# Patient Record
Sex: MALE | Race: BLACK OR AFRICAN AMERICAN | Employment: OTHER | URBAN - METROPOLITAN AREA
[De-identification: names, ages, dates, MRNs, and addresses within clinical notes are randomized per-mention and may not be internally consistent; named-entity substitution may affect disease eponyms.]

---

## 2018-12-26 ENCOUNTER — HOSPITAL ENCOUNTER (OUTPATIENT)
Age: 68
Discharge: HOME | End: 2018-12-28

## 2018-12-26 PROCEDURE — 88331 PATH CONSLTJ SURG 1 BLK 1SPC: CPT

## 2018-12-26 PROCEDURE — 88305 TISSUE EXAM BY PATHOLOGIST: CPT

## 2018-12-26 PROCEDURE — 88307 TISSUE EXAM BY PATHOLOGIST: CPT

## 2018-12-26 PROCEDURE — 88182 CELL MARKER STUDY: CPT

## 2018-12-26 PROCEDURE — 88184 FLOWCYTOMETRY/ TC 1 MARKER: CPT

## 2018-12-26 PROCEDURE — 88185 FLOWCYTOMETRY/TC ADD-ON: CPT

## 2018-12-28 LAB
Lab: NORMAL
REPORT: NORMAL
THIS TEST SENT TO: NORMAL

## 2022-01-07 ENCOUNTER — HOSPITAL ENCOUNTER (EMERGENCY)
Age: 72
Discharge: HOME | End: 2022-01-07
Attending: EMERGENCY MEDICINE
Payer: COMMERCIAL

## 2022-01-07 ENCOUNTER — APPOINTMENT (OUTPATIENT)
Dept: GENERAL RADIOLOGY | Age: 72
End: 2022-01-07
Payer: COMMERCIAL

## 2022-01-07 VITALS
RESPIRATION RATE: 17 BRPM | HEART RATE: 87 BPM | HEIGHT: 68 IN | BODY MASS INDEX: 19.7 KG/M2 | DIASTOLIC BLOOD PRESSURE: 76 MMHG | SYSTOLIC BLOOD PRESSURE: 148 MMHG | WEIGHT: 130 LBS | TEMPERATURE: 97.5 F | OXYGEN SATURATION: 96 %

## 2022-01-07 DIAGNOSIS — M25.511 ACUTE PAIN OF RIGHT SHOULDER: Primary | ICD-10-CM

## 2022-01-07 LAB
ANION GAP SERPL CALCULATED.3IONS-SCNC: 10 MMOL/L (ref 7–16)
BASOPHILS ABSOLUTE: 0.07 E9/L (ref 0–0.2)
BASOPHILS RELATIVE PERCENT: 0.9 % (ref 0–2)
BUN BLDV-MCNC: 8 MG/DL (ref 6–23)
CALCIUM SERPL-MCNC: 9.8 MG/DL (ref 8.6–10.2)
CHLORIDE BLD-SCNC: 102 MMOL/L (ref 98–107)
CO2: 26 MMOL/L (ref 22–29)
CREAT SERPL-MCNC: 1.1 MG/DL (ref 0.7–1.2)
EOSINOPHILS ABSOLUTE: 0.39 E9/L (ref 0.05–0.5)
EOSINOPHILS RELATIVE PERCENT: 5.2 % (ref 0–6)
GFR AFRICAN AMERICAN: >60
GFR NON-AFRICAN AMERICAN: >60 ML/MIN/1.73
GLUCOSE BLD-MCNC: 94 MG/DL (ref 74–99)
HCT VFR BLD CALC: 40.5 % (ref 37–54)
HEMOGLOBIN: 13.3 G/DL (ref 12.5–16.5)
IMMATURE GRANULOCYTES #: 0.04 E9/L
IMMATURE GRANULOCYTES %: 0.5 % (ref 0–5)
LYMPHOCYTES ABSOLUTE: 1.14 E9/L (ref 1.5–4)
LYMPHOCYTES RELATIVE PERCENT: 15.3 % (ref 20–42)
MCH RBC QN AUTO: 30.7 PG (ref 26–35)
MCHC RBC AUTO-ENTMCNC: 32.8 % (ref 32–34.5)
MCV RBC AUTO: 93.5 FL (ref 80–99.9)
MONOCYTES ABSOLUTE: 0.91 E9/L (ref 0.1–0.95)
MONOCYTES RELATIVE PERCENT: 12.2 % (ref 2–12)
NEUTROPHILS ABSOLUTE: 4.89 E9/L (ref 1.8–7.3)
NEUTROPHILS RELATIVE PERCENT: 65.9 % (ref 43–80)
PDW BLD-RTO: 13.2 FL (ref 11.5–15)
PLATELET # BLD: 182 E9/L (ref 130–450)
PMV BLD AUTO: 9.5 FL (ref 7–12)
POTASSIUM REFLEX MAGNESIUM: 4.4 MMOL/L (ref 3.5–5)
RBC # BLD: 4.33 E12/L (ref 3.8–5.8)
SARS-COV-2, NAAT: NOT DETECTED
SODIUM BLD-SCNC: 138 MMOL/L (ref 132–146)
TROPONIN, HIGH SENSITIVITY: 13 NG/L (ref 0–11)
TROPONIN, HIGH SENSITIVITY: 15 NG/L (ref 0–11)
WBC # BLD: 7.4 E9/L (ref 4.5–11.5)

## 2022-01-07 PROCEDURE — 87635 SARS-COV-2 COVID-19 AMP PRB: CPT

## 2022-01-07 PROCEDURE — 73030 X-RAY EXAM OF SHOULDER: CPT

## 2022-01-07 PROCEDURE — 6360000002 HC RX W HCPCS: Performed by: STUDENT IN AN ORGANIZED HEALTH CARE EDUCATION/TRAINING PROGRAM

## 2022-01-07 PROCEDURE — 84484 ASSAY OF TROPONIN QUANT: CPT

## 2022-01-07 PROCEDURE — 71045 X-RAY EXAM CHEST 1 VIEW: CPT

## 2022-01-07 PROCEDURE — 36415 COLL VENOUS BLD VENIPUNCTURE: CPT

## 2022-01-07 PROCEDURE — 85025 COMPLETE CBC W/AUTO DIFF WBC: CPT

## 2022-01-07 PROCEDURE — 99282 EMERGENCY DEPT VISIT SF MDM: CPT

## 2022-01-07 PROCEDURE — 96372 THER/PROPH/DIAG INJ SC/IM: CPT

## 2022-01-07 PROCEDURE — 93005 ELECTROCARDIOGRAM TRACING: CPT | Performed by: NURSE PRACTITIONER

## 2022-01-07 PROCEDURE — 80048 BASIC METABOLIC PNL TOTAL CA: CPT

## 2022-01-07 RX ORDER — KETOROLAC TROMETHAMINE 30 MG/ML
30 INJECTION, SOLUTION INTRAMUSCULAR; INTRAVENOUS ONCE
Status: COMPLETED | OUTPATIENT
Start: 2022-01-07 | End: 2022-01-07

## 2022-01-07 RX ADMIN — KETOROLAC TROMETHAMINE 30 MG: 30 INJECTION, SOLUTION INTRAMUSCULAR at 18:08

## 2022-01-07 ASSESSMENT — ENCOUNTER SYMPTOMS
NAUSEA: 0
EYE REDNESS: 0
VOMITING: 0
SINUS PRESSURE: 0
DIARRHEA: 0
COUGH: 0
SORE THROAT: 0
ABDOMINAL PAIN: 0
SHORTNESS OF BREATH: 0
BACK PAIN: 0
EYE DISCHARGE: 0
EYE PAIN: 0
WHEEZING: 0

## 2022-01-07 ASSESSMENT — PAIN SCALES - GENERAL: PAINLEVEL_OUTOF10: 6

## 2022-01-07 NOTE — ED NOTES
Department of Emergency Medicine  FIRST PROVIDER TRIAGE NOTE             Independent MLP           1/7/22  2:21 PM EST    Date of Encounter: 1/7/22   MRN: [id number]      HPI: Milo Baltazar is a 70 y.o. male who presents to the ED for Neck Pain (since last week, reports R sided neck and shoulder pain that radiates to R ribs. Denies SOB and CP)  Right side neck pain along with right shoulder pain and right rib pain. He denies any specific shortness of breath or chest pain. He states he has been having a cough that is been persistent. Denies any midline neck pain or numbness or tingling. Denies any falls or trauma. ROS: Negative for sob or fever. PE: Gen Appearance/Constitutional: alert  Neck: No midline tenderness. Tenderness on the right paraspinal  CV: regular rate. Right lateral chest wall tenderness  Pulm: CTA bilat     Initial Plan of Care: All treatment areas with department are currently occupied. Plan to order/Initiate the following while awaiting opening in ED: labs, EKG and imaging studies.     Initial Plan of Care: Initiate Treatment-Testing, Proceed toTreatment Area When Bed Available for ED Attending/MLP to Continue Care    Electronically signed by JASSI Carreon CNP   DD: 1/7/22         JASSI Carreon CNP  01/07/22 [id number]

## 2022-01-07 NOTE — ED NOTES
Bed: HD  Expected date:   Expected time:   Means of arrival:   Comments:  Rusty Nelson, GAYLE  01/07/22 9799

## 2022-01-07 NOTE — ED PROVIDER NOTES
Patient presents with right shoulder and neck pain. Patient states has been ongoing for 1 week. He denies any inciting event. Patient rates his pain as moderate to severe. He states that it was initially swallowing but that has improved over time. States the pain however has remained constant. He states the pain is worse with palpation and movement and better with nothing. He has not taken any medications to make it better nor worse. Patient denies any numbness, tingling, weakness down his arms. He denies any fevers, chest pain, shortness of breath, nausea, vomiting, or abdominal pain. Patient is a current smoker. The history is provided by the patient. No  was used. Review of Systems   Constitutional: Negative for chills and fever. HENT: Negative for ear pain, sinus pressure and sore throat. Eyes: Negative for pain, discharge and redness. Respiratory: Negative for cough, shortness of breath and wheezing. Cardiovascular: Negative for chest pain. Gastrointestinal: Negative for abdominal pain, diarrhea, nausea and vomiting. Genitourinary: Negative for dysuria and frequency. Musculoskeletal: Positive for neck pain and neck stiffness. Negative for arthralgias and back pain. Skin: Negative for rash and wound. Neurological: Negative for weakness and headaches. Hematological: Negative for adenopathy. All other systems reviewed and are negative. Physical Exam  Vitals and nursing note reviewed. Constitutional:       General: He is not in acute distress. Appearance: He is well-developed. HENT:      Head: Normocephalic and atraumatic. Eyes:      Conjunctiva/sclera: Conjunctivae normal.   Cardiovascular:      Rate and Rhythm: Normal rate and regular rhythm. Heart sounds: Normal heart sounds. No murmur heard. Pulmonary:      Effort: Pulmonary effort is normal. No respiratory distress. Breath sounds: Normal breath sounds.  No wheezing There are no obvious ST elevations however there are flattened T waves in lead III. No previous EKG for comparison. As interpreted by myself, the ED physician. [BB]      ED Course User Index  [BB] Carissa Martinez, DO       --------------------------------------------- PAST HISTORY ---------------------------------------------  Past Medical History:  has a past medical history of Asthma, Diabetes mellitus ([facility] [address] and Hypertension. Past Surgical History:  has a past surgical history that includes Dental surgery. Social History:  reports that he has been smoking cigarettes. He has been smoking about 0.50 packs per day. He has never used smokeless tobacco. He reports current alcohol use. He reports that he does not use drugs. Family History: family history includes Brain Cancer in his mother; Heart Attack in his father. The patients home medications have been reviewed. Allergies: Patient has no known allergies.     -------------------------------------------------- RESULTS -------------------------------------------------  Labs:  Results for orders placed or performed during the hospital encounter of 01/07/22   COVID-19, Rapid    Specimen: Nasopharyngeal Swab   Result Value Ref Range    SARS-CoV-2, NAAT Not Detected Not Detected   CBC Auto Differential   Result Value Ref Range    WBC 7.4 4.5 - 11.5 E9/L    RBC 4.33 3.80 - 5.80 E12/L    Hemoglobin 13.3 12.5 - 16.5 g/dL    Hematocrit 40.5 37.0 - 54.0 %    MCV 93.5 80.0 - 99.9 fL    MCH 30.7 26.0 - 35.0 pg    MCHC 32.8 32.0 - 34.5 %    RDW 13.2 11.5 - 15.0 fL    Platelets 205 194 - 915 E9/L    MPV 9.5 7.0 - 12.0 fL    Neutrophils % 65.9 43.0 - 80.0 %    Immature Granulocytes % 0.5 0.0 - 5.0 %    Lymphocytes % 15.3 (L) 20.0 - 42.0 %    Monocytes % 12.2 (H) 2.0 - 12.0 %    Eosinophils % 5.2 0.0 - 6.0 %    Basophils % 0.9 0.0 - 2.0 %    Neutrophils Absolute 4.89 1.80 - 7.30 E9/L    Immature Granulocytes # 0.04 E9/L    Lymphocytes Absolute 1.14 (L) 1.50 - 4.00 E9/L    Monocytes Absolute 0.91 0.10 - 0.95 E9/L    Eosinophils Absolute 0.39 0.05 - 0.50 E9/L    Basophils Absolute 0.07 0.00 - 0.20 M9/K   Basic Metabolic Panel w/ Reflex to MG   Result Value Ref Range    Sodium 138 132 - 146 mmol/L    Potassium reflex Magnesium 4.4 3.5 - 5.0 mmol/L    Chloride 102 98 - 107 mmol/L    CO2 26 22 - 29 mmol/L    Anion Gap 10 7 - 16 mmol/L    Glucose 94 74 - 99 mg/dL    BUN 8 6 - 23 mg/dL    CREATININE 1.1 0.7 - 1.2 mg/dL    GFR Non-African American >60 >=60 mL/min/1.73    GFR African American >60     Calcium 9.8 8.6 - 10.2 mg/dL   Troponin   Result Value Ref Range    Troponin, High Sensitivity 13 (H) 0 - 11 ng/L   Troponin   Result Value Ref Range    Troponin, High Sensitivity 15 (H) 0 - 11 ng/L   EKG 12 Lead   Result Value Ref Range    Ventricular Rate 89 BPM    Atrial Rate 89 BPM    P-R Interval 146 ms    QRS Duration 100 ms    Q-T Interval 374 ms    QTc Calculation (Bazett) 455 ms    P Axis 71 degrees    R Axis -47 degrees    T Axis 10 degrees       Radiology:  XR SHOULDER RIGHT (MIN 2 VIEWS)   Final Result   No acute abnormality. XR CHEST PORTABLE   Final Result   No acute process. XR RIBS RIGHT INCLUDE CHEST (MIN 3 VIEWS)    (Results Pending)       ------------------------- NURSING NOTES AND VITALS REVIEWED ---------------------------  Date / Time Roomed:  1/7/2022  4:05 PM  ED Bed Assignment:  SABA D/MARICRUZ    The nursing notes within the ED encounter and vital signs as below have been reviewed. BP (!) 148/76   Pulse 87   Temp 97.5 °F (36.4 °C)   Resp 17   Ht 5' 8\" (1.727 m)   Wt 130 lb (59 kg)   SpO2 96%   BMI 19.77 kg/m²   Oxygen Saturation Interpretation: Normal      ------------------------------------------ PROGRESS NOTES ------------------------------------------  8:48 PM EST  I have spoken with the patient and discussed todays results, in addition to providing specific details for the plan of care and counseling regarding the diagnosis and prognosis. Their questions are answered at this time and they are agreeable with the plan. I discussed at length with them reasons for immediate return here for re evaluation. They will followup with their primary care physician by calling their office tomorrow. --------------------------------- ADDITIONAL PROVIDER NOTES ---------------------------------  At this time the patient is without objective evidence of an acute process requiring hospitalization or inpatient management. They have remained hemodynamically stable throughout their entire ED visit and are stable for discharge with outpatient follow-up. The plan has been discussed in detail and they are aware of the specific conditions for emergent return, as well as the importance of follow-up. Discharge Medication List as of 1/7/2022  6:17 PM          Diagnosis:  1. Acute pain of right shoulder        Disposition:  Patient's disposition: Discharge to home  Patient's condition is stable.     Patient was seen and evaluated by both myself and Michael Toussaint, [facility],   Resident  01/07/22 2045

## 2022-01-08 LAB
EKG ATRIAL RATE: 89 BPM
EKG P AXIS: 71 DEGREES
EKG P-R INTERVAL: 146 MS
EKG Q-T INTERVAL: 374 MS
EKG QRS DURATION: 100 MS
EKG QTC CALCULATION (BAZETT): 455 MS
EKG R AXIS: -47 DEGREES
EKG T AXIS: 10 DEGREES
EKG VENTRICULAR RATE: 89 BPM

## 2022-01-08 PROCEDURE — 93010 ELECTROCARDIOGRAM REPORT: CPT | Performed by: INTERNAL MEDICINE

## 2022-08-08 ENCOUNTER — HOSPITAL ENCOUNTER (EMERGENCY)
Age: 72
Discharge: HOME | End: 2022-08-08
Payer: COMMERCIAL

## 2022-08-08 VITALS
HEART RATE: 94 BPM | RESPIRATION RATE: 16 BRPM | TEMPERATURE: 98.2 F | HEIGHT: 68 IN | OXYGEN SATURATION: 97 % | SYSTOLIC BLOOD PRESSURE: 130 MMHG | DIASTOLIC BLOOD PRESSURE: 89 MMHG | WEIGHT: 132 LBS | BODY MASS INDEX: 20 KG/M2

## 2022-08-08 DIAGNOSIS — S61.412A LACERATION OF LEFT HAND WITHOUT FOREIGN BODY, INITIAL ENCOUNTER: Primary | ICD-10-CM

## 2022-08-08 PROCEDURE — 99283 EMERGENCY DEPT VISIT LOW MDM: CPT

## 2022-08-08 PROCEDURE — 6370000000 HC RX 637 (ALT 250 FOR IP): Performed by: PHYSICIAN ASSISTANT

## 2022-08-08 RX ORDER — PRAMOXINE HCL/BENZALKONIUM CHL 1%-0.13%
SPRAY, NON-AEROSOL (ML) TOPICAL 3 TIMES DAILY
Qty: 28 G | Refills: 0 | Status: SHIPPED | OUTPATIENT
Start: 2022-08-08 | End: 2022-08-15

## 2022-08-08 RX ORDER — BACITRACIN, NEOMYCIN, POLYMYXIN B 400; 3.5; 5 [USP'U]/G; MG/G; [USP'U]/G
OINTMENT TOPICAL 2 TIMES DAILY
Status: DISCONTINUED | OUTPATIENT
Start: 2022-08-08 | End: 2022-08-08 | Stop reason: HOSPADM

## 2022-08-08 RX ORDER — CEPHALEXIN 500 MG/1
500 CAPSULE ORAL ONCE
Status: COMPLETED | OUTPATIENT
Start: 2022-08-08 | End: 2022-08-08

## 2022-08-08 RX ORDER — CEPHALEXIN 500 MG/1
500 CAPSULE ORAL 3 TIMES DAILY
Qty: 21 CAPSULE | Refills: 0 | Status: SHIPPED | OUTPATIENT
Start: 2022-08-08 | End: 2022-08-15

## 2022-08-08 RX ADMIN — CEPHALEXIN 500 MG: 500 CAPSULE ORAL at 13:26

## 2022-08-08 RX ADMIN — POLYMYXIN B SULFATE, BACITRACIN ZINC, NEOMYCIN SULFATE 1 EACH: 5000; 3.5; 4 OINTMENT TOPICAL at 14:14

## 2022-08-08 ASSESSMENT — LIFESTYLE VARIABLES
HOW MANY STANDARD DRINKS CONTAINING ALCOHOL DO YOU HAVE ON A TYPICAL DAY: 3 OR 4
HOW OFTEN DO YOU HAVE A DRINK CONTAINING ALCOHOL: 2-4 TIMES A MONTH

## 2022-08-08 NOTE — ED PROVIDER NOTES
[address]  Department of Emergency Medicine   ED  Encounter Note  Admit Date/RoomTime: 2022 12:53 PM  ED Room: [place] [address]    NAME: Tian Gao  : 1950  MRN: [id number]     Chief Complaint:  Laceration (Laceration to left thumb. )    History of Present Illness       Tian Gao is a 67 y.o. old male presenting to the emergency department by private vehicle, for a laceration to the interdigital space between the left #1-2 fingers, caused by falling on an outstretched hand, which occurred approximately \"a couple \" day(s) prior to arrival.  There is not a possibility of retained foreign body in the affected area. Bleeding is  controlled. He takes no blood thinning agents. There is pain at injury site. Tetanus Status:  within past 5 years. ROS   Pertinent positives and negatives are stated within HPI, all other systems reviewed and are negative. Past Medical History:  has a past medical history of Asthma, Diabetes mellitus ([address] and Hypertension. Surgical History:  has a past surgical history that includes Dental surgery. Social History:  reports that he has been smoking cigarettes. He has been smoking an average of .5 packs per day. He has never used smokeless tobacco. He reports current alcohol use. He reports that he does not use drugs. Family History: family history includes Brain Cancer in his mother; Heart Attack in his father. Allergies: Patient has no known allergies. Physical Exam  Physical Exam   Oxygen Saturation Interpretation: Normal.        ED Triage Vitals   BP Temp Temp Source Heart Rate Resp SpO2 Height Weight   22 1250 22 1227 22 1227 22 1227 22 1250 22 1227 22 1250 22 1250   130/89 98.2 °F (36.8 °C) Oral 94 16 97 % 5' 8\" (1.727 m) 132 lb (59.9 kg)         Constitutional:  Alert, development consistent with age. Neck:  Normal ROM. Supple. Non-tender.   Hand: Left interdigital space between fingers #1-2            Tenderness: mild. Swelling: none. Deformity: No.             Skin: See clinical image below, 30 cm laceration. Neurovascular: Motor deficit: none. Sensory deficit: none. Pulse deficit: none. Capillary refill: normal.  Fingers:  Left all fingers diffusely            Tenderness:  none. Swelling: none. Deformity: No.              ROM: full range of motion. Skin:  no wounds, erythema, or swelling. Wrist:  Left diffusely across carpal bones             Tenderness:  none. Swelling: none. Deformity: No.             ROM: Full range of motion. Skin:  no wounds, erythema, or swelling. Lymphatics: No lymphangitis or adenopathy noted. Neurological:  Oriented. Motor functions intact. **Informed Consent**    The patient has given verbal consent to have photos taken of left thumb lac and electronically inserted into their ED Provider Note as part of their permanent medical record for purposes of illustration, documentation, treatment management and/or medical review. All Images taken on 8/8/22 of patient name: Homer Gordon were taken by a [facility] approved registered mobile device via [employer] mobile application and transmitted then stored on a secured [employer] Site located within [facility]. Lab / Imaging Results   (All laboratory and radiology results have been personally reviewed by myself)  Labs:  No results found for this visit on 08/08/22. Imaging: All Radiology results interpreted by Radiologist unless otherwise noted.   No orders to display     ED Course / Medical Decision Making     Medications   neomycin-bacitracin-polymyxin (NEOSPORIN) ointment (has no administration in time range)   cephALEXin (KEFLEX) capsule 500 mg (has no administration in time range)        Consult(s):   None    Procedure(s):   There were no wounds requiring formal closure. MDM:   Patient presents to the emergency department for a finger laceration that occurred \"a couple\" days prior to arrival.  Wound shows signs of healing and he is well beyond the timeframe for consideration of wound approximation with sutures. Explained to the patient that wound closure at this time would increase his risk of infection. We have encouraged him to discontinue use of hydrogen peroxide. Dressing was changed in the emergency room. Wound care discussed. Signs of infection explained. Patient received medications below for antibiotic prophylaxis. Patient educated on all new meds. Follow-up primary care provider as needed. Return for any worsening symptoms. Plan of Care/Counseling:  Vita Agustin PA-C reviewed today's visit with the patient in addition to providing specific details for the plan of care and counseling regarding the diagnosis and prognosis. Questions are answered at this time and are agreeable with the plan. Assessment      1. Laceration of left hand without foreign body, initial encounter      Plan   Discharged home. Patient condition is good    New Medications     New Prescriptions    CEPHALEXIN (KEFLEX) 500 MG CAPSULE    Take 1 capsule by mouth in the morning and 1 capsule at noon and 1 capsule before bedtime. Do all this for 7 days. NEOMYCIN-POLYMYXIN-PRAMOXINE (NEOSPORIN PLUS) 1 % CREAM    Apply topically 3 times daily for 7 days Apply topically 2 times daily. Electronically signed by Vita Agustin PA-C   DD: 8/8/22  **This report was transcribed using voice recognition software. Every effort was made to ensure accuracy; however, inadvertent computerized transcription errors may be present.   END OF ED PROVIDER NOTE       Vita Agustin PA-C  08/08/22 [id number]

## 2022-08-26 ENCOUNTER — HOSPITAL ENCOUNTER (INPATIENT)
Age: 72
LOS: 2 days | Discharge: HOME | DRG: 469 | End: 2022-08-28
Attending: EMERGENCY MEDICINE | Admitting: FAMILY MEDICINE
Payer: COMMERCIAL

## 2022-08-26 ENCOUNTER — APPOINTMENT (OUTPATIENT)
Dept: GENERAL RADIOLOGY | Age: 72
DRG: 469 | End: 2022-08-26
Payer: COMMERCIAL

## 2022-08-26 DIAGNOSIS — U07.1 COVID-19: Primary | ICD-10-CM

## 2022-08-26 PROBLEM — N17.9 ACUTE RENAL FAILURE (ARF) (HCC): Status: ACTIVE | Noted: 2022-08-26

## 2022-08-26 LAB
ALBUMIN SERPL-MCNC: 4.1 G/DL (ref 3.5–5.2)
ALP BLD-CCNC: 79 U/L (ref 40–129)
ALT SERPL-CCNC: 11 U/L (ref 0–40)
ANION GAP SERPL CALCULATED.3IONS-SCNC: 17 MMOL/L (ref 7–16)
AST SERPL-CCNC: 27 U/L (ref 0–39)
BASOPHILS ABSOLUTE: 0.02 E9/L (ref 0–0.2)
BASOPHILS RELATIVE PERCENT: 0.4 % (ref 0–2)
BILIRUB SERPL-MCNC: 0.6 MG/DL (ref 0–1.2)
BUN BLDV-MCNC: 23 MG/DL (ref 6–23)
CALCIUM SERPL-MCNC: 9.9 MG/DL (ref 8.6–10.2)
CHLORIDE BLD-SCNC: 101 MMOL/L (ref 98–107)
CO2: 23 MMOL/L (ref 22–29)
CREAT SERPL-MCNC: 2.2 MG/DL (ref 0.7–1.2)
EOSINOPHILS ABSOLUTE: 0.03 E9/L (ref 0.05–0.5)
EOSINOPHILS RELATIVE PERCENT: 0.5 % (ref 0–6)
GFR AFRICAN AMERICAN: 36
GFR NON-AFRICAN AMERICAN: 36 ML/MIN/1.73
GLUCOSE BLD-MCNC: 153 MG/DL (ref 74–99)
HCT VFR BLD CALC: 42.5 % (ref 37–54)
HEMOGLOBIN: 14.5 G/DL (ref 12.5–16.5)
IMMATURE GRANULOCYTES #: 0.04 E9/L
IMMATURE GRANULOCYTES %: 0.7 % (ref 0–5)
LYMPHOCYTES ABSOLUTE: 1.1 E9/L (ref 1.5–4)
LYMPHOCYTES RELATIVE PERCENT: 19.5 % (ref 20–42)
MCH RBC QN AUTO: 30.2 PG (ref 26–35)
MCHC RBC AUTO-ENTMCNC: 34.1 % (ref 32–34.5)
MCV RBC AUTO: 88.5 FL (ref 80–99.9)
MONOCYTES ABSOLUTE: 0.74 E9/L (ref 0.1–0.95)
MONOCYTES RELATIVE PERCENT: 13.1 % (ref 2–12)
NEUTROPHILS ABSOLUTE: 3.7 E9/L (ref 1.8–7.3)
NEUTROPHILS RELATIVE PERCENT: 65.8 % (ref 43–80)
PDW BLD-RTO: 12.8 FL (ref 11.5–15)
PLATELET # BLD: 180 E9/L (ref 130–450)
PMV BLD AUTO: 9.8 FL (ref 7–12)
POTASSIUM REFLEX MAGNESIUM: 4.8 MMOL/L (ref 3.5–5)
PRO-BNP: 889 PG/ML (ref 0–125)
RBC # BLD: 4.8 E12/L (ref 3.8–5.8)
REASON FOR REJECTION: NORMAL
REJECTED TEST: NORMAL
SARS-COV-2, NAAT: DETECTED
SODIUM BLD-SCNC: 141 MMOL/L (ref 132–146)
TOTAL PROTEIN: 8.8 G/DL (ref 6.4–8.3)
TROPONIN, HIGH SENSITIVITY: 41 NG/L (ref 0–11)
WBC # BLD: 5.6 E9/L (ref 4.5–11.5)

## 2022-08-26 PROCEDURE — 83880 ASSAY OF NATRIURETIC PEPTIDE: CPT

## 2022-08-26 PROCEDURE — 85025 COMPLETE CBC W/AUTO DIFF WBC: CPT

## 2022-08-26 PROCEDURE — 6370000000 HC RX 637 (ALT 250 FOR IP)

## 2022-08-26 PROCEDURE — 93005 ELECTROCARDIOGRAM TRACING: CPT

## 2022-08-26 PROCEDURE — 80053 COMPREHEN METABOLIC PANEL: CPT

## 2022-08-26 PROCEDURE — 87635 SARS-COV-2 COVID-19 AMP PRB: CPT

## 2022-08-26 PROCEDURE — 2140000000 HC CCU INTERMEDIATE R&B

## 2022-08-26 PROCEDURE — 99285 EMERGENCY DEPT VISIT HI MDM: CPT

## 2022-08-26 PROCEDURE — 84484 ASSAY OF TROPONIN QUANT: CPT

## 2022-08-26 PROCEDURE — 71045 X-RAY EXAM CHEST 1 VIEW: CPT

## 2022-08-26 RX ORDER — ASPIRIN 81 MG/1
324 TABLET, CHEWABLE ORAL ONCE
Status: COMPLETED | OUTPATIENT
Start: 2022-08-26 | End: 2022-08-26

## 2022-08-26 RX ADMIN — ASPIRIN 324 MG: 81 TABLET, CHEWABLE ORAL at 19:54

## 2022-08-26 ASSESSMENT — ENCOUNTER SYMPTOMS
WHEEZING: 0
EYE REDNESS: 0
TROUBLE SWALLOWING: 0
DIARRHEA: 0
PHOTOPHOBIA: 0
VOMITING: 0
ABDOMINAL PAIN: 0
RHINORRHEA: 0
BACK PAIN: 0
NAUSEA: 0
COUGH: 0
VOICE CHANGE: 0
EYE DISCHARGE: 0

## 2022-08-26 ASSESSMENT — PAIN - FUNCTIONAL ASSESSMENT
PAIN_FUNCTIONAL_ASSESSMENT: NONE - DENIES PAIN
PAIN_FUNCTIONAL_ASSESSMENT: NONE - DENIES PAIN

## 2022-08-26 ASSESSMENT — LIFESTYLE VARIABLES
HOW MANY STANDARD DRINKS CONTAINING ALCOHOL DO YOU HAVE ON A TYPICAL DAY: 1 OR 2
HOW OFTEN DO YOU HAVE A DRINK CONTAINING ALCOHOL: MONTHLY OR LESS
HOW MANY STANDARD DRINKS CONTAINING ALCOHOL DO YOU HAVE ON A TYPICAL DAY: PATIENT DOES NOT DRINK
HOW OFTEN DO YOU HAVE A DRINK CONTAINING ALCOHOL: NEVER

## 2022-08-27 ENCOUNTER — APPOINTMENT (OUTPATIENT)
Dept: NUCLEAR MEDICINE | Age: 72
DRG: 469 | End: 2022-08-27
Payer: COMMERCIAL

## 2022-08-27 ENCOUNTER — APPOINTMENT (OUTPATIENT)
Dept: ULTRASOUND IMAGING | Age: 72
DRG: 469 | End: 2022-08-27
Payer: COMMERCIAL

## 2022-08-27 LAB
ALBUMIN SERPL-MCNC: 3.7 G/DL (ref 3.5–5.2)
ALP BLD-CCNC: 69 U/L (ref 40–129)
ALT SERPL-CCNC: 10 U/L (ref 0–40)
ANION GAP SERPL CALCULATED.3IONS-SCNC: 16 MMOL/L (ref 7–16)
AST SERPL-CCNC: 27 U/L (ref 0–39)
BASOPHILS ABSOLUTE: 0.01 E9/L (ref 0–0.2)
BASOPHILS RELATIVE PERCENT: 0.4 % (ref 0–2)
BILIRUB SERPL-MCNC: 0.3 MG/DL (ref 0–1.2)
BUN BLDV-MCNC: 30 MG/DL (ref 6–23)
C-REACTIVE PROTEIN: 0.4 MG/DL (ref 0–0.4)
C-REACTIVE PROTEIN: 0.4 MG/DL (ref 0–0.4)
CALCIUM SERPL-MCNC: 9.3 MG/DL (ref 8.6–10.2)
CHLORIDE BLD-SCNC: 100 MMOL/L (ref 98–107)
CO2: 21 MMOL/L (ref 22–29)
CREAT SERPL-MCNC: 2.1 MG/DL (ref 0.7–1.2)
D DIMER: 1500 NG/ML DDU
EOSINOPHILS ABSOLUTE: 0 E9/L (ref 0.05–0.5)
EOSINOPHILS RELATIVE PERCENT: 0 % (ref 0–6)
FERRITIN: 1371 NG/ML
GFR AFRICAN AMERICAN: 38
GFR NON-AFRICAN AMERICAN: 38 ML/MIN/1.73
GLUCOSE BLD-MCNC: 161 MG/DL (ref 74–99)
HCT VFR BLD CALC: 40 % (ref 37–54)
HEMOGLOBIN: 13.5 G/DL (ref 12.5–16.5)
IMMATURE GRANULOCYTES #: 0.01 E9/L
IMMATURE GRANULOCYTES %: 0.4 % (ref 0–5)
LACTIC ACID: 2.6 MMOL/L (ref 0.5–2.2)
LYMPHOCYTES ABSOLUTE: 0.71 E9/L (ref 1.5–4)
LYMPHOCYTES RELATIVE PERCENT: 25.3 % (ref 20–42)
MCH RBC QN AUTO: 30.9 PG (ref 26–35)
MCHC RBC AUTO-ENTMCNC: 33.8 % (ref 32–34.5)
MCV RBC AUTO: 91.5 FL (ref 80–99.9)
METER GLUCOSE: 135 MG/DL (ref 74–99)
METER GLUCOSE: 137 MG/DL (ref 74–99)
METER GLUCOSE: 163 MG/DL (ref 74–99)
MONOCYTES ABSOLUTE: 0.18 E9/L (ref 0.1–0.95)
MONOCYTES RELATIVE PERCENT: 6.4 % (ref 2–12)
NEUTROPHILS ABSOLUTE: 1.9 E9/L (ref 1.8–7.3)
NEUTROPHILS RELATIVE PERCENT: 67.5 % (ref 43–80)
PDW BLD-RTO: 12.9 FL (ref 11.5–15)
PLATELET # BLD: 177 E9/L (ref 130–450)
PMV BLD AUTO: 10 FL (ref 7–12)
POTASSIUM REFLEX MAGNESIUM: 5.1 MMOL/L (ref 3.5–5)
RBC # BLD: 4.37 E12/L (ref 3.8–5.8)
SODIUM BLD-SCNC: 137 MMOL/L (ref 132–146)
TOTAL PROTEIN: 7.9 G/DL (ref 6.4–8.3)
TROPONIN, HIGH SENSITIVITY: 23 NG/L (ref 0–11)
TROPONIN, HIGH SENSITIVITY: 23 NG/L (ref 0–11)
TROPONIN, HIGH SENSITIVITY: 27 NG/L (ref 0–11)
VITAMIN D 25-HYDROXY: 92 NG/ML (ref 30–100)
WBC # BLD: 2.8 E9/L (ref 4.5–11.5)

## 2022-08-27 PROCEDURE — 85378 FIBRIN DEGRADE SEMIQUANT: CPT

## 2022-08-27 PROCEDURE — 82728 ASSAY OF FERRITIN: CPT

## 2022-08-27 PROCEDURE — 83605 ASSAY OF LACTIC ACID: CPT

## 2022-08-27 PROCEDURE — 85025 COMPLETE CBC W/AUTO DIFF WBC: CPT

## 2022-08-27 PROCEDURE — 80053 COMPREHEN METABOLIC PANEL: CPT

## 2022-08-27 PROCEDURE — 2580000003 HC RX 258: Performed by: FAMILY MEDICINE

## 2022-08-27 PROCEDURE — A9539 TC99M PENTETATE: HCPCS | Performed by: RADIOLOGY

## 2022-08-27 PROCEDURE — 2140000000 HC CCU INTERMEDIATE R&B

## 2022-08-27 PROCEDURE — 6370000000 HC RX 637 (ALT 250 FOR IP): Performed by: FAMILY MEDICINE

## 2022-08-27 PROCEDURE — 82306 VITAMIN D 25 HYDROXY: CPT

## 2022-08-27 PROCEDURE — 93971 EXTREMITY STUDY: CPT | Performed by: RADIOLOGY

## 2022-08-27 PROCEDURE — A9540 TC99M MAA: HCPCS | Performed by: RADIOLOGY

## 2022-08-27 PROCEDURE — 82962 GLUCOSE BLOOD TEST: CPT

## 2022-08-27 PROCEDURE — 86140 C-REACTIVE PROTEIN: CPT

## 2022-08-27 PROCEDURE — 2700000000 HC OXYGEN THERAPY PER DAY

## 2022-08-27 PROCEDURE — 93005 ELECTROCARDIOGRAM TRACING: CPT | Performed by: FAMILY MEDICINE

## 2022-08-27 PROCEDURE — 78582 LUNG VENTILAT&PERFUS IMAGING: CPT | Performed by: RADIOLOGY

## 2022-08-27 PROCEDURE — 3430000000 HC RX DIAGNOSTIC RADIOPHARMACEUTICAL: Performed by: RADIOLOGY

## 2022-08-27 PROCEDURE — 84484 ASSAY OF TROPONIN QUANT: CPT

## 2022-08-27 PROCEDURE — 6360000002 HC RX W HCPCS: Performed by: FAMILY MEDICINE

## 2022-08-27 PROCEDURE — 93970 EXTREMITY STUDY: CPT

## 2022-08-27 PROCEDURE — 78582 LUNG VENTILAT&PERFUS IMAGING: CPT

## 2022-08-27 PROCEDURE — 36415 COLL VENOUS BLD VENIPUNCTURE: CPT

## 2022-08-27 RX ORDER — LIDOCAINE 4 G/G
1 PATCH TOPICAL DAILY
Status: DISCONTINUED | OUTPATIENT
Start: 2022-08-27 | End: 2022-08-28 | Stop reason: HOSPADM

## 2022-08-27 RX ORDER — VITAMIN B COMPLEX
1000 TABLET ORAL DAILY
Status: DISCONTINUED | OUTPATIENT
Start: 2022-08-27 | End: 2022-08-28 | Stop reason: HOSPADM

## 2022-08-27 RX ORDER — KIT FOR THE PREPARATION OF TECHNETIUM TC 99M PENTETATE 20 MG/1
35 INJECTION, POWDER, LYOPHILIZED, FOR SOLUTION INTRAVENOUS; RESPIRATORY (INHALATION)
Status: COMPLETED | OUTPATIENT
Start: 2022-08-27 | End: 2022-08-27

## 2022-08-27 RX ORDER — INSULIN LISPRO 100 [IU]/ML
0-8 INJECTION, SOLUTION INTRAVENOUS; SUBCUTANEOUS
Status: DISCONTINUED | OUTPATIENT
Start: 2022-08-27 | End: 2022-08-28 | Stop reason: HOSPADM

## 2022-08-27 RX ORDER — INSULIN LISPRO 100 [IU]/ML
0-4 INJECTION, SOLUTION INTRAVENOUS; SUBCUTANEOUS NIGHTLY
Status: DISCONTINUED | OUTPATIENT
Start: 2022-08-27 | End: 2022-08-28 | Stop reason: HOSPADM

## 2022-08-27 RX ORDER — ENOXAPARIN SODIUM 100 MG/ML
30 INJECTION SUBCUTANEOUS DAILY
Status: DISCONTINUED | OUTPATIENT
Start: 2022-08-27 | End: 2022-08-27

## 2022-08-27 RX ORDER — ONDANSETRON 2 MG/ML
4 INJECTION INTRAMUSCULAR; INTRAVENOUS EVERY 6 HOURS PRN
Status: DISCONTINUED | OUTPATIENT
Start: 2022-08-27 | End: 2022-08-28 | Stop reason: HOSPADM

## 2022-08-27 RX ORDER — SODIUM CHLORIDE 0.9 % (FLUSH) 0.9 %
5-40 SYRINGE (ML) INJECTION EVERY 12 HOURS SCHEDULED
Status: DISCONTINUED | OUTPATIENT
Start: 2022-08-27 | End: 2022-08-28 | Stop reason: HOSPADM

## 2022-08-27 RX ORDER — ONDANSETRON 4 MG/1
4 TABLET, ORALLY DISINTEGRATING ORAL EVERY 8 HOURS PRN
Status: DISCONTINUED | OUTPATIENT
Start: 2022-08-27 | End: 2022-08-28 | Stop reason: HOSPADM

## 2022-08-27 RX ORDER — ATORVASTATIN CALCIUM 10 MG/1
10 TABLET, FILM COATED ORAL DAILY
Status: DISCONTINUED | OUTPATIENT
Start: 2022-08-27 | End: 2022-08-28 | Stop reason: HOSPADM

## 2022-08-27 RX ORDER — SODIUM CHLORIDE 9 MG/ML
INJECTION, SOLUTION INTRAVENOUS CONTINUOUS
Status: DISCONTINUED | OUTPATIENT
Start: 2022-08-27 | End: 2022-08-28

## 2022-08-27 RX ORDER — ACETAMINOPHEN 650 MG/1
650 SUPPOSITORY RECTAL EVERY 6 HOURS PRN
Status: DISCONTINUED | OUTPATIENT
Start: 2022-08-27 | End: 2022-08-28 | Stop reason: HOSPADM

## 2022-08-27 RX ORDER — ASPIRIN 81 MG/1
81 TABLET ORAL DAILY
Status: DISCONTINUED | OUTPATIENT
Start: 2022-08-27 | End: 2022-08-28 | Stop reason: HOSPADM

## 2022-08-27 RX ORDER — CYCLOBENZAPRINE HCL 10 MG
5 TABLET ORAL 2 TIMES DAILY PRN
Status: DISCONTINUED | OUTPATIENT
Start: 2022-08-27 | End: 2022-08-28 | Stop reason: HOSPADM

## 2022-08-27 RX ORDER — DEXAMETHASONE 4 MG/1
6 TABLET ORAL DAILY
Status: DISCONTINUED | OUTPATIENT
Start: 2022-08-27 | End: 2022-08-28 | Stop reason: HOSPADM

## 2022-08-27 RX ORDER — SODIUM CHLORIDE 9 MG/ML
INJECTION, SOLUTION INTRAVENOUS PRN
Status: DISCONTINUED | OUTPATIENT
Start: 2022-08-27 | End: 2022-08-28 | Stop reason: HOSPADM

## 2022-08-27 RX ORDER — ALBUTEROL SULFATE 90 UG/1
2 AEROSOL, METERED RESPIRATORY (INHALATION) EVERY 4 HOURS PRN
Status: DISCONTINUED | OUTPATIENT
Start: 2022-08-27 | End: 2022-08-28 | Stop reason: HOSPADM

## 2022-08-27 RX ORDER — SODIUM CHLORIDE 0.9 % (FLUSH) 0.9 %
5-40 SYRINGE (ML) INJECTION PRN
Status: DISCONTINUED | OUTPATIENT
Start: 2022-08-27 | End: 2022-08-28 | Stop reason: HOSPADM

## 2022-08-27 RX ORDER — ACETAMINOPHEN 325 MG/1
650 TABLET ORAL EVERY 6 HOURS PRN
Status: DISCONTINUED | OUTPATIENT
Start: 2022-08-27 | End: 2022-08-28 | Stop reason: HOSPADM

## 2022-08-27 RX ORDER — HEPARIN SODIUM 10000 [USP'U]/ML
5000 INJECTION, SOLUTION INTRAVENOUS; SUBCUTANEOUS EVERY 8 HOURS
Status: DISCONTINUED | OUTPATIENT
Start: 2022-08-27 | End: 2022-08-28 | Stop reason: HOSPADM

## 2022-08-27 RX ORDER — POLYETHYLENE GLYCOL 3350 17 G/17G
17 POWDER, FOR SOLUTION ORAL DAILY PRN
Status: DISCONTINUED | OUTPATIENT
Start: 2022-08-27 | End: 2022-08-28 | Stop reason: HOSPADM

## 2022-08-27 RX ADMIN — SODIUM CHLORIDE, PRESERVATIVE FREE 10 ML: 5 INJECTION INTRAVENOUS at 13:59

## 2022-08-27 RX ADMIN — DEXAMETHASONE 6 MG: 4 TABLET ORAL at 13:52

## 2022-08-27 RX ADMIN — SODIUM CHLORIDE: 9 INJECTION, SOLUTION INTRAVENOUS at 21:28

## 2022-08-27 RX ADMIN — MOMETASONE FUROATE AND FORMOTEROL FUMARATE DIHYDRATE 2 PUFF: 200; 5 AEROSOL RESPIRATORY (INHALATION) at 21:24

## 2022-08-27 RX ADMIN — HEPARIN SODIUM 5000 UNITS: 10000 INJECTION INTRAVENOUS; SUBCUTANEOUS at 13:54

## 2022-08-27 RX ADMIN — KIT FOR THE PREPARATION OF TECHNETIUM TC 99M PENTETATE 35 MILLICURIE: 20 INJECTION, POWDER, LYOPHILIZED, FOR SOLUTION INTRAVENOUS; RESPIRATORY (INHALATION) at 12:33

## 2022-08-27 RX ADMIN — Medication 6 MILLICURIE: at 12:33

## 2022-08-27 RX ADMIN — Medication 1000 UNITS: at 13:54

## 2022-08-27 RX ADMIN — ASPIRIN 81 MG: 81 TABLET, COATED ORAL at 13:54

## 2022-08-27 RX ADMIN — ATORVASTATIN CALCIUM 10 MG: 10 TABLET, FILM COATED ORAL at 21:24

## 2022-08-27 RX ADMIN — SODIUM CHLORIDE: 9 INJECTION, SOLUTION INTRAVENOUS at 08:28

## 2022-08-27 ASSESSMENT — ENCOUNTER SYMPTOMS: SHORTNESS OF BREATH: 1

## 2022-08-27 NOTE — ED PROVIDER NOTES
67y.o. year old male presenting to the emergency room with concerns of shortness of breath beginning prior to arrival.  Patient reports that symptom's onset prior to arrival. Worsen with using the restroom. Improves with nothing. Severity of moderate, with no radiation. Symptoms are intermittent in timing. Symptoms described as dizziness, increased work of breathing after using the bathroom. Patient reports associated symptoms of dizziness. Patient in no acute distress. Patient with previous history of asthma, smoking history. Patient denies any chest pain. Chief Complaint   Patient presents with    Shortness of Breath     Sudden SOB while in bathroom       Review of Systems   Constitutional:  Negative for chills, fatigue and fever. HENT:  Negative for congestion, rhinorrhea, trouble swallowing and voice change. Eyes:  Negative for photophobia, discharge, redness and visual disturbance. Respiratory:  Positive for shortness of breath. Negative for cough and wheezing. Cardiovascular:  Negative for chest pain and palpitations. Gastrointestinal:  Negative for abdominal pain, diarrhea, nausea and vomiting. Genitourinary:  Negative for dysuria, hematuria and urgency. Musculoskeletal:  Negative for arthralgias, back pain and neck pain. Skin:  Negative for rash and wound. Neurological:  Positive for dizziness. Negative for syncope, weakness, numbness and headaches. Psychiatric/Behavioral:  Negative for behavioral problems and confusion. The patient is nervous/anxious. Physical Exam  Vitals reviewed. Constitutional:       General: He is not in acute distress. Appearance: Normal appearance. HENT:      Head: Normocephalic. Right Ear: External ear normal.      Left Ear: External ear normal.      Nose: Nose normal.      Mouth/Throat:      Pharynx: Oropharynx is clear. No oropharyngeal exudate. Eyes:      General:         Right eye: No discharge. Left eye: No discharge. Conjunctiva/sclera: Conjunctivae normal.      Pupils: Pupils are equal, round, and reactive to light. Cardiovascular:      Rate and Rhythm: Normal rate and regular rhythm. Heart sounds: No murmur heard. No friction rub. No gallop. Pulmonary:      Effort: Accessory muscle usage present. No respiratory distress. Breath sounds: No stridor. Decreased breath sounds present. Abdominal:      General: There is no distension. Palpations: Abdomen is soft. Tenderness: There is no abdominal tenderness. There is no guarding or rebound. Musculoskeletal:         General: No tenderness or deformity. Cervical back: Normal range of motion and neck supple. No rigidity or tenderness. Right lower leg: No edema. Left lower leg: No edema. Skin:     General: Skin is warm. Coloration: Skin is not jaundiced. Findings: No erythema. Neurological:      Mental Status: He is alert and oriented to person, place, and time. Sensory: No sensory deficit. Motor: No weakness. Psychiatric:         Mood and Affect: Mood is anxious. Behavior: Behavior normal.        Procedures     XR CHEST PORTABLE   Final Result   No evidence of pneumonia or pleural effusion. EKG:  This EKG is signed by emergency department physician. Rate: 99  Rhythm: Sinus  AXIS:left  ST Changes:none  Interpretation: sinus arrhythmia, incomplete right bundle branch block, t wave inversion V4-V6  Comparison: changes compared to previous EKG     MDM  Number of Diagnoses or Management Options  Diagnosis management comments: 79-year-old male presented emergency department complaints of shortness of breath. Patient alert and oriented on initial exam, no acute distress given aspirin. Patient reporting that he had episode of shortness of breath after defecating. Patient COVID-positive. Chest x-ray negative. Patient with HENNY noted.   Spoke to hospitalist, discussed patient plan admit patient at this time. Amount and/or Complexity of Data Reviewed  Decide to obtain previous medical records or to obtain history from someone other than the patient: yes                    --------------------------------------------- PAST HISTORY ---------------------------------------------  Past Medical History:  has a past medical history of Asthma, Diabetes mellitus ([facility] [address] and Hypertension. Past Surgical History:  has a past surgical history that includes Dental surgery. Social History:  reports that he has been smoking cigarettes. He has been smoking an average of .5 packs per day. He has never used smokeless tobacco. He reports current alcohol use. He reports that he does not use drugs. Family History: family history includes Brain Cancer in his mother; Heart Attack in his father. The patients home medications have been reviewed. Allergies: Patient has no known allergies.     -------------------------------------------------- RESULTS -------------------------------------------------    LABS:  Results for orders placed or performed during the hospital encounter of 08/26/22   COVID-19, Rapid    Specimen: Nasopharyngeal Swab   Result Value Ref Range    SARS-CoV-2, NAAT DETECTED (A) Not Detected   CBC with Auto Differential   Result Value Ref Range    WBC 5.6 4.5 - 11.5 E9/L    RBC 4.80 3.80 - 5.80 E12/L    Hemoglobin 14.5 12.5 - 16.5 g/dL    Hematocrit 42.5 37.0 - 54.0 %    MCV 88.5 80.0 - 99.9 fL    MCH 30.2 26.0 - 35.0 pg    MCHC 34.1 32.0 - 34.5 %    RDW 12.8 11.5 - 15.0 fL    Platelets 808 174 - 791 E9/L    MPV 9.8 7.0 - 12.0 fL    Neutrophils % 65.8 43.0 - 80.0 %    Immature Granulocytes % 0.7 0.0 - 5.0 %    Lymphocytes % 19.5 (L) 20.0 - 42.0 %    Monocytes % 13.1 (H) 2.0 - 12.0 %    Eosinophils % 0.5 0.0 - 6.0 %    Basophils % 0.4 0.0 - 2.0 %    Neutrophils Absolute 3.70 1.80 - 7.30 E9/L    Immature Granulocytes # 0.04 E9/L    Lymphocytes Absolute 1.10 (L) 1.50 - 4.00 E9/L    Monocytes Absolute 0.74 0.10 - 0.95 E9/L    Eosinophils Absolute 0.03 (L) 0.05 - 0.50 E9/L    Basophils Absolute 0.02 0.00 - 0.20 E9/L   Comprehensive Metabolic Panel w/ Reflex to MG   Result Value Ref Range    Sodium 141 132 - 146 mmol/L    Potassium reflex Magnesium 4.8 3.5 - 5.0 mmol/L    Chloride 101 98 - 107 mmol/L    CO2 23 22 - 29 mmol/L    Anion Gap 17 (H) 7 - 16 mmol/L    Glucose 153 (H) 74 - 99 mg/dL    BUN 23 6 - 23 mg/dL    Creatinine 2.2 (H) 0.7 - 1.2 mg/dL    GFR Non-African American 36 >=60 mL/min/1.73    GFR African American 36     Calcium 9.9 8.6 - 10.2 mg/dL    Total Protein 8.8 (H) 6.4 - 8.3 g/dL    Albumin 4.1 3.5 - 5.2 g/dL    Total Bilirubin 0.6 0.0 - 1.2 mg/dL    Alkaline Phosphatase 79 40 - 129 U/L    ALT 11 0 - 40 U/L    AST 27 0 - 39 U/L   Troponin   Result Value Ref Range    Troponin, High Sensitivity 41 (H) 0 - 11 ng/L   Brain Natriuretic Peptide   Result Value Ref Range    Pro- (H) 0 - 125 pg/mL   Troponin   Result Value Ref Range    Troponin, High Sensitivity 27 (H) 0 - 11 ng/L   SPECIMEN REJECTION   Result Value Ref Range    Rejected Test trop     Reason for Rejection see below        RADIOLOGY:  XR CHEST PORTABLE   Final Result   No evidence of pneumonia or pleural effusion.                 ------------------------- NURSING NOTES AND VITALS REVIEWED ---------------------------  Date / Time Roomed:  8/26/2022  7:15 PM  ED Bed Assignment:  23/23    The nursing notes within the ED encounter and vital signs as below have been reviewed.      Patient Vitals for the past 24 hrs:   BP Temp Temp src Pulse Resp SpO2 Height Weight   08/26/22 2332 138/88 97.1 °F (36.2 °C) Axillary 86 18 100 % -- --   08/26/22 1924 (!) 143/93 -- -- 94 20 100 % 5' 8\" (1.727 m) 132 lb (59.9 kg)       Oxygen Saturation Interpretation: Normal    ------------------------------------------ PROGRESS NOTES ------------------------------------------  Re-evaluation(s):   Patients symptoms show no change  Repeat physical examination is not changed    Counseling:  I have spoken with the patient and discussed todays results, in addition to providing specific details for the plan of care and counseling regarding the diagnosis and prognosis. Their questions are answered at this time and they are agreeable with the plan of admission.    --------------------------------- ADDITIONAL PROVIDER NOTES ---------------------------------  Consultations:  Spoke with Dr. Sapna Bennett. Discussed case. They will admit the patient. This patient's ED course included: a personal history and physicial examination, re-evaluation prior to disposition, multiple bedside re-evaluations, IV medications, cardiac monitoring, and continuous pulse oximetry    This patient has remained hemodynamically stable during their ED course. Diagnosis:  1. COVID-19        Disposition:  Patient's disposition: Admit  Patient's condition is stable. Attending was present and available throughout encounter including all critical portions;  See Attending Note/Attestation for Final [address], DO  Resident  08/27/22 [id number]

## 2022-08-27 NOTE — PLAN OF CARE
Problem: Chronic Conditions and Co-morbidities  Goal: Patient's chronic conditions and co-morbidity symptoms are monitored and maintained or improved  Outcome: Progressing     Problem: Discharge Planning  Goal: Discharge to home or other facility with appropriate resources  Outcome: Progressing     Problem: Skin/Tissue Integrity  Goal: Absence of new skin breakdown  Description: 1. Monitor for areas of redness and/or skin breakdown  2. Assess vascular access sites hourly  3. Every 4-6 hours minimum:  Change oxygen saturation probe site  4. Every 4-6 hours:  If on nasal continuous positive airway pressure, respiratory therapy assess nares and determine need for appliance change or resting period.   Outcome: Progressing     Problem: ABCDS Injury Assessment  Goal: Absence of physical injury  Outcome: Progressing     Problem: Safety - Adult  Goal: Free from fall injury  Outcome: Progressing

## 2022-08-27 NOTE — PROGRESS NOTES
Hospitalist Progress Note      SYNOPSIS: Patient admitted on 2022 for Acute renal failure (ARF) [facility])    Patient seen and examined today. He does not use oxygen at home. He did mention of having some muscle spasms in his chest wall mid region. Discussed checking EKG troponin try some low-dose muscle spasm lidocaine patch. Mr. Micah Piper, a 67y.o. year old male  who  has a past medical history of Asthma, Diabetes mellitus ([address] and Hypertension. He presented to the emergency department with shortness of breath. On admission SPO2 100% on 4 L nasal cannula. Laboratory studies demonstrate creatinine 2.2, anion gap 17, glucose 153, proBNP 889, troponin 41. COVID-19 positive. Chest x-ray shows no acute pathology. SUBJECTIVE:    Patient seen and examined  Records reviewed. Stable overnight. No other overnight issues reported. Temp (24hrs), Av.1 °F (36.2 °C), Min:97.1 °F (36.2 °C), Max:97.1 °F (36.2 °C)    DIET: ADULT DIET; Regular  CODE: Full Code  No intake or output data in the 24 hours ending 22 0812    OBJECTIVE:    BP (!) 130/97   Pulse 82   Temp 97.1 °F (36.2 °C) (Oral)   Resp 18   Ht 5' 8\" (1.727 m)   Wt 132 lb (59.9 kg)   SpO2 97%   BMI 20.07 kg/m²     General appearance: No apparent distress, appears stated age and cooperative. HEENT:  Conjunctivae/corneas clear. Neck: Supple. No jugular venous distention. Respiratory: Clear to auscultation bilaterally, normal respiratory effort  Cardiovascular: Regular rate rhythm, normal S1-S2  Abdomen: Soft, nontender, nondistended  Musculoskeletal: No clubbing, cyanosis, no bilateral lower extremity edema. Brisk capillary refill.    Skin:  No rashes  on visible skin  Neurologic: awake, alert and following commands     ASSESSMENT:  -Acute renal failure  -COVID-19 infection  -Acute hypoxic respiratory failure  -Hypertension  -Diabetes mellitus type 2, non-insulin dependent   -Asthma not in exacerbation  -Atypical chest pain    Plan  1. We will start on Decadron given his requiring oxygen. Low-dose sliding scale insulin. Continue IV hydration for HENNY. Avoid nephrotoxins. Continue oxygen support wean as tolerated. Concerning chest pain/spasm we will go ahead and check troponin again. D-dimer came back elevated at 1500. Given his creatinine clearance not a candidate for CT angiogram of the chest.  We will go ahead and get Dopplers of lower extremity. VQ scan. DISPOSITION:     Medications:  REVIEWED DAILY    Infusion Medications    sodium chloride      sodium chloride       Scheduled Medications    aspirin  81 mg Oral Daily    mometasone-formoterol  2 puff Inhalation BID    atorvastatin  10 mg Oral Daily    Vitamin D  1,000 Units Oral Daily    sodium chloride flush  5-40 mL IntraVENous 2 times per day    enoxaparin  30 mg SubCUTAneous Daily     PRN Meds: albuterol sulfate HFA, sodium chloride flush, sodium chloride, ondansetron **OR** ondansetron, polyethylene glycol, acetaminophen **OR** acetaminophen    Labs:     Recent Labs     08/26/22 1958   WBC 5.6   HGB 14.5   HCT 42.5          Recent Labs     08/26/22 1958      K 4.8      CO2 23   BUN 23   CREATININE 2.2*   CALCIUM 9.9       Recent Labs     08/26/22 1958   PROT 8.8*   ALKPHOS 79   ALT 11   AST 27   BILITOT 0.6       No results for input(s): INR in the last 72 hours. No results for input(s): Pam Danielsman in the last 72 hours. Chronic labs:    Lab Results   Component Value Date    INR 1.1 09/10/2015       Radiology: REVIEWED DAILY    +++++++++++++++++++++++++++++++++++++++++++++++++  Yaneth Casanova MD  [facility] Physician - [address] [place] [address]  +++++++++++++++++++++++++++++++++++++++++++++++++  NOTE: This report was transcribed using voice recognition software. Every effort was made to ensure accuracy; however, inadvertent computerized transcription errors may be present.

## 2022-08-27 NOTE — H&P
Hospitalist History & Physical      PCP: Aristides Oglesby    Date of Service: Pt seen/examined on 8/26/2022     Chief Complaint:  had concerns including Shortness of Breath (Sudden SOB while in bathroom). History Of Present Illness:    Mr. Hawk Nina, a 67y.o. year old male  who  has a past medical history of Asthma, Diabetes mellitus ([address] and Hypertension. She presented to the emergency department with shortness of breath. Came on very suddenly while he was in the restroom. Patient denies fever, chills, nausea, vomiting, chest pain, abdominal pain, diarrhea. Vital signs within normal limits and stable. The patient is afebrile. Currently SPO2 100% on 4 L nasal cannula. Laboratory studies demonstrate creatinine 2.2, anion gap 17, glucose 153, proBNP 889, troponin 41. COVID-19 positive. Chest x-ray shows no acute pathology. Medicine consulted for admission. Past Medical History:   Diagnosis Date    Asthma     Diabetes mellitus ([facility] [address]     Hypertension        Past Surgical History:   Procedure Laterality Date    DENTAL SURGERY         Prior to Admission medications    Medication Sig Start Date End Date Taking?  Authorizing Provider   benazepril-hydrochlorthiazide (LOTENSIN HCT) 20-12.5 MG per tablet Take 1 tablet by mouth daily    Historical Provider, MD   albuterol (PROVENTIL HFA;VENTOLIN HFA) 108 (90 BASE) MCG/ACT inhaler Inhale 2 puffs into the lungs every 4 hours as needed for Wheezing    Historical Provider, MD   simvastatin (ZOCOR) 20 MG tablet Take 20 mg by mouth nightly    Historical Provider, MD   aspirin 81 MG EC tablet Take 81 mg by mouth daily    Historical Provider, MD   Vitamin D (CHOLECALCIFEROL) 1000 UNITS CAPS capsule Take 1,000 Units by mouth daily    Historical Provider, MD   budesonide-formoterol (SYMBICORT) 80-4.5 MCG/ACT AERO Inhale 2 puffs into the lungs 2 times daily    Historical Provider, MD   naproxen (NAPROSYN) 375 MG tablet Take 375 mg by mouth 2 times daily as needed for Pain    Historical Provider, MD         Allergies:  Patient has no known allergies. Social History:    TOBACCO:   reports that he has been smoking cigarettes. He has been smoking an average of .5 packs per day. He has never used smokeless tobacco.  ETOH:   reports current alcohol use. Family History:    Reviewed in detail and negative for DM, CAD, Cancer, CVA. Positive as follows\"      Problem Relation Age of Onset    Brain Cancer Mother     Heart Attack Father        REVIEW OF SYSTEMS:   Pertinent positives as noted in the HPI. All other systems reviewed and negative. PHYSICAL EXAM:  BP (!) 143/93   Pulse 94   Resp 20   Ht 5' 8\" (1.727 m)   Wt 132 lb (59.9 kg)   SpO2 100%   BMI 20.07 kg/m²   General appearance: No apparent distress, appears stated age and cooperative. HEENT: Normal cephalic, atraumatic without obvious deformity. Pupils equal, round, and reactive to light. Extra ocular muscles intact. Conjunctivae/corneas clear. Neck: Supple, with full range of motion. No jugular venous distention. Trachea midline. Respiratory: CTA  Cardiovascular: RRR  Abdomen: S/NT/ND  Musculoskeletal: No clubbing, cyanosis, edema of bilateral lower extremities. Brisk capillary refill. Skin: Normal skin color. No rashes or lesions. Neurologic:  Neurovascularly intact without any focal sensory/motor deficits. Cranial nerves: II-XII intact, grossly non-focal.  Psychiatric: Alert and oriented, thought content appropriate, normal insight    Reviewed EKG and CXR personally      CBC:   Recent Labs     08/26/22 1958   WBC 5.6   RBC 4.80   HGB 14.5   HCT 42.5   MCV 88.5   RDW 12.8        BMP:   Recent Labs     08/26/22 1958      K 4.8      CO2 23   BUN 23   CREATININE 2.2*     LFT:  Recent Labs     08/26/22 1958   PROT 8.8*   ALKPHOS 79   ALT 11   AST 27   BILITOT 0.6     CE:  No results for input(s): Yue Height in the last 72 hours.   PT/INR: No results for input(s): INR, APTT in the last 72 hours. BNP: No results for input(s): BNP in the last 72 hours. ESR: No results found for: SEDRATE  CRP: No results found for: CRP  D Dimer: No results found for: DDIMER   Folate and B12: No results found for: MJOOICTC80, No results found for: FOLATE  Lactic Acid:   Lab Results   Component Value Date    LACTA 1.4 09/10/2015     Thyroid Studies: No results found for: TSH, O5ZTUUA, U5OHDIV, THYROIDAB    Oupatient labs:  Lab Results   Component Value Date    INR 1.1 09/10/2015       Urinalysis:    Lab Results   Component Value Date/Time    NITRU Negative 09/10/2015 02:12 PM    BLOODU Negative 09/10/2015 02:12 PM    SPECGRAV 1.015 09/10/2015 02:12 PM    GLUCOSEU Negative 09/10/2015 02:12 PM       Imaging:  XR CHEST PORTABLE    Result Date: 8/26/2022  EXAMINATION: ONE XRAY VIEW OF THE CHEST 8/26/2022 8:18 pm COMPARISON: January 7, 2022 HISTORY: ORDERING SYSTEM PROVIDED HISTORY: shortness of breath TECHNOLOGIST PROVIDED HISTORY: Reason for exam:->shortness of breath What reading provider will be dictating this exam?->CRC FINDINGS: Chronic interstitial opacities bilaterally. No airspace opacity or pleural effusion. No pneumothorax. The heart is normal in size. No evidence of pneumonia or pleural effusion.        ASSESSMENT:  -Acute renal failure  -COVID-19 infection  -Hypertension  -Diabetes mellitus type 2  -Asthma      PLAN:  -Admit to medicine  -Monitor renal function  -Avoid nephrotoxic medications  -Hold ACE/ARB  -COVID-19 precautions  -Monitor inflammatory markers  -Continue home medications        Diet: No diet orders on file  Code Status: Prior  Surrogate decision maker confirmed with patient:   Extended Emergency Contact Information  Primary Emergency Contact: Radha Ernst, [address] [facility]  Home Phone: [phone]  Relation: Other  Secondary Emergency Contact: None,None  Relation: Other    DVT Prophylaxis: []Lovenox []Heparin []PCD [] [address] []Encouraged ambulation  Disposition: []Med/Surg [] Intermediate [] ICU/CCU  Admit status: [] Observation [] Inpatient     +++++++++++++++++++++++++++++++++++++++++++++++++  Laura Combsff, DO  +++++++++++++++++++++++++++++++++++++++++++++++++  NOTE: This report was transcribed using voice recognition software. Every effort was made to ensure accuracy; however, inadvertent computerized transcription errors may be present.

## 2022-08-27 NOTE — ED NOTES
Pt resting with eyes closed awakens easily alert oriented skin warm dry resp easy lungs diminished moist cough noted pt denies pain     Liang Piña RN  08/27/22 9793

## 2022-08-27 NOTE — PROGRESS NOTES
This patient is on medication that requires renal, weight, and/or indication dose adjustment. Date Body Weight IBW  Adjusted BW SCr  CrCl Dialysis status   8/27/2022 132 lb (59.9 kg) Ideal body weight: 68.4 kg (150 lb 12.7 oz) Serum creatinine: 2.2 mg/dL (H) 08/26/22 1958  Estimated creatinine clearance: 26 mL/min (A) N/a       Pharmacy has dose-adjusted the following medication(s):    Date Previous Order Adjusted Order   8/27/2022 Enoxaparin 30mg SC BID Enoxaparin 30mg SC q24h       These changes were made per protocol according to the [address] for Pharmacists. *Please note this dose may need readjusted if patient's condition changes. Please contact pharmacy with any questions regarding these changes.     Mao Hendrickson, PharmD, [facility], BCPS 8/27/2022 4:01 AM

## 2022-08-28 VITALS
HEART RATE: 79 BPM | HEIGHT: 68 IN | RESPIRATION RATE: 18 BRPM | DIASTOLIC BLOOD PRESSURE: 88 MMHG | SYSTOLIC BLOOD PRESSURE: 146 MMHG | WEIGHT: 132 LBS | TEMPERATURE: 97.9 F | OXYGEN SATURATION: 96 % | BODY MASS INDEX: 20 KG/M2

## 2022-08-28 LAB
ANION GAP SERPL CALCULATED.3IONS-SCNC: 10 MMOL/L (ref 7–16)
BASOPHILS ABSOLUTE: 0 E9/L (ref 0–0.2)
BASOPHILS RELATIVE PERCENT: 0 % (ref 0–2)
BUN BLDV-MCNC: 33 MG/DL (ref 6–23)
C-REACTIVE PROTEIN: 0.3 MG/DL (ref 0–0.4)
CALCIUM SERPL-MCNC: 8.7 MG/DL (ref 8.6–10.2)
CHLORIDE BLD-SCNC: 108 MMOL/L (ref 98–107)
CO2: 20 MMOL/L (ref 22–29)
CREAT SERPL-MCNC: 1.4 MG/DL (ref 0.7–1.2)
D DIMER: 479 NG/ML DDU
EOSINOPHILS ABSOLUTE: 0 E9/L (ref 0.05–0.5)
EOSINOPHILS RELATIVE PERCENT: 0 % (ref 0–6)
GFR AFRICAN AMERICAN: >60
GFR NON-AFRICAN AMERICAN: >60 ML/MIN/1.73
GLUCOSE BLD-MCNC: 120 MG/DL (ref 74–99)
HCT VFR BLD CALC: 35.1 % (ref 37–54)
HEMOGLOBIN: 11.9 G/DL (ref 12.5–16.5)
IMMATURE GRANULOCYTES #: 0.02 E9/L
IMMATURE GRANULOCYTES %: 0.4 % (ref 0–5)
LYMPHOCYTES ABSOLUTE: 0.92 E9/L (ref 1.5–4)
LYMPHOCYTES RELATIVE PERCENT: 18.6 % (ref 20–42)
MCH RBC QN AUTO: 30.5 PG (ref 26–35)
MCHC RBC AUTO-ENTMCNC: 33.9 % (ref 32–34.5)
MCV RBC AUTO: 90 FL (ref 80–99.9)
METER GLUCOSE: 112 MG/DL (ref 74–99)
METER GLUCOSE: 115 MG/DL (ref 74–99)
MONOCYTES ABSOLUTE: 0.71 E9/L (ref 0.1–0.95)
MONOCYTES RELATIVE PERCENT: 14.4 % (ref 2–12)
NEUTROPHILS ABSOLUTE: 3.29 E9/L (ref 1.8–7.3)
NEUTROPHILS RELATIVE PERCENT: 66.6 % (ref 43–80)
PDW BLD-RTO: 12.7 FL (ref 11.5–15)
PLATELET # BLD: 158 E9/L (ref 130–450)
PMV BLD AUTO: 9.4 FL (ref 7–12)
POTASSIUM REFLEX MAGNESIUM: 4.8 MMOL/L (ref 3.5–5)
RBC # BLD: 3.9 E12/L (ref 3.8–5.8)
SODIUM BLD-SCNC: 138 MMOL/L (ref 132–146)
WBC # BLD: 4.9 E9/L (ref 4.5–11.5)

## 2022-08-28 PROCEDURE — 6370000000 HC RX 637 (ALT 250 FOR IP): Performed by: FAMILY MEDICINE

## 2022-08-28 PROCEDURE — 36415 COLL VENOUS BLD VENIPUNCTURE: CPT

## 2022-08-28 PROCEDURE — 85378 FIBRIN DEGRADE SEMIQUANT: CPT

## 2022-08-28 PROCEDURE — 82962 GLUCOSE BLOOD TEST: CPT

## 2022-08-28 PROCEDURE — 86140 C-REACTIVE PROTEIN: CPT

## 2022-08-28 PROCEDURE — 2580000003 HC RX 258: Performed by: FAMILY MEDICINE

## 2022-08-28 PROCEDURE — 80048 BASIC METABOLIC PNL TOTAL CA: CPT

## 2022-08-28 PROCEDURE — 6360000002 HC RX W HCPCS: Performed by: FAMILY MEDICINE

## 2022-08-28 PROCEDURE — 85025 COMPLETE CBC W/AUTO DIFF WBC: CPT

## 2022-08-28 RX ORDER — DEXAMETHASONE 6 MG/1
6 TABLET ORAL DAILY
Qty: 10 TABLET | Refills: 0 | Status: SHIPPED | OUTPATIENT
Start: 2022-08-29 | End: 2022-09-08

## 2022-08-28 RX ADMIN — SODIUM CHLORIDE, PRESERVATIVE FREE 10 ML: 5 INJECTION INTRAVENOUS at 09:43

## 2022-08-28 RX ADMIN — ASPIRIN 81 MG: 81 TABLET, COATED ORAL at 09:41

## 2022-08-28 RX ADMIN — DEXAMETHASONE 6 MG: 4 TABLET ORAL at 09:41

## 2022-08-28 RX ADMIN — HEPARIN SODIUM 5000 UNITS: 10000 INJECTION INTRAVENOUS; SUBCUTANEOUS at 13:00

## 2022-08-28 RX ADMIN — MOMETASONE FUROATE AND FORMOTEROL FUMARATE DIHYDRATE 2 PUFF: 200; 5 AEROSOL RESPIRATORY (INHALATION) at 09:43

## 2022-08-28 RX ADMIN — HEPARIN SODIUM 5000 UNITS: 10000 INJECTION INTRAVENOUS; SUBCUTANEOUS at 03:57

## 2022-08-28 RX ADMIN — Medication 1000 UNITS: at 09:41

## 2022-08-28 NOTE — PLAN OF CARE
Problem: Chronic Conditions and Co-morbidities  Goal: Patient's chronic conditions and co-morbidity symptoms are monitored and maintained or improved  8/28/2022 1602 by Abundio Goss RN  Outcome: Completed  8/28/2022 1601 by Abundio Goss RN  Outcome: Adequate for Discharge     Problem: Discharge Planning  Goal: Discharge to home or other facility with appropriate resources  8/28/2022 1602 by Abundio Goss RN  Outcome: Completed  8/28/2022 1601 by Abundio Goss RN  Outcome: Adequate for Discharge     Problem: Skin/Tissue Integrity  Goal: Absence of new skin breakdown  Description: 1. Monitor for areas of redness and/or skin breakdown  2. Assess vascular access sites hourly  3. Every 4-6 hours minimum:  Change oxygen saturation probe site  4. Every 4-6 hours:  If on nasal continuous positive airway pressure, respiratory therapy assess nares and determine need for appliance change or resting period.   8/28/2022 1602 by Abundio Goss RN  Outcome: Completed  8/28/2022 1601 by Abundio Goss RN  Outcome: Adequate for Discharge     Problem: ABCDS Injury Assessment  Goal: Absence of physical injury  8/28/2022 1602 by Abundio Goss RN  Outcome: Completed  8/28/2022 1601 by Abundio Goss RN  Outcome: Adequate for Discharge     Problem: Safety - Adult  Goal: Free from fall injury  8/28/2022 1602 by Abundio Goss RN  Outcome: Completed  8/28/2022 1601 by Abundio Goss RN  Outcome: Adequate for Discharge

## 2022-08-28 NOTE — PLAN OF CARE
Problem: Chronic Conditions and Co-morbidities  Goal: Patient's chronic conditions and co-morbidity symptoms are monitored and maintained or improved  Outcome: Adequate for Discharge     Problem: Discharge Planning  Goal: Discharge to home or other facility with appropriate resources  Outcome: Adequate for Discharge     Problem: Skin/Tissue Integrity  Goal: Absence of new skin breakdown  Description: 1. Monitor for areas of redness and/or skin breakdown  2. Assess vascular access sites hourly  3. Every 4-6 hours minimum:  Change oxygen saturation probe site  4. Every 4-6 hours:  If on nasal continuous positive airway pressure, respiratory therapy assess nares and determine need for appliance change or resting period.   Outcome: Adequate for Discharge     Problem: ABCDS Injury Assessment  Goal: Absence of physical injury  Outcome: Adequate for Discharge     Problem: Safety - Adult  Goal: Free from fall injury  Outcome: Adequate for Discharge

## 2022-08-28 NOTE — DISCHARGE SUMMARY
Hospitalist Discharge Summary    Patient ID: Primo Muniz   Patient : 1950  Patient's PCP: Tariq Carlos    Admit Date: 2022   Admitting Physician: Mihai Ford DO    Discharge Date:  2022  Discharge Physician: Loa Leyden, MD   Discharge Condition: Stable  Discharge Disposition: Home    History of presenting illness:  Patient seen and examined today. He does not use oxygen at home. He did mention of having some muscle spasms in his chest wall mid region. Discussed checking EKG troponin try some low-dose muscle spasm lidocaine patch. Mr. Primo Muniz, a 67y.o. year old male  who  has a past medical history of Asthma, Diabetes mellitus ([address] and Hypertension. He presented to the emergency department with shortness of breath. On admission SPO2 100% on 4 L nasal cannula. Laboratory studies demonstrate creatinine 2.2, anion gap 17, glucose 153, proBNP 889, troponin 41. COVID-19 positive. Chest x-ray shows no acute pathology. HENNY improved. Creatinine 1.4 compared to 2.1 yesterday. VQ scan low probability for PE. Ultrasound lower extremity negative for DVT. He was weaned off oxygen to room air.     Hospital course in brief:  (Please refer to daily progress notes for a comprehensive review of the hospitalization by requesting medical records)  As above    Consults:   IP CONSULT TO HOSPITALIST    Discharge Diagnoses:  ASSESSMENT:  -Acute renal failure  -COVID-19 infection  -Acute hypoxic respiratory failure  -Hypertension  -Diabetes mellitus type 2, non-insulin dependent   -Asthma not in exacerbation  -Atypical chest pain- likely musculoskeletal, resolved    Discharge Instructions / Follow up:    Continued appropriate risk factor modification of blood pressure, diabetes and serum lipids will remain essential to reducing risk of future atherosclerotic development    Activity: activity as tolerated    Significant labs:  CBC:   Recent Labs     22  0710 22  0515   WBC 5.6 2.8* 4.9   RBC 4.80 4.37 3.90   HGB 14.5 13.5 11.9*   HCT 42.5 40.0 35.1*   MCV 88.5 91.5 90.0   RDW 12.8 12.9 12.7    177 158     BMP:   Recent Labs     22  0710 22  0515    137 138   K 4.8 5.1* 4.8    100 108*   CO2 23 21* 20*   BUN 23 30* 33*   CREATININE 2.2* 2.1* 1.4*     LFT:  Recent Labs     22  0710   PROT 8.8* 7.9   ALKPHOS 79 69   ALT 11 10   AST 27 27   BILITOT 0.6 0.3     PT/INR: No results for input(s): INR, APTT in the last 72 hours. BNP: No results for input(s): BNP in the last 72 hours. Hgb A1C: No results found for: LABA1C  Folate and B12: No results found for: [id number], No results found for: FOLATE  Thyroid Studies: No results found for: TSH, Z9CWFQH, A1OROWD, THYROIDAB    Urinalysis:    Lab Results   Component Value Date/Time    NITRU Negative 09/10/2015 02:12 PM    BLOODU Negative 09/10/2015 02:12 PM    SPECGRAV 1.015 09/10/2015 02:12 PM    GLUCOSEU Negative 09/10/2015 02:12 PM       Imaging:  NM LUNG VENT/PERFUSION (VQ)    Result Date: 2022  Patient MRN: [id number] : 1950 Age:  67 years Gender: Male Order Date: 2022 12:34 PM Exam: NM LUNG VENT/PERFUSION (VQ) Number of Images: 9 views Indication:   chest pain, elevated d-dimer chest pain, elevated d-dimer What reading provider will be dictating this exam?->[facility] Comparison: 2022 Findings: The patient received 35 mCi of technetium dtpa Ventilation images  reveal multiple ventilation defects. The patient received 6 millicuries of technetium MAA. Perfusion images reveal multiple nonsegmental perfusion defects. Chronic thromboembolic pulmonary hypertension is not excluded.  Low probability for pulmonary embolus     XR CHEST PORTABLE    Result Date: 2022  EXAMINATION: ONE XRAY VIEW OF THE CHEST 2022 8:18 pm COMPARISON: 2022 HISTORY: ORDERING SYSTEM PROVIDED HISTORY: shortness of breath TECHNOLOGIST PROVIDED HISTORY: Reason for exam:->shortness of breath What reading provider will be dictating this exam?->CRC FINDINGS: Chronic interstitial opacities bilaterally. No airspace opacity or pleural effusion. No pneumothorax. The heart is normal in size. No evidence of pneumonia or pleural effusion. US DUP LOWER EXTREMITIES BILATERAL VENOUS    Result Date: 2022  Patient MRN:  [id number] : 1950 Age: 67 years Gender: Male Order Date:  2022 2:24 PM EXAM: US DUP LOWER EXTREMITIES BILATERAL VENOUS NUMBER OF IMAGES:  55 INDICATION:  dyspnea, elevated d-dimer dyspnea, elevated d-dimer What reading provider will be dictating this exam?->[facility] COMPARISON: None Within the visualized vessels, there is no evidence for deep venous thrombosis There is good compressibility, there is good augmentation, there is good color flow.      Within the visualized vessels there is no evidence for deep venous thrombosis       Discharge Medications:      Medication List        START taking these medications      dexamethasone 6 MG tablet  Commonly known as: DECADRON  Take 1 tablet by mouth daily for 10 days  Start taking on: 2022            Jenn Leslie taking these medications      albuterol sulfate  (90 Base) MCG/ACT inhaler  Commonly known as: PROVENTIL;VENTOLIN;PROAIR     aspirin 81 MG EC tablet     benazepril-hydrochlorthiazide 20-12.5 MG per tablet  Commonly known as: LOTENSIN HCT     budesonide-formoterol 80-4.5 MCG/ACT Aero  Commonly known as: SYMBICORT     naproxen 375 MG tablet  Commonly known as: NAPROSYN     simvastatin 20 MG tablet  Commonly known as: ZOCOR     Vitamin D 1000 units Caps capsule  Commonly known as: CHOLECALCIFEROL               Where to Get Your Medications        These medications were sent to [address] [facility], [address] [address]      Phone: [phone]   dexamethasone 6 MG tablet Time Spent on discharge is more than 35 minutes in the examination, evaluation, counseling and review of medications and discharge plan.    +++++++++++++++++++++++++++++++++++++++++++++++++  Fariha Edgar MD  [address]  +++++++++++++++++++++++++++++++++++++++++++++++++  NOTE: This report was transcribed using voice recognition software. Every effort was made to ensure accuracy; however, inadvertent computerized transcription errors may be present.

## 2022-08-28 NOTE — PROGRESS NOTES
.    Hospitalist Progress Note      SYNOPSIS: Patient admitted on 2022 for Acute renal failure (ARF) ([facility] [address]      SUBJECTIVE:    Patient seen and examined. He is doing well on room air. He denies chest pain. Tolerating oral intake. Records reviewed. Mr. Arlin Castro, a 67y.o. year old male  who  has a past medical history of Asthma, Diabetes mellitus ([facility] [address] and Hypertension. He presented to the emergency department with shortness of breath. On admission SPO2 100% on 4 L nasal cannula. Laboratory studies demonstrate creatinine 2.2, anion gap 17, glucose 153, proBNP 889, troponin 41. COVID-19 positive. Chest x-ray shows no acute pathology. HENNY improved. Creatinine 1.4 compared to 2.1 yesterday. VQ scan low probability for PE. Ultrasound lower extremity negative for DVT. Stable overnight. No other overnight issues reported. Temp (24hrs), Av °F (36.7 °C), Min:97.9 °F (36.6 °C), Max:98.1 °F (36.7 °C)    DIET: ADULT DIET; Regular  CODE: Full Code    Intake/Output Summary (Last 24 hours) at 2022 1101  Last data filed at 2022 0541  Gross per 24 hour   Intake 1699 ml   Output 1000 ml   Net 699 ml       OBJECTIVE:    BP (!) 146/88   Pulse 79   Temp 97.9 °F (36.6 °C) (Oral)   Resp 18   Ht 5' 8\" (1.727 m)   Wt 132 lb (59.9 kg)   SpO2 96%   BMI 20.07 kg/m²     General appearance: No apparent distress, appears stated age and cooperative. HEENT:  Conjunctivae/corneas clear. Neck: Supple. No jugular venous distention. Respiratory: Clear to auscultation bilaterally, normal respiratory effort  Cardiovascular: Regular rate rhythm, normal S1-S2  Abdomen: Soft, nontender, nondistended  Musculoskeletal: No clubbing, cyanosis, no bilateral lower extremity edema. Brisk capillary refill.    Skin:  No rashes  on visible skin  Neurologic: awake, alert and following commands       ASSESSMENT:  -Acute renal failure  -COVID-19 infection  -Acute hypoxic respiratory failure  -Hypertension  -Diabetes mellitus type 2, non-insulin dependent   -Asthma not in exacerbation  -Atypical chest pain       Plan  1. Continue Decadron. Oxygen support wean as tolerated. Continue Decadron. HENNY improving. Creatinine 1.4 compared to 2.1 yesterday. VQ scan low probability for PE. Ultrasound lower extremity negative for DVT. DISPOSITION:     Medications:  REVIEWED DAILY    Infusion Medications    sodium chloride      sodium chloride 100 mL/hr at 08/27/22 2121     Scheduled Medications    aspirin  81 mg Oral Daily    mometasone-formoterol  2 puff Inhalation BID    atorvastatin  10 mg Oral Daily    Vitamin D  1,000 Units Oral Daily    sodium chloride flush  5-40 mL IntraVENous 2 times per day    dexamethasone  6 mg Oral Daily    insulin lispro  0-8 Units SubCUTAneous TID WC    insulin lispro  0-4 Units SubCUTAneous Nightly    heparin (porcine)  5,000 Units SubCUTAneous Q8H    lidocaine  1 patch TransDERmal Daily     PRN Meds: albuterol sulfate HFA, sodium chloride flush, sodium chloride, ondansetron **OR** ondansetron, polyethylene glycol, acetaminophen **OR** acetaminophen, cyclobenzaprine    Labs:     Recent Labs     08/26/22 1958 08/27/22  0710 08/28/22  0515   WBC 5.6 2.8* 4.9   HGB 14.5 13.5 11.9*   HCT 42.5 40.0 35.1*    177 158       Recent Labs     08/26/22 1958 08/27/22  0710 08/28/22  0515    137 138   K 4.8 5.1* 4.8    100 108*   CO2 23 21* 20*   BUN 23 30* 33*   CREATININE 2.2* 2.1* 1.4*   CALCIUM 9.9 9.3 8.7       Recent Labs     08/26/22 1958 08/27/22  0710   PROT 8.8* 7.9   ALKPHOS 79 69   ALT 11 10   AST 27 27   BILITOT 0.6 0.3       No results for input(s): INR in the last 72 hours. No results for input(s): Prentis Revels in the last 72 hours.     Chronic labs:    Lab Results   Component Value Date    INR 1.1 09/10/2015       Radiology: REVIEWED DAILY    +++++++++++++++++++++++++++++++++++++++++++++++++  MD Simeon Lopez Physician - [address] [facility], [place]  +++++++++++++++++++++++++++++++++++++++++++++++++  NOTE: This report was transcribed using voice recognition software. Every effort was made to ensure accuracy; however, inadvertent computerized transcription errors may be present.

## 2022-08-29 ENCOUNTER — CARE COORDINATION (OUTPATIENT)
Dept: CARE COORDINATION | Age: 72
End: 2022-08-29

## 2022-08-29 LAB
EKG ATRIAL RATE: 74 BPM
EKG ATRIAL RATE: 99 BPM
EKG P AXIS: 74 DEGREES
EKG P AXIS: 82 DEGREES
EKG P-R INTERVAL: 130 MS
EKG P-R INTERVAL: 150 MS
EKG Q-T INTERVAL: 414 MS
EKG Q-T INTERVAL: 432 MS
EKG QRS DURATION: 106 MS
EKG QRS DURATION: 110 MS
EKG QTC CALCULATION (BAZETT): 479 MS
EKG QTC CALCULATION (BAZETT): 531 MS
EKG R AXIS: -77 DEGREES
EKG R AXIS: -79 DEGREES
EKG T AXIS: -83 DEGREES
EKG T AXIS: 93 DEGREES
EKG VENTRICULAR RATE: 74 BPM
EKG VENTRICULAR RATE: 99 BPM

## 2022-08-30 ENCOUNTER — CARE COORDINATION (OUTPATIENT)
Dept: CARE COORDINATION | Age: 72
End: 2022-08-30

## 2022-08-30 NOTE — CARE COORDINATION
[facility] Transitions Initial Follow Up Call    Call within 2 business days of discharge: Yes    Patient: Vale العراقي Patient : 1950   MRN: <[id number]>  Reason for Admission: [facility] -2022 Covid  Discharge Date: 22 RARS: Readmission Risk Score: 11.2      Last Discharge [facility]       Date Complaint Diagnosis Description Type Department Provider    22 Shortness of Breath COVID-19 ED to Hosp-Admission (Discharged) (ADMITTED) [facility] 6WE Jesse Acuna MD; Kateryna Darnell. .. Spoke with: Unable to contact pt, no option to leave vm. Ctn will sign off     Facility: [facility]    Care Transitions 24 Hour Call    Care Transitions Interventions         Follow Up  No future appointments.     Romayne Pester, LPN

## 2023-05-17 ENCOUNTER — HOSPITAL ENCOUNTER (EMERGENCY)
Age: 73
Discharge: HOME | End: 2023-05-17
Attending: EMERGENCY MEDICINE
Payer: COMMERCIAL

## 2023-05-17 ENCOUNTER — APPOINTMENT (OUTPATIENT)
Dept: CT IMAGING | Age: 73
End: 2023-05-17
Payer: COMMERCIAL

## 2023-05-17 VITALS
TEMPERATURE: 97.2 F | RESPIRATION RATE: 16 BRPM | WEIGHT: 130 LBS | HEART RATE: 80 BPM | BODY MASS INDEX: 19.77 KG/M2 | DIASTOLIC BLOOD PRESSURE: 90 MMHG | OXYGEN SATURATION: 98 % | SYSTOLIC BLOOD PRESSURE: 141 MMHG

## 2023-05-17 DIAGNOSIS — R31.9 HEMATURIA, UNSPECIFIED TYPE: Primary | ICD-10-CM

## 2023-05-17 DIAGNOSIS — K92.1 BLOOD IN STOOL: ICD-10-CM

## 2023-05-17 LAB
ALBUMIN SERPL-MCNC: 3.8 G/DL (ref 3.5–5.2)
ALP SERPL-CCNC: 74 U/L (ref 40–129)
ALT SERPL-CCNC: 18 U/L (ref 0–40)
ANION GAP SERPL CALCULATED.3IONS-SCNC: 8 MMOL/L (ref 7–16)
AST SERPL-CCNC: 30 U/L (ref 0–39)
BACTERIA URNS QL MICRO: NORMAL /HPF
BASOPHILS # BLD: 0.07 E9/L (ref 0–0.2)
BASOPHILS NFR BLD: 1.2 % (ref 0–2)
BILIRUB SERPL-MCNC: 0.3 MG/DL (ref 0–1.2)
BILIRUB UR QL STRIP: NEGATIVE
BUN SERPL-MCNC: 8 MG/DL (ref 6–23)
CALCIUM SERPL-MCNC: 9.7 MG/DL (ref 8.6–10.2)
CHLORIDE SERPL-SCNC: 103 MMOL/L (ref 98–107)
CLARITY UR: CLEAR
CO2 SERPL-SCNC: 29 MMOL/L (ref 22–29)
COLOR UR: YELLOW
CREAT SERPL-MCNC: 1.1 MG/DL (ref 0.7–1.2)
EOSINOPHIL # BLD: 0.4 E9/L (ref 0.05–0.5)
EOSINOPHIL NFR BLD: 7.1 % (ref 0–6)
ERYTHROCYTE [DISTWIDTH] IN BLOOD BY AUTOMATED COUNT: 14.9 FL (ref 11.5–15)
GLUCOSE SERPL-MCNC: 94 MG/DL (ref 74–99)
GLUCOSE UR STRIP-MCNC: NEGATIVE MG/DL
HCT VFR BLD AUTO: 37.6 % (ref 37–54)
HGB BLD-MCNC: 12.2 G/DL (ref 12.5–16.5)
HGB UR QL STRIP: NEGATIVE
IMM GRANULOCYTES # BLD: 0.01 E9/L
IMM GRANULOCYTES NFR BLD: 0.2 % (ref 0–5)
KETONES UR STRIP-MCNC: NEGATIVE MG/DL
LACTATE BLDV-SCNC: 1.3 MMOL/L (ref 0.5–2.2)
LEUKOCYTE ESTERASE UR QL STRIP: NEGATIVE
LIPASE: 45 U/L (ref 13–60)
LYMPHOCYTES # BLD: 1.26 E9/L (ref 1.5–4)
LYMPHOCYTES NFR BLD: 22.3 % (ref 20–42)
MAGNESIUM SERPL-MCNC: 1.7 MG/DL (ref 1.6–2.6)
MCH RBC QN AUTO: 29.6 PG (ref 26–35)
MCHC RBC AUTO-ENTMCNC: 32.4 % (ref 32–34.5)
MCV RBC AUTO: 91.3 FL (ref 80–99.9)
MONOCYTES # BLD: 0.7 E9/L (ref 0.1–0.95)
MONOCYTES NFR BLD: 12.4 % (ref 2–12)
NEUTROPHILS # BLD: 3.22 E9/L (ref 1.8–7.3)
NEUTS SEG NFR BLD: 56.8 % (ref 43–80)
NITRITE UR QL STRIP: NEGATIVE
PH UR STRIP: 6.5 [PH] (ref 5–9)
PLATELET # BLD AUTO: 231 E9/L (ref 130–450)
PMV BLD AUTO: 9 FL (ref 7–12)
POTASSIUM SERPL-SCNC: 4 MMOL/L (ref 3.5–5)
PROT SERPL-MCNC: 8.1 G/DL (ref 6.4–8.3)
PROT UR STRIP-MCNC: 100 MG/DL
RBC # BLD AUTO: 4.12 E12/L (ref 3.8–5.8)
RBC #/AREA URNS HPF: NORMAL /HPF (ref 0–2)
SODIUM SERPL-SCNC: 140 MMOL/L (ref 132–146)
SP GR UR STRIP: 1.02 (ref 1–1.03)
UROBILINOGEN UR STRIP-ACNC: 0.2 E.U./DL
WBC # BLD: 5.7 E9/L (ref 4.5–11.5)
WBC #/AREA URNS HPF: NORMAL /HPF (ref 0–5)

## 2023-05-17 PROCEDURE — 83605 ASSAY OF LACTIC ACID: CPT

## 2023-05-17 PROCEDURE — 99284 EMERGENCY DEPT VISIT MOD MDM: CPT

## 2023-05-17 PROCEDURE — 80053 COMPREHEN METABOLIC PANEL: CPT

## 2023-05-17 PROCEDURE — 81001 URINALYSIS AUTO W/SCOPE: CPT

## 2023-05-17 PROCEDURE — 74176 CT ABD & PELVIS W/O CONTRAST: CPT

## 2023-05-17 PROCEDURE — 83735 ASSAY OF MAGNESIUM: CPT

## 2023-05-17 PROCEDURE — 83690 ASSAY OF LIPASE: CPT

## 2023-05-17 PROCEDURE — 85025 COMPLETE CBC W/AUTO DIFF WBC: CPT

## 2023-05-17 RX ORDER — POLYETHYLENE GLYCOL 3350 17 G/17G
17 POWDER, FOR SOLUTION ORAL DAILY
Qty: 510 G | Refills: 0 | Status: SHIPPED | OUTPATIENT
Start: 2023-05-17 | End: 2023-06-16

## 2023-05-17 NOTE — ED NOTES
Department of Emergency Medicine  FIRST PROVIDER TRIAGE NOTE             Independent MLP           5/17/23  5:57 PM EDT    Date of Encounter: 5/17/23   MRN: [id number]      HPI: Tim Levy is a 67 y.o. male who presents to the ED for Dysuria (With blood in the urine that started yesterday. Also having bloody stools. )  Presents for complaints of dysuria as well as having bloody stools over the last 2 days. Denies abdominal pain. ROS: Negative for cp or sob. PE: Gen Appearance/Constitutional: alert  CV: regular rate     Initial Plan of Care: All treatment areas with department are currently occupied. Plan to order/Initiate the following while awaiting opening in ED: labs.   Initiate Treatment-Testing, Proceed toTreatment Area When Bed Available for ED Attending/MLP to Continue Care    Electronically signed by JASSI Oneal CNP   DD: 5/17/23       JASSI Ovalle CNP  05/17/23 3007

## 2023-05-18 NOTE — ED PROVIDER NOTES
Neo Foley is a 67 y.o. male    HPI  Neo Foley is a 67 y.o. male presenting to the ED for Dysuria (With blood in the urine that started yesterday. Also having bloody stools. )    History comes primarily from the patient. Patient presents to the emergency department for hematuria and blood in his stool has been going on patient since yesterday. The patient says the blood in his stool is dark red. The patient is not having any rectal pain. The hematuria is not associated with any specific dysuria, although he does have some suprapubic abdominal pain. The patient has not had any fever, nausea or vomiting, lightheadedness or dizziness, diarrhea. ROS  Full review of systems completed. Pertinent positives and negatives per the HPI, unless otherwise stated ROS is negative. Physical Exam  Vitals reviewed. Constitutional:       General: He is not in acute distress. Appearance: Normal appearance. He is underweight. He is not ill-appearing. HENT:      Head: Normocephalic and atraumatic. Right Ear: External ear normal.      Left Ear: External ear normal.      Nose: Nose normal. No rhinorrhea. Mouth/Throat:      Mouth: Mucous membranes are moist.      Pharynx: Oropharynx is clear. Eyes:      Extraocular Movements: Extraocular movements intact. Conjunctiva/sclera: Conjunctivae normal.      Pupils: Pupils are equal, round, and reactive to light. Cardiovascular:      Rate and Rhythm: Normal rate and regular rhythm. Heart sounds: Normal heart sounds. No murmur heard. Pulmonary:      Effort: Pulmonary effort is normal. No respiratory distress. Breath sounds: Normal breath sounds. Abdominal:      General: Abdomen is flat. There is no distension. Tenderness: There is no abdominal tenderness. Musculoskeletal:         General: No swelling or tenderness. Normal range of motion. Cervical back: Normal range of motion. No rigidity or tenderness.    Skin:

## 2023-05-18 NOTE — ED NOTES
The following labs were labeled with appropriate pt sticker and tubed to lab:     [] Blue     [] Lavender   [] on ice  [] Green/yellow  [] Green/black [] on ice  [] Umu [place]  [] on ice  [] Yellow  [] Red  [] Type/ Screen  [] ABG  [] VBG    [] COVID-19 swab    [] Rapid  [] PCR  [] Flu swab  [] Peds Viral Panel     [x] Urine Sample  [] Fecal Sample  [] Pelvic Cultures  [] Blood Cultures  [] X 2  [] STREP Cultures       Dex Lopez RN  05/17/23 2050

## 2023-06-12 PROBLEM — J18.9 PNEUMONIA, UNSPECIFIED ORGANISM: Status: ACTIVE | Noted: 2023-06-12

## 2023-08-23 ENCOUNTER — HOSPITAL ENCOUNTER (OUTPATIENT)
Age: 73
Setting detail: OBSERVATION
Discharge: HOME | End: 2023-08-27
Attending: EMERGENCY MEDICINE | Admitting: STUDENT IN AN ORGANIZED HEALTH CARE EDUCATION/TRAINING PROGRAM
Payer: COMMERCIAL

## 2023-08-23 ENCOUNTER — APPOINTMENT (OUTPATIENT)
Dept: GENERAL RADIOLOGY | Age: 73
End: 2023-08-23
Payer: COMMERCIAL

## 2023-08-23 DIAGNOSIS — R94.31 ACUTE ELECTROCARDIOGRAM CHANGES: Primary | ICD-10-CM

## 2023-08-23 DIAGNOSIS — R07.9 CHEST PAIN, UNSPECIFIED TYPE: ICD-10-CM

## 2023-08-23 LAB
ALBUMIN SERPL-MCNC: 3.8 G/DL (ref 3.5–5.2)
ALP SERPL-CCNC: 64 U/L (ref 40–129)
ALT SERPL-CCNC: 12 U/L (ref 0–40)
ANION GAP SERPL CALCULATED.3IONS-SCNC: 11 MMOL/L (ref 7–16)
AST SERPL-CCNC: 23 U/L (ref 0–39)
BASOPHILS # BLD: 0.07 K/UL (ref 0–0.2)
BASOPHILS NFR BLD: 1 % (ref 0–2)
BILIRUB SERPL-MCNC: 0.4 MG/DL (ref 0–1.2)
BNP SERPL-MCNC: 1064 PG/ML (ref 0–125)
BUN SERPL-MCNC: 17 MG/DL (ref 6–23)
CALCIUM SERPL-MCNC: 9.6 MG/DL (ref 8.6–10.2)
CHLORIDE SERPL-SCNC: 102 MMOL/L (ref 98–107)
CO2 SERPL-SCNC: 26 MMOL/L (ref 22–29)
CREAT SERPL-MCNC: 1.2 MG/DL (ref 0.7–1.2)
EKG ATRIAL RATE: 73 BPM
EKG P AXIS: 69 DEGREES
EKG P-R INTERVAL: 164 MS
EKG Q-T INTERVAL: 412 MS
EKG QRS DURATION: 106 MS
EKG QTC CALCULATION (BAZETT): 453 MS
EKG R AXIS: -80 DEGREES
EKG T AXIS: 102 DEGREES
EKG VENTRICULAR RATE: 73 BPM
EOSINOPHIL # BLD: 0.36 K/UL (ref 0.05–0.5)
EOSINOPHILS RELATIVE PERCENT: 6 % (ref 0–6)
ERYTHROCYTE [DISTWIDTH] IN BLOOD BY AUTOMATED COUNT: 13.3 % (ref 11.5–15)
GFR SERPL CREATININE-BSD FRML MDRD: >60 ML/MIN/1.73M2
GLUCOSE SERPL-MCNC: 99 MG/DL (ref 74–99)
HBA1C MFR BLD: 5.8 % (ref 4–5.6)
HCT VFR BLD AUTO: 36.6 % (ref 37–54)
HGB BLD-MCNC: 12.5 G/DL (ref 12.5–16.5)
IMM GRANULOCYTES # BLD AUTO: <0.03 K/UL (ref 0–0.58)
IMM GRANULOCYTES NFR BLD: 0 % (ref 0–5)
LV EF: 45 %
LVEF MODALITY: NORMAL
LYMPHOCYTES NFR BLD: 1.04 K/UL (ref 1.5–4)
LYMPHOCYTES RELATIVE PERCENT: 19 % (ref 20–42)
MCH RBC QN AUTO: 30.6 PG (ref 26–35)
MCHC RBC AUTO-ENTMCNC: 34.2 G/DL (ref 32–34.5)
MCV RBC AUTO: 89.5 FL (ref 80–99.9)
MONOCYTES NFR BLD: 0.89 K/UL (ref 0.1–0.95)
MONOCYTES NFR BLD: 16 % (ref 2–12)
NEUTROPHILS NFR BLD: 57 % (ref 43–80)
NEUTS SEG NFR BLD: 3.22 K/UL (ref 1.8–7.3)
PLATELET # BLD AUTO: 167 K/UL (ref 130–450)
PMV BLD AUTO: 9.1 FL (ref 7–12)
POTASSIUM SERPL-SCNC: 3.9 MMOL/L (ref 3.5–5)
PROT SERPL-MCNC: 7.8 G/DL (ref 6.4–8.3)
RBC # BLD AUTO: 4.09 M/UL (ref 3.8–5.8)
REASON FOR REJECTION: NORMAL
SARS-COV-2 RDRP RESP QL NAA+PROBE: NOT DETECTED
SODIUM SERPL-SCNC: 139 MMOL/L (ref 132–146)
SPECIMEN DESCRIPTION: NORMAL
SPECIMEN SOURCE: NORMAL
TROPONIN I SERPL HS-MCNC: 21 NG/L (ref 0–11)
TROPONIN I SERPL HS-MCNC: 29 NG/L (ref 0–11)
WBC OTHER # BLD: 5.6 K/UL (ref 4.5–11.5)
ZZ NTE CLEAN UP: ORDERED TEST: NORMAL

## 2023-08-23 PROCEDURE — 99285 EMERGENCY DEPT VISIT HI MDM: CPT

## 2023-08-23 PROCEDURE — 71045 X-RAY EXAM CHEST 1 VIEW: CPT

## 2023-08-23 PROCEDURE — 93005 ELECTROCARDIOGRAM TRACING: CPT | Performed by: STUDENT IN AN ORGANIZED HEALTH CARE EDUCATION/TRAINING PROGRAM

## 2023-08-23 PROCEDURE — 93010 ELECTROCARDIOGRAM REPORT: CPT | Performed by: INTERNAL MEDICINE

## 2023-08-23 PROCEDURE — 93306 TTE W/DOPPLER COMPLETE: CPT

## 2023-08-23 PROCEDURE — G0378 HOSPITAL OBSERVATION PER HR: HCPCS

## 2023-08-23 PROCEDURE — 85025 COMPLETE CBC W/AUTO DIFF WBC: CPT

## 2023-08-23 PROCEDURE — 87635 SARS-COV-2 COVID-19 AMP PRB: CPT

## 2023-08-23 PROCEDURE — 83880 ASSAY OF NATRIURETIC PEPTIDE: CPT

## 2023-08-23 PROCEDURE — 6370000000 HC RX 637 (ALT 250 FOR IP): Performed by: STUDENT IN AN ORGANIZED HEALTH CARE EDUCATION/TRAINING PROGRAM

## 2023-08-23 PROCEDURE — 99223 1ST HOSP IP/OBS HIGH 75: CPT | Performed by: INTERNAL MEDICINE

## 2023-08-23 PROCEDURE — 6360000002 HC RX W HCPCS: Performed by: STUDENT IN AN ORGANIZED HEALTH CARE EDUCATION/TRAINING PROGRAM

## 2023-08-23 PROCEDURE — 2580000003 HC RX 258: Performed by: STUDENT IN AN ORGANIZED HEALTH CARE EDUCATION/TRAINING PROGRAM

## 2023-08-23 PROCEDURE — 94640 AIRWAY INHALATION TREATMENT: CPT

## 2023-08-23 PROCEDURE — APPSS45 APP SPLIT SHARED TIME 31-45 MINUTES: Performed by: PHYSICIAN ASSISTANT

## 2023-08-23 PROCEDURE — 80053 COMPREHEN METABOLIC PANEL: CPT

## 2023-08-23 PROCEDURE — 83036 HEMOGLOBIN GLYCOSYLATED A1C: CPT

## 2023-08-23 PROCEDURE — 84484 ASSAY OF TROPONIN QUANT: CPT

## 2023-08-23 RX ORDER — SODIUM CHLORIDE 0.9 % (FLUSH) 0.9 %
5-40 SYRINGE (ML) INJECTION PRN
Status: DISCONTINUED | OUTPATIENT
Start: 2023-08-23 | End: 2023-08-27 | Stop reason: HOSPADM

## 2023-08-23 RX ORDER — HYDROCHLOROTHIAZIDE 25 MG/1
12.5 TABLET ORAL DAILY
Status: DISCONTINUED | OUTPATIENT
Start: 2023-08-23 | End: 2023-08-24

## 2023-08-23 RX ORDER — NICOTINE 21 MG/24HR
1 PATCH, TRANSDERMAL 24 HOURS TRANSDERMAL DAILY
Status: DISCONTINUED | OUTPATIENT
Start: 2023-08-23 | End: 2023-08-27 | Stop reason: HOSPADM

## 2023-08-23 RX ORDER — ALBUTEROL SULFATE 2.5 MG/3ML
2.5 SOLUTION RESPIRATORY (INHALATION) EVERY 4 HOURS PRN
Status: DISCONTINUED | OUTPATIENT
Start: 2023-08-23 | End: 2023-08-27 | Stop reason: HOSPADM

## 2023-08-23 RX ORDER — ONDANSETRON 2 MG/ML
4 INJECTION INTRAMUSCULAR; INTRAVENOUS EVERY 6 HOURS PRN
Status: DISCONTINUED | OUTPATIENT
Start: 2023-08-23 | End: 2023-08-27 | Stop reason: HOSPADM

## 2023-08-23 RX ORDER — BUDESONIDE 0.25 MG/2ML
250 INHALANT ORAL 2 TIMES DAILY
Status: DISCONTINUED | OUTPATIENT
Start: 2023-08-23 | End: 2023-08-27 | Stop reason: HOSPADM

## 2023-08-23 RX ORDER — GUAIFENESIN 600 MG/1
600 TABLET, EXTENDED RELEASE ORAL 2 TIMES DAILY
Status: DISCONTINUED | OUTPATIENT
Start: 2023-08-23 | End: 2023-08-23 | Stop reason: RX

## 2023-08-23 RX ORDER — ACETAMINOPHEN 650 MG/1
650 SUPPOSITORY RECTAL EVERY 6 HOURS PRN
Status: DISCONTINUED | OUTPATIENT
Start: 2023-08-23 | End: 2023-08-27 | Stop reason: HOSPADM

## 2023-08-23 RX ORDER — ATORVASTATIN CALCIUM 40 MG/1
40 TABLET, FILM COATED ORAL DAILY
Status: DISCONTINUED | OUTPATIENT
Start: 2023-08-23 | End: 2023-08-27 | Stop reason: HOSPADM

## 2023-08-23 RX ORDER — ACETAMINOPHEN 325 MG/1
650 TABLET ORAL EVERY 6 HOURS PRN
Status: DISCONTINUED | OUTPATIENT
Start: 2023-08-23 | End: 2023-08-25 | Stop reason: SDUPTHER

## 2023-08-23 RX ORDER — ENOXAPARIN SODIUM 100 MG/ML
40 INJECTION SUBCUTANEOUS DAILY
Status: DISCONTINUED | OUTPATIENT
Start: 2023-08-23 | End: 2023-08-27 | Stop reason: HOSPADM

## 2023-08-23 RX ORDER — GUAIFENESIN 400 MG/1
400 TABLET ORAL 3 TIMES DAILY
Status: DISCONTINUED | OUTPATIENT
Start: 2023-08-23 | End: 2023-08-27 | Stop reason: HOSPADM

## 2023-08-23 RX ORDER — LIDOCAINE 4 G/G
1 PATCH TOPICAL DAILY
Status: DISCONTINUED | OUTPATIENT
Start: 2023-08-23 | End: 2023-08-27 | Stop reason: HOSPADM

## 2023-08-23 RX ORDER — SODIUM CHLORIDE 0.9 % (FLUSH) 0.9 %
5-40 SYRINGE (ML) INJECTION EVERY 12 HOURS SCHEDULED
Status: DISCONTINUED | OUTPATIENT
Start: 2023-08-23 | End: 2023-08-27 | Stop reason: HOSPADM

## 2023-08-23 RX ORDER — VITAMIN B COMPLEX
1000 TABLET ORAL DAILY
Status: DISCONTINUED | OUTPATIENT
Start: 2023-08-23 | End: 2023-08-27 | Stop reason: HOSPADM

## 2023-08-23 RX ORDER — IPRATROPIUM BROMIDE AND ALBUTEROL SULFATE 2.5; .5 MG/3ML; MG/3ML
1 SOLUTION RESPIRATORY (INHALATION)
Status: DISCONTINUED | OUTPATIENT
Start: 2023-08-23 | End: 2023-08-27 | Stop reason: HOSPADM

## 2023-08-23 RX ORDER — REGADENOSON 0.08 MG/ML
0.4 INJECTION, SOLUTION INTRAVENOUS
Status: COMPLETED | OUTPATIENT
Start: 2023-08-23 | End: 2023-08-24

## 2023-08-23 RX ORDER — MAGNESIUM SULFATE IN WATER 40 MG/ML
2000 INJECTION, SOLUTION INTRAVENOUS PRN
Status: DISCONTINUED | OUTPATIENT
Start: 2023-08-23 | End: 2023-08-27 | Stop reason: HOSPADM

## 2023-08-23 RX ORDER — ASPIRIN 81 MG/1
81 TABLET ORAL DAILY
Status: DISCONTINUED | OUTPATIENT
Start: 2023-08-24 | End: 2023-08-24

## 2023-08-23 RX ORDER — ASPIRIN 81 MG/1
324 TABLET, CHEWABLE ORAL ONCE
Status: COMPLETED | OUTPATIENT
Start: 2023-08-23 | End: 2023-08-23

## 2023-08-23 RX ORDER — LISINOPRIL 20 MG/1
20 TABLET ORAL DAILY
Status: DISCONTINUED | OUTPATIENT
Start: 2023-08-23 | End: 2023-08-24

## 2023-08-23 RX ORDER — SENNOSIDES A AND B 8.6 MG/1
1 TABLET, FILM COATED ORAL DAILY PRN
Status: DISCONTINUED | OUTPATIENT
Start: 2023-08-23 | End: 2023-08-27 | Stop reason: HOSPADM

## 2023-08-23 RX ORDER — POLYETHYLENE GLYCOL 3350 17 G/17G
17 POWDER, FOR SOLUTION ORAL DAILY PRN
Status: DISCONTINUED | OUTPATIENT
Start: 2023-08-23 | End: 2023-08-27 | Stop reason: HOSPADM

## 2023-08-23 RX ORDER — ONDANSETRON 4 MG/1
4 TABLET, ORALLY DISINTEGRATING ORAL EVERY 8 HOURS PRN
Status: DISCONTINUED | OUTPATIENT
Start: 2023-08-23 | End: 2023-08-27 | Stop reason: HOSPADM

## 2023-08-23 RX ORDER — POTASSIUM CHLORIDE 7.45 MG/ML
10 INJECTION INTRAVENOUS PRN
Status: DISCONTINUED | OUTPATIENT
Start: 2023-08-23 | End: 2023-08-27 | Stop reason: HOSPADM

## 2023-08-23 RX ORDER — POTASSIUM CHLORIDE 20 MEQ/1
40 TABLET, EXTENDED RELEASE ORAL PRN
Status: DISCONTINUED | OUTPATIENT
Start: 2023-08-23 | End: 2023-08-27 | Stop reason: HOSPADM

## 2023-08-23 RX ORDER — SODIUM CHLORIDE 9 MG/ML
INJECTION, SOLUTION INTRAVENOUS PRN
Status: DISCONTINUED | OUTPATIENT
Start: 2023-08-23 | End: 2023-08-27 | Stop reason: HOSPADM

## 2023-08-23 RX ADMIN — ATORVASTATIN CALCIUM 40 MG: 40 TABLET, FILM COATED ORAL at 10:20

## 2023-08-23 RX ADMIN — IPRATROPIUM BROMIDE AND ALBUTEROL SULFATE 1 DOSE: 2.5; .5 SOLUTION RESPIRATORY (INHALATION) at 09:52

## 2023-08-23 RX ADMIN — Medication 1000 UNITS: at 10:20

## 2023-08-23 RX ADMIN — ASPIRIN 81 MG 324 MG: 81 TABLET ORAL at 08:24

## 2023-08-23 RX ADMIN — SODIUM CHLORIDE, PRESERVATIVE FREE 10 ML: 5 INJECTION INTRAVENOUS at 20:56

## 2023-08-23 RX ADMIN — HYDROCHLOROTHIAZIDE 12.5 MG: 25 TABLET ORAL at 10:20

## 2023-08-23 RX ADMIN — GUAIFENESIN 400 MG: 400 TABLET ORAL at 20:54

## 2023-08-23 RX ADMIN — IPRATROPIUM BROMIDE AND ALBUTEROL SULFATE 1 DOSE: 2.5; .5 SOLUTION RESPIRATORY (INHALATION) at 15:42

## 2023-08-23 RX ADMIN — BUDESONIDE 250 MCG: 0.25 SUSPENSION RESPIRATORY (INHALATION) at 19:20

## 2023-08-23 RX ADMIN — LISINOPRIL 20 MG: 20 TABLET ORAL at 10:20

## 2023-08-23 RX ADMIN — IPRATROPIUM BROMIDE AND ALBUTEROL SULFATE 1 DOSE: 2.5; .5 SOLUTION RESPIRATORY (INHALATION) at 19:20

## 2023-08-23 RX ADMIN — SODIUM CHLORIDE, PRESERVATIVE FREE 10 ML: 5 INJECTION INTRAVENOUS at 09:21

## 2023-08-23 RX ADMIN — BUDESONIDE 250 MCG: 0.25 SUSPENSION RESPIRATORY (INHALATION) at 10:41

## 2023-08-23 NOTE — ED NOTES
Report called to floor. Spoke with Socorro Montanez. Verified room read- and pt was placed in transport.       Karolina Franco RN  08/23/23 8443

## 2023-08-23 NOTE — H&P
Hospital Medicine History & Physical      PCP: Fer Dexter    Date of Admission: 8/23/2023    Date of Service: Pt seen/examined on 8/23/2023. Placed in Observation. Chief Complaint:  SOB, chest pain       History Of Present Illness: This is a 68year old male who  has a past medical history of Asthma, Cataract, left eye, and Hypertension. Nicotine dependence, COPD    He presented to [facility] on 8/23/2023 for chief concerns of chest pain. His chest pain was mostly located on the right side associated with SOB and coughing. Aggravated on coughing, relieved on laying on right side. His symptoms has been ongoing for past few days. No hx of trauma. He smokes 1/2 pack cigarettes per day. EKG showed Sinus rhythm with occasional premature ventricular complexes and premature atrial complexes  Incomplete right bundle branch block  Leftward axis  T wave abnormality, consider lateral ischemia  Abnormal ECG    CXR showed obstructive pulmonary disease. Admitted for atypical chest pain to rule out ACS. Past Medical History:          Diagnosis Date    Asthma     Cataract, left eye     Hypertension        Past Surgical History:          Procedure Laterality Date    DENTAL SURGERY         Medications Prior to Admission:      Prior to Admission medications    Medication Sig Start Date End Date Taking?  Authorizing Provider   mometasone-formoterol (DULERA) 100-5 MCG/ACT inhaler Inhale 2 puffs into the lungs daily   Yes Historical Provider, MD   hydroCHLOROthiazide (HYDRODIURIL) 12.5 MG tablet Take 1 tablet by mouth daily 6/16/23   Romain Villegas MD   lisinopril (PRINIVIL;ZESTRIL) 20 MG tablet Take 1 tablet by mouth daily 6/16/23   Romain Villegas MD   albuterol sulfate HFA (PROVENTIL;VENTOLIN;PROAIR) 108 (90 Base) MCG/ACT inhaler Inhale 2 puffs into the lungs every 4 hours as needed for Wheezing    Historical Provider, MD   carbamide peroxide (DEBROX) 6.5 % otic solution Place 2 drops into both ears 2 times daily as

## 2023-08-24 ENCOUNTER — APPOINTMENT (OUTPATIENT)
Dept: NUCLEAR MEDICINE | Age: 73
End: 2023-08-24
Payer: COMMERCIAL

## 2023-08-24 ENCOUNTER — APPOINTMENT (OUTPATIENT)
Dept: CT IMAGING | Age: 73
End: 2023-08-24
Payer: COMMERCIAL

## 2023-08-24 ENCOUNTER — APPOINTMENT (OUTPATIENT)
Dept: NON INVASIVE DIAGNOSTICS | Age: 73
End: 2023-08-24
Payer: COMMERCIAL

## 2023-08-24 LAB
AMPHET UR QL SCN: NEGATIVE
ANION GAP SERPL CALCULATED.3IONS-SCNC: 16 MMOL/L (ref 7–16)
ANION GAP SERPL CALCULATED.3IONS-SCNC: 9 MMOL/L (ref 7–16)
BARBITURATES UR QL SCN: NEGATIVE
BENZODIAZ UR QL: NEGATIVE
BUN SERPL-MCNC: 18 MG/DL (ref 6–23)
BUN SERPL-MCNC: 21 MG/DL (ref 6–23)
BUPRENORPHINE UR QL: NEGATIVE
CALCIUM SERPL-MCNC: 9.7 MG/DL (ref 8.6–10.2)
CALCIUM SERPL-MCNC: 9.9 MG/DL (ref 8.6–10.2)
CANNABINOIDS UR QL SCN: NEGATIVE
CHLORIDE SERPL-SCNC: 104 MMOL/L (ref 98–107)
CHLORIDE SERPL-SCNC: 96 MMOL/L (ref 98–107)
CHOLEST SERPL-MCNC: 146 MG/DL
CO2 SERPL-SCNC: 22 MMOL/L (ref 22–29)
CO2 SERPL-SCNC: 28 MMOL/L (ref 22–29)
COCAINE UR QL SCN: NEGATIVE
CREAT SERPL-MCNC: 1.2 MG/DL (ref 0.7–1.2)
CREAT SERPL-MCNC: 1.3 MG/DL (ref 0.7–1.2)
ERYTHROCYTE [DISTWIDTH] IN BLOOD BY AUTOMATED COUNT: 13 % (ref 11.5–15)
FENTANYL UR QL: NEGATIVE
GFR SERPL CREATININE-BSD FRML MDRD: 58 ML/MIN/1.73M2
GFR SERPL CREATININE-BSD FRML MDRD: >60 ML/MIN/1.73M2
GLUCOSE SERPL-MCNC: 125 MG/DL (ref 74–99)
GLUCOSE SERPL-MCNC: 89 MG/DL (ref 74–99)
HCT VFR BLD AUTO: 38.6 % (ref 37–54)
HDLC SERPL-MCNC: 54 MG/DL
HGB BLD-MCNC: 13.1 G/DL (ref 12.5–16.5)
LDLC SERPL CALC-MCNC: 81 MG/DL
LV EF: 34 %
LVEF MODALITY: NORMAL
MAGNESIUM SERPL-MCNC: 1.8 MG/DL (ref 1.6–2.6)
MAGNESIUM SERPL-MCNC: 1.8 MG/DL (ref 1.6–2.6)
MCH RBC QN AUTO: 30.3 PG (ref 26–35)
MCHC RBC AUTO-ENTMCNC: 33.9 G/DL (ref 32–34.5)
MCV RBC AUTO: 89.1 FL (ref 80–99.9)
METHADONE UR QL: NEGATIVE
OPIATES UR QL SCN: POSITIVE
OXYCODONE UR QL SCN: NEGATIVE
PCP UR QL SCN: NEGATIVE
PHOSPHATE SERPL-MCNC: 3.7 MG/DL (ref 2.5–4.5)
PLATELET # BLD AUTO: 174 K/UL (ref 130–450)
PMV BLD AUTO: 10.2 FL (ref 7–12)
POTASSIUM SERPL-SCNC: 3.6 MMOL/L (ref 3.5–5)
POTASSIUM SERPL-SCNC: 3.8 MMOL/L (ref 3.5–5)
RBC # BLD AUTO: 4.33 M/UL (ref 3.8–5.8)
SODIUM SERPL-SCNC: 133 MMOL/L (ref 132–146)
SODIUM SERPL-SCNC: 142 MMOL/L (ref 132–146)
TEST INFORMATION: ABNORMAL
TRIGL SERPL-MCNC: 57 MG/DL
TROPONIN I SERPL HS-MCNC: 23 NG/L (ref 0–11)
VLDLC SERPL CALC-MCNC: 11 MG/DL
WBC OTHER # BLD: 5.7 K/UL (ref 4.5–11.5)

## 2023-08-24 PROCEDURE — 2580000003 HC RX 258: Performed by: STUDENT IN AN ORGANIZED HEALTH CARE EDUCATION/TRAINING PROGRAM

## 2023-08-24 PROCEDURE — 6370000000 HC RX 637 (ALT 250 FOR IP): Performed by: STUDENT IN AN ORGANIZED HEALTH CARE EDUCATION/TRAINING PROGRAM

## 2023-08-24 PROCEDURE — 80048 BASIC METABOLIC PNL TOTAL CA: CPT

## 2023-08-24 PROCEDURE — 6370000000 HC RX 637 (ALT 250 FOR IP): Performed by: INTERNAL MEDICINE

## 2023-08-24 PROCEDURE — 78452 HT MUSCLE IMAGE SPECT MULT: CPT | Performed by: INTERNAL MEDICINE

## 2023-08-24 PROCEDURE — G0378 HOSPITAL OBSERVATION PER HR: HCPCS

## 2023-08-24 PROCEDURE — 93016 CV STRESS TEST SUPVJ ONLY: CPT | Performed by: INTERNAL MEDICINE

## 2023-08-24 PROCEDURE — 84100 ASSAY OF PHOSPHORUS: CPT

## 2023-08-24 PROCEDURE — 6360000002 HC RX W HCPCS: Performed by: STUDENT IN AN ORGANIZED HEALTH CARE EDUCATION/TRAINING PROGRAM

## 2023-08-24 PROCEDURE — 93017 CV STRESS TEST TRACING ONLY: CPT

## 2023-08-24 PROCEDURE — A9500 TC99M SESTAMIBI: HCPCS | Performed by: RADIOLOGY

## 2023-08-24 PROCEDURE — 3430000000 HC RX DIAGNOSTIC RADIOPHARMACEUTICAL: Performed by: RADIOLOGY

## 2023-08-24 PROCEDURE — 93018 CV STRESS TEST I&R ONLY: CPT | Performed by: INTERNAL MEDICINE

## 2023-08-24 PROCEDURE — 85027 COMPLETE CBC AUTOMATED: CPT

## 2023-08-24 PROCEDURE — 6360000002 HC RX W HCPCS: Performed by: INTERNAL MEDICINE

## 2023-08-24 PROCEDURE — 80307 DRUG TEST PRSMV CHEM ANLYZR: CPT

## 2023-08-24 PROCEDURE — 71250 CT THORAX DX C-: CPT

## 2023-08-24 PROCEDURE — 6360000002 HC RX W HCPCS: Performed by: PHYSICIAN ASSISTANT

## 2023-08-24 PROCEDURE — 99254 IP/OBS CNSLTJ NEW/EST MOD 60: CPT | Performed by: INTERNAL MEDICINE

## 2023-08-24 PROCEDURE — 93005 ELECTROCARDIOGRAM TRACING: CPT | Performed by: INTERNAL MEDICINE

## 2023-08-24 PROCEDURE — 78452 HT MUSCLE IMAGE SPECT MULT: CPT

## 2023-08-24 PROCEDURE — 99233 SBSQ HOSP IP/OBS HIGH 50: CPT | Performed by: INTERNAL MEDICINE

## 2023-08-24 PROCEDURE — 94640 AIRWAY INHALATION TREATMENT: CPT

## 2023-08-24 PROCEDURE — 84484 ASSAY OF TROPONIN QUANT: CPT

## 2023-08-24 PROCEDURE — 80061 LIPID PANEL: CPT

## 2023-08-24 PROCEDURE — 83735 ASSAY OF MAGNESIUM: CPT

## 2023-08-24 PROCEDURE — 36415 COLL VENOUS BLD VENIPUNCTURE: CPT

## 2023-08-24 RX ORDER — LISINOPRIL 20 MG/1
20 TABLET ORAL ONCE
Status: COMPLETED | OUTPATIENT
Start: 2023-08-24 | End: 2023-08-24

## 2023-08-24 RX ORDER — ARFORMOTEROL TARTRATE 15 UG/2ML
15 SOLUTION RESPIRATORY (INHALATION)
Status: DISCONTINUED | OUTPATIENT
Start: 2023-08-24 | End: 2023-08-27 | Stop reason: HOSPADM

## 2023-08-24 RX ORDER — TETRAKIS(2-METHOXYISOBUTYLISOCYANIDE)COPPER(I) TETRAFLUOROBORATE 1 MG/ML
13 INJECTION, POWDER, LYOPHILIZED, FOR SOLUTION INTRAVENOUS
Status: COMPLETED | OUTPATIENT
Start: 2023-08-24 | End: 2023-08-24

## 2023-08-24 RX ORDER — PREDNISONE 20 MG/1
20 TABLET ORAL DAILY
Status: DISCONTINUED | OUTPATIENT
Start: 2023-08-24 | End: 2023-08-27 | Stop reason: HOSPADM

## 2023-08-24 RX ORDER — TETRAKIS(2-METHOXYISOBUTYLISOCYANIDE)COPPER(I) TETRAFLUOROBORATE 1 MG/ML
30 INJECTION, POWDER, LYOPHILIZED, FOR SOLUTION INTRAVENOUS
Status: COMPLETED | OUTPATIENT
Start: 2023-08-24 | End: 2023-08-24

## 2023-08-24 RX ORDER — ASPIRIN 81 MG/1
81 TABLET, CHEWABLE ORAL DAILY
Status: DISCONTINUED | OUTPATIENT
Start: 2023-08-24 | End: 2023-08-27 | Stop reason: HOSPADM

## 2023-08-24 RX ORDER — LISINOPRIL 20 MG/1
40 TABLET ORAL DAILY
Status: DISCONTINUED | OUTPATIENT
Start: 2023-08-25 | End: 2023-08-27 | Stop reason: HOSPADM

## 2023-08-24 RX ORDER — METOPROLOL SUCCINATE 25 MG/1
25 TABLET, EXTENDED RELEASE ORAL DAILY
Status: DISCONTINUED | OUTPATIENT
Start: 2023-08-24 | End: 2023-08-27 | Stop reason: HOSPADM

## 2023-08-24 RX ORDER — POTASSIUM CHLORIDE 20 MEQ/1
20 TABLET, EXTENDED RELEASE ORAL ONCE
Status: DISCONTINUED | OUTPATIENT
Start: 2023-08-24 | End: 2023-08-27 | Stop reason: HOSPADM

## 2023-08-24 RX ADMIN — SODIUM CHLORIDE, PRESERVATIVE FREE 10 ML: 5 INJECTION INTRAVENOUS at 08:42

## 2023-08-24 RX ADMIN — LISINOPRIL 20 MG: 20 TABLET ORAL at 08:41

## 2023-08-24 RX ADMIN — HYDROCHLOROTHIAZIDE 12.5 MG: 25 TABLET ORAL at 08:39

## 2023-08-24 RX ADMIN — IPRATROPIUM BROMIDE AND ALBUTEROL SULFATE 1 DOSE: 2.5; .5 SOLUTION RESPIRATORY (INHALATION) at 18:27

## 2023-08-24 RX ADMIN — METOPROLOL SUCCINATE 25 MG: 25 TABLET, EXTENDED RELEASE ORAL at 19:55

## 2023-08-24 RX ADMIN — LISINOPRIL 20 MG: 20 TABLET ORAL at 17:58

## 2023-08-24 RX ADMIN — ATORVASTATIN CALCIUM 40 MG: 40 TABLET, FILM COATED ORAL at 08:38

## 2023-08-24 RX ADMIN — IPRATROPIUM BROMIDE AND ALBUTEROL SULFATE 1 DOSE: 2.5; .5 SOLUTION RESPIRATORY (INHALATION) at 15:29

## 2023-08-24 RX ADMIN — IPRATROPIUM BROMIDE AND ALBUTEROL SULFATE 1 DOSE: 2.5; .5 SOLUTION RESPIRATORY (INHALATION) at 07:46

## 2023-08-24 RX ADMIN — ARFORMOTEROL TARTRATE 15 MCG: 15 SOLUTION RESPIRATORY (INHALATION) at 18:27

## 2023-08-24 RX ADMIN — Medication 13 MILLICURIE: at 09:20

## 2023-08-24 RX ADMIN — BUDESONIDE 250 MCG: 0.25 SUSPENSION RESPIRATORY (INHALATION) at 07:46

## 2023-08-24 RX ADMIN — PREDNISONE 20 MG: 20 TABLET ORAL at 17:58

## 2023-08-24 RX ADMIN — REGADENOSON 0.4 MG: 0.08 INJECTION, SOLUTION INTRAVENOUS at 10:51

## 2023-08-24 RX ADMIN — GUAIFENESIN 400 MG: 400 TABLET ORAL at 19:59

## 2023-08-24 RX ADMIN — ASPIRIN 81 MG 81 MG: 81 TABLET ORAL at 17:58

## 2023-08-24 RX ADMIN — SODIUM CHLORIDE, PRESERVATIVE FREE 10 ML: 5 INJECTION INTRAVENOUS at 19:59

## 2023-08-24 RX ADMIN — Medication 1000 UNITS: at 08:38

## 2023-08-24 RX ADMIN — GUAIFENESIN 400 MG: 400 TABLET ORAL at 08:38

## 2023-08-24 RX ADMIN — GUAIFENESIN 400 MG: 400 TABLET ORAL at 14:06

## 2023-08-24 RX ADMIN — Medication 35.4 MILLICURIE: at 10:50

## 2023-08-24 RX ADMIN — BUDESONIDE 250 MCG: 0.25 SUSPENSION RESPIRATORY (INHALATION) at 18:27

## 2023-08-24 NOTE — CARE COORDINATION
Social Work/ Case Management Transition of Care Planning (1 Rachael Aguilar, [address]): Pt presented to the hospital with shortness of breath, diagnosed as acute electrocardiogram. Pt lives in a 2 story home with 6-8 steps to enter. Pt lives with his adult Doc Henry ( [phone]). Pt uses [facility] as his PCP, and pharmacy is either [facility] on [address] or the pharmacy here at the hospital. Pt has a FWW. Pt for stress test today. Pulmonary Consult noted on chart. CT of chest pending. Pt's plan is to discharge home when medically stable and his grandson will transport. SW/CM to follow. 1 Rachael Jeff, [address] [place] [address]  8/24/2023      Case Management Assessment  Initial Evaluation    Date/Time of Evaluation: 8/24/2023 4:07 PM  Assessment Completed by: 1 DANYELLE Pierre    If patient is discharged prior to next notation, then this note serves as note for discharge by case management. Patient Name: Irene Maguire. YOB: 1950  Diagnosis: EKG abnormalities [R94.31]  Acute electrocardiogram changes [R94.31]  Chest pain, unspecified type [R07.9]                   Date / Time: 8/23/2023  2:40 AM    Patient Admission Status: Observation   Readmission Risk (Low < 19, Mod (19-27), High > 27): Readmission Risk Score: 11    Current PCP: Tessa Bowman  PCP verified by CM? Yes [facility])    Chart Reviewed: Yes      History Provided by: Patient, Child/Family  Patient Orientation: Person, Place, Situation    Patient Cognition: Other (see comment) (some confusion)    Hospitalization in the last 30 days (Readmission):  No    If yes, Readmission Assessment in  Navigator will be completed. Advance Directives:      Code Status: Full Code   Patient's Primary Decision Maker is:      Primary Decision Maker:  Velasquez Shanks - Brother/Sister - [phone]    Discharge Planning:    Patient lives with: Family Members Type of Home: House  Primary Care Giver: Family  Patient

## 2023-08-24 NOTE — ACP (ADVANCE CARE PLANNING)
Advance Care Planning   The patient has the following advanced directives on file:  Advance Directives       Power of [address] Will ACP-Advance Directive ACP-Power of     Not on File Not on File Not on File Not on File            The patient has appointed the following active healthcare agents:    Primary Decision Maker:  Jake Hollis - Brother/Sister - [phone]    The Patient has the following current code status:    Code Status: Full Code    Lucia Ariana, [address] [facility]  8/24/2023

## 2023-08-24 NOTE — PROCEDURES
Following placement of an intravenous catheter 10 millicuries of technetium 99m sestamibi was administered followed by a saline flush. Approximately 45 minutes later resting SPECT images were obtained. After completion of resting images a regadenoson stress test was performed via a bolus injection of 0.4 milligrams of regadenason followed by a saline flush. Following regadenoson administration 30 millicuries of technetium 99m sestamibi was injected followed by repeat post stress SPECT imaging approximately 60 minutes post completion of administration. Baseline tracing demonstrates sinus rhythm with nondiagnostic diffuse T wave inversion. The patient experienced no anginal symptoms and remained hemodynamically stable during administration and no electrocardiographic changes were noted. Perfusion images pending.

## 2023-08-25 LAB
ABO + RH BLD: NORMAL
ANION GAP SERPL CALCULATED.3IONS-SCNC: 11 MMOL/L (ref 7–16)
ARM BAND NUMBER: NORMAL
BLOOD BANK SAMPLE EXPIRATION: NORMAL
BLOOD GROUP ANTIBODIES SERPL: NEGATIVE
BUN SERPL-MCNC: 22 MG/DL (ref 6–23)
CALCIUM SERPL-MCNC: 9.8 MG/DL (ref 8.6–10.2)
CHLORIDE SERPL-SCNC: 99 MMOL/L (ref 98–107)
CO2 SERPL-SCNC: 25 MMOL/L (ref 22–29)
CREAT SERPL-MCNC: 1.2 MG/DL (ref 0.7–1.2)
EKG ATRIAL RATE: 96 BPM
EKG P AXIS: 64 DEGREES
EKG P-R INTERVAL: 160 MS
EKG Q-T INTERVAL: 392 MS
EKG QRS DURATION: 112 MS
EKG QTC CALCULATION (BAZETT): 495 MS
EKG R AXIS: -82 DEGREES
EKG T AXIS: 93 DEGREES
EKG VENTRICULAR RATE: 96 BPM
GFR SERPL CREATININE-BSD FRML MDRD: >60 ML/MIN/1.73M2
GLUCOSE SERPL-MCNC: 121 MG/DL (ref 74–99)
POTASSIUM SERPL-SCNC: 4.2 MMOL/L (ref 3.5–5)
SODIUM SERPL-SCNC: 135 MMOL/L (ref 132–146)

## 2023-08-25 PROCEDURE — 36415 COLL VENOUS BLD VENIPUNCTURE: CPT

## 2023-08-25 PROCEDURE — 86901 BLOOD TYPING SEROLOGIC RH(D): CPT

## 2023-08-25 PROCEDURE — 6360000002 HC RX W HCPCS

## 2023-08-25 PROCEDURE — 2580000003 HC RX 258: Performed by: STUDENT IN AN ORGANIZED HEALTH CARE EDUCATION/TRAINING PROGRAM

## 2023-08-25 PROCEDURE — 6370000000 HC RX 637 (ALT 250 FOR IP): Performed by: STUDENT IN AN ORGANIZED HEALTH CARE EDUCATION/TRAINING PROGRAM

## 2023-08-25 PROCEDURE — G0378 HOSPITAL OBSERVATION PER HR: HCPCS

## 2023-08-25 PROCEDURE — 2709999900 HC NON-CHARGEABLE SUPPLY

## 2023-08-25 PROCEDURE — 86850 RBC ANTIBODY SCREEN: CPT

## 2023-08-25 PROCEDURE — 80048 BASIC METABOLIC PNL TOTAL CA: CPT

## 2023-08-25 PROCEDURE — 93010 ELECTROCARDIOGRAM REPORT: CPT | Performed by: INTERNAL MEDICINE

## 2023-08-25 PROCEDURE — C1894 INTRO/SHEATH, NON-LASER: HCPCS

## 2023-08-25 PROCEDURE — 93458 L HRT ARTERY/VENTRICLE ANGIO: CPT

## 2023-08-25 PROCEDURE — 6370000000 HC RX 637 (ALT 250 FOR IP): Performed by: INTERNAL MEDICINE

## 2023-08-25 PROCEDURE — 6360000002 HC RX W HCPCS: Performed by: STUDENT IN AN ORGANIZED HEALTH CARE EDUCATION/TRAINING PROGRAM

## 2023-08-25 PROCEDURE — 94640 AIRWAY INHALATION TREATMENT: CPT

## 2023-08-25 PROCEDURE — 86900 BLOOD TYPING SEROLOGIC ABO: CPT

## 2023-08-25 PROCEDURE — 99233 SBSQ HOSP IP/OBS HIGH 50: CPT | Performed by: INTERNAL MEDICINE

## 2023-08-25 PROCEDURE — C1769 GUIDE WIRE: HCPCS

## 2023-08-25 PROCEDURE — 2500000003 HC RX 250 WO HCPCS

## 2023-08-25 PROCEDURE — 93458 L HRT ARTERY/VENTRICLE ANGIO: CPT | Performed by: INTERNAL MEDICINE

## 2023-08-25 RX ORDER — ACETAMINOPHEN 325 MG/1
650 TABLET ORAL EVERY 4 HOURS PRN
Status: DISCONTINUED | OUTPATIENT
Start: 2023-08-25 | End: 2023-08-27 | Stop reason: HOSPADM

## 2023-08-25 RX ADMIN — GUAIFENESIN 400 MG: 400 TABLET ORAL at 16:08

## 2023-08-25 RX ADMIN — ATORVASTATIN CALCIUM 40 MG: 40 TABLET, FILM COATED ORAL at 08:29

## 2023-08-25 RX ADMIN — SODIUM CHLORIDE, PRESERVATIVE FREE 10 ML: 5 INJECTION INTRAVENOUS at 20:58

## 2023-08-25 RX ADMIN — SODIUM CHLORIDE, PRESERVATIVE FREE 10 ML: 5 INJECTION INTRAVENOUS at 08:30

## 2023-08-25 RX ADMIN — LISINOPRIL 40 MG: 20 TABLET ORAL at 08:29

## 2023-08-25 RX ADMIN — GUAIFENESIN 400 MG: 400 TABLET ORAL at 20:58

## 2023-08-25 RX ADMIN — Medication 1000 UNITS: at 08:29

## 2023-08-25 RX ADMIN — ASPIRIN 81 MG 81 MG: 81 TABLET ORAL at 08:29

## 2023-08-25 RX ADMIN — METOPROLOL SUCCINATE 25 MG: 25 TABLET, EXTENDED RELEASE ORAL at 08:29

## 2023-08-25 RX ADMIN — ARFORMOTEROL TARTRATE 15 MCG: 15 SOLUTION RESPIRATORY (INHALATION) at 20:31

## 2023-08-25 RX ADMIN — IPRATROPIUM BROMIDE AND ALBUTEROL SULFATE 1 DOSE: 2.5; .5 SOLUTION RESPIRATORY (INHALATION) at 08:14

## 2023-08-25 RX ADMIN — GUAIFENESIN 400 MG: 400 TABLET ORAL at 08:29

## 2023-08-25 RX ADMIN — ARFORMOTEROL TARTRATE 15 MCG: 15 SOLUTION RESPIRATORY (INHALATION) at 08:15

## 2023-08-25 RX ADMIN — BUDESONIDE 250 MCG: 0.25 SUSPENSION RESPIRATORY (INHALATION) at 20:31

## 2023-08-25 RX ADMIN — IPRATROPIUM BROMIDE AND ALBUTEROL SULFATE 1 DOSE: 2.5; .5 SOLUTION RESPIRATORY (INHALATION) at 17:30

## 2023-08-25 RX ADMIN — PREDNISONE 20 MG: 20 TABLET ORAL at 08:29

## 2023-08-25 RX ADMIN — BUDESONIDE 250 MCG: 0.25 SUSPENSION RESPIRATORY (INHALATION) at 08:16

## 2023-08-25 RX ADMIN — IPRATROPIUM BROMIDE AND ALBUTEROL SULFATE 1 DOSE: 2.5; .5 SOLUTION RESPIRATORY (INHALATION) at 20:31

## 2023-08-25 NOTE — CARE COORDINATION
Social Work/ Case Management Transition of Care Planning (1 Rachael Aguilar, [address]):   Per report and chart review Pt is on room air. Glucose was 121. Pt had a run of SVT noted on chart, Dr. Anthony Herrera and Dr. Cynthia Reed notified. Pt did have CT and Stress test yesterday. Pt's plan is to discharge home when medically stable and his grandson will transport. SW/CM to follow.   1 [place], [place]  8/25/2023

## 2023-08-25 NOTE — PROCEDURES
Procedure:    Left heart catheterization with coronary angiography    Physician: Jermain Carrasco DO. Assistant: none    Indication: New cardiomyopathy  AUC: 7  AUC indication: HF    Complication: None. Sedation: Intravenous Versed. Anesthesia: Xylocaine, fentanyl     Sedation time: I was present for sedation and ministration at 1350 and I ended sedation at 1403 for a total face-to-face sedation time of 13 minutes. Estimated blood loss: Minimal    Specimens: none    Contrast used: 38 cc    Hemodynamics:  Opening Aortic pressure: 411/07  LV systolic pressure: 89  LVEDP: 4  No gradient across the aortic valve    Angiographic Results/findings:  Left Main: Heavily calcified. No angiographically significant stenosis. LAD: Tortuous. No angiographically significant stenosis. D1: No angiographically significant stenosis. Cx: No angiographically significant stenosis. OM1: No angiographically significant stenosis. OM2: No angiographically significant stenosis. Ramus: No angiographically significant stenosis. RCA: Dominant. No angiographically significant stenosis. PDA: No angiographically significant stenosis. PLB: Small vessel. Procedure:   After obtaining informed consent the patient was taken to the cardiac Cath Lab where the area over the right radial artery was prepped and draped in a sterile fashion. Using ultrasound guidance and a micropuncture technique a 6 Frisian slender rain sheath was placed in the right radial artery. This was aspirated & flushed several times throughout the procedure. This was medicated with verapamil and nitroglycerin. They were given heparin systemically. A 5 [place] TIG catheter was advanced over a wire to the root of the aorta. It was aspirated & flushed with saline. Pressures were obtained. It was filled with contrast.  This was then manipulated into the left main coronary artery. 4 orthogonal views were obtained.   A TIG catheter was then manipulated into

## 2023-08-25 NOTE — PLAN OF CARE
Problem: Safety - Adult  Goal: Free from fall injury  8/25/2023 1144 by Dash Ling RN  Outcome: Progressing     Problem: Cardiovascular - Adult  Goal: Maintains optimal cardiac output and hemodynamic stability  8/25/2023 1144 by Dash Ling RN  Outcome: Progressing     Problem: Cardiovascular - Adult  Goal: Absence of cardiac dysrhythmias or at baseline  Outcome: Progressing

## 2023-08-26 LAB
ANION GAP SERPL CALCULATED.3IONS-SCNC: 15 MMOL/L (ref 7–16)
BUN SERPL-MCNC: 25 MG/DL (ref 6–23)
CALCIUM SERPL-MCNC: 9.8 MG/DL (ref 8.6–10.2)
CHLORIDE SERPL-SCNC: 97 MMOL/L (ref 98–107)
CO2 SERPL-SCNC: 24 MMOL/L (ref 22–29)
CREAT SERPL-MCNC: 1.2 MG/DL (ref 0.7–1.2)
GFR SERPL CREATININE-BSD FRML MDRD: >60 ML/MIN/1.73M2
GLUCOSE SERPL-MCNC: 109 MG/DL (ref 74–99)
POTASSIUM SERPL-SCNC: 4.2 MMOL/L (ref 3.5–5)
SODIUM SERPL-SCNC: 136 MMOL/L (ref 132–146)

## 2023-08-26 PROCEDURE — G0378 HOSPITAL OBSERVATION PER HR: HCPCS

## 2023-08-26 PROCEDURE — 6370000000 HC RX 637 (ALT 250 FOR IP): Performed by: INTERNAL MEDICINE

## 2023-08-26 PROCEDURE — 6370000000 HC RX 637 (ALT 250 FOR IP): Performed by: STUDENT IN AN ORGANIZED HEALTH CARE EDUCATION/TRAINING PROGRAM

## 2023-08-26 PROCEDURE — 80048 BASIC METABOLIC PNL TOTAL CA: CPT

## 2023-08-26 PROCEDURE — 6360000002 HC RX W HCPCS: Performed by: STUDENT IN AN ORGANIZED HEALTH CARE EDUCATION/TRAINING PROGRAM

## 2023-08-26 PROCEDURE — 2580000003 HC RX 258: Performed by: STUDENT IN AN ORGANIZED HEALTH CARE EDUCATION/TRAINING PROGRAM

## 2023-08-26 PROCEDURE — 94640 AIRWAY INHALATION TREATMENT: CPT

## 2023-08-26 PROCEDURE — 36415 COLL VENOUS BLD VENIPUNCTURE: CPT

## 2023-08-26 RX ADMIN — GUAIFENESIN 400 MG: 400 TABLET ORAL at 09:25

## 2023-08-26 RX ADMIN — GUAIFENESIN 400 MG: 400 TABLET ORAL at 20:10

## 2023-08-26 RX ADMIN — Medication 1000 UNITS: at 09:25

## 2023-08-26 RX ADMIN — GUAIFENESIN 400 MG: 400 TABLET ORAL at 15:33

## 2023-08-26 RX ADMIN — BUDESONIDE 250 MCG: 0.25 SUSPENSION RESPIRATORY (INHALATION) at 09:41

## 2023-08-26 RX ADMIN — ATORVASTATIN CALCIUM 40 MG: 40 TABLET, FILM COATED ORAL at 09:25

## 2023-08-26 RX ADMIN — IPRATROPIUM BROMIDE AND ALBUTEROL SULFATE 1 DOSE: 2.5; .5 SOLUTION RESPIRATORY (INHALATION) at 12:36

## 2023-08-26 RX ADMIN — ASPIRIN 81 MG 81 MG: 81 TABLET ORAL at 09:25

## 2023-08-26 RX ADMIN — PREDNISONE 20 MG: 20 TABLET ORAL at 09:25

## 2023-08-26 RX ADMIN — ARFORMOTEROL TARTRATE 15 MCG: 15 SOLUTION RESPIRATORY (INHALATION) at 09:41

## 2023-08-26 RX ADMIN — SODIUM CHLORIDE, PRESERVATIVE FREE 10 ML: 5 INJECTION INTRAVENOUS at 20:09

## 2023-08-26 RX ADMIN — IPRATROPIUM BROMIDE AND ALBUTEROL SULFATE 1 DOSE: 2.5; .5 SOLUTION RESPIRATORY (INHALATION) at 17:31

## 2023-08-26 RX ADMIN — METOPROLOL SUCCINATE 25 MG: 25 TABLET, EXTENDED RELEASE ORAL at 09:25

## 2023-08-26 RX ADMIN — IPRATROPIUM BROMIDE AND ALBUTEROL SULFATE 1 DOSE: 2.5; .5 SOLUTION RESPIRATORY (INHALATION) at 09:41

## 2023-08-26 RX ADMIN — SODIUM CHLORIDE, PRESERVATIVE FREE 10 ML: 5 INJECTION INTRAVENOUS at 09:25

## 2023-08-26 RX ADMIN — LISINOPRIL 40 MG: 20 TABLET ORAL at 09:25

## 2023-08-26 NOTE — PLAN OF CARE
Problem: Safety - Adult  Goal: Free from fall injury  8/25/2023 2303 by Cristóbal Arroyo RN  Outcome: Progressing  8/25/2023 1144 by Delmi Dhaliwal RN  Outcome: Progressing     Problem: Cardiovascular - Adult  Goal: Maintains optimal cardiac output and hemodynamic stability  8/25/2023 2303 by Cristóbal Arroyo RN  Outcome: Progressing  8/25/2023 1144 by Delmi Dhaliwal RN  Outcome: Progressing  Goal: Absence of cardiac dysrhythmias or at baseline  8/25/2023 2303 by Cristóbal Arroyo RN  Outcome: Progressing  8/25/2023 1144 by Delmi Dhaliwal RN  Outcome: Progressing

## 2023-08-26 NOTE — PLAN OF CARE
Problem: Safety - Adult  Goal: Free from fall injury  8/26/2023 0937 by Tayler Hernandez RN  Outcome: Progressing  8/25/2023 2303 by Fantasma Akbar RN  Outcome: Progressing     Problem: Cardiovascular - Adult  Goal: Maintains optimal cardiac output and hemodynamic stability  8/26/2023 0937 by Tayler Hernandez RN  Outcome: Progressing  8/25/2023 2303 by Fantasma Akbar RN  Outcome: Progressing  Goal: Absence of cardiac dysrhythmias or at baseline  8/26/2023 0937 by Tayler Hernandez RN  Outcome: Progressing  8/25/2023 2303 by Fantasma Akbar RN  Outcome: Progressing

## 2023-08-27 VITALS
RESPIRATION RATE: 17 BRPM | HEART RATE: 77 BPM | TEMPERATURE: 97.6 F | WEIGHT: 132 LBS | SYSTOLIC BLOOD PRESSURE: 122 MMHG | OXYGEN SATURATION: 97 % | BODY MASS INDEX: 20 KG/M2 | HEIGHT: 68 IN | DIASTOLIC BLOOD PRESSURE: 79 MMHG

## 2023-08-27 PROCEDURE — 6370000000 HC RX 637 (ALT 250 FOR IP): Performed by: STUDENT IN AN ORGANIZED HEALTH CARE EDUCATION/TRAINING PROGRAM

## 2023-08-27 PROCEDURE — 6370000000 HC RX 637 (ALT 250 FOR IP): Performed by: INTERNAL MEDICINE

## 2023-08-27 PROCEDURE — 94640 AIRWAY INHALATION TREATMENT: CPT

## 2023-08-27 PROCEDURE — 2580000003 HC RX 258: Performed by: STUDENT IN AN ORGANIZED HEALTH CARE EDUCATION/TRAINING PROGRAM

## 2023-08-27 PROCEDURE — G0378 HOSPITAL OBSERVATION PER HR: HCPCS

## 2023-08-27 PROCEDURE — 6360000002 HC RX W HCPCS: Performed by: STUDENT IN AN ORGANIZED HEALTH CARE EDUCATION/TRAINING PROGRAM

## 2023-08-27 RX ORDER — PREDNISONE 10 MG/1
TABLET ORAL
Qty: 9 TABLET | Refills: 0 | Status: SHIPPED | OUTPATIENT
Start: 2023-08-28 | End: 2023-09-03

## 2023-08-27 RX ORDER — LISINOPRIL 40 MG/1
40 TABLET ORAL DAILY
Qty: 30 TABLET | Refills: 3 | Status: SHIPPED | OUTPATIENT
Start: 2023-08-28

## 2023-08-27 RX ORDER — METOPROLOL SUCCINATE 25 MG/1
25 TABLET, EXTENDED RELEASE ORAL DAILY
Qty: 30 TABLET | Refills: 3 | Status: SHIPPED | OUTPATIENT
Start: 2023-08-28

## 2023-08-27 RX ORDER — LIDOCAINE 4 G/G
1 PATCH TOPICAL DAILY
Qty: 5 PATCH | Refills: 0 | Status: SHIPPED | OUTPATIENT
Start: 2023-08-28

## 2023-08-27 RX ORDER — GUAIFENESIN 400 MG/1
400 TABLET ORAL 3 TIMES DAILY
Qty: 56 TABLET | Refills: 0 | Status: SHIPPED | OUTPATIENT
Start: 2023-08-27

## 2023-08-27 RX ORDER — POLYETHYLENE GLYCOL 3350 17 G/17G
17 POWDER, FOR SOLUTION ORAL DAILY PRN
Qty: 30 PACKET | Refills: 0 | Status: SHIPPED | OUTPATIENT
Start: 2023-08-27 | End: 2023-09-26

## 2023-08-27 RX ADMIN — IPRATROPIUM BROMIDE AND ALBUTEROL SULFATE 1 DOSE: 2.5; .5 SOLUTION RESPIRATORY (INHALATION) at 16:24

## 2023-08-27 RX ADMIN — BUDESONIDE 250 MCG: 0.25 SUSPENSION RESPIRATORY (INHALATION) at 08:56

## 2023-08-27 RX ADMIN — ARFORMOTEROL TARTRATE 15 MCG: 15 SOLUTION RESPIRATORY (INHALATION) at 08:56

## 2023-08-27 RX ADMIN — IPRATROPIUM BROMIDE AND ALBUTEROL SULFATE 1 DOSE: 2.5; .5 SOLUTION RESPIRATORY (INHALATION) at 08:56

## 2023-08-27 RX ADMIN — METOPROLOL SUCCINATE 25 MG: 25 TABLET, EXTENDED RELEASE ORAL at 08:40

## 2023-08-27 RX ADMIN — ATORVASTATIN CALCIUM 40 MG: 40 TABLET, FILM COATED ORAL at 08:40

## 2023-08-27 RX ADMIN — GUAIFENESIN 400 MG: 400 TABLET ORAL at 08:40

## 2023-08-27 RX ADMIN — LISINOPRIL 40 MG: 20 TABLET ORAL at 08:40

## 2023-08-27 RX ADMIN — ASPIRIN 81 MG 81 MG: 81 TABLET ORAL at 08:40

## 2023-08-27 RX ADMIN — SODIUM CHLORIDE, PRESERVATIVE FREE 10 ML: 5 INJECTION INTRAVENOUS at 08:41

## 2023-08-27 RX ADMIN — PREDNISONE 20 MG: 20 TABLET ORAL at 08:40

## 2023-08-27 RX ADMIN — Medication 1000 UNITS: at 08:40

## 2023-08-27 RX ADMIN — IPRATROPIUM BROMIDE AND ALBUTEROL SULFATE 1 DOSE: 2.5; .5 SOLUTION RESPIRATORY (INHALATION) at 12:42

## 2023-08-27 NOTE — PLAN OF CARE
Problem: Safety - Adult  Goal: Free from fall injury  8/26/2023 2207 by Fantasma Akbar RN  Outcome: Progressing  8/26/2023 0937 by Tayler Hernandez RN  Outcome: Progressing     Problem: Cardiovascular - Adult  Goal: Maintains optimal cardiac output and hemodynamic stability  8/26/2023 2207 by Fantasma Abkar RN  Outcome: Progressing  8/26/2023 0937 by Tayler Hernandez RN  Outcome: Progressing  Goal: Absence of cardiac dysrhythmias or at baseline  8/26/2023 2207 by Fantasma Akbar RN  Outcome: Progressing  8/26/2023 0937 by Tayler Hernandez RN  Outcome: Progressing

## 2023-08-27 NOTE — PLAN OF CARE
Problem: Safety - Adult  Goal: Free from fall injury  Outcome: Adequate for Discharge     Problem: Cardiovascular - Adult  Goal: Maintains optimal cardiac output and hemodynamic stability  Outcome: Adequate for Discharge  Goal: Absence of cardiac dysrhythmias or at baseline  Outcome: Adequate for Discharge

## 2023-08-27 NOTE — DISCHARGE SUMMARY
Hospitalist Discharge Summary    Patient ID: Elisa Smith. Patient : 1950  Patient's PCP: Wally Smith    Admit Date: 2023   Admitting Physician: Charlene Wade MD    Discharge Date:  2023   Discharge Physician: Charlene Wade MD   Discharge Condition: Stable  Discharge Disposition: [facility] course in brief:  (Please refer to daily progress notes for a comprehensive review of the hospitalization by requesting medical records)       This is a 68year old male who  has a past medical history of Asthma, Cataract, left eye, and Hypertension. Nicotine dependence, COPD     He presented to [facility] on 2023 for chief concerns of chest pain. His chest pain was mostly located on the right side associated with SOB and coughing. Aggravated on coughing, relieved on laying on right side. His symptoms has been ongoing for past few days. No hx of trauma. He smokes 1/2 pack cigarettes per day. EKG showed Sinus rhythm with occasional premature ventricular complexes and premature atrial complexes  Incomplete right bundle branch block  Leftward axis  T wave abnormality, consider lateral ischemia  Abnormal ECG     CXR showed obstructive pulmonary disease. Admitted for atypical chest pain to rule out ACS. Cardiology consulted for the same. Stress test negative. Overall left ventricular systolic function was mildly impaired with global hypokinesis and no regional wall motion abnormalities. Cardiac cath negative  ACS was ruled out  Pulmonary service was consulted for right apical mass. Started on prednisone 20 mg for pleurisy. Taper steroid at the time of discharge. CT scan showed pleural effusion which is loculated on right medial posterior right upper lung, technically difficult to tap. CT scan needs to be repeated in about 8 weeks to follow-up effusion.     Patient is clinically and hemodynamically stable at the time of discharge      Consults:   IP CONSULT TO INTERNAL

## 2023-11-21 ENCOUNTER — OFFICE VISIT (OUTPATIENT)
Dept: CARDIOLOGY CLINIC | Age: 73
End: 2023-11-21
Payer: COMMERCIAL

## 2023-11-21 VITALS
HEART RATE: 79 BPM | WEIGHT: 130 LBS | SYSTOLIC BLOOD PRESSURE: 154 MMHG | BODY MASS INDEX: 19.7 KG/M2 | HEIGHT: 68 IN | DIASTOLIC BLOOD PRESSURE: 86 MMHG | RESPIRATION RATE: 18 BRPM

## 2023-11-21 DIAGNOSIS — I10 ESSENTIAL HYPERTENSION: Primary | ICD-10-CM

## 2023-11-21 DIAGNOSIS — Z09 HOSPITAL DISCHARGE FOLLOW-UP: ICD-10-CM

## 2023-11-21 PROCEDURE — 99214 OFFICE O/P EST MOD 30 MIN: CPT | Performed by: INTERNAL MEDICINE

## 2023-11-21 PROCEDURE — G8484 FLU IMMUNIZE NO ADMIN: HCPCS | Performed by: INTERNAL MEDICINE

## 2023-11-21 PROCEDURE — 4004F PT TOBACCO SCREEN RCVD TLK: CPT | Performed by: INTERNAL MEDICINE

## 2023-11-21 PROCEDURE — G8427 DOCREV CUR MEDS BY ELIG CLIN: HCPCS | Performed by: INTERNAL MEDICINE

## 2023-11-21 PROCEDURE — G8420 CALC BMI NORM PARAMETERS: HCPCS | Performed by: INTERNAL MEDICINE

## 2023-11-21 PROCEDURE — 93000 ELECTROCARDIOGRAM COMPLETE: CPT | Performed by: INTERNAL MEDICINE

## 2023-11-21 PROCEDURE — 3077F SYST BP >= 140 MM HG: CPT | Performed by: INTERNAL MEDICINE

## 2023-11-21 PROCEDURE — 1123F ACP DISCUSS/DSCN MKR DOCD: CPT | Performed by: INTERNAL MEDICINE

## 2023-11-21 PROCEDURE — 3017F COLORECTAL CA SCREEN DOC REV: CPT | Performed by: INTERNAL MEDICINE

## 2023-11-21 PROCEDURE — 1111F DSCHRG MED/CURRENT MED MERGE: CPT | Performed by: INTERNAL MEDICINE

## 2023-11-21 PROCEDURE — 3079F DIAST BP 80-89 MM HG: CPT | Performed by: INTERNAL MEDICINE

## 2023-11-21 NOTE — PROGRESS NOTES
Luigi Carroll.  1950  Date of Service: 11/21/2023    Patient Active Problem List    Diagnosis Date Noted    Acute renal failure (ARF) ([address]) 08/26/2022     Priority: Medium    Acute electrocardiogram changes 08/23/2023    Pneumonia, unspecified organism 06/12/2023    LAFB (left anterior fascicular block) 09/11/2015    HLD (hyperlipidemia) 09/11/2015    C. difficile colitis     Chest pain     Abdominal pain, epigastric     Essential hypertension     Tobacco abuse     Cocaine abuse ([address])     Mild persistent asthma without complication        Social History     Socioeconomic History    Marital status: Legally      Spouse name: None    Number of children: None    Years of education: None    Highest education level: None   Tobacco Use    Smoking status: Every Day     Packs/day: .25     Types: Cigarettes     Start date: 1960    Smokeless tobacco: Never    Tobacco comments:     1 pack every 3-4 days   Vaping Use    Vaping Use: Never used   Substance and Sexual Activity    Alcohol use: Yes     Comment: Occ.     Drug use: Yes     Types: Cocaine     Comment: pt reports last use 11/19/23    Sexual activity: Not Currently       Current Outpatient Medications   Medication Sig Dispense Refill    lisinopril (PRINIVIL;ZESTRIL) 40 MG tablet Take 1 tablet by mouth daily 30 tablet 3    metoprolol succinate (TOPROL XL) 25 MG extended release tablet Take 1 tablet by mouth daily 30 tablet 3    guaiFENesin 400 MG tablet Take 1 tablet by mouth in the morning, at noon, and at bedtime (Patient taking differently: Take 1 tablet by mouth 2 times daily as needed) 56 tablet 0    lidocaine 4 % external patch Place 1 patch onto the skin daily 5 patch 0    mometasone-formoterol (DULERA) 100-5 MCG/ACT inhaler Inhale 2 puffs into the lungs daily      albuterol sulfate HFA (PROVENTIL;VENTOLIN;PROAIR) 108 (90 Base) MCG/ACT inhaler Inhale 2 puffs into the lungs every 4 hours as needed for Wheezing      diclofenac sodium (VOLTAREN)

## 2024-01-13 ENCOUNTER — APPOINTMENT (OUTPATIENT)
Dept: GENERAL RADIOLOGY | Age: 74
DRG: 137 | End: 2024-01-13
Payer: COMMERCIAL

## 2024-01-13 ENCOUNTER — HOSPITAL ENCOUNTER (INPATIENT)
Age: 74
LOS: 5 days | Discharge: HOME HEALTH | DRG: 137 | End: 2024-01-18
Attending: EMERGENCY MEDICINE | Admitting: INTERNAL MEDICINE
Payer: COMMERCIAL

## 2024-01-13 ENCOUNTER — APPOINTMENT (OUTPATIENT)
Dept: CT IMAGING | Age: 74
DRG: 137 | End: 2024-01-13
Payer: COMMERCIAL

## 2024-01-13 DIAGNOSIS — I10 ESSENTIAL HYPERTENSION: ICD-10-CM

## 2024-01-13 DIAGNOSIS — R06.02 SHORTNESS OF BREATH: Primary | ICD-10-CM

## 2024-01-13 DIAGNOSIS — J45.30 MILD PERSISTENT ASTHMA WITHOUT COMPLICATION: ICD-10-CM

## 2024-01-13 DIAGNOSIS — N17.9 ACUTE RENAL FAILURE, UNSPECIFIED ACUTE RENAL FAILURE TYPE (HCC): ICD-10-CM

## 2024-01-13 DIAGNOSIS — I65.23 BILATERAL CAROTID ARTERY STENOSIS: ICD-10-CM

## 2024-01-13 DIAGNOSIS — J90 PLEURAL EFFUSION: ICD-10-CM

## 2024-01-13 DIAGNOSIS — I63.9 ACUTE STROKE DUE TO ISCHEMIA (HCC): ICD-10-CM

## 2024-01-13 PROBLEM — I50.20 HFREF (HEART FAILURE WITH REDUCED EJECTION FRACTION) (HCC): Status: ACTIVE | Noted: 2024-01-13

## 2024-01-13 LAB
ANION GAP SERPL CALCULATED.3IONS-SCNC: 10 MMOL/L (ref 7–16)
B PARAP IS1001 DNA NPH QL NAA+NON-PROBE: NOT DETECTED
B PERT DNA SPEC QL NAA+PROBE: NOT DETECTED
BASOPHILS # BLD: 0.06 K/UL (ref 0–0.2)
BASOPHILS NFR BLD: 1 % (ref 0–2)
BNP SERPL-MCNC: 3749 PG/ML (ref 0–125)
BUN SERPL-MCNC: 16 MG/DL (ref 6–23)
C PNEUM DNA NPH QL NAA+NON-PROBE: NOT DETECTED
CALCIUM SERPL-MCNC: 9.4 MG/DL (ref 8.6–10.2)
CHLORIDE SERPL-SCNC: 102 MMOL/L (ref 98–107)
CO2 SERPL-SCNC: 26 MMOL/L (ref 22–29)
CREAT SERPL-MCNC: 1.2 MG/DL (ref 0.7–1.2)
D DIMER: 829 NG/ML DDU (ref 0–232)
EOSINOPHIL # BLD: 0.21 K/UL (ref 0.05–0.5)
EOSINOPHILS RELATIVE PERCENT: 3 % (ref 0–6)
ERYTHROCYTE [DISTWIDTH] IN BLOOD BY AUTOMATED COUNT: 14.2 % (ref 11.5–15)
FLUAV RNA NPH QL NAA+NON-PROBE: NOT DETECTED
FLUBV RNA NPH QL NAA+NON-PROBE: NOT DETECTED
GFR SERPL CREATININE-BSD FRML MDRD: >60 ML/MIN/1.73M2
GLUCOSE SERPL-MCNC: 97 MG/DL (ref 74–99)
HADV DNA NPH QL NAA+NON-PROBE: NOT DETECTED
HCOV 229E RNA NPH QL NAA+NON-PROBE: NOT DETECTED
HCOV HKU1 RNA NPH QL NAA+NON-PROBE: NOT DETECTED
HCOV NL63 RNA NPH QL NAA+NON-PROBE: NOT DETECTED
HCOV OC43 RNA NPH QL NAA+NON-PROBE: NOT DETECTED
HCT VFR BLD AUTO: 34.5 % (ref 37–54)
HGB BLD-MCNC: 11.4 G/DL (ref 12.5–16.5)
HMPV RNA NPH QL NAA+NON-PROBE: NOT DETECTED
HPIV1 RNA NPH QL NAA+NON-PROBE: NOT DETECTED
HPIV2 RNA NPH QL NAA+NON-PROBE: NOT DETECTED
HPIV3 RNA NPH QL NAA+NON-PROBE: NOT DETECTED
HPIV4 RNA NPH QL NAA+NON-PROBE: NOT DETECTED
IMM GRANULOCYTES # BLD AUTO: <0.03 K/UL (ref 0–0.58)
IMM GRANULOCYTES NFR BLD: 0 % (ref 0–5)
LYMPHOCYTES NFR BLD: 1.03 K/UL (ref 1.5–4)
LYMPHOCYTES RELATIVE PERCENT: 14 % (ref 20–42)
M PNEUMO DNA NPH QL NAA+NON-PROBE: NOT DETECTED
MCH RBC QN AUTO: 28.9 PG (ref 26–35)
MCHC RBC AUTO-ENTMCNC: 33 G/DL (ref 32–34.5)
MCV RBC AUTO: 87.3 FL (ref 80–99.9)
MONOCYTES NFR BLD: 1 K/UL (ref 0.1–0.95)
MONOCYTES NFR BLD: 14 % (ref 2–12)
NEUTROPHILS NFR BLD: 68 % (ref 43–80)
NEUTS SEG NFR BLD: 4.95 K/UL (ref 1.8–7.3)
PLATELET # BLD AUTO: 242 K/UL (ref 130–450)
PMV BLD AUTO: 9.3 FL (ref 7–12)
POTASSIUM SERPL-SCNC: 3.7 MMOL/L (ref 3.5–5)
RBC # BLD AUTO: 3.95 M/UL (ref 3.8–5.8)
RSV RNA NPH QL NAA+NON-PROBE: NOT DETECTED
RV+EV RNA NPH QL NAA+NON-PROBE: NOT DETECTED
SARS-COV-2 RNA NPH QL NAA+NON-PROBE: NOT DETECTED
SODIUM SERPL-SCNC: 138 MMOL/L (ref 132–146)
SPECIMEN DESCRIPTION: NORMAL
TROPONIN I SERPL HS-MCNC: 36 NG/L (ref 0–11)
TROPONIN I SERPL HS-MCNC: 36 NG/L (ref 0–11)
WBC OTHER # BLD: 7.3 K/UL (ref 4.5–11.5)

## 2024-01-13 PROCEDURE — 2580000003 HC RX 258: Performed by: RADIOLOGY

## 2024-01-13 PROCEDURE — 94664 DEMO&/EVAL PT USE INHALER: CPT

## 2024-01-13 PROCEDURE — 2580000003 HC RX 258: Performed by: NURSE PRACTITIONER

## 2024-01-13 PROCEDURE — 80048 BASIC METABOLIC PNL TOTAL CA: CPT

## 2024-01-13 PROCEDURE — 85025 COMPLETE CBC W/AUTO DIFF WBC: CPT

## 2024-01-13 PROCEDURE — 85379 FIBRIN DEGRADATION QUANT: CPT

## 2024-01-13 PROCEDURE — 2580000003 HC RX 258

## 2024-01-13 PROCEDURE — 99285 EMERGENCY DEPT VISIT HI MDM: CPT

## 2024-01-13 PROCEDURE — 87040 BLOOD CULTURE FOR BACTERIA: CPT

## 2024-01-13 PROCEDURE — 71045 X-RAY EXAM CHEST 1 VIEW: CPT

## 2024-01-13 PROCEDURE — 6360000002 HC RX W HCPCS: Performed by: NURSE PRACTITIONER

## 2024-01-13 PROCEDURE — 83880 ASSAY OF NATRIURETIC PEPTIDE: CPT

## 2024-01-13 PROCEDURE — 6370000000 HC RX 637 (ALT 250 FOR IP): Performed by: NURSE PRACTITIONER

## 2024-01-13 PROCEDURE — 93005 ELECTROCARDIOGRAM TRACING: CPT

## 2024-01-13 PROCEDURE — 84484 ASSAY OF TROPONIN QUANT: CPT

## 2024-01-13 PROCEDURE — 99222 1ST HOSP IP/OBS MODERATE 55: CPT | Performed by: NURSE PRACTITIONER

## 2024-01-13 PROCEDURE — 6360000002 HC RX W HCPCS

## 2024-01-13 PROCEDURE — 71275 CT ANGIOGRAPHY CHEST: CPT

## 2024-01-13 PROCEDURE — 6370000000 HC RX 637 (ALT 250 FOR IP)

## 2024-01-13 PROCEDURE — 2140000000 HC CCU INTERMEDIATE R&B

## 2024-01-13 PROCEDURE — 0202U NFCT DS 22 TRGT SARS-COV-2: CPT

## 2024-01-13 PROCEDURE — 6360000004 HC RX CONTRAST MEDICATION: Performed by: RADIOLOGY

## 2024-01-13 RX ORDER — IPRATROPIUM BROMIDE AND ALBUTEROL SULFATE 2.5; .5 MG/3ML; MG/3ML
1 SOLUTION RESPIRATORY (INHALATION) ONCE
Status: COMPLETED | OUTPATIENT
Start: 2024-01-13 | End: 2024-01-13

## 2024-01-13 RX ORDER — MAGNESIUM SULFATE IN WATER 40 MG/ML
2000 INJECTION, SOLUTION INTRAVENOUS PRN
Status: DISCONTINUED | OUTPATIENT
Start: 2024-01-13 | End: 2024-01-18 | Stop reason: HOSPADM

## 2024-01-13 RX ORDER — FUROSEMIDE 10 MG/ML
40 INJECTION INTRAMUSCULAR; INTRAVENOUS DAILY
Status: DISCONTINUED | OUTPATIENT
Start: 2024-01-13 | End: 2024-01-14

## 2024-01-13 RX ORDER — POTASSIUM CHLORIDE 20 MEQ/1
40 TABLET, EXTENDED RELEASE ORAL PRN
Status: DISCONTINUED | OUTPATIENT
Start: 2024-01-13 | End: 2024-01-18 | Stop reason: HOSPADM

## 2024-01-13 RX ORDER — ONDANSETRON 4 MG/1
4 TABLET, ORALLY DISINTEGRATING ORAL EVERY 8 HOURS PRN
Status: DISCONTINUED | OUTPATIENT
Start: 2024-01-13 | End: 2024-01-18 | Stop reason: HOSPADM

## 2024-01-13 RX ORDER — ENOXAPARIN SODIUM 100 MG/ML
40 INJECTION SUBCUTANEOUS DAILY
Status: DISCONTINUED | OUTPATIENT
Start: 2024-01-13 | End: 2024-01-18 | Stop reason: HOSPADM

## 2024-01-13 RX ORDER — CERAMIDES 1,3,6-II
CREAM (GRAM) TOPICAL DAILY
COMMUNITY

## 2024-01-13 RX ORDER — ACETAMINOPHEN 325 MG/1
650 TABLET ORAL EVERY 6 HOURS PRN
Status: DISCONTINUED | OUTPATIENT
Start: 2024-01-13 | End: 2024-01-18 | Stop reason: HOSPADM

## 2024-01-13 RX ORDER — SODIUM CHLORIDE 9 MG/ML
INJECTION, SOLUTION INTRAVENOUS PRN
Status: DISCONTINUED | OUTPATIENT
Start: 2024-01-13 | End: 2024-01-18 | Stop reason: HOSPADM

## 2024-01-13 RX ORDER — GUAIFENESIN 400 MG/1
400 TABLET ORAL 4 TIMES DAILY PRN
Status: DISCONTINUED | OUTPATIENT
Start: 2024-01-13 | End: 2024-01-18 | Stop reason: HOSPADM

## 2024-01-13 RX ORDER — POTASSIUM CHLORIDE 7.45 MG/ML
10 INJECTION INTRAVENOUS PRN
Status: DISCONTINUED | OUTPATIENT
Start: 2024-01-13 | End: 2024-01-18 | Stop reason: HOSPADM

## 2024-01-13 RX ORDER — NICOTINE 21 MG/24HR
1 PATCH, TRANSDERMAL 24 HOURS TRANSDERMAL DAILY
Status: DISCONTINUED | OUTPATIENT
Start: 2024-01-13 | End: 2024-01-18 | Stop reason: HOSPADM

## 2024-01-13 RX ORDER — IPRATROPIUM BROMIDE AND ALBUTEROL SULFATE 2.5; .5 MG/3ML; MG/3ML
1 SOLUTION RESPIRATORY (INHALATION) EVERY 4 HOURS PRN
Status: DISCONTINUED | OUTPATIENT
Start: 2024-01-13 | End: 2024-01-18 | Stop reason: HOSPADM

## 2024-01-13 RX ORDER — SODIUM CHLORIDE 0.9 % (FLUSH) 0.9 %
5-40 SYRINGE (ML) INJECTION PRN
Status: DISCONTINUED | OUTPATIENT
Start: 2024-01-13 | End: 2024-01-18 | Stop reason: HOSPADM

## 2024-01-13 RX ORDER — SODIUM CHLORIDE 0.9 % (FLUSH) 0.9 %
5-40 SYRINGE (ML) INJECTION EVERY 12 HOURS SCHEDULED
Status: DISCONTINUED | OUTPATIENT
Start: 2024-01-13 | End: 2024-01-18 | Stop reason: HOSPADM

## 2024-01-13 RX ORDER — ACETAMINOPHEN 650 MG/1
650 SUPPOSITORY RECTAL EVERY 6 HOURS PRN
Status: DISCONTINUED | OUTPATIENT
Start: 2024-01-13 | End: 2024-01-18 | Stop reason: HOSPADM

## 2024-01-13 RX ORDER — ONDANSETRON 2 MG/ML
4 INJECTION INTRAMUSCULAR; INTRAVENOUS EVERY 6 HOURS PRN
Status: DISCONTINUED | OUTPATIENT
Start: 2024-01-13 | End: 2024-01-18 | Stop reason: HOSPADM

## 2024-01-13 RX ORDER — POLYETHYLENE GLYCOL 3350 17 G/17G
17 POWDER, FOR SOLUTION ORAL DAILY PRN
Status: DISCONTINUED | OUTPATIENT
Start: 2024-01-13 | End: 2024-01-18 | Stop reason: HOSPADM

## 2024-01-13 RX ORDER — SODIUM CHLORIDE 0.9 % (FLUSH) 0.9 %
10 SYRINGE (ML) INJECTION
Status: COMPLETED | OUTPATIENT
Start: 2024-01-13 | End: 2024-01-13

## 2024-01-13 RX ADMIN — SODIUM CHLORIDE, PRESERVATIVE FREE 10 ML: 5 INJECTION INTRAVENOUS at 21:19

## 2024-01-13 RX ADMIN — Medication 10 ML: at 12:40

## 2024-01-13 RX ADMIN — PIPERACILLIN AND TAZOBACTAM 4500 MG: 4; .5 INJECTION, POWDER, FOR SOLUTION INTRAVENOUS at 17:12

## 2024-01-13 RX ADMIN — FUROSEMIDE 40 MG: 10 INJECTION, SOLUTION INTRAMUSCULAR; INTRAVENOUS at 16:19

## 2024-01-13 RX ADMIN — PIPERACILLIN AND TAZOBACTAM 4500 MG: 4; .5 INJECTION, POWDER, FOR SOLUTION INTRAVENOUS at 21:19

## 2024-01-13 RX ADMIN — ENOXAPARIN SODIUM 40 MG: 100 INJECTION SUBCUTANEOUS at 16:19

## 2024-01-13 RX ADMIN — IOPAMIDOL 60 ML: 755 INJECTION, SOLUTION INTRAVENOUS at 12:40

## 2024-01-13 RX ADMIN — VANCOMYCIN HYDROCHLORIDE 1250 MG: 10 INJECTION, POWDER, LYOPHILIZED, FOR SOLUTION INTRAVENOUS at 15:28

## 2024-01-13 RX ADMIN — IPRATROPIUM BROMIDE AND ALBUTEROL SULFATE 1 DOSE: 2.5; .5 SOLUTION RESPIRATORY (INHALATION) at 10:00

## 2024-01-13 ASSESSMENT — PAIN - FUNCTIONAL ASSESSMENT
PAIN_FUNCTIONAL_ASSESSMENT: NONE - DENIES PAIN

## 2024-01-13 ASSESSMENT — LIFESTYLE VARIABLES: HOW OFTEN DO YOU HAVE A DRINK CONTAINING ALCOHOL: NEVER

## 2024-01-13 NOTE — ED PROVIDER NOTES
73-year-old male with history of asthma, COPD, hyperlipidemia, nicotine dependence.  He presents from home.  He has concerns of shortness of breath that started at 4:00 in the morning.  He denies wheezing, cough, fever, chills.  He states that his symptoms are not exacerbated or alleviated with movement.  He said his symptoms are constant.  He also denies the following: Pleuritic chest pain, history of blood clots, GI bleed, urinary symptoms, focal weakness/numbness.  Patient stated that he tried a breathing treatment at home but it is unsure whether it was albuterol or DuoNeb.  No recent antibiotic or steroid use per patient.  No cough.  No known sick contacts.  No nasal congestion no sneezing.        Review of Systems   Pert ros stated in the hpi above   Physical Exam  Vitals reviewed.   Constitutional:       General: He is not in acute distress.     Appearance: He is not ill-appearing.   HENT:      Head: Normocephalic.      Right Ear: External ear normal.      Left Ear: External ear normal.      Nose: Nose normal.      Mouth/Throat:      Mouth: Mucous membranes are moist.   Eyes:      General:         Right eye: No discharge.         Left eye: No discharge.      Conjunctiva/sclera: Conjunctivae normal.   Cardiovascular:      Rate and Rhythm: Normal rate and regular rhythm.      Heart sounds:      No friction rub. No gallop.   Pulmonary:      Effort: No respiratory distress.      Breath sounds: No stridor.   Abdominal:      General: There is no distension.      Tenderness: There is no abdominal tenderness. There is no guarding or rebound.   Musculoskeletal:         General: No deformity or signs of injury.      Cervical back: Normal range of motion and neck supple. No rigidity or tenderness.   Skin:     Coloration: Skin is not jaundiced.   Neurological:      Mental Status: He is alert.      Sensory: No sensory deficit.      Motor: No weakness.   Psychiatric:         Mood and Affect: Mood normal.         Behavior:     MPV 9.3 7.0 - 12.0 fL    Neutrophils % 68 43.0 - 80.0 %    Lymphocytes % 14 (L) 20.0 - 42.0 %    Monocytes % 14 (H) 2.0 - 12.0 %    Eosinophils % 3 0 - 6 %    Basophils % 1 0.0 - 2.0 %    Immature Granulocytes 0 0.0 - 5.0 %    Neutrophils Absolute 4.95 1.80 - 7.30 k/uL    Lymphocytes Absolute 1.03 (L) 1.50 - 4.00 k/uL    Monocytes Absolute 1.00 (H) 0.10 - 0.95 k/uL    Eosinophils Absolute 0.21 0.05 - 0.50 k/uL    Basophils Absolute 0.06 0.00 - 0.20 k/uL    Absolute Immature Granulocyte <0.03 0.00 - 0.58 k/uL   Basic Metabolic Panel w/ Reflex to MG   Result Value Ref Range    Sodium 138 132 - 146 mmol/L    Potassium 3.7 3.5 - 5.0 mmol/L    Chloride 102 98 - 107 mmol/L    CO2 26 22 - 29 mmol/L    Anion Gap 10 7 - 16 mmol/L    Glucose 97 74 - 99 mg/dL    BUN 16 6 - 23 mg/dL    Creatinine 1.2 0.70 - 1.20 mg/dL    Est, Glom Filt Rate >60 >60 mL/min/1.73m2    Calcium 9.4 8.6 - 10.2 mg/dL   Troponin   Result Value Ref Range    Troponin, High Sensitivity 36 (H) 0 - 11 ng/L   Brain Natriuretic Peptide   Result Value Ref Range    Pro-BNP 3,749 (H) 0 - 125 pg/mL   D-Dimer, Quantitative   Result Value Ref Range    D-Dimer, Quant 829 (H) 0 - 232 ng/mL DDU   Troponin   Result Value Ref Range    Troponin, High Sensitivity 36 (H) 0 - 11 ng/L       RADIOLOGY:  CTA PULMONARY W CONTRAST   Final Result   1. No findings of acute pulmonary embolism.   2. Emphysema.   3. Loculated right apical pleural effusion containing gas bubbles.  In the   absence of recent thoracentesis, empyema cannot be excluded.         XR CHEST PORTABLE   Final Result   1. Increased interstitial prominence concerning for developing pulmonary   edema or fluid overload.   2. No signs of pneumothorax.   3. Stable right apical pleural thickening.                 ------------------------- NURSING NOTES AND VITALS REVIEWED ---------------------------  Date / Time Roomed:  1/13/2024  9:23 AM  ED Bed Assignment:  08/08    The nursing notes within the ED encounter and

## 2024-01-13 NOTE — H&P
[facility] Hospitalist Group History and Physical      CHIEF COMPLAINT: Shortness of breath    History of Present Illness: This is a 73-year-old male with a past medical history of HTN, HLD, tobacco abuse, cocaine abuse, and HFrEF who presents to the ED today with complaints of shortness of breath.  Patient reports the shortness of breath began around 4:00 this morning.  He reports it is worse with exertion but better with rest.  Complains of inability to take a deep breath.  Patient reports trying breathing treatments at home without relief of symptoms.  Currently has a productive cough and complains of feeling \"warm \".  He denies nausea, vomiting, diarrhea, and chills. Patient previously admitted 8/23/2023 - 8/27/2023 for chest pain with a negative stress test/cardiac cath.  At this time a right apical mass was noted on CT chest-pulmonology was consulted and patient was started on steroids at that time for pleurisy.  He discharged home to follow-up in 8 weeks with pulmonology-patient has not followed up.    Patient seen laying in bed.  He is alert and in no distress.  Respirations are unlabored on room air.  Patient complains of shortness of breath with exertion and the inability to take a deep breath.  Patient has a productive cough with green sputum.  Lung sounds with coarse crackles at the bases.  He complains of dry flaky skin on his bilateral lower extremities.    ED course is as follows: Vital signs-Temp 99.5, HR 90, /82, SpO2 95% on room air.  proBNP 3749, troponin 36> 36 Hgb 11.4, D-dimer 829.  CTA chest was negative for PE but shows loculated right apical pleural effusion containing gas bubbles.  CXR shows interstitial edema.  Received DuoNeb treatment, vancomycin 1250 mg x 1, Zosyn 4.5 g.    Patient be admitted for further management    Informant(s) for H&P: Patient somewhat poor historian    REVIEW OF SYSTEMS:  A comprehensive review of systems was negative except for: what is in the

## 2024-01-13 NOTE — ED NOTES
Set one of two blood culture drawn at 1445 via right wrist by this rn and set two of two blood culture drawn by this rn at 1455 via right hand-pt tolerated well.    written material/verbal instruction

## 2024-01-14 LAB
ANION GAP SERPL CALCULATED.3IONS-SCNC: 14 MMOL/L (ref 7–16)
BASOPHILS # BLD: 0.07 K/UL (ref 0–0.2)
BASOPHILS NFR BLD: 1 % (ref 0–2)
BUN SERPL-MCNC: 19 MG/DL (ref 6–23)
CALCIUM SERPL-MCNC: 9 MG/DL (ref 8.6–10.2)
CHLORIDE SERPL-SCNC: 98 MMOL/L (ref 98–107)
CO2 SERPL-SCNC: 26 MMOL/L (ref 22–29)
CREAT SERPL-MCNC: 1.5 MG/DL (ref 0.7–1.2)
EOSINOPHIL # BLD: 0.15 K/UL (ref 0.05–0.5)
EOSINOPHILS RELATIVE PERCENT: 3 % (ref 0–6)
ERYTHROCYTE [DISTWIDTH] IN BLOOD BY AUTOMATED COUNT: 13.8 % (ref 11.5–15)
GFR SERPL CREATININE-BSD FRML MDRD: 50 ML/MIN/1.73M2
GLUCOSE SERPL-MCNC: 82 MG/DL (ref 74–99)
HCT VFR BLD AUTO: 34.5 % (ref 37–54)
HGB BLD-MCNC: 11.5 G/DL (ref 12.5–16.5)
IMM GRANULOCYTES # BLD AUTO: <0.03 K/UL (ref 0–0.58)
IMM GRANULOCYTES NFR BLD: 0 % (ref 0–5)
LYMPHOCYTES NFR BLD: 1.2 K/UL (ref 1.5–4)
LYMPHOCYTES RELATIVE PERCENT: 24 % (ref 20–42)
MAGNESIUM SERPL-MCNC: 1.6 MG/DL (ref 1.6–2.6)
MCH RBC QN AUTO: 28.8 PG (ref 26–35)
MCHC RBC AUTO-ENTMCNC: 33.3 G/DL (ref 32–34.5)
MCV RBC AUTO: 86.3 FL (ref 80–99.9)
MONOCYTES NFR BLD: 0.73 K/UL (ref 0.1–0.95)
MONOCYTES NFR BLD: 15 % (ref 2–12)
NEUTROPHILS NFR BLD: 56 % (ref 43–80)
NEUTS SEG NFR BLD: 2.74 K/UL (ref 1.8–7.3)
PLATELET # BLD AUTO: 224 K/UL (ref 130–450)
PMV BLD AUTO: 9.6 FL (ref 7–12)
POTASSIUM SERPL-SCNC: 3.3 MMOL/L (ref 3.5–5)
RBC # BLD AUTO: 4 M/UL (ref 3.8–5.8)
SODIUM SERPL-SCNC: 138 MMOL/L (ref 132–146)
WBC OTHER # BLD: 4.9 K/UL (ref 4.5–11.5)

## 2024-01-14 PROCEDURE — 83735 ASSAY OF MAGNESIUM: CPT

## 2024-01-14 PROCEDURE — 36415 COLL VENOUS BLD VENIPUNCTURE: CPT

## 2024-01-14 PROCEDURE — 2580000003 HC RX 258: Performed by: NURSE PRACTITIONER

## 2024-01-14 PROCEDURE — 2580000003 HC RX 258

## 2024-01-14 PROCEDURE — 2140000000 HC CCU INTERMEDIATE R&B

## 2024-01-14 PROCEDURE — 6360000002 HC RX W HCPCS

## 2024-01-14 PROCEDURE — 6360000002 HC RX W HCPCS: Performed by: NURSE PRACTITIONER

## 2024-01-14 PROCEDURE — 85025 COMPLETE CBC W/AUTO DIFF WBC: CPT

## 2024-01-14 PROCEDURE — 99232 SBSQ HOSP IP/OBS MODERATE 35: CPT | Performed by: STUDENT IN AN ORGANIZED HEALTH CARE EDUCATION/TRAINING PROGRAM

## 2024-01-14 PROCEDURE — 80048 BASIC METABOLIC PNL TOTAL CA: CPT

## 2024-01-14 PROCEDURE — 6370000000 HC RX 637 (ALT 250 FOR IP): Performed by: NURSE PRACTITIONER

## 2024-01-14 RX ORDER — AMMONIUM LACTATE 12 G/100G
LOTION TOPICAL PRN
Status: DISCONTINUED | OUTPATIENT
Start: 2024-01-14 | End: 2024-01-18 | Stop reason: HOSPADM

## 2024-01-14 RX ADMIN — SODIUM CHLORIDE, PRESERVATIVE FREE 10 ML: 5 INJECTION INTRAVENOUS at 09:40

## 2024-01-14 RX ADMIN — PIPERACILLIN AND TAZOBACTAM 4500 MG: 4; .5 INJECTION, POWDER, FOR SOLUTION INTRAVENOUS at 17:32

## 2024-01-14 RX ADMIN — MAGNESIUM SULFATE HEPTAHYDRATE 2000 MG: 40 INJECTION, SOLUTION INTRAVENOUS at 12:32

## 2024-01-14 RX ADMIN — VANCOMYCIN HYDROCHLORIDE 1000 MG: 1 INJECTION, POWDER, LYOPHILIZED, FOR SOLUTION INTRAVENOUS at 14:47

## 2024-01-14 RX ADMIN — ENOXAPARIN SODIUM 40 MG: 100 INJECTION SUBCUTANEOUS at 09:27

## 2024-01-14 RX ADMIN — PIPERACILLIN AND TAZOBACTAM 4500 MG: 4; .5 INJECTION, POWDER, FOR SOLUTION INTRAVENOUS at 09:39

## 2024-01-14 RX ADMIN — PIPERACILLIN AND TAZOBACTAM 4500 MG: 4; .5 INJECTION, POWDER, FOR SOLUTION INTRAVENOUS at 03:36

## 2024-01-14 RX ADMIN — POTASSIUM BICARBONATE 40 MEQ: 782 TABLET, EFFERVESCENT ORAL at 12:32

## 2024-01-14 NOTE — PROGRESS NOTES
HOSPITALIST PROGRESS NOTE    Patient's name: Chuck Chavarria Jr.  : 1950  Admission date: 2024  Date of service: 2024   Primary care physician: Tony Mendez    Assessment   Chuck Chavarria is a 73 y.o. male patient of Tony Mendez with history of hypertension, hyperlipidemia, HFrEF, drug use disorder, tobacco use disorder presented to [facility] with concern of shortness of breath and productive cough on 2024.  While in the ER patient found to have elevated BNP, chest x-ray concerning for interstitial edema, CT chest was done that was concerning for loculated pleural effusion possibly empyema.  Patient is admitted for further evaluation management.    HFrEF with acute exacerbation:  Most recent echo on  showing EF 45% with grade 1 diastolic dysfunction  Patient looks dry on exam, does not appear overloaded and will hold further IV Lasix  Strict I's and O's and daily weight  Repeat echo  Monitor renal function closely    Mild HENNY:  Baseline continue 1.2, creatinine 1.5 on   Holding off on IV Lasix.  Hold off on fluids for now.  Monitor renal function.  Encourage p.o. intake    Possible empyema  Patient has been started on vancomycin and IV Zosyn  Pulmonology consult  RSV is negative  Follow blood cultures    Elevated troponin  Elevated at baseline  Patient had stress test in August without ischemia  Had negative cath on   No further Workup unless echocardiogram shows abnormality    Hypokalemia  Replace per protocol    COPD/emphysema  Continue home breathing treatments    Hypertension  Continue lisinopril and Toprol    Hyperlipidemia  Continue Zocor    Tobacco use disorder  Encourage cessation  Nicotine patch ordered    Cocaine use disorder  Reports intermittent use on admission  Encourage cessation      Follow labs   DVT prophylaxis Lovenox  Please see orders for further management and care.  Discharge plan: Pending blood cultures and pulmonology  5-40 mL  5-40 mL IntraVENous PRN Carrier, JASSI Muniz - CNP        0.9 % sodium chloride infusion   IntraVENous PRN Carrier, JASSI Muniz CNP        potassium chloride (KLOR-CON M) extended release tablet 40 mEq  40 mEq Oral PRN Carrier, CristyMARY mccabeN - CNP        Or    potassium bicarb-citric acid (EFFER-K) effervescent tablet 40 mEq  40 mEq Oral PRN Carrier, JASSI Muniz - NATIVIDAD        Or    potassium chloride 10 mEq/100 mL IVPB (Peripheral Line)  10 mEq IntraVENous PRN Carrier, JASSI Muniz - CNP        magnesium sulfate 2000 mg in 50 mL IVPB premix  2,000 mg IntraVENous PRN Carrier, JASSI Muniz - CNP        enoxaparin (LOVENOX) injection 40 mg  40 mg SubCUTAneous Daily Carrier, JASSI Muniz - CNP   40 mg at 01/13/24 1619    ondansetron (ZOFRAN-ODT) disintegrating tablet 4 mg  4 mg Oral Q8H PRN Carrier, JASSI Muniz - NATIVIDAD        Or    ondansetron (ZOFRAN) injection 4 mg  4 mg IntraVENous Q6H PRN Carrier, JASSI Muniz - NATIVIDAD        polyethylene glycol (GLYCOLAX) packet 17 g  17 g Oral Daily PRN Carrier, JASSI Muniz - NATIVIDAD        acetaminophen (TYLENOL) tablet 650 mg  650 mg Oral Q6H PRN Carrier, JASSI Muniz - CNP        Or    acetaminophen (TYLENOL) suppository 650 mg  650 mg Rectal Q6H PRN Carrier, JASSI Muniz - CNP        white petrolatum ointment   Topical BID PRN Carrier, JASSI Muniz - CNP        furosemide (LASIX) injection 40 mg  40 mg IntraVENous Daily Carrier, MARY MunizN - CNP   40 mg at 01/13/24 1619    ipratropium 0.5 mg-albuterol 2.5 mg (DUONEB) nebulizer solution 1 Dose  1 Dose Inhalation Q4H PRN Carrier, JASSI Muniz - NATIVIDAD        guaiFENesin tablet 400 mg  400 mg Oral 4x Daily PRN Carrier, JASSI Muniz - CNP        nicotine (NICODERM CQ) 21 MG/24HR 1 patch  1 patch TransDERmal Daily Carrier, JASSI Muniz - CNP   1 patch at 01/13/24 1619    vancomycin (VANCOCIN) 1,000 mg in sodium chloride 0.9 % 250 mL IVPB ([id number])  15 mg/kg IntraVENous Q24H

## 2024-01-14 NOTE — PLAN OF CARE
Problem: Discharge Planning  Goal: Discharge to home or other facility with appropriate resources  1/14/2024 1406 by Marysol Hernandez RN  Outcome: Progressing  1/14/2024 1404 by Marysol Hernandez RN  Outcome: Progressing  Flowsheets (Taken 1/14/2024 0800)  Discharge to home or other facility with appropriate resources: Identify barriers to discharge with patient and caregiver     Problem: Safety - Adult  Goal: Free from fall injury  1/14/2024 1406 by Marysol Hernandez RN  Outcome: Progressing  1/14/2024 1404 by Marysol Hernandez RN  Outcome: Progressing  Flowsheets (Taken 1/14/2024 0800)  Free From Fall Injury: Instruct family/caregiver on patient safety

## 2024-01-14 NOTE — PROGRESS NOTES
Extended Infusion Policy     This patient is on medication that requires renal, weight, and/or indication dose adjustment.      Date Body Weight IBW  Adjusted BW SCr  CrCl Dialysis status BMI   1/14/2024 54.7 kg (120 lb 9.6 oz) Ideal body weight: 68.4 kg (150 lb 12.7 oz) Serum creatinine: 1.5 mg/dL (H) 01/14/24 0636  Estimated creatinine clearance: 34 mL/min (A) N/a Body mass index is 18.34 kg/m².       Pharmacy has dose-adjusted the following medication(s):    Ordered Medication: Zosyn 4500 mg q6h over 30 min      Order Changed/converted to: Zosyn 4500 mg q8h over 240 min    These changes were made per protocol according to the [facility]   Automatic Extended Infusion Dose Adjustment Policy.     *Please note this dose may need readjusted if patient's condition changes.    Please contact pharmacy with any questions regarding these changes.    Moon Parra RPH  1/14/2024  1:25 PM

## 2024-01-14 NOTE — DISCHARGE INSTRUCTIONS
HEART FAILURE  / CONGESTIVE HEART FAILURE  DISCHARGE INSTRUCTIONS:  GUIDELINES TO FOLLOW AT HOME    Self- Managed Care:     MEDICATIONS:  Take your medication as directed. If you are experiencing any side effects, inform your doctor, Do not stop taking any of your medications without letting your doctor know.   Check with your doctor before taking any over-the-counter medications / herbal / or dietary supplements. They may interfere with your other medications.  Do not take ibuprofen (Advil or Motrin) and naproxen (Aleve) without talking to your doctor first. They could make your heart failure worse.         WEIGHT MONITORING:   Weigh yourself everyday (with the same scale) around the same time of the day and write it down. (you can chart them on a calendar or keep track of them on paper.   Notify your doctor of a weight gain of 3 pounds or more in 1 day   OR a total of 5 pounds or more in 1 week    Take your weight record to your doctor visits  Also, the same goes if you loose more than 3# in one day, let your heart doctor know.         DIET:   Cardiac heart healthy diet- Low saturated / low trans fat, no added salt, caffeine restricted, Low sodium diet-   No more than 2,000mg (2 grams) of salt / sodium per day (which equals to a little less than  a teaspoon of salt)  If your doctor wants you on a fluid restriction...it is usually recommended a fluid limit of 2,000cc -  Fluid restriction- 2,000 ml (milliliters) = 64 ounces = you can have 8 glasses of fluid per day (each glass 8 ounces)    Follow a low salt diet - avoid using salt at the table, avoid / limit use of canned soups, processed / packaged foods, salted snacks, olives and pickles.  Do not use a salt substitute without checking with your doctor, they may contain a high amount of potassioum. (Mrs. Dash is safe to use).    Limit the use of alcohol       CALL YOUR DOCTOR THE FIRST DAY YOU NOTICE ANY OF THESE   SYMPTOMS:  You have a weight

## 2024-01-14 NOTE — PROGRESS NOTES
4 Eyes Skin Assessment     NAME:  Chuck Chavarria Jr.  YOB: 1950  MEDICAL RECORD NUMBER:  [id number]    The patient is being assessed for  Admission    I agree that at least one RN has performed a thorough Head to Toe Skin Assessment on the patient. ALL assessment sites listed below have been assessed.      Areas assessed by both nurses:    Head, Face, Ears, Shoulders, Back, Chest, Arms, Elbows, Hands, Sacrum. Buttock, Coccyx, Ischium, Legs. Feet and Heels, and Under Medical Devices         Does the Patient have a Wound? No noted wound(s)       Martin Prevention initiated by RN: No  Wound Care Orders initiated by RN: No    Pressure Injury (Stage 3,4, Unstageable, DTI, NWPT, and Complex wounds) if present, place Wound referral order by RN under : No    New Ostomies, if present place, Ostomy referral order under : No     Nurse 1 eSignature: Electronically signed by Yashira Knowles RN on 1/13/24 at 10:43 PM EST    **SHARE this note so that the co-signing nurse can place an eSignature**    Nurse 2 eSignature: Electronically signed by Eliz Hoover RN on 1/13/24 at 10:44 PM EST

## 2024-01-14 NOTE — PROGRESS NOTES
Patient arrived to unit from ER with coat, sweater, shirt, pants, socks, footwear, lanyard with keys, wallet, gloves, hat, cell phone.

## 2024-01-14 NOTE — PATIENT CARE CONFERENCE
P Quality Flow/Interdisciplinary Rounds Progress Note        Quality Flow Rounds held on January 14, 2024    Disciplines Attending:  Bedside Nurse and Nursing Unit Leadership    Chuck Deon  was admitted on 1/13/2024  9:23 AM    Anticipated Discharge Date:       Disposition:    Martin Score:  Martin Scale Score: 19    Readmission Risk              Risk of Unplanned Readmission:  15           Discussed patient goal for the day, patient clinical progression, and barriers to discharge.  The following Goal(s) of the Day/Commitment(s) have been identified:  Labs - Report Results      Tova Wan RN  January 14, 2024

## 2024-01-15 ENCOUNTER — APPOINTMENT (OUTPATIENT)
Age: 74
DRG: 137 | End: 2024-01-15
Attending: STUDENT IN AN ORGANIZED HEALTH CARE EDUCATION/TRAINING PROGRAM
Payer: COMMERCIAL

## 2024-01-15 LAB
ANION GAP SERPL CALCULATED.3IONS-SCNC: 10 MMOL/L (ref 7–16)
ANION GAP SERPL CALCULATED.3IONS-SCNC: 12 MMOL/L (ref 7–16)
BASOPHILS # BLD: 0.06 K/UL (ref 0–0.2)
BASOPHILS NFR BLD: 1 % (ref 0–2)
BUN SERPL-MCNC: 18 MG/DL (ref 6–23)
BUN SERPL-MCNC: 20 MG/DL (ref 6–23)
CALCIUM SERPL-MCNC: 8.8 MG/DL (ref 8.6–10.2)
CALCIUM SERPL-MCNC: 9 MG/DL (ref 8.6–10.2)
CHLORIDE SERPL-SCNC: 103 MMOL/L (ref 98–107)
CHLORIDE SERPL-SCNC: 103 MMOL/L (ref 98–107)
CO2 SERPL-SCNC: 24 MMOL/L (ref 22–29)
CO2 SERPL-SCNC: 28 MMOL/L (ref 22–29)
CREAT SERPL-MCNC: 1.6 MG/DL (ref 0.7–1.2)
CREAT SERPL-MCNC: 1.6 MG/DL (ref 0.7–1.2)
CREAT UR-MCNC: 134.6 MG/DL (ref 40–278)
CREAT UR-MCNC: 136.8 MG/DL (ref 40–278)
DATE LAST DOSE: NORMAL
ECHO AO SINUS VALSALVA DIAM: 2.7 CM
ECHO AO SINUS VALSALVA INDEX: 1.64 CM/M2
ECHO AV AREA PEAK VELOCITY: 2.3 CM2
ECHO AV AREA VTI: 2.2 CM2
ECHO AV AREA/BSA PEAK VELOCITY: 1.4 CM2/M2
ECHO AV AREA/BSA VTI: 1.3 CM2/M2
ECHO AV MEAN GRADIENT: 6 MMHG
ECHO AV MEAN VELOCITY: 1.1 M/S
ECHO AV PEAK GRADIENT: 11 MMHG
ECHO AV PEAK VELOCITY: 1.7 M/S
ECHO AV VELOCITY RATIO: 0.71
ECHO AV VTI: 32.7 CM
ECHO BSA: 1.62 M2
ECHO EST RA PRESSURE: 3 MMHG
ECHO LA DIAMETER INDEX: 2.06 CM/M2
ECHO LA DIAMETER: 3.4 CM
ECHO LA VOL A-L A2C: 53 ML (ref 18–58)
ECHO LA VOL A-L A4C: 60 ML (ref 18–58)
ECHO LA VOL MOD A2C: 48 ML (ref 18–58)
ECHO LA VOL MOD A4C: 56 ML (ref 18–58)
ECHO LA VOLUME AREA LENGTH: 57 ML
ECHO LA VOLUME INDEX A-L A2C: 32 ML/M2 (ref 16–34)
ECHO LA VOLUME INDEX A-L A4C: 36 ML/M2 (ref 16–34)
ECHO LA VOLUME INDEX AREA LENGTH: 35 ML/M2 (ref 16–34)
ECHO LA VOLUME INDEX MOD A2C: 29 ML/M2 (ref 16–34)
ECHO LA VOLUME INDEX MOD A4C: 34 ML/M2 (ref 16–34)
ECHO LV EDV A2C: 125 ML
ECHO LV EDV A4C: 134 ML
ECHO LV EDV BP: 132 ML (ref 67–155)
ECHO LV EDV INDEX A4C: 81 ML/M2
ECHO LV EDV INDEX BP: 80 ML/M2
ECHO LV EDV NDEX A2C: 76 ML/M2
ECHO LV EJECTION FRACTION A2C: 44 %
ECHO LV EJECTION FRACTION A4C: 59 %
ECHO LV EJECTION FRACTION BIPLANE: 52 % (ref 55–100)
ECHO LV ESV A2C: 70 ML
ECHO LV ESV A4C: 54 ML
ECHO LV ESV BP: 64 ML (ref 22–58)
ECHO LV ESV INDEX A2C: 42 ML/M2
ECHO LV ESV INDEX A4C: 33 ML/M2
ECHO LV ESV INDEX BP: 39 ML/M2
ECHO LV FRACTIONAL SHORTENING: 15 % (ref 28–44)
ECHO LV INTERNAL DIMENSION DIASTOLE INDEX: 2.79 CM/M2
ECHO LV INTERNAL DIMENSION DIASTOLIC: 4.6 CM (ref 4.2–5.9)
ECHO LV INTERNAL DIMENSION SYSTOLIC INDEX: 2.36 CM/M2
ECHO LV INTERNAL DIMENSION SYSTOLIC: 3.9 CM
ECHO LV ISOVOLUMETRIC RELAXATION TIME (IVRT): 129.2 MS
ECHO LV IVSD: 0.7 CM (ref 0.6–1)
ECHO LV IVSS: 0.7 CM
ECHO LV MASS 2D: 117.9 G (ref 88–224)
ECHO LV MASS INDEX 2D: 71.5 G/M2 (ref 49–115)
ECHO LV POSTERIOR WALL DIASTOLIC: 0.9 CM (ref 0.6–1)
ECHO LV POSTERIOR WALL SYSTOLIC: 1.2 CM
ECHO LV RELATIVE WALL THICKNESS RATIO: 0.39
ECHO LVOT AREA: 3.1 CM2
ECHO LVOT AV VTI INDEX: 0.68
ECHO LVOT DIAM: 2 CM
ECHO LVOT MEAN GRADIENT: 3 MMHG
ECHO LVOT PEAK GRADIENT: 6 MMHG
ECHO LVOT PEAK VELOCITY: 1.2 M/S
ECHO LVOT STROKE VOLUME INDEX: 42.4 ML/M2
ECHO LVOT SV: 70 ML
ECHO LVOT VTI: 22.3 CM
ECHO MV "A" WAVE DURATION: 156.9 MSEC
ECHO MV A VELOCITY: 0.92 M/S
ECHO MV AREA PHT: 3.7 CM2
ECHO MV AREA VTI: 2.5 CM2
ECHO MV E DECELERATION TIME (DT): 183.9 MS
ECHO MV E VELOCITY: 0.81 M/S
ECHO MV E/A RATIO: 0.88
ECHO MV LVOT VTI INDEX: 1.27
ECHO MV MAX VELOCITY: 1.1 M/S
ECHO MV MEAN GRADIENT: 2 MMHG
ECHO MV MEAN VELOCITY: 0.7 M/S
ECHO MV PEAK GRADIENT: 5 MMHG
ECHO MV PRESSURE HALF TIME (PHT): 58.9 MS
ECHO MV VTI: 28.4 CM
ECHO PV MAX VELOCITY: 0.9 M/S
ECHO PV MEAN GRADIENT: 2 MMHG
ECHO PV MEAN VELOCITY: 0.6 M/S
ECHO PV PEAK GRADIENT: 3 MMHG
ECHO PV VTI: 15.1 CM
ECHO RIGHT VENTRICULAR SYSTOLIC PRESSURE (RVSP): 30 MMHG
ECHO RV INTERNAL DIMENSION: 2.6 CM
ECHO RV TAPSE: 2.2 CM (ref 1.7–?)
ECHO TV REGURGITANT MAX VELOCITY: 2.61 M/S
ECHO TV REGURGITANT PEAK GRADIENT: 27 MMHG
EKG ATRIAL RATE: 79 BPM
EKG P AXIS: 63 DEGREES
EKG P-R INTERVAL: 154 MS
EKG Q-T INTERVAL: 414 MS
EKG QRS DURATION: 134 MS
EKG QTC CALCULATION (BAZETT): 474 MS
EKG R AXIS: -86 DEGREES
EKG T AXIS: 76 DEGREES
EKG VENTRICULAR RATE: 79 BPM
EOSINOPHIL # BLD: 0.32 K/UL (ref 0.05–0.5)
EOSINOPHILS RELATIVE PERCENT: 8 % (ref 0–6)
ERYTHROCYTE [DISTWIDTH] IN BLOOD BY AUTOMATED COUNT: 14.1 % (ref 11.5–15)
GFR SERPL CREATININE-BSD FRML MDRD: 45 ML/MIN/1.73M2
GFR SERPL CREATININE-BSD FRML MDRD: 47 ML/MIN/1.73M2
GLUCOSE SERPL-MCNC: 103 MG/DL (ref 74–99)
GLUCOSE SERPL-MCNC: 78 MG/DL (ref 74–99)
HCT VFR BLD AUTO: 34.8 % (ref 37–54)
HGB BLD-MCNC: 11.6 G/DL (ref 12.5–16.5)
IMM GRANULOCYTES # BLD AUTO: <0.03 K/UL (ref 0–0.58)
IMM GRANULOCYTES NFR BLD: 1 % (ref 0–5)
LYMPHOCYTES NFR BLD: 0.87 K/UL (ref 1.5–4)
LYMPHOCYTES RELATIVE PERCENT: 21 % (ref 20–42)
MAGNESIUM SERPL-MCNC: 2.2 MG/DL (ref 1.6–2.6)
MCH RBC QN AUTO: 29 PG (ref 26–35)
MCHC RBC AUTO-ENTMCNC: 33.3 G/DL (ref 32–34.5)
MCV RBC AUTO: 87 FL (ref 80–99.9)
MICROALBUMIN UR-MCNC: 205 MG/L (ref 0–19)
MICROALBUMIN/CREAT UR-RTO: 153 MCG/MG CREAT (ref 0–30)
MONOCYTES NFR BLD: 0.84 K/UL (ref 0.1–0.95)
MONOCYTES NFR BLD: 20 % (ref 2–12)
NEUTROPHILS NFR BLD: 49 % (ref 43–80)
NEUTS SEG NFR BLD: 2.04 K/UL (ref 1.8–7.3)
OSMOLALITY UR: 474 MOSM/KG (ref 300–900)
PLATELET # BLD AUTO: 245 K/UL (ref 130–450)
PMV BLD AUTO: 9.3 FL (ref 7–12)
POTASSIUM SERPL-SCNC: 3.8 MMOL/L (ref 3.5–5)
POTASSIUM SERPL-SCNC: 4 MMOL/L (ref 3.5–5)
POTASSIUM, UR: 36.7 MMOL/L
RBC # BLD AUTO: 4 M/UL (ref 3.8–5.8)
SODIUM SERPL-SCNC: 139 MMOL/L (ref 132–146)
SODIUM SERPL-SCNC: 141 MMOL/L (ref 132–146)
SODIUM UR-SCNC: 75 MMOL/L
TME LAST DOSE: NORMAL H
TOTAL PROTEIN, URINE: 58 MG/DL (ref 0–12)
URINE TOTAL PROTEIN CREATININE RATIO: 0.43 (ref 0–0.2)
UUN UR-MCNC: 717 MG/DL (ref 800–1666)
VANCOMYCIN DOSE: NORMAL MG
VANCOMYCIN SERPL-MCNC: 7 UG/ML (ref 5–40)
WBC OTHER # BLD: 4.2 K/UL (ref 4.5–11.5)

## 2024-01-15 PROCEDURE — 6360000002 HC RX W HCPCS: Performed by: NURSE PRACTITIONER

## 2024-01-15 PROCEDURE — 36415 COLL VENOUS BLD VENIPUNCTURE: CPT

## 2024-01-15 PROCEDURE — 84156 ASSAY OF PROTEIN URINE: CPT

## 2024-01-15 PROCEDURE — 87205 SMEAR GRAM STAIN: CPT

## 2024-01-15 PROCEDURE — 80048 BASIC METABOLIC PNL TOTAL CA: CPT

## 2024-01-15 PROCEDURE — 6370000000 HC RX 637 (ALT 250 FOR IP): Performed by: INTERNAL MEDICINE

## 2024-01-15 PROCEDURE — 83735 ASSAY OF MAGNESIUM: CPT

## 2024-01-15 PROCEDURE — 93306 TTE W/DOPPLER COMPLETE: CPT | Performed by: INTERNAL MEDICINE

## 2024-01-15 PROCEDURE — 87070 CULTURE OTHR SPECIMN AEROBIC: CPT

## 2024-01-15 PROCEDURE — 84540 ASSAY OF URINE/UREA-N: CPT

## 2024-01-15 PROCEDURE — 84133 ASSAY OF URINE POTASSIUM: CPT

## 2024-01-15 PROCEDURE — 85025 COMPLETE CBC W/AUTO DIFF WBC: CPT

## 2024-01-15 PROCEDURE — 2580000003 HC RX 258

## 2024-01-15 PROCEDURE — 2580000003 HC RX 258: Performed by: NURSE PRACTITIONER

## 2024-01-15 PROCEDURE — 6370000000 HC RX 637 (ALT 250 FOR IP): Performed by: NURSE PRACTITIONER

## 2024-01-15 PROCEDURE — 84300 ASSAY OF URINE SODIUM: CPT

## 2024-01-15 PROCEDURE — 93306 TTE W/DOPPLER COMPLETE: CPT

## 2024-01-15 PROCEDURE — 2140000000 HC CCU INTERMEDIATE R&B

## 2024-01-15 PROCEDURE — 83935 ASSAY OF URINE OSMOLALITY: CPT

## 2024-01-15 PROCEDURE — 6360000002 HC RX W HCPCS

## 2024-01-15 PROCEDURE — 82043 UR ALBUMIN QUANTITATIVE: CPT

## 2024-01-15 PROCEDURE — 82570 ASSAY OF URINE CREATININE: CPT

## 2024-01-15 PROCEDURE — 99233 SBSQ HOSP IP/OBS HIGH 50: CPT | Performed by: INTERNAL MEDICINE

## 2024-01-15 PROCEDURE — 80202 ASSAY OF VANCOMYCIN: CPT

## 2024-01-15 PROCEDURE — 93010 ELECTROCARDIOGRAM REPORT: CPT | Performed by: INTERNAL MEDICINE

## 2024-01-15 RX ORDER — ATORVASTATIN CALCIUM 20 MG/1
20 TABLET, FILM COATED ORAL DAILY
Status: DISCONTINUED | OUTPATIENT
Start: 2024-01-15 | End: 2024-01-17

## 2024-01-15 RX ORDER — LISINOPRIL 20 MG/1
40 TABLET ORAL DAILY
Status: DISCONTINUED | OUTPATIENT
Start: 2024-01-15 | End: 2024-01-18 | Stop reason: HOSPADM

## 2024-01-15 RX ORDER — METOPROLOL SUCCINATE 25 MG/1
25 TABLET, EXTENDED RELEASE ORAL DAILY
Status: DISCONTINUED | OUTPATIENT
Start: 2024-01-15 | End: 2024-01-18 | Stop reason: HOSPADM

## 2024-01-15 RX ADMIN — PIPERACILLIN AND TAZOBACTAM 4500 MG: 4; .5 INJECTION, POWDER, FOR SOLUTION INTRAVENOUS at 08:14

## 2024-01-15 RX ADMIN — PIPERACILLIN AND TAZOBACTAM 4500 MG: 4; .5 INJECTION, POWDER, FOR SOLUTION INTRAVENOUS at 17:05

## 2024-01-15 RX ADMIN — ATORVASTATIN CALCIUM 20 MG: 20 TABLET, FILM COATED ORAL at 09:51

## 2024-01-15 RX ADMIN — SODIUM CHLORIDE, PRESERVATIVE FREE 10 ML: 5 INJECTION INTRAVENOUS at 08:12

## 2024-01-15 RX ADMIN — METOPROLOL SUCCINATE 25 MG: 25 TABLET, EXTENDED RELEASE ORAL at 09:51

## 2024-01-15 RX ADMIN — SODIUM CHLORIDE, PRESERVATIVE FREE 10 ML: 5 INJECTION INTRAVENOUS at 21:38

## 2024-01-15 RX ADMIN — PIPERACILLIN AND TAZOBACTAM 4500 MG: 4; .5 INJECTION, POWDER, FOR SOLUTION INTRAVENOUS at 01:16

## 2024-01-15 RX ADMIN — VANCOMYCIN HYDROCHLORIDE 1000 MG: 1 INJECTION, POWDER, LYOPHILIZED, FOR SOLUTION INTRAVENOUS at 21:38

## 2024-01-15 RX ADMIN — ENOXAPARIN SODIUM 40 MG: 100 INJECTION SUBCUTANEOUS at 08:11

## 2024-01-15 NOTE — PROGRESS NOTES
provider] vancomycin  15 mg/kg IntraVENous Q24H     PRN Meds: white petrolatum, ammonium lactate, sodium chloride flush, sodium chloride, potassium chloride **OR** potassium alternative oral replacement **OR** potassium chloride, magnesium sulfate, ondansetron **OR** ondansetron, polyethylene glycol, acetaminophen **OR** acetaminophen, white petrolatum, ipratropium 0.5 mg-albuterol 2.5 mg, guaiFENesin        Labs:   Recent Labs     01/13/24  0949 01/14/24  0636 01/15/24  0539   WBC 7.3 4.9 4.2*   HGB 11.4* 11.5* 11.6*   HCT 34.5* 34.5* 34.8*    224 245       Recent Labs     01/13/24  0949 01/14/24  0636 01/15/24  0539    138 139   K 3.7 3.3* 3.8    98 103   CO2 26 26 24   BUN 16 19 20   CREATININE 1.2 1.5* 1.6*   CALCIUM 9.4 9.0 9.0       Assessment and plan      HFrEF without acute exacerbation:  Most recent echo on 8/23 showing EF 45% with grade 1 diastolic dysfunction  Patient looks dry on exam, does not appear overloaded and will hold further IV Lasix  Strict I's and O's and daily weight  Repeat echo  Monitor renal function closely  Nephrology consulted for assistance with diuretic management      Mild HENNY:  Baseline continue 1.2, creatinine 1.5 on 1/14-->increased to 1.6. Likely from IV Diuresis an contrast  Will get Nephrology evaluation.     Possible empyema  Patient has been started on vancomycin and IV Zosyn  Pulmonology consult  RSV is negative  Follow blood cultures  I did consult pharmacy for management of vancomycin dosing.      Elevated troponin  Elevated at baseline  Patient had stress test in August without ischemia  Had negative cath on 8/25  No further Workup unless echocardiogram shows abnormality  EKG shows bifascicular which was also present in August 2023.     Hypokalemia  Resolved  3.8 today.   Monitor.   Mag normal at 2.2.      COPD/emphysema  Continue home breathing treatments     Hypertension  I did order Toprol today but will hold off on lisinopril with worsening HENNY

## 2024-01-15 NOTE — PROGRESS NOTES
Pharmacy Consultation Note  (Antibiotic Dosing and Monitoring)    Initial consult date: 1-  Consulting physician/provider: Dr. Hernandez  Drug: Vancomycin  Indication: PNA (aspiration?)    Age/  Gender Height Weight IBW  Allergy Information   73 y.o./male 172.7 cm (5' 8\") 61.2 kg (135 lb)     Ideal body weight: 68.4 kg (150 lb 12.7 oz)   Patient has no known allergies.      Renal Function:  Recent Labs     01/13/24  0949 01/14/24  0636 01/15/24  0539   BUN 16 19 20   CREATININE 1.2 1.5* 1.6*       Intake/Output Summary (Last 24 hours) at 1/15/2024 1316  Last data filed at 1/15/2024 1028  Gross per 24 hour   Intake 600 ml   Output 1000 ml   Net -400 ml       Vancomycin Monitoring:  Trough:  No results for input(s): \"VANCOTROUGH\" in the last 72 hours.  Random:  No results for input(s): \"VANCORANDOM\" in the last 72 hours.    Vancomycin Administration Times:  Recent vancomycin administrations                     vancomycin (VANCOCIN) 1,000 mg in sodium chloride 0.9 % 250 mL IVPB (Mgmg0Dhw) (mg) 1,000 mg New Bag 01/14/24 1447    vancomycin (VANCOCIN) 1,250 mg in sodium chloride 0.9 % 250 mL IVPB (mg) 1,250 mg New Bag 01/13/24 1528                    Assessment:  72 yo/M, started ATBs (vancomycin and pip-tazo) for PNA (aspiration?) on 1/13.      Pharmacy consulted 1/15 to dose vanco.   Cr = 1.6 today (1/15), was 1.2 on 1/13/2024, (baseline from 2015 = 1-1.2).  Estimated Creatinine Clearance: 32 mL/min (A) (based on SCr of 1.6 mg/dL (H)).  To dose vancomycin, pharmacy will be utilizing dosing based off of levels because of patient's renal impairment/insufficiency.    Plan:  Stop vancomycin.  Check vancomycin level at 1600 with scheduled BMP.  Re-dose/resume vanco when level < 20 mCg/mL.  Will continue to monitor renal function.   Pharmacy to follow.    Ryan Saleh, PharmD  1/15/2024  1:28 PM  [phone]

## 2024-01-15 NOTE — PLAN OF CARE
Problem: Discharge Planning  Goal: Discharge to home or other facility with appropriate resources  1/15/2024 1558 by Celine Lopez, RN  Outcome: Progressing  1/15/2024 0609 by Yashira Knowles, RN  Outcome: Progressing  Flowsheets (Taken 1/14/2024 2000)  Discharge to home or other facility with appropriate resources: Identify barriers to discharge with patient and caregiver     Problem: Safety - Adult  Goal: Free from fall injury  1/15/2024 1558 by Celine Lopez, RN  Outcome: Progressing  1/15/2024 0609 by Yashira Knowles, RN  Outcome: Progressing

## 2024-01-15 NOTE — PATIENT CARE CONFERENCE
[facility] Quality Flow/Interdisciplinary Rounds Progress Note        Quality Flow Rounds held on January 15, 2024    Disciplines Attending:  Bedside Nurse, , , and Nursing Unit Leadership    Chuck Deon Sandro was admitted on 1/13/2024  9:23 AM    Anticipated Discharge Date:       Disposition:    Martin Score:  Martin Scale Score: 20    Readmission Risk              Risk of Unplanned Readmission:  15           Discussed patient goal for the day, patient clinical progression, and barriers to discharge.  The following Goal(s) of the Day/Commitment(s) have been identified:  Report labs/diagnostics      Fany Patel RN  January 15, 2024

## 2024-01-15 NOTE — PLAN OF CARE
Problem: Discharge Planning  Goal: Discharge to home or other facility with appropriate resources  Outcome: Progressing  Flowsheets (Taken 1/14/2024 2000)  Discharge to home or other facility with appropriate resources: Identify barriers to discharge with patient and caregiver     Problem: Safety - Adult  Goal: Free from fall injury  Outcome: Progressing

## 2024-01-15 NOTE — CARE COORDINATION
Readmission Assessment in  Navigator will be completed.    Advance Directives:      Code Status: Full Code   Patient's Primary Decision Maker is:      Primary Decision Maker: Ashia Tillman - Brother/Sister - [phone]    Discharge Planning:    Patient lives with: Family Members Type of Home: House  Primary Care Giver: Self  Patient Support Systems include: Family Members   Current Financial resources:    Current community resources:    Current services prior to admission: None            Current DME:              Type of Home Care services:  None    ADLS  Prior functional level: Independent in ADLs/IADLs  Current functional level: Independent in ADLs/IADLs    PT AM-PAC:   /24  OT AM-PAC:   /24    Family can provide assistance at DC: Yes  Would you like Case Management to discuss the discharge plan with any other family members/significant others, and if so, who? Yes (my sister)  Plans to Return to Present Housing: Yes  Other Identified Issues/Barriers to RETURNING to current housing: none noted  Potential Assistance needed at discharge: N/A            Potential DME:    Patient expects to discharge to: House  Plan for transportation at discharge:      Financial    Payor: [facility] / Plan: [facility] MEDICAID / Product Type: *No Product type* /     Does insurance require precert for SNF: Yes    Potential assistance Purchasing Medications:    Meds-to-Beds request: Yes      [facility] [address] [place] [address] [phone] - F [phone]  [address] [place] [address]  Phone: [phone] Fax: [phone]    [id number] - [place], [place] - [address] [facility] -  [phone] - F [phone]  [address] [place] [id number]  Phone: [phone] Fax: [phone]      Notes:    Factors facilitating achievement of predicted outcomes: Family support, Motivated, Cooperative, Pleasant, and Has needed Durable Medical Equipment at home    Barriers to discharge: Medical complications  (IV ATB)    Additional Case Management Notes: See above    The Plan for Transition of Care is related to the following treatment goals of Shortness of breath [R06.02]  Pleural effusion [J90]    IF APPLICABLE: The Patient and/or patient representative Chuck and his family were provided with a choice of provider and agrees with the discharge plan. Freedom of choice list with basic dialogue that supports the patient's individualized plan of care/goals and shares the quality data associated with the providers was provided to:     Patient Representative Name:       The Patient and/or Patient Representative Agree with the Discharge Plan? Yes     DANYELLE Hanson  Case Management Department  Ph: [phone] Fax:

## 2024-01-15 NOTE — ACP (ADVANCE CARE PLANNING)
Advance Care Planning   Healthcare Decision Maker:    Primary Decision Maker: Ashia Tillman - Brother/Sister - [phone]    Secondary Decision Maker: Elena Tillman - Other - [phone]    Supplemental (Other) Decision Maker: EVANGELINA REED - Grandchild - [phone]    Click here to complete Healthcare Decision Makers including selection of the Healthcare Decision Maker Relationship (ie \"Primary\").

## 2024-01-16 LAB
ANION GAP SERPL CALCULATED.3IONS-SCNC: 12 MMOL/L (ref 7–16)
BASOPHILS # BLD: 0.07 K/UL (ref 0–0.2)
BASOPHILS NFR BLD: 2 % (ref 0–2)
BNP SERPL-MCNC: 1257 PG/ML (ref 0–125)
BUN SERPL-MCNC: 14 MG/DL (ref 6–23)
CALCIUM SERPL-MCNC: 8.6 MG/DL (ref 8.6–10.2)
CHLORIDE SERPL-SCNC: 106 MMOL/L (ref 98–107)
CO2 SERPL-SCNC: 23 MMOL/L (ref 22–29)
CREAT SERPL-MCNC: 1.4 MG/DL (ref 0.7–1.2)
EOSINOPHIL # BLD: 0.36 K/UL (ref 0.05–0.5)
EOSINOPHILS RELATIVE PERCENT: 8 % (ref 0–6)
ERYTHROCYTE [DISTWIDTH] IN BLOOD BY AUTOMATED COUNT: 14 % (ref 11.5–15)
FERRITIN SERPL-MCNC: 312 NG/ML
FOLATE SERPL-MCNC: 8.9 NG/ML (ref 4.8–24.2)
GFR SERPL CREATININE-BSD FRML MDRD: 53 ML/MIN/1.73M2
GLUCOSE SERPL-MCNC: 78 MG/DL (ref 74–99)
HCT VFR BLD AUTO: 35.6 % (ref 37–54)
HGB BLD-MCNC: 11.5 G/DL (ref 12.5–16.5)
IMM GRANULOCYTES # BLD AUTO: <0.03 K/UL (ref 0–0.58)
IMM GRANULOCYTES NFR BLD: 0 % (ref 0–5)
IRON SATN MFR SERPL: 25 % (ref 20–55)
IRON SERPL-MCNC: 54 UG/DL (ref 59–158)
LYMPHOCYTES NFR BLD: 0.93 K/UL (ref 1.5–4)
LYMPHOCYTES RELATIVE PERCENT: 20 % (ref 20–42)
MCH RBC QN AUTO: 28.7 PG (ref 26–35)
MCHC RBC AUTO-ENTMCNC: 32.3 G/DL (ref 32–34.5)
MCV RBC AUTO: 88.8 FL (ref 80–99.9)
MICROORGANISM/AGENT SPEC: ABNORMAL
MONOCYTES NFR BLD: 0.88 K/UL (ref 0.1–0.95)
MONOCYTES NFR BLD: 19 % (ref 2–12)
NEUTROPHILS NFR BLD: 51 % (ref 43–80)
NEUTS SEG NFR BLD: 2.3 K/UL (ref 1.8–7.3)
PLATELET # BLD AUTO: 234 K/UL (ref 130–450)
PMV BLD AUTO: 9.7 FL (ref 7–12)
POTASSIUM SERPL-SCNC: 3.9 MMOL/L (ref 3.5–5)
RBC # BLD AUTO: 4.01 M/UL (ref 3.8–5.8)
SODIUM SERPL-SCNC: 141 MMOL/L (ref 132–146)
SPECIMEN DESCRIPTION: ABNORMAL
TIBC SERPL-MCNC: 214 UG/DL (ref 250–450)
VIT B12 SERPL-MCNC: 267 PG/ML (ref 211–946)
WBC OTHER # BLD: 4.6 K/UL (ref 4.5–11.5)

## 2024-01-16 PROCEDURE — 6370000000 HC RX 637 (ALT 250 FOR IP): Performed by: INTERNAL MEDICINE

## 2024-01-16 PROCEDURE — 99255 IP/OBS CONSLTJ NEW/EST HI 80: CPT | Performed by: INTERNAL MEDICINE

## 2024-01-16 PROCEDURE — 83880 ASSAY OF NATRIURETIC PEPTIDE: CPT

## 2024-01-16 PROCEDURE — 87077 CULTURE AEROBIC IDENTIFY: CPT

## 2024-01-16 PROCEDURE — 85025 COMPLETE CBC W/AUTO DIFF WBC: CPT

## 2024-01-16 PROCEDURE — 2580000003 HC RX 258

## 2024-01-16 PROCEDURE — 6370000000 HC RX 637 (ALT 250 FOR IP): Performed by: NURSE PRACTITIONER

## 2024-01-16 PROCEDURE — 6360000002 HC RX W HCPCS: Performed by: NURSE PRACTITIONER

## 2024-01-16 PROCEDURE — 83540 ASSAY OF IRON: CPT

## 2024-01-16 PROCEDURE — 99232 SBSQ HOSP IP/OBS MODERATE 35: CPT | Performed by: INTERNAL MEDICINE

## 2024-01-16 PROCEDURE — 87205 SMEAR GRAM STAIN: CPT

## 2024-01-16 PROCEDURE — 83550 IRON BINDING TEST: CPT

## 2024-01-16 PROCEDURE — 82728 ASSAY OF FERRITIN: CPT

## 2024-01-16 PROCEDURE — 6360000002 HC RX W HCPCS

## 2024-01-16 PROCEDURE — 94640 AIRWAY INHALATION TREATMENT: CPT

## 2024-01-16 PROCEDURE — 80048 BASIC METABOLIC PNL TOTAL CA: CPT

## 2024-01-16 PROCEDURE — 87070 CULTURE OTHR SPECIMN AEROBIC: CPT

## 2024-01-16 PROCEDURE — 36415 COLL VENOUS BLD VENIPUNCTURE: CPT

## 2024-01-16 PROCEDURE — 2580000003 HC RX 258: Performed by: NURSE PRACTITIONER

## 2024-01-16 PROCEDURE — 2140000000 HC CCU INTERMEDIATE R&B

## 2024-01-16 PROCEDURE — 82746 ASSAY OF FOLIC ACID SERUM: CPT

## 2024-01-16 PROCEDURE — 82607 VITAMIN B-12: CPT

## 2024-01-16 RX ORDER — HYDRALAZINE HYDROCHLORIDE 20 MG/ML
10 INJECTION INTRAMUSCULAR; INTRAVENOUS EVERY 6 HOURS PRN
Status: DISCONTINUED | OUTPATIENT
Start: 2024-01-16 | End: 2024-01-18 | Stop reason: HOSPADM

## 2024-01-16 RX ORDER — IPRATROPIUM BROMIDE AND ALBUTEROL SULFATE 2.5; .5 MG/3ML; MG/3ML
1 SOLUTION RESPIRATORY (INHALATION)
Status: DISCONTINUED | OUTPATIENT
Start: 2024-01-16 | End: 2024-01-18 | Stop reason: HOSPADM

## 2024-01-16 RX ORDER — LANOLIN ALCOHOL/MO/W.PET/CERES
1000 CREAM (GRAM) TOPICAL DAILY
Status: DISCONTINUED | OUTPATIENT
Start: 2024-01-16 | End: 2024-01-18 | Stop reason: HOSPADM

## 2024-01-16 RX ADMIN — CYANOCOBALAMIN TAB 1000 MCG 1000 MCG: 1000 TAB at 16:09

## 2024-01-16 RX ADMIN — HYDRALAZINE HYDROCHLORIDE 10 MG: 20 INJECTION, SOLUTION INTRAMUSCULAR; INTRAVENOUS at 23:38

## 2024-01-16 RX ADMIN — ATORVASTATIN CALCIUM 20 MG: 20 TABLET, FILM COATED ORAL at 08:33

## 2024-01-16 RX ADMIN — METOPROLOL SUCCINATE 25 MG: 25 TABLET, EXTENDED RELEASE ORAL at 08:33

## 2024-01-16 RX ADMIN — SODIUM CHLORIDE, PRESERVATIVE FREE 10 ML: 5 INJECTION INTRAVENOUS at 08:34

## 2024-01-16 RX ADMIN — PIPERACILLIN AND TAZOBACTAM 4500 MG: 4; .5 INJECTION, POWDER, FOR SOLUTION INTRAVENOUS at 01:33

## 2024-01-16 RX ADMIN — IPRATROPIUM BROMIDE AND ALBUTEROL SULFATE 1 DOSE: .5; 2.5 SOLUTION RESPIRATORY (INHALATION) at 20:10

## 2024-01-16 RX ADMIN — ENOXAPARIN SODIUM 40 MG: 100 INJECTION SUBCUTANEOUS at 08:34

## 2024-01-16 RX ADMIN — PIPERACILLIN AND TAZOBACTAM 4500 MG: 4; .5 INJECTION, POWDER, FOR SOLUTION INTRAVENOUS at 08:41

## 2024-01-16 RX ADMIN — SODIUM CHLORIDE, PRESERVATIVE FREE 10 ML: 5 INJECTION INTRAVENOUS at 19:16

## 2024-01-16 RX ADMIN — IPRATROPIUM BROMIDE AND ALBUTEROL SULFATE 1 DOSE: .5; 2.5 SOLUTION RESPIRATORY (INHALATION) at 13:07

## 2024-01-16 RX ADMIN — IPRATROPIUM BROMIDE AND ALBUTEROL SULFATE 1 DOSE: .5; 2.5 SOLUTION RESPIRATORY (INHALATION) at 16:32

## 2024-01-16 NOTE — CARE COORDINATION
1/16/24  Transition of Care Update.  Patient admitted for Shortness of breath and Pleural effusion.  ECHO complete with EF of 50-55%.  Patient was on IV Vancomycin and IV Zosyn which have been stopped .  Patient is on room air.  Pulmonary consulted with recommendations for  Bronchodialtors to treat COPD as well as spiriva and symbicort BID at discharge.  Nephrology is holding lisinopril and starting B12 with monitoring of renal function.  Patient is independent with all ADL's and uses a cane. Patient is requesting a new cane if he qualifies at discharge. Patient has no active PCP.  Call Placed to [facility] physician referral line and awaiting return call to set up a PCP. Patient was being followed by [facility] home care will not require home care with IV ATB being discontinued.  Discharge goal is home when medically stable. SUNITA/Gary to follow.     Electronically signed by DANYELLE Hanson on 1/16/2024 at 2:00 PM

## 2024-01-16 NOTE — PROGRESS NOTES
Hospitalist Progress Note      Synopsis: Patient admitted on 1/13/2024 he presented with shortness of breath.  Patient was found to have CTA of the chest that showed a loculated right pleural effusion with gas bubbles concerning for an empyema.  Respiratory panel was negative.  EKG was unchanged from prior.  Troponin was 36 with a repeat of 36.  BNP was elevated at 3749.  Patient's diuretics were held secondary to acute renal failure.  Baseline creatinine is 1.2-1.3.  Creatinine 1.5 on admission.  Nephrology consulted.'s pulmonology consulted for empyema.  Patient was initiated on vancomycin and Zosyn.  Echocardiogram ordered. ECHO showed an EF of 50-55%. Normal wall thickness. Grade II diastolic dysfunction. Moderate sclerosis of the aortic valve. Pulmonology evaluated the patient and is not recommending any further management or antibiotics and to observe. Antibiotics discontinued. Patient started on B12. Lisinopril remains on hold.     Subjective    Patient was seen and examined. He feels okay. Shortness of breath okay. No chest pain. No nausea or vomiting. States that he is tired and was not able to sleep well last night.     Exam:  BP (!) 168/92   Pulse 75   Temp 97.1 °F (36.2 °C) (Temporal)   Resp 21   Ht 1.727 m (5' 8\")   Wt 54.7 kg (120 lb 9.6 oz)   SpO2 97%   BMI 18.34 kg/m²   General appearance: No apparent distress, appears stated age and cooperative.  HEENT: Pupils equal, round, and reactive to light. Conjunctivae/corneas clear.  Neck: Supple. No jugular venous distention. Trachea midline.  Respiratory:  Normal respiratory effort. Clear to auscultation, bilaterally without Rales/Wheezes/Rhonchi.  Cardiovascular: Regular rate and rhythm with normal S1/S2 without murmurs, rubs or gallops.  Abdomen: Soft, non-tender, non-distended with normal bowel sounds.  Musculoskeletal: No clubbing, cyanosis or edema bilaterally. Brisk capillary refill. 2+ lower extremity pulses (dorsalis pedis).   Skin:  No  started on vancomycin and IV Zosyn  Pulmonology consult  RSV is negative  Follow blood cultures--negative at 2 days  I did consult pharmacy for management of vancomycin dosing.   Pulmonology feels this is chronic. No further antibiotics or treatment needed at this time.      Elevated troponin  Elevated at baseline  Patient had stress test in August without ischemia  Had negative cath on 8/25  No further Workup unless echocardiogram shows abnormality  EKG shows bifascicular which was also present in August 2023.  ECHO reassuring      Hypokalemia  Resolved  3.9 today.   Monitor.        COPD/emphysema  Continue home breathing treatments     Hypertension  I did order Toprol today but will hold off on lisinopril with worsening HENNY      Hyperlipidemia  Continue Zocor     Tobacco use disorder  Encourage cessation     Cocaine use disorder  Reports intermittent use on admission  Encourage cessation        Follow labs   DVT prophylaxis Lovenox  Please see orders for further management and care.  Discharge plan: Pending nephrology clearance. Still elevated creatinine     +++++++++++++++++++++++++++++++++++++++++++++++++  Celeste Southerly, DO   [facility].  +++++++++++++++++++++++++++++++++++++++++++++++++  NOTE: This report was transcribed using voice recognition software. Every effort was made to ensure accuracy; however, inadvertent computerized transcription errors may be present.

## 2024-01-16 NOTE — PROGRESS NOTES
Pharmacy Consultation Note  (Antibiotic Dosing and Monitoring)    Initial consult date: 1-  Consulting physician/provider: Dr. Hernandez  Drug: Vancomycin  Indication: PNA (aspiration?)    Age/  Gender Height Weight IBW  Allergy Information   73 y.o./male 172.7 cm (5' 8\") 61.2 kg (135 lb)     Ideal body weight: 68.4 kg (150 lb 12.7 oz)   Patient has no known allergies.      Renal Function:  Recent Labs     01/15/24  0539 01/15/24  1528 01/16/24  0540   BUN 20 18 14   CREATININE 1.6* 1.6* 1.4*         Intake/Output Summary (Last 24 hours) at 1/16/2024 1301  Last data filed at 1/16/2024 1143  Gross per 24 hour   Intake 360 ml   Output 600 ml   Net -240 ml         Vancomycin Monitoring:  Trough:  No results for input(s): \"VANCOTROUGH\" in the last 72 hours.  Random:    Recent Labs     01/15/24  1528   VANCORANDOM 7.0       Vancomycin Administration Times:  Recent vancomycin administrations                     vancomycin (VANCOCIN) 1,000 mg in sodium chloride 0.9 % 250 mL IVPB (Manw3Twu) (mg) 1,000 mg New Bag 01/15/24 2138    vancomycin (VANCOCIN) 1,000 mg in sodium chloride 0.9 % 250 mL IVPB ([id number]) (mg) 1,000 mg New Bag 01/14/24 1447    vancomycin (VANCOCIN) 1,250 mg in sodium chloride 0.9 % 250 mL IVPB (mg) 1,250 mg New Bag 01/13/24 1528                  Assessment:  74 yo/M, started ATBs (vancomycin and pip-tazo) for PNA (aspiration?) on 1/13.      Pharmacy consulted 1/15 to dose vanco.   Cr = 1.6 today (1/15), was 1.2 on 1/13/2024, (baseline from 2015 = 1-1.2).  Estimated Creatinine Clearance: 36 mL/min (A) (based on SCr of 1.4 mg/dL (H)).  To dose vancomycin, pharmacy will be utilizing dosing based off of levels because of patient's renal impairment/insufficiency.  1/16: Pulm stopped vanco and pip-tazo today.    Plan:.   Pharmacy sign off.    Ryan Saleh, PharmD  1/16/2024  1:01 PM  [phone]

## 2024-01-16 NOTE — PROGRESS NOTES
Department of Internal Medicine  Nephrology Consult Note    Events reviewed    SUBJECTIVE: We are following Mr. Chuck Chavarria for HENNY.  Reports no complaints.  PHYSICAL EXAM:      Vitals:    VITALS:  /87   Pulse 76   Temp 97.1 °F (36.2 °C) (Temporal)   Resp 20   Ht 1.727 m (5' 8\")   Wt 54.7 kg (120 lb 9.6 oz)   SpO2 100%   BMI 18.34 kg/m²   24HR INTAKE/OUTPUT:    Intake/Output Summary (Last 24 hours) at 1/16/2024 1152  Last data filed at 1/16/2024 1143  Gross per 24 hour   Intake 120 ml   Output 600 ml   Net -480 ml       Constitutional:  Awake, alert, oriented, in NAD  HEENT:  PERRLA, normocephalic, atraumatic  Respiratory:  CTA  Cardiovascular/Edema:  RRR, normal S1, normal S2  Gastrointestinal:  Soft, flat, non-distended, non-tender  Neurologic:  Nonfocal  Skin:  warm, dry, no rashes, no lesions    Scheduled Meds:   ipratropium 0.5 mg-albuterol 2.5 mg  1 Dose Inhalation 4x Daily RT    atorvastatin  20 mg Oral Daily    metoprolol succinate  25 mg Oral Daily    [Held by provider] lisinopril  40 mg Oral Daily    sodium chloride flush  5-40 mL IntraVENous 2 times per day    enoxaparin  40 mg SubCUTAneous Daily    nicotine  1 patch TransDERmal Daily     Continuous Infusions:   sodium chloride       PRN Meds:.white petrolatum, ammonium lactate, sodium chloride flush, sodium chloride, potassium chloride **OR** potassium alternative oral replacement **OR** potassium chloride, magnesium sulfate, ondansetron **OR** ondansetron, polyethylene glycol, acetaminophen **OR** acetaminophen, white petrolatum, ipratropium 0.5 mg-albuterol 2.5 mg, guaiFENesin        DATA:    CBC:   Lab Results   Component Value Date/Time    WBC 4.6 01/16/2024 05:40 AM    RBC 4.01 01/16/2024 05:40 AM    HGB 11.5 01/16/2024 05:40 AM    HCT 35.6 01/16/2024 05:40 AM    MCV 88.8 01/16/2024 05:40 AM    MCH 28.7 01/16/2024 05:40 AM    MCHC 32.3 01/16/2024 05:40 AM    RDW 14.0 01/16/2024 05:40 AM     01/16/2024 05:40 AM    MPV 9.7  months.\    HTN, on metoprolol, hold lisinopril  HFrEF 45%, proBNP 3749, on metoprolol  Vitamin D deficiency, on cholecalciferol  Normocytic anemia, ferritin 312, iron saturation 25%, folate 8.9, B12 267  ---------------------------------------------------------------------------  Possible empyema, on Zosyn and vancomycin  COPD  Hyperlipidemia, on atorvastatin    Plan:    Continue hold lisinopril  Start B12 1000 mcg p.o. daily  Avoid hypotension  Continue to monitor renal function      Electronically signed by Catherine Cook MD on 1/16/2024 at 11:52 AM

## 2024-01-16 NOTE — CONSULTS
[facility]   Inpatient CHF Nurse Navigator Consult      Cardiologist: Dr. Luna Chavarria Jr. is a 73 y.o. (1950) male with a history of HFpEF, most recent EF: 50-55% 1/15/24  Lab Results   Component Value Date    LVEF 34 08/24/2023       Patient was resting, laying in bed during the consultation and is agreeable to heart failure education. He was somewhat disengaged and did not ask questions throughout the education session. He is not interested in the CHF clinic.     Barriers identified during consult contributing to HF Hospitalization:  [] Limited medication adherence   [] Poor health literacy, education regarding HF medications provided   [] Pill box provided to patient  [] Difficulty affording medications  [] Prescription assistance information given     [] Not weighing themselves daily  [x] Weight log provided for easy monitoring  [] Scale provided     [] Not following low sodium diet  [] Food insecurity   [x] 2 gram sodium diet education provided   [] Low sodium recipes provided  [] Sodium free seasoning provided   [] Low sodium meal delivery options given to patient  [] Dietician consulted     [] Lack of transportation to appointments     [] Depression, given chronic illness  [] Primary team notified     [] Goals of care need addressed  [] Palliative care consulted     [] CHF CHW consulted, to assist with         Chart Reviewed:  Diet: ADULT DIET; Regular   Daily Weights: Patient Vitals for the past 96 hrs (Last 3 readings):   Weight   01/13/24 2015 54.7 kg (120 lb 9.6 oz)   01/13/24 0923 61.2 kg (135 lb)     I/O:   Intake/Output Summary (Last 24 hours) at 1/16/2024 1318  Last data filed at 1/16/2024 1143  Gross per 24 hour   Intake 360 ml   Output 600 ml   Net -240 ml       [] Nursing staff/manager notified of inaccurate barnes weights or I/O        Discharge Plan:  Above identified barriers reviewed and needs addressed    Patient/family educated on daily

## 2024-01-16 NOTE — PROGRESS NOTES
[facility] Quality Flow/Interdisciplinary Rounds Progress Note        Quality Flow Rounds held on January 16, 2024    Disciplines Attending:  Bedside Nurse, , , and Nursing Unit Leadership    Chuck Deon Sandro was admitted on 1/13/2024  9:23 AM    Anticipated Discharge Date:       Disposition:    Martin Score:  Martin Scale Score: 20    Readmission Risk              Risk of Unplanned Readmission:  16           Discussed patient goal for the day, patient clinical progression, and barriers to discharge.  The following Goal(s) of the Day/Commitment(s) have been identified:  Diagnostics - Report Results and Labs - Report Results      Celine Lopez RN  January 16, 2024

## 2024-01-16 NOTE — CONSULTS
wall motion. Grade II diastolic dysfunction with increased LAP.   Right Ventricle: Right ventricle size is normal. Normal systolic function. TAPSE is 2.2 cm.   Aortic Valve: Trileaflet valve. Moderate sclerosis of the aortic valve cusp. No stenosis.   Mitral Valve: Mild regurgitation.   Tricuspid Valve: Normal RVSP. The estimated RVSP is 30 mmHg.   Left Atrium: Left atrium is mildly dilated.   Pericardium: No pericardial effusion.   Image quality is adequate.     CTA PULMONARY W CONTRAST    Result Date: 1/13/2024  FINDINGS: Pulmonary Arteries: Pulmonary arteries are adequately opacified for evaluation.  No evidence of intraluminal filling defect to suggest pulmonary embolism.  Main pulmonary artery is normal in caliber. Mediastinum: No evidence of mediastinal lymphadenopathy.  The heart and pericardium demonstrate no acute abnormality.  There is no acute abnormality of the thoracic aorta. Lungs/pleura: Emphysematous changes are noted in both lungs.  Loculated pleural effusion is noted in the right apical region.  Anteriorly there are gas bubbles within the pleural fluid collection which is a new finding.  In the absence of recent thoracentesis, an empyema cannot be excluded.  No pneumothorax identified.  Lungs are free of infiltrate.  There is a stable 5 mm smoothly marginated right upper lobe nodule. Upper Abdomen: The tip of an IVC filter is noted. Soft Tissues/Bones: No acute bone or soft tissue abnormality.     1. No findings of acute pulmonary embolism. 2. Emphysema. 3. Loculated right apical pleural effusion containing gas bubbles.  In the absence of recent thoracentesis, empyema cannot be excluded.         Assessment:   Panlobar emphysema  Chronic fluid fill blebs  Smoker   Drug history  Vitaligo  SOB:  Possibly 2/2 right-sided loculated effusion versus pulmonary edema.  Currently on room air.  Elevated BNP, noted loculated right-sided pleural effusion.  Productive cough- Will get RVP to assess for possible  viral infection.  Sputum culture.  Right-sided loculated pleural effusion: As seen on CT scan concerning for possible empyema.  Admission in August 2023 -loculated effusion noted on CT scan-effusion difficult to tap patient was for repeat CT 8 weeks postdischarge-he never followed.  Received Zosyn and vancomycin in the ED- will continue inpatient.  Blood cultures ordered await results  Elevated troponin: HST 36> 36.  Elevated at baseline.  No complaints of chest pain.  Recent stress test in August without ischemia, cardiac cath 8/25 negative.  HFrEF: Last cardiac echo 8/23 EF 45%.  BNP 3749.  CXR showing increased interstitial prominence possible pulmonary edema.  Could also be source of shortness of breath.  Will start Lasix 40 mg daily x 3 days.  Monitor I&O and daily weight.  Patient was referred to heart failure clinic last admission-I do not believe he has followed.  Heart failure educator consulted to see pt  COPD/emphysema: At home breathing treatments.  Follows with PCP but not pulmonology.  HTN: Continue home dose of lisinopril 40 mg daily and Toprol-XL 25 mg daily  HLD: Continue Zocor 20 mg daily and ASA  Tobacco abuse: Encourage cessation.  Nicotine patch ordered  Cocaine abuse: Reports intermittent use.  Encourage cessation      Plan:   Chronic pan lobar emphysema with chronic apical caring and fluid filled blebs  Bronchodialtors for COPD  Stop smoking  Other than apical blebectomy (high risk) I recommend as my partners did do monitor, he is not toxic and not requiring oxygen  Tspot test for academic issues  Ok to discharge with spiriva and symbicort BID,         Keagan Spivey DO MPH, FCCP, MACOI, FACP  Professor of Internal Medicine  Pulmonary, Critical Care and Sleep Medicine

## 2024-01-16 NOTE — PLAN OF CARE
Problem: Discharge Planning  Goal: Discharge to home or other facility with appropriate resources  1/16/2024 0738 by Armida Campo, RN  Outcome: Progressing     Problem: Safety - Adult  Goal: Free from fall injury  1/16/2024 0738 by Armida Campo, RN  Outcome: Progressing

## 2024-01-16 NOTE — PLAN OF CARE
Problem: Discharge Planning  Goal: Discharge to home or other facility with appropriate resources  1/15/2024 2251 by Jenny Denson, RN  Outcome: Progressing     Problem: Safety - Adult  Goal: Free from fall injury  1/15/2024 2251 by Jenny Denson, RN  Outcome: Progressing

## 2024-01-17 ENCOUNTER — APPOINTMENT (OUTPATIENT)
Dept: CT IMAGING | Age: 74
DRG: 137 | End: 2024-01-17
Payer: COMMERCIAL

## 2024-01-17 ENCOUNTER — APPOINTMENT (OUTPATIENT)
Dept: MRI IMAGING | Age: 74
DRG: 137 | End: 2024-01-17
Payer: COMMERCIAL

## 2024-01-17 PROBLEM — F17.200 TOBACCO DEPENDENCE: Status: ACTIVE | Noted: 2024-01-17

## 2024-01-17 PROBLEM — I65.01 OCCLUSION OF RIGHT VERTEBRAL ARTERY: Status: ACTIVE | Noted: 2024-01-17

## 2024-01-17 PROBLEM — I65.23 BILATERAL CAROTID ARTERY STENOSIS: Status: ACTIVE | Noted: 2024-01-17

## 2024-01-17 PROBLEM — J18.9 PNEUMONIA, UNSPECIFIED ORGANISM: Status: RESOLVED | Noted: 2023-06-12 | Resolved: 2024-01-17

## 2024-01-17 PROBLEM — I63.211 CEREBRAL INFARCTION DUE TO OCCLUSION OF RIGHT VERTEBRAL ARTERY (HCC): Status: ACTIVE | Noted: 2024-01-17

## 2024-01-17 PROBLEM — R94.31 ACUTE ELECTROCARDIOGRAM CHANGES: Status: RESOLVED | Noted: 2023-08-23 | Resolved: 2024-01-17

## 2024-01-17 LAB
ANION GAP SERPL CALCULATED.3IONS-SCNC: 13 MMOL/L (ref 7–16)
BASOPHILS # BLD: 0.04 K/UL (ref 0–0.2)
BASOPHILS NFR BLD: 1 % (ref 0–2)
BUN SERPL-MCNC: 12 MG/DL (ref 6–23)
CALCIUM SERPL-MCNC: 8.9 MG/DL (ref 8.6–10.2)
CHLORIDE SERPL-SCNC: 105 MMOL/L (ref 98–107)
CO2 SERPL-SCNC: 21 MMOL/L (ref 22–29)
CREAT SERPL-MCNC: 1.2 MG/DL (ref 0.7–1.2)
EOSINOPHIL # BLD: 0.32 K/UL (ref 0.05–0.5)
EOSINOPHILS RELATIVE PERCENT: 7 % (ref 0–6)
ERYTHROCYTE [DISTWIDTH] IN BLOOD BY AUTOMATED COUNT: 13.9 % (ref 11.5–15)
GFR SERPL CREATININE-BSD FRML MDRD: >60 ML/MIN/1.73M2
GLUCOSE BLD-MCNC: 99 MG/DL (ref 74–99)
GLUCOSE SERPL-MCNC: 83 MG/DL (ref 74–99)
HCT VFR BLD AUTO: 36.2 % (ref 37–54)
HGB BLD-MCNC: 11.9 G/DL (ref 12.5–16.5)
IMM GRANULOCYTES # BLD AUTO: <0.03 K/UL (ref 0–0.58)
IMM GRANULOCYTES NFR BLD: 1 % (ref 0–5)
LYMPHOCYTES NFR BLD: 0.92 K/UL (ref 1.5–4)
LYMPHOCYTES RELATIVE PERCENT: 21 % (ref 20–42)
MCH RBC QN AUTO: 28.3 PG (ref 26–35)
MCHC RBC AUTO-ENTMCNC: 32.9 G/DL (ref 32–34.5)
MCV RBC AUTO: 86 FL (ref 80–99.9)
MONOCYTES NFR BLD: 0.78 K/UL (ref 0.1–0.95)
MONOCYTES NFR BLD: 18 % (ref 2–12)
NEUTROPHILS NFR BLD: 52 % (ref 43–80)
NEUTS SEG NFR BLD: 2.22 K/UL (ref 1.8–7.3)
PLATELET # BLD AUTO: 236 K/UL (ref 130–450)
PMV BLD AUTO: 9.4 FL (ref 7–12)
POTASSIUM SERPL-SCNC: 3.7 MMOL/L (ref 3.5–5)
RBC # BLD AUTO: 4.21 M/UL (ref 3.8–5.8)
SODIUM SERPL-SCNC: 139 MMOL/L (ref 132–146)
WBC OTHER # BLD: 4.3 K/UL (ref 4.5–11.5)

## 2024-01-17 PROCEDURE — 6360000002 HC RX W HCPCS: Performed by: NURSE PRACTITIONER

## 2024-01-17 PROCEDURE — 70496 CT ANGIOGRAPHY HEAD: CPT

## 2024-01-17 PROCEDURE — 6370000000 HC RX 637 (ALT 250 FOR IP): Performed by: INTERNAL MEDICINE

## 2024-01-17 PROCEDURE — 70498 CT ANGIOGRAPHY NECK: CPT

## 2024-01-17 PROCEDURE — 70551 MRI BRAIN STEM W/O DYE: CPT

## 2024-01-17 PROCEDURE — 6370000000 HC RX 637 (ALT 250 FOR IP): Performed by: NURSE PRACTITIONER

## 2024-01-17 PROCEDURE — 82962 GLUCOSE BLOOD TEST: CPT

## 2024-01-17 PROCEDURE — 80048 BASIC METABOLIC PNL TOTAL CA: CPT

## 2024-01-17 PROCEDURE — 99233 SBSQ HOSP IP/OBS HIGH 50: CPT | Performed by: INTERNAL MEDICINE

## 2024-01-17 PROCEDURE — 36415 COLL VENOUS BLD VENIPUNCTURE: CPT

## 2024-01-17 PROCEDURE — 0042T CT BRAIN PERFUSION: CPT

## 2024-01-17 PROCEDURE — 70450 CT HEAD/BRAIN W/O DYE: CPT

## 2024-01-17 PROCEDURE — 2140000000 HC CCU INTERMEDIATE R&B

## 2024-01-17 PROCEDURE — 85025 COMPLETE CBC W/AUTO DIFF WBC: CPT

## 2024-01-17 PROCEDURE — 2580000003 HC RX 258: Performed by: NURSE PRACTITIONER

## 2024-01-17 PROCEDURE — 6360000004 HC RX CONTRAST MEDICATION: Performed by: RADIOLOGY

## 2024-01-17 PROCEDURE — 94640 AIRWAY INHALATION TREATMENT: CPT

## 2024-01-17 PROCEDURE — 99222 1ST HOSP IP/OBS MODERATE 55: CPT | Performed by: SURGERY

## 2024-01-17 PROCEDURE — 6360000002 HC RX W HCPCS

## 2024-01-17 RX ORDER — ASPIRIN 81 MG/1
81 TABLET ORAL DAILY
Status: DISCONTINUED | OUTPATIENT
Start: 2024-01-17 | End: 2024-01-18 | Stop reason: HOSPADM

## 2024-01-17 RX ORDER — LABETALOL HYDROCHLORIDE 5 MG/ML
10 INJECTION, SOLUTION INTRAVENOUS EVERY 6 HOURS PRN
Status: DISCONTINUED | OUTPATIENT
Start: 2024-01-17 | End: 2024-01-18 | Stop reason: HOSPADM

## 2024-01-17 RX ORDER — ATORVASTATIN CALCIUM 40 MG/1
40 TABLET, FILM COATED ORAL DAILY
Status: DISCONTINUED | OUTPATIENT
Start: 2024-01-18 | End: 2024-01-18 | Stop reason: HOSPADM

## 2024-01-17 RX ADMIN — IOPAMIDOL 100 ML: 755 INJECTION, SOLUTION INTRAVENOUS at 01:30

## 2024-01-17 RX ADMIN — IPRATROPIUM BROMIDE AND ALBUTEROL SULFATE 1 DOSE: .5; 2.5 SOLUTION RESPIRATORY (INHALATION) at 12:15

## 2024-01-17 RX ADMIN — LABETALOL HYDROCHLORIDE 10 MG: 5 INJECTION INTRAVENOUS at 02:05

## 2024-01-17 RX ADMIN — METOPROLOL SUCCINATE 25 MG: 25 TABLET, EXTENDED RELEASE ORAL at 08:00

## 2024-01-17 RX ADMIN — ATORVASTATIN CALCIUM 20 MG: 20 TABLET, FILM COATED ORAL at 07:59

## 2024-01-17 RX ADMIN — ASPIRIN 81 MG: 81 TABLET, COATED ORAL at 10:12

## 2024-01-17 RX ADMIN — IPRATROPIUM BROMIDE AND ALBUTEROL SULFATE 1 DOSE: .5; 2.5 SOLUTION RESPIRATORY (INHALATION) at 15:42

## 2024-01-17 RX ADMIN — IPRATROPIUM BROMIDE AND ALBUTEROL SULFATE 1 DOSE: .5; 2.5 SOLUTION RESPIRATORY (INHALATION) at 20:17

## 2024-01-17 RX ADMIN — ENOXAPARIN SODIUM 40 MG: 100 INJECTION SUBCUTANEOUS at 07:59

## 2024-01-17 RX ADMIN — IPRATROPIUM BROMIDE AND ALBUTEROL SULFATE 1 DOSE: .5; 2.5 SOLUTION RESPIRATORY (INHALATION) at 08:58

## 2024-01-17 RX ADMIN — CYANOCOBALAMIN TAB 1000 MCG 1000 MCG: 1000 TAB at 07:59

## 2024-01-17 RX ADMIN — SODIUM CHLORIDE, PRESERVATIVE FREE 10 ML: 5 INJECTION INTRAVENOUS at 08:00

## 2024-01-17 NOTE — PROGRESS NOTES
Hospitalist Progress Note      Synopsis: Patient admitted on 1/13/2024 he presented with shortness of breath.  Patient was found to have CTA of the chest that showed a loculated right pleural effusion with gas bubbles concerning for an empyema.  Respiratory panel was negative.  EKG was unchanged from prior.  Troponin was 36 with a repeat of 36.  BNP was elevated at 3749.  Patient's diuretics were held secondary to acute renal failure.  Baseline creatinine is 1.2-1.3.  Creatinine 1.5 on admission.  Nephrology consulted.'s pulmonology consulted for empyema.  Patient was initiated on vancomycin and Zosyn.  Echocardiogram ordered. ECHO showed an EF of 50-55%. Normal wall thickness. Grade II diastolic dysfunction. Moderate sclerosis of the aortic valve. Pulmonology evaluated the patient and is not recommending any further management or antibiotics and to observe. Antibiotics discontinued. Patient started on B12. Lisinopril remains on hold. Blood pressure elevated at night. Patient had an RRT for altered mental status and left sided weakness. Resolved when RRT team arrived. NIH was 2. CTH with no bleed. CTA head and neck showed no focal high grade stenosis or occlusion in proximal large arteries. 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  Dominant left vertebral artery is widely patent.  Findings may relate to atherosclerotic disease.  Underlying dissection is difficult to exclude. Focal 50-60% stenosis in the proximal bilateral cervical ICAs. Vascular surgery consulted. Neurology consulted. MRI ordered. Lipitor increased to 40 mg daily. ASA 81 mg added.     Subjective    Patient was seen and examined. No chest pain or shortness of breath. No fevers or chills. No abdominal pain. No focal deficits. Feels back to baseline.     Exam:  BP (!) 152/78   Pulse 80   Temp 97.5 °F (36.4 °C) (Temporal)   Resp 18   Ht 1.727 m (5' 8\")

## 2024-01-17 NOTE — PLAN OF CARE
Problem: Discharge Planning  Goal: Discharge to home or other facility with appropriate resources  1/17/2024 0916 by Armida Campo, RN  Outcome: Progressing     Problem: Safety - Adult  Goal: Free from fall injury  1/17/2024 0916 by Armida Campo, RN  Outcome: Progressing

## 2024-01-17 NOTE — CONSULTS
Chief Complaint: Seen for evaluation of abnormal CTA of the carotids, revealing evidence of occlusion of the right vertebral artery and bilateral carotid stenosis      HPI: The patient was hospitalized with shortness of breath, has significant underlying medical comorbid risk factors including asthma, chronic obstructive lung disease, tobacco use, hypertension, hyperlipidemia,, developed an episode of confusion, left-sided facial droop, stroke alert was called, patient underwent CT of the carotids and CT perfusion scan, abnormal and vascular services consulted for further evaluation    Patient also was seen by pulmonary service because of abnormal CT scan of the chest and nephrology service, for acute kidney injury    When I came to see the patient, patient was alert oriented, not short of breath, not on any supplemental oxygen    Patient denies any new new focal lateralizing neurological symptoms like loss of speech, vision or loss of function of extremity    Patient can walk a few blocks, lowly and denies any symptoms of rest pain    No Known Allergies    Current Facility-Administered Medications   Medication Dose Route Frequency Provider Last Rate Last Admin    labetalol (NORMODYNE;TRANDATE) injection 10 mg  10 mg IntraVENous Q6H PRN Aby Orellana MD   10 mg at 01/17/24 0205    [START ON 1/18/2024] atorvastatin (LIPITOR) tablet 40 mg  40 mg Oral Daily Celeste Hernandez DO        aspirin EC tablet 81 mg  81 mg Oral Daily Celeste Hernandez DO   81 mg at 01/17/24 1012    ipratropium 0.5 mg-albuterol 2.5 mg (DUONEB) nebulizer solution 1 Dose  1 Dose Inhalation 4x Daily RT Keagan Spivey DO   1 Dose at 01/17/24 1215    vitamin B-12 (CYANOCOBALAMIN) tablet 1,000 mcg  1,000 mcg Oral Daily Catherine Cook MD   1,000 mcg at 01/17/24 2053    hydrALAZINE (APRESOLINE) injection 10 mg  10 mg IntraVENous Q6H PRN Stephania Tracey APRN - NP   10 mg at 01/16/24 1821    metoprolol succinate (TOPROL XL) extended  dry.    Head:  Normocephalic.  No masses, lesions or tenderness.    Eyes:  Conjunctivae appear normal; PERRL.    Ears:  External ears normal.    Nose/Sinuses:  Septum midline, mucosa normal; no drainage.    Oropharynx:  Clear, no exudate noted.      Neck:  No jugular venous distention, lymphadenopathy or thyromegaly.  No evidence of carotid bruit      Lungs:  Clear to ausculation bilaterally.  No rhonchi, crackles, wheezes.    Heart:  Regular rate and rhythm.  No rub or murmur..    Abdomen:  Soft, non-tender.  No masses, organomegaly.    Musculoskeletal: No joint effusions, tenderness swelling or warmth.        Neuro: Speech is intact. Moving all extremities. No focal motor or sensory deficits.          Extremities:  Both feet are warm to touch. The color of both feet is normal.          Pulses Right  Left    Brachial 3 3    Radial    3=normal   Femoral 2 2  2=diminished   Popliteal    1=barely palpable   Dorsalis pedis    0=absent   Posterior tibial 2 2  4=aneurysmal           Other pertinent information:1. The past medical records were reviewed.    2.    Lab Results   Component Value Date    WBC 4.3 (L) 01/17/2024    HGB 11.9 (L) 01/17/2024    HCT 36.2 (L) 01/17/2024    MCV 86.0 01/17/2024     01/17/2024      Lab Results   Component Value Date     01/17/2024    K 3.7 01/17/2024     01/17/2024    CO2 21 (L) 01/17/2024    BUN 12 01/17/2024    CREATININE 1.2 01/17/2024    GLUCOSE 83 01/17/2024    CALCIUM 8.9 01/17/2024    PROT 7.8 08/23/2023    LABALBU 3.8 08/23/2023    BILITOT 0.4 08/23/2023    ALKPHOS 64 08/23/2023    AST 23 08/23/2023    ALT 12 08/23/2023    LABGLOM >60 01/17/2024    GFRAA >60 08/28/2022     Lab Results   Component Value Date    APTT 22.1 (L) 09/10/2015      Lab Results   Component Value Date    INR 1.1 09/10/2015    PROTIME 11.7 09/10/2015        3.   CT HEAD WO CONTRAST   Final Result   1. No focal high-grade stenosis or occlusion in the proximal large   intracranial arteries.

## 2024-01-17 NOTE — PLAN OF CARE
Problem: Discharge Planning  Goal: Discharge to home or other facility with appropriate resources  1/16/2024 1954 by Jenny Denson, RN  Outcome: Progressing     Problem: Safety - Adult  Goal: Free from fall injury  1/16/2024 1954 by Jenny Denson, RN  Outcome: Progressing

## 2024-01-17 NOTE — SIGNIFICANT EVENT
Critical Care - Rapid Response Team Note      Date of event: 1/17/2024   Time of event: 0055    Chuck Chavarria Jr. 73 y.o. year old male   YOB: 1950     PCP:  Tony Mendez   Location: [facility]/6510-A   Witnessed? : [x]Yes  [] No  Initial Code status: [x] Full  [] DNR-CCA  []DNR-CC    []Limited  ______________________________________________________________________  Reason for RRT:   Stroke like symptoms    Chief Complaint for this admission:   Chief Complaint   Patient presents with    Shortness of Breath     Work up this morning feeling this way       Admit date:  1/13/2024     Admitting Diagnosis: Shortness of breath [R06.02]  Pleural effusion [J90]      Current Diagnosis: The primary encounter diagnosis was Shortness of breath. A diagnosis of Pleural effusion was also pertinent to this visit.       Stroke alert called for facial droop and AMS.    Patient symptoms had resolved upon my arrival. Reported episode of L sided facial droop and confusion. Episode had resolved. Patient without active complaints.    Vital signs showed hypertension at 180/100s.  Patient alert and oriented x2  Neuro: NIH score 2; Minimal upper and lower L sided facial droop noted.  Lungs: Breath sounds clear  Heart: Regular rate and rhythm    Patient taken down to CT for imaging. I will follow imaging.   Patient will need anti-hypertensive medications.     Attempted to reach out to both neurology and  primary team, but no answer.       Reassessment of patient show no change in mentation or NIH score,        RRT Assessment and Plan:    Chuck Chavarria Jr. is a 73 y.o. male with  has a past medical history of Arthritis, Asthma, Cataract, left eye, COPD (chronic obstructive pulmonary disease) (HCC), Hyperlipidemia, and Hypertension. who was admitted on 1/13/2024 with admitting diagnosis Shortness of breath [R06.02]  Pleural effusion [J90]  .    RRT was called on 1/17/2024 . Initial assessment and interventions as noted above.

## 2024-01-17 NOTE — CARE COORDINATION
1/17/24  Transition of Care Update. Patient was an RRT for stroke like symptoms.  CT of the head, CTA of head and neck and CTA of brain ordered.  Patient has a consult to neurology as well as vascular surgery.   Patient is independent with all ADL's and uses a cane. Patient is requesting a new cane if he qualifies at discharge with order placed. Patient would like to use [employer] [facility] for the cane. Patient has no active PCP.  Call Placed to [facility] physician referral line and awaiting return call to set up a PCP. [facility] home care is following for any potential post discharge needs.  IV ATB's have been discontinued.  Home Care needs pending course of treatment.  Discharge goal is home when medically stable. SW/Cm to follow.      Electronically signed by DANYELLE Hanson on 1/17/2024 at 10:23 AM

## 2024-01-17 NOTE — PROGRESS NOTES
Reached out to resident Dr. Wray re: \" Pt's CT scans have been read and interpreted from the stroke alert. I just wanted to reach out and confirm that no intervention is needed. Thanks.\"  Per resident, they will try to reach out to neurology again and we will monitor for now.

## 2024-01-17 NOTE — PROGRESS NOTES
Department of Internal Medicine  Nephrology Consult Note    Events reviewed    SUBJECTIVE: We are following Mr. Chuck Chavarria for HENNY.  Reports no complaints.  PHYSICAL EXAM:      Vitals:    VITALS:  BP (!) 172/98   Pulse 80   Temp 97.5 °F (36.4 °C) (Temporal)   Resp 18   Ht 1.727 m (5' 8\")   Wt 54.7 kg (120 lb 9.6 oz)   SpO2 95%   BMI 18.34 kg/m²   24HR INTAKE/OUTPUT:    Intake/Output Summary (Last 24 hours) at 1/17/2024 0928  Last data filed at 1/17/2024 0211  Gross per 24 hour   Intake 240 ml   Output 800 ml   Net -560 ml       Constitutional:  Awake, alert, oriented, in NAD  HEENT:  PERRLA, normocephalic, atraumatic  Respiratory:  CTA  Cardiovascular/Edema:  RRR, normal S1, normal S2  Gastrointestinal:  Soft, flat, non-distended, non-tender  Neurologic:  Nonfocal  Skin:  warm, dry, no rashes, no lesions    Scheduled Meds:   [START ON 1/18/2024] atorvastatin  40 mg Oral Daily    ipratropium 0.5 mg-albuterol 2.5 mg  1 Dose Inhalation 4x Daily RT    vitamin B-12  1,000 mcg Oral Daily    metoprolol succinate  25 mg Oral Daily    [Held by provider] lisinopril  40 mg Oral Daily    sodium chloride flush  5-40 mL IntraVENous 2 times per day    enoxaparin  40 mg SubCUTAneous Daily    nicotine  1 patch TransDERmal Daily     Continuous Infusions:   sodium chloride       PRN Meds:.labetalol, hydrALAZINE, white petrolatum, ammonium lactate, sodium chloride flush, sodium chloride, potassium chloride **OR** potassium alternative oral replacement **OR** potassium chloride, magnesium sulfate, ondansetron **OR** ondansetron, polyethylene glycol, acetaminophen **OR** acetaminophen, white petrolatum, ipratropium 0.5 mg-albuterol 2.5 mg, guaiFENesin        DATA:    CBC:   Lab Results   Component Value Date/Time    WBC 4.3 01/17/2024 06:00 AM    RBC 4.21 01/17/2024 06:00 AM    HGB 11.9 01/17/2024 06:00 AM    HCT 36.2 01/17/2024 06:00 AM    MCV 86.0 01/17/2024 06:00 AM    MCH 28.3 01/17/2024 06:00 AM    MCHC 32.9 01/17/2024

## 2024-01-17 NOTE — CONSULTS
[facility]  Neuro Inpatient Consult        Chuck Chavarria Jr. was a 73 y.o. right handed, who was an excellent historian    Neurology was consulted for suspected TIA    Past Medical History:     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 (HCC) 01/17/2024    Hypoplastic, small right vertebral artery, with absent flow proximally CT of the carotids 2024  Patent left vertebral artery, good size    COPD (chronic obstructive pulmonary disease) ([facility])     Hyperlipidemia     Hypertension     Occlusion of right vertebral artery 01/17/2024    Small, hypoplastic right vertebral artery occlusion proximally with widely patent dominant left vertebral artery    Tobacco dependence 01/17/2024       Past Surgical History:     Past Surgical History:   Procedure Laterality Date    CARDIAC CATHETERIZATION  08/25/2023    COLONOSCOPY      DENTAL SURGERY      TONSILLECTOMY       Patient has no known allergies.    Medications:     Prior to Admission medications    Medication Sig Start Date End Date Taking? Authorizing Provider   Emollient (CERAVE) CREA Apply topically daily   Yes Simi Anguiano MD   lisinopril (PRINIVIL;ZESTRIL) 40 MG tablet Take 1 tablet by mouth daily 8/28/23   Ji Zayas MD   metoprolol succinate (TOPROL XL) 25 MG extended release tablet Take 1 tablet by mouth daily 8/28/23   Ji Zayas MD   mometasone-formoterol (DULERA) 100-5 MCG/ACT inhaler Inhale 2 puffs into the lungs daily    ProviderSimi MD   albuterol sulfate HFA (PROVENTIL;VENTOLIN;PROAIR) 108 (90 Base) MCG/ACT inhaler Inhale 2 puffs into the lungs every 4 hours as needed for Wheezing    Simi Anguiano MD   diclofenac sodium  (VOLTAREN) 1 % GEL Apply 4 g topically 2 times daily as needed for Pain Apply to both legs.    ProviderSimi MD   meloxicam (MOBIC) 7.5 MG tablet Take 1 tablet by mouth daily    Simi Anguiano MD   nicotine polacrilex (NICORETTE) 2 MG gum Take 1 each by mouth as needed for Smoking cessation    Simi Anguiano MD   simvastatin (ZOCOR) 20 MG tablet Take 1 tablet by mouth daily    Simi Anguiano MD   aspirin 81 MG EC tablet Take 1 tablet by mouth daily    Simi Anguiano MD   Vitamin D (CHOLECALCIFEROL) 25 MCG (1000 UT) TABS tablet Take 1 tablet by mouth daily    ProviderSimi MD       Social History:     He denied abusing ETOH or illicit drugs, but admitted to still smoking cigarettes.    Review of Systems:     No chest pain or palpitations  No SOB  No vertigo, lightheadedness or loss of consciousness  No falls, tripping or stumbling  No incontinence of bowels or bladder  No itching or bruising appreciated  No numbness, tingling or focal arm/leg weakness  No speech or swallowing problems    ROS was otherwise negative     Family History:     Family History   Problem Relation Age of Onset    Brain Cancer Mother     Heart Attack Father       History of Present Illness:     Chuck Chavarria was a 73-year-old man with a medical history significant for COPD, hyperlipidemia, hypertension and cocaine abuse.    He presented to the [facility] emergency department on 1/13 with shortness of breath.  He did not appear in acute respiratory distress and did not require oxygen.  Pulmonary CTA demonstrated a loculated right apical pleural effusion containing gas bubbles, possibly empyema.  Labs were significant for a pro-BNP of 3749 and D-dimer of of 829.  He was admitted for further evaluation of this loculated pleural effusion seen on imaging.      On 1/16, 1:00 in the morning, a stroke alert was called as the patient was reported to have left-sided facial droop and a

## 2024-01-17 NOTE — PATIENT CARE CONFERENCE
[facility] Quality Flow/Interdisciplinary Rounds Progress Note        Quality Flow Rounds held on January 17, 2024    Disciplines Attending:  Bedside Nurse, , , and Nursing Unit Leadership    Chuck Deon Sandro was admitted on 1/13/2024  9:23 AM    Anticipated Discharge Date:       Disposition:    Martin Score:  Martin Scale Score: 20    Readmission Risk              Risk of Unplanned Readmission:  16           Discussed patient goal for the day, patient clinical progression, and barriers to discharge.  The following Goal(s) of the Day/Commitment(s) have been identified:  downgrade/discharge planning       Fany Patel RN  January 17, 2024

## 2024-01-18 VITALS
OXYGEN SATURATION: 96 % | SYSTOLIC BLOOD PRESSURE: 136 MMHG | BODY MASS INDEX: 18.28 KG/M2 | DIASTOLIC BLOOD PRESSURE: 72 MMHG | HEART RATE: 76 BPM | WEIGHT: 120.6 LBS | RESPIRATION RATE: 18 BRPM | TEMPERATURE: 97.8 F | HEIGHT: 68 IN

## 2024-01-18 PROBLEM — I63.9 ACUTE STROKE DUE TO ISCHEMIA (HCC): Status: ACTIVE | Noted: 2024-01-18

## 2024-01-18 LAB
ANION GAP SERPL CALCULATED.3IONS-SCNC: 11 MMOL/L (ref 7–16)
BASOPHILS # BLD: 0.04 K/UL (ref 0–0.2)
BASOPHILS NFR BLD: 1 % (ref 0–2)
BUN SERPL-MCNC: 12 MG/DL (ref 6–23)
CALCIUM SERPL-MCNC: 9.4 MG/DL (ref 8.6–10.2)
CHLORIDE SERPL-SCNC: 106 MMOL/L (ref 98–107)
CHOLEST SERPL-MCNC: 157 MG/DL
CO2 SERPL-SCNC: 22 MMOL/L (ref 22–29)
CREAT SERPL-MCNC: 1.1 MG/DL (ref 0.7–1.2)
EOSINOPHIL # BLD: 0.36 K/UL (ref 0.05–0.5)
EOSINOPHILS RELATIVE PERCENT: 10 % (ref 0–6)
ERYTHROCYTE [DISTWIDTH] IN BLOOD BY AUTOMATED COUNT: 14.1 % (ref 11.5–15)
GFR SERPL CREATININE-BSD FRML MDRD: >60 ML/MIN/1.73M2
GLUCOSE SERPL-MCNC: 82 MG/DL (ref 74–99)
HCT VFR BLD AUTO: 37.4 % (ref 37–54)
HDLC SERPL-MCNC: 44 MG/DL
HGB BLD-MCNC: 12.5 G/DL (ref 12.5–16.5)
IMM GRANULOCYTES # BLD AUTO: <0.03 K/UL (ref 0–0.58)
IMM GRANULOCYTES NFR BLD: 0 % (ref 0–5)
LDLC SERPL CALC-MCNC: 99 MG/DL
LYMPHOCYTES NFR BLD: 0.89 K/UL (ref 1.5–4)
LYMPHOCYTES RELATIVE PERCENT: 24 % (ref 20–42)
MCH RBC QN AUTO: 28.8 PG (ref 26–35)
MCHC RBC AUTO-ENTMCNC: 33.4 G/DL (ref 32–34.5)
MCV RBC AUTO: 86.2 FL (ref 80–99.9)
MICROORGANISM SPEC CULT: ABNORMAL
MICROORGANISM SPEC CULT: ABNORMAL
MICROORGANISM SPEC CULT: NORMAL
MICROORGANISM SPEC CULT: NORMAL
MICROORGANISM/AGENT SPEC: ABNORMAL
MONOCYTES NFR BLD: 0.71 K/UL (ref 0.1–0.95)
MONOCYTES NFR BLD: 19 % (ref 2–12)
NEUTROPHILS NFR BLD: 47 % (ref 43–80)
NEUTS SEG NFR BLD: 1.78 K/UL (ref 1.8–7.3)
PLATELET # BLD AUTO: 242 K/UL (ref 130–450)
PMV BLD AUTO: 9.2 FL (ref 7–12)
POTASSIUM SERPL-SCNC: 4.2 MMOL/L (ref 3.5–5)
RBC # BLD AUTO: 4.34 M/UL (ref 3.8–5.8)
SERVICE CMNT-IMP: NORMAL
SERVICE CMNT-IMP: NORMAL
SODIUM SERPL-SCNC: 139 MMOL/L (ref 132–146)
SPECIMEN DESCRIPTION: ABNORMAL
SPECIMEN DESCRIPTION: NORMAL
SPECIMEN DESCRIPTION: NORMAL
TRIGL SERPL-MCNC: 71 MG/DL
VLDLC SERPL CALC-MCNC: 14 MG/DL
WBC OTHER # BLD: 3.8 K/UL (ref 4.5–11.5)

## 2024-01-18 PROCEDURE — 99232 SBSQ HOSP IP/OBS MODERATE 35: CPT | Performed by: NURSE PRACTITIONER

## 2024-01-18 PROCEDURE — 97530 THERAPEUTIC ACTIVITIES: CPT

## 2024-01-18 PROCEDURE — 93246 EXT ECG>7D<15D RECORDING: CPT

## 2024-01-18 PROCEDURE — 6370000000 HC RX 637 (ALT 250 FOR IP): Performed by: INTERNAL MEDICINE

## 2024-01-18 PROCEDURE — 6370000000 HC RX 637 (ALT 250 FOR IP): Performed by: NURSE PRACTITIONER

## 2024-01-18 PROCEDURE — 80048 BASIC METABOLIC PNL TOTAL CA: CPT

## 2024-01-18 PROCEDURE — 94640 AIRWAY INHALATION TREATMENT: CPT

## 2024-01-18 PROCEDURE — 6360000002 HC RX W HCPCS: Performed by: NURSE PRACTITIONER

## 2024-01-18 PROCEDURE — 97161 PT EVAL LOW COMPLEX 20 MIN: CPT

## 2024-01-18 PROCEDURE — 80061 LIPID PANEL: CPT

## 2024-01-18 PROCEDURE — 99239 HOSP IP/OBS DSCHRG MGMT >30: CPT | Performed by: INTERNAL MEDICINE

## 2024-01-18 PROCEDURE — 2580000003 HC RX 258: Performed by: NURSE PRACTITIONER

## 2024-01-18 PROCEDURE — 97535 SELF CARE MNGMENT TRAINING: CPT

## 2024-01-18 PROCEDURE — 83036 HEMOGLOBIN GLYCOSYLATED A1C: CPT

## 2024-01-18 PROCEDURE — 97165 OT EVAL LOW COMPLEX 30 MIN: CPT

## 2024-01-18 PROCEDURE — 36415 COLL VENOUS BLD VENIPUNCTURE: CPT

## 2024-01-18 PROCEDURE — 85025 COMPLETE CBC W/AUTO DIFF WBC: CPT

## 2024-01-18 PROCEDURE — 99223 1ST HOSP IP/OBS HIGH 75: CPT | Performed by: PSYCHIATRY & NEUROLOGY

## 2024-01-18 RX ORDER — CLOPIDOGREL BISULFATE 75 MG/1
75 TABLET ORAL DAILY
Qty: 89 TABLET | Refills: 0 | Status: SHIPPED | OUTPATIENT
Start: 2024-01-19 | End: 2024-04-17

## 2024-01-18 RX ORDER — ATORVASTATIN CALCIUM 40 MG/1
40 TABLET, FILM COATED ORAL DAILY
Qty: 30 TABLET | Refills: 3 | Status: SHIPPED | OUTPATIENT
Start: 2024-01-19

## 2024-01-18 RX ORDER — GUAIFENESIN 400 MG/1
400 TABLET ORAL 2 TIMES DAILY PRN
Qty: 30 TABLET | Refills: 0 | Status: SHIPPED | OUTPATIENT
Start: 2024-01-18

## 2024-01-18 RX ORDER — CLOPIDOGREL BISULFATE 75 MG/1
75 TABLET ORAL DAILY
Status: DISCONTINUED | OUTPATIENT
Start: 2024-01-18 | End: 2024-01-18 | Stop reason: HOSPADM

## 2024-01-18 RX ADMIN — IPRATROPIUM BROMIDE AND ALBUTEROL SULFATE 1 DOSE: .5; 2.5 SOLUTION RESPIRATORY (INHALATION) at 12:13

## 2024-01-18 RX ADMIN — METOPROLOL SUCCINATE 25 MG: 25 TABLET, EXTENDED RELEASE ORAL at 09:54

## 2024-01-18 RX ADMIN — SODIUM CHLORIDE, PRESERVATIVE FREE 10 ML: 5 INJECTION INTRAVENOUS at 09:54

## 2024-01-18 RX ADMIN — ENOXAPARIN SODIUM 40 MG: 100 INJECTION SUBCUTANEOUS at 09:55

## 2024-01-18 RX ADMIN — CYANOCOBALAMIN TAB 1000 MCG 1000 MCG: 1000 TAB at 09:54

## 2024-01-18 RX ADMIN — CLOPIDOGREL BISULFATE 75 MG: 75 TABLET ORAL at 09:54

## 2024-01-18 RX ADMIN — IPRATROPIUM BROMIDE AND ALBUTEROL SULFATE 1 DOSE: .5; 2.5 SOLUTION RESPIRATORY (INHALATION) at 09:54

## 2024-01-18 RX ADMIN — ATORVASTATIN CALCIUM 40 MG: 40 TABLET, FILM COATED ORAL at 09:54

## 2024-01-18 RX ADMIN — ASPIRIN 81 MG: 81 TABLET, COATED ORAL at 09:54

## 2024-01-18 NOTE — PATIENT CARE CONFERENCE
[facility] Quality Flow/Interdisciplinary Rounds Progress Note        Quality Flow Rounds held on January 18, 2024    Disciplines Attending:  Bedside Nurse, , , and Nursing Unit Leadership    Chuck Deon Sandro was admitted on 1/13/2024  9:23 AM    Anticipated Discharge Date:       Disposition:    Martin Score:  Martin Scale Score: 21    Readmission Risk              Risk of Unplanned Readmission:  16           Discussed patient goal for the day, patient clinical progression, and barriers to discharge.  The following Goal(s) of the Day/Commitment(s) have been identified:  downgrade/discharge planning       Fany Patel RN  January 18, 2024

## 2024-01-18 NOTE — PROGRESS NOTES
Department of Internal Medicine  Nephrology Consult Note    Events reviewed    SUBJECTIVE: We are following Mr. Chuck Chavarria for HENNY.  Reports no complaints.    PHYSICAL EXAM:      Vitals:    VITALS:  /72   Pulse 76   Temp 97.8 °F (36.6 °C) (Temporal)   Resp 18   Ht 1.727 m (5' 8\")   Wt 54.7 kg (120 lb 9.6 oz)   SpO2 96%   BMI 18.34 kg/m²   24HR INTAKE/OUTPUT:    Intake/Output Summary (Last 24 hours) at 1/18/2024 1329  Last data filed at 1/18/2024 1118  Gross per 24 hour   Intake 120 ml   Output 500 ml   Net -380 ml       Constitutional:  Awake, alert, oriented, in NAD  HEENT:  PERRLA, normocephalic, atraumatic  Respiratory:  CTA  Cardiovascular/Edema:  RRR, normal S1, normal S2  Gastrointestinal:  Soft, flat, non-distended, non-tender  Neurologic:  Nonfocal  Skin:  warm, dry, no rashes, no lesions    Scheduled Meds:   clopidogrel  75 mg Oral Daily    atorvastatin  40 mg Oral Daily    aspirin  81 mg Oral Daily    ipratropium 0.5 mg-albuterol 2.5 mg  1 Dose Inhalation 4x Daily RT    vitamin B-12  1,000 mcg Oral Daily    metoprolol succinate  25 mg Oral Daily    [Held by provider] lisinopril  40 mg Oral Daily    sodium chloride flush  5-40 mL IntraVENous 2 times per day    enoxaparin  40 mg SubCUTAneous Daily    nicotine  1 patch TransDERmal Daily     Continuous Infusions:   sodium chloride       PRN Meds:.labetalol, hydrALAZINE, white petrolatum, ammonium lactate, sodium chloride flush, sodium chloride, potassium chloride **OR** potassium alternative oral replacement **OR** potassium chloride, magnesium sulfate, ondansetron **OR** ondansetron, polyethylene glycol, acetaminophen **OR** acetaminophen, white petrolatum, ipratropium 0.5 mg-albuterol 2.5 mg, guaiFENesin        DATA:    CBC:   Lab Results   Component Value Date/Time    WBC 3.8 01/18/2024 07:07 AM    RBC 4.34 01/18/2024 07:07 AM    HGB 12.5 01/18/2024 07:07 AM    HCT 37.4 01/18/2024 07:07 AM    MCV 86.2 01/18/2024 07:07 AM    [facility] 28.8  UPCR: 0.43 g/g.  Probably underlying CKD due to nephrosclerosis, we need to repeat UACR in 3 to 6 months.    HTN, on metoprolol, resume lisinopril  HFrEF 45%, proBNP 3749 >1257, on metoprolol  Vitamin D deficiency, on cholecalciferol  Normocytic anemia, ferritin 312, iron saturation 25%, folate 8.9, B12 267  ---------------------------------------------------------------------------  Possible empyema, on Zosyn and vancomycin  COPD  Hyperlipidemia, on atorvastatin    Plan:    Okay to resume lisinopril  Continue B12 1000 mcg p.o. daily  Avoid hypotension  Continue to monitor renal function  Okay for discharge from renal POV, follow up in 2-3 weeks, BMP one week prior      Electronically signed by JASSI Barrera CNP on 1/18/2024 at 1:29 PM

## 2024-01-18 NOTE — DISCHARGE SUMMARY
sounds.  Musculoskeletal: No clubbing, cyanosis or edema bilaterally.  Full range of motion without deformity.  Skin: Skin color, texture, turgor normal.  No rashes or lesions.  Neurologic:  Neurovascularly intact without any focal sensory/motor deficits. Cranial nerves: II-XII intact, grossly non-focal.  Psychiatric: Alert and oriented, thought content appropriate, normal insight      Consults:     IP CONSULT TO PULMONOLOGY  IP CONSULT TO INTERNAL MEDICINE  IP CONSULT TO HEART FAILURE NURSE/COORDINATOR  IP CONSULT TO PULMONOLOGY  IP CONSULT TO PULMONOLOGY  IP CONSULT TO NEPHROLOGY  IP CONSULT TO PHARMACY  IP CONSULT TO NEUROLOGY  IP CONSULT TO VASCULAR SURGERY    Significant Diagnostic Studies:       MRI BRAIN WO CONTRAST    Result Date: 1/18/2024  Suspected tiny subacute cortical infarcts involving the left middle frontal gyrus as well as the right pre frontal gyrus.     CT HEAD WO CONTRAST    Result Date: 1/17/2024  1. No focal high-grade stenosis or occlusion in the proximal large intracranial arteries. 2. No perfusion mismatch. 3. 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  Dominant left vertebral artery is widely patent.  Findings may relate to atherosclerotic disease.  Underlying dissection is difficult to exclude. 4. Focal 50-60% stenosis in the proximal bilateral cervical ICAs per NASCET criteria. 5. Right apical loculated pleural fluid with pleural thickening/enhancement, air-fluid levels and heterogeneous pulmonary opacities.  Findings are similar compared to chest CT done 01/13/2024 and raise concern for empyema. 6. No acute intracranial hemorrhage or mass effect. This scan was analyzed using [employer] contact LVO. Identification of suspected findings is not for diagnostic use beyond notification. Viz LVO is limited to analysis of imaging data and should not be used in-lieu of full patient evaluation or relied upon to  legs., Topical, 2 TIMES DAILY PRN, Historical Med      nicotine polacrilex (NICORETTE) 2 MG gum Take 1 each by mouth as needed for Smoking cessationHistorical Med      aspirin 81 MG EC tablet Take 1 tablet by mouth dailyHistorical Med      Vitamin D (CHOLECALCIFEROL) 25 MCG (1000 UT) TABS tablet Take 1 tablet by mouth dailyHistorical Med             Time Spent on discharge was 45 minutes in the examination, evaluation, counseling and review of medications and discharge plan.      Signed:    Celeste Hernandez DO   1/18/2024

## 2024-01-18 NOTE — PROGRESS NOTES
Secure message to Dr. Cook to verify if patient can resume lisinopril.      1320: Okay to resume lisinopril per nephrology.

## 2024-01-18 NOTE — PROGRESS NOTES
OCCUPATIONAL THERAPY INITIAL EVALUATION    [facility]  [address], [place]      Date:2024                                                  Patient Name: Chuck Chavarria Jr.  MRN: [id number]  : 1950  Room: [address] [place] [address]    Evaluating OT: Paula Krueger, BRANDON, OTR/L  # [id number]    Referring Provider:  Celeste Hernandez DO   Specific Provider Orders:  \"OT Eval and Treat\"  24    Diagnosis: Shortness of breath [R06.02]  Pleural effusion [J90]    Pt was admitted w/ SOB, not hypoxic, noted Facial Droop and Confusion 24 - Stroke Alert initiated  CTA of Carotids revealed Occlusion/Stenosis, MRI of the Brain revealed Suspected tiny subacute cortical infarcts involving the left middle frontal gyrus as well as the right pre frontal gyrus.    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    Surgeries this admission: None     Precautions:  Fall Risk      Assessment of current deficits   [x] Functional mobility  [x]ADLs  [x] Strength               []Cognition   [x] Functional transfers   [x] IADLs         [x] Safety Awareness   [x]Endurance   [] Fine Coordination              [x] Balance     [] Vision/perception   []Sensation    []Gross Motor Coordination  [x] ROM  [] Delirium                  [] Motor Control       OT PLAN OF CARE   OT POC based on physician orders, patient diagnosis and results of clinical assessment    Frequency/Duration 1-3 days/wk for 2 weeks PRN   Specific OT Treatment to include:   *  information, gathering information on past medical history/social history and prior level of function, completion of standardized testing/informal observation of tasks, assessment of data and education on plan of care and goals.            Paula Krueger, MOT, OTR/L  # [id number]

## 2024-01-18 NOTE — PROGRESS NOTES
Physician Progress Note      PATIENT:               BRANDEN REED  [facility] #:                  [id number]  :                       1950  ADMIT DATE:       2024 9:23 AM  DISCH DATE:  RESPONDING  PROVIDER #:        Celeste Hernandez DO        QUERY TEXT:    Type of Encephalopathy: Please provide further specificity, if known.    Clinical indicators include: altered mental status, fevers, acute,   encephalopathy  Options provided:  -- Anoxic/hypoxic encephalopathy  -- Metabolic encephalopathy  -- Toxic encephalopathy  -- Hepatic encephalopathy  -- Hypertensive encephalopathy  -- Other - I will add my own diagnosis  -- Disagree - Not applicable / Not valid  -- Disagree - Clinically Unable to determine / Unknown        PROVIDER RESPONSE TEXT:    The patient has hypertensive encephalopathy.      Electronically signed by:  Celeste Hernandez DO 2024 2:24 PM

## 2024-01-18 NOTE — PROGRESS NOTES
[facility]  Neuro Inpatient progress note       Chuck Chavarria Jr. is a 73 y.o. right handed     Neurology is following for suspected TIA    PMH: Prior stroke, asthma, bilateral carotid artery stenosis, COPD, hypertension, hyperlipidemia, arthritis, tobacco dependence    Chuck Chavarria is a 73-year-old male with a past medical history significant for COPD, hyperlipidemia, hypertension, and cocaine abuse.    Patient presented to the [facility] emergency department on 1/13 with a chief complaint of shortness of breath.  Patient was nontoxic-appearing and did not require oxygen.  CTA pulmonary demonstrated a loculated right apical pleural effusion containing gas bubbles, concerning for potential empyema.  Labs while in the ED were significant for a pro-BNP of 3749 and D-dimer of of 829.  Patient was admitted for evaluation of his loculated pleural effusion seen on imaging.  On 1/16 at approximately 1:00 in the morning, stroke alert was called as patient was noted to have left-sided facial droop and altered mental status.  By the time the physician arrived to the patient's room, they stated that his symptoms had resolved.  Vitals were significant for BP of 180/100s.  Stat CTA head, CT head without contrast, and CT brain perfusion were significant for multifocal areas of severe stenosis/occlusion in the right cervical vertebral artery, most pronounced in the V3 segment.  Neurology was consulted on 1/17 for stroke/TIA workup.    MRI brain completed on 1/18 showed tiny subacute cortical infarcts involving the left middle frontal gyrus and right prefrontal gyrus; of note there are multiple remote strokes also noted.  Complete echo was negative for cardioembolic source.      Intermittently hypertensive otherwise vitals are stable on room air    Allergies:     Patient has no known allergies.    Medications:     Prior to Admission medications    Medication Sig Start  relate to atherosclerotic   disease.  Underlying dissection is difficult to exclude.   4. Focal 50-60% stenosis in the proximal bilateral cervical ICAs per NASCET   criteria.   5. Right apical loculated pleural fluid with pleural thickening/enhancement,   air-fluid levels and heterogeneous pulmonary opacities.  Findings are similar   compared to chest CT done 01/13/2024 and raise concern for empyema.   6. No acute intracranial hemorrhage or mass effect.   This scan was analyzed using itembase LVO. Identification of suspected   findings is not for diagnostic use beyond notification. Perception Software LVO is limited to   analysis of imaging data and should not be used in-lieu of full patient   evaluation or relied upon to make or confirm diagnosis.         CTA NECK W CONTRAST   Final Result   1. No focal high-grade stenosis or occlusion in the proximal large   intracranial arteries.   2. No perfusion mismatch.   3. Multifocal areas of severe stenosis/occlusion in the hypoplastic right   cervical vertebral artery, most pronounced in the V3 segment.  Dominant left   vertebral artery is widely patent.  Findings may relate to atherosclerotic   disease.  Underlying dissection is difficult to exclude.   4. Focal 50-60% stenosis in the proximal bilateral cervical ICAs per NASCET   criteria.   5. Right apical loculated pleural fluid with pleural thickening/enhancement,   air-fluid levels and heterogeneous pulmonary opacities.  Findings are similar   compared to chest CT done 01/13/2024 and raise concern for empyema.   6. No acute intracranial hemorrhage or mass effect.   This scan was analyzed using itembase LVO. Identification of suspected   findings is not for diagnostic use beyond notification. Perception Software LVO is limited to   analysis of imaging data and should not be used in-lieu of full patient   evaluation or relied upon to make or confirm diagnosis.         CT BRAIN PERFUSION   Final Result   1. No focal high-grade stenosis or

## 2024-01-18 NOTE — CARE COORDINATION
1/18/24  Transition of care Update.  Patient was an RRT yesterday .  MRI complete and Neurology consulted.  Patient will start Plavix and follow in the stroke clinic out patient. Patient will need a Zio patch placed at discharge.   Neurology signing off as well as vascular.  SW/CM Checking a discharge with attending.  [facility] was called and new PCP appointment set up with Dr.M Italo Moore [phone] located at [address] [place] [address] appointment time is 10:15. Patient to arrive at 10:00am with Insurance cards and medications. A referral was called to [facility] for PT/OT per therapy recommendation.  [facility] and family aware of PCP appointment and arrangements.  Orders placed for home PT/OT.  Patient also ordered a small base quad cane from [facility].  Discharge goal ishome.  Patient sister to provide transport.  SW/CM to follow.    1:00pm  Cane not covered under insurance as a walker was recently ordered.  Patient and sister agreeable to pay private for the cane and [facility] updated.     Electronically signed by DANYELLE Hanson on 1/18/2024 at 10:58 AM

## 2024-01-18 NOTE — PROGRESS NOTES
Physical Therapy  Physical Therapy Initial Assessment     Name: Chuck Chavarria Jr.  : 1950  MRN: [id number]      Date of Service: 2024    Evaluating PT:  Payal Randolph, PT, DPT, [id number]    Room #:  6510/65-A  Diagnosis:  Shortness of breath [R06.02]  Pleural effusion [J90]  PMHx/PSHx:    Past Medical History:   Diagnosis Date    Arthritis     Asthma     Bilateral carotid artery stenosis 2024    Approximately 50% stenosis on the right side and 30 to 40% stenosis on the left side, CT of the carotids,     Cataract, left eye     Cerebral infarction due to occlusion of right vertebral artery (HCC) 2024    Hypoplastic, small right vertebral artery, with absent flow proximally CT of the carotids   Patent left vertebral artery, good size    COPD (chronic obstructive pulmonary disease) (HCC)     Hyperlipidemia     Hypertension     Occlusion of right vertebral artery 2024    Small, hypoplastic right vertebral artery occlusion proximally with widely patent dominant left vertebral artery    Tobacco dependence 2024      Past Surgical History:   Procedure Laterality Date    CARDIAC CATHETERIZATION  2023    COLONOSCOPY      DENTAL SURGERY      TONSILLECTOMY        Procedure/Surgery:  RRT (stroke-like symptoms)   Precautions:  Fall Risk, + Alarms  Equipment Needs:  SBQC    SUBJECTIVE:    Pt lives alone in a 5th floor apartment with level entry and elevator access.  Bed is on main floor and bath is on main floor.  Pt ambulated with hurrycane PTA. Owns: [employer]    OBJECTIVE:   Initial Evaluation  Date: 24 Treatment Short Term/ Long Term   Goals   AM-PAC 6 Clicks      Was pt agreeable to Eval/treatment? yes     Does pt have pain? No c/o pain     Bed Mobility  Rolling: NT  Supine to sit: SBA  Sit to supine: SBA  Scooting: SBA  Rolling: Independent   Supine to sit: Independent   Sit to supine: Independent   Scooting: Independent    Transfers Sit to stand:  to improve independence with functional mobility   [x] Balance Training to improve static/dynamic balance and to reduce fall risk  [x] Endurance Training to improve activity tolerance during functional mobility   [x] Transfer Training to improve safety and independence with all functional transfers   [x] Gait Training to improve gait mechanics, endurance and assess need for appropriate assistive device  [x] Stair Training in preparation for safe discharge home and/or into the community   [] Positioning to prevent skin breakdown and contractures  [x] Safety and Education Training   [x] Patient/Caregiver Education   [] HEP  [x] Other     PT long term treatment goals are located in above grid    Frequency of treatments: 2-5x/week x 1-2 weeks.    Time in  0925  Time out  0950    Total Treatment Time  10 minutes     Evaluation Time includes thorough review of current medical information, gathering information on past medical history/social history and prior level of function, completion of standardized testing/informal observation of tasks, assessment of data and education on plan of care and goals.    CPT codes:  [x] Low Complexity PT evaluation 71778  [] Moderate Complexity PT evaluation 30207  [] High Complexity PT evaluation 68194  [] PT Re-evaluation 39982  [] Gait training 06176 0 minutes  [] Manual therapy 89811 0 minutes  [x] Therapeutic activities 95464 10 minutes  [] Therapeutic exercises 43295 0 minutes  [] Neuromuscular reeducation 40574 0 minutes     Payal Randolph PT, DPT  [id number]

## 2024-01-18 NOTE — PROGRESS NOTES
IVs and heart monitor removed. Ziopatch placed. Educated patient on use and removal of zio patch. Patient verbalized understanding.

## 2024-01-18 NOTE — PROGRESS NOTES
CLINICAL PHARMACY NOTE: MEDS TO BEDS    Total # of Prescriptions Filled: 3   The following medications were delivered to the patient:  Atorvastatin calcium 40 mg  Vitamin B-12 1,000 mcg  Clopidogrel bisulfate 75 mg    Additional Documentation:   Sister picked up in the pharmacy at register

## 2024-01-19 ENCOUNTER — OFFICE VISIT (OUTPATIENT)
Dept: PRIMARY CARE CLINIC | Age: 74
End: 2024-01-19

## 2024-01-19 VITALS
OXYGEN SATURATION: 98 % | HEIGHT: 68 IN | WEIGHT: 125 LBS | HEART RATE: 79 BPM | TEMPERATURE: 97.5 F | SYSTOLIC BLOOD PRESSURE: 124 MMHG | RESPIRATION RATE: 17 BRPM | BODY MASS INDEX: 18.94 KG/M2 | DIASTOLIC BLOOD PRESSURE: 72 MMHG

## 2024-01-19 DIAGNOSIS — I63.9 ACUTE STROKE DUE TO ISCHEMIA (HCC): ICD-10-CM

## 2024-01-19 DIAGNOSIS — F14.10 COCAINE ABUSE (HCC): ICD-10-CM

## 2024-01-19 DIAGNOSIS — I10 ESSENTIAL HYPERTENSION: Primary | ICD-10-CM

## 2024-01-19 DIAGNOSIS — Z72.0 TOBACCO ABUSE: ICD-10-CM

## 2024-01-19 DIAGNOSIS — N17.9 ACUTE RENAL FAILURE, UNSPECIFIED ACUTE RENAL FAILURE TYPE (HCC): ICD-10-CM

## 2024-01-19 DIAGNOSIS — M19.90 ARTHRITIS: ICD-10-CM

## 2024-01-19 DIAGNOSIS — E78.2 MIXED HYPERLIPIDEMIA: ICD-10-CM

## 2024-01-19 SDOH — ECONOMIC STABILITY: FOOD INSECURITY: WITHIN THE PAST 12 MONTHS, YOU WORRIED THAT YOUR FOOD WOULD RUN OUT BEFORE YOU GOT MONEY TO BUY MORE.: NEVER TRUE

## 2024-01-19 SDOH — ECONOMIC STABILITY: FOOD INSECURITY: WITHIN THE PAST 12 MONTHS, THE FOOD YOU BOUGHT JUST DIDN'T LAST AND YOU DIDN'T HAVE MONEY TO GET MORE.: NEVER TRUE

## 2024-01-19 SDOH — ECONOMIC STABILITY: INCOME INSECURITY: HOW HARD IS IT FOR YOU TO PAY FOR THE VERY BASICS LIKE FOOD, HOUSING, MEDICAL CARE, AND HEATING?: NOT HARD AT ALL

## 2024-01-19 SDOH — ECONOMIC STABILITY: HOUSING INSECURITY
IN THE LAST 12 MONTHS, WAS THERE A TIME WHEN YOU DID NOT HAVE A STEADY PLACE TO SLEEP OR SLEPT IN A SHELTER (INCLUDING NOW)?: NO

## 2024-01-19 ASSESSMENT — ENCOUNTER SYMPTOMS
VOMITING: 0
NAUSEA: 0
SHORTNESS OF BREATH: 0
COUGH: 0
ABDOMINAL PAIN: 0
DIARRHEA: 0

## 2024-01-19 ASSESSMENT — PATIENT HEALTH QUESTIONNAIRE - PHQ9
SUM OF ALL RESPONSES TO PHQ QUESTIONS 1-9: 0
SUM OF ALL RESPONSES TO PHQ QUESTIONS 1-9: 0
2. FEELING DOWN, DEPRESSED OR HOPELESS: 0
SUM OF ALL RESPONSES TO PHQ QUESTIONS 1-9: 0
1. LITTLE INTEREST OR PLEASURE IN DOING THINGS: 0
SUM OF ALL RESPONSES TO PHQ9 QUESTIONS 1 & 2: 0
SUM OF ALL RESPONSES TO PHQ QUESTIONS 1-9: 0

## 2024-01-19 NOTE — PROGRESS NOTES
Outpatient Encounter Medications as of 1/19/2024   Medication Sig Dispense Refill    atorvastatin (LIPITOR) 40 MG tablet Take 1 tablet by mouth daily 30 tablet 3    guaiFENesin 400 MG tablet Take 1 tablet by mouth 2 times daily as needed for Cough 30 tablet 0    vitamin B-12 1000 MCG tablet Take 1 tablet by mouth daily 30 tablet 3    clopidogrel (PLAVIX) 75 MG tablet Take 1 tablet by mouth daily for 89 doses 89 tablet 0    Emollient (CERAVE) CREA Apply topically daily      lisinopril (PRINIVIL;ZESTRIL) 40 MG tablet Take 1 tablet by mouth daily 30 tablet 3    metoprolol succinate (TOPROL XL) 25 MG extended release tablet Take 1 tablet by mouth daily 30 tablet 3    mometasone-formoterol (DULERA) 100-5 MCG/ACT inhaler Inhale 2 puffs into the lungs daily      albuterol sulfate HFA (PROVENTIL;VENTOLIN;PROAIR) 108 (90 Base) MCG/ACT inhaler Inhale 2 puffs into the lungs every 4 hours as needed for Wheezing      diclofenac sodium (VOLTAREN) 1 % GEL Apply 4 g topically 2 times daily as needed for Pain Apply to both legs.      nicotine polacrilex (NICORETTE) 2 MG gum Take 1 each by mouth as needed for Smoking cessation      aspirin 81 MG EC tablet Take 1 tablet by mouth daily      Vitamin D (CHOLECALCIFEROL) 25 MCG (1000 UT) TABS tablet Take 1 tablet by mouth daily       No facility-administered encounter medications on file as of 1/19/2024.       Return in about 1 week (around 1/26/2024) for blood work.        Reviewed recent labs related to Chuck's current problems      Discussed importance of regular Health Maintenance follow up  Health Maintenance   Topic    COVID-19 Vaccine (1)    Depression Screen     Hepatitis C screen     Colorectal Cancer Screen     Respiratory Syncytial Virus (RSV) Pregnant or age 60 yrs+ (1 - 1-dose 60+ series)    Shingles vaccine (2 of 3)    Flu vaccine (1)    DTaP/Tdap/Td vaccine (2 - Td or Tdap)    A1C test (Diabetic or Prediabetic)     Lipids     Pneumococcal 65+ years Vaccine     AAA

## 2024-01-22 ENCOUNTER — TELEPHONE (OUTPATIENT)
Dept: PRIMARY CARE CLINIC | Age: 74
End: 2024-01-22

## 2024-01-22 NOTE — TELEPHONE ENCOUNTER
Received a call from Cristy at [facility] letting Dr. Reyes Moore know that she went to the home to [place] set up with home care and patient declined all services from them. She states patient would not allow her to take blood pressure or vitals and told her he did not need any equipment or services. Thank you.

## 2024-01-26 ENCOUNTER — PREP FOR PROCEDURE (OUTPATIENT)
Dept: VASCULAR SURGERY | Age: 74
End: 2024-01-26

## 2024-03-21 ENCOUNTER — COMMUNITY OUTREACH (OUTPATIENT)
Dept: PRIMARY CARE CLINIC | Age: 74
End: 2024-03-21

## 2024-06-30 ENCOUNTER — APPOINTMENT (OUTPATIENT)
Dept: ULTRASOUND IMAGING | Age: 74
End: 2024-06-30
Payer: COMMERCIAL

## 2024-06-30 ENCOUNTER — APPOINTMENT (OUTPATIENT)
Dept: GENERAL RADIOLOGY | Age: 74
End: 2024-06-30
Payer: COMMERCIAL

## 2024-06-30 ENCOUNTER — APPOINTMENT (OUTPATIENT)
Dept: CT IMAGING | Age: 74
End: 2024-06-30
Payer: COMMERCIAL

## 2024-06-30 ENCOUNTER — HOSPITAL ENCOUNTER (EMERGENCY)
Age: 74
Discharge: HOME | End: 2024-06-30
Attending: EMERGENCY MEDICINE
Payer: COMMERCIAL

## 2024-06-30 VITALS
OXYGEN SATURATION: 91 % | RESPIRATION RATE: 16 BRPM | BODY MASS INDEX: 19.01 KG/M2 | DIASTOLIC BLOOD PRESSURE: 85 MMHG | HEART RATE: 89 BPM | WEIGHT: 125 LBS | SYSTOLIC BLOOD PRESSURE: 142 MMHG | TEMPERATURE: 97.7 F

## 2024-06-30 DIAGNOSIS — L80 VITILIGO: ICD-10-CM

## 2024-06-30 DIAGNOSIS — K52.9 ENTEROCOLITIS: Primary | ICD-10-CM

## 2024-06-30 DIAGNOSIS — R10.84 GENERALIZED ABDOMINAL PAIN: ICD-10-CM

## 2024-06-30 DIAGNOSIS — R10.9 FLANK PAIN: ICD-10-CM

## 2024-06-30 DIAGNOSIS — K76.0 FATTY LIVER: ICD-10-CM

## 2024-06-30 LAB
ALBUMIN SERPL-MCNC: 3.3 G/DL (ref 3.5–5.2)
ALP SERPL-CCNC: 87 U/L (ref 40–129)
ALT SERPL-CCNC: 14 U/L (ref 0–40)
ANION GAP SERPL CALCULATED.3IONS-SCNC: 13 MMOL/L (ref 7–16)
AST SERPL-CCNC: 25 U/L (ref 0–39)
BASOPHILS # BLD: 0.04 K/UL (ref 0–0.2)
BASOPHILS NFR BLD: 1 % (ref 0–2)
BILIRUB SERPL-MCNC: 0.7 MG/DL (ref 0–1.2)
BILIRUB UR QL STRIP: NEGATIVE
BUN SERPL-MCNC: 13 MG/DL (ref 6–23)
CALCIUM SERPL-MCNC: 9 MG/DL (ref 8.6–10.2)
CHLORIDE SERPL-SCNC: 100 MMOL/L (ref 98–107)
CLARITY UR: CLEAR
CO2 SERPL-SCNC: 24 MMOL/L (ref 22–29)
COLOR UR: YELLOW
CREAT SERPL-MCNC: 1.2 MG/DL (ref 0.7–1.2)
EOSINOPHIL # BLD: 0.09 K/UL (ref 0.05–0.5)
EOSINOPHILS RELATIVE PERCENT: 1 % (ref 0–6)
ERYTHROCYTE [DISTWIDTH] IN BLOOD BY AUTOMATED COUNT: 14 % (ref 11.5–15)
GFR, ESTIMATED: 62 ML/MIN/1.73M2
GLUCOSE SERPL-MCNC: 104 MG/DL (ref 74–99)
GLUCOSE UR STRIP-MCNC: NEGATIVE MG/DL
HCT VFR BLD AUTO: 35.3 % (ref 37–54)
HGB BLD-MCNC: 11.8 G/DL (ref 12.5–16.5)
HGB UR QL STRIP.AUTO: ABNORMAL
IMM GRANULOCYTES # BLD AUTO: 0.05 K/UL (ref 0–0.58)
IMM GRANULOCYTES NFR BLD: 1 % (ref 0–5)
KETONES UR STRIP-MCNC: NEGATIVE MG/DL
LACTATE BLDV-SCNC: 2 MMOL/L (ref 0.5–1.9)
LACTATE BLDV-SCNC: 2.1 MMOL/L (ref 0.5–1.9)
LEUKOCYTE ESTERASE UR QL STRIP: NEGATIVE
LIPASE SERPL-CCNC: 36 U/L (ref 13–60)
LYMPHOCYTES NFR BLD: 0.94 K/UL (ref 1.5–4)
LYMPHOCYTES RELATIVE PERCENT: 13 % (ref 20–42)
MCH RBC QN AUTO: 30.3 PG (ref 26–35)
MCHC RBC AUTO-ENTMCNC: 33.4 G/DL (ref 32–34.5)
MCV RBC AUTO: 90.7 FL (ref 80–99.9)
MONOCYTES NFR BLD: 1.29 K/UL (ref 0.1–0.95)
MONOCYTES NFR BLD: 18 % (ref 2–12)
NEUTROPHILS NFR BLD: 67 % (ref 43–80)
NEUTS SEG NFR BLD: 4.94 K/UL (ref 1.8–7.3)
NITRITE UR QL STRIP: NEGATIVE
PH UR STRIP: 6.5 [PH] (ref 5–9)
PLATELET # BLD AUTO: 175 K/UL (ref 130–450)
PMV BLD AUTO: 9.4 FL (ref 7–12)
POTASSIUM SERPL-SCNC: 3.8 MMOL/L (ref 3.5–5)
PROT SERPL-MCNC: 8.1 G/DL (ref 6.4–8.3)
PROT UR STRIP-MCNC: >=300 MG/DL
RBC # BLD AUTO: 3.89 M/UL (ref 3.8–5.8)
RBC #/AREA URNS HPF: ABNORMAL /HPF
SODIUM SERPL-SCNC: 137 MMOL/L (ref 132–146)
SP GR UR STRIP: 1.01 (ref 1–1.03)
UROBILINOGEN UR STRIP-ACNC: 0.2 EU/DL (ref 0–1)
WBC #/AREA URNS HPF: ABNORMAL /HPF
WBC OTHER # BLD: 7.4 K/UL (ref 4.5–11.5)

## 2024-06-30 PROCEDURE — 83605 ASSAY OF LACTIC ACID: CPT

## 2024-06-30 PROCEDURE — 85025 COMPLETE CBC W/AUTO DIFF WBC: CPT

## 2024-06-30 PROCEDURE — 80053 COMPREHEN METABOLIC PANEL: CPT

## 2024-06-30 PROCEDURE — 87086 URINE CULTURE/COLONY COUNT: CPT

## 2024-06-30 PROCEDURE — 81001 URINALYSIS AUTO W/SCOPE: CPT

## 2024-06-30 PROCEDURE — 74178 CT ABD&PLV WO CNTR FLWD CNTR: CPT

## 2024-06-30 PROCEDURE — 76705 ECHO EXAM OF ABDOMEN: CPT

## 2024-06-30 PROCEDURE — 71045 X-RAY EXAM CHEST 1 VIEW: CPT

## 2024-06-30 PROCEDURE — 6360000004 HC RX CONTRAST MEDICATION: Performed by: RADIOLOGY

## 2024-06-30 PROCEDURE — 2580000003 HC RX 258

## 2024-06-30 PROCEDURE — 83690 ASSAY OF LIPASE: CPT

## 2024-06-30 PROCEDURE — 99285 EMERGENCY DEPT VISIT HI MDM: CPT

## 2024-06-30 PROCEDURE — 93005 ELECTROCARDIOGRAM TRACING: CPT

## 2024-06-30 RX ORDER — DICYCLOMINE HCL 20 MG
20 TABLET ORAL 4 TIMES DAILY
Qty: 30 TABLET | Refills: 0 | Status: SHIPPED | OUTPATIENT
Start: 2024-06-30

## 2024-06-30 RX ORDER — 0.9 % SODIUM CHLORIDE 0.9 %
1000 INTRAVENOUS SOLUTION INTRAVENOUS ONCE
Status: COMPLETED | OUTPATIENT
Start: 2024-06-30 | End: 2024-06-30

## 2024-06-30 RX ADMIN — IOPAMIDOL 75 ML: 755 INJECTION, SOLUTION INTRAVENOUS at 12:58

## 2024-06-30 RX ADMIN — SODIUM CHLORIDE 1000 ML: 9 INJECTION, SOLUTION INTRAVENOUS at 13:07

## 2024-06-30 ASSESSMENT — PAIN DESCRIPTION - DESCRIPTORS: DESCRIPTORS: SHARP

## 2024-06-30 ASSESSMENT — PAIN DESCRIPTION - LOCATION: LOCATION: FLANK

## 2024-06-30 ASSESSMENT — PAIN DESCRIPTION - ONSET: ONSET: ON-GOING

## 2024-06-30 ASSESSMENT — LIFESTYLE VARIABLES: HOW MANY STANDARD DRINKS CONTAINING ALCOHOL DO YOU HAVE ON A TYPICAL DAY: 1 OR 2

## 2024-06-30 ASSESSMENT — PAIN DESCRIPTION - PAIN TYPE: TYPE: ACUTE PAIN

## 2024-06-30 ASSESSMENT — PAIN DESCRIPTION - FREQUENCY: FREQUENCY: CONTINUOUS

## 2024-06-30 ASSESSMENT — PAIN - FUNCTIONAL ASSESSMENT: PAIN_FUNCTIONAL_ASSESSMENT: 0-10

## 2024-06-30 ASSESSMENT — PAIN SCALES - GENERAL: PAINLEVEL_OUTOF10: 6

## 2024-06-30 ASSESSMENT — PAIN DESCRIPTION - ORIENTATION: ORIENTATION: RIGHT;LEFT

## 2024-06-30 NOTE — ED PROVIDER NOTES
ATTENDING PROVIDER ATTESTATION:     Chuck Chavarria Jr. presented to the emergency department for evaluation of Flank Pain (Bilateral flank pain 6/10 sharp, for 1 week, denies N/V)   and was initially evaluated by the Medical Resident. See Original ED Note for H&P and ED course above.     I have reviewed and discussed the case, including pertinent history (medical, surgical, family and social) and exam findings with the Medical Resident assigned to Chuck Deon Rai.  I have personally performed and/or participated in the history, exam, medical decision making, and procedures and agree with all pertinent clinical information and any additional changes or corrections are noted below in my assessment and plan. I have discussed this patient in detail with the resident, and provided the instruction and education,       I have reviewed my findings and recommendations with the assigned Medical Resident, Chuck Chavarria Jr. and members of family present at the time of disposition.      I have performed a history and physical examination of this patient and directly supervised the resident caring for this patient              [facility] EMERGENCY DEPARTMENT  EMERGENCY DEPARTMENT ENCOUNTER        Pt Name: Chuck Chavarria Jr.  MRN: [id number]  Birthdate 1950  Date of evaluation: 6/30/2024  Provider: Davy Cortez MD  PCP: Gunner Espinosa MD  Note Started: 10:45 AM EDT 6/30/24    CHIEF COMPLAINT       Chief Complaint   Patient presents with    Flank Pain     Bilateral flank pain 6/10 sharp, for 1 week, denies N/V       HISTORY OF PRESENT ILLNESS: 1 or more Elements     Limitations to history : None    Chuck Chavarria Jr. is a 73 y.o. male who presents for flank pain.  Patient reports bilateral flank pain for the last week.  Aching pain that is also sharp at times.  No nausea or vomiting. No abdominal pain.   He did have a few episodes of loose stool but no blood..  He denies urinary 
visit were within normal range or not returned as of this dictation.      EKG Interpretation  Interpreted by emergency department physician, Candy Norwood MD    EKG:  This EKG is signed and interpreted by me.    Rate: Normal  Rhythm: Sinus  Interpretation: Normal sinus rhythm, nonspecific ST changes.  Prolonged QTc.  PACs noted.    Comparison: changes compared to previous EKG     RADIOLOGY:   Non-plain film images such as CT, Ultrasound and MRI are read by the radiologist. Plain radiographic images are visualized and preliminarily interpreted by the ED Provider with the below findings:    CXR unremarkable for any acute cardiopulmonary process, COPD and chronic interstitial lung changes were once again seen as well as pulmonary parenchymal scar formation and pleural thickening once again seen in the right upper lobe.    Interpretation per the Radiologist below, if available at the time of this note:    XR CHEST PORTABLE   Final Result   1. No acute cardiopulmonary process.   2. COPD and chronic interstitial lung changes again seen, when compared to   the previous studies performed 01/13/2024.   3. Pulmonary parenchymal scar formation and pleural thickening again seen in   the right upper lobe.         CT ABDOMEN PELVIS W WO CONTRAST Additional Contrast? None   Final Result   1. Mild scattered small and large bowel wall thickening suggestive of   enterocolitis.   2. Trace ascites.   3. Fatty liver.   4. Enlarged prostate.   5. Mild nonspecific urinary bladder wall thickening similar to previous.   6. Trace pleural effusions.   7. Bronchitis and scattered atelectasis in the lung bases.   8. IVC filter ASVD.   9. Nonspecific swirling of the central mesentery present to some degree   previously.         US GALLBLADDER RUQ   Final Result   Unremarkable right upper quadrant ultrasound.           No results found.    No results found.    PROCEDURES   Unless otherwise noted below, none          CRITICAL CARE TIME (.cct)

## 2024-07-01 LAB
EKG ATRIAL RATE: 111 BPM
EKG ATRIAL RATE: 93 BPM
EKG P AXIS: 70 DEGREES
EKG P-R INTERVAL: 158 MS
EKG Q-T INTERVAL: 402 MS
EKG Q-T INTERVAL: 404 MS
EKG QRS DURATION: 130 MS
EKG QRS DURATION: 134 MS
EKG QTC CALCULATION (BAZETT): 499 MS
EKG QTC CALCULATION (BAZETT): 505 MS
EKG R AXIS: -85 DEGREES
EKG R AXIS: -88 DEGREES
EKG T AXIS: 87 DEGREES
EKG T AXIS: 99 DEGREES
EKG VENTRICULAR RATE: 93 BPM
EKG VENTRICULAR RATE: 94 BPM
MICROORGANISM SPEC CULT: ABNORMAL
SERVICE CMNT-IMP: ABNORMAL
SPECIMEN DESCRIPTION: ABNORMAL

## 2024-07-01 PROCEDURE — 93010 ELECTROCARDIOGRAM REPORT: CPT | Performed by: INTERNAL MEDICINE

## 2024-07-07 LAB — HBA1C MFR BLD: 5.8 % (ref 4–5.6)

## 2024-07-08 ENCOUNTER — HOSPITAL ENCOUNTER (INPATIENT)
Age: 74
LOS: 9 days | Discharge: HOME | DRG: 139 | End: 2024-07-17
Attending: EMERGENCY MEDICINE | Admitting: FAMILY MEDICINE
Payer: COMMERCIAL

## 2024-07-08 ENCOUNTER — APPOINTMENT (OUTPATIENT)
Dept: GENERAL RADIOLOGY | Age: 74
DRG: 139 | End: 2024-07-08
Payer: COMMERCIAL

## 2024-07-08 ENCOUNTER — APPOINTMENT (OUTPATIENT)
Dept: CT IMAGING | Age: 74
DRG: 139 | End: 2024-07-08
Attending: EMERGENCY MEDICINE
Payer: COMMERCIAL

## 2024-07-08 DIAGNOSIS — J90 LOCULATED PLEURAL EFFUSION: ICD-10-CM

## 2024-07-08 DIAGNOSIS — F17.200 TOBACCO DEPENDENCE: ICD-10-CM

## 2024-07-08 DIAGNOSIS — I48.91 ATRIAL FIBRILLATION WITH RAPID VENTRICULAR RESPONSE (HCC): Primary | ICD-10-CM

## 2024-07-08 DIAGNOSIS — J44.1 COPD EXACERBATION (HCC): ICD-10-CM

## 2024-07-08 LAB
AADO2: 73.7 MMHG
ALBUMIN SERPL-MCNC: 3.4 G/DL (ref 3.5–5.2)
ALP SERPL-CCNC: 86 U/L (ref 40–129)
ALT SERPL-CCNC: 16 U/L (ref 0–40)
AMPHET UR QL SCN: NEGATIVE
ANION GAP SERPL CALCULATED.3IONS-SCNC: 12 MMOL/L (ref 7–16)
AST SERPL-CCNC: 31 U/L (ref 0–39)
B PARAP IS1001 DNA NPH QL NAA+NON-PROBE: NOT DETECTED
B PERT DNA SPEC QL NAA+PROBE: NOT DETECTED
B.E.: -1.3 MMOL/L (ref -3–3)
BARBITURATES UR QL SCN: NEGATIVE
BASOPHILS # BLD: 0.05 K/UL (ref 0–0.2)
BASOPHILS NFR BLD: 1 % (ref 0–2)
BENZODIAZ UR QL: NEGATIVE
BILIRUB SERPL-MCNC: 0.3 MG/DL (ref 0–1.2)
BNP SERPL-MCNC: 8315 PG/ML (ref 0–125)
BUN SERPL-MCNC: 15 MG/DL (ref 6–23)
BUPRENORPHINE UR QL: NEGATIVE
C PNEUM DNA NPH QL NAA+NON-PROBE: NOT DETECTED
CALCIUM SERPL-MCNC: 8.9 MG/DL (ref 8.6–10.2)
CANNABINOIDS UR QL SCN: NEGATIVE
CHLORIDE SERPL-SCNC: 106 MMOL/L (ref 98–107)
CO2 SERPL-SCNC: 25 MMOL/L (ref 22–29)
COCAINE UR QL SCN: POSITIVE
COHB: 0.3 % (ref 0–1.5)
CREAT SERPL-MCNC: 1.2 MG/DL (ref 0.7–1.2)
CRITICAL: ABNORMAL
DATE ANALYZED: ABNORMAL
DATE OF COLLECTION: ABNORMAL
EKG ATRIAL RATE: 326 BPM
EKG ATRIAL RATE: 500 BPM
EKG Q-T INTERVAL: 370 MS
EKG Q-T INTERVAL: 400 MS
EKG QRS DURATION: 136 MS
EKG QRS DURATION: 146 MS
EKG QTC CALCULATION (BAZETT): 482 MS
EKG QTC CALCULATION (BAZETT): 497 MS
EKG R AXIS: -89 DEGREES
EKG R AXIS: -89 DEGREES
EKG T AXIS: 104 DEGREES
EKG T AXIS: 91 DEGREES
EKG VENTRICULAR RATE: 102 BPM
EKG VENTRICULAR RATE: 93 BPM
EOSINOPHIL # BLD: 0.21 K/UL (ref 0.05–0.5)
EOSINOPHILS RELATIVE PERCENT: 4 % (ref 0–6)
ERYTHROCYTE [DISTWIDTH] IN BLOOD BY AUTOMATED COUNT: 14.4 % (ref 11.5–15)
FENTANYL UR QL: NEGATIVE
FIO2: 30 %
FLUAV RNA NPH QL NAA+NON-PROBE: NOT DETECTED
FLUBV RNA NPH QL NAA+NON-PROBE: NOT DETECTED
GFR, ESTIMATED: 64 ML/MIN/1.73M2
GLUCOSE SERPL-MCNC: 98 MG/DL (ref 74–99)
HADV DNA NPH QL NAA+NON-PROBE: NOT DETECTED
HCO3: 21.9 MMOL/L (ref 22–26)
HCOV 229E RNA NPH QL NAA+NON-PROBE: NOT DETECTED
HCOV HKU1 RNA NPH QL NAA+NON-PROBE: NOT DETECTED
HCOV NL63 RNA NPH QL NAA+NON-PROBE: NOT DETECTED
HCOV OC43 RNA NPH QL NAA+NON-PROBE: NOT DETECTED
HCT VFR BLD AUTO: 35.3 % (ref 37–54)
HGB BLD-MCNC: 11.4 G/DL (ref 12.5–16.5)
HHB: 2.5 % (ref 0–5)
HMPV RNA NPH QL NAA+NON-PROBE: NOT DETECTED
HPIV1 RNA NPH QL NAA+NON-PROBE: NOT DETECTED
HPIV2 RNA NPH QL NAA+NON-PROBE: NOT DETECTED
HPIV3 RNA NPH QL NAA+NON-PROBE: NOT DETECTED
HPIV4 RNA NPH QL NAA+NON-PROBE: NOT DETECTED
IMM GRANULOCYTES # BLD AUTO: <0.03 K/UL (ref 0–0.58)
IMM GRANULOCYTES NFR BLD: 0 % (ref 0–5)
LAB: ABNORMAL
LACTATE BLDV-SCNC: 1.5 MMOL/L (ref 0.5–1.9)
LYMPHOCYTES NFR BLD: 0.92 K/UL (ref 1.5–4)
LYMPHOCYTES RELATIVE PERCENT: 16 % (ref 20–42)
Lab: 1400
M PNEUMO DNA NPH QL NAA+NON-PROBE: NOT DETECTED
MCH RBC QN AUTO: 29 PG (ref 26–35)
MCHC RBC AUTO-ENTMCNC: 32.3 G/DL (ref 32–34.5)
MCV RBC AUTO: 89.8 FL (ref 80–99.9)
METHADONE UR QL: NEGATIVE
METHB: 0.3 % (ref 0–1.5)
MODE: ABNORMAL
MONOCYTES NFR BLD: 0.74 K/UL (ref 0.1–0.95)
MONOCYTES NFR BLD: 13 % (ref 2–12)
NEUTROPHILS NFR BLD: 66 % (ref 43–80)
NEUTS SEG NFR BLD: 3.81 K/UL (ref 1.8–7.3)
O2 SATURATION: 97.5 % (ref 92–98.5)
O2HB: 96.9 % (ref 94–97)
OPERATOR ID: 1210
OPIATES UR QL SCN: NEGATIVE
OXYCODONE UR QL SCN: NEGATIVE
PATIENT TEMP: 37 C
PCO2: 32.5 MMHG (ref 35–45)
PCP UR QL SCN: NEGATIVE
PEEP/CPAP: 8 CMH2O
PFO2: 3.4 MMHG/%
PH BLOOD GAS: 7.45 (ref 7.35–7.45)
PLATELET # BLD AUTO: 239 K/UL (ref 130–450)
PMV BLD AUTO: 9.5 FL (ref 7–12)
PO2: 102 MMHG (ref 75–100)
POTASSIUM SERPL-SCNC: 3.7 MMOL/L (ref 3.5–5)
PROCALCITONIN SERPL-MCNC: 0.03 NG/ML (ref 0–0.08)
PROCALCITONIN SERPL-MCNC: 0.04 NG/ML (ref 0–0.08)
PROT SERPL-MCNC: 7.9 G/DL (ref 6.4–8.3)
PS: 12 CMH20
RBC # BLD AUTO: 3.93 M/UL (ref 3.8–5.8)
RI(T): 0.72
RSV RNA NPH QL NAA+NON-PROBE: NOT DETECTED
RV+EV RNA NPH QL NAA+NON-PROBE: NOT DETECTED
SARS-COV-2 RNA NPH QL NAA+NON-PROBE: NOT DETECTED
SODIUM SERPL-SCNC: 143 MMOL/L (ref 132–146)
SOURCE, BLOOD GAS: ABNORMAL
SPECIMEN DESCRIPTION: NORMAL
T4 FREE SERPL-MCNC: 1.1 NG/DL (ref 0.9–1.7)
TEST INFORMATION: ABNORMAL
THB: 13.7 G/DL (ref 11.5–16.5)
TIME ANALYZED: 1416
TROPONIN I SERPL HS-MCNC: 33 NG/L (ref 0–11)
TROPONIN I SERPL HS-MCNC: 34 NG/L (ref 0–11)
TSH SERPL DL<=0.05 MIU/L-ACNC: 2.97 UIU/ML (ref 0.27–4.2)
WBC OTHER # BLD: 5.7 K/UL (ref 4.5–11.5)

## 2024-07-08 PROCEDURE — 87899 AGENT NOS ASSAY W/OPTIC: CPT

## 2024-07-08 PROCEDURE — 84145 PROCALCITONIN (PCT): CPT

## 2024-07-08 PROCEDURE — 0202U NFCT DS 22 TRGT SARS-COV-2: CPT

## 2024-07-08 PROCEDURE — APPSS180 APP SPLIT SHARED TIME > 60 MINUTES: Performed by: NURSE PRACTITIONER

## 2024-07-08 PROCEDURE — 84484 ASSAY OF TROPONIN QUANT: CPT

## 2024-07-08 PROCEDURE — 2580000003 HC RX 258: Performed by: EMERGENCY MEDICINE

## 2024-07-08 PROCEDURE — 85025 COMPLETE CBC W/AUTO DIFF WBC: CPT

## 2024-07-08 PROCEDURE — 94640 AIRWAY INHALATION TREATMENT: CPT

## 2024-07-08 PROCEDURE — 84439 ASSAY OF FREE THYROXINE: CPT

## 2024-07-08 PROCEDURE — 93010 ELECTROCARDIOGRAM REPORT: CPT | Performed by: INTERNAL MEDICINE

## 2024-07-08 PROCEDURE — 83880 ASSAY OF NATRIURETIC PEPTIDE: CPT

## 2024-07-08 PROCEDURE — 83605 ASSAY OF LACTIC ACID: CPT

## 2024-07-08 PROCEDURE — 2500000003 HC RX 250 WO HCPCS: Performed by: EMERGENCY MEDICINE

## 2024-07-08 PROCEDURE — 87081 CULTURE SCREEN ONLY: CPT

## 2024-07-08 PROCEDURE — 80053 COMPREHEN METABOLIC PANEL: CPT

## 2024-07-08 PROCEDURE — 87449 NOS EACH ORGANISM AG IA: CPT

## 2024-07-08 PROCEDURE — 99223 1ST HOSP IP/OBS HIGH 75: CPT | Performed by: INTERNAL MEDICINE

## 2024-07-08 PROCEDURE — 2140000000 HC CCU INTERMEDIATE R&B

## 2024-07-08 PROCEDURE — 99222 1ST HOSP IP/OBS MODERATE 55: CPT | Performed by: FAMILY MEDICINE

## 2024-07-08 PROCEDURE — 80307 DRUG TEST PRSMV CHEM ANLYZR: CPT

## 2024-07-08 PROCEDURE — 87186 SC STD MICRODIL/AGAR DIL: CPT

## 2024-07-08 PROCEDURE — 87070 CULTURE OTHR SPECIMN AEROBIC: CPT

## 2024-07-08 PROCEDURE — 6360000004 HC RX CONTRAST MEDICATION: Performed by: RADIOLOGY

## 2024-07-08 PROCEDURE — 82805 BLOOD GASES W/O2 SATURATION: CPT

## 2024-07-08 PROCEDURE — 93005 ELECTROCARDIOGRAM TRACING: CPT | Performed by: EMERGENCY MEDICINE

## 2024-07-08 PROCEDURE — 6360000002 HC RX W HCPCS: Performed by: EMERGENCY MEDICINE

## 2024-07-08 PROCEDURE — 87154 CUL TYP ID BLD PTHGN 6+ TRGT: CPT

## 2024-07-08 PROCEDURE — 6370000000 HC RX 637 (ALT 250 FOR IP): Performed by: FAMILY MEDICINE

## 2024-07-08 PROCEDURE — 87040 BLOOD CULTURE FOR BACTERIA: CPT

## 2024-07-08 PROCEDURE — 2700000000 HC OXYGEN THERAPY PER DAY

## 2024-07-08 PROCEDURE — 6360000002 HC RX W HCPCS

## 2024-07-08 PROCEDURE — 6360000002 HC RX W HCPCS: Performed by: FAMILY MEDICINE

## 2024-07-08 PROCEDURE — 96374 THER/PROPH/DIAG INJ IV PUSH: CPT

## 2024-07-08 PROCEDURE — 87205 SMEAR GRAM STAIN: CPT

## 2024-07-08 PROCEDURE — 87106 FUNGI IDENTIFICATION YEAST: CPT

## 2024-07-08 PROCEDURE — 84443 ASSAY THYROID STIM HORMONE: CPT

## 2024-07-08 PROCEDURE — 6370000000 HC RX 637 (ALT 250 FOR IP): Performed by: EMERGENCY MEDICINE

## 2024-07-08 PROCEDURE — 96376 TX/PRO/DX INJ SAME DRUG ADON: CPT

## 2024-07-08 PROCEDURE — 71275 CT ANGIOGRAPHY CHEST: CPT

## 2024-07-08 PROCEDURE — 99285 EMERGENCY DEPT VISIT HI MDM: CPT

## 2024-07-08 PROCEDURE — 2500000003 HC RX 250 WO HCPCS

## 2024-07-08 PROCEDURE — 71045 X-RAY EXAM CHEST 1 VIEW: CPT

## 2024-07-08 PROCEDURE — 2580000003 HC RX 258: Performed by: FAMILY MEDICINE

## 2024-07-08 PROCEDURE — 94660 CPAP INITIATION&MGMT: CPT

## 2024-07-08 PROCEDURE — 96375 TX/PRO/DX INJ NEW DRUG ADDON: CPT

## 2024-07-08 PROCEDURE — 87077 CULTURE AEROBIC IDENTIFY: CPT

## 2024-07-08 RX ORDER — LEVOFLOXACIN 5 MG/ML
750 INJECTION, SOLUTION INTRAVENOUS EVERY 24 HOURS
Status: DISCONTINUED | OUTPATIENT
Start: 2024-07-09 | End: 2024-07-11

## 2024-07-08 RX ORDER — ONDANSETRON 4 MG/1
4 TABLET, ORALLY DISINTEGRATING ORAL EVERY 8 HOURS PRN
Status: DISCONTINUED | OUTPATIENT
Start: 2024-07-08 | End: 2024-07-17 | Stop reason: HOSPADM

## 2024-07-08 RX ORDER — ENOXAPARIN SODIUM 100 MG/ML
1 INJECTION SUBCUTANEOUS EVERY 12 HOURS
Status: DISCONTINUED | OUTPATIENT
Start: 2024-07-08 | End: 2024-07-08

## 2024-07-08 RX ORDER — IPRATROPIUM BROMIDE AND ALBUTEROL SULFATE 2.5; .5 MG/3ML; MG/3ML
3 SOLUTION RESPIRATORY (INHALATION) ONCE
Status: COMPLETED | OUTPATIENT
Start: 2024-07-08 | End: 2024-07-08

## 2024-07-08 RX ORDER — SODIUM CHLORIDE 0.9 % (FLUSH) 0.9 %
10 SYRINGE (ML) INJECTION
Status: ACTIVE | OUTPATIENT
Start: 2024-07-08 | End: 2024-07-09

## 2024-07-08 RX ORDER — ONDANSETRON 2 MG/ML
4 INJECTION INTRAMUSCULAR; INTRAVENOUS EVERY 6 HOURS PRN
Status: DISCONTINUED | OUTPATIENT
Start: 2024-07-08 | End: 2024-07-17 | Stop reason: HOSPADM

## 2024-07-08 RX ORDER — EPINEPHRINE 1 MG/ML
INJECTION, SOLUTION, CONCENTRATE INTRAVENOUS
Status: DISPENSED
Start: 2024-07-08 | End: 2024-07-08

## 2024-07-08 RX ORDER — FAMOTIDINE 10 MG/ML
INJECTION, SOLUTION INTRAVENOUS
Status: COMPLETED
Start: 2024-07-08 | End: 2024-07-08

## 2024-07-08 RX ORDER — MAGNESIUM SULFATE IN WATER 40 MG/ML
2000 INJECTION, SOLUTION INTRAVENOUS ONCE
Status: COMPLETED | OUTPATIENT
Start: 2024-07-08 | End: 2024-07-08

## 2024-07-08 RX ORDER — BUDESONIDE, GLYCOPYRROLATE, AND FORMOTEROL FUMARATE 160; 9; 4.8 UG/1; UG/1; UG/1
2 AEROSOL, METERED RESPIRATORY (INHALATION) 2 TIMES DAILY
COMMUNITY
End: 2024-07-08

## 2024-07-08 RX ORDER — ACETAMINOPHEN 325 MG/1
650 TABLET ORAL EVERY 6 HOURS PRN
Status: DISCONTINUED | OUTPATIENT
Start: 2024-07-08 | End: 2024-07-17 | Stop reason: HOSPADM

## 2024-07-08 RX ORDER — ACETAMINOPHEN 650 MG/1
650 SUPPOSITORY RECTAL EVERY 6 HOURS PRN
Status: DISCONTINUED | OUTPATIENT
Start: 2024-07-08 | End: 2024-07-17 | Stop reason: HOSPADM

## 2024-07-08 RX ORDER — HYDRALAZINE HYDROCHLORIDE 25 MG/1
25 TABLET, FILM COATED ORAL EVERY 6 HOURS PRN
Status: DISCONTINUED | OUTPATIENT
Start: 2024-07-08 | End: 2024-07-17 | Stop reason: HOSPADM

## 2024-07-08 RX ORDER — HYDROCHLOROTHIAZIDE 12.5 MG/1
12.5 CAPSULE, GELATIN COATED ORAL DAILY
Status: ON HOLD | COMMUNITY
End: 2024-07-10 | Stop reason: HOSPADM

## 2024-07-08 RX ORDER — METOPROLOL SUCCINATE 25 MG/1
25 TABLET, EXTENDED RELEASE ORAL 2 TIMES DAILY
Status: DISCONTINUED | OUTPATIENT
Start: 2024-07-09 | End: 2024-07-17 | Stop reason: HOSPADM

## 2024-07-08 RX ORDER — GUAIFENESIN 400 MG/1
400 TABLET ORAL 2 TIMES DAILY PRN
Status: DISCONTINUED | OUTPATIENT
Start: 2024-07-08 | End: 2024-07-17 | Stop reason: HOSPADM

## 2024-07-08 RX ORDER — VITAMIN B COMPLEX
1000 TABLET ORAL DAILY
Status: DISCONTINUED | OUTPATIENT
Start: 2024-07-08 | End: 2024-07-17 | Stop reason: HOSPADM

## 2024-07-08 RX ORDER — MAGNESIUM SULFATE IN WATER 40 MG/ML
INJECTION, SOLUTION INTRAVENOUS
Status: COMPLETED
Start: 2024-07-08 | End: 2024-07-08

## 2024-07-08 RX ORDER — METOPROLOL SUCCINATE 25 MG/1
25 TABLET, EXTENDED RELEASE ORAL DAILY
Status: DISCONTINUED | OUTPATIENT
Start: 2024-07-08 | End: 2024-07-08

## 2024-07-08 RX ORDER — METHYLPREDNISOLONE SODIUM SUCCINATE 125 MG/2ML
INJECTION, POWDER, LYOPHILIZED, FOR SOLUTION INTRAMUSCULAR; INTRAVENOUS
Status: DISPENSED
Start: 2024-07-08 | End: 2024-07-08

## 2024-07-08 RX ORDER — LANOLIN ALCOHOL/MO/W.PET/CERES
1000 CREAM (GRAM) TOPICAL DAILY
Status: DISCONTINUED | OUTPATIENT
Start: 2024-07-08 | End: 2024-07-17 | Stop reason: HOSPADM

## 2024-07-08 RX ORDER — BUDESONIDE, GLYCOPYRROLATE, AND FORMOTEROL FUMARATE 160; 9; 4.8 UG/1; UG/1; UG/1
2 AEROSOL, METERED RESPIRATORY (INHALATION) 2 TIMES DAILY
Status: ON HOLD | COMMUNITY
End: 2024-07-10

## 2024-07-08 RX ORDER — ENOXAPARIN SODIUM 100 MG/ML
40 INJECTION SUBCUTANEOUS DAILY
Status: DISCONTINUED | OUTPATIENT
Start: 2024-07-08 | End: 2024-07-08

## 2024-07-08 RX ORDER — ALBUTEROL SULFATE 2.5 MG/3ML
2.5 SOLUTION RESPIRATORY (INHALATION) ONCE
Status: COMPLETED | OUTPATIENT
Start: 2024-07-08 | End: 2024-07-08

## 2024-07-08 RX ORDER — WATER 10 ML/10ML
INJECTION INTRAMUSCULAR; INTRAVENOUS; SUBCUTANEOUS
Status: DISPENSED
Start: 2024-07-08 | End: 2024-07-08

## 2024-07-08 RX ORDER — MAGNESIUM SULFATE IN WATER 40 MG/ML
2000 INJECTION, SOLUTION INTRAVENOUS PRN
Status: DISCONTINUED | OUTPATIENT
Start: 2024-07-08 | End: 2024-07-17 | Stop reason: HOSPADM

## 2024-07-08 RX ORDER — SODIUM CHLORIDE 9 MG/ML
INJECTION, SOLUTION INTRAVENOUS PRN
Status: DISCONTINUED | OUTPATIENT
Start: 2024-07-08 | End: 2024-07-17 | Stop reason: HOSPADM

## 2024-07-08 RX ORDER — POTASSIUM CHLORIDE 20 MEQ/1
40 TABLET, EXTENDED RELEASE ORAL PRN
Status: DISCONTINUED | OUTPATIENT
Start: 2024-07-08 | End: 2024-07-17 | Stop reason: HOSPADM

## 2024-07-08 RX ORDER — SODIUM CHLORIDE 0.9 % (FLUSH) 0.9 %
5-40 SYRINGE (ML) INJECTION PRN
Status: DISCONTINUED | OUTPATIENT
Start: 2024-07-08 | End: 2024-07-17 | Stop reason: HOSPADM

## 2024-07-08 RX ORDER — DILTIAZEM HYDROCHLORIDE 5 MG/ML
10 INJECTION INTRAVENOUS ONCE
Status: COMPLETED | OUTPATIENT
Start: 2024-07-08 | End: 2024-07-08

## 2024-07-08 RX ORDER — SODIUM CHLORIDE 0.9 % (FLUSH) 0.9 %
5-40 SYRINGE (ML) INJECTION EVERY 12 HOURS SCHEDULED
Status: DISCONTINUED | OUTPATIENT
Start: 2024-07-08 | End: 2024-07-17 | Stop reason: HOSPADM

## 2024-07-08 RX ORDER — NICOTINE 21 MG/24HR
1 PATCH, TRANSDERMAL 24 HOURS TRANSDERMAL DAILY
Status: DISCONTINUED | OUTPATIENT
Start: 2024-07-08 | End: 2024-07-17 | Stop reason: HOSPADM

## 2024-07-08 RX ORDER — DIPHENHYDRAMINE HYDROCHLORIDE 50 MG/ML
INJECTION INTRAMUSCULAR; INTRAVENOUS
Status: COMPLETED
Start: 2024-07-08 | End: 2024-07-08

## 2024-07-08 RX ORDER — POTASSIUM CHLORIDE 7.45 MG/ML
10 INJECTION INTRAVENOUS PRN
Status: DISCONTINUED | OUTPATIENT
Start: 2024-07-08 | End: 2024-07-17 | Stop reason: HOSPADM

## 2024-07-08 RX ORDER — FUROSEMIDE 10 MG/ML
20 INJECTION INTRAMUSCULAR; INTRAVENOUS DAILY
Status: DISCONTINUED | OUTPATIENT
Start: 2024-07-08 | End: 2024-07-17 | Stop reason: HOSPADM

## 2024-07-08 RX ORDER — IPRATROPIUM BROMIDE AND ALBUTEROL SULFATE 2.5; .5 MG/3ML; MG/3ML
1 SOLUTION RESPIRATORY (INHALATION)
Status: DISCONTINUED | OUTPATIENT
Start: 2024-07-08 | End: 2024-07-11

## 2024-07-08 RX ORDER — ATORVASTATIN CALCIUM 40 MG/1
40 TABLET, FILM COATED ORAL DAILY
Status: DISCONTINUED | OUTPATIENT
Start: 2024-07-08 | End: 2024-07-17 | Stop reason: HOSPADM

## 2024-07-08 RX ORDER — ASPIRIN 81 MG/1
81 TABLET ORAL DAILY
Status: DISCONTINUED | OUTPATIENT
Start: 2024-07-08 | End: 2024-07-17 | Stop reason: HOSPADM

## 2024-07-08 RX ORDER — POLYETHYLENE GLYCOL 3350 17 G/17G
17 POWDER, FOR SOLUTION ORAL DAILY PRN
Status: DISCONTINUED | OUTPATIENT
Start: 2024-07-08 | End: 2024-07-17 | Stop reason: HOSPADM

## 2024-07-08 RX ADMIN — DIPHENHYDRAMINE HYDROCHLORIDE: 50 INJECTION, SOLUTION INTRAMUSCULAR; INTRAVENOUS at 10:48

## 2024-07-08 RX ADMIN — WATER 125 MG: 1 INJECTION INTRAMUSCULAR; INTRAVENOUS; SUBCUTANEOUS at 08:12

## 2024-07-08 RX ADMIN — ALBUTEROL SULFATE 2.5 MG: 2.5 SOLUTION RESPIRATORY (INHALATION) at 10:17

## 2024-07-08 RX ADMIN — ENOXAPARIN SODIUM 40 MG: 100 INJECTION SUBCUTANEOUS at 12:38

## 2024-07-08 RX ADMIN — CYANOCOBALAMIN TAB 1000 MCG 1000 MCG: 1000 TAB at 16:23

## 2024-07-08 RX ADMIN — DILTIAZEM HYDROCHLORIDE 10 MG: 5 INJECTION, SOLUTION INTRAVENOUS at 08:10

## 2024-07-08 RX ADMIN — SODIUM CHLORIDE 5 MG/HR: 900 INJECTION, SOLUTION INTRAVENOUS at 09:35

## 2024-07-08 RX ADMIN — IPRATROPIUM BROMIDE AND ALBUTEROL SULFATE 1 DOSE: .5; 2.5 SOLUTION RESPIRATORY (INHALATION) at 11:41

## 2024-07-08 RX ADMIN — ARFORMOTEROL TARTRATE: 15 SOLUTION RESPIRATORY (INHALATION) at 21:10

## 2024-07-08 RX ADMIN — IPRATROPIUM BROMIDE AND ALBUTEROL SULFATE 1 DOSE: .5; 2.5 SOLUTION RESPIRATORY (INHALATION) at 17:10

## 2024-07-08 RX ADMIN — MAGNESIUM SULFATE IN WATER 2000 MG: 40 INJECTION, SOLUTION INTRAVENOUS at 10:39

## 2024-07-08 RX ADMIN — DOXYCYCLINE 100 MG: 100 INJECTION, POWDER, LYOPHILIZED, FOR SOLUTION INTRAVENOUS at 10:00

## 2024-07-08 RX ADMIN — SODIUM CHLORIDE 6.5 MG/HR: 900 INJECTION, SOLUTION INTRAVENOUS at 21:54

## 2024-07-08 RX ADMIN — SODIUM CHLORIDE, PRESERVATIVE FREE 10 ML: 5 INJECTION INTRAVENOUS at 21:55

## 2024-07-08 RX ADMIN — METOPROLOL SUCCINATE 25 MG: 25 TABLET, EXTENDED RELEASE ORAL at 16:23

## 2024-07-08 RX ADMIN — CEFTRIAXONE SODIUM 2000 MG: 2 INJECTION, POWDER, FOR SOLUTION INTRAMUSCULAR; INTRAVENOUS at 09:54

## 2024-07-08 RX ADMIN — FUROSEMIDE 20 MG: 10 INJECTION, SOLUTION INTRAMUSCULAR; INTRAVENOUS at 12:38

## 2024-07-08 RX ADMIN — LEVOFLOXACIN 750 MG: 5 INJECTION, SOLUTION INTRAVENOUS at 23:37

## 2024-07-08 RX ADMIN — IPRATROPIUM BROMIDE AND ALBUTEROL SULFATE 3 DOSE: .5; 3 SOLUTION RESPIRATORY (INHALATION) at 08:18

## 2024-07-08 RX ADMIN — FAMOTIDINE: 10 INJECTION, SOLUTION INTRAVENOUS at 10:48

## 2024-07-08 RX ADMIN — ASPIRIN 81 MG: 81 TABLET, COATED ORAL at 16:23

## 2024-07-08 RX ADMIN — ATORVASTATIN CALCIUM 40 MG: 40 TABLET, FILM COATED ORAL at 16:23

## 2024-07-08 RX ADMIN — IPRATROPIUM BROMIDE AND ALBUTEROL SULFATE 1 DOSE: .5; 2.5 SOLUTION RESPIRATORY (INHALATION) at 21:11

## 2024-07-08 RX ADMIN — IOPAMIDOL 75 ML: 755 INJECTION, SOLUTION INTRAVENOUS at 09:02

## 2024-07-08 RX ADMIN — Medication 1000 UNITS: at 16:23

## 2024-07-08 RX ADMIN — SODIUM CHLORIDE, PRESERVATIVE FREE 10 ML: 5 INJECTION INTRAVENOUS at 16:26

## 2024-07-08 RX ADMIN — WATER 40 MG: 1 INJECTION INTRAMUSCULAR; INTRAVENOUS; SUBCUTANEOUS at 16:29

## 2024-07-08 RX ADMIN — WATER 40 MG: 1 INJECTION INTRAMUSCULAR; INTRAVENOUS; SUBCUTANEOUS at 23:31

## 2024-07-08 RX ADMIN — ARFORMOTEROL TARTRATE: 15 SOLUTION RESPIRATORY (INHALATION) at 11:41

## 2024-07-08 ASSESSMENT — LIFESTYLE VARIABLES
HOW MANY STANDARD DRINKS CONTAINING ALCOHOL DO YOU HAVE ON A TYPICAL DAY: 1 OR 2
HOW OFTEN DO YOU HAVE A DRINK CONTAINING ALCOHOL: MONTHLY OR LESS

## 2024-07-08 NOTE — ED PROVIDER NOTES
diphenhydrAMINE (BENADRYL) 50 MG/ML injection (  Given 7/8/24 1048)                 Medical Decision Making/Differential Diagnosis:    CC/HPI Summary, Social Determinants of health, Records Reviewed, DDx, testing done/not done, ED Course, Reassessment, disposition considerations/shared decision making with patient, consults, disposition:           ED Course as of 07/08/24 1120   Mon Jul 08, 2024   0740 EKG Interpretation  Interpreted by emergency department physician, Davy Cortez MD    7/8/24  Time: 0716    Rhythm: atrial fibrillation - rapid  Rate: tachycardia  Axis: left  Conduction: bifasicular  ST Segments: no acute change  T Waves: no acute change    Clinical Impression: atrial fibrillation - rapid  Comparison to Old EKG Changes from prior EKG     [CD]   0838 EKG Interpretation  Interpreted by emergency department physician, Davy Cortez MD    7/8/24  Time: 0754    Rhythm: atrial fibrillation - controlled  Rate: normal  Axis: left  Conduction: bifasicular  ST Segments: no acute change  T Waves: no acute change    Clinical Impression: atrial fibrillation - controlled  Comparison to Old EKG Changes from prior EKG     [CD]   1016 The following tests were interpreted by me:       [CD]   1016 Comprehensive Metabolic Panel(!):    Sodium 143   Potassium 3.7   Chloride 106   CARBON DIOXIDE 25   Anion Gap 12   Glucose 98   BUN,BUNPL 15   Creatinine 1.2   Est, Glom Filt Rate 64   Calcium 8.9   Total Protein 7.9   Albumin 3.4(!)   Total Bilirubin 0.3   Alkaline Phosphatase 86   ALT 16   AST 31 [CD]   1016 Brain Natriuretic Peptide(!):    Pro-BNP 8,315(!) [CD]   1016 Troponin Now and Q 1 Hour(!):    Troponin, High Sensitivity 34(!) [CD]   1016 CBC with Auto Differential(!):    WBC 5.7   RBC 3.93   Hemoglobin Quant 11.4(!)   Hematocrit 35.3(!)   MCV 89.8   MCH 29.0   MCHC 32.3   RDW 14.4   Platelet Count 239   MPV 9.5   Neutrophils % 66   Lymphocyte % 16(!)   Monocytes % 13(!)   Eosinophils % 4   Basophils % 1   Immature  Granulocytes % 0   Neutrophils Absolute 3.81   Lymphocytes Absolute 0.92(!)   Monocytes Absolute 0.74   Eosinophils Absolute 0.21   Basophils Absolute 0.05   Immature Granulocytes Absolute <0.03 [CD]   1016 Lactate, Sepsis:    Lactic Acid, Sepsis 1.5 [CD]   1016 The following tests were interpreted by me: CXR - right upper and middle lobe pneumonia [CD]   1016 Called to the bedside by nursing as the patient had increasing wheezing shortly after doxycycline was started.  There is no rash or vomiting.  No hives.  He read he was wheezing from his COPD but additional treatments and Benadryl and Pepcid were given, he already received steroids,it is hard to say if this could be allergic reaction.  He is not vomiting and there is no hives.  There is no facial swelling.  There is no tongue swelling.  His only symptom is wheezing which he already had on arrival [CD]   1025 Improving with nebs, bipap and meds. He is more comfortable, still no hives or facial swelling.  [CD]      ED Course User Index  [CD] Davy Cortez MD       Medical Decision Making  Symptoms likely multifactorial, believe that the combination of A-fib/flutter RVR and acute COPD exacerbation.  Initially he was given Cardizem bolus and Cardizem drip to bring threatening deterioration.  Given multiple breathing treatments and steroids with improvement.  His chest x-ray showed right-sided infiltrate concerning for pneumonia and cultures antibiotics were initiated.  He did have some worsening wheezing requiring additional breathing treatments as well as magnesium and BiPAP and his symptoms resolved and he is breathing much better.  There is a question if he had reaction to doxycycline as this worsening started shortly after the initiation of medication but there was no rash or hives or nausea or vomiting or facial swelling.  The medicine was discontinued regardless.  His labs show troponin with a nonsignificant delta, elevated BNP although no peripheral or

## 2024-07-08 NOTE — PROGRESS NOTES
Date: 7/8/2024    Time: 11:44 AM    Patient Placed On BIPAP/CPAP/ Non-Invasive Ventilation?  Yes    If no must comment.  Facial area red/color change? No           If YES are Blister/Lesion present?Yes   If yes must notify nursing staff  BIPAP/CPAP skin barrier?  Yes    Skin barrier type:mepilexlite       Comments:        Suzan Guerrero RCP

## 2024-07-08 NOTE — CONSULTS
Blood Pressure Lying 182/107   Pulse Lying 92 PER MINUTE   Blood Pressure Sitting 178/110   Pulse Sitting 89 PER MINUTE   Blood Pressure Standing 173/104   Pulse Standing 91 PER MINUTE       Wt Readings from Last 3 Encounters:   06/30/24 56.7 kg (125 lb)   01/19/24 56.7 kg (125 lb)   01/13/24 54.7 kg (120 lb 9.6 oz)         Intake/Output Summary (Last 24 hours) at 7/8/2024 1340  Last data filed at 7/8/2024 1047  Gross per 24 hour   Intake 17.41 ml   Output --   Net 17.41 ml       Labs:   CBC:   Recent Labs     07/08/24  0733   WBC 5.7   HGB 11.4*   HCT 35.3*        BMP:   Recent Labs     07/08/24  0733      K 3.7   CO2 25   BUN 15   CREATININE 1.2   LABGLOM 64   CALCIUM 8.9     Mag: No results for input(s): \"MG\" in the last 72 hours.  Phos: No results for input(s): \"PHOS\" in the last 72 hours.  TSH:   Lab Results   Component Value Date    TSH 2.97 07/08/2024       HgA1c:   Lab Results   Component Value Date    LABA1C 5.8 (H) 01/18/2024       PRO-BNP:   Lab Results   Component Value Date    PROBNP 8,315 (H) 07/08/2024    PROBNP 1,257 (H) 01/16/2024    PROBNP 3,749 (H) 01/13/2024    PROBNP 1,064 (H) 08/23/2023    PROBNP 889 (H) 08/26/2022     PT/INR: No results for input(s): \"PROTIME\", \"INR\" in the last 72 hours.  APTT:No results for input(s): \"APTT\" in the last 72 hours.  HS TROP:  Lab Results   Component Value Date/Time    TROPHS 33 07/08/2024 09:39 AM    TROPHS 34 07/08/2024 07:33 AM    TROPHS 36 01/13/2024 11:13 AM    TROPHS 36 01/13/2024 09:49 AM    TROPHS 23 08/24/2023 07:42 PM    TROPHS 21 08/23/2023 05:59 AM    TROPHS 29 08/23/2023 03:00 AM    TROPHS 35 06/13/2023 04:46 PM    TROPHS 26 06/12/2023 09:59 PM    TROPHS 23 08/27/2022 12:09 PM     LIPID PANEL:No results for input(s): \"CHOL\", \"HDL\", \"TRIG\", \"VLDL\" in the last 72 hours.    Invalid input(s): \"LDLCHOLESTEROL\"  LIVER PROFILE:  Recent Labs     07/08/24  0733   AST 31   ALT 16     [id number] Score for Atrial Fibrillation Stroke Risk   Risk    Factors  Component Value   C CHF No 0   H HTN Yes 1   A2 Age >= 75 No,  (73 y.o.) 0   D DM No 0   S2 Prior Stroke/TIA Yes 2   V Vascular Disease No 0   A Age 65-74 Yes,  (73 y.o.) 1   Sc Sex male 0    [id number]  Score  4   Score last updated 7/8/24 1:01 PM EDT    Click here for a link to the UpToDate guideline \"Atrial Fibrillation: Anticoagulation therapy to prevent embolization    Disclaimer: Risk Score calculation is dependent on accuracy of patient problem list and past encounter diagnosis.    Assessment and plan per JASSI Canales CNP  [facility] Card[facility]     Electronically signed by JASSI Tucker CNP on 7/8/2024 at 1:40 PM     I have personally spent more than 51% of the total time involved in performing this consultation.   I have personally and separately seen and evaluated the patient.  I personally and separately obtained the history and performed the physical exam.  I personally reviewed all of the above labs, imaging, history, past medical history, social history, family history, surgical history, medications, review of systems, and data.  I reviewed available records.  All of the assessments and recommendations are personally from me.  I personally counseled and educated the patient and coordinated care with other healthcare professionals as needed.  I communicated the above and results to patient's family/caregiver if asked or needed.  All of the above cardiac medical decisions are personally from me.  Please see my additional contributions to the history, physical exam, assessment, and recommendations below.      History of chief complaint:  He presented with shortness of breath.  He was found to be hypoxemic.  He states that he ran out of his inhaler.  He has been coughing thick mucus.  No chest pain.  He has a history of COPD, lung mass, cocaine use.  He has been noncompliant with following up with pulmonology.  He was found to be in atrial fibrillation with RVR on

## 2024-07-08 NOTE — H&P
Hospital Medicine History & Physical      PCP: Gunner Espinosa MD    Date of Admission: 7/8/2024    Date of Service:  7/8/2024    Chief Complaint: Shortness of breath      History Of Present Illness:  Chuck Chavarria Jr. is a 73 y.o. male who presents for shortness of breath.  Patient reports: EMS as he was short of breath.  He reports he has a history of COPD and ran out of his inhaler last night.  He was coughing and wheezing.  He reports that he is spitting up mucus.  EMS gave 1 breathing treatment as he was hypoxic.  They placed him on 4 L.  He denies fever but reports productive cough.  He denies chest pain.  He says he feels like his heart is beating rapidly.  He says he has a history of atrial fibrillation but is not anticoagulated.  He denies any history of DVT or PE.  He denies abdominal pain.  He denies any other complaints.     Discussed with ED physician    Workup in the ED essentially unremarkable for possible pneumonia, A-fib RVR.  CTA showed no PE, possible pneumonia, pulmonary vascular congestion.  There was a reported possible allergic reaction to possibly doxycycline versus Rocephin?  With reported worsening shortness of breath patient received epinephrine steroids and Benadryl.  During my interview with the patient he is slightly confused lethargic arousable on BiPAP       Past Medical History:          Diagnosis Date    Arthritis     Asthma     Bilateral carotid artery stenosis 01/17/2024    Approximately 50% stenosis on the right side and 30 to 40% stenosis on the left side, CT of the carotids, 2024    Cataract, left eye     Cerebral infarction due to occlusion of right vertebral artery (HCC) 01/17/2024    Hypoplastic, small right vertebral artery, with absent flow proximally CT of the carotids 2024  Patent left vertebral artery, good size    COPD (chronic obstructive pulmonary disease) (HCC)     Hyperlipidemia     Hypertension     Occlusion of right vertebral artery 01/17/2024    Small,

## 2024-07-09 LAB
ALBUMIN SERPL-MCNC: 3.1 G/DL (ref 3.5–5.2)
ALP SERPL-CCNC: 77 U/L (ref 40–129)
ALT SERPL-CCNC: 14 U/L (ref 0–40)
ANION GAP SERPL CALCULATED.3IONS-SCNC: 14 MMOL/L (ref 7–16)
AST SERPL-CCNC: 19 U/L (ref 0–39)
BASOPHILS # BLD: 0.01 K/UL (ref 0–0.2)
BASOPHILS NFR BLD: 0 % (ref 0–2)
BILIRUB SERPL-MCNC: 0.2 MG/DL (ref 0–1.2)
BUN SERPL-MCNC: 25 MG/DL (ref 6–23)
CALCIUM SERPL-MCNC: 8.8 MG/DL (ref 8.6–10.2)
CHLORIDE SERPL-SCNC: 102 MMOL/L (ref 98–107)
CO2 SERPL-SCNC: 22 MMOL/L (ref 22–29)
CREAT SERPL-MCNC: 1.2 MG/DL (ref 0.7–1.2)
EOSINOPHIL # BLD: 0 K/UL (ref 0.05–0.5)
EOSINOPHILS RELATIVE PERCENT: 0 % (ref 0–6)
ERYTHROCYTE [DISTWIDTH] IN BLOOD BY AUTOMATED COUNT: 14.5 % (ref 11.5–15)
GFR, ESTIMATED: 63 ML/MIN/1.73M2
GLUCOSE SERPL-MCNC: 136 MG/DL (ref 74–99)
HCT VFR BLD AUTO: 34.5 % (ref 37–54)
HGB BLD-MCNC: 11.7 G/DL (ref 12.5–16.5)
IMM GRANULOCYTES # BLD AUTO: 0.04 K/UL (ref 0–0.58)
IMM GRANULOCYTES NFR BLD: 1 % (ref 0–5)
L PNEUMO1 AG UR QL IA.RAPID: NEGATIVE
LYMPHOCYTES NFR BLD: 0.49 K/UL (ref 1.5–4)
LYMPHOCYTES RELATIVE PERCENT: 11 % (ref 20–42)
MAGNESIUM SERPL-MCNC: 2 MG/DL (ref 1.6–2.6)
MCH RBC QN AUTO: 30.3 PG (ref 26–35)
MCHC RBC AUTO-ENTMCNC: 33.9 G/DL (ref 32–34.5)
MCV RBC AUTO: 89.4 FL (ref 80–99.9)
MONOCYTES NFR BLD: 0.31 K/UL (ref 0.1–0.95)
MONOCYTES NFR BLD: 7 % (ref 2–12)
NEUTROPHILS NFR BLD: 82 % (ref 43–80)
NEUTS SEG NFR BLD: 3.82 K/UL (ref 1.8–7.3)
PLATELET # BLD AUTO: 224 K/UL (ref 130–450)
PMV BLD AUTO: 9.7 FL (ref 7–12)
POTASSIUM SERPL-SCNC: 4 MMOL/L (ref 3.5–5)
PROT SERPL-MCNC: 7.6 G/DL (ref 6.4–8.3)
RBC # BLD AUTO: 3.86 M/UL (ref 3.8–5.8)
RBC # BLD: ABNORMAL 10*6/UL
S PNEUM AG SPEC QL: NEGATIVE
SODIUM SERPL-SCNC: 138 MMOL/L (ref 132–146)
SPECIMEN SOURCE: NORMAL
WBC OTHER # BLD: 4.7 K/UL (ref 4.5–11.5)

## 2024-07-09 PROCEDURE — 2140000000 HC CCU INTERMEDIATE R&B

## 2024-07-09 PROCEDURE — 83735 ASSAY OF MAGNESIUM: CPT

## 2024-07-09 PROCEDURE — 85025 COMPLETE CBC W/AUTO DIFF WBC: CPT

## 2024-07-09 PROCEDURE — 80053 COMPREHEN METABOLIC PANEL: CPT

## 2024-07-09 PROCEDURE — 6360000002 HC RX W HCPCS: Performed by: FAMILY MEDICINE

## 2024-07-09 PROCEDURE — 6370000000 HC RX 637 (ALT 250 FOR IP): Performed by: INTERNAL MEDICINE

## 2024-07-09 PROCEDURE — 94640 AIRWAY INHALATION TREATMENT: CPT

## 2024-07-09 PROCEDURE — 6370000000 HC RX 637 (ALT 250 FOR IP): Performed by: FAMILY MEDICINE

## 2024-07-09 PROCEDURE — 99232 SBSQ HOSP IP/OBS MODERATE 35: CPT | Performed by: FAMILY MEDICINE

## 2024-07-09 PROCEDURE — 94660 CPAP INITIATION&MGMT: CPT

## 2024-07-09 PROCEDURE — 99232 SBSQ HOSP IP/OBS MODERATE 35: CPT | Performed by: INTERNAL MEDICINE

## 2024-07-09 PROCEDURE — 36415 COLL VENOUS BLD VENIPUNCTURE: CPT

## 2024-07-09 PROCEDURE — 2580000003 HC RX 258: Performed by: FAMILY MEDICINE

## 2024-07-09 PROCEDURE — 99255 IP/OBS CONSLTJ NEW/EST HI 80: CPT | Performed by: INTERNAL MEDICINE

## 2024-07-09 PROCEDURE — 6370000000 HC RX 637 (ALT 250 FOR IP): Performed by: NURSE PRACTITIONER

## 2024-07-09 RX ORDER — MECOBALAMIN 5000 MCG
10 TABLET,DISINTEGRATING ORAL NIGHTLY
Status: DISCONTINUED | OUTPATIENT
Start: 2024-07-09 | End: 2024-07-17 | Stop reason: HOSPADM

## 2024-07-09 RX ORDER — LANOLIN ALCOHOL/MO/W.PET/CERES
3 CREAM (GRAM) TOPICAL NIGHTLY PRN
Status: DISCONTINUED | OUTPATIENT
Start: 2024-07-09 | End: 2024-07-17 | Stop reason: HOSPADM

## 2024-07-09 RX ADMIN — METOPROLOL SUCCINATE 25 MG: 25 TABLET, EXTENDED RELEASE ORAL at 09:53

## 2024-07-09 RX ADMIN — IPRATROPIUM BROMIDE AND ALBUTEROL SULFATE 1 DOSE: .5; 2.5 SOLUTION RESPIRATORY (INHALATION) at 08:59

## 2024-07-09 RX ADMIN — IPRATROPIUM BROMIDE AND ALBUTEROL SULFATE 1 DOSE: .5; 2.5 SOLUTION RESPIRATORY (INHALATION) at 20:48

## 2024-07-09 RX ADMIN — IPRATROPIUM BROMIDE AND ALBUTEROL SULFATE 1 DOSE: .5; 2.5 SOLUTION RESPIRATORY (INHALATION) at 05:34

## 2024-07-09 RX ADMIN — FUROSEMIDE 20 MG: 10 INJECTION, SOLUTION INTRAMUSCULAR; INTRAVENOUS at 09:51

## 2024-07-09 RX ADMIN — METOPROLOL SUCCINATE 25 MG: 25 TABLET, EXTENDED RELEASE ORAL at 21:06

## 2024-07-09 RX ADMIN — Medication 10 MG: at 22:41

## 2024-07-09 RX ADMIN — ARFORMOTEROL TARTRATE: 15 SOLUTION RESPIRATORY (INHALATION) at 08:59

## 2024-07-09 RX ADMIN — ATORVASTATIN CALCIUM 40 MG: 40 TABLET, FILM COATED ORAL at 09:53

## 2024-07-09 RX ADMIN — Medication 1000 UNITS: at 09:53

## 2024-07-09 RX ADMIN — IPRATROPIUM BROMIDE AND ALBUTEROL SULFATE 1 DOSE: .5; 2.5 SOLUTION RESPIRATORY (INHALATION) at 12:20

## 2024-07-09 RX ADMIN — ASPIRIN 81 MG: 81 TABLET, COATED ORAL at 09:53

## 2024-07-09 RX ADMIN — Medication 3 MG: at 06:03

## 2024-07-09 RX ADMIN — GUAIFENESIN 400 MG: 400 TABLET ORAL at 09:53

## 2024-07-09 RX ADMIN — WATER 40 MG: 1 INJECTION INTRAMUSCULAR; INTRAVENOUS; SUBCUTANEOUS at 22:41

## 2024-07-09 RX ADMIN — LEVOFLOXACIN 750 MG: 5 INJECTION, SOLUTION INTRAVENOUS at 22:44

## 2024-07-09 RX ADMIN — SODIUM CHLORIDE, PRESERVATIVE FREE 10 ML: 5 INJECTION INTRAVENOUS at 21:07

## 2024-07-09 RX ADMIN — WATER 40 MG: 1 INJECTION INTRAMUSCULAR; INTRAVENOUS; SUBCUTANEOUS at 09:51

## 2024-07-09 RX ADMIN — ARFORMOTEROL TARTRATE: 15 SOLUTION RESPIRATORY (INHALATION) at 20:48

## 2024-07-09 ASSESSMENT — PAIN - FUNCTIONAL ASSESSMENT: PAIN_FUNCTIONAL_ASSESSMENT: NONE - DENIES PAIN

## 2024-07-09 NOTE — ED NOTES
Pt complaint of starting to feel SOB. Pt 98% on pulse ox with some wheezes to auscultation. Respiratory called for a breathing treatment.

## 2024-07-09 NOTE — PROGRESS NOTES
Hospitalist Progress Note      Synopsis: Patient admitted on 7/8/2024 Chuck Chavarria Jr. is a 73 y.o. male who presents for shortness of breath.  Patient reports: EMS as he was short of breath.  He reports he has a history of COPD and ran out of his inhaler last night.  He was coughing and wheezing.  He reports that he is spitting up mucus.  EMS gave 1 breathing treatment as he was hypoxic.  They placed him on 4 L.  He denies fever but reports productive cough.  He denies chest pain.  He says he feels like his heart is beating rapidly.  He says he has a history of atrial fibrillation but is not anticoagulated.  He denies any history of DVT or PE.  He denies abdominal pain.  He denies any other complaints.     Discussed with ED physician     Workup in the ED essentially unremarkable for possible pneumonia, A-fib RVR.  CTA showed no PE, possible pneumonia, pulmonary vascular congestion.  There was a reported possible allergic reaction to possibly doxycycline versus Rocephin?  With reported worsening shortness of breath patient received epinephrine steroids and Benadryl.  During my interview with the patient he is slightly confused lethargic arousable on BiPAP       Subjective  Seen and examined chart reviewed discussed with staff patient is comfortable now on room air.  Some cough he has no chest pain he has some shortness of breath    Exam:  BP (!) 153/91   Pulse 73   Temp 97.4 °F (36.3 °C)   Resp 16   Ht 1.727 m (5' 8\")   Wt 56.7 kg (125 lb) Comment: previous encounter  SpO2 98%   BMI 19.01 kg/m²   -General:  Awake, alert, oriented X 3.  Well developed, well nourished, well groomed.  No apparent distress.    -HEENT:  Normocephalic, atraumatic.  Pupils equal, round, reactive to light.  No scleral icterus.  No conjunctival injection.  Normal lips, teeth, and gums.  No nasal discharge.  Neck:  Supple    -Heart:  RRR, no murmurs, gallops, rubs    -Lungs: Bilateral wheezes    -Abdomen:  Bowel sounds present,  soft, nontender, no masses, no organomegaly, no peritoneal signs    -Extremities: normal ROM. No Cyanosis clubbing or edema    -Skin: Warm and dry    -Neuro:  AAO x 3 .Cranial nerves 2-12 intact, no focal deficits     -Psych : normal .    Medications:  Reviewed    Infusion Medications    dilTIAZem 100 mg in sodium chloride 0.9 % 100 mL infusion (ADD-[place]) Stopped (07/09/24 0737)    sodium chloride       Scheduled Medications    melatonin  10 mg Oral Nightly    aspirin  81 mg Oral Daily    atorvastatin  40 mg Oral Daily    arformoterol 15 mcg-budesonide 0.25 mg neb solution   Nebulization BID RT    cyanocobalamin  1,000 mcg Oral Daily    Vitamin D  1,000 Units Oral Daily    nicotine  1 patch TransDERmal Daily    methylPREDNISolone  40 mg IntraVENous Q8H    ipratropium 0.5 mg-albuterol 2.5 mg  1 Dose Inhalation Q4H WA RT    sodium chloride flush  5-40 mL IntraVENous 2 times per day    furosemide  20 mg IntraVENous Daily    levofloxacin  750 mg IntraVENous Q24H    metoprolol succinate  25 mg Oral BID    apixaban  5 mg Oral BID     PRN Meds: melatonin, guaiFENesin, sodium chloride flush, sodium chloride, potassium chloride **OR** potassium alternative oral replacement **OR** potassium chloride, magnesium sulfate, polyethylene glycol, acetaminophen **OR** acetaminophen, ondansetron **OR** ondansetron, hydrALAZINE    I/O    Intake/Output Summary (Last 24 hours) at 7/9/2024 1218  Last data filed at 7/9/2024 0136  Gross per 24 hour   Intake 228.46 ml   Output --   Net 228.46 ml       Labs:   Recent Labs     07/08/24 0733 07/09/24 0739   WBC 5.7 4.7   HGB 11.4* 11.7*   HCT 35.3* 34.5*    224       Recent Labs     07/08/24 0733 07/09/24  0739    138   K 3.7 4.0    102   CO2 25 22   BUN 15 25*   CREATININE 1.2 1.2   CALCIUM 8.9 8.8       Recent Labs     07/08/24 0733 07/09/24  0739   ALKPHOS 86 77   ALT 16 14   AST 31 19   BILITOT 0.3 0.2       No results for input(s): \"INR\" in the last 72

## 2024-07-09 NOTE — ED NOTES
Pt asking for medication to help him sleep. NP Amrita Driver notified via [employer]

## 2024-07-09 NOTE — PROGRESS NOTES
PROGRESS NOTE     CARDIOLOGY    Chief complaint: Seen today for follow up, management & recommendations for paroxysmal atrial fibrillation.    He denies chest pain or shortness of breath today.    Wt Readings from Last 3 Encounters:   07/08/24 56.7 kg (125 lb)   06/30/24 56.7 kg (125 lb)   01/19/24 56.7 kg (125 lb)     Temp Readings from Last 3 Encounters:   07/09/24 97.8 °F (36.6 °C) (Oral)   06/30/24 97.7 °F (36.5 °C) (Oral)   01/19/24 97.5 °F (36.4 °C)     BP Readings from Last 3 Encounters:   07/09/24 (!) 151/95   06/30/24 (!) 142/85   01/19/24 124/72     Pulse Readings from Last 3 Encounters:   07/09/24 73   06/30/24 89   01/19/24 79         Intake/Output Summary (Last 24 hours) at 7/9/2024 1140  Last data filed at 7/9/2024 0136  Gross per 24 hour   Intake 228.46 ml   Output --   Net 228.46 ml       Recent Labs     07/08/24  0733 07/09/24  0739   WBC 5.7 4.7   HGB 11.4* 11.7*   HCT 35.3* 34.5*   MCV 89.8 89.4    224     Recent Labs     07/08/24  0733 07/09/24  0739    138   K 3.7 4.0    102   CO2 25 22   BUN 15 25*   CREATININE 1.2 1.2   MG  --  2.0     No results for input(s): \"PROTIME\", \"INR\" in the last 72 hours.  No results for input(s): \"CKTOTAL\", \"CKMB\", \"CKMBINDEX\", \"TROPONINI\" in the last 72 hours.  No results for input(s): \"BNP\" in the last 72 hours.  No results for input(s): \"CHOL\", \"HDL\", \"TRIG\" in the last 72 hours.    Invalid input(s): \"CHOLHDLR\", \"LDLCALCU\"  Recent Labs     07/08/24  0733 07/08/24  0939   TROPHS 34* 33*         melatonin tablet 3 mg, Nightly PRN  melatonin disintegrating tablet 10 mg, Nightly  dilTIAZem 100 mg in sodium chloride 0.9 % 100 mL infusion (ADD-[place]), Continuous  aspirin EC tablet 81 mg, Daily  atorvastatin (LIPITOR) tablet 40 mg, Daily  guaiFENesin tablet 400 mg, BID PRN  arformoterol 15 mcg-budesonide 0.25 mg neb solution, BID RT  vitamin B-12 (CYANOCOBALAMIN) tablet 1,000 mcg, Daily  Vitamin D (CHOLECALCIFEROL) tablet 1,000 Units,

## 2024-07-09 NOTE — CONSULTS
[facility]  Department of Internal Medicine  Division of Pulmonary, Critical Care and Sleep Medicine  Consult Note    Keagan Spivey DO, MPH, [facility], FACOI, FACP  Jeaneth Smith DO, [facility]  Florian Booker MD, MS Tanya Perry, APRN-CNP    Patient: Chuck Chavarria Jr.  MRN: [id number]  : 1950    Encounter Time: 2:16 PM     Date of Admission: 2024  7:08 AM    Primary Care Physician: Gunner Espinosa MD    Reason for Consultation: Acute on Chronic RF     HISTORY OF PRESENT ILLNESS : Chuck Chavarria Jr. 73 y.o. male was seen in consultation regarding the above chief compliant.    Patient admitted on 2024 Chuck Chavarria Jr. is a 73 y.o. male who presents for shortness of breath. UDS + again for cocaine.  Patient reports: EMS as he was short of breath.  He reports he has a history of COPD and ran out of his inhaler last night.  He was coughing and wheezing.  He reports that he is spitting up mucus.  EMS gave 1 breathing treatment as he was hypoxic.  They placed him on 4 L.  He denies fever but reports productive cough.  He denies chest pain.  He says he feels like his heart is beating rapidly.  He says he has a history of atrial fibrillation but is not anticoagulated.  He denies any history of DVT or PE.  He denies abdominal pain.  He denies any other complaints.     PAST MEDICAL HISTORY:  has a past medical history of Arthritis, Asthma, Bilateral carotid artery stenosis, Cataract, left eye, Cerebral infarction due to occlusion of right vertebral artery (HCC), COPD (chronic obstructive pulmonary disease) (HCC), Hyperlipidemia, Hypertension, Occlusion of right vertebral artery, and Tobacco dependence.     SURGICAL HISTORY:  has a past surgical history that includes Dental surgery; Cardiac catheterization (2023); Tonsillectomy; and Colonoscopy.     SOCIAL HISTORY:  reports that he has been smoking cigarettes. He started smoking about 64 years ago. He has a  abnormality of the thoracic aorta. Lungs/pleura: Chronic changes of hyperinflated appearance with upper lobe predominant emphysema and biapical pleuroparenchymal scarring greatest on the right superimposed septal thickening mosaicism of an interstitial pulmonary edema pattern with small to moderate right and small left pleural effusions of decompensation.  Partially isolated areas of right upper lobe anterior plaque like opacification along with a right anterolateral nodular densities similar to prior with nodule measured around 6 mm.  No clear progression at these sites. Upper Abdomen: Limited images of the upper abdomen are unremarkable. Soft Tissues/Bones: No acute bone or soft tissue abnormality.     1. No acute pulmonary artery embolism. 2. Emphysema with interstitial pulmonary edema and bilateral pleural effusions. 3. Stable partially isolated right upper lobe anterior plaque-like opacification with nodular densities measuring up to 6 mm. 4. 6 mm right solid pulmonary nodule within the upper lobe.  CT chest recommended for follow-up at 6 months RECOMMENDATIONS: Lung rads category 3 follow-up recommended CT chest 6 months     CT ABDOMEN PELVIS W WO CONTRAST Additional Contrast? None    Result Date: 6/30/2024  FINDINGS: Trace pleural effusions.  Bronchitis and scattered atelectasis in the lung bases.  No pneumoperitoneum.  Fatty liver.  Solid organs and gallbladder negative for acute process.  Trace ascites.  Mild scattered small and large bowel wall thickening suggestive of enterocolitis.  IVC filter ASVD. Nonspecific swirling of the central mesentery present to some degree previously.  Enlarged prostate.  Mild nonspecific urinary bladder wall thickening similar to previous.  No acute osseous findings or destructive bone lesions.     1. Mild scattered small and large bowel wall thickening suggestive of enterocolitis. 2. Trace ascites. 3. Fatty liver. 4. Enlarged prostate. 5. Mild nonspecific urinary bladder

## 2024-07-10 LAB
ALBUMIN SERPL-MCNC: 3.2 G/DL (ref 3.5–5.2)
ALP SERPL-CCNC: 74 U/L (ref 40–129)
ALT SERPL-CCNC: 15 U/L (ref 0–40)
ANION GAP SERPL CALCULATED.3IONS-SCNC: 14 MMOL/L (ref 7–16)
AST SERPL-CCNC: 20 U/L (ref 0–39)
BASOPHILS # BLD: 0 K/UL (ref 0–0.2)
BASOPHILS NFR BLD: 0 % (ref 0–2)
BILIRUB SERPL-MCNC: 0.3 MG/DL (ref 0–1.2)
BUN SERPL-MCNC: 35 MG/DL (ref 6–23)
CALCIUM SERPL-MCNC: 9 MG/DL (ref 8.6–10.2)
CHLORIDE SERPL-SCNC: 102 MMOL/L (ref 98–107)
CO2 SERPL-SCNC: 25 MMOL/L (ref 22–29)
CREAT SERPL-MCNC: 1.4 MG/DL (ref 0.7–1.2)
EOSINOPHIL # BLD: 0 K/UL (ref 0.05–0.5)
EOSINOPHILS RELATIVE PERCENT: 0 % (ref 0–6)
ERYTHROCYTE [DISTWIDTH] IN BLOOD BY AUTOMATED COUNT: 14.4 % (ref 11.5–15)
GFR, ESTIMATED: 51 ML/MIN/1.73M2
GLUCOSE SERPL-MCNC: 139 MG/DL (ref 74–99)
HCT VFR BLD AUTO: 32.7 % (ref 37–54)
HGB BLD-MCNC: 10.8 G/DL (ref 12.5–16.5)
LYMPHOCYTES NFR BLD: 0.35 K/UL (ref 1.5–4)
LYMPHOCYTES RELATIVE PERCENT: 4 % (ref 20–42)
MAGNESIUM SERPL-MCNC: 2 MG/DL (ref 1.6–2.6)
MCH RBC QN AUTO: 29.5 PG (ref 26–35)
MCHC RBC AUTO-ENTMCNC: 33 G/DL (ref 32–34.5)
MCV RBC AUTO: 89.3 FL (ref 80–99.9)
MICROORGANISM SPEC CULT: ABNORMAL
MICROORGANISM SPEC CULT: ABNORMAL
MICROORGANISM SPEC CULT: NORMAL
MICROORGANISM/AGENT SPEC: ABNORMAL
MONOCYTES NFR BLD: 0.62 K/UL (ref 0.1–0.95)
MONOCYTES NFR BLD: 6 % (ref 2–12)
NEUTROPHILS NFR BLD: 90 % (ref 43–80)
NEUTS SEG NFR BLD: 9.22 K/UL (ref 1.8–7.3)
PLATELET # BLD AUTO: 235 K/UL (ref 130–450)
PMV BLD AUTO: 9.8 FL (ref 7–12)
POTASSIUM SERPL-SCNC: 4.2 MMOL/L (ref 3.5–5)
PROT SERPL-MCNC: 7.4 G/DL (ref 6.4–8.3)
RBC # BLD AUTO: 3.66 M/UL (ref 3.8–5.8)
RBC # BLD: ABNORMAL 10*6/UL
SODIUM SERPL-SCNC: 141 MMOL/L (ref 132–146)
SPECIMEN DESCRIPTION: ABNORMAL
SPECIMEN DESCRIPTION: NORMAL
WBC OTHER # BLD: 10.2 K/UL (ref 4.5–11.5)

## 2024-07-10 PROCEDURE — 2140000000 HC CCU INTERMEDIATE R&B

## 2024-07-10 PROCEDURE — 83735 ASSAY OF MAGNESIUM: CPT

## 2024-07-10 PROCEDURE — 6360000002 HC RX W HCPCS: Performed by: FAMILY MEDICINE

## 2024-07-10 PROCEDURE — 87040 BLOOD CULTURE FOR BACTERIA: CPT

## 2024-07-10 PROCEDURE — 2580000003 HC RX 258: Performed by: INTERNAL MEDICINE

## 2024-07-10 PROCEDURE — 2700000000 HC OXYGEN THERAPY PER DAY

## 2024-07-10 PROCEDURE — 87081 CULTURE SCREEN ONLY: CPT

## 2024-07-10 PROCEDURE — 80053 COMPREHEN METABOLIC PANEL: CPT

## 2024-07-10 PROCEDURE — 36415 COLL VENOUS BLD VENIPUNCTURE: CPT

## 2024-07-10 PROCEDURE — 6370000000 HC RX 637 (ALT 250 FOR IP): Performed by: FAMILY MEDICINE

## 2024-07-10 PROCEDURE — 97165 OT EVAL LOW COMPLEX 30 MIN: CPT

## 2024-07-10 PROCEDURE — 97530 THERAPEUTIC ACTIVITIES: CPT

## 2024-07-10 PROCEDURE — 94640 AIRWAY INHALATION TREATMENT: CPT

## 2024-07-10 PROCEDURE — 99232 SBSQ HOSP IP/OBS MODERATE 35: CPT | Performed by: INTERNAL MEDICINE

## 2024-07-10 PROCEDURE — 6370000000 HC RX 637 (ALT 250 FOR IP): Performed by: INTERNAL MEDICINE

## 2024-07-10 PROCEDURE — 2580000003 HC RX 258: Performed by: FAMILY MEDICINE

## 2024-07-10 PROCEDURE — 97161 PT EVAL LOW COMPLEX 20 MIN: CPT

## 2024-07-10 PROCEDURE — 99232 SBSQ HOSP IP/OBS MODERATE 35: CPT | Performed by: FAMILY MEDICINE

## 2024-07-10 PROCEDURE — 85025 COMPLETE CBC W/AUTO DIFF WBC: CPT

## 2024-07-10 PROCEDURE — 6360000002 HC RX W HCPCS: Performed by: INTERNAL MEDICINE

## 2024-07-10 RX ORDER — HYDRALAZINE HYDROCHLORIDE 25 MG/1
25 TABLET, FILM COATED ORAL 3 TIMES DAILY
Qty: 90 TABLET | Refills: 0 | Status: SHIPPED | OUTPATIENT
Start: 2024-07-10

## 2024-07-10 RX ORDER — LANOLIN ALCOHOL/MO/W.PET/CERES
5 CREAM (GRAM) TOPICAL NIGHTLY PRN
Qty: 30 TABLET | Refills: 0 | Status: SHIPPED | OUTPATIENT
Start: 2024-07-10

## 2024-07-10 RX ORDER — METOPROLOL SUCCINATE 25 MG/1
25 TABLET, EXTENDED RELEASE ORAL 2 TIMES DAILY
Qty: 60 TABLET | Refills: 0 | Status: SHIPPED | OUTPATIENT
Start: 2024-07-10

## 2024-07-10 RX ORDER — LEVOFLOXACIN 500 MG/1
500 TABLET, FILM COATED ORAL DAILY
Qty: 4 TABLET | Refills: 0 | Status: SHIPPED | OUTPATIENT
Start: 2024-07-10 | End: 2024-07-14

## 2024-07-10 RX ORDER — ALBUTEROL SULFATE 90 UG/1
2 AEROSOL, METERED RESPIRATORY (INHALATION) EVERY 4 HOURS PRN
Qty: 1 EACH | Refills: 0 | Status: SHIPPED | OUTPATIENT
Start: 2024-07-10

## 2024-07-10 RX ORDER — NICOTINE 21 MG/24HR
1 PATCH, TRANSDERMAL 24 HOURS TRANSDERMAL DAILY
Qty: 30 PATCH | Refills: 0 | Status: SHIPPED | OUTPATIENT
Start: 2024-07-10

## 2024-07-10 RX ORDER — BUDESONIDE, GLYCOPYRROLATE, AND FORMOTEROL FUMARATE 160; 9; 4.8 UG/1; UG/1; UG/1
2 AEROSOL, METERED RESPIRATORY (INHALATION) 2 TIMES DAILY
Qty: 1 G | Refills: 0 | Status: SHIPPED | OUTPATIENT
Start: 2024-07-10 | End: 2024-07-17 | Stop reason: HOSPADM

## 2024-07-10 RX ORDER — PREDNISONE 20 MG/1
40 TABLET ORAL DAILY
Qty: 10 TABLET | Refills: 0 | Status: SHIPPED | OUTPATIENT
Start: 2024-07-10 | End: 2024-07-15

## 2024-07-10 RX ADMIN — METOPROLOL SUCCINATE 25 MG: 25 TABLET, EXTENDED RELEASE ORAL at 20:18

## 2024-07-10 RX ADMIN — WATER 20 MG: 1 INJECTION INTRAMUSCULAR; INTRAVENOUS; SUBCUTANEOUS at 20:18

## 2024-07-10 RX ADMIN — ASPIRIN 81 MG: 81 TABLET, COATED ORAL at 09:59

## 2024-07-10 RX ADMIN — Medication 10 MG: at 20:18

## 2024-07-10 RX ADMIN — APIXABAN 5 MG: 5 TABLET, FILM COATED ORAL at 09:59

## 2024-07-10 RX ADMIN — Medication 1000 UNITS: at 09:59

## 2024-07-10 RX ADMIN — CYANOCOBALAMIN TAB 1000 MCG 1000 MCG: 1000 TAB at 09:59

## 2024-07-10 RX ADMIN — WATER 40 MG: 1 INJECTION INTRAMUSCULAR; INTRAVENOUS; SUBCUTANEOUS at 09:59

## 2024-07-10 RX ADMIN — SODIUM CHLORIDE, PRESERVATIVE FREE 10 ML: 5 INJECTION INTRAVENOUS at 20:21

## 2024-07-10 RX ADMIN — APIXABAN 5 MG: 5 TABLET, FILM COATED ORAL at 20:18

## 2024-07-10 RX ADMIN — METOPROLOL SUCCINATE 25 MG: 25 TABLET, EXTENDED RELEASE ORAL at 09:59

## 2024-07-10 RX ADMIN — IPRATROPIUM BROMIDE AND ALBUTEROL SULFATE 1 DOSE: .5; 2.5 SOLUTION RESPIRATORY (INHALATION) at 19:47

## 2024-07-10 RX ADMIN — ARFORMOTEROL TARTRATE: 15 SOLUTION RESPIRATORY (INHALATION) at 19:47

## 2024-07-10 RX ADMIN — IPRATROPIUM BROMIDE AND ALBUTEROL SULFATE 1 DOSE: .5; 2.5 SOLUTION RESPIRATORY (INHALATION) at 07:27

## 2024-07-10 RX ADMIN — FUROSEMIDE 20 MG: 10 INJECTION, SOLUTION INTRAMUSCULAR; INTRAVENOUS at 09:59

## 2024-07-10 RX ADMIN — IPRATROPIUM BROMIDE AND ALBUTEROL SULFATE 1 DOSE: .5; 2.5 SOLUTION RESPIRATORY (INHALATION) at 10:52

## 2024-07-10 RX ADMIN — ARFORMOTEROL TARTRATE: 15 SOLUTION RESPIRATORY (INHALATION) at 07:27

## 2024-07-10 RX ADMIN — IPRATROPIUM BROMIDE AND ALBUTEROL SULFATE 1 DOSE: .5; 2.5 SOLUTION RESPIRATORY (INHALATION) at 16:05

## 2024-07-10 RX ADMIN — SODIUM CHLORIDE, PRESERVATIVE FREE 10 ML: 5 INJECTION INTRAVENOUS at 10:00

## 2024-07-10 RX ADMIN — ATORVASTATIN CALCIUM 40 MG: 40 TABLET, FILM COATED ORAL at 09:59

## 2024-07-10 ASSESSMENT — PAIN SCALES - GENERAL: PAINLEVEL_OUTOF10: 0

## 2024-07-10 NOTE — PROGRESS NOTES
Occupational Therapy  OCCUPATIONAL THERAPY INITIAL EVALUATION    [facility]  [address], [place]      Date:7/10/2024                                                Patient Name: Chuck Chavarria Jr.  MRN: [id number]  : 1950  Room: [address] [place] [address]    Evaluating OT: Eitan Paniagua OTR/L #[id number]     Referring Provider: Baltazar Huffman MD   Specific Provider Orders/Date: OT eval and treat 24    Diagnosis: COPD exacerbation (HCC) [J44.1]  Atrial fibrillation with rapid ventricular response (HCC) [I48.91]   Pt admitted to hospital with SOB     Pertinent Medical History:  has a past medical history of Arthritis, Asthma, Bilateral carotid artery stenosis, Cataract, left eye, Cerebral infarction due to occlusion of right vertebral artery (HCC), COPD (chronic obstructive pulmonary disease) (HCC), Hyperlipidemia, Hypertension, Occlusion of right vertebral artery, and Tobacco dependence.       Precautions:  Fall Risk, O2    Assessment of current deficits    [x] Functional mobility  [x]ADLs  [x] Strength               []Cognition    [x] Functional transfers   [x] IADLs         [x] Safety Awareness   [x]Endurance    [] Fine Coordination              [x] Balance      [] Vision/perception   []Sensation     []Gross Motor Coordination  [] ROM  [] Delirium                   [] Motor Control     OT PLAN OF CARE   OT POC based on physician orders, patient diagnosis and results of clinical assessment    Frequency/Duration 1-3 days/wk for 2 weeks PRN   Specific OT Treatment Interventions to include:   * Instruction/training on adapted ADL techniques and AE recommendations to increase functional independence within precautions       * Training on energy conservation strategies, correct breathing pattern and techniques to improve independence/tolerance for self-care routine  * Functional transfer/mobility training/DME recommendations for increased independence, safety,

## 2024-07-10 NOTE — PROGRESS NOTES
PULSE OX ON ROOM AIR SITTING 99%   PULSE OX ON  0 LITERS SITTING RECOVERY 99%   PULSE OX ON ROOM AIR AMBULATING 94%

## 2024-07-10 NOTE — PROGRESS NOTES
[facility]  Department of Internal Medicine  Division of Pulmonary, Critical Care and Sleep Medicine  Progress Noted    Patient: Chuck Chavarria Jr.  MRN: [id number]  : 1950    Encounter Time: 2:17 PM     Date of Admission: 2024  7:08 AM    Primary Care Physician: Gunner Espinosa MD    Reason for Consultation: Acute on Chronic RF  SUBJECTIVE:    BC in Epic noted  BP normal to high  On supplemental oxygen  No complaints      OBJECTIVE:     PHYSICAL EXAM:   VITALS:   Vitals:    07/10/24 0345 07/10/24 0727 07/10/24 0800 07/10/24 1052   BP: (!) 152/94  (!) 150/92    Pulse: 80  72    Resp: 18  19    Temp: 97.6 °F (36.4 °C)  98 °F (36.7 °C)    TempSrc: Oral  Oral    SpO2: 100% 99% 100% 99%   Weight:       Height:            Intake/Output Summary (Last 24 hours) at 7/10/2024 1417  Last data filed at 7/10/2024 0634  Gross per 24 hour   Intake --   Output 1100 ml   Net -1100 ml          CONSTITUTIONAL:   A&O x 3, NAD  SKIN:     No skin discoloration  HEENT:     EOMI, MMM, No thrush  NECK:    No bruits, No JVP appreciated  CV:      Sinus,  No murmur, No rubs, No gallops  PULMONARY:   Couse BS,  No Wheezing, No Rales, No Rhonchi      No noted egophony  ABDOMEN:     Soft, non-tender. BS normal. No R/R/G  EXT:    No deformities .  No clubbing.       No lower extremity edema, No venous stasis  PULSE:   Palpable.  PSYCHIATRIC:  Seems appropriate, No acute psychosis  MS:    No fractures, No gross weakness  NEUROLOGIC:   The clinical assessment is non-focal     DATA: IMAGING & TESTING:     LABORATORY TESTS:    CBC:   Lab Results   Component Value Date/Time    WBC 10.2 07/10/2024 04:17 AM    RBC 3.66 07/10/2024 04:17 AM    HGB 10.8 07/10/2024 04:17 AM    HCT 32.7 07/10/2024 04:17 AM    MCV 89.3 07/10/2024 04:17 AM    MCH 29.5 07/10/2024 04:17 AM    MCHC 33.0 07/10/2024 04:17 AM    RDW 14.4 07/10/2024 04:17 AM     07/10/2024 04:17 AM    MPV 9.8 07/10/2024 04:17 AM     CMP:

## 2024-07-10 NOTE — PLAN OF CARE
Problem: Chronic Conditions and Co-morbidities  Goal: Patient's chronic conditions and co-morbidity symptoms are monitored and maintained or improved  7/10/2024 1116 by Phyllis Altamirano, RN  Outcome: Adequate for Discharge  7/9/2024 7465 by Arpita Berg, RN  Outcome: Progressing     Problem: Discharge Planning  Goal: Discharge to home or other facility with appropriate resources  Outcome: Adequate for Discharge     Problem: Skin/Tissue Integrity  Goal: Absence of new skin breakdown  Description: 1.  Monitor for areas of redness and/or skin breakdown  2.  Assess vascular access sites hourly  3.  Every 4-6 hours minimum:  Change oxygen saturation probe site  4.  Every 4-6 hours:  If on nasal continuous positive airway pressure, respiratory therapy assess nares and determine need for appliance change or resting period.  Outcome: Adequate for Discharge     Problem: Safety - Adult  Goal: Free from fall injury  Outcome: Adequate for Discharge

## 2024-07-10 NOTE — PROGRESS NOTES
CLINICAL PHARMACY NOTE: MEDS TO BEDS    Total # of Prescriptions Filled: 9   The following medications were delivered to the patient:  Nicotine 21 mg/24 hr.  Eliquis 5 mg  Breztri aerosphere  Prednisone 20 mg  Levofloxacin 500 mg  Albuterol hfa inhaler  Metoprolol succinate er 25 mg  Hydralazine 25 mg  Melatonin 3 mg    Additional Documentation:  Delivered to pt. Bedside table

## 2024-07-10 NOTE — PROGRESS NOTES
Received this pt from [facility] at 4pm   [Father] : father [Fruit] : fruit [Vegetables] : vegetables [Meat] : meat [Dairy] : dairy [Table food] : table food [Normal] : Normal [Brushing teeth] : Brushing teeth [Vitamin] : Primary Fluoride Source: Vitamin [No] : No cigarette smoke exposure [Water heater temperature set at <120 degrees F] : Water heater temperature set at <120 degrees F [Smoke Detectors] : Smoke detectors [Carbon Monoxide Detectors] : Carbon monoxide detectors [Up to date] : Up to date [Car seat in back seat] : Car seat in back seat [Gun in Home] : No gun in home [At risk for exposure to TB] : Not at risk for exposure to Tuberculosis [FreeTextEntry7] : 12mo WCC [FreeTextEntry1] : taking dairy currently- taking milk, cheese, no blood or mucus in stool, doing well.

## 2024-07-10 NOTE — CARE COORDINATION
Patient presented to the ED from home due to shortness of breath; admitted for atrial fibrillation, tested positive for cocaine. Met with patient at bedside for transition of care planning. Patient reports he lives alone in a 5th floor apartment, has elevator access, patient independent, ambulates with a cane, drives occasionally; has walker and inhaler at home. Uses [facility] ([facility] and PCP is Dr. Gunner Moore. Patient reports his inhaler ran out, does not go into detail on how; patient has [facility] medicaid which covers this. Patient requesting an incentive spirometer at discharge. Discussed drug use, patient reports he uses cocaine once weekly, has been using for the past 5-6 yrs and has never gone through any treatment programs; interested in outpatient resources. Patient plans to return home, no needs reported and his sister to transport. Resources for treatment provided to patient.    Case Management Assessment  Initial Evaluation    Date/Time of Evaluation: 7/10/2024 12:00 PM  Assessment Completed by: CECILIA Brunner    If patient is discharged prior to next notation, then this note serves as note for discharge by case management.    Patient Name: Chuck Chavarria Jr.                   YOB: 1950  Diagnosis: COPD exacerbation (HCC) [J44.1]  Atrial fibrillation with rapid ventricular response (HCC) [I48.91]                   Date / Time: 7/8/2024  7:08 AM    Patient Admission Status: Inpatient   Readmission Risk (Low < 19, Mod (19-27), High > 27): Readmission Risk Score: 15.5    Current PCP: Gunner Espinosa MD  PCP verified by CM? Yes    Chart Reviewed: Yes      History Provided by: Patient  Patient Orientation: Alert and Oriented    Patient Cognition: Alert    Hospitalization in the last 30 days (Readmission):  No    If yes, Readmission Assessment in  Navigator will be completed.    Advance Directives:      Code Status: Full Code   Patient's Primary Decision Maker is: Legal  following treatment goals of COPD exacerbation (HCC) [J44.1]  Atrial fibrillation with rapid ventricular response (HCC) [I48.91]    IF APPLICABLE: The Patient and/or patient representative Chuck and his family were provided with a choice of provider and agrees with the discharge plan. Freedom of choice list with basic dialogue that supports the patient's individualized plan of care/goals and shares the quality data associated with the providers was provided to: Patient   Patient Representative Name:       The Patient and/or Patient Representative Agree with the Discharge Plan? Yes    Electronically signed by CECILIA Brunner on 7/10/2024 at 12:00 PM

## 2024-07-10 NOTE — CONSULTS
[facility]  [facility]    Consultation Note     Admit Date: 7/8/2024  7:08 AM    Reason for Consult:   Bacteremia    Attending Physician:  Baltazar Huffman MD     Chief Complaint: Shortness of breath    HISTORY OF PRESENT ILLNESS:   74 yo M with PMx of Arthritis, Asthma,Stroke,HLD,HTN,COPD, A-fib, cocaine Abuse, status post IVC filter presented with shortness of breath.  CTA chest shows emphysematous lung disease with right upper lobe opacification and pulmonary nodules.  Also found to have bilateral pleural effusion.  Blood cultures positive for gram-positive cocci in chains.  Respiratory cultures positive for Candida albicans.  ID consulted for bacteremia.    Past Medical History:        Diagnosis Date    Arthritis     Asthma     Bilateral carotid artery stenosis 01/17/2024    Approximately 50% stenosis on the right side and 30 to 40% stenosis on the left side, CT of the carotids, 2024    Cataract, left eye     Cerebral infarction due to occlusion of right vertebral artery (HCC) 01/17/2024    Hypoplastic, small right vertebral artery, with absent flow proximally CT of the carotids 2024  Patent left vertebral artery, good size    COPD (chronic obstructive pulmonary disease) (HCC)     Hyperlipidemia     Hypertension     Occlusion of right vertebral artery 01/17/2024    Small, hypoplastic right vertebral artery occlusion proximally with widely patent dominant left vertebral artery    Tobacco dependence 01/17/2024     Past Surgical History:        Procedure Laterality Date    CARDIAC CATHETERIZATION  08/25/2023    COLONOSCOPY      DENTAL SURGERY      TONSILLECTOMY       Current Medications:   Scheduled Meds:   methylPREDNISolone  20 mg IntraVENous Q12H    melatonin  10 mg Oral Nightly    aspirin  81 mg Oral Daily    atorvastatin  40 mg Oral Daily    arformoterol 15 mcg-budesonide 0.25 mg neb solution   Nebulization BID RT    cyanocobalamin  1,000 mcg Oral Daily    Vitamin D  1,000 Units

## 2024-07-10 NOTE — DISCHARGE SUMMARY
Hospital Medicine Discharge Summary    Patient ID: Chuck Chavarria Jr.      Patient's PCP: Gunner Espinosa MD    Admit Date: 7/8/2024     Discharge Date:  7/10/2024    Admitting Physician: Baltazar Huffman MD     Discharge Physician: Baltazar Huffman MD    Discharge Diagnoses:       Active Hospital Problems    Diagnosis Date Noted    Atrial fibrillation with rapid ventricular response (HCC) [I48.91] 07/08/2024       The patient was seen and examined on day of discharge and this discharge summary is in conjunction with any daily progress note from day of discharge.    Hospital Course:   HPI from chart :   \"  Patient admitted on 7/8/2024 Chuck Chavarria Jr. is a 73 y.o. male who presents for shortness of breath.  Patient reports: EMS as he was short of breath.  He reports he has a history of COPD and ran out of his inhaler last night.  He was coughing and wheezing.  He reports that he is spitting up mucus.  EMS gave 1 breathing treatment as he was hypoxic.  They placed him on 4 L.  He denies fever but reports productive cough.  He denies chest pain.  He says he feels like his heart is beating rapidly.  He says he has a history of atrial fibrillation but is not anticoagulated.  He denies any history of DVT or PE.  He denies abdominal pain.  He denies any other complaints.     Discussed with ED physician     Workup in the ED essentially unremarkable for possible pneumonia, A-fib RVR.  CTA showed no PE, possible pneumonia, pulmonary vascular congestion.  There was a reported possible allergic reaction to possibly doxycycline versus Rocephin?  With reported worsening shortness of breath patient received epinephrine steroids and Benadryl.  During my interview with the patient he is slightly confused lethargic arousable on BiPAP   \"      Brief Hospital course:     --Acute hypoxic respiratory failure.  Likely multifactorial, possible pneumonia, COPD exacerbation and pleural effusions  CTA no PE elevated proBNP  Status post  Refills: 0      metoprolol succinate (TOPROL XL) 25 MG extended release tablet Take 1 tablet by mouth in the morning and at bedtime  Qty: 60 tablet, Refills: 0                Details   VITAMIN A PO Take 1 tablet by mouth daily      atorvastatin (LIPITOR) 40 MG tablet Take 1 tablet by mouth daily  Qty: 30 tablet, Refills: 3      vitamin B-12 1000 MCG tablet Take 1 tablet by mouth daily  Qty: 30 tablet, Refills: 3      aspirin 81 MG EC tablet Take 1 tablet by mouth daily      Vitamin D (CHOLECALCIFEROL) 25 MCG (1000 UT) TABS tablet Take 1 tablet by mouth daily             Time Spent on discharge is more than 37 mins in the examination, evaluation, counseling and review of medications and discharge plan.      Signed:    Baltazar Huffman MD  7/10/2024

## 2024-07-10 NOTE — PROGRESS NOTES
Infec disease consult for positive blood culture. Sent to [facility] per office

## 2024-07-10 NOTE — PROGRESS NOTES
Physical Therapy  Initial Assessment     Name: Chuck Chavarria Jr.  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  Precautions:  Fall risk, O2    SUBJECTIVE:    Pt lives alone in a 5th floor apartment unit with elevator access. Level entry into building. Pt ambulated with tripod cane prior to admission.    OBJECTIVE:   Initial Evaluation  Date: 7/10/24 Treatment Date: Short Term/ Long Term   Goals   AM-PAC 6 Clicks      Was pt agreeable to Eval/treatment? Yes     Does pt have pain? /10 abdominal pain from coughing     Bed Mobility  Rolling: NT  Supine to sit: Supervision  Sit to supine: NT  Scooting: Supervision to EOB  Rolling: Independent   Supine to sit: Independent   Sit to supine: Independent   Scooting: Independent    Transfers Sit to stand: SBA  Stand to sit: SBA  Stand pivot: SBA with WW  Sit to stand: Independent   Stand to sit: Independent   Stand pivot: Independent    Ambulation   150 feet with WW with SBA  >400 feet with WW Mod Independent    Stair negotiation: ascended and descended NT  >12 step(s) with 1 rail(s) Mod Independent    ROM BUE: Refer to OT note  BLE: WFL     Strength BUE: Refer to OT note  BLE: WFL     Balance Sitting EOB: Independent   Dynamic Standing: SBA with WW  Dynamic Standing: Independent      Pt is A & O x: 4 to person, place, month/year, and situation.   Sensation: Pt denies numbness and tingling of extremities.   Edema: Unremarkable    Patient  education  Pt educated on PT role in acute care setting.    Patient response to education:   Pt verbalized understanding Pt demonstrated skill Pt requires further education in this area   Yes NA No     ASSESSMENT:    Conditions Requiring Skilled Therapeutic Intervention:    [x]Decreased strength     []Decreased ROM  [x]Decreased functional mobility  [x]Decreased balance   [x]Decreased endurance   []Decreased posture  []Decreased sensation  []Decreased coordination   []Decreased vision  []Decreased safety awareness   []Increased pain       Comments:    Pt was in bed upon room entry; agreeable to PT evaluation. Pt completed all mobility noted above. Pt ambulated extended distance in hallway with WW. Gait was slow but steady with WW. Pt reported mild SOB but O2 sat was % on 3 L O2/min throughout session. Plan is to return home at discharge. Pt was left in bed with all needs met at conclusion of session.    Treatment:  Patient practiced and was instructed in the following treatment:    Therapeutic activities:  Bed mobility: Pt was cued for technique during bed mobility transfers.  Transfers: Pt was cued for hand placement during sit <> stand transfers.  Ambulation: Pt was cued for WW technique and safety when ambulating.  Vitals and symptoms were closely monitored throughout session.    Pt's/family goals:  1. To return home.    Prognosis is Good for reaching above PT goals.    Patient and or family understand(s) diagnosis, prognosis, and plan of care.  Yes    PHYSICAL THERAPY PLAN OF CARE:    PT POC is established based on physician order and patient diagnosis     Referring provider/PT Order:    Start   Ordering Provider    07/08/24 1115  PT evaluation and treat  Start:  07/08/24 1115,   End:  07/08/24 1115,   ONE TIME,   Standing Count:  1 Occurrences,   R         Baltazar Huffman MD      Diagnosis:  COPD exacerbation (HCC) [J44.1]  Atrial fibrillation with rapid ventricular response (HCC) [I48.91]  Specific

## 2024-07-11 ENCOUNTER — APPOINTMENT (OUTPATIENT)
Age: 74
DRG: 139 | End: 2024-07-11
Attending: INTERNAL MEDICINE
Payer: COMMERCIAL

## 2024-07-11 LAB
ALBUMIN SERPL-MCNC: 3.1 G/DL (ref 3.5–5.2)
ALP SERPL-CCNC: 72 U/L (ref 40–129)
ALT SERPL-CCNC: 14 U/L (ref 0–40)
ANION GAP SERPL CALCULATED.3IONS-SCNC: 11 MMOL/L (ref 7–16)
AST SERPL-CCNC: 14 U/L (ref 0–39)
BASOPHILS # BLD: 0.01 K/UL (ref 0–0.2)
BASOPHILS NFR BLD: 0 % (ref 0–2)
BILIRUB SERPL-MCNC: 0.2 MG/DL (ref 0–1.2)
BUN SERPL-MCNC: 36 MG/DL (ref 6–23)
CALCIUM SERPL-MCNC: 9 MG/DL (ref 8.6–10.2)
CHLORIDE SERPL-SCNC: 101 MMOL/L (ref 98–107)
CO2 SERPL-SCNC: 26 MMOL/L (ref 22–29)
CREAT SERPL-MCNC: 1.4 MG/DL (ref 0.7–1.2)
ECHO AO ASC DIAM: 2.6 CM
ECHO AO ASCENDING AORTA INDEX: 1.56 CM/M2
ECHO AV AREA PEAK VELOCITY: 3.2 CM2
ECHO AV AREA VTI: 3 CM2
ECHO AV AREA/BSA PEAK VELOCITY: 1.9 CM2/M2
ECHO AV AREA/BSA VTI: 1.8 CM2/M2
ECHO AV CUSP MM: 1.3 CM
ECHO AV MEAN GRADIENT: 4 MMHG
ECHO AV MEAN VELOCITY: 0.8 M/S
ECHO AV PEAK GRADIENT: 9 MMHG
ECHO AV PEAK VELOCITY: 1.5 M/S
ECHO AV VELOCITY RATIO: 0.8
ECHO AV VTI: 29.6 CM
ECHO EST RA PRESSURE: 8 MMHG
ECHO LA DIAMETER INDEX: 2.57 CM/M2
ECHO LA DIAMETER: 4.3 CM
ECHO LA VOL A-L A2C: 55 ML (ref 18–58)
ECHO LA VOL A-L A4C: 42 ML (ref 18–58)
ECHO LA VOL MOD A2C: 52 ML (ref 18–58)
ECHO LA VOL MOD A4C: 39 ML (ref 18–58)
ECHO LA VOLUME AREA LENGTH: 51 ML
ECHO LA VOLUME INDEX A-L A2C: 33 ML/M2 (ref 16–34)
ECHO LA VOLUME INDEX A-L A4C: 25 ML/M2 (ref 16–34)
ECHO LA VOLUME INDEX AREA LENGTH: 31 ML/M2 (ref 16–34)
ECHO LA VOLUME INDEX MOD A2C: 31 ML/M2 (ref 16–34)
ECHO LA VOLUME INDEX MOD A4C: 23 ML/M2 (ref 16–34)
ECHO LV FRACTIONAL SHORTENING: 34 % (ref 28–44)
ECHO LV INTERNAL DIMENSION DIASTOLE INDEX: 2.99 CM/M2
ECHO LV INTERNAL DIMENSION DIASTOLIC: 5 CM (ref 4.2–5.9)
ECHO LV INTERNAL DIMENSION SYSTOLIC INDEX: 1.98 CM/M2
ECHO LV INTERNAL DIMENSION SYSTOLIC: 3.3 CM
ECHO LV ISOVOLUMETRIC RELAXATION TIME (IVRT): 69.2 MS
ECHO LV IVSD: 0.8 CM (ref 0.6–1)
ECHO LV IVSS: 1.2 CM
ECHO LV MASS 2D: 182 G (ref 88–224)
ECHO LV MASS INDEX 2D: 109 G/M2 (ref 49–115)
ECHO LV POSTERIOR WALL DIASTOLIC: 1.2 CM (ref 0.6–1)
ECHO LV POSTERIOR WALL SYSTOLIC: 1.7 CM
ECHO LV RELATIVE WALL THICKNESS RATIO: 0.48
ECHO LVOT AREA: 3.8 CM2
ECHO LVOT AV VTI INDEX: 0.77
ECHO LVOT DIAM: 2.2 CM
ECHO LVOT MEAN GRADIENT: 3 MMHG
ECHO LVOT PEAK GRADIENT: 6 MMHG
ECHO LVOT PEAK VELOCITY: 1.2 M/S
ECHO LVOT STROKE VOLUME INDEX: 52.1 ML/M2
ECHO LVOT SV: 87 ML
ECHO LVOT VTI: 22.9 CM
ECHO MV A VELOCITY: 0.88 M/S
ECHO MV AREA PHT: 2.8 CM2
ECHO MV AREA VTI: 1.5 CM2
ECHO MV E DECELERATION TIME (DT): 190.3 MS
ECHO MV E VELOCITY: 1.22 M/S
ECHO MV E/A RATIO: 1.39
ECHO MV LVOT VTI INDEX: 2.55
ECHO MV MAX VELOCITY: 1.4 M/S
ECHO MV MEAN GRADIENT: 1 MMHG
ECHO MV MEAN VELOCITY: 0.5 M/S
ECHO MV PEAK GRADIENT: 8 MMHG
ECHO MV PRESSURE HALF TIME (PHT): 79.1 MS
ECHO MV VTI: 58.3 CM
ECHO PV MAX VELOCITY: 0.8 M/S
ECHO PV MEAN GRADIENT: 2 MMHG
ECHO PV MEAN VELOCITY: 0.6 M/S
ECHO PV PEAK GRADIENT: 3 MMHG
ECHO PV VTI: 18.3 CM
ECHO RV INTERNAL DIMENSION: 3.5 CM
ECHO RV TAPSE: 2.6 CM (ref 1.7–?)
ECHO RVOT MEAN GRADIENT: 0 MMHG
ECHO RVOT PEAK GRADIENT: 1 MMHG
ECHO RVOT PEAK VELOCITY: 0.5 M/S
ECHO RVOT VTI: 8.5 CM
EOSINOPHIL # BLD: 0 K/UL (ref 0.05–0.5)
EOSINOPHILS RELATIVE PERCENT: 0 % (ref 0–6)
ERYTHROCYTE [DISTWIDTH] IN BLOOD BY AUTOMATED COUNT: 14.6 % (ref 11.5–15)
GFR, ESTIMATED: 53 ML/MIN/1.73M2
GLUCOSE SERPL-MCNC: 131 MG/DL (ref 74–99)
HCT VFR BLD AUTO: 33.3 % (ref 37–54)
HGB BLD-MCNC: 10.9 G/DL (ref 12.5–16.5)
IMM GRANULOCYTES # BLD AUTO: 0.09 K/UL (ref 0–0.58)
IMM GRANULOCYTES NFR BLD: 1 % (ref 0–5)
LYMPHOCYTES NFR BLD: 0.38 K/UL (ref 1.5–4)
LYMPHOCYTES RELATIVE PERCENT: 3 % (ref 20–42)
MAGNESIUM SERPL-MCNC: 2.1 MG/DL (ref 1.6–2.6)
MCH RBC QN AUTO: 29.3 PG (ref 26–35)
MCHC RBC AUTO-ENTMCNC: 32.7 G/DL (ref 32–34.5)
MCV RBC AUTO: 89.5 FL (ref 80–99.9)
MONOCYTES NFR BLD: 0.74 K/UL (ref 0.1–0.95)
MONOCYTES NFR BLD: 7 % (ref 2–12)
NEUTROPHILS NFR BLD: 89 % (ref 43–80)
NEUTS SEG NFR BLD: 9.88 K/UL (ref 1.8–7.3)
PLATELET # BLD AUTO: 232 K/UL (ref 130–450)
PMV BLD AUTO: 9.9 FL (ref 7–12)
POTASSIUM SERPL-SCNC: 4 MMOL/L (ref 3.5–5)
PROT SERPL-MCNC: 7.1 G/DL (ref 6.4–8.3)
RBC # BLD AUTO: 3.72 M/UL (ref 3.8–5.8)
RBC # BLD: ABNORMAL 10*6/UL
SODIUM SERPL-SCNC: 138 MMOL/L (ref 132–146)
WBC OTHER # BLD: 11.1 K/UL (ref 4.5–11.5)

## 2024-07-11 PROCEDURE — 6370000000 HC RX 637 (ALT 250 FOR IP): Performed by: FAMILY MEDICINE

## 2024-07-11 PROCEDURE — 94640 AIRWAY INHALATION TREATMENT: CPT

## 2024-07-11 PROCEDURE — 6370000000 HC RX 637 (ALT 250 FOR IP): Performed by: INTERNAL MEDICINE

## 2024-07-11 PROCEDURE — 6360000002 HC RX W HCPCS: Performed by: FAMILY MEDICINE

## 2024-07-11 PROCEDURE — 99232 SBSQ HOSP IP/OBS MODERATE 35: CPT | Performed by: FAMILY MEDICINE

## 2024-07-11 PROCEDURE — 83735 ASSAY OF MAGNESIUM: CPT

## 2024-07-11 PROCEDURE — 2580000003 HC RX 258: Performed by: FAMILY MEDICINE

## 2024-07-11 PROCEDURE — 99232 SBSQ HOSP IP/OBS MODERATE 35: CPT | Performed by: INTERNAL MEDICINE

## 2024-07-11 PROCEDURE — 85025 COMPLETE CBC W/AUTO DIFF WBC: CPT

## 2024-07-11 PROCEDURE — 2700000000 HC OXYGEN THERAPY PER DAY

## 2024-07-11 PROCEDURE — 2580000003 HC RX 258: Performed by: INTERNAL MEDICINE

## 2024-07-11 PROCEDURE — 93306 TTE W/DOPPLER COMPLETE: CPT

## 2024-07-11 PROCEDURE — 80053 COMPREHEN METABOLIC PANEL: CPT

## 2024-07-11 PROCEDURE — 6360000002 HC RX W HCPCS: Performed by: INTERNAL MEDICINE

## 2024-07-11 PROCEDURE — 2140000000 HC CCU INTERMEDIATE R&B

## 2024-07-11 PROCEDURE — 36415 COLL VENOUS BLD VENIPUNCTURE: CPT

## 2024-07-11 PROCEDURE — 93306 TTE W/DOPPLER COMPLETE: CPT | Performed by: INTERNAL MEDICINE

## 2024-07-11 RX ORDER — SIMETHICONE 80 MG
80 TABLET,CHEWABLE ORAL EVERY 6 HOURS PRN
Status: DISCONTINUED | OUTPATIENT
Start: 2024-07-11 | End: 2024-07-17 | Stop reason: HOSPADM

## 2024-07-11 RX ORDER — IPRATROPIUM BROMIDE AND ALBUTEROL SULFATE 2.5; .5 MG/3ML; MG/3ML
1 SOLUTION RESPIRATORY (INHALATION)
Status: DISCONTINUED | OUTPATIENT
Start: 2024-07-11 | End: 2024-07-17 | Stop reason: HOSPADM

## 2024-07-11 RX ORDER — LEVOFLOXACIN 5 MG/ML
750 INJECTION, SOLUTION INTRAVENOUS
Status: COMPLETED | OUTPATIENT
Start: 2024-07-12 | End: 2024-07-14

## 2024-07-11 RX ADMIN — IPRATROPIUM BROMIDE AND ALBUTEROL SULFATE 1 DOSE: .5; 2.5 SOLUTION RESPIRATORY (INHALATION) at 09:37

## 2024-07-11 RX ADMIN — CYANOCOBALAMIN TAB 1000 MCG 1000 MCG: 1000 TAB at 10:06

## 2024-07-11 RX ADMIN — SODIUM CHLORIDE, PRESERVATIVE FREE 10 ML: 5 INJECTION INTRAVENOUS at 10:06

## 2024-07-11 RX ADMIN — ARFORMOTEROL TARTRATE: 15 SOLUTION RESPIRATORY (INHALATION) at 19:33

## 2024-07-11 RX ADMIN — ATORVASTATIN CALCIUM 40 MG: 40 TABLET, FILM COATED ORAL at 10:05

## 2024-07-11 RX ADMIN — FUROSEMIDE 20 MG: 10 INJECTION, SOLUTION INTRAMUSCULAR; INTRAVENOUS at 10:06

## 2024-07-11 RX ADMIN — HYDRALAZINE HYDROCHLORIDE 25 MG: 25 TABLET ORAL at 01:45

## 2024-07-11 RX ADMIN — WATER 20 MG: 1 INJECTION INTRAMUSCULAR; INTRAVENOUS; SUBCUTANEOUS at 10:06

## 2024-07-11 RX ADMIN — METOPROLOL SUCCINATE 25 MG: 25 TABLET, EXTENDED RELEASE ORAL at 20:02

## 2024-07-11 RX ADMIN — ARFORMOTEROL TARTRATE: 15 SOLUTION RESPIRATORY (INHALATION) at 09:38

## 2024-07-11 RX ADMIN — APIXABAN 5 MG: 5 TABLET, FILM COATED ORAL at 20:02

## 2024-07-11 RX ADMIN — METOPROLOL SUCCINATE 25 MG: 25 TABLET, EXTENDED RELEASE ORAL at 10:05

## 2024-07-11 RX ADMIN — ONDANSETRON 4 MG: 2 INJECTION INTRAMUSCULAR; INTRAVENOUS at 03:11

## 2024-07-11 RX ADMIN — Medication 1000 UNITS: at 10:06

## 2024-07-11 RX ADMIN — Medication 10 MG: at 20:03

## 2024-07-11 RX ADMIN — APIXABAN 5 MG: 5 TABLET, FILM COATED ORAL at 10:05

## 2024-07-11 RX ADMIN — IPRATROPIUM BROMIDE AND ALBUTEROL SULFATE 1 DOSE: .5; 2.5 SOLUTION RESPIRATORY (INHALATION) at 19:33

## 2024-07-11 RX ADMIN — ASPIRIN 81 MG: 81 TABLET, COATED ORAL at 10:05

## 2024-07-11 RX ADMIN — LEVOFLOXACIN 750 MG: 5 INJECTION, SOLUTION INTRAVENOUS at 00:12

## 2024-07-11 RX ADMIN — SODIUM CHLORIDE, PRESERVATIVE FREE 10 ML: 5 INJECTION INTRAVENOUS at 20:03

## 2024-07-11 RX ADMIN — SIMETHICONE 80 MG: 80 TABLET, CHEWABLE ORAL at 20:52

## 2024-07-11 RX ADMIN — Medication 5 DROP: at 20:03

## 2024-07-11 RX ADMIN — WATER 20 MG: 1 INJECTION INTRAMUSCULAR; INTRAVENOUS; SUBCUTANEOUS at 20:03

## 2024-07-11 NOTE — PROGRESS NOTES
Renal Dose Adjustment Policy (Generic)     This patient is on medication that requires renal, weight, and/or indication dose adjustment.      Date Body Weight IBW  Adjusted BW SCr  CrCl Dialysis status   7/11/2024 56.7 kg (125 lb) Ideal body weight: 68.4 kg (150 lb 12.7 oz) Serum creatinine: 1.4 mg/dL (H) 07/11/24 0416  Estimated creatinine clearance: 38 mL/min (A) N/a       Pharmacy has dose-adjusted the following medication(s):    Date Previous Order Adjusted Order   7/11/2024 Levofloxacin 750 mg q24h Levofloxacin 750 mg q48h       These changes were made per protocol according to the [facility]   Automatic Renal Dose Adjustment Policy.     *Please note this dose may need readjusted if patient's condition changes.    Please contact pharmacy with any questions regarding these changes.    Moon Parra RPH  7/11/2024  9:29 AM

## 2024-07-11 NOTE — PROGRESS NOTES
Spoke with Dr. Huffman while on unit who states that the pt is okay to downgrade to med/surg with tele. Order placed at this time.

## 2024-07-11 NOTE — PROGRESS NOTES
Hospitalist Progress Note      Synopsis: Patient admitted on 7/8/2024 Chuck Chavarria Jr. is a 73 y.o. male who presents for shortness of breath.  Patient reports: EMS as he was short of breath.  He reports he has a history of COPD and ran out of his inhaler last night.  He was coughing and wheezing.  He reports that he is spitting up mucus.  EMS gave 1 breathing treatment as he was hypoxic.  They placed him on 4 L.  He denies fever but reports productive cough.  He denies chest pain.  He says he feels like his heart is beating rapidly.  He says he has a history of atrial fibrillation but is not anticoagulated.  He denies any history of DVT or PE.  He denies abdominal pain.  He denies any other complaints.     Discussed with ED physician     Workup in the ED essentially unremarkable for possible pneumonia, A-fib RVR.  CTA showed no PE, possible pneumonia, pulmonary vascular congestion.  There was a reported possible allergic reaction to possibly doxycycline versus Rocephin?  With reported worsening shortness of breath patient received epinephrine steroids and Benadryl.  During my interview with the patient he is slightly confused lethargic arousable on BiPAP       Subjective  Seen and examined chart reviewed discussed with nursing staff no overnight events reported patient is afebrile currently on IV Levaquin he is going for an echocardiogram today.  He has no chest pain or shortness of breath he is doing well on room air.    Exam:  BP (!) 147/94   Pulse 71   Temp 98.4 °F (36.9 °C) (Temporal)   Resp 20   Ht 1.727 m (5' 8\")   Wt 56.7 kg (125 lb) Comment: previous encounter  SpO2 97%   BMI 19.01 kg/m²   -General:  Awake, alert, oriented X 3.  Well developed, well nourished, well groomed.  No apparent distress.    -HEENT:  Normocephalic, atraumatic.  Pupils equal, round, reactive to light.  No scleral icterus.  No conjunctival injection.  Normal lips, teeth, and gums.  No nasal discharge.  Neck:

## 2024-07-11 NOTE — PROGRESS NOTES
Hospitalist Progress Note      Synopsis: Patient admitted on 7/8/2024 Chuck Chavarria Jr. is a 73 y.o. male who presents for shortness of breath.  Patient reports: EMS as he was short of breath.  He reports he has a history of COPD and ran out of his inhaler last night.  He was coughing and wheezing.  He reports that he is spitting up mucus.  EMS gave 1 breathing treatment as he was hypoxic.  They placed him on 4 L.  He denies fever but reports productive cough.  He denies chest pain.  He says he feels like his heart is beating rapidly.  He says he has a history of atrial fibrillation but is not anticoagulated.  He denies any history of DVT or PE.  He denies abdominal pain.  He denies any other complaints.     Discussed with ED physician     Workup in the ED essentially unremarkable for possible pneumonia, A-fib RVR.  CTA showed no PE, possible pneumonia, pulmonary vascular congestion.  There was a reported possible allergic reaction to possibly doxycycline versus Rocephin?  With reported worsening shortness of breath patient received epinephrine steroids and Benadryl.  During my interview with the patient he is slightly confused lethargic arousable on BiPAP       Subjective  Seen and examined chart reviewed discussed with nursing staff and case management no overnight events reported patient feels that he is back to his baseline, currently doing well on nasal cannula O2 patient requesting discharge.  Initially plan was to discharge patient home however his blood culture came back positive.  Gram-positive cocci in chains.  Repeat cultures pending ID has been consulted input appreciated    Exam:  BP (!) 147/94   Pulse 71   Temp 98.4 °F (36.9 °C) (Temporal)   Resp 20   Ht 1.727 m (5' 8\")   Wt 56.7 kg (125 lb) Comment: previous encounter  SpO2 97%   BMI 19.01 kg/m²   -General:  Awake, alert, oriented X 3.  Well developed, well nourished, well groomed.  No apparent distress.    -HEENT:  Normocephalic,

## 2024-07-11 NOTE — PROGRESS NOTES
[facility]  [facility]  Progress Note      Chief Complaint   Patient presents with    Shortness of Breath     SOB that got worse his morning. His inhaler ran out tonight, +wheezing. EMS gave 1 duoneb and stated pt was 90% room air. Now 100% on 4LNc after breathing Tx.        SUBJECTIVE:    Patient is tolerating medications. No reported adverse drug reactions.  No nausea, vomiting, diarrhea.    Review of systems:  As stated above in the chief complaint, otherwise negative.    Medications:  Scheduled Meds:   ipratropium 0.5 mg-albuterol 2.5 mg  1 Dose Inhalation BID RT    [START ON 2024] levofloxacin  750 mg IntraVENous Q48H    methylPREDNISolone  20 mg IntraVENous Q12H    melatonin  10 mg Oral Nightly    aspirin  81 mg Oral Daily    atorvastatin  40 mg Oral Daily    arformoterol 15 mcg-budesonide 0.25 mg neb solution   Nebulization BID RT    cyanocobalamin  1,000 mcg Oral Daily    Vitamin D  1,000 Units Oral Daily    nicotine  1 patch TransDERmal Daily    sodium chloride flush  5-40 mL IntraVENous 2 times per day    [Held by provider] furosemide  20 mg IntraVENous Daily    metoprolol succinate  25 mg Oral BID    apixaban  5 mg Oral BID     Continuous Infusions:   sodium chloride       PRN Meds:melatonin, guaiFENesin, sodium chloride flush, sodium chloride, potassium chloride **OR** potassium alternative oral replacement **OR** potassium chloride, magnesium sulfate, polyethylene glycol, acetaminophen **OR** acetaminophen, ondansetron **OR** ondansetron, hydrALAZINE    OBJECTIVE:  BP (!) 144/87   Pulse 76   Temp 98.1 °F (36.7 °C) (Oral)   Resp 15   Ht 1.727 m (5' 8\")   Wt 56.7 kg (125 lb) Comment: previous encounter  SpO2 99%   BMI 19.01 kg/m²   Temp  Av °F (36.7 °C)  Min: 97.8 °F (36.6 °C)  Max: 98.4 °F (36.9 °C)  Constitutional: The patient is awake, alert, and oriented.   Skin: Warm and dry. No rashes were noted. No jaundice.  HEENT: Eyes show round, and reactive  \"SEDRATE\"  Radiology:      Microbiology:   Lab Results   Component Value Date/Time    BC 5 Days no growth 06/12/2023 09:59 PM    ORG C Difficile Toxin A and/or B detected 09/10/2015 06:40 PM     Lab Results   Component Value Date/Time    BLOODCULT2 5 Days no growth 06/12/2023 09:59 PM    ORG C Difficile Toxin A and/or B detected 09/10/2015 06:40 PM     No results found for: \"WNDABS\"  No results found for: \"RESPSMEAR\"      Component Value Date/Time    MPNEUMO Not Detected 07/08/2024 1230     No results found for: \"CULTRESP\"  No results found for: \"CXCATHTIP\"  No results found for: \"BFCS\"  No results found for: \"CXSURG\"  No results found for: \"LABURIN\"  MRSA Culture Only   Date Value Ref Range Status   06/13/2023 Methicillin resistant Staph aureus not isolated  Final       ASSESSMENT:  Streptococcal bacteremia  Emphysema  Cocaine abuse  Chronic apical fibrotic scarring of lung  Candida Colonizer Resp Tract     Plan:    Continue levofloxacin 750 mg IV daily  Echocardiogram to rule out vegetations  Follow repeat blood cultures  Pulmonary follow-up appreciated  MRSA nares negative  ID will continue to follow    Ras Herrera MD  4:26 PM  7/11/2024

## 2024-07-11 NOTE — PLAN OF CARE
Problem: Chronic Conditions and Co-morbidities  Goal: Patient's chronic conditions and co-morbidity symptoms are monitored and maintained or improved  7/10/2024 2229 by Maritza Hoover RN  Outcome: Progressing  Flowsheets (Taken 7/10/2024 1947)  Care Plan - Patient's Chronic Conditions and Co-Morbidity Symptoms are Monitored and Maintained or Improved: Monitor and assess patient's chronic conditions and comorbid symptoms for stability, deterioration, or improvement  7/10/2024 1116 by Phyllis Altamirano RN  Outcome: Adequate for Discharge     Problem: Discharge Planning  Goal: Discharge to home or other facility with appropriate resources  7/10/2024 2229 by Maritza Hoover RN  Outcome: Progressing  Flowsheets (Taken 7/10/2024 1947)  Discharge to home or other facility with appropriate resources: Identify barriers to discharge with patient and caregiver  7/10/2024 1116 by Phyllis Altamirano RN  Outcome: Adequate for Discharge     Problem: Skin/Tissue Integrity  Goal: Absence of new skin breakdown  Description: 1.  Monitor for areas of redness and/or skin breakdown  2.  Assess vascular access sites hourly  3.  Every 4-6 hours minimum:  Change oxygen saturation probe site  4.  Every 4-6 hours:  If on nasal continuous positive airway pressure, respiratory therapy assess nares and determine need for appliance change or resting period.  7/10/2024 2229 by Maritza Hoover RN  Outcome: Progressing  7/10/2024 1116 by Phyllis Altamirano RN  Outcome: Adequate for Discharge     Problem: Safety - Adult  Goal: Free from fall injury  7/10/2024 2229 by Maritza Hoover RN  Outcome: Progressing  7/10/2024 1116 by Phyllis Altamirano RN  Outcome: Adequate for Discharge

## 2024-07-11 NOTE — CARE COORDINATION
Patient currently on IV Levaquin Q24, IV Solumedrol Q12, echo ordered; pulmonology and ID following. Patient plans to return home, no needs reported and his sister to transport. Resources for treatment provided to patient.     Electronically signed by CECILIA Brunner on 7/11/2024 at 3:55 PM

## 2024-07-11 NOTE — PROGRESS NOTES
[facility]  Department of Internal Medicine  Division of Pulmonary, Critical Care and Sleep Medicine  Progress Noted    Patient: Chuck Chavarria Jr.  MRN: [id number]  : 1950    Encounter Time: 2:15 PM     Date of Admission: 2024  7:08 AM    Primary Care Physician: Gunner Espinosa MD    Reason for Consultation: Acute on Chronic RF  SUBJECTIVE:    BC in Epic noted  BP normal to high  On supplemental oxygen  No complaints      OBJECTIVE:     PHYSICAL EXAM:   VITALS:   Vitals:    24 0308 24 0400 24 0758 24 1230   BP: (!) 145/95  (!) 147/94 (!) 144/87   Pulse: 72  71 76   Resp: 18  20 15   Temp: 98 °F (36.7 °C)  98.4 °F (36.9 °C) 98.1 °F (36.7 °C)   TempSrc: Temporal  Temporal Oral   SpO2: 98% 99% 97% 99%   Weight:       Height:            Intake/Output Summary (Last 24 hours) at 2024 1415  Last data filed at 2024 1014  Gross per 24 hour   Intake 306.96 ml   Output 925 ml   Net -618.04 ml          CONSTITUTIONAL:   A&O x 3, NAD  SKIN:     No skin discoloration  HEENT:     EOMI, MMM, No thrush  NECK:    No bruits, No JVP appreciated  CV:      Sinus,  No murmur, No rubs, No gallops  PULMONARY:   Couse BS,  No Wheezing, No Rales, No Rhonchi      No noted egophony  ABDOMEN:     Soft, non-tender. BS normal. No R/R/G  EXT:    No deformities .  No clubbing.       No lower extremity edema, No venous stasis  PULSE:   Palpable.  PSYCHIATRIC:  Seems appropriate, No acute psychosis  MS:    No fractures, No gross weakness  NEUROLOGIC:   The clinical assessment is non-focal     DATA: IMAGING & TESTING:     LABORATORY TESTS:    CBC:   Lab Results   Component Value Date/Time    WBC 11.1 2024 04:16 AM    RBC 3.72 2024 04:16 AM    HGB 10.9 2024 04:16 AM    HCT 33.3 2024 04:16 AM    MCV 89.5 2024 04:16 AM    MCH 29.3 2024 04:16 AM    MCHC 32.7 2024 04:16 AM    RDW 14.6 2024 04:16 AM     2024

## 2024-07-11 NOTE — RT PROTOCOL NOTE
RT Inhaler-Nebulizer Bronchodilator Protocol Note    There is a bronchodilator order in the chart from a provider indicating to follow the RT Bronchodilator Protocol and there is an “Initiate RT Inhaler-Nebulizer Bronchodilator Protocol” order as well (see protocol at bottom of note).    CXR Findings:  No results found.    The findings from the last RT Protocol Assessment were as follows:   History Pulmonary Disease: Chronic pulmonary disease (Asthma, COPD)  Respiratory Pattern: Regular pattern and RR 12-20 bpm  Breath Sounds: Slightly diminished and/or crackles  Cough: Strong, spontaneous, non-productive  Indication for Bronchodilator Therapy:    Bronchodilator Assessment Score: 4    Aerosolized bronchodilator medication orders have been revised according to the RT Inhaler-Nebulizer Bronchodilator Protocol below.    Respiratory Therapist to perform RT Therapy Protocol Assessment initially then follow the protocol.  Repeat RT Therapy Protocol Assessment PRN for score 0-3 or on second treatment, BID, and PRN for scores above 3.    No Indications - adjust the frequency to every 6 hours PRN wheezing or bronchospasm, if no treatments needed after 48 hours then discontinue using Per Protocol order mode.     If indication present, adjust the RT bronchodilator orders based on the Bronchodilator Assessment Score as indicated below.  Use Inhaler orders unless patient has one or more of the following: on home nebulizer, not able to hold breath for 10 seconds, is not alert and oriented, cannot activate and use MDI correctly, or respiratory rate 25 breaths per minute or more, then use the equivalent nebulizer order(s) with same Frequency and PRN reasons based on the score.  If a patient is on this medication at home then do not decrease Frequency below that used at home.    0-3 - enter or revise RT bronchodilator order(s) to equivalent RT Bronchodilator order with Frequency of every 4 hours PRN for wheezing or increased work of  breathing using Per Protocol order mode.        4-6 - enter or revise RT Bronchodilator order(s) to two equivalent RT bronchodilator orders with one order with BID Frequency and one order with Frequency of every 4 hours PRN wheezing or increased work of breathing using Per Protocol order mode.        7-10 - enter or revise RT Bronchodilator order(s) to two equivalent RT bronchodilator orders with one order with TID Frequency and one order with Frequency of every 4 hours PRN wheezing or increased work of breathing using Per Protocol order mode.       11-13 - enter or revise RT Bronchodilator order(s) to one equivalent RT bronchodilator order with QID Frequency and an Albuterol order with Frequency of every 4 hours PRN wheezing or increased work of breathing using Per Protocol order mode.      Greater than 13 - enter or revise RT Bronchodilator order(s) to one equivalent RT bronchodilator order with every 4 hours Frequency and an Albuterol order with Frequency of every 2 hours PRN wheezing or increased work of breathing using Per Protocol order mode.     RT to enter RT Home Evaluation for COPD & MDI Assessment order using Per Protocol order mode.    Electronically signed by Marjorie Camejo RCP on 7/11/2024 at 9:01 AM

## 2024-07-12 LAB
ALBUMIN SERPL-MCNC: 3.3 G/DL (ref 3.5–5.2)
ALP SERPL-CCNC: 74 U/L (ref 40–129)
ALT SERPL-CCNC: 14 U/L (ref 0–40)
ANION GAP SERPL CALCULATED.3IONS-SCNC: 12 MMOL/L (ref 7–16)
AST SERPL-CCNC: 17 U/L (ref 0–39)
BASOPHILS # BLD: 0 K/UL (ref 0–0.2)
BASOPHILS NFR BLD: 0 % (ref 0–2)
BILIRUB SERPL-MCNC: 0.2 MG/DL (ref 0–1.2)
BUN SERPL-MCNC: 36 MG/DL (ref 6–23)
CALCIUM SERPL-MCNC: 9.4 MG/DL (ref 8.6–10.2)
CHLORIDE SERPL-SCNC: 104 MMOL/L (ref 98–107)
CO2 SERPL-SCNC: 25 MMOL/L (ref 22–29)
CREAT SERPL-MCNC: 1.4 MG/DL (ref 0.7–1.2)
EOSINOPHIL # BLD: 0 K/UL (ref 0.05–0.5)
EOSINOPHILS RELATIVE PERCENT: 0 % (ref 0–6)
ERYTHROCYTE [DISTWIDTH] IN BLOOD BY AUTOMATED COUNT: 14.5 % (ref 11.5–15)
GFR, ESTIMATED: 52 ML/MIN/1.73M2
GLUCOSE SERPL-MCNC: 115 MG/DL (ref 74–99)
HCT VFR BLD AUTO: 36.7 % (ref 37–54)
HGB BLD-MCNC: 11.9 G/DL (ref 12.5–16.5)
LYMPHOCYTES NFR BLD: 0.27 K/UL (ref 1.5–4)
LYMPHOCYTES RELATIVE PERCENT: 3 % (ref 20–42)
MAGNESIUM SERPL-MCNC: 2.2 MG/DL (ref 1.6–2.6)
MCH RBC QN AUTO: 29.2 PG (ref 26–35)
MCHC RBC AUTO-ENTMCNC: 32.4 G/DL (ref 32–34.5)
MCV RBC AUTO: 90 FL (ref 80–99.9)
MICROORGANISM SPEC CULT: NORMAL
MONOCYTES NFR BLD: 0.54 K/UL (ref 0.1–0.95)
MONOCYTES NFR BLD: 5 % (ref 2–12)
NEUTROPHILS NFR BLD: 92 % (ref 43–80)
NEUTS SEG NFR BLD: 9.49 K/UL (ref 1.8–7.3)
PLATELET # BLD AUTO: 242 K/UL (ref 130–450)
PMV BLD AUTO: 10.1 FL (ref 7–12)
POTASSIUM SERPL-SCNC: 4.4 MMOL/L (ref 3.5–5)
PROT SERPL-MCNC: 7.4 G/DL (ref 6.4–8.3)
RBC # BLD AUTO: 4.08 M/UL (ref 3.8–5.8)
RBC # BLD: ABNORMAL 10*6/UL
SODIUM SERPL-SCNC: 141 MMOL/L (ref 132–146)
SPECIMEN DESCRIPTION: NORMAL
WBC OTHER # BLD: 10.3 K/UL (ref 4.5–11.5)

## 2024-07-12 PROCEDURE — 85025 COMPLETE CBC W/AUTO DIFF WBC: CPT

## 2024-07-12 PROCEDURE — 6360000002 HC RX W HCPCS: Performed by: INTERNAL MEDICINE

## 2024-07-12 PROCEDURE — 80053 COMPREHEN METABOLIC PANEL: CPT

## 2024-07-12 PROCEDURE — 2580000003 HC RX 258: Performed by: INTERNAL MEDICINE

## 2024-07-12 PROCEDURE — 6370000000 HC RX 637 (ALT 250 FOR IP): Performed by: FAMILY MEDICINE

## 2024-07-12 PROCEDURE — 99232 SBSQ HOSP IP/OBS MODERATE 35: CPT | Performed by: INTERNAL MEDICINE

## 2024-07-12 PROCEDURE — 97530 THERAPEUTIC ACTIVITIES: CPT

## 2024-07-12 PROCEDURE — 97535 SELF CARE MNGMENT TRAINING: CPT

## 2024-07-12 PROCEDURE — 99232 SBSQ HOSP IP/OBS MODERATE 35: CPT | Performed by: FAMILY MEDICINE

## 2024-07-12 PROCEDURE — 1200000000 HC SEMI PRIVATE

## 2024-07-12 PROCEDURE — 83735 ASSAY OF MAGNESIUM: CPT

## 2024-07-12 PROCEDURE — 6370000000 HC RX 637 (ALT 250 FOR IP): Performed by: INTERNAL MEDICINE

## 2024-07-12 PROCEDURE — 2580000003 HC RX 258: Performed by: FAMILY MEDICINE

## 2024-07-12 PROCEDURE — 6360000002 HC RX W HCPCS: Performed by: FAMILY MEDICINE

## 2024-07-12 PROCEDURE — 94640 AIRWAY INHALATION TREATMENT: CPT

## 2024-07-12 PROCEDURE — 6370000000 HC RX 637 (ALT 250 FOR IP): Performed by: NURSE PRACTITIONER

## 2024-07-12 PROCEDURE — 36415 COLL VENOUS BLD VENIPUNCTURE: CPT

## 2024-07-12 RX ADMIN — APIXABAN 5 MG: 5 TABLET, FILM COATED ORAL at 21:11

## 2024-07-12 RX ADMIN — Medication 3 MG: at 00:21

## 2024-07-12 RX ADMIN — SODIUM CHLORIDE, PRESERVATIVE FREE 10 ML: 5 INJECTION INTRAVENOUS at 21:11

## 2024-07-12 RX ADMIN — APIXABAN 5 MG: 5 TABLET, FILM COATED ORAL at 09:05

## 2024-07-12 RX ADMIN — Medication 10 MG: at 21:10

## 2024-07-12 RX ADMIN — ATORVASTATIN CALCIUM 40 MG: 40 TABLET, FILM COATED ORAL at 09:05

## 2024-07-12 RX ADMIN — METOPROLOL SUCCINATE 25 MG: 25 TABLET, EXTENDED RELEASE ORAL at 21:10

## 2024-07-12 RX ADMIN — LEVOFLOXACIN 750 MG: 5 INJECTION, SOLUTION INTRAVENOUS at 21:19

## 2024-07-12 RX ADMIN — IPRATROPIUM BROMIDE AND ALBUTEROL SULFATE 1 DOSE: .5; 2.5 SOLUTION RESPIRATORY (INHALATION) at 19:30

## 2024-07-12 RX ADMIN — WATER 20 MG: 1 INJECTION INTRAMUSCULAR; INTRAVENOUS; SUBCUTANEOUS at 09:05

## 2024-07-12 RX ADMIN — WATER 20 MG: 1 INJECTION INTRAMUSCULAR; INTRAVENOUS; SUBCUTANEOUS at 21:11

## 2024-07-12 RX ADMIN — ARFORMOTEROL TARTRATE: 15 SOLUTION RESPIRATORY (INHALATION) at 07:30

## 2024-07-12 RX ADMIN — ARFORMOTEROL TARTRATE: 15 SOLUTION RESPIRATORY (INHALATION) at 19:30

## 2024-07-12 RX ADMIN — SODIUM CHLORIDE, PRESERVATIVE FREE 10 ML: 5 INJECTION INTRAVENOUS at 09:06

## 2024-07-12 RX ADMIN — ASPIRIN 81 MG: 81 TABLET, COATED ORAL at 09:05

## 2024-07-12 RX ADMIN — CYANOCOBALAMIN TAB 1000 MCG 1000 MCG: 1000 TAB at 09:05

## 2024-07-12 RX ADMIN — IPRATROPIUM BROMIDE AND ALBUTEROL SULFATE 1 DOSE: .5; 2.5 SOLUTION RESPIRATORY (INHALATION) at 07:30

## 2024-07-12 RX ADMIN — Medication 5 DROP: at 21:10

## 2024-07-12 RX ADMIN — METOPROLOL SUCCINATE 25 MG: 25 TABLET, EXTENDED RELEASE ORAL at 09:05

## 2024-07-12 RX ADMIN — Medication 1000 UNITS: at 09:05

## 2024-07-12 NOTE — CARE COORDINATION
Peer Recovery Support Note       Name:  Chuck Chavarria Jr.   Date:    7/12/2024        Chief Complaint   Patient presents with    Shortness of Breath     SOB that got worse his morning. His inhaler ran out tonight, +wheezing. EMS gave 1 duoneb and stated pt was 90% room air. Now 100% on 4LNc after breathing Tx.            Peer Support met with patient.  [] Support and education provided  [x] Resources provided   [] Treatment referral:   [] Other:   [] Patient declined peer recovery services      Referred By: Morelia (SUNITA)     Notes:  Was asked to see the patient, and bring the patient some resources for out patient treatment addiction recovery area resources.    I leafed through the resource pamphlet with the patient, to show him the different areas, and services (in patient and out patient) that are available and all the numbers.    I placed the addiction recovery area resources pamphlet under the patient property, and made sure that the patient seen it and has access to it.    Electronically signed by Jose Maria Coles on 7/12/2024 at 4:08 PM

## 2024-07-12 NOTE — PROGRESS NOTES
Occupational Therapy  OCCUPATIONAL THERAPY TREATMENT SESSION    [facility]  [address], [place]      OT BEDSIDE TREATMENT NOTE      Date:2024  Patient Name: Chuck Chavarria Jr.  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         Precautions:  Fall Risk,  continuous pulse ox, +alarms     Assessment of current deficits    [x] Functional mobility          [x]ADLs           [x] Strength                  []Cognition    [x] Functional transfers        [x] IADLs         [x] Safety Awareness   [x]Endurance    [] Fine Coordination                        [x] Balance      [] Vision/perception   []Sensation      []Gross Motor Coordination            [] ROM           [] Delirium                   [] Motor Control      OT PLAN OF CARE   OT POC based on physician orders, patient diagnosis and results of clinical assessment     Frequency/Duration 1-3 days/wk for 2 weeks PRN   Specific OT Treatment Interventions to include:   * Instruction/training on adapted ADL techniques and AE recommendations to increase functional independence within precautions       * Training on energy conservation strategies, correct breathing pattern and techniques to improve independence/tolerance for self-care routine  *  Functional transfer/mobility training/DME recommendations for increased independence, safety, and fall prevention  * Patient/Family education to increase follow through with safety techniques and functional independence  * Recommendation of environmental modifications for increased safety with functional transfers/mobility and ADLs  * Therapeutic exercise to improve motor endurance, ROM, and functional strength for ADLs/functional transfers  * Therapeutic activities to facilitate/challenge dynamic balance, stand tolerance for increased safety and independence with ADLs     Recommended Adaptive Equipment:  TBD      Home Living: Pt lives alone in a 5th floor apartment with elevator access    Bathroom setup: tub/shower combo    Equipment owned: none     Prior Level of Function: Independent  with ADLs , Independent  with IADLs; ambulated with cane prn   Driving: yes   Occupation: na     Pain Level: Pt c/o no pain this session     Cognition: A&O: 4/4; Follows 2 step directions             Memory:  good             Sequencing:  good             Problem solving:  good             Judgement/safety:  fair               Functional Assessment:  AM-PAC Daily Activity Raw Score: 19/24    Initial Eval Status  Date: 7/10/24 Treatment Status  Date: 7/12/24 STGs = LTGs  Time frame: 10-14 days   Feeding Independent        Grooming Supervision   SBA  Standing at sink for various tasks Modified [place]    UB Dressing Supervision   SBA  Gown Modified [place]    LB Dressing Stand by Assist   Min A  Donned/doffed B socks Modified [place]    Bathing Stand by Assist  Min A  Simulated Modified [place]    Toileting Stand by Assist  SBA  Standing at commode for bladder management  Modified [place]    Bed Mobility  Supine to sit: Supervision   Sit to supine: NT  Supine to sit: SBA   Sit to supine: SBA Supine to sit: Modified [place]   Sit to supine: Modified [place]    Functional Transfers Stand by Assist

## 2024-07-12 NOTE — PLAN OF CARE
Problem: Chronic Conditions and Co-morbidities  Goal: Patient's chronic conditions and co-morbidity symptoms are monitored and maintained or improved  7/12/2024 1035 by Alysa Norton RN  Outcome: Progressing  7/11/2024 2149 by Maritza Hoover RN  Outcome: Progressing  Flowsheets (Taken 7/11/2024 1945)  Care Plan - Patient's Chronic Conditions and Co-Morbidity Symptoms are Monitored and Maintained or Improved: Monitor and assess patient's chronic conditions and comorbid symptoms for stability, deterioration, or improvement     Problem: Discharge Planning  Goal: Discharge to home or other facility with appropriate resources  7/12/2024 1035 by Alysa Norton RN  Outcome: Progressing  7/11/2024 2149 by Maritza Hoover RN  Outcome: Progressing  Flowsheets (Taken 7/11/2024 1945)  Discharge to home or other facility with appropriate resources: Identify barriers to discharge with patient and caregiver     Problem: Skin/Tissue Integrity  Goal: Absence of new skin breakdown  Description: 1.  Monitor for areas of redness and/or skin breakdown  2.  Assess vascular access sites hourly  3.  Every 4-6 hours minimum:  Change oxygen saturation probe site  4.  Every 4-6 hours:  If on nasal continuous positive airway pressure, respiratory therapy assess nares and determine need for appliance change or resting period.  7/12/2024 1035 by Alysa Norton RN  Outcome: Progressing  7/11/2024 2149 by Maritza Hoover RN  Outcome: Progressing     Problem: Safety - Adult  Goal: Free from fall injury  7/12/2024 1035 by Alysa Norton RN  Outcome: Progressing  7/11/2024 2149 by Maritza Hoover RN  Outcome: Progressing

## 2024-07-12 NOTE — PROGRESS NOTES
[facility]  Department of Internal Medicine  Division of Pulmonary, Critical Care and Sleep Medicine  Progress Noted    Patient: Chuck Chavarria Jr.  MRN: [id number]  : 1950    Encounter Time: 9:38 AM     Date of Admission: 2024  7:08 AM    Primary Care Physician: Gunner Espinosa MD    Reason for Consultation: Acute on Chronic RF  SUBJECTIVE:    BC in Epic noted  BP normal to high  On supplemental oxygen  No complaints      OBJECTIVE:     PHYSICAL EXAM:   VITALS:   Vitals:    24 1945 24 0022 24 0819 24 0905   BP: (!) 146/84 (!) 148/70 (!) 159/83 (!) 166/82   Pulse: 77  79 74   Resp: 18  18 18   Temp: 98 °F (36.7 °C)  98.2 °F (36.8 °C) 97 °F (36.1 °C)   TempSrc: Oral  Oral Temporal   SpO2: 96%  98% 97%   Weight:       Height:            Intake/Output Summary (Last 24 hours) at 2024 0938  Last data filed at 2024 0022  Gross per 24 hour   Intake --   Output 1150 ml   Net -1150 ml          CONSTITUTIONAL:   A&O x 3, NAD  SKIN:     No skin discoloration  HEENT:     EOMI, MMM, No thrush  NECK:    No bruits, No JVP appreciated  CV:      Sinus,  No murmur, No rubs, No gallops  PULMONARY:   Couse BS,  No Wheezing, No Rales, No Rhonchi      No noted egophony  ABDOMEN:     Soft, non-tender. BS normal. No R/R/G  EXT:    No deformities .  No clubbing.       No lower extremity edema, No venous stasis  PULSE:   Palpable.  PSYCHIATRIC:  Seems appropriate, No acute psychosis  MS:    No fractures, No gross weakness  NEUROLOGIC:   The clinical assessment is non-focal     DATA: IMAGING & TESTING:     LABORATORY TESTS:    CBC:   Lab Results   Component Value Date/Time    WBC 10.3 2024 04:22 AM    RBC 4.08 2024 04:22 AM    HGB 11.9 2024 04:22 AM    HCT 36.7 2024 04:22 AM    MCV 90.0 2024 04:22 AM    MCH 29.2 2024 04:22 AM    MCHC 32.4 2024 04:22 AM    RDW 14.5 2024 04:22 AM     2024 04:22 AM             Assessment:   Acute on chronic respiratory failure  Drug abuse =Cocaine + repeatedly   Chronci apical tam emphysema with fibrotic scaring  Thicken bronchial tissue on CT scan  Fatty liver.   Enlarged prostate.   Mild nonspecific urinary bladder wall thickening similar to previous.   IVC filter ASVD.   Nonspecific swirling of the central mesentery  HTN  HLN  Smoking abuse      Plan:   Bronchodialtors Q4  Echo  SRAVANTHI negative  Complains about his ears - Cercumex added  Wean off BIPAP  Nicotene patch refused  Continue abx for now defer to ID  PT OT   Check Vitamin D level  Check TSH  Couseling needed for drug issue  MRSA nares pending   BC repeat negative  DIscharge planning       Keagan Spivey DO MPH, FCCP, TOSIN, FACP  Professor of Internal Medicine  Pulmonary, Critical Care and Sleep Medicine

## 2024-07-12 NOTE — CARE COORDINATION
Patient continues on IV Levaquin Q48, IV Solumedrol Q12, echo completed yesterday, EF 50-55%, sputum and blood cx's positive; ID and pulmonology following. Patient currently independent in the room without a device, plans to return home, no needs and his sister will transport home when discharged. [facility] discussed with patient; patient agreeable to a referral. Message sent to [facility] regarding the referral.     The Plan for Transition of Care is related to the following treatment goals: discharge planning    The Patient and/or patient representative Chuck Chavarria Jr. was provided with a choice of provider and agrees   with the discharge plan. [x] Yes [] No    Freedom of choice list was provided with basic dialogue that supports the patient's individualized plan of care/goals, treatment preferences and shares the quality data associated with the providers. [x] Yes [] No     Electronically signed by CECILIA Brunner on 7/12/2024 at 8:59 AM

## 2024-07-12 NOTE — PROGRESS NOTES
[facility]  [facility]  Progress Note      Chief Complaint   Patient presents with    Shortness of Breath     SOB that got worse his morning. His inhaler ran out tonight, +wheezing. EMS gave 1 duoneb and stated pt was 90% room air. Now 100% on 4LNc after breathing Tx.        SUBJECTIVE:    Patient is tolerating medications. No reported adverse drug reactions.  No nausea, vomiting, diarrhea.    Review of systems:  As stated above in the chief complaint, otherwise negative.    Medications:  Scheduled Meds:   ipratropium 0.5 mg-albuterol 2.5 mg  1 Dose Inhalation BID RT    levofloxacin  750 mg IntraVENous Q48H    carbamide peroxide  5 drop Both Ears BID    methylPREDNISolone  20 mg IntraVENous Q12H    melatonin  10 mg Oral Nightly    aspirin  81 mg Oral Daily    atorvastatin  40 mg Oral Daily    arformoterol 15 mcg-budesonide 0.25 mg neb solution   Nebulization BID RT    cyanocobalamin  1,000 mcg Oral Daily    Vitamin D  1,000 Units Oral Daily    nicotine  1 patch TransDERmal Daily    sodium chloride flush  5-40 mL IntraVENous 2 times per day    [Held by provider] furosemide  20 mg IntraVENous Daily    metoprolol succinate  25 mg Oral BID    apixaban  5 mg Oral BID     Continuous Infusions:   sodium chloride       PRN Meds:simethicone, melatonin, guaiFENesin, sodium chloride flush, sodium chloride, potassium chloride **OR** potassium alternative oral replacement **OR** potassium chloride, magnesium sulfate, polyethylene glycol, acetaminophen **OR** acetaminophen, ondansetron **OR** ondansetron, hydrALAZINE    OBJECTIVE:  BP (!) 166/82   Pulse 74   Temp 97 °F (36.1 °C) (Temporal)   Resp 18   Ht 1.727 m (5' 8\")   Wt 56.7 kg (125 lb) Comment: previous encounter  SpO2 97%   BMI 19.01 kg/m²   Temp  Av.8 °F (36.6 °C)  Min: 97 °F (36.1 °C)  Max: 98.2 °F (36.8 °C)  Constitutional: The patient is awake, alert, and oriented.   Skin: Warm and dry. No rashes were noted. No  0.4 08/27/2022     No results found for: \"SEDRATE\"  Radiology:      Microbiology:   Lab Results   Component Value Date/Time    BC 5 Days no growth 06/12/2023 09:59 PM    ORG C Difficile Toxin A and/or B detected 09/10/2015 06:40 PM     Lab Results   Component Value Date/Time    BLOODCULT2 5 Days no growth 06/12/2023 09:59 PM    ORG C Difficile Toxin A and/or B detected 09/10/2015 06:40 PM     No results found for: \"WNDABS\"  No results found for: \"RESPSMEAR\"      Component Value Date/Time    MPNEUMO Not Detected 07/08/2024 1230     No results found for: \"CULTRESP\"  No results found for: \"CXCATHTIP\"  No results found for: \"BFCS\"  No results found for: \"CXSURG\"  No results found for: \"LABURIN\"  MRSA Culture Only   Date Value Ref Range Status   06/13/2023 Methicillin resistant Staph aureus not isolated  Final       ASSESSMENT:  Streptococcal bacteremia  Emphysema  Cocaine abuse  Chronic apical fibrotic scarring of lung  Candida Colonizer Resp Tract     Plan:    Continue levofloxacin 750 mg IV daily  TTE did not show any evidence of vegetations, SRAVANTHI ordered  Follow repeat blood cultures  Pulmonary follow-up appreciated  MRSA nares negative  ID will continue to follow    Ras Herrera MD  2:31 PM  7/12/2024

## 2024-07-12 NOTE — PROGRESS NOTES
Hospitalist Progress Note      Synopsis: Patient admitted on 7/8/2024 Chuck Chavarria Jr. is a 73 y.o. male who presents for shortness of breath.  Patient reports: EMS as he was short of breath.  He reports he has a history of COPD and ran out of his inhaler last night.  He was coughing and wheezing.  He reports that he is spitting up mucus.  EMS gave 1 breathing treatment as he was hypoxic.  They placed him on 4 L.  He denies fever but reports productive cough.  He denies chest pain.  He says he feels like his heart is beating rapidly.  He says he has a history of atrial fibrillation but is not anticoagulated.  He denies any history of DVT or PE.  He denies abdominal pain.  He denies any other complaints.     Discussed with ED physician     Workup in the ED essentially unremarkable for possible pneumonia, A-fib RVR.  CTA showed no PE, possible pneumonia, pulmonary vascular congestion.  There was a reported possible allergic reaction to possibly doxycycline versus Rocephin?  With reported worsening shortness of breath patient received epinephrine steroids and Benadryl.  During my interview with the patient he is slightly confused lethargic arousable on BiPAP       Subjective  Patient seen and examined chart reviewed discussed with nursing staff no overnight events reported  Patient currently comfortable on room air no chest pain or shortness of breath he is afebrile normal WBC count echocardiogram done shows no vegetations normal EF    Exam:  BP (!) 166/82   Pulse 74   Temp 97 °F (36.1 °C) (Temporal)   Resp 18   Ht 1.727 m (5' 8\")   Wt 56.7 kg (125 lb) Comment: previous encounter  SpO2 97%   BMI 19.01 kg/m²   -General:  Awake, alert, oriented X 3.  Well developed, well nourished, well groomed.  No apparent distress.    -HEENT:  Normocephalic, atraumatic.  Pupils equal, round, reactive to light.  No scleral icterus.  No conjunctival injection.  Normal lips, teeth, and gums.  No nasal discharge.  Neck:

## 2024-07-12 NOTE — PROGRESS NOTES
Physical Therapy  Initial Assessment     Name: Chuck Chavarria Jr.  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  Precautions:  Fall risk, O2    SUBJECTIVE:    Pt lives alone in a 5th floor apartment unit with elevator access. Level entry into building. Pt ambulated with tripod cane prior to admission.    OBJECTIVE:   Initial Evaluation  Date: 7/10/24 Treatment Date:  24 Short Term/ Long Term   Goals   AM-PAC 6 Clicks     Was pt agreeable to Eval/treatment? Yes yes    Does pt have pain? 9/10 abdominal pain from coughing     Bed Mobility  Rolling: NT  Supine to sit: Supervision  Sit to supine: NT  Scooting: Supervision to EOB Rolling: Ind  Supine to sit:   Ind   Sit to supine: Ind   Scooting: Ind   Rolling: Independent   Supine to sit: Independent   Sit to supine: Independent   Scooting: Independent    Transfers Sit to stand: SBA  Stand to sit: SBA  Stand pivot: SBA with WW Sit to stand: Sup to fww  Stand to sit: Sup  Stand pivot: Sup with fww  Sit to stand: Independent   Stand to sit: Independent   Stand pivot: Independent    Ambulation   150 feet with WW with SBA  100 feet with fww SBA  >400 feet with WW Mod Independent    Stair negotiation: ascended and descended NT NT >12 step(s) with 1 rail(s) Mod Independent    ROM BUE: Refer to OT note  BLE: WFL     Strength BUE: Refer to OT note  BLE: WFL     Balance Sitting EOB: Independent   Dynamic Standing: SBA with WW  Dynamic

## 2024-07-12 NOTE — PLAN OF CARE
Problem: Chronic Conditions and Co-morbidities  Goal: Patient's chronic conditions and co-morbidity symptoms are monitored and maintained or improved  7/11/2024 2149 by Maritza Hoover RN  Outcome: Progressing  Flowsheets (Taken 7/11/2024 1945)  Care Plan - Patient's Chronic Conditions and Co-Morbidity Symptoms are Monitored and Maintained or Improved: Monitor and assess patient's chronic conditions and comorbid symptoms for stability, deterioration, or improvement  7/11/2024 1313 by Monalisa Hoang RN  Outcome: Progressing     Problem: Discharge Planning  Goal: Discharge to home or other facility with appropriate resources  7/11/2024 2149 by Maritza Hoover RN  Outcome: Progressing  Flowsheets (Taken 7/11/2024 1945)  Discharge to home or other facility with appropriate resources: Identify barriers to discharge with patient and caregiver  7/11/2024 1313 by Monalisa Hoang RN  Outcome: Progressing     Problem: Skin/Tissue Integrity  Goal: Absence of new skin breakdown  Description: 1.  Monitor for areas of redness and/or skin breakdown  2.  Assess vascular access sites hourly  3.  Every 4-6 hours minimum:  Change oxygen saturation probe site  4.  Every 4-6 hours:  If on nasal continuous positive airway pressure, respiratory therapy assess nares and determine need for appliance change or resting period.  7/11/2024 2149 by Maritza Hoover RN  Outcome: Progressing  7/11/2024 1313 by Monalisa Hoang RN  Outcome: Progressing     Problem: Safety - Adult  Goal: Free from fall injury  7/11/2024 2149 by Maritza Hoover RN  Outcome: Progressing  7/11/2024 1313 by Monalisa Hoang RN  Outcome: Progressing

## 2024-07-13 LAB
ALBUMIN SERPL-MCNC: 3.1 G/DL (ref 3.5–5.2)
ALP SERPL-CCNC: 68 U/L (ref 40–129)
ALT SERPL-CCNC: 15 U/L (ref 0–40)
ANION GAP SERPL CALCULATED.3IONS-SCNC: 14 MMOL/L (ref 7–16)
AST SERPL-CCNC: 18 U/L (ref 0–39)
BASOPHILS # BLD: 0 K/UL (ref 0–0.2)
BASOPHILS NFR BLD: 0 % (ref 0–2)
BILIRUB SERPL-MCNC: 0.2 MG/DL (ref 0–1.2)
BUN SERPL-MCNC: 31 MG/DL (ref 6–23)
CALCIUM SERPL-MCNC: 8.8 MG/DL (ref 8.6–10.2)
CHLORIDE SERPL-SCNC: 105 MMOL/L (ref 98–107)
CO2 SERPL-SCNC: 23 MMOL/L (ref 22–29)
CREAT SERPL-MCNC: 1.2 MG/DL (ref 0.7–1.2)
EOSINOPHIL # BLD: 0 K/UL (ref 0.05–0.5)
EOSINOPHILS RELATIVE PERCENT: 0 % (ref 0–6)
ERYTHROCYTE [DISTWIDTH] IN BLOOD BY AUTOMATED COUNT: 14.4 % (ref 11.5–15)
GFR, ESTIMATED: 66 ML/MIN/1.73M2
GLUCOSE SERPL-MCNC: 153 MG/DL (ref 74–99)
HCT VFR BLD AUTO: 35.1 % (ref 37–54)
HGB BLD-MCNC: 11.6 G/DL (ref 12.5–16.5)
LYMPHOCYTES NFR BLD: 0.28 K/UL (ref 1.5–4)
LYMPHOCYTES RELATIVE PERCENT: 4 % (ref 20–42)
MAGNESIUM SERPL-MCNC: 2 MG/DL (ref 1.6–2.6)
MCH RBC QN AUTO: 29.6 PG (ref 26–35)
MCHC RBC AUTO-ENTMCNC: 33 G/DL (ref 32–34.5)
MCV RBC AUTO: 89.5 FL (ref 80–99.9)
MICROORGANISM SPEC CULT: NORMAL
MONOCYTES NFR BLD: 0.56 K/UL (ref 0.1–0.95)
MONOCYTES NFR BLD: 7 % (ref 2–12)
NEUTROPHILS NFR BLD: 89 % (ref 43–80)
NEUTS SEG NFR BLD: 7.05 K/UL (ref 1.8–7.3)
PLATELET # BLD AUTO: 245 K/UL (ref 130–450)
PMV BLD AUTO: 10.2 FL (ref 7–12)
POTASSIUM SERPL-SCNC: 4.3 MMOL/L (ref 3.5–5)
PROT SERPL-MCNC: 6.8 G/DL (ref 6.4–8.3)
RBC # BLD AUTO: 3.92 M/UL (ref 3.8–5.8)
RBC # BLD: ABNORMAL 10*6/UL
SERVICE CMNT-IMP: NORMAL
SODIUM SERPL-SCNC: 142 MMOL/L (ref 132–146)
SPECIMEN DESCRIPTION: NORMAL
WBC OTHER # BLD: 7.9 K/UL (ref 4.5–11.5)

## 2024-07-13 PROCEDURE — 6360000002 HC RX W HCPCS: Performed by: FAMILY MEDICINE

## 2024-07-13 PROCEDURE — 6370000000 HC RX 637 (ALT 250 FOR IP): Performed by: FAMILY MEDICINE

## 2024-07-13 PROCEDURE — 1200000000 HC SEMI PRIVATE

## 2024-07-13 PROCEDURE — 85025 COMPLETE CBC W/AUTO DIFF WBC: CPT

## 2024-07-13 PROCEDURE — 83735 ASSAY OF MAGNESIUM: CPT

## 2024-07-13 PROCEDURE — 36415 COLL VENOUS BLD VENIPUNCTURE: CPT

## 2024-07-13 PROCEDURE — 2580000003 HC RX 258: Performed by: FAMILY MEDICINE

## 2024-07-13 PROCEDURE — 2580000003 HC RX 258: Performed by: INTERNAL MEDICINE

## 2024-07-13 PROCEDURE — 6360000002 HC RX W HCPCS: Performed by: INTERNAL MEDICINE

## 2024-07-13 PROCEDURE — 94640 AIRWAY INHALATION TREATMENT: CPT

## 2024-07-13 PROCEDURE — 6370000000 HC RX 637 (ALT 250 FOR IP): Performed by: INTERNAL MEDICINE

## 2024-07-13 PROCEDURE — 99232 SBSQ HOSP IP/OBS MODERATE 35: CPT | Performed by: FAMILY MEDICINE

## 2024-07-13 PROCEDURE — 80053 COMPREHEN METABOLIC PANEL: CPT

## 2024-07-13 RX ADMIN — Medication 1000 UNITS: at 08:27

## 2024-07-13 RX ADMIN — ARFORMOTEROL TARTRATE: 15 SOLUTION RESPIRATORY (INHALATION) at 19:45

## 2024-07-13 RX ADMIN — IPRATROPIUM BROMIDE AND ALBUTEROL SULFATE 1 DOSE: .5; 2.5 SOLUTION RESPIRATORY (INHALATION) at 07:49

## 2024-07-13 RX ADMIN — SODIUM CHLORIDE, PRESERVATIVE FREE 10 ML: 5 INJECTION INTRAVENOUS at 21:12

## 2024-07-13 RX ADMIN — ARFORMOTEROL TARTRATE: 15 SOLUTION RESPIRATORY (INHALATION) at 07:49

## 2024-07-13 RX ADMIN — METOPROLOL SUCCINATE 25 MG: 25 TABLET, EXTENDED RELEASE ORAL at 21:11

## 2024-07-13 RX ADMIN — Medication 5 DROP: at 21:12

## 2024-07-13 RX ADMIN — ASPIRIN 81 MG: 81 TABLET, COATED ORAL at 08:26

## 2024-07-13 RX ADMIN — ONDANSETRON 4 MG: 2 INJECTION INTRAMUSCULAR; INTRAVENOUS at 15:13

## 2024-07-13 RX ADMIN — ATORVASTATIN CALCIUM 40 MG: 40 TABLET, FILM COATED ORAL at 08:27

## 2024-07-13 RX ADMIN — Medication 10 MG: at 21:11

## 2024-07-13 RX ADMIN — METOPROLOL SUCCINATE 25 MG: 25 TABLET, EXTENDED RELEASE ORAL at 08:27

## 2024-07-13 RX ADMIN — IPRATROPIUM BROMIDE AND ALBUTEROL SULFATE 1 DOSE: .5; 2.5 SOLUTION RESPIRATORY (INHALATION) at 19:45

## 2024-07-13 RX ADMIN — APIXABAN 5 MG: 5 TABLET, FILM COATED ORAL at 08:26

## 2024-07-13 RX ADMIN — WATER 20 MG: 1 INJECTION INTRAMUSCULAR; INTRAVENOUS; SUBCUTANEOUS at 21:11

## 2024-07-13 RX ADMIN — WATER 20 MG: 1 INJECTION INTRAMUSCULAR; INTRAVENOUS; SUBCUTANEOUS at 08:37

## 2024-07-13 RX ADMIN — CYANOCOBALAMIN TAB 1000 MCG 1000 MCG: 1000 TAB at 08:26

## 2024-07-13 RX ADMIN — Medication 5 DROP: at 08:28

## 2024-07-13 RX ADMIN — APIXABAN 5 MG: 5 TABLET, FILM COATED ORAL at 21:11

## 2024-07-13 RX ADMIN — SODIUM CHLORIDE, PRESERVATIVE FREE 10 ML: 5 INJECTION INTRAVENOUS at 08:27

## 2024-07-13 NOTE — PLAN OF CARE
Problem: Chronic Conditions and Co-morbidities  Goal: Patient's chronic conditions and co-morbidity symptoms are monitored and maintained or improved  7/13/2024 1400 by Gabbi Perez RN  Outcome: Progressing  7/13/2024 0021 by Joselyn Reese RN  Outcome: Progressing     Problem: Discharge Planning  Goal: Discharge to home or other facility with appropriate resources  7/13/2024 1400 by Gabbi Perez RN  Outcome: Progressing  7/13/2024 0021 by Joselyn Reese RN  Outcome: Progressing     Problem: Skin/Tissue Integrity  Goal: Absence of new skin breakdown  Description: 1.  Monitor for areas of redness and/or skin breakdown  2.  Assess vascular access sites hourly  3.  Every 4-6 hours minimum:  Change oxygen saturation probe site  4.  Every 4-6 hours:  If on nasal continuous positive airway pressure, respiratory therapy assess nares and determine need for appliance change or resting period.  7/13/2024 1400 by Gabbi Perez RN  Outcome: Progressing  7/13/2024 0021 by Joselyn Reese RN  Outcome: Progressing     Problem: Safety - Adult  Goal: Free from fall injury  7/13/2024 1400 by Gabbi Perez RN  Outcome: Progressing  7/13/2024 0021 by Joselyn Reese RN  Outcome: Progressing

## 2024-07-13 NOTE — PROGRESS NOTES
Hospitalist Progress Note      Synopsis: Patient admitted on 7/8/2024 Chuck Chavarria Jr. is a 73 y.o. male who presents for shortness of breath.  Patient reports: EMS as he was short of breath.  He reports he has a history of COPD and ran out of his inhaler last night.  He was coughing and wheezing.  He reports that he is spitting up mucus.  EMS gave 1 breathing treatment as he was hypoxic.  They placed him on 4 L.  He denies fever but reports productive cough.  He denies chest pain.  He says he feels like his heart is beating rapidly.  He says he has a history of atrial fibrillation but is not anticoagulated.  He denies any history of DVT or PE.  He denies abdominal pain.  He denies any other complaints.     Discussed with ED physician     Workup in the ED essentially unremarkable for possible pneumonia, A-fib RVR.  CTA showed no PE, possible pneumonia, pulmonary vascular congestion.  There was a reported possible allergic reaction to possibly doxycycline versus Rocephin?  With reported worsening shortness of breath patient received epinephrine steroids and Benadryl.  During my interview with the patient he is slightly confused lethargic arousable on BiPAP       Subjective  Patient seen and examined chart reviewed discussed with nursing staff no overnight events reported patient is currently comfortable no chest pain or shortness of breath is afebrile patient being followed by infectious diseases and plan for a SRAVANTHI on Monday  Exam:  BP (!) 169/97   Pulse 72   Temp (!) 96.6 °F (35.9 °C) (Temporal)   Resp 18   Ht 1.727 m (5' 8\")   Wt 56.7 kg (125 lb) Comment: previous encounter  SpO2 99%   BMI 19.01 kg/m²   -General:  Awake, alert, oriented X 3.  Well developed, well nourished, well groomed.  No apparent distress.    -HEENT:  Normocephalic, atraumatic.  Pupils equal, round, reactive to light.  No scleral icterus.  No conjunctival injection.  Normal lips, teeth, and gums.  No nasal discharge.  Neck:   Supple    -Heart:  RRR, no murmurs, gallops, rubs    -Lungs: Bilateral wheezes    -Abdomen:  Bowel sounds present, soft, nontender, no masses, no organomegaly, no peritoneal signs    -Extremities: normal ROM. No Cyanosis clubbing or edema    -Skin: Warm and dry    -Neuro:  AAO x 3 .Cranial nerves 2-12 intact, no focal deficits     -Psych : normal .    Medications:  Reviewed    Infusion Medications    sodium chloride       Scheduled Medications    ipratropium 0.5 mg-albuterol 2.5 mg  1 Dose Inhalation BID RT    levofloxacin  750 mg IntraVENous Q48H    carbamide peroxide  5 drop Both Ears BID    methylPREDNISolone  20 mg IntraVENous Q12H    melatonin  10 mg Oral Nightly    aspirin  81 mg Oral Daily    atorvastatin  40 mg Oral Daily    arformoterol 15 mcg-budesonide 0.25 mg neb solution   Nebulization BID RT    cyanocobalamin  1,000 mcg Oral Daily    Vitamin D  1,000 Units Oral Daily    nicotine  1 patch TransDERmal Daily    sodium chloride flush  5-40 mL IntraVENous 2 times per day    [Held by provider] furosemide  20 mg IntraVENous Daily    metoprolol succinate  25 mg Oral BID    apixaban  5 mg Oral BID     PRN Meds: simethicone, melatonin, guaiFENesin, sodium chloride flush, sodium chloride, potassium chloride **OR** potassium alternative oral replacement **OR** potassium chloride, magnesium sulfate, polyethylene glycol, acetaminophen **OR** acetaminophen, ondansetron **OR** ondansetron, hydrALAZINE    I/O  No intake or output data in the 24 hours ending 07/13/24 1014      Labs:   Recent Labs     07/11/24 0416 07/12/24 0422 07/13/24  0430   WBC 11.1 10.3 7.9   HGB 10.9* 11.9* 11.6*   HCT 33.3* 36.7* 35.1*    242 245         Recent Labs     07/11/24 0416 07/12/24  0422 07/13/24  0430    141 142   K 4.0 4.4 4.3    104 105   CO2 26 25 23   BUN 36* 36* 31*   CREATININE 1.4* 1.4* 1.2   CALCIUM 9.0 9.4 8.8         Recent Labs     07/11/24 0416 07/12/24  0422 07/13/24  0430   ALKPHOS 72 74 68   ALT

## 2024-07-13 NOTE — PROGRESS NOTES
4 Eyes Skin Assessment     NAME:  Chuck Chavarria Jr.  YOB: 1950  MEDICAL RECORD NUMBER:  [id number]    The patient is being assessed for  Admission    I agree that at least one RN has performed a thorough Head to Toe Skin Assessment on the patient. ALL assessment sites listed below have been assessed.      Areas assessed by both nurses:    Head, Face, Ears, Shoulders, Back, Chest, Arms, Elbows, Hands, Sacrum. Buttock, Coccyx, Ischium, Legs. Feet and Heels, and Under Medical Devices         Does the Patient have a Wound? No noted wound(s)       Calluses bilat feet/heels with old healed looking wounds on bilat heels    Hypopigmentation BLE and hands    Dry flaky calluses bilat knees.     Martin Prevention initiated by RN: Yes  Wound Care Orders initiated by RN: No    Pressure Injury (Stage 3,4, Unstageable, DTI, NWPT, and Complex wounds) if present, place Wound referral order by RN under : No    New Ostomies, if present place, Ostomy referral order under : No     Nurse 1 eSignature: Electronically signed by Joselyn Reese RN on 7/13/24 at 12:29 AM EDT    **SHARE this note so that the co-signing nurse can place an eSignature**    Nurse 2 eSignature: Electronically signed by Sapna Arevalo RN on 7/13/24 at 2:56 AM EDT

## 2024-07-13 NOTE — PROGRESS NOTES
Eyes show round, and reactive pupils. Moist mucous membranes, no ulcerations, no thrush.   Neck: Supple to movements. No lymphadenopathy.   Chest: No use of accessory muscles to breathe. Symmetrical expansion. Auscultation reveals bibasilar crackles  Cardiovascular: S1 and S2 are rhythmic and regular. No murmurs appreciated.   Abdomen: Positive bowel sounds to auscultation. Benign to palpation. No masses felt. No hepatosplenomegaly.  Genitourinary: No pain in the lower abdomen  Extremities: No clubbing, no cyanosis, no edema.  Musculoskeletal: No pain in range of motion of any joints  Neurological: Following commands, no focal neurodeficit  Lines: peripheral    Laboratory and Tests Review:  Lab Results   Component Value Date    WBC 7.9 07/13/2024    WBC 10.3 07/12/2024    WBC 11.1 07/11/2024    HGB 11.6 (L) 07/13/2024    HCT 35.1 (L) 07/13/2024    MCV 89.5 07/13/2024     07/13/2024     Lab Results   Component Value Date    NEUTROABS 7.05 07/13/2024    NEUTROABS 9.49 (H) 07/12/2024    NEUTROABS 9.88 (H) 07/11/2024     No results found for: \"CRPHS\"  Lab Results   Component Value Date    ALT 15 07/13/2024    AST 18 07/13/2024    ALKPHOS 68 07/13/2024    BILITOT 0.2 07/13/2024     Lab Results   Component Value Date/Time     07/13/2024 04:30 AM    K 4.3 07/13/2024 04:30 AM    K 4.8 08/28/2022 05:15 AM     07/13/2024 04:30 AM    CO2 23 07/13/2024 04:30 AM    BUN 31 07/13/2024 04:30 AM    CREATININE 1.2 07/13/2024 04:30 AM    CREATININE 1.4 07/12/2024 04:22 AM    CREATININE 1.4 07/11/2024 04:16 AM    GFRAA >60 08/28/2022 05:15 AM    LABGLOM 66 07/13/2024 04:30 AM    LABGLOM >60 01/18/2024 07:07 AM    GLUCOSE 153 07/13/2024 04:30 AM    CALCIUM 8.8 07/13/2024 04:30 AM    BILITOT 0.2 07/13/2024 04:30 AM    ALKPHOS 68 07/13/2024 04:30 AM    AST 18 07/13/2024 04:30 AM    ALT 15 07/13/2024 04:30 AM     Lab Results   Component Value Date    CRP 0.3 08/28/2022    CRP 0.4 08/27/2022    CRP 0.4 08/27/2022     No  results found for: \"SEDRATE\"  Radiology:      Microbiology:   Lab Results   Component Value Date/Time    BC 5 Days no growth 06/12/2023 09:59 PM    ORG C Difficile Toxin A and/or B detected 09/10/2015 06:40 PM     Lab Results   Component Value Date/Time    BLOODCULT2 5 Days no growth 06/12/2023 09:59 PM    ORG C Difficile Toxin A and/or B detected 09/10/2015 06:40 PM     No results found for: \"WNDABS\"  No results found for: \"RESPSMEAR\"      Component Value Date/Time    MPNEUMO Not Detected 07/08/2024 1230     No results found for: \"CULTRESP\"  No results found for: \"CXCATHTIP\"  No results found for: \"BFCS\"  No results found for: \"CXSURG\"  No results found for: \"LABURIN\"  MRSA Culture Only   Date Value Ref Range Status   06/13/2023 Methicillin resistant Staph aureus not isolated  Final       ASSESSMENT:  Streptococcal bacteremia  Emphysema  Cocaine abuse  Chronic apical fibrotic scarring of lung  Candida Colonizer Resp Tract     Plan:    Continue levofloxacin 750 mg IV daily  TTE did not show any evidence of vegetations, SRAVANTHI ordered  Follow repeat blood cultures  Pulmonary follow-up appreciated  MRSA nares negative  ID will continue to follow    Ras Herrera MD  2:17 PM  7/13/2024

## 2024-07-13 NOTE — PLAN OF CARE
Problem: Chronic Conditions and Co-morbidities  Goal: Patient's chronic conditions and co-morbidity symptoms are monitored and maintained or improved  7/13/2024 0021 by Joselyn Reese RN  Outcome: Progressing  7/12/2024 1035 by Alysa Norton RN  Outcome: Progressing     Problem: Discharge Planning  Goal: Discharge to home or other facility with appropriate resources  7/13/2024 0021 by Joselyn Reese RN  Outcome: Progressing  7/12/2024 1035 by Alysa Norton RN  Outcome: Progressing     Problem: Skin/Tissue Integrity  Goal: Absence of new skin breakdown  Description: 1.  Monitor for areas of redness and/or skin breakdown  2.  Assess vascular access sites hourly  3.  Every 4-6 hours minimum:  Change oxygen saturation probe site  4.  Every 4-6 hours:  If on nasal continuous positive airway pressure, respiratory therapy assess nares and determine need for appliance change or resting period.  7/13/2024 0021 by Joselyn Reese RN  Outcome: Progressing  7/12/2024 1035 by Alysa Norton RN  Outcome: Progressing     Problem: Safety - Adult  Goal: Free from fall injury  7/13/2024 0021 by Joselyn Reese RN  Outcome: Progressing  7/12/2024 1035 by Alysa Norton RN  Outcome: Progressing

## 2024-07-14 LAB
ACB COMPLEX DNA BLD POS QL NAA+NON-PROBE: NOT DETECTED
ALBUMIN SERPL-MCNC: 2.8 G/DL (ref 3.5–5.2)
ALP SERPL-CCNC: 72 U/L (ref 40–129)
ALT SERPL-CCNC: 17 U/L (ref 0–40)
ANION GAP SERPL CALCULATED.3IONS-SCNC: 10 MMOL/L (ref 7–16)
AST SERPL-CCNC: 21 U/L (ref 0–39)
B FRAGILIS DNA BLD POS QL NAA+NON-PROBE: NOT DETECTED
BASOPHILS # BLD: 0.01 K/UL (ref 0–0.2)
BASOPHILS NFR BLD: 0 % (ref 0–2)
BILIRUB SERPL-MCNC: 0.2 MG/DL (ref 0–1.2)
BIOFIRE TEST COMMENT: NORMAL
BUN SERPL-MCNC: 28 MG/DL (ref 6–23)
C ALBICANS DNA BLD POS QL NAA+NON-PROBE: NOT DETECTED
C AURIS DNA BLD POS QL NAA+NON-PROBE: NOT DETECTED
C GATTII+NEOFOR DNA BLD POS QL NAA+N-PRB: NOT DETECTED
C GLABRATA DNA BLD POS QL NAA+NON-PROBE: NOT DETECTED
C KRUSEI DNA BLD POS QL NAA+NON-PROBE: NOT DETECTED
C PARAP DNA BLD POS QL NAA+NON-PROBE: NOT DETECTED
C TROPICLS DNA BLD POS QL NAA+NON-PROBE: NOT DETECTED
CALCIUM SERPL-MCNC: 8.6 MG/DL (ref 8.6–10.2)
CHLORIDE SERPL-SCNC: 104 MMOL/L (ref 98–107)
CO2 SERPL-SCNC: 26 MMOL/L (ref 22–29)
CREAT SERPL-MCNC: 1.2 MG/DL (ref 0.7–1.2)
E CLOAC COMP DNA BLD POS NAA+NON-PROBE: NOT DETECTED
E COLI DNA BLD POS QL NAA+NON-PROBE: NOT DETECTED
E FAECALIS DNA BLD POS QL NAA+NON-PROBE: NOT DETECTED
E FAECIUM DNA BLD POS QL NAA+NON-PROBE: NOT DETECTED
ENTEROBACTERALES DNA BLD POS NAA+N-PRB: NOT DETECTED
EOSINOPHIL # BLD: 0 K/UL (ref 0.05–0.5)
EOSINOPHILS RELATIVE PERCENT: 0 % (ref 0–6)
ERYTHROCYTE [DISTWIDTH] IN BLOOD BY AUTOMATED COUNT: 14.4 % (ref 11.5–15)
GFR, ESTIMATED: 67 ML/MIN/1.73M2
GLUCOSE SERPL-MCNC: 145 MG/DL (ref 74–99)
GP B STREP DNA BLD POS QL NAA+NON-PROBE: NOT DETECTED
HAEM INFLU DNA BLD POS QL NAA+NON-PROBE: NOT DETECTED
HCT VFR BLD AUTO: 36.1 % (ref 37–54)
HGB BLD-MCNC: 11.6 G/DL (ref 12.5–16.5)
IMM GRANULOCYTES # BLD AUTO: 0.11 K/UL (ref 0–0.58)
IMM GRANULOCYTES NFR BLD: 1 % (ref 0–5)
K OXYTOCA DNA BLD POS QL NAA+NON-PROBE: NOT DETECTED
KLEBSIELLA SP DNA BLD POS QL NAA+NON-PRB: NOT DETECTED
KLEBSIELLA SP DNA BLD POS QL NAA+NON-PRB: NOT DETECTED
L MONOCYTOG DNA BLD POS QL NAA+NON-PROBE: NOT DETECTED
LYMPHOCYTES NFR BLD: 0.53 K/UL (ref 1.5–4)
LYMPHOCYTES RELATIVE PERCENT: 6 % (ref 20–42)
MAGNESIUM SERPL-MCNC: 1.9 MG/DL (ref 1.6–2.6)
MCH RBC QN AUTO: 29 PG (ref 26–35)
MCHC RBC AUTO-ENTMCNC: 32.1 G/DL (ref 32–34.5)
MCV RBC AUTO: 90.3 FL (ref 80–99.9)
MICROORGANISM SPEC CULT: NORMAL
MICROORGANISM/AGENT SPEC: NORMAL
MONOCYTES NFR BLD: 0.62 K/UL (ref 0.1–0.95)
MONOCYTES NFR BLD: 8 % (ref 2–12)
N MEN DNA BLD POS QL NAA+NON-PROBE: NOT DETECTED
NEUTROPHILS NFR BLD: 85 % (ref 43–80)
NEUTS SEG NFR BLD: 7.01 K/UL (ref 1.8–7.3)
P AERUGINOSA DNA BLD POS NAA+NON-PROBE: NOT DETECTED
PLATELET # BLD AUTO: 246 K/UL (ref 130–450)
PMV BLD AUTO: 9.9 FL (ref 7–12)
POTASSIUM SERPL-SCNC: 4.2 MMOL/L (ref 3.5–5)
PROT SERPL-MCNC: 6.5 G/DL (ref 6.4–8.3)
PROTEUS SP DNA BLD POS QL NAA+NON-PROBE: NOT DETECTED
RBC # BLD AUTO: 4 M/UL (ref 3.8–5.8)
RBC # BLD: ABNORMAL 10*6/UL
S AUREUS DNA BLD POS QL NAA+NON-PROBE: NOT DETECTED
S AUREUS+CONS DNA BLD POS NAA+NON-PROBE: NOT DETECTED
S EPIDERMIDIS DNA BLD POS QL NAA+NON-PRB: NOT DETECTED
S LUGDUNENSIS DNA BLD POS QL NAA+NON-PRB: NOT DETECTED
S MALTOPHILIA DNA BLD POS QL NAA+NON-PRB: NOT DETECTED
S MARCESCENS DNA BLD POS NAA+NON-PROBE: NOT DETECTED
S PNEUM DNA BLD POS QL NAA+NON-PROBE: NOT DETECTED
S PYO DNA BLD POS QL NAA+NON-PROBE: NOT DETECTED
SALMONELLA DNA BLD POS QL NAA+NON-PROBE: NOT DETECTED
SERVICE CMNT-IMP: NORMAL
SODIUM SERPL-SCNC: 140 MMOL/L (ref 132–146)
SPECIMEN DESCRIPTION: NORMAL
STREPTOCOCCUS DNA BLD POS NAA+NON-PROBE: NOT DETECTED
WBC OTHER # BLD: 8.3 K/UL (ref 4.5–11.5)

## 2024-07-14 PROCEDURE — 6370000000 HC RX 637 (ALT 250 FOR IP): Performed by: INTERNAL MEDICINE

## 2024-07-14 PROCEDURE — 6360000002 HC RX W HCPCS: Performed by: FAMILY MEDICINE

## 2024-07-14 PROCEDURE — 2580000003 HC RX 258: Performed by: INTERNAL MEDICINE

## 2024-07-14 PROCEDURE — 6370000000 HC RX 637 (ALT 250 FOR IP): Performed by: FAMILY MEDICINE

## 2024-07-14 PROCEDURE — 2580000003 HC RX 258: Performed by: FAMILY MEDICINE

## 2024-07-14 PROCEDURE — 80053 COMPREHEN METABOLIC PANEL: CPT

## 2024-07-14 PROCEDURE — 6360000002 HC RX W HCPCS: Performed by: INTERNAL MEDICINE

## 2024-07-14 PROCEDURE — 36415 COLL VENOUS BLD VENIPUNCTURE: CPT

## 2024-07-14 PROCEDURE — 94640 AIRWAY INHALATION TREATMENT: CPT

## 2024-07-14 PROCEDURE — 83735 ASSAY OF MAGNESIUM: CPT

## 2024-07-14 PROCEDURE — 99232 SBSQ HOSP IP/OBS MODERATE 35: CPT | Performed by: FAMILY MEDICINE

## 2024-07-14 PROCEDURE — 1200000000 HC SEMI PRIVATE

## 2024-07-14 PROCEDURE — 85025 COMPLETE CBC W/AUTO DIFF WBC: CPT

## 2024-07-14 RX ADMIN — ATORVASTATIN CALCIUM 40 MG: 40 TABLET, FILM COATED ORAL at 08:22

## 2024-07-14 RX ADMIN — METOPROLOL SUCCINATE 25 MG: 25 TABLET, EXTENDED RELEASE ORAL at 08:21

## 2024-07-14 RX ADMIN — Medication 5 DROP: at 08:22

## 2024-07-14 RX ADMIN — Medication 10 MG: at 21:40

## 2024-07-14 RX ADMIN — CYANOCOBALAMIN TAB 1000 MCG 1000 MCG: 1000 TAB at 08:21

## 2024-07-14 RX ADMIN — WATER 20 MG: 1 INJECTION INTRAMUSCULAR; INTRAVENOUS; SUBCUTANEOUS at 21:40

## 2024-07-14 RX ADMIN — SODIUM CHLORIDE, PRESERVATIVE FREE 10 ML: 5 INJECTION INTRAVENOUS at 08:22

## 2024-07-14 RX ADMIN — IPRATROPIUM BROMIDE AND ALBUTEROL SULFATE 1 DOSE: .5; 2.5 SOLUTION RESPIRATORY (INHALATION) at 11:59

## 2024-07-14 RX ADMIN — IPRATROPIUM BROMIDE AND ALBUTEROL SULFATE 1 DOSE: .5; 2.5 SOLUTION RESPIRATORY (INHALATION) at 19:24

## 2024-07-14 RX ADMIN — ASPIRIN 81 MG: 81 TABLET, COATED ORAL at 08:21

## 2024-07-14 RX ADMIN — APIXABAN 5 MG: 5 TABLET, FILM COATED ORAL at 21:39

## 2024-07-14 RX ADMIN — LEVOFLOXACIN 750 MG: 5 INJECTION, SOLUTION INTRAVENOUS at 22:16

## 2024-07-14 RX ADMIN — ARFORMOTEROL TARTRATE: 15 SOLUTION RESPIRATORY (INHALATION) at 11:59

## 2024-07-14 RX ADMIN — APIXABAN 5 MG: 5 TABLET, FILM COATED ORAL at 08:21

## 2024-07-14 RX ADMIN — ARFORMOTEROL TARTRATE: 15 SOLUTION RESPIRATORY (INHALATION) at 19:24

## 2024-07-14 RX ADMIN — SODIUM CHLORIDE, PRESERVATIVE FREE 10 ML: 5 INJECTION INTRAVENOUS at 21:41

## 2024-07-14 RX ADMIN — WATER 20 MG: 1 INJECTION INTRAMUSCULAR; INTRAVENOUS; SUBCUTANEOUS at 08:22

## 2024-07-14 RX ADMIN — Medication 5 DROP: at 21:39

## 2024-07-14 RX ADMIN — METOPROLOL SUCCINATE 25 MG: 25 TABLET, EXTENDED RELEASE ORAL at 21:41

## 2024-07-14 RX ADMIN — Medication 1000 UNITS: at 08:21

## 2024-07-14 ASSESSMENT — PAIN SCALES - GENERAL
PAINLEVEL_OUTOF10: 0
PAINLEVEL_OUTOF10: 0

## 2024-07-14 NOTE — PLAN OF CARE
Problem: Chronic Conditions and Co-morbidities  Goal: Patient's chronic conditions and co-morbidity symptoms are monitored and maintained or improved  7/14/2024 0309 by Joselyn Reese RN  Outcome: Progressing  7/13/2024 1400 by Gabbi Perez RN  Outcome: Progressing     Problem: Discharge Planning  Goal: Discharge to home or other facility with appropriate resources  7/14/2024 0309 by Joselyn Reese RN  Outcome: Progressing  7/13/2024 1400 by Gabbi Perez RN  Outcome: Progressing     Problem: Skin/Tissue Integrity  Goal: Absence of new skin breakdown  Description: 1.  Monitor for areas of redness and/or skin breakdown  2.  Assess vascular access sites hourly  3.  Every 4-6 hours minimum:  Change oxygen saturation probe site  4.  Every 4-6 hours:  If on nasal continuous positive airway pressure, respiratory therapy assess nares and determine need for appliance change or resting period.  7/14/2024 0309 by Joselyn Reese RN  Outcome: Progressing  7/13/2024 1400 by Gabbi Perez RN  Outcome: Progressing     Problem: Safety - Adult  Goal: Free from fall injury  7/14/2024 0309 by Joselyn Reese RN  Outcome: Progressing  7/13/2024 1400 by Gabbi Perez RN  Outcome: Progressing

## 2024-07-14 NOTE — PLAN OF CARE
Problem: Chronic Conditions and Co-morbidities  Goal: Patient's chronic conditions and co-morbidity symptoms are monitored and maintained or improved  7/14/2024 1532 by Radha Reed RN  Outcome: Progressing     Problem: Discharge Planning  Goal: Discharge to home or other facility with appropriate resources  7/14/2024 1532 by Radha Reed RN  Outcome: Progressing     Problem: Skin/Tissue Integrity  Goal: Absence of new skin breakdown  Description: 1.  Monitor for areas of redness and/or skin breakdown  2.  Assess vascular access sites hourly  3.  Every 4-6 hours minimum:  Change oxygen saturation probe site  4.  Every 4-6 hours:  If on nasal continuous positive airway pressure, respiratory therapy assess nares and determine need for appliance change or resting period.  7/14/2024 1532 by Radha Reed RN  Outcome: Progressing     Problem: Safety - Adult  Goal: Free from fall injury  7/14/2024 1532 by Radha Reed RN  Outcome: Progressing     Problem: Pain  Goal: Verbalizes/displays adequate comfort level or baseline comfort level  Outcome: Progressing

## 2024-07-14 NOTE — PROGRESS NOTES
[facility]  [facility]  Progress Note      Chief Complaint   Patient presents with    Shortness of Breath     SOB that got worse his morning. His inhaler ran out tonight, +wheezing. EMS gave 1 duoneb and stated pt was 90% room air. Now 100% on 4LNc after breathing Tx.        SUBJECTIVE:    Patient is tolerating medications. No reported adverse drug reactions.  No nausea, vomiting, diarrhea.    Review of systems:  As stated above in the chief complaint, otherwise negative.    Medications:  Scheduled Meds:   ipratropium 0.5 mg-albuterol 2.5 mg  1 Dose Inhalation BID RT    levofloxacin  750 mg IntraVENous Q48H    carbamide peroxide  5 drop Both Ears BID    methylPREDNISolone  20 mg IntraVENous Q12H    melatonin  10 mg Oral Nightly    aspirin  81 mg Oral Daily    atorvastatin  40 mg Oral Daily    arformoterol 15 mcg-budesonide 0.25 mg neb solution   Nebulization BID RT    cyanocobalamin  1,000 mcg Oral Daily    Vitamin D  1,000 Units Oral Daily    nicotine  1 patch TransDERmal Daily    sodium chloride flush  5-40 mL IntraVENous 2 times per day    [Held by provider] furosemide  20 mg IntraVENous Daily    metoprolol succinate  25 mg Oral BID    apixaban  5 mg Oral BID     Continuous Infusions:   sodium chloride       PRN Meds:white petrolatum, simethicone, melatonin, guaiFENesin, sodium chloride flush, sodium chloride, potassium chloride **OR** potassium alternative oral replacement **OR** potassium chloride, magnesium sulfate, polyethylene glycol, acetaminophen **OR** acetaminophen, ondansetron **OR** ondansetron, hydrALAZINE    OBJECTIVE:  BP (!) 153/86   Pulse 66   Temp 96.8 °F (36 °C) (Temporal)   Resp 17   Ht 1.727 m (5' 8\")   Wt 56.7 kg (125 lb) Comment: previous encounter  SpO2 97%   BMI 19.01 kg/m²   Temp  Av.1 °F (36.2 °C)  Min: 96.8 °F (36 °C)  Max: 97.4 °F (36.3 °C)  Constitutional: The patient is awake, alert, and oriented.   Skin: Warm and dry. No rashes were noted.

## 2024-07-14 NOTE — PROGRESS NOTES
Hospitalist Progress Note      Synopsis: Patient admitted on 7/8/2024 Chuck Chavarria Jr. is a 73 y.o. male who presents for shortness of breath.  Patient reports: EMS as he was short of breath.  He reports he has a history of COPD and ran out of his inhaler last night.  He was coughing and wheezing.  He reports that he is spitting up mucus.  EMS gave 1 breathing treatment as he was hypoxic.  They placed him on 4 L.  He denies fever but reports productive cough.  He denies chest pain.  He says he feels like his heart is beating rapidly.  He says he has a history of atrial fibrillation but is not anticoagulated.  He denies any history of DVT or PE.  He denies abdominal pain.  He denies any other complaints.     Discussed with ED physician     Workup in the ED essentially unremarkable for possible pneumonia, A-fib RVR.  CTA showed no PE, possible pneumonia, pulmonary vascular congestion.  There was a reported possible allergic reaction to possibly doxycycline versus Rocephin?  With reported worsening shortness of breath patient received epinephrine steroids and Benadryl.  During my interview with the patient he is slightly confused lethargic arousable on BiPAP       Subjective  Patient seen and examined chart reviewed discussed with nursing staff no overnight events reported   Patient on room air   No chest pain or dyspnea  Afebrile.   Plan for SRAVANTHI tomorrow.   Exam:  BP (!) 153/86   Pulse 66   Temp 96.8 °F (36 °C) (Temporal)   Resp 17   Ht 1.727 m (5' 8\")   Wt 56.7 kg (125 lb) Comment: previous encounter  SpO2 97%   BMI 19.01 kg/m²   -General:  Awake, alert, oriented X 3.  Well developed, well nourished, well groomed.  No apparent distress.    -HEENT:  Normocephalic, atraumatic.  Pupils equal, round, reactive to light.  No scleral icterus.  No conjunctival injection.  Normal lips, teeth, and gums.  No nasal discharge.  Neck:  Supple    -Heart:  RRR, no murmurs, gallops, rubs    -Lungs: Bilateral  wheezes    -Abdomen:  Bowel sounds present, soft, nontender, no masses, no organomegaly, no peritoneal signs    -Extremities: normal ROM. No Cyanosis clubbing or edema    -Skin: Warm and dry    -Neuro:  AAO x 3 .Cranial nerves 2-12 intact, no focal deficits     -Psych : normal .    Medications:  Reviewed    Infusion Medications    sodium chloride       Scheduled Medications    ipratropium 0.5 mg-albuterol 2.5 mg  1 Dose Inhalation BID RT    levofloxacin  750 mg IntraVENous Q48H    carbamide peroxide  5 drop Both Ears BID    methylPREDNISolone  20 mg IntraVENous Q12H    melatonin  10 mg Oral Nightly    aspirin  81 mg Oral Daily    atorvastatin  40 mg Oral Daily    arformoterol 15 mcg-budesonide 0.25 mg neb solution   Nebulization BID RT    cyanocobalamin  1,000 mcg Oral Daily    Vitamin D  1,000 Units Oral Daily    nicotine  1 patch TransDERmal Daily    sodium chloride flush  5-40 mL IntraVENous 2 times per day    [Held by provider] furosemide  20 mg IntraVENous Daily    metoprolol succinate  25 mg Oral BID    apixaban  5 mg Oral BID     PRN Meds: white petrolatum, simethicone, melatonin, guaiFENesin, sodium chloride flush, sodium chloride, potassium chloride **OR** potassium alternative oral replacement **OR** potassium chloride, magnesium sulfate, polyethylene glycol, acetaminophen **OR** acetaminophen, ondansetron **OR** ondansetron, hydrALAZINE    I/O    Intake/Output Summary (Last 24 hours) at 7/14/2024 1047  Last data filed at 7/13/2024 1200  Gross per 24 hour   Intake 0 ml   Output --   Net 0 ml         Labs:   Recent Labs     07/12/24 0422 07/13/24 0430 07/14/24 0458   WBC 10.3 7.9 8.3   HGB 11.9* 11.6* 11.6*   HCT 36.7* 35.1* 36.1*    245 246         Recent Labs     07/12/24 0422 07/13/24 0430 07/14/24 0458    142 140   K 4.4 4.3 4.2    105 104   CO2 25 23 26   BUN 36* 31* 28*   CREATININE 1.4* 1.2 1.2   CALCIUM 9.4 8.8 8.6         Recent Labs     07/12/24 0422 07/13/24 0430

## 2024-07-15 ENCOUNTER — HOSPITAL ENCOUNTER (OUTPATIENT)
Age: 74
Discharge: HOME | End: 2024-07-15
Attending: INTERNAL MEDICINE

## 2024-07-15 LAB
ALBUMIN SERPL-MCNC: 2.9 G/DL (ref 3.5–5.2)
ALP SERPL-CCNC: 71 U/L (ref 40–129)
ALT SERPL-CCNC: 19 U/L (ref 0–40)
ANION GAP SERPL CALCULATED.3IONS-SCNC: 10 MMOL/L (ref 7–16)
AST SERPL-CCNC: 22 U/L (ref 0–39)
BASOPHILS # BLD: 0 K/UL (ref 0–0.2)
BASOPHILS NFR BLD: 0 % (ref 0–2)
BILIRUB SERPL-MCNC: 0.2 MG/DL (ref 0–1.2)
BUN SERPL-MCNC: 31 MG/DL (ref 6–23)
CALCIUM SERPL-MCNC: 9 MG/DL (ref 8.6–10.2)
CHLORIDE SERPL-SCNC: 105 MMOL/L (ref 98–107)
CO2 SERPL-SCNC: 27 MMOL/L (ref 22–29)
CREAT SERPL-MCNC: 1.1 MG/DL (ref 0.7–1.2)
EOSINOPHIL # BLD: 0 K/UL (ref 0.05–0.5)
EOSINOPHILS RELATIVE PERCENT: 0 % (ref 0–6)
ERYTHROCYTE [DISTWIDTH] IN BLOOD BY AUTOMATED COUNT: 14.4 % (ref 11.5–15)
GFR, ESTIMATED: 68 ML/MIN/1.73M2
GLUCOSE SERPL-MCNC: 120 MG/DL (ref 74–99)
HCT VFR BLD AUTO: 34.6 % (ref 37–54)
HGB BLD-MCNC: 11.5 G/DL (ref 12.5–16.5)
LYMPHOCYTES NFR BLD: 0.49 K/UL (ref 1.5–4)
LYMPHOCYTES RELATIVE PERCENT: 5 % (ref 20–42)
MAGNESIUM SERPL-MCNC: 2 MG/DL (ref 1.6–2.6)
MCH RBC QN AUTO: 30.2 PG (ref 26–35)
MCHC RBC AUTO-ENTMCNC: 33.2 G/DL (ref 32–34.5)
MCV RBC AUTO: 90.8 FL (ref 80–99.9)
METAMYELOCYTES ABSOLUTE COUNT: 0.08 K/UL (ref 0–0.12)
METAMYELOCYTES: 1 % (ref 0–1)
MICROORGANISM SPEC CULT: NORMAL
MICROORGANISM SPEC CULT: NORMAL
MONOCYTES NFR BLD: 0.57 K/UL (ref 0.1–0.95)
MONOCYTES NFR BLD: 6 % (ref 2–12)
MYELOCYTES ABSOLUTE COUNT: 0.08 K/UL
MYELOCYTES: 1 %
NEUTROPHILS NFR BLD: 87 % (ref 43–80)
NEUTS SEG NFR BLD: 8.09 K/UL (ref 1.8–7.3)
PLATELET # BLD AUTO: 234 K/UL (ref 130–450)
PMV BLD AUTO: 10.2 FL (ref 7–12)
POTASSIUM SERPL-SCNC: 4.6 MMOL/L (ref 3.5–5)
PROT SERPL-MCNC: 6.5 G/DL (ref 6.4–8.3)
RBC # BLD AUTO: 3.81 M/UL (ref 3.8–5.8)
RBC # BLD: NORMAL 10*6/UL
SERVICE CMNT-IMP: NORMAL
SERVICE CMNT-IMP: NORMAL
SODIUM SERPL-SCNC: 142 MMOL/L (ref 132–146)
SPECIMEN DESCRIPTION: NORMAL
SPECIMEN DESCRIPTION: NORMAL
WBC OTHER # BLD: 9.3 K/UL (ref 4.5–11.5)

## 2024-07-15 PROCEDURE — 83735 ASSAY OF MAGNESIUM: CPT

## 2024-07-15 PROCEDURE — 6370000000 HC RX 637 (ALT 250 FOR IP): Performed by: FAMILY MEDICINE

## 2024-07-15 PROCEDURE — 6360000002 HC RX W HCPCS: Performed by: INTERNAL MEDICINE

## 2024-07-15 PROCEDURE — 2580000003 HC RX 258: Performed by: INTERNAL MEDICINE

## 2024-07-15 PROCEDURE — 99232 SBSQ HOSP IP/OBS MODERATE 35: CPT | Performed by: FAMILY MEDICINE

## 2024-07-15 PROCEDURE — 36415 COLL VENOUS BLD VENIPUNCTURE: CPT

## 2024-07-15 PROCEDURE — 6360000002 HC RX W HCPCS: Performed by: FAMILY MEDICINE

## 2024-07-15 PROCEDURE — 85025 COMPLETE CBC W/AUTO DIFF WBC: CPT

## 2024-07-15 PROCEDURE — 99233 SBSQ HOSP IP/OBS HIGH 50: CPT | Performed by: INTERNAL MEDICINE

## 2024-07-15 PROCEDURE — 92610 EVALUATE SWALLOWING FUNCTION: CPT

## 2024-07-15 PROCEDURE — 6370000000 HC RX 637 (ALT 250 FOR IP): Performed by: INTERNAL MEDICINE

## 2024-07-15 PROCEDURE — 1200000000 HC SEMI PRIVATE

## 2024-07-15 PROCEDURE — 94640 AIRWAY INHALATION TREATMENT: CPT

## 2024-07-15 PROCEDURE — 2580000003 HC RX 258: Performed by: FAMILY MEDICINE

## 2024-07-15 PROCEDURE — 80053 COMPREHEN METABOLIC PANEL: CPT

## 2024-07-15 RX ORDER — LEVOFLOXACIN 5 MG/ML
750 INJECTION, SOLUTION INTRAVENOUS
Status: DISCONTINUED | OUTPATIENT
Start: 2024-07-15 | End: 2024-07-15

## 2024-07-15 RX ORDER — LEVOFLOXACIN 750 MG/1
750 TABLET, FILM COATED ORAL EVERY OTHER DAY
Status: DISCONTINUED | OUTPATIENT
Start: 2024-07-16 | End: 2024-07-17

## 2024-07-15 RX ADMIN — CYANOCOBALAMIN TAB 1000 MCG 1000 MCG: 1000 TAB at 09:16

## 2024-07-15 RX ADMIN — WATER 20 MG: 1 INJECTION INTRAMUSCULAR; INTRAVENOUS; SUBCUTANEOUS at 22:01

## 2024-07-15 RX ADMIN — Medication 1000 UNITS: at 09:16

## 2024-07-15 RX ADMIN — APIXABAN 5 MG: 5 TABLET, FILM COATED ORAL at 10:29

## 2024-07-15 RX ADMIN — ARFORMOTEROL TARTRATE: 15 SOLUTION RESPIRATORY (INHALATION) at 19:29

## 2024-07-15 RX ADMIN — Medication 5 DROP: at 09:16

## 2024-07-15 RX ADMIN — SODIUM CHLORIDE, PRESERVATIVE FREE 10 ML: 5 INJECTION INTRAVENOUS at 22:02

## 2024-07-15 RX ADMIN — APIXABAN 5 MG: 5 TABLET, FILM COATED ORAL at 22:01

## 2024-07-15 RX ADMIN — IPRATROPIUM BROMIDE AND ALBUTEROL SULFATE 1 DOSE: .5; 2.5 SOLUTION RESPIRATORY (INHALATION) at 08:07

## 2024-07-15 RX ADMIN — Medication 5 DROP: at 22:01

## 2024-07-15 RX ADMIN — ASPIRIN 81 MG: 81 TABLET, COATED ORAL at 10:29

## 2024-07-15 RX ADMIN — METOPROLOL SUCCINATE 25 MG: 25 TABLET, EXTENDED RELEASE ORAL at 09:16

## 2024-07-15 RX ADMIN — WATER 20 MG: 1 INJECTION INTRAMUSCULAR; INTRAVENOUS; SUBCUTANEOUS at 09:19

## 2024-07-15 RX ADMIN — ARFORMOTEROL TARTRATE: 15 SOLUTION RESPIRATORY (INHALATION) at 08:07

## 2024-07-15 RX ADMIN — METOPROLOL SUCCINATE 25 MG: 25 TABLET, EXTENDED RELEASE ORAL at 22:01

## 2024-07-15 RX ADMIN — Medication 10 MG: at 22:01

## 2024-07-15 RX ADMIN — ATORVASTATIN CALCIUM 40 MG: 40 TABLET, FILM COATED ORAL at 09:17

## 2024-07-15 RX ADMIN — SODIUM CHLORIDE, PRESERVATIVE FREE 10 ML: 5 INJECTION INTRAVENOUS at 09:16

## 2024-07-15 RX ADMIN — IPRATROPIUM BROMIDE AND ALBUTEROL SULFATE 1 DOSE: .5; 2.5 SOLUTION RESPIRATORY (INHALATION) at 19:28

## 2024-07-15 ASSESSMENT — PAIN SCALES - GENERAL: PAINLEVEL_OUTOF10: 0

## 2024-07-15 NOTE — PLAN OF CARE

## 2024-07-15 NOTE — PROGRESS NOTES
[facility]  Department of Pulmonary, Critical Care and Sleep Medicine  [facility]  Department of Internal Medicine  Progress Note    Keagan KEENE        [facility]    SUBJECTIVE: Patient seen and examined after returning from the restroom.  Currently he denies symptoms of shortness of breath.  He is currently on room air.  He does state that he has had inhalers and nebulizers at home but is unsure who has prescribed these in the past or what these medications were.         OBJECTIVE:  Vitals:    07/14/24 0815 07/14/24 2000 07/15/24 0748 07/15/24 0807   BP: (!) 153/86 (!) 145/88 (!) 155/87    Pulse: 66 76 72    Resp: 17 18 16    Temp: 96.8 °F (36 °C) 97.6 °F (36.4 °C) 97.6 °F (36.4 °C)    TempSrc: Temporal Temporal Temporal    SpO2: 97% 97% 100% 98%   Weight:       Height:         Constitutional: Alert,     EENT: EOMI LORY. MMM. No icterus. No thrush.     Neck:  Trachea was midline.   Respiratory: CTA bilaterally, no use of accessory muscles  Cardiovascular: Regular, No murmur. No rubs.      Pulses:  Equal bilaterally.    Abdomen: Soft without organomegaly. No rebound, rigidity.  No guarding.  Lymphatic: No lymphadenopathy.  Musculoskeletal: Without weakness or gross deficits  Extremities:  No lower extremity edema. Reflexes appear adequate.   Skin:  Warm and dry.  No skin rashes.   Neurological/Psychiatric: No acute psychosis. Cranial nerves are intact.        DATA:    Monitor Strips:  Reviewed & discusses with technical team. No changes noted.    RADIOLOGY: No new chest imaging today.      CBC with Differential:    Lab Results   Component Value Date/Time    WBC 9.3 07/15/2024 05:20 AM    RBC 3.81 07/15/2024 05:20 AM    HGB 11.5 07/15/2024 05:20 AM    HCT 34.6 07/15/2024 05:20 AM      07/15/2024 05:20 AM    MCV 90.8 07/15/2024 05:20 AM    MCH 30.2 07/15/2024 05:20 AM    MCHC 33.2 07/15/2024 05:20 AM    RDW 14.4 07/15/2024 05:20 AM    METASPCT 1 07/15/2024 05:20 AM    LYMPHOPCT 5 07/15/2024 05:20 AM    MONOPCT 6 07/15/2024 05:20 AM    MYELOPCT 1 07/15/2024 05:20 AM    EOSPCT 0 07/15/2024 05:20 AM    BASOPCT 0 07/15/2024 05:20 AM    MONOSABS 0.57 07/15/2024 05:20 AM    LYMPHSABS 0.49 07/15/2024 05:20 AM    EOSABS 0.00 07/15/2024 05:20 AM    BASOSABS 0.00 07/15/2024 05:20 AM     CMP:    Lab Results   Component Value Date/Time     07/15/2024 05:20 AM    K 4.6 07/15/2024 05:20 AM    K 4.8 08/28/2022 05:15 AM     07/15/2024 05:20 AM    CO2 27 07/15/2024 05:20 AM    BUN 31 07/15/2024 05:20 AM    CREATININE 1.1 07/15/2024 05:20 AM    GFRAA >60 08/28/2022 05:15 AM    LABGLOM 68 07/15/2024 05:20 AM    LABGLOM >60 01/18/2024 07:07 AM    GLUCOSE 120 07/15/2024 05:20 AM    CALCIUM 9.0 07/15/2024 05:20 AM    BILITOT 0.2 07/15/2024 05:20 AM    ALKPHOS 71 07/15/2024 05:20 AM    AST 22 07/15/2024 05:20 AM    ALT 19 07/15/2024 05:20 AM       CLINICAL ASSESMENT:  Underlying COPD/emphysema  Strep bacteremia  A-fib with RVR  Tobacco abuse  Acute on chronic respiratory failure-resolved  Chronic apical lung emphysema with fibrotic scarring    PLAN: If needed the case was discussed with the care team  Continue bronchodilators every 4 hours  Currently not requiring BiPAP  Antibiotics as per ID.  Continue current nebulizers.  Recommend discharging on combination of Symbicort, Spiriva, albuterol as needed or equivalents.  Treatment of A-fib as per primary and cardiology.  Pulmonary will continue to follow.      Jeaneth Smith DO

## 2024-07-15 NOTE — PROGRESS NOTES
Comprehensive Nutrition Assessment    Type and Reason for Visit:  Initial, RD Nutrition Re-Screen/LOS    Nutrition Assessment:    Pt assessed per LOS protocol. Chart reviewed. Pt. tolerating diet currently eating  % at most meals and appears stable from a nutritional standpoint. No nutrition intervention indicated at this time. Will follow per policy. Consult RD if needed.      Don Grimes RD  Contact: ext [phone]

## 2024-07-15 NOTE — PROGRESS NOTES
[facility]  [facility]  Progress Note      Chief Complaint   Patient presents with    Shortness of Breath     SOB that got worse his morning. His inhaler ran out tonight, +wheezing. EMS gave 1 duoneb and stated pt was 90% room air. Now 100% on 4LNc after breathing Tx.        SUBJECTIVE:    Patient is tolerating medications. No reported adverse drug reactions.  No nausea, vomiting, diarrhea.    Review of systems:  As stated above in the chief complaint, otherwise negative.    Medications:  Scheduled Meds:   ipratropium 0.5 mg-albuterol 2.5 mg  1 Dose Inhalation BID RT    carbamide peroxide  5 drop Both Ears BID    methylPREDNISolone  20 mg IntraVENous Q12H    melatonin  10 mg Oral Nightly    aspirin  81 mg Oral Daily    atorvastatin  40 mg Oral Daily    arformoterol 15 mcg-budesonide 0.25 mg neb solution   Nebulization BID RT    cyanocobalamin  1,000 mcg Oral Daily    Vitamin D  1,000 Units Oral Daily    nicotine  1 patch TransDERmal Daily    sodium chloride flush  5-40 mL IntraVENous 2 times per day    [Held by provider] furosemide  20 mg IntraVENous Daily    metoprolol succinate  25 mg Oral BID    apixaban  5 mg Oral BID     Continuous Infusions:   sodium chloride       PRN Meds:white petrolatum, simethicone, melatonin, guaiFENesin, sodium chloride flush, sodium chloride, potassium chloride **OR** potassium alternative oral replacement **OR** potassium chloride, magnesium sulfate, polyethylene glycol, acetaminophen **OR** acetaminophen, ondansetron **OR** ondansetron, hydrALAZINE    OBJECTIVE:  BP (!) 155/87   Pulse 72   Temp 97.6 °F (36.4 °C) (Temporal)   Resp 16   Ht 1.727 m (5' 8\")   Wt 56.7 kg (125 lb) Comment: previous encounter  SpO2 98%   BMI 19.01 kg/m²   Temp  Av.6 °F (36.4 °C)  Min: 97.6 °F (36.4 °C)  Max: 97.6 °F (36.4 °C)  Constitutional: The patient is awake, alert, and oriented.   Skin: Warm and dry. No rashes were noted. No jaundice.  HEENT: Eyes show round,  \"SEDRATE\"  Radiology:      Microbiology:   Lab Results   Component Value Date/Time    BC 5 Days no growth 06/12/2023 09:59 PM    ORG C Difficile Toxin A and/or B detected 09/10/2015 06:40 PM     Lab Results   Component Value Date/Time    BLOODCULT2 5 Days no growth 06/12/2023 09:59 PM    ORG C Difficile Toxin A and/or B detected 09/10/2015 06:40 PM     No results found for: \"WNDABS\"  No results found for: \"RESPSMEAR\"      Component Value Date/Time    MPNEUMO Not Detected 07/08/2024 1230     No results found for: \"CULTRESP\"  No results found for: \"CXCATHTIP\"  No results found for: \"BFCS\"  No results found for: \"CXSURG\"  No results found for: \"LABURIN\"  MRSA Culture Only   Date Value Ref Range Status   06/13/2023 Methicillin resistant Staph aureus not isolated  Final       ASSESSMENT:  Streptococcal bacteremia  Emphysema  Cocaine abuse  Chronic apical fibrotic scarring of lung  Candida Colonizer Resp Tract     Plan:    Continue levofloxacin 750 mg IV daily  TTE did not show any evidence of vegetations, SRAVANTHI pending  Follow repeat blood cultures  Pulmonary follow-up appreciated  MRSA nares negative  ID will continue to follow    Ras Herrera MD  1:40 PM  7/15/2024

## 2024-07-15 NOTE — PROGRESS NOTES
Patient to CV [facility] for SRAVANTHI. Patient states he had applesauce with medications this morning. Dr. Rowland notified. Procedure canceled. Rescheduled for tomorrow. Floor notified

## 2024-07-15 NOTE — PROGRESS NOTES
CHG wipes before procedure   Spoke with cath lab, pt will sign consent before SRAVANTHI procedure

## 2024-07-15 NOTE — PROGRESS NOTES
SPEECH/LANGUAGE PATHOLOGY  CLINICAL ASSESSMENT OF SWALLOWING FUNCTION   and PLAN OF CARE    PATIENT NAME:  Chuck Chavarria Jr.  (male)     MRN:  [id number]    :  1950  (73 y.o.)  STATUS:  Inpatient: Room 8416/8416-B    TODAY'S DATE:  7/15/2024  07/14/24 0700    SLP swallowing-dysphagia evaluation and treatment  Start:  24 0700,   End:  24,   ONE TIME,   Standing Count:  1 Occurrences,   R       Milanes Marino, Maria, MD  REASON FOR REFERRAL:  choking episode when taking pills  EVALUATING THERAPIST: Mary Beth Rush, SLP                 RESULTS:    DYSPHAGIA DIAGNOSIS:   functional oropharyngeal swallow for age/premorbid functioning      DIET RECOMMENDATIONS:  Easy to chew consistency solids (IDDSI level 7, transitional) with  thin liquids (IDDSI level 0),     MEDICATION ADMINISTRATION, Administer medication crushed, as able, with pudding/applesauce, and Administer medication whole, ONE AT A TIME, in pudding/applesauce     FEEDING RECOMMENDATIONS:     Assistance level:  No assistance needed      Compensatory strategies recommended: No strategies are recommended at this time      Discussed recommendations with:  patient nurse in person    SPEECH THERAPY  PLAN OF CARE   The dysphagia POC is established based on physician order, dysphagia diagnosis and results of clinical assessment     Dysphagia therapy is not recommended     Conditions Requiring Skilled Therapeutic Intervention for dysphagia:    Patient is performing below functional baseline d/t  current acute condition, respiratory compromise, multiple medications, and/or increased dependency upon caregivers.    Specific dysphagia interventions to include:     Not applicable no therapy warranted     Specific instructions for next treatment:  not applicable   Patient Treatment Goals:    Short Term Goals:  Not applicable no therapy warranted     Long Term Goals:   Not applicable no therapy warranted      Patient/family Goal:    not  applicable    Plan of care discussed with Patient   The Patient understand(s) the diagnosis, prognosis and plan of care     Rehabilitation Potential/Prognosis: N/A                    ADMITTING DIAGNOSIS: COPD exacerbation ([facility]) [J44.1]  Atrial fibrillation with rapid ventricular response ([facility]) [I48.91]    VISIT DIAGNOSIS:   Visit Diagnoses         Codes    COPD exacerbation ([facility])     J44.1             PATIENT REPORT/COMPLAINT: difficulty swallowing pills  RN cleared patient for participation in assessment     yes     PRIOR LEVEL OF SWALLOW FUNCTION:    PAST HISTORY OF OROPHARYNGEAL DYSPHAGIA?: none reported    Home diet: Easy to chew consistency solids (IDDSI level 7, transitional) with  thin liquids (IDDSI level 0)  Current Diet Order:  ADULT DIET; Regular    PROCEDURE:  Consistencies Administered During the Evaluation   Liquids: thin liquid   Solids:  Pureed  and Hard solid      Method of Intake:   cup, straw, spoon  Self fed      Position:   Sitting in bed with head elevated above 75 degrees    CLINICAL ASSESSMENT:  Oral Stage:       Pt is able to achieve adequate cohesive bolus prep as evidenced by satisfactory mastication patterns, timely A-P bolus propulsion, and minimal oral residuals.  Dentition:  edentulous      Pharyngeal Stage:    No signs of aspiration were noted during this evaluation however, silent aspiration cannot be ruled out at bedside.  If silent aspiration is suspected, a Videofluoroscopic Study of Swallowing (MBS) is recommended and requires a physician order.    Cognition:   Within functional limits for this exam    Oral Peripheral Examination   Adequate lingual/labial strength     Current Respiratory Status    room air     Parameters of Speech Production  Respiration:  Adequate for speech production  Quality:   Within functional limits  Intensity: Within functional limits    Volitional Swallow: Present     Volitional Cough:  Present     Pain: No pain reported.    EDUCATION:   The Speech

## 2024-07-15 NOTE — PLAN OF CARE
Problem: Chronic Conditions and Co-morbidities  Goal: Patient's chronic conditions and co-morbidity symptoms are monitored and maintained or improved  7/15/2024 0058 by Jennifer Velasco RN  Outcome: Progressing  7/14/2024 1532 by Radha Reed RN  Outcome: Progressing     Problem: Discharge Planning  Goal: Discharge to home or other facility with appropriate resources  7/15/2024 0058 by Jennifer Velasco RN  Outcome: Progressing  7/14/2024 1532 by Radha Reed RN  Outcome: Progressing     Problem: Skin/Tissue Integrity  Goal: Absence of new skin breakdown  Description: 1.  Monitor for areas of redness and/or skin breakdown  2.  Assess vascular access sites hourly  3.  Every 4-6 hours minimum:  Change oxygen saturation probe site  4.  Every 4-6 hours:  If on nasal continuous positive airway pressure, respiratory therapy assess nares and determine need for appliance change or resting period.  7/15/2024 0058 by Jennifer Velasco RN  Outcome: Progressing  7/14/2024 1532 by Radha Reed RN  Outcome: Progressing     Problem: Safety - Adult  Goal: Free from fall injury  7/15/2024 0058 by Jennifer Velasco RN  Outcome: Progressing  7/14/2024 1532 by Radha Reed RN  Outcome: Progressing     Problem: Pain  Goal: Verbalizes/displays adequate comfort level or baseline comfort level  7/15/2024 0058 by Jennifer Velasco RN  Outcome: Progressing  7/14/2024 1532 by Radha Reed RN  Outcome: Progressing

## 2024-07-15 NOTE — PROGRESS NOTES
Speech Language Pathology  NAME:  Chuck Chavarria Jr.  :  1950  DATE: 7/15/2024  ROOM:  84/8416-B    Pt unavailable at 745 for Clinical Swallow Evaluation Discussed with charge nurse via phone      REASON:  Patient NPO for procedure not able to address dysphagia goals due to this.    Will re-attempt as appropriate.       Thank You    Elena Cooper M.S. CCC-SLP/L  Speech Language Pathologist  [phone]

## 2024-07-15 NOTE — CARE COORDINATION
Social Work/Case Management Transition of Care Planning (Yovana Andrade -[phone]):  Per report and chart review, echo was negative for endocarditis.  SRAVANTHI is pending.  Patient is on IV Levofloxacin QD.  ID is following.  Patient is on IV Solumedrol q12 and IV Lasix QD.  He is on room air.  PT/OT scores noted to be 20/19.  Patient is independent in his room. Referral was made to [facility]. Patient was given outpatient treatment resources.  Met with patient at bedside.  Discharge plan is to home with no needs.  Patient indicated his sister might be able to transport. If not, he will take the bus.  Patient indicated he needs a FWW.  He did use a walker with therapy.  Call placed to [facility] for FWW.  Order is in place.  CM/SW will follow.  Yovana Andrade, DANYELLE  7/15/2024

## 2024-07-15 NOTE — PROGRESS NOTES
Hospitalist Progress Note      Synopsis: Patient admitted on 7/8/2024 Chuck Chavarria Jr. is a 73 y.o. male who presents for shortness of breath.  Patient reports: EMS as he was short of breath.  He reports he has a history of COPD and ran out of his inhaler last night.  He was coughing and wheezing.  He reports that he is spitting up mucus.  EMS gave 1 breathing treatment as he was hypoxic.  They placed him on 4 L.  He denies fever but reports productive cough.  He denies chest pain.  He says he feels like his heart is beating rapidly.  He says he has a history of atrial fibrillation but is not anticoagulated.  He denies any history of DVT or PE.  He denies abdominal pain.  He denies any other complaints.     Discussed with ED physician     Workup in the ED essentially unremarkable for possible pneumonia, A-fib RVR.  CTA showed no PE, possible pneumonia, pulmonary vascular congestion.  There was a reported possible allergic reaction to possibly doxycycline versus Rocephin?  With reported worsening shortness of breath patient received epinephrine steroids and Benadryl.  During my interview with the patient he is slightly confused lethargic arousable on BiPAP       Subjective  Patient seen and examined chart reviewed discussed with nursing staff no overnight events reported  Patient is afebrile normal WBC count hemoglobin 11.5 is doing well on room air  Denies any chest pain or shortness of breath he is n.p.o. for a SRAVANTHI today    Exam:  BP (!) 155/87   Pulse 72   Temp 97.6 °F (36.4 °C) (Temporal)   Resp 16   Ht 1.727 m (5' 8\")   Wt 56.7 kg (125 lb) Comment: previous encounter  SpO2 98%   BMI 19.01 kg/m²   -General:  Awake, alert, oriented X 3.  Well developed, well nourished, well groomed.  No apparent distress.    -HEENT:  Normocephalic, atraumatic.  Pupils equal, round, reactive to light.  No scleral icterus.  No conjunctival injection.  Normal lips, teeth, and gums.  No nasal discharge.  Neck:

## 2024-07-16 ENCOUNTER — ANESTHESIA EVENT (OUTPATIENT)
Age: 74
End: 2024-07-16
Payer: COMMERCIAL

## 2024-07-16 ENCOUNTER — ANESTHESIA (OUTPATIENT)
Age: 74
End: 2024-07-16
Payer: COMMERCIAL

## 2024-07-16 ENCOUNTER — ANESTHESIA EVENT (OUTPATIENT)
Dept: ENDOSCOPY | Age: 74
End: 2024-07-16
Payer: COMMERCIAL

## 2024-07-16 ENCOUNTER — ANESTHESIA (OUTPATIENT)
Dept: ENDOSCOPY | Age: 74
End: 2024-07-16
Payer: COMMERCIAL

## 2024-07-16 ENCOUNTER — APPOINTMENT (OUTPATIENT)
Age: 74
DRG: 139 | End: 2024-07-16
Attending: INTERNAL MEDICINE
Payer: COMMERCIAL

## 2024-07-16 PROBLEM — R13.19 ESOPHAGEAL DYSPHAGIA: Status: ACTIVE | Noted: 2024-07-16

## 2024-07-16 LAB
ALBUMIN SERPL-MCNC: 3 G/DL (ref 3.5–5.2)
ALP SERPL-CCNC: 75 U/L (ref 40–129)
ALT SERPL-CCNC: 20 U/L (ref 0–40)
ANION GAP SERPL CALCULATED.3IONS-SCNC: 13 MMOL/L (ref 7–16)
AST SERPL-CCNC: 22 U/L (ref 0–39)
BASOPHILS # BLD: 0.02 K/UL (ref 0–0.2)
BASOPHILS NFR BLD: 0 % (ref 0–2)
BILIRUB SERPL-MCNC: 0.2 MG/DL (ref 0–1.2)
BUN SERPL-MCNC: 30 MG/DL (ref 6–23)
CALCIUM SERPL-MCNC: 9.1 MG/DL (ref 8.6–10.2)
CHLORIDE SERPL-SCNC: 102 MMOL/L (ref 98–107)
CO2 SERPL-SCNC: 23 MMOL/L (ref 22–29)
CREAT SERPL-MCNC: 1.2 MG/DL (ref 0.7–1.2)
EOSINOPHIL # BLD: 0 K/UL (ref 0.05–0.5)
EOSINOPHILS RELATIVE PERCENT: 0 % (ref 0–6)
ERYTHROCYTE [DISTWIDTH] IN BLOOD BY AUTOMATED COUNT: 14.4 % (ref 11.5–15)
GFR, ESTIMATED: 65 ML/MIN/1.73M2
GLUCOSE SERPL-MCNC: 137 MG/DL (ref 74–99)
HCT VFR BLD AUTO: 36.6 % (ref 37–54)
HGB BLD-MCNC: 12.1 G/DL (ref 12.5–16.5)
IMM GRANULOCYTES # BLD AUTO: 0.17 K/UL (ref 0–0.58)
IMM GRANULOCYTES NFR BLD: 2 % (ref 0–5)
LYMPHOCYTES NFR BLD: 0.56 K/UL (ref 1.5–4)
LYMPHOCYTES RELATIVE PERCENT: 6 % (ref 20–42)
MAGNESIUM SERPL-MCNC: 2.1 MG/DL (ref 1.6–2.6)
MCH RBC QN AUTO: 29.8 PG (ref 26–35)
MCHC RBC AUTO-ENTMCNC: 33.1 G/DL (ref 32–34.5)
MCV RBC AUTO: 90.1 FL (ref 80–99.9)
MONOCYTES NFR BLD: 0.73 K/UL (ref 0.1–0.95)
MONOCYTES NFR BLD: 8 % (ref 2–12)
NEUTROPHILS NFR BLD: 83 % (ref 43–80)
NEUTS SEG NFR BLD: 7.44 K/UL (ref 1.8–7.3)
PLATELET # BLD AUTO: 253 K/UL (ref 130–450)
PMV BLD AUTO: 10.1 FL (ref 7–12)
POTASSIUM SERPL-SCNC: 4.5 MMOL/L (ref 3.5–5)
PROT SERPL-MCNC: 6.5 G/DL (ref 6.4–8.3)
RBC # BLD AUTO: 4.06 M/UL (ref 3.8–5.8)
RBC # BLD: ABNORMAL 10*6/UL
SODIUM SERPL-SCNC: 138 MMOL/L (ref 132–146)
WBC OTHER # BLD: 8.9 K/UL (ref 4.5–11.5)

## 2024-07-16 PROCEDURE — 2709999900 HC NON-CHARGEABLE SUPPLY: Performed by: SURGERY

## 2024-07-16 PROCEDURE — 99232 SBSQ HOSP IP/OBS MODERATE 35: CPT | Performed by: FAMILY MEDICINE

## 2024-07-16 PROCEDURE — 3700000000 HC ANESTHESIA ATTENDED CARE: Performed by: SURGERY

## 2024-07-16 PROCEDURE — 3609017100 HC EGD: Performed by: SURGERY

## 2024-07-16 PROCEDURE — 3700000001 HC ADD 15 MINUTES (ANESTHESIA): Performed by: INTERNAL MEDICINE

## 2024-07-16 PROCEDURE — 99222 1ST HOSP IP/OBS MODERATE 55: CPT | Performed by: SURGERY

## 2024-07-16 PROCEDURE — 6360000002 HC RX W HCPCS: Performed by: STUDENT IN AN ORGANIZED HEALTH CARE EDUCATION/TRAINING PROGRAM

## 2024-07-16 PROCEDURE — 6370000000 HC RX 637 (ALT 250 FOR IP): Performed by: STUDENT IN AN ORGANIZED HEALTH CARE EDUCATION/TRAINING PROGRAM

## 2024-07-16 PROCEDURE — 2500000003 HC RX 250 WO HCPCS

## 2024-07-16 PROCEDURE — 6370000000 HC RX 637 (ALT 250 FOR IP): Performed by: FAMILY MEDICINE

## 2024-07-16 PROCEDURE — 80053 COMPREHEN METABOLIC PANEL: CPT

## 2024-07-16 PROCEDURE — 1200000000 HC SEMI PRIVATE

## 2024-07-16 PROCEDURE — 43235 EGD DIAGNOSTIC BRUSH WASH: CPT | Performed by: SURGERY

## 2024-07-16 PROCEDURE — 7100000000 HC PACU RECOVERY - FIRST 15 MIN: Performed by: SURGERY

## 2024-07-16 PROCEDURE — 85025 COMPLETE CBC W/AUTO DIFF WBC: CPT

## 2024-07-16 PROCEDURE — 36415 COLL VENOUS BLD VENIPUNCTURE: CPT

## 2024-07-16 PROCEDURE — 2580000003 HC RX 258

## 2024-07-16 PROCEDURE — 2580000003 HC RX 258: Performed by: STUDENT IN AN ORGANIZED HEALTH CARE EDUCATION/TRAINING PROGRAM

## 2024-07-16 PROCEDURE — 3700000001 HC ADD 15 MINUTES (ANESTHESIA): Performed by: SURGERY

## 2024-07-16 PROCEDURE — 6360000002 HC RX W HCPCS

## 2024-07-16 PROCEDURE — 94640 AIRWAY INHALATION TREATMENT: CPT

## 2024-07-16 PROCEDURE — 99233 SBSQ HOSP IP/OBS HIGH 50: CPT | Performed by: INTERNAL MEDICINE

## 2024-07-16 PROCEDURE — 3700000000 HC ANESTHESIA ATTENDED CARE: Performed by: INTERNAL MEDICINE

## 2024-07-16 PROCEDURE — 7100000001 HC PACU RECOVERY - ADDTL 15 MIN: Performed by: SURGERY

## 2024-07-16 PROCEDURE — 0DJ08ZZ INSPECTION OF UPPER INTESTINAL TRACT, VIA NATURAL OR ARTIFICIAL OPENING ENDOSCOPIC: ICD-10-PCS | Performed by: SURGERY

## 2024-07-16 PROCEDURE — 99152 MOD SED SAME PHYS/QHP 5/>YRS: CPT | Performed by: INTERNAL MEDICINE

## 2024-07-16 PROCEDURE — 2580000003 HC RX 258: Performed by: FAMILY MEDICINE

## 2024-07-16 PROCEDURE — 83735 ASSAY OF MAGNESIUM: CPT

## 2024-07-16 RX ORDER — PANTOPRAZOLE SODIUM 40 MG/1
40 TABLET, DELAYED RELEASE ORAL
Status: DISCONTINUED | OUTPATIENT
Start: 2024-07-16 | End: 2024-07-17 | Stop reason: HOSPADM

## 2024-07-16 RX ORDER — DEXAMETHASONE SODIUM PHOSPHATE 10 MG/ML
INJECTION, EMULSION INTRAMUSCULAR; INTRAVENOUS PRN
Status: DISCONTINUED | OUTPATIENT
Start: 2024-07-16 | End: 2024-07-16 | Stop reason: SDUPTHER

## 2024-07-16 RX ORDER — LIDOCAINE HYDROCHLORIDE 20 MG/ML
INJECTION, SOLUTION INFILTRATION; PERINEURAL PRN
Status: DISCONTINUED | OUTPATIENT
Start: 2024-07-16 | End: 2024-07-16 | Stop reason: SDUPTHER

## 2024-07-16 RX ORDER — PROPOFOL 10 MG/ML
INJECTION, EMULSION INTRAVENOUS PRN
Status: DISCONTINUED | OUTPATIENT
Start: 2024-07-16 | End: 2024-07-16 | Stop reason: SDUPTHER

## 2024-07-16 RX ORDER — SODIUM CHLORIDE 9 MG/ML
INJECTION, SOLUTION INTRAVENOUS CONTINUOUS PRN
Status: DISCONTINUED | OUTPATIENT
Start: 2024-07-16 | End: 2024-07-16 | Stop reason: SDUPTHER

## 2024-07-16 RX ORDER — FLUCONAZOLE 100 MG/1
200 TABLET ORAL DAILY
Status: DISCONTINUED | OUTPATIENT
Start: 2024-07-17 | End: 2024-07-17 | Stop reason: HOSPADM

## 2024-07-16 RX ADMIN — PROPOFOL 20 MG: 10 INJECTION, EMULSION INTRAVENOUS at 08:30

## 2024-07-16 RX ADMIN — LEVOFLOXACIN 750 MG: 750 TABLET, FILM COATED ORAL at 16:31

## 2024-07-16 RX ADMIN — ATORVASTATIN CALCIUM 40 MG: 40 TABLET, FILM COATED ORAL at 16:31

## 2024-07-16 RX ADMIN — IPRATROPIUM BROMIDE AND ALBUTEROL SULFATE 1 DOSE: .5; 2.5 SOLUTION RESPIRATORY (INHALATION) at 20:07

## 2024-07-16 RX ADMIN — ARFORMOTEROL TARTRATE: 15 SOLUTION RESPIRATORY (INHALATION) at 20:07

## 2024-07-16 RX ADMIN — DEXAMETHASONE SODIUM PHOSPHATE 10 MG: 10 INJECTION, EMULSION INTRAMUSCULAR; INTRAVENOUS at 08:52

## 2024-07-16 RX ADMIN — PROPOFOL 30 MG: 10 INJECTION, EMULSION INTRAVENOUS at 08:26

## 2024-07-16 RX ADMIN — WATER 20 MG: 1 INJECTION INTRAMUSCULAR; INTRAVENOUS; SUBCUTANEOUS at 16:33

## 2024-07-16 RX ADMIN — SODIUM CHLORIDE: 9 INJECTION, SOLUTION INTRAVENOUS at 14:24

## 2024-07-16 RX ADMIN — SODIUM CHLORIDE, PRESERVATIVE FREE 10 ML: 5 INJECTION INTRAVENOUS at 10:23

## 2024-07-16 RX ADMIN — SODIUM CHLORIDE: 9 INJECTION, SOLUTION INTRAVENOUS at 08:18

## 2024-07-16 RX ADMIN — PETROLATUM: 420 OINTMENT TOPICAL at 10:23

## 2024-07-16 RX ADMIN — LIDOCAINE HYDROCHLORIDE 60 MG: 20 INJECTION, SOLUTION INFILTRATION; PERINEURAL at 08:26

## 2024-07-16 RX ADMIN — PANTOPRAZOLE SODIUM 40 MG: 40 TABLET, DELAYED RELEASE ORAL at 16:31

## 2024-07-16 RX ADMIN — Medication 10 MG: at 20:40

## 2024-07-16 RX ADMIN — METOPROLOL SUCCINATE 25 MG: 25 TABLET, EXTENDED RELEASE ORAL at 20:40

## 2024-07-16 RX ADMIN — Medication 5 DROP: at 10:24

## 2024-07-16 RX ADMIN — FLUCONAZOLE 400 MG: 150 TABLET ORAL at 16:44

## 2024-07-16 RX ADMIN — Medication 1000 UNITS: at 16:31

## 2024-07-16 RX ADMIN — CYANOCOBALAMIN TAB 1000 MCG 1000 MCG: 1000 TAB at 16:31

## 2024-07-16 RX ADMIN — HYDRALAZINE HYDROCHLORIDE 25 MG: 25 TABLET ORAL at 16:31

## 2024-07-16 RX ADMIN — PROPOFOL 150 MG: 10 INJECTION, EMULSION INTRAVENOUS at 14:25

## 2024-07-16 RX ADMIN — Medication 5 DROP: at 20:41

## 2024-07-16 ASSESSMENT — LIFESTYLE VARIABLES
SMOKING_STATUS: 1
SMOKING_STATUS: 1

## 2024-07-16 NOTE — PLAN OF CARE
Problem: Chronic Conditions and Co-morbidities  Goal: Patient's chronic conditions and co-morbidity symptoms are monitored and maintained or improved  7/16/2024 0038 by Milo Stanford RN  Outcome: Progressing  7/15/2024 1503 by Teodoro Sanches RN  Outcome: Progressing     Problem: Discharge Planning  Goal: Discharge to home or other facility with appropriate resources  7/16/2024 0038 by Milo Stanford RN  Outcome: Progressing  7/15/2024 1503 by Teodoro Sanches RN  Outcome: Progressing     Problem: Safety - Adult  Goal: Free from fall injury  7/16/2024 0038 by Milo Stanford RN  Outcome: Progressing  7/15/2024 1503 by Teodoro Sanches RN  Outcome: Progressing     Problem: Pain  Goal: Verbalizes/displays adequate comfort level or baseline comfort level  7/16/2024 0038 by Milo Stanford RN  Outcome: Progressing  7/15/2024 1503 by Teodoro Sanches RN  Outcome: Progressing

## 2024-07-16 NOTE — ANESTHESIA PRE PROCEDURE
Department of Anesthesiology  Preprocedure Note       Name:  Chuck Chavarria Jr.   Age:  74 y.o.  :  1950                                          MRN:  [id number]         Date:  2024      Surgeon: Surgeon(s):  Todd Chow MD    Procedure:   EGD    Medications prior to admission:   Prior to Admission medications    Medication Sig Start Date End Date Taking? Authorizing Provider   Budeson-Glycopyrrol-Formoterol (BREZTRI AEROSPHERE) 160-9-4.8 MCG/ACT AERO Inhale 2 puffs into the lungs 2 times daily 7/10/24  Yes Baltazar Huffman MD   albuterol sulfate HFA (PROVENTIL;VENTOLIN;PROAIR) 108 (90 Base) MCG/ACT inhaler Inhale 2 puffs into the lungs every 4 hours as needed for Wheezing 7/10/24  Yes Baltazar Huffman MD   apixaban (ELIQUIS) 5 MG TABS tablet Take 1 tablet by mouth 2 times daily 7/10/24  Yes Baltazar Huffman MD   metoprolol succinate (TOPROL XL) 25 MG extended release tablet Take 1 tablet by mouth in the morning and at bedtime 7/10/24  Yes Baltazar Huffman MD   melatonin 3 MG TABS tablet Take 1.5 tablets by mouth nightly as needed (insomnia) 7/10/24  Yes Baltazar Huffman MD   nicotine (NICODERM CQ) 21 MG/24HR Place 1 patch onto the skin daily 7/10/24  Yes Baltazar Huffman MD   hydrALAZINE (APRESOLINE) 25 MG tablet Take 1 tablet by mouth 3 times daily 7/10/24  Yes Baltazar Huffman MD   VITAMIN A PO Take 1 tablet by mouth daily   Yes Simi Anguiano MD   atorvastatin (LIPITOR) 40 MG tablet Take 1 tablet by mouth daily 24   Celeste Hernandez DO   vitamin B-12 1000 MCG tablet Take 1 tablet by mouth daily 24   Celeste Hernandez DO   aspirin 81 MG EC tablet Take 1 tablet by mouth daily    Simi Anguiano MD   Vitamin D (CHOLECALCIFEROL) 25 MCG (1000 UT) TABS tablet Take 1 tablet by mouth daily    Simi Anguiano MD       Current medications:    Current Facility-Administered Medications   Medication Dose Route Frequency Provider Last Rate Last Admin    levoFLOXacin (LEVAQUIN) tablet

## 2024-07-16 NOTE — PROGRESS NOTES
Supple    -Heart:  RRR, no murmurs, gallops, rubs    -Lungs: Bilateral wheezes    -Abdomen:  Bowel sounds present, soft, nontender, no masses, no organomegaly, no peritoneal signs    -Extremities: normal ROM. No Cyanosis clubbing or edema    -Skin: Warm and dry    -Neuro:  AAO x 3 .Cranial nerves 2-12 intact, no focal deficits     -Psych : normal .    Medications:  Reviewed    Infusion Medications    sodium chloride       Scheduled Medications    levoFLOXacin  750 mg Oral Every Other Day    ipratropium 0.5 mg-albuterol 2.5 mg  1 Dose Inhalation BID RT    carbamide peroxide  5 drop Both Ears BID    methylPREDNISolone  20 mg IntraVENous Q12H    melatonin  10 mg Oral Nightly    aspirin  81 mg Oral Daily    atorvastatin  40 mg Oral Daily    arformoterol 15 mcg-budesonide 0.25 mg neb solution   Nebulization BID RT    cyanocobalamin  1,000 mcg Oral Daily    Vitamin D  1,000 Units Oral Daily    nicotine  1 patch TransDERmal Daily    sodium chloride flush  5-40 mL IntraVENous 2 times per day    [Held by provider] furosemide  20 mg IntraVENous Daily    metoprolol succinate  25 mg Oral BID    [Held by provider] apixaban  5 mg Oral BID     PRN Meds: white petrolatum, simethicone, melatonin, guaiFENesin, sodium chloride flush, sodium chloride, potassium chloride **OR** potassium alternative oral replacement **OR** potassium chloride, magnesium sulfate, polyethylene glycol, acetaminophen **OR** acetaminophen, ondansetron **OR** ondansetron, hydrALAZINE    I/O    Intake/Output Summary (Last 24 hours) at 7/16/2024 1345  Last data filed at 7/15/2024 1900  Gross per 24 hour   Intake 80 ml   Output --   Net 80 ml         Labs:   Recent Labs     07/14/24  0458 07/15/24  0520 07/16/24  0612   WBC 8.3 9.3 8.9   HGB 11.6* 11.5* 12.1*   HCT 36.1* 34.6* 36.6*    234 253         Recent Labs     07/14/24  0458 07/15/24  0520 07/16/24  0612    142 138   K 4.2 4.6 4.5    105 102   CO2 26 27 23   BUN 28* 31* 30*   CREATININE  1.2 1.1 1.2   CALCIUM 8.6 9.0 9.1         Recent Labs     07/14/24  0458 07/15/24  0520 07/16/24  0612   ALKPHOS 72 71 75   ALT 17 19 20   AST 21 22 22   BILITOT 0.2 0.2 0.2         No results for input(s): \"INR\" in the last 72 hours.    No results for input(s): \"CKTOTAL\", \"TROPONINI\" in the last 72 hours.    Chronic labs:  Lab Results   Component Value Date    CHOL 146 08/24/2023    TRIG 57 08/24/2023    HDL 44 01/18/2024    TSH 2.97 07/08/2024    INR 1.1 09/10/2015    LABA1C 5.8 (H) 01/18/2024       Radiology:  Imaging studies reviewed today.    ASSESSMENT:    Principal Problem:    Atrial fibrillation with rapid ventricular response (HCC)  Resolved Problems:    * No resolved hospital problems. *    Assessment/plan    --Acute hypoxic respiratory failure.  Resolving   likely multifactorial, possible pneumonia, COPD exacerbation and pleural effusions  CTA no PE elevated proBNP  Status post Rocephin and doxycycline in the ED possible allergic reaction.  Switch to Levaquin, status post IV Solu-Medrol IV Lasix pulmonology consult  Status post off BiPAP now on room air  SRAVANTHI today unsuccessful due to possible esophageal mass    -- Dysphagia with possible esophageal mass, general surgery team consulted     --Bacteremia gram-positive cocci.  Repeat pending ID consulted he is currently on Levaquin as above  Echocardiogram negative SRAVANTHI today    --A-fib RVR, now rate is controlled, Eliquis and metoprolol off Cardizem drip cardiology following input appreciated    --Dyslipidemia statin     --COPD exacerbation treatment as above     --Smoking and tobacco use, counseled patient, nicotine     --Disposition pending SRAVANTHI and final antibiotics regimen.  On discharge she will likely go home    Diet: Diet NPO Exceptions are: Sips of Water with Meds  Code Status: Full Code  PT/OT Eval Status:     DVT Prophylaxis:     Recommended disposition at discharge:      +++++++++++++++++++++++++++++++++++++++++++++++++  Baltazar Bleibel

## 2024-07-16 NOTE — CONSULTS
GENERAL SURGERY  CONSULT NOTE  7/16/2024    Physician Consulted: Dr. Chow  Reason for Consult: possible esophageal mass   Referring Physician: Dr. Nathanael MARTINS  Chuck Chavarria Jr. is a 74 y.o. male with a hx of COPD, cocaine use who is admitted with strep bacteremia. General surgery was consulted for evaluation of a possible esophageal mass. He underwent SRAVANTHI today however the scope was unable to be passed into the esophagus. Due to these findings general surgery was consulted for further evaluation. He does report some difficulty with swallowing solids and states his food has to be soft and does occasionally have difficulty with fluids. He does report a hx tobacco use and illicit drug use. Denies any episodes of hematemesis or coffee ground emesis. No hx of bloody stools or dark tarry stools. No family of esophageal cancer.       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 (HCC) 01/17/2024    Hypoplastic, small right vertebral artery, with absent flow proximally CT of the carotids 2024  Patent left vertebral artery, good size    COPD (chronic obstructive pulmonary disease) (HCC)     Hyperlipidemia     Hypertension     Occlusion of right vertebral artery 01/17/2024    Small, hypoplastic right vertebral artery occlusion proximally with widely patent dominant left vertebral artery    Tobacco dependence 01/17/2024       Past Surgical History:   Procedure Laterality Date    CARDIAC CATHETERIZATION  08/25/2023    COLONOSCOPY      DENTAL SURGERY      TONSILLECTOMY         Medications Prior to Admission    Prior to Admission medications    Medication Sig Start Date End Date Taking? Authorizing Provider   Budeson-Glycopyrrol-Formoterol (BREZTRI

## 2024-07-16 NOTE — ANESTHESIA POSTPROCEDURE EVALUATION
Department of Anesthesiology  Postprocedure Note    Patient: Chuck Chavarria Jr.  MRN: [id number]  YOB: 1950  Date of evaluation: 7/16/2024    Procedure Summary       Date: 07/16/24 Room / Location: [place] [address] / [facility]    Anesthesia Start: 1424 Anesthesia Stop: 1436    Procedure: ESOPHAGOGASTRODUODENOSCOPY Diagnosis:       Dysphagia, unspecified type      (Dysphagia, unspecified type [R13.10])    Surgeons: Todd Chow MD Responsible Provider: Tony Craft DO    Anesthesia Type: MAC ASA Status: 3            Anesthesia Type: No value filed.    Reuben Phase I: Reuben Score: 10    Reuben Phase II:      Anesthesia Post Evaluation    Patient location during evaluation: bedside  Patient participation: complete - patient cannot participate  Level of consciousness: awake and alert  Airway patency: patent  Nausea & Vomiting: no nausea and no vomiting  Cardiovascular status: blood pressure returned to baseline  Respiratory status: acceptable  Hydration status: euvolemic  Multimodal analgesia pain management approach    No notable events documented.

## 2024-07-16 NOTE — PROGRESS NOTES
SRAVANTHI Attempt Note:    Was able to intubate the proximal esophagus, however the probe would not pass further. SRAVANTHI aborted due to fear of esophageal injury. Consider GI evaluation.    Jennifer Laura D.O.  Cardiologist  Cardiology, [facility]

## 2024-07-16 NOTE — CARE COORDINATION
Social Work/Case Management Transition of Care Planning (Yovana Andrade -[phone]):  Per report and chart review, patient remains on IV Solu-medrol q12 and IV Lasix QD. He is on room air.  Pulmonology is following.  Patient  has been transitioned oral Levaquin.  ID is following.SRAVANTHI was attempted but unable to be completed due to some obstruction.  General surgery has been consulted .  SLP was consulted.  Patient is on easy to chew solids and thin liquids. Met with patient at bedside.  Discharge plan is to home with no needs.  Patient indicated his sister might be able to transport. If not, he will take the bus.  Patient indicated he needs a FWW.  He did use a walker with therapy.  Call placed to [facility] for FWW.  Order is in place.  CM/SW will follow. Yovana Andrade, DANYELLE  7/16/2024

## 2024-07-16 NOTE — ANESTHESIA PRE PROCEDURE
levoFLOXacin (LEVAQUIN) tablet 750 mg  750 mg Oral Every Other Day Ras Herrera MD       • white petrolatum ointment   Topical BID PRN Baltazar Huffman MD       • ipratropium 0.5 mg-albuterol 2.5 mg (DUONEB) nebulizer solution 1 Dose  1 Dose Inhalation BID RT Baltazar Huffman MD   1 Dose at 07/15/24 1928   • carbamide peroxide (DEBROX) 6.5 % otic solution 5 drop  5 drop Both Ears BID Keagan Spivey DO   5 drop at 07/15/24 2201   • simethicone (MYLICON) chewable tablet 80 mg  80 mg Oral Q6H PRN Huseyin Rosenberg MD   80 mg at 07/11/24 2052   • methylPREDNISolone sodium succ (SOLU-MEDROL) 20 mg in sterile water 0.5 mL injection  20 mg IntraVENous Q12H Keagan Spivey DO   20 mg at 07/15/24 2201   • melatonin tablet 3 mg  3 mg Oral Nightly PRN Amrita Driver APRN - CNP   3 mg at 07/12/24 0021   • melatonin disintegrating tablet 10 mg  10 mg Oral Nightly Baltazar Huffman MD   10 mg at 07/15/24 2201   • aspirin EC tablet 81 mg  81 mg Oral Daily Baltazar Huffman MD   81 mg at 07/15/24 1029   • atorvastatin (LIPITOR) tablet 40 mg  40 mg Oral Daily Baltazar Huffman MD   40 mg at 07/15/24 0917   • guaiFENesin tablet 400 mg  400 mg Oral BID PRN Baltazar Huffman MD   400 mg at 07/09/24 0953   • arformoterol 15 mcg-budesonide 0.25 mg neb solution   Nebulization BID RT Baltazar Huffman MD   Given at 07/15/24 1929   • vitamin B-12 (CYANOCOBALAMIN) tablet 1,000 mcg  1,000 mcg Oral Daily Baltazar Huffman MD   1,000 mcg at 07/15/24 0916   • Vitamin D (CHOLECALCIFEROL) tablet 1,000 Units  1,000 Units Oral Daily Baltazar Huffman MD   1,000 Units at 07/15/24 0916   • nicotine (NICODERM CQ) 21 MG/24HR 1 patch  1 patch TransDERmal Daily Baltazar Huffman MD   1 patch at 07/15/24 0919   • sodium chloride flush 0.9 % injection 5-40 mL  5-40 mL IntraVENous 2 times per day Baltazar Huffman MD   10 mL at 07/15/24 2202   • sodium chloride flush 0.9 % injection 5-40 mL  5-40 mL IntraVENous PRN Baltazar Huffman MD       • 0.9 % sodium chloride

## 2024-07-16 NOTE — PROGRESS NOTES
probe.  Surgical consult for evaluation of esophagus (unable to pass probe for SRAVANTHI)   repeat blood cultures no gowth   Pulmonary follow-up appreciated  MRSA nares negative  ID will continue to follow      JASSI Brooke CNP  10:40 AM  7/16/2024

## 2024-07-16 NOTE — PROGRESS NOTES
Physician Progress Note      PATIENT:               BRANDEN REED  [facility] #:                  [id number]  :                       1950  ADMIT DATE:       2024 7:08 AM  DISCH DATE:  RESPONDING  PROVIDER #:        Baltazar Pedro MD          QUERY TEXT:    Documentation of pleural effusions and elevated BNP. HFrEF noted in Anesthesia   notes Problem's list. If possible, please document in progress notes and d/c   summary further specificity regarding the type/underlying cause of pleural   effusion:    The medical record reflects the following:  Risk Factors: HTN, Nonischemic cardiomyopathy, LVEF 45%, repeat EF 50 to 55%,   grade 2 diastolic dysfunction  Clinical Indicators: Per notes, --Acute hypoxic respiratory failure.    Resolving likely multifactorial, possible pneumonia, COPD exacerbation and   pleural effusions; elevated proBNP.  Anesthesia Pre Procedure 24 note,   Patient Active Problem List: HFrEF. ECHO:  Low normal left ventricular   systolic function with a visually estimated EF of 50 - 55%. Indeterminate   diastolic function.  BNP 8315.  Treatment:  IV Lasix, ECHO    Thank You,  Martina Michelle, RN, BSN, CRCR, Clinical Documentation Improvement  Options provided:  -- Acute on Chronic Systolic CHF/HFrEF  -- Acute Systolic CHF/HFrEF  -- Chronic Systolic CHF/HFrEF  -- No CHF, pleural effusions due to other, Please specify  -- Other - I will add my own diagnosis  -- Disagree - Not applicable / Not valid  -- Disagree - Clinically unable to determine / Unknown  -- Refer to Clinical Documentation Reviewer    PROVIDER RESPONSE TEXT:    This patient has chronic systolic CHF/HFrEF.    Query created by: Martina Michelle on 2024 1:10 PM      Electronically signed by:  Baltazar Pedro MD 2024 2:11 PM

## 2024-07-16 NOTE — ANESTHESIA POSTPROCEDURE EVALUATION
Department of Anesthesiology  Postprocedure Note    Patient: Chuck Chavarria Jr.  MRN: [id number]  YOB: 1950  Date of evaluation: 7/16/2024    Procedure Summary       Date: 07/16/24 Room / Location: [facility] Cardiac Cath Lab; [facility] Noninvasive Cardiology    Anesthesia Start: 0818 Anesthesia Stop: 0841    Procedure: SRAVANTHI (PRN CONTRAST/BUBBLE/3D) Diagnosis: Loculated pleural effusion    Scheduled Providers: Destini Pa MD Responsible Provider: Destini Pa MD    Anesthesia Type: MAC ASA Status: 3            Anesthesia Type: MAC    Reuben Phase I:      Reuben Phase II:      Anesthesia Post Evaluation    Patient location during evaluation: PACU  Patient participation: complete - patient participated  Level of consciousness: awake  Pain score: 0  Airway patency: patent  Nausea & Vomiting: no nausea  Cardiovascular status: hemodynamically stable  Respiratory status: acceptable  Hydration status: stable    No notable events documented.

## 2024-07-17 VITALS
HEIGHT: 68 IN | SYSTOLIC BLOOD PRESSURE: 144 MMHG | WEIGHT: 125 LBS | RESPIRATION RATE: 14 BRPM | HEART RATE: 75 BPM | DIASTOLIC BLOOD PRESSURE: 87 MMHG | TEMPERATURE: 96.8 F | OXYGEN SATURATION: 99 % | BODY MASS INDEX: 18.94 KG/M2

## 2024-07-17 LAB
ALBUMIN SERPL-MCNC: 2.9 G/DL (ref 3.5–5.2)
ALP SERPL-CCNC: 76 U/L (ref 40–129)
ALT SERPL-CCNC: 21 U/L (ref 0–40)
ANION GAP SERPL CALCULATED.3IONS-SCNC: 13 MMOL/L (ref 7–16)
AST SERPL-CCNC: 21 U/L (ref 0–39)
BASOPHILS # BLD: 0.01 K/UL (ref 0–0.2)
BASOPHILS NFR BLD: 0 % (ref 0–2)
BILIRUB SERPL-MCNC: 0.3 MG/DL (ref 0–1.2)
BUN SERPL-MCNC: 32 MG/DL (ref 6–23)
CALCIUM SERPL-MCNC: 8.8 MG/DL (ref 8.6–10.2)
CHLORIDE SERPL-SCNC: 103 MMOL/L (ref 98–107)
CO2 SERPL-SCNC: 21 MMOL/L (ref 22–29)
CREAT SERPL-MCNC: 1.2 MG/DL (ref 0.7–1.2)
EOSINOPHIL # BLD: 0 K/UL (ref 0.05–0.5)
EOSINOPHILS RELATIVE PERCENT: 0 % (ref 0–6)
ERYTHROCYTE [DISTWIDTH] IN BLOOD BY AUTOMATED COUNT: 14.6 % (ref 11.5–15)
GFR, ESTIMATED: 65 ML/MIN/1.73M2
GLUCOSE SERPL-MCNC: 177 MG/DL (ref 74–99)
HCT VFR BLD AUTO: 37 % (ref 37–54)
HGB BLD-MCNC: 12.3 G/DL (ref 12.5–16.5)
IMM GRANULOCYTES # BLD AUTO: 0.15 K/UL (ref 0–0.58)
IMM GRANULOCYTES NFR BLD: 1 % (ref 0–5)
LYMPHOCYTES NFR BLD: 0.64 K/UL (ref 1.5–4)
LYMPHOCYTES RELATIVE PERCENT: 6 % (ref 20–42)
MAGNESIUM SERPL-MCNC: 2 MG/DL (ref 1.6–2.6)
MCH RBC QN AUTO: 29.4 PG (ref 26–35)
MCHC RBC AUTO-ENTMCNC: 33.2 G/DL (ref 32–34.5)
MCV RBC AUTO: 88.5 FL (ref 80–99.9)
MONOCYTES NFR BLD: 1.07 K/UL (ref 0.1–0.95)
MONOCYTES NFR BLD: 10 % (ref 2–12)
NEUTROPHILS NFR BLD: 83 % (ref 43–80)
NEUTS SEG NFR BLD: 8.85 K/UL (ref 1.8–7.3)
PLATELET # BLD AUTO: 239 K/UL (ref 130–450)
PMV BLD AUTO: 9.7 FL (ref 7–12)
POTASSIUM SERPL-SCNC: 4.4 MMOL/L (ref 3.5–5)
PROT SERPL-MCNC: 6.6 G/DL (ref 6.4–8.3)
RBC # BLD AUTO: 4.18 M/UL (ref 3.8–5.8)
SODIUM SERPL-SCNC: 137 MMOL/L (ref 132–146)
WBC OTHER # BLD: 10.7 K/UL (ref 4.5–11.5)

## 2024-07-17 PROCEDURE — 6360000002 HC RX W HCPCS: Performed by: STUDENT IN AN ORGANIZED HEALTH CARE EDUCATION/TRAINING PROGRAM

## 2024-07-17 PROCEDURE — 80053 COMPREHEN METABOLIC PANEL: CPT

## 2024-07-17 PROCEDURE — 6370000000 HC RX 637 (ALT 250 FOR IP): Performed by: STUDENT IN AN ORGANIZED HEALTH CARE EDUCATION/TRAINING PROGRAM

## 2024-07-17 PROCEDURE — 85025 COMPLETE CBC W/AUTO DIFF WBC: CPT

## 2024-07-17 PROCEDURE — 94640 AIRWAY INHALATION TREATMENT: CPT

## 2024-07-17 PROCEDURE — 99232 SBSQ HOSP IP/OBS MODERATE 35: CPT | Performed by: FAMILY MEDICINE

## 2024-07-17 PROCEDURE — 2580000003 HC RX 258: Performed by: STUDENT IN AN ORGANIZED HEALTH CARE EDUCATION/TRAINING PROGRAM

## 2024-07-17 PROCEDURE — 36415 COLL VENOUS BLD VENIPUNCTURE: CPT

## 2024-07-17 PROCEDURE — 83735 ASSAY OF MAGNESIUM: CPT

## 2024-07-17 RX ORDER — LEVOFLOXACIN 750 MG/1
750 TABLET, FILM COATED ORAL DAILY
Status: DISCONTINUED | OUTPATIENT
Start: 2024-07-17 | End: 2024-07-17 | Stop reason: HOSPADM

## 2024-07-17 RX ORDER — LEVOFLOXACIN 750 MG/1
750 TABLET, FILM COATED ORAL DAILY
Qty: 10 TABLET | Refills: 0 | Status: SHIPPED | OUTPATIENT
Start: 2024-07-17 | End: 2024-07-27

## 2024-07-17 RX ORDER — FLUCONAZOLE 200 MG/1
200 TABLET ORAL DAILY
Qty: 14 TABLET | Refills: 0 | Status: SHIPPED | OUTPATIENT
Start: 2024-07-18 | End: 2024-08-01

## 2024-07-17 RX ORDER — FLUTICASONE FUROATE, UMECLIDINIUM BROMIDE AND VILANTEROL TRIFENATATE 200; 62.5; 25 UG/1; UG/1; UG/1
1 POWDER RESPIRATORY (INHALATION) DAILY
Qty: 1 EACH | Refills: 0 | Status: SHIPPED | OUTPATIENT
Start: 2024-07-17 | End: 2024-07-17 | Stop reason: HOSPADM

## 2024-07-17 RX ORDER — PANTOPRAZOLE SODIUM 40 MG/1
40 TABLET, DELAYED RELEASE ORAL
Qty: 60 TABLET | Refills: 0 | Status: SHIPPED | OUTPATIENT
Start: 2024-07-18

## 2024-07-17 RX ORDER — BUDESONIDE AND FORMOTEROL FUMARATE DIHYDRATE 160; 4.5 UG/1; UG/1
2 AEROSOL RESPIRATORY (INHALATION) 2 TIMES DAILY
Qty: 30.6 G | Refills: 0 | Status: SHIPPED | OUTPATIENT
Start: 2024-07-17

## 2024-07-17 RX ORDER — TIOTROPIUM BROMIDE 18 UG/1
18 CAPSULE ORAL; RESPIRATORY (INHALATION) DAILY
Qty: 90 CAPSULE | Refills: 0 | Status: SHIPPED | OUTPATIENT
Start: 2024-07-17

## 2024-07-17 RX ADMIN — IPRATROPIUM BROMIDE AND ALBUTEROL SULFATE 1 DOSE: .5; 2.5 SOLUTION RESPIRATORY (INHALATION) at 09:11

## 2024-07-17 RX ADMIN — PANTOPRAZOLE SODIUM 40 MG: 40 TABLET, DELAYED RELEASE ORAL at 15:20

## 2024-07-17 RX ADMIN — METOPROLOL SUCCINATE 25 MG: 25 TABLET, EXTENDED RELEASE ORAL at 08:01

## 2024-07-17 RX ADMIN — WATER 20 MG: 1 INJECTION INTRAMUSCULAR; INTRAVENOUS; SUBCUTANEOUS at 05:42

## 2024-07-17 RX ADMIN — CYANOCOBALAMIN TAB 1000 MCG 1000 MCG: 1000 TAB at 08:01

## 2024-07-17 RX ADMIN — PANTOPRAZOLE SODIUM 40 MG: 40 TABLET, DELAYED RELEASE ORAL at 05:42

## 2024-07-17 RX ADMIN — FLUCONAZOLE 200 MG: 100 TABLET ORAL at 08:02

## 2024-07-17 RX ADMIN — SODIUM CHLORIDE, PRESERVATIVE FREE 10 ML: 5 INJECTION INTRAVENOUS at 08:02

## 2024-07-17 RX ADMIN — APIXABAN 5 MG: 5 TABLET, FILM COATED ORAL at 08:01

## 2024-07-17 RX ADMIN — ARFORMOTEROL TARTRATE: 15 SOLUTION RESPIRATORY (INHALATION) at 09:11

## 2024-07-17 RX ADMIN — ASPIRIN 81 MG: 81 TABLET, COATED ORAL at 08:01

## 2024-07-17 RX ADMIN — Medication 1000 UNITS: at 08:01

## 2024-07-17 RX ADMIN — ATORVASTATIN CALCIUM 40 MG: 40 TABLET, FILM COATED ORAL at 08:01

## 2024-07-17 NOTE — PROGRESS NOTES
Hospitalist Progress Note      Synopsis: Patient admitted on 7/8/2024 Chuck Chavarria Jr. is a 73 y.o. male who presents for shortness of breath.  Patient reports: EMS as he was short of breath.  He reports he has a history of COPD and ran out of his inhaler last night.  He was coughing and wheezing.  He reports that he is spitting up mucus.  EMS gave 1 breathing treatment as he was hypoxic.  They placed him on 4 L.  He denies fever but reports productive cough.  He denies chest pain.  He says he feels like his heart is beating rapidly.  He says he has a history of atrial fibrillation but is not anticoagulated.  He denies any history of DVT or PE.  He denies abdominal pain.  He denies any other complaints.     Discussed with ED physician     Workup in the ED essentially unremarkable for possible pneumonia, A-fib RVR.  CTA showed no PE, possible pneumonia, pulmonary vascular congestion.  There was a reported possible allergic reaction to possibly doxycycline versus Rocephin?  With reported worsening shortness of breath patient received epinephrine steroids and Benadryl.  During my interview with the patient he is slightly confused lethargic arousable on BiPAP       Subjective  Patient seen and examined chart reviewed discussed with staff and case management no overnight events reported patient went for EGD yesterday by surgery team  Verbal report from nursing staff, signs of esophagitis patient started on Diflucan no mass was seen  Patient otherwise afebrile normal WBC count asking about discharge  Cardiology team was unable to perform SRAVANTHI yesterday.  Will check with infectious diseases regarding antibiotics commendations    Exam:  /69   Pulse 74   Temp 98.5 °F (36.9 °C) (Oral)   Resp 16   Ht 1.727 m (5' 8\")   Wt 56.7 kg (125 lb) Comment: previous encounter  SpO2 98%   BMI 19.01 kg/m²   -General:  Awake, alert, oriented X 3.  Well developed, well nourished, well groomed.  No apparent  patient, nicotine     --Disposition likely home on discharge in the next 24 hours pending infectious disease following recommendations regarding antibiotics  Diet: ADULT DIET; Easy to Chew  Code Status: Full Code  PT/OT Eval Status:     DVT Prophylaxis:     Recommended disposition at discharge:      +++++++++++++++++++++++++++++++++++++++++++++++++  Baltazar Huffman MD  7/17/2024  [facility].  +++++++++++++++++++++++++++++++++++++++++++++++++  NOTE: This report was transcribed using voice recognition software. Every effort was made to ensure accuracy; however, inadvertent computerized transcription errors may be present.

## 2024-07-17 NOTE — CARE COORDINATION
Social Work/Case Management Transition of Care Planning (Yovana Andrade -[phone]):  Per report and chart review, SRAVANTHI was attempted on 7/16 but was unable to be completed due to an obstruction.  General surgery was consulted and EGD was done.  No blockage was found. IV Solu-medrol has been discontinued.  IV Lasix has been on hold.  Pulmonology is following.  He is on oral Levaquin and oral Diflucan.  ID is following. Met with patient at bedside.  Discharge plan is to home with no needs. Patient indicated his sister is out of town and cannot transport. Per [facility], patient got a walker last year and cannot get another one. Discussed transport through insurance at discharge rather than taking the bus.  Patient is agreeable. CM/SW will follow. Yovana Andrade, DANYELLE  7/17/2024

## 2024-07-17 NOTE — DISCHARGE SUMMARY
Hospital Medicine Discharge Summary    Patient ID: Chuck Chavarria Jr.      Patient's PCP: Gunner Espinosa MD    Admit Date: 7/8/2024     Discharge Date:  7/17/2024    Admitting Physician: Baltazar Huffman MD     Discharge Physician: Baltazar Huffman MD    Discharge Diagnoses:       Active Hospital Problems    Diagnosis Date Noted    Esophageal dysphagia [R13.19] 07/16/2024    Atrial fibrillation with rapid ventricular response (HCC) [I48.91] 07/08/2024       The patient was seen and examined on day of discharge and this discharge summary is in conjunction with any daily progress note from day of discharge.    Hospital Course:   HPI from chart :   \"  Patient admitted on 7/8/2024 Chuck Chavarria Jr. is a 73 y.o. male who presents for shortness of breath.  Patient reports: EMS as he was short of breath.  He reports he has a history of COPD and ran out of his inhaler last night.  He was coughing and wheezing.  He reports that he is spitting up mucus.  EMS gave 1 breathing treatment as he was hypoxic.  They placed him on 4 L.  He denies fever but reports productive cough.  He denies chest pain.  He says he feels like his heart is beating rapidly.  He says he has a history of atrial fibrillation but is not anticoagulated.  He denies any history of DVT or PE.  He denies abdominal pain.  He denies any other complaints.     Discussed with ED physician     Workup in the ED essentially unremarkable for possible pneumonia, A-fib RVR.  CTA showed no PE, possible pneumonia, pulmonary vascular congestion.  There was a reported possible allergic reaction to possibly doxycycline versus Rocephin?  With reported worsening shortness of breath patient received epinephrine steroids and Benadryl.  During my interview with the patient he is slightly confused lethargic arousable on BiPAP   \"      Brief Hospital course:     --Acute hypoxic respiratory failure.  Likely multifactorial, possible pneumonia, COPD exacerbation and pleural  signs    -Extremities: normal ROM. No Cyanosis clubbing or edema    -Skin: Warm and dry    -Neuro:  AAO x 3 .Cranial nerves 2-12 intact, no focal deficits     -Psych : normal .      Consults:     IP CONSULT TO INTERNAL MEDICINE  IP CONSULT TO PULMONOLOGY  IP CONSULT TO CARDIOLOGY  IP CONSULT TO INFECTIOUS DISEASES  IP CONSULT TO GENERAL SURGERY      Disposition: Home      Code Status:  Full Code     Activity: activity as tolerated    Diet: Regular /refer to dc instructions     Labs: For convenience and continuity at follow-up the following most recent labs are provided:      CBC:    Lab Results   Component Value Date/Time    WBC 10.7 07/17/2024 04:51 AM    HGB 12.3 07/17/2024 04:51 AM    HCT 37.0 07/17/2024 04:51 AM     07/17/2024 04:51 AM       Renal:    Lab Results   Component Value Date/Time     07/17/2024 04:51 AM    K 4.4 07/17/2024 04:51 AM    K 4.8 08/28/2022 05:15 AM     07/17/2024 04:51 AM    CO2 21 07/17/2024 04:51 AM    BUN 32 07/17/2024 04:51 AM    CREATININE 1.2 07/17/2024 04:51 AM    CALCIUM 8.8 07/17/2024 04:51 AM    PHOS 3.7 08/24/2023 04:30 AM       Discharge Medications:     Current Discharge Medication List             Details   fluconazole (DIFLUCAN) 200 MG tablet Take 1 tablet by mouth daily for 14 days  Qty: 14 tablet, Refills: 0      pantoprazole (PROTONIX) 40 MG tablet Take 1 tablet by mouth 2 times daily (before meals)  Qty: 60 tablet, Refills: 0      apixaban (ELIQUIS) 5 MG TABS tablet Take 1 tablet by mouth 2 times daily  Qty: 60 tablet, Refills: 0      melatonin 3 MG TABS tablet Take 1.5 tablets by mouth nightly as needed (insomnia)  Qty: 30 tablet, Refills: 0      nicotine (NICODERM CQ) 21 MG/24HR Place 1 patch onto the skin daily  Qty: 30 patch, Refills: 0      hydrALAZINE (APRESOLINE) 25 MG tablet Take 1 tablet by mouth 3 times daily  Qty: 90 tablet, Refills: 0                Details   levoFLOXacin (LEVAQUIN) 750 MG tablet Take 1 tablet by mouth daily for 10

## 2024-07-17 NOTE — PROGRESS NOTES
07/16/2024 06:12 AM    METASPCT 1 07/15/2024 05:20 AM    LYMPHOPCT 6 07/16/2024 06:12 AM    MONOPCT 8 07/16/2024 06:12 AM    MYELOPCT 1 07/15/2024 05:20 AM    EOSPCT 0 07/16/2024 06:12 AM    BASOPCT 0 07/16/2024 06:12 AM    MONOSABS 0.73 07/16/2024 06:12 AM    LYMPHSABS 0.56 07/16/2024 06:12 AM    EOSABS 0.00 07/16/2024 06:12 AM    BASOSABS 0.02 07/16/2024 06:12 AM     CMP:    Lab Results   Component Value Date/Time     07/16/2024 06:12 AM    K 4.5 07/16/2024 06:12 AM    K 4.8 08/28/2022 05:15 AM     07/16/2024 06:12 AM    CO2 23 07/16/2024 06:12 AM    BUN 30 07/16/2024 06:12 AM    CREATININE 1.2 07/16/2024 06:12 AM    GFRAA >60 08/28/2022 05:15 AM    LABGLOM 65 07/16/2024 06:12 AM    LABGLOM >60 01/18/2024 07:07 AM    GLUCOSE 137 07/16/2024 06:12 AM    CALCIUM 9.1 07/16/2024 06:12 AM    BILITOT 0.2 07/16/2024 06:12 AM    ALKPHOS 75 07/16/2024 06:12 AM    AST 22 07/16/2024 06:12 AM    ALT 20 07/16/2024 06:12 AM       CLINICAL ASSESMENT:  Underlying COPD/emphysema  Strep bacteremia  A-fib with RVR  Tobacco abuse  Acute on chronic respiratory failure-resolved  Chronic apical lung emphysema with fibrotic scarring    PLAN: If needed the case was discussed with the care team  Continue bronchodilators every 4 hours  Currently not requiring BiPAP  Antibiotics as per ID.  Continue current nebulizers.  Recommend discharging on combination of Symbicort, Spiriva, albuterol as needed or equivalents.  Treatment of A-fib as per primary and cardiology.  Pulmonary will continue to follow.  Evaulation of possible esophageal blockage as per general surgery.       Jeaneth Smith DO

## 2024-07-17 NOTE — PROGRESS NOTES
[facility]  [facility]  Progress Note      Chief Complaint   Patient presents with    Shortness of Breath     SOB that got worse his morning. His inhaler ran out tonight, +wheezing. EMS gave 1 duoneb and stated pt was 90% room air. Now 100% on 4LNc after breathing Tx.        SUBJECTIVE:    Patient is tolerating medications. No reported adverse drug reactions.  No nausea, vomiting, diarrhea.    Review of systems:  As stated above in the chief complaint, otherwise negative.    Medications:  Scheduled Meds:   fluconazole  200 mg Oral Daily    pantoprazole  40 mg Oral BID AC    levoFLOXacin  750 mg Oral Every Other Day    ipratropium 0.5 mg-albuterol 2.5 mg  1 Dose Inhalation BID RT    carbamide peroxide  5 drop Both Ears BID    melatonin  10 mg Oral Nightly    aspirin  81 mg Oral Daily    atorvastatin  40 mg Oral Daily    arformoterol 15 mcg-budesonide 0.25 mg neb solution   Nebulization BID RT    cyanocobalamin  1,000 mcg Oral Daily    Vitamin D  1,000 Units Oral Daily    nicotine  1 patch TransDERmal Daily    sodium chloride flush  5-40 mL IntraVENous 2 times per day    [Held by provider] furosemide  20 mg IntraVENous Daily    metoprolol succinate  25 mg Oral BID    apixaban  5 mg Oral BID     Continuous Infusions:   sodium chloride       PRN Meds:white petrolatum, simethicone, melatonin, guaiFENesin, sodium chloride flush, sodium chloride, potassium chloride **OR** potassium alternative oral replacement **OR** potassium chloride, magnesium sulfate, polyethylene glycol, acetaminophen **OR** acetaminophen, ondansetron **OR** ondansetron, hydrALAZINE    OBJECTIVE:  BP (!) 144/87   Pulse 75   Temp 96.8 °F (36 °C) (Temporal)   Resp 14   Ht 1.727 m (5' 8\")   Wt 56.7 kg (125 lb) Comment: previous encounter  SpO2 99%   BMI 19.01 kg/m²   Temp  Av.5 °F (36.4 °C)  Min: 96.8 °F (36 °C)  Max: 98.5 °F (36.9 °C)  Constitutional: The patient is awake, alert, and oriented.   Skin: Warm and  dry. No rashes were noted. No jaundice.  HEENT: Eyes show round, and reactive pupils. Moist mucous membranes, no ulcerations, no thrush.   Neck: Supple to movements. No lymphadenopathy.   Chest: No use of accessory muscles to breathe. Symmetrical expansion. Auscultation reveals bibasilar crackles  Cardiovascular: S1 and S2 are rhythmic and regular. No murmurs appreciated.   Abdomen: Positive bowel sounds to auscultation. Benign to palpation. No masses felt. No hepatosplenomegaly.  Genitourinary: No pain in the lower abdomen  Extremities: No clubbing, no cyanosis, no edema.  Musculoskeletal: No pain in range of motion of any joints  Neurological: Following commands, no focal neurodeficit  Lines: peripheral    Laboratory and Tests Review:  Lab Results   Component Value Date    WBC 10.7 07/17/2024    WBC 8.9 07/16/2024    WBC 9.3 07/15/2024    HGB 12.3 (L) 07/17/2024    HCT 37.0 07/17/2024    MCV 88.5 07/17/2024     07/17/2024     Lab Results   Component Value Date    NEUTROABS 8.85 (H) 07/17/2024    NEUTROABS 7.44 (H) 07/16/2024    NEUTROABS 8.09 (H) 07/15/2024     No results found for: \"CRPHS\"  Lab Results   Component Value Date    ALT 21 07/17/2024    AST 21 07/17/2024    ALKPHOS 76 07/17/2024    BILITOT 0.3 07/17/2024     Lab Results   Component Value Date/Time     07/17/2024 04:51 AM    K 4.4 07/17/2024 04:51 AM    K 4.8 08/28/2022 05:15 AM     07/17/2024 04:51 AM    CO2 21 07/17/2024 04:51 AM    BUN 32 07/17/2024 04:51 AM    CREATININE 1.2 07/17/2024 04:51 AM    CREATININE 1.2 07/16/2024 06:12 AM    CREATININE 1.1 07/15/2024 05:20 AM    GFRAA >60 08/28/2022 05:15 AM    LABGLOM 65 07/17/2024 04:51 AM    LABGLOM >60 01/18/2024 07:07 AM    GLUCOSE 177 07/17/2024 04:51 AM    CALCIUM 8.8 07/17/2024 04:51 AM    BILITOT 0.3 07/17/2024 04:51 AM    ALKPHOS 76 07/17/2024 04:51 AM    AST 21 07/17/2024 04:51 AM    ALT 21 07/17/2024 04:51 AM     Lab Results   Component Value Date    CRP 0.3 08/28/2022

## 2024-07-17 NOTE — PLAN OF CARE
Problem: Chronic Conditions and Co-morbidities  Goal: Patient's chronic conditions and co-morbidity symptoms are monitored and maintained or improved  7/16/2024 2150 by Milo Stanford RN  Outcome: Progressing  7/16/2024 1853 by Teodoro Sanches RN  Outcome: Progressing     Problem: Discharge Planning  Goal: Discharge to home or other facility with appropriate resources  7/16/2024 2150 by Milo Stanford RN  Outcome: Progressing  7/16/2024 1853 by Teodoro Sanches RN  Outcome: Progressing     Problem: Safety - Adult  Goal: Free from fall injury  7/16/2024 2150 by Milo Stanford RN  Outcome: Progressing  7/16/2024 1853 by Teodoro Sanches RN  Outcome: Progressing     Problem: Pain  Goal: Verbalizes/displays adequate comfort level or baseline comfort level  7/16/2024 2150 by Milo Stanford RN  Outcome: Progressing  7/16/2024 1853 by Teodoro Sanches RN  Outcome: Progressing

## 2024-07-17 NOTE — PLAN OF CARE
Problem: Chronic Conditions and Co-morbidities  Goal: Patient's chronic conditions and co-morbidity symptoms are monitored and maintained or improved  7/17/2024 1138 by Juany Garza RN  Outcome: Progressing  7/16/2024 2150 by Milo Stanford RN  Outcome: Progressing     Problem: Discharge Planning  Goal: Discharge to home or other facility with appropriate resources  7/17/2024 1138 by Juany Garza RN  Outcome: Progressing  7/16/2024 2150 by Milo Stanford RN  Outcome: Progressing     Problem: Skin/Tissue Integrity  Goal: Absence of new skin breakdown  Description: 1.  Monitor for areas of redness and/or skin breakdown  2.  Assess vascular access sites hourly  3.  Every 4-6 hours minimum:  Change oxygen saturation probe site  4.  Every 4-6 hours:  If on nasal continuous positive airway pressure, respiratory therapy assess nares and determine need for appliance change or resting period.  Outcome: Progressing     Problem: Safety - Adult  Goal: Free from fall injury  7/17/2024 1138 by Juany Garza RN  Outcome: Progressing  7/16/2024 2150 by Milo Stanford RN  Outcome: Progressing     Problem: Pain  Goal: Verbalizes/displays adequate comfort level or baseline comfort level  7/17/2024 1138 by Juany Garza RN  Outcome: Progressing  7/16/2024 2150 by Milo Stanford RN  Outcome: Progressing

## 2024-07-18 LAB
SEND OUT REPORT: NORMAL
TEST NAME: NORMAL

## 2024-07-26 ENCOUNTER — TELEPHONE (OUTPATIENT)
Dept: CARDIOLOGY CLINIC | Age: 74
End: 2024-07-26

## 2024-07-29 NOTE — TELEPHONE ENCOUNTER
Called patient, number disconnected.  Patient's sister (Ashia) notified of Dr. Carrasco's recommendation. F/U scheduled for 10/29/24 at 11:00 a.m.

## 2024-08-10 ENCOUNTER — HOSPITAL ENCOUNTER (INPATIENT)
Age: 74
LOS: 1 days | Discharge: HOME | DRG: 194 | End: 2024-08-12
Attending: EMERGENCY MEDICINE | Admitting: FAMILY MEDICINE
Payer: COMMERCIAL

## 2024-08-10 ENCOUNTER — APPOINTMENT (OUTPATIENT)
Dept: GENERAL RADIOLOGY | Age: 74
DRG: 194 | End: 2024-08-10
Payer: COMMERCIAL

## 2024-08-10 DIAGNOSIS — J44.1 COPD WITH ACUTE EXACERBATION (HCC): Primary | ICD-10-CM

## 2024-08-10 DIAGNOSIS — J96.00 ACUTE RESPIRATORY FAILURE, UNSPECIFIED WHETHER WITH HYPOXIA OR HYPERCAPNIA (HCC): ICD-10-CM

## 2024-08-10 DIAGNOSIS — I50.9 CONGESTIVE HEART FAILURE, UNSPECIFIED HF CHRONICITY, UNSPECIFIED HEART FAILURE TYPE (HCC): ICD-10-CM

## 2024-08-10 LAB
ALBUMIN SERPL-MCNC: 3.2 G/DL (ref 3.5–5.2)
ALP SERPL-CCNC: 130 U/L (ref 40–129)
ALT SERPL-CCNC: 18 U/L (ref 0–40)
ANION GAP SERPL CALCULATED.3IONS-SCNC: 7 MMOL/L (ref 7–16)
AST SERPL-CCNC: 24 U/L (ref 0–39)
BASOPHILS # BLD: 0.06 K/UL (ref 0–0.2)
BASOPHILS NFR BLD: 1 % (ref 0–2)
BILIRUB SERPL-MCNC: 0.3 MG/DL (ref 0–1.2)
BUN SERPL-MCNC: 12 MG/DL (ref 6–23)
CALCIUM SERPL-MCNC: 8.9 MG/DL (ref 8.6–10.2)
CHLORIDE SERPL-SCNC: 105 MMOL/L (ref 98–107)
CO2 SERPL-SCNC: 29 MMOL/L (ref 22–29)
CREAT SERPL-MCNC: 1.1 MG/DL (ref 0.7–1.2)
EOSINOPHIL # BLD: 0.15 K/UL (ref 0.05–0.5)
EOSINOPHILS RELATIVE PERCENT: 3 % (ref 0–6)
ERYTHROCYTE [DISTWIDTH] IN BLOOD BY AUTOMATED COUNT: 15.2 % (ref 11.5–15)
GFR, ESTIMATED: 69 ML/MIN/1.73M2
GLUCOSE SERPL-MCNC: 99 MG/DL (ref 74–99)
HCT VFR BLD AUTO: 32.8 % (ref 37–54)
HGB BLD-MCNC: 10.5 G/DL (ref 12.5–16.5)
IMM GRANULOCYTES # BLD AUTO: 0.05 K/UL (ref 0–0.58)
IMM GRANULOCYTES NFR BLD: 1 % (ref 0–5)
LYMPHOCYTES NFR BLD: 1.21 K/UL (ref 1.5–4)
LYMPHOCYTES RELATIVE PERCENT: 24 % (ref 20–42)
MCH RBC QN AUTO: 29 PG (ref 26–35)
MCHC RBC AUTO-ENTMCNC: 32 G/DL (ref 32–34.5)
MCV RBC AUTO: 90.6 FL (ref 80–99.9)
MONOCYTES NFR BLD: 0.92 K/UL (ref 0.1–0.95)
MONOCYTES NFR BLD: 18 % (ref 2–12)
NEUTROPHILS NFR BLD: 53 % (ref 43–80)
NEUTS SEG NFR BLD: 2.74 K/UL (ref 1.8–7.3)
PLATELET # BLD AUTO: 314 K/UL (ref 130–450)
PMV BLD AUTO: 9.2 FL (ref 7–12)
POTASSIUM SERPL-SCNC: 3.8 MMOL/L (ref 3.5–5)
PROT SERPL-MCNC: 6.9 G/DL (ref 6.4–8.3)
RBC # BLD AUTO: 3.62 M/UL (ref 3.8–5.8)
SODIUM SERPL-SCNC: 141 MMOL/L (ref 132–146)
WBC OTHER # BLD: 5.1 K/UL (ref 4.5–11.5)

## 2024-08-10 PROCEDURE — 99285 EMERGENCY DEPT VISIT HI MDM: CPT

## 2024-08-10 PROCEDURE — 2700000000 HC OXYGEN THERAPY PER DAY

## 2024-08-10 PROCEDURE — 85025 COMPLETE CBC W/AUTO DIFF WBC: CPT

## 2024-08-10 PROCEDURE — 84484 ASSAY OF TROPONIN QUANT: CPT

## 2024-08-10 PROCEDURE — 6370000000 HC RX 637 (ALT 250 FOR IP): Performed by: EMERGENCY MEDICINE

## 2024-08-10 PROCEDURE — 93005 ELECTROCARDIOGRAM TRACING: CPT | Performed by: EMERGENCY MEDICINE

## 2024-08-10 PROCEDURE — 71045 X-RAY EXAM CHEST 1 VIEW: CPT

## 2024-08-10 PROCEDURE — 80053 COMPREHEN METABOLIC PANEL: CPT

## 2024-08-10 PROCEDURE — 94640 AIRWAY INHALATION TREATMENT: CPT

## 2024-08-10 RX ORDER — IPRATROPIUM BROMIDE AND ALBUTEROL SULFATE 2.5; .5 MG/3ML; MG/3ML
1 SOLUTION RESPIRATORY (INHALATION)
Status: COMPLETED | OUTPATIENT
Start: 2024-08-10 | End: 2024-08-10

## 2024-08-10 RX ORDER — IPRATROPIUM BROMIDE AND ALBUTEROL SULFATE 2.5; .5 MG/3ML; MG/3ML
1 SOLUTION RESPIRATORY (INHALATION)
Status: DISPENSED | OUTPATIENT
Start: 2024-08-10 | End: 2024-08-10

## 2024-08-10 RX ADMIN — IPRATROPIUM BROMIDE AND ALBUTEROL SULFATE 1 DOSE: 2.5; .5 SOLUTION RESPIRATORY (INHALATION) at 23:33

## 2024-08-10 RX ADMIN — IPRATROPIUM BROMIDE AND ALBUTEROL SULFATE 1 DOSE: 2.5; .5 SOLUTION RESPIRATORY (INHALATION) at 23:34

## 2024-08-10 RX ADMIN — IPRATROPIUM BROMIDE AND ALBUTEROL SULFATE 1 DOSE: 2.5; .5 SOLUTION RESPIRATORY (INHALATION) at 23:32

## 2024-08-11 PROBLEM — I50.31 ACUTE DIASTOLIC CHF (CONGESTIVE HEART FAILURE) (HCC): Status: ACTIVE | Noted: 2024-08-11

## 2024-08-11 LAB
AADO2: 274.2 MMHG
AMPHET UR QL SCN: NEGATIVE
B.E.: -0.8 MMOL/L (ref -3–3)
BARBITURATES UR QL SCN: NEGATIVE
BENZODIAZ UR QL: NEGATIVE
BNP SERPL-MCNC: ABNORMAL PG/ML (ref 0–450)
BUPRENORPHINE UR QL: NEGATIVE
CANNABINOIDS UR QL SCN: NEGATIVE
COCAINE UR QL SCN: POSITIVE
COHB: 0.3 % (ref 0–1.5)
CRITICAL: ABNORMAL
DATE ANALYZED: ABNORMAL
DATE OF COLLECTION: ABNORMAL
FENTANYL UR QL: NEGATIVE
FIO2: 100 %
HCO3: 23.6 MMOL/L (ref 22–26)
HHB: 0.4 % (ref 0–5)
LAB: ABNORMAL
LACTATE BLDV-SCNC: 1.1 MMOL/L (ref 0.5–1.9)
LACTATE BLDV-SCNC: 1.5 MMOL/L (ref 0.5–1.9)
Lab: 255
METHADONE UR QL: NEGATIVE
METHB: 0.3 % (ref 0–1.5)
MODE: ABNORMAL
O2 SATURATION: 99.6 % (ref 92–98.5)
O2HB: 99 % (ref 94–97)
OPERATOR ID: 2577
OPIATES UR QL SCN: NEGATIVE
OXYCODONE UR QL SCN: NEGATIVE
PATIENT TEMP: 37 C
PCO2: 38.3 MMHG (ref 35–45)
PCP UR QL SCN: NEGATIVE
PEEP/CPAP: 8 CMH2O
PFO2: 3.76 MMHG/%
PH BLOOD GAS: 7.41 (ref 7.35–7.45)
PIP: 15 CMH2O
PO2: 375.5 MMHG (ref 75–100)
PROCALCITONIN SERPL-MCNC: 0.54 NG/ML (ref 0–0.08)
RI(T): 0.73
SOURCE, BLOOD GAS: ABNORMAL
TEST INFORMATION: ABNORMAL
THB: 12.1 G/DL (ref 11.5–16.5)
TIME ANALYZED: 306
TROPONIN I SERPL HS-MCNC: 41 NG/L (ref 0–11)
TROPONIN I SERPL HS-MCNC: 50 NG/L (ref 0–11)

## 2024-08-11 PROCEDURE — 2580000003 HC RX 258: Performed by: PHYSICIAN ASSISTANT

## 2024-08-11 PROCEDURE — 99222 1ST HOSP IP/OBS MODERATE 55: CPT | Performed by: FAMILY MEDICINE

## 2024-08-11 PROCEDURE — 2580000003 HC RX 258: Performed by: FAMILY MEDICINE

## 2024-08-11 PROCEDURE — 6360000002 HC RX W HCPCS: Performed by: INTERNAL MEDICINE

## 2024-08-11 PROCEDURE — 6360000002 HC RX W HCPCS: Performed by: FAMILY MEDICINE

## 2024-08-11 PROCEDURE — 96366 THER/PROPH/DIAG IV INF ADDON: CPT

## 2024-08-11 PROCEDURE — 96374 THER/PROPH/DIAG INJ IV PUSH: CPT

## 2024-08-11 PROCEDURE — 96376 TX/PRO/DX INJ SAME DRUG ADON: CPT

## 2024-08-11 PROCEDURE — 84145 PROCALCITONIN (PCT): CPT

## 2024-08-11 PROCEDURE — 36415 COLL VENOUS BLD VENIPUNCTURE: CPT

## 2024-08-11 PROCEDURE — 6360000002 HC RX W HCPCS: Performed by: EMERGENCY MEDICINE

## 2024-08-11 PROCEDURE — 6360000002 HC RX W HCPCS: Performed by: PHYSICIAN ASSISTANT

## 2024-08-11 PROCEDURE — 94660 CPAP INITIATION&MGMT: CPT

## 2024-08-11 PROCEDURE — 94640 AIRWAY INHALATION TREATMENT: CPT

## 2024-08-11 PROCEDURE — 82805 BLOOD GASES W/O2 SATURATION: CPT

## 2024-08-11 PROCEDURE — 6370000000 HC RX 637 (ALT 250 FOR IP): Performed by: FAMILY MEDICINE

## 2024-08-11 PROCEDURE — 2700000000 HC OXYGEN THERAPY PER DAY

## 2024-08-11 PROCEDURE — 96375 TX/PRO/DX INJ NEW DRUG ADDON: CPT

## 2024-08-11 PROCEDURE — 84484 ASSAY OF TROPONIN QUANT: CPT

## 2024-08-11 PROCEDURE — 99223 1ST HOSP IP/OBS HIGH 75: CPT | Performed by: INTERNAL MEDICINE

## 2024-08-11 PROCEDURE — 96367 TX/PROPH/DG ADDL SEQ IV INF: CPT

## 2024-08-11 PROCEDURE — 83880 ASSAY OF NATRIURETIC PEPTIDE: CPT

## 2024-08-11 PROCEDURE — G0378 HOSPITAL OBSERVATION PER HR: HCPCS

## 2024-08-11 PROCEDURE — 2140000000 HC CCU INTERMEDIATE R&B

## 2024-08-11 PROCEDURE — 96365 THER/PROPH/DIAG IV INF INIT: CPT

## 2024-08-11 PROCEDURE — 80307 DRUG TEST PRSMV CHEM ANLYZR: CPT

## 2024-08-11 PROCEDURE — 83605 ASSAY OF LACTIC ACID: CPT

## 2024-08-11 PROCEDURE — 87040 BLOOD CULTURE FOR BACTERIA: CPT

## 2024-08-11 RX ORDER — ACETAMINOPHEN 650 MG/1
650 SUPPOSITORY RECTAL EVERY 6 HOURS PRN
Status: DISCONTINUED | OUTPATIENT
Start: 2024-08-11 | End: 2024-08-12 | Stop reason: HOSPADM

## 2024-08-11 RX ORDER — HYDRALAZINE HYDROCHLORIDE 25 MG/1
25 TABLET, FILM COATED ORAL 3 TIMES DAILY
Status: DISCONTINUED | OUTPATIENT
Start: 2024-08-11 | End: 2024-08-12

## 2024-08-11 RX ORDER — PREDNISONE 20 MG/1
40 TABLET ORAL DAILY
Status: DISCONTINUED | OUTPATIENT
Start: 2024-08-12 | End: 2024-08-11

## 2024-08-11 RX ORDER — ONDANSETRON 4 MG/1
4 TABLET, ORALLY DISINTEGRATING ORAL EVERY 8 HOURS PRN
Status: DISCONTINUED | OUTPATIENT
Start: 2024-08-11 | End: 2024-08-12 | Stop reason: HOSPADM

## 2024-08-11 RX ORDER — FUROSEMIDE 10 MG/ML
20 INJECTION INTRAMUSCULAR; INTRAVENOUS 2 TIMES DAILY
Status: DISCONTINUED | OUTPATIENT
Start: 2024-08-11 | End: 2024-08-12 | Stop reason: HOSPADM

## 2024-08-11 RX ORDER — ATORVASTATIN CALCIUM 40 MG/1
40 TABLET, FILM COATED ORAL DAILY
Status: DISCONTINUED | OUTPATIENT
Start: 2024-08-11 | End: 2024-08-12 | Stop reason: HOSPADM

## 2024-08-11 RX ORDER — MAGNESIUM SULFATE IN WATER 40 MG/ML
2000 INJECTION, SOLUTION INTRAVENOUS ONCE
Status: COMPLETED | OUTPATIENT
Start: 2024-08-11 | End: 2024-08-11

## 2024-08-11 RX ORDER — FUROSEMIDE 10 MG/ML
40 INJECTION INTRAMUSCULAR; INTRAVENOUS 2 TIMES DAILY
Status: DISCONTINUED | OUTPATIENT
Start: 2024-08-11 | End: 2024-08-11

## 2024-08-11 RX ORDER — SODIUM CHLORIDE 9 MG/ML
INJECTION, SOLUTION INTRAVENOUS PRN
Status: DISCONTINUED | OUTPATIENT
Start: 2024-08-11 | End: 2024-08-12 | Stop reason: HOSPADM

## 2024-08-11 RX ORDER — CHOLECALCIFEROL (VITAMIN D3) 125 MCG
3 CAPSULE ORAL DAILY
Status: DISCONTINUED | OUTPATIENT
Start: 2024-08-11 | End: 2024-08-12 | Stop reason: HOSPADM

## 2024-08-11 RX ORDER — ONDANSETRON 2 MG/ML
4 INJECTION INTRAMUSCULAR; INTRAVENOUS EVERY 6 HOURS PRN
Status: DISCONTINUED | OUTPATIENT
Start: 2024-08-11 | End: 2024-08-12 | Stop reason: HOSPADM

## 2024-08-11 RX ORDER — POLYETHYLENE GLYCOL 3350 17 G/17G
17 POWDER, FOR SOLUTION ORAL DAILY PRN
Status: DISCONTINUED | OUTPATIENT
Start: 2024-08-11 | End: 2024-08-12 | Stop reason: HOSPADM

## 2024-08-11 RX ORDER — POTASSIUM CHLORIDE 20 MEQ/1
40 TABLET, EXTENDED RELEASE ORAL PRN
Status: DISCONTINUED | OUTPATIENT
Start: 2024-08-11 | End: 2024-08-12 | Stop reason: HOSPADM

## 2024-08-11 RX ORDER — IPRATROPIUM BROMIDE AND ALBUTEROL SULFATE 2.5; .5 MG/3ML; MG/3ML
1 SOLUTION RESPIRATORY (INHALATION)
Status: DISCONTINUED | OUTPATIENT
Start: 2024-08-11 | End: 2024-08-12 | Stop reason: HOSPADM

## 2024-08-11 RX ORDER — LEVOFLOXACIN 5 MG/ML
750 INJECTION, SOLUTION INTRAVENOUS EVERY 24 HOURS
Status: DISCONTINUED | OUTPATIENT
Start: 2024-08-11 | End: 2024-08-12 | Stop reason: HOSPADM

## 2024-08-11 RX ORDER — AZITHROMYCIN 250 MG/1
500 TABLET, FILM COATED ORAL DAILY
Status: DISCONTINUED | OUTPATIENT
Start: 2024-08-11 | End: 2024-08-11

## 2024-08-11 RX ORDER — SODIUM CHLORIDE 0.9 % (FLUSH) 0.9 %
5-40 SYRINGE (ML) INJECTION PRN
Status: DISCONTINUED | OUTPATIENT
Start: 2024-08-11 | End: 2024-08-12 | Stop reason: HOSPADM

## 2024-08-11 RX ORDER — VITAMIN B COMPLEX
1000 TABLET ORAL DAILY
Status: DISCONTINUED | OUTPATIENT
Start: 2024-08-11 | End: 2024-08-12 | Stop reason: HOSPADM

## 2024-08-11 RX ORDER — ACETAMINOPHEN 325 MG/1
650 TABLET ORAL EVERY 6 HOURS PRN
Status: DISCONTINUED | OUTPATIENT
Start: 2024-08-11 | End: 2024-08-12 | Stop reason: HOSPADM

## 2024-08-11 RX ORDER — POTASSIUM CHLORIDE 7.45 MG/ML
10 INJECTION INTRAVENOUS PRN
Status: DISCONTINUED | OUTPATIENT
Start: 2024-08-11 | End: 2024-08-12 | Stop reason: HOSPADM

## 2024-08-11 RX ORDER — LANOLIN ALCOHOL/MO/W.PET/CERES
1000 CREAM (GRAM) TOPICAL DAILY
Status: DISCONTINUED | OUTPATIENT
Start: 2024-08-11 | End: 2024-08-12 | Stop reason: HOSPADM

## 2024-08-11 RX ORDER — ALBUTEROL SULFATE 2.5 MG/3ML
2.5 SOLUTION RESPIRATORY (INHALATION) EVERY 6 HOURS PRN
Status: DISCONTINUED | OUTPATIENT
Start: 2024-08-11 | End: 2024-08-12 | Stop reason: HOSPADM

## 2024-08-11 RX ORDER — METOPROLOL SUCCINATE 25 MG/1
25 TABLET, EXTENDED RELEASE ORAL 2 TIMES DAILY
Status: DISCONTINUED | OUTPATIENT
Start: 2024-08-11 | End: 2024-08-12 | Stop reason: HOSPADM

## 2024-08-11 RX ORDER — ASPIRIN 81 MG/1
81 TABLET ORAL DAILY
Status: DISCONTINUED | OUTPATIENT
Start: 2024-08-11 | End: 2024-08-12 | Stop reason: HOSPADM

## 2024-08-11 RX ORDER — LANOLIN ALCOHOL/MO/W.PET/CERES
3 CREAM (GRAM) TOPICAL NIGHTLY
Status: DISCONTINUED | OUTPATIENT
Start: 2024-08-11 | End: 2024-08-12 | Stop reason: HOSPADM

## 2024-08-11 RX ORDER — MAGNESIUM SULFATE IN WATER 40 MG/ML
2000 INJECTION, SOLUTION INTRAVENOUS PRN
Status: DISCONTINUED | OUTPATIENT
Start: 2024-08-11 | End: 2024-08-12 | Stop reason: HOSPADM

## 2024-08-11 RX ORDER — SODIUM CHLORIDE 0.9 % (FLUSH) 0.9 %
5-40 SYRINGE (ML) INJECTION EVERY 12 HOURS SCHEDULED
Status: DISCONTINUED | OUTPATIENT
Start: 2024-08-11 | End: 2024-08-12 | Stop reason: HOSPADM

## 2024-08-11 RX ORDER — FUROSEMIDE 10 MG/ML
20 INJECTION INTRAMUSCULAR; INTRAVENOUS ONCE
Status: COMPLETED | OUTPATIENT
Start: 2024-08-11 | End: 2024-08-11

## 2024-08-11 RX ORDER — PANTOPRAZOLE SODIUM 40 MG/1
40 TABLET, DELAYED RELEASE ORAL
Status: DISCONTINUED | OUTPATIENT
Start: 2024-08-11 | End: 2024-08-12 | Stop reason: HOSPADM

## 2024-08-11 RX ADMIN — FUROSEMIDE 20 MG: 10 INJECTION, SOLUTION INTRAMUSCULAR; INTRAVENOUS at 03:30

## 2024-08-11 RX ADMIN — IPRATROPIUM BROMIDE AND ALBUTEROL SULFATE 1 DOSE: 2.5; .5 SOLUTION RESPIRATORY (INHALATION) at 19:47

## 2024-08-11 RX ADMIN — IPRATROPIUM BROMIDE AND ALBUTEROL SULFATE 1 DOSE: 2.5; .5 SOLUTION RESPIRATORY (INHALATION) at 08:34

## 2024-08-11 RX ADMIN — WATER 125 MG: 1 INJECTION INTRAMUSCULAR; INTRAVENOUS; SUBCUTANEOUS at 02:15

## 2024-08-11 RX ADMIN — HYDRALAZINE HYDROCHLORIDE 25 MG: 25 TABLET ORAL at 09:41

## 2024-08-11 RX ADMIN — METOPROLOL SUCCINATE 25 MG: 25 TABLET, EXTENDED RELEASE ORAL at 20:27

## 2024-08-11 RX ADMIN — Medication 1000 UNITS: at 09:41

## 2024-08-11 RX ADMIN — ATORVASTATIN CALCIUM 40 MG: 40 TABLET, FILM COATED ORAL at 09:41

## 2024-08-11 RX ADMIN — HYDRALAZINE HYDROCHLORIDE 25 MG: 25 TABLET ORAL at 20:27

## 2024-08-11 RX ADMIN — Medication 3 MG: at 09:41

## 2024-08-11 RX ADMIN — PANTOPRAZOLE SODIUM 40 MG: 40 TABLET, DELAYED RELEASE ORAL at 18:43

## 2024-08-11 RX ADMIN — FUROSEMIDE 20 MG: 10 INJECTION, SOLUTION INTRAMUSCULAR; INTRAVENOUS at 18:44

## 2024-08-11 RX ADMIN — ARFORMOTEROL TARTRATE: 15 SOLUTION RESPIRATORY (INHALATION) at 08:34

## 2024-08-11 RX ADMIN — MAGNESIUM SULFATE HEPTAHYDRATE 2000 MG: 40 INJECTION, SOLUTION INTRAVENOUS at 01:18

## 2024-08-11 RX ADMIN — APIXABAN 5 MG: 5 TABLET, FILM COATED ORAL at 20:27

## 2024-08-11 RX ADMIN — Medication 3 MG: at 20:27

## 2024-08-11 RX ADMIN — SODIUM CHLORIDE, PRESERVATIVE FREE 10 ML: 5 INJECTION INTRAVENOUS at 20:27

## 2024-08-11 RX ADMIN — ASPIRIN 81 MG: 81 TABLET, COATED ORAL at 09:41

## 2024-08-11 RX ADMIN — HYDRALAZINE HYDROCHLORIDE 25 MG: 25 TABLET ORAL at 18:43

## 2024-08-11 RX ADMIN — FUROSEMIDE 40 MG: 10 INJECTION, SOLUTION INTRAMUSCULAR; INTRAVENOUS at 09:41

## 2024-08-11 RX ADMIN — METOPROLOL SUCCINATE 25 MG: 25 TABLET, EXTENDED RELEASE ORAL at 09:41

## 2024-08-11 RX ADMIN — ARFORMOTEROL TARTRATE: 15 SOLUTION RESPIRATORY (INHALATION) at 19:47

## 2024-08-11 RX ADMIN — SODIUM CHLORIDE, PRESERVATIVE FREE 10 ML: 5 INJECTION INTRAVENOUS at 09:41

## 2024-08-11 RX ADMIN — CYANOCOBALAMIN TAB 1000 MCG 1000 MCG: 1000 TAB at 09:41

## 2024-08-11 RX ADMIN — APIXABAN 5 MG: 5 TABLET, FILM COATED ORAL at 09:41

## 2024-08-11 RX ADMIN — LEVOFLOXACIN 750 MG: 5 INJECTION, SOLUTION INTRAVENOUS at 05:31

## 2024-08-11 RX ADMIN — PANTOPRAZOLE SODIUM 40 MG: 40 TABLET, DELAYED RELEASE ORAL at 05:31

## 2024-08-11 ASSESSMENT — PAIN SCALES - GENERAL: PAINLEVEL_OUTOF10: 0

## 2024-08-11 NOTE — CONSULTS
will defer the comorbidities to others.    Thank you for allowing me to participate in your patient's care.      Nico Carrasco DO, [facility], [facility]  Interventional Cardiology    Note: This report was completed using computerized voice recognition software. Every effort has been made to ensure accuracy, however; and invert and computerized transcription errors may be present.

## 2024-08-11 NOTE — PROGRESS NOTES
08/11/24 0355   Assessment   Respirations 19   SpO2 97 %   Level of Consciousness 0   Using Accessory Muscles Yes   Breath Sounds   Breath Sounds Bilateral Coarse crackles   Right Upper Lobe Coarse crackles   Right Middle Lobe Coarse crackles   Right Lower Lobe Coarse crackles   Left Upper Lobe Coarse crackles   Left Lower Lobe Coarse crackles   Settings/Measurements   PIP Observed 16 cm H20   IPAP 15 cmH20   CPAP/EPAP 8 cmH2O   Vt (Measured) 767 mL   Insp Rise Time (%) 3 %   FiO2  60 %   Minute Volume (L/min) 16 Liters   Mask Leak (lpm) 81 lpm   Patient's Home Machine No   Alarm Settings   Alarms On Y     Date: 8/11/2024    Time: 3:56 AM    Patient Placed On BIPAP/CPAP/ Non-Invasive Ventilation?  yes    If no must comment.  Facial area red/color change? No           If YES are Blister/Lesion present?No   If yes must notify nursing staff  BIPAP/CPAP skin barrier?  Yes    Skin barrier type:mepilexlite       Comments:Patient placed back on BIPAP        Sade Vogt RCP

## 2024-08-11 NOTE — ED PROVIDER NOTES
HPI:  8/10/24, Time: 10:37 PM EDT         Chuck Chavarria Jr. is a 74 y.o. male history of arthritis asthma history of cataract stroke history of COPD hyperlipidemia hypertension history of tobacco dependence presenting to the ED for shortness of breath, beginning this afternoon ago.  The complaint has been persistent, moderate in severity, and worsened by nothing.  Patient reporting shortness of breath that started this afternoon.  Patient reporting some productive cough he reports tightness in his chest.  Patient reporting no fever no chills patient was given DuoNeb's prior to arrival.  Patient reporting no syncopal event.  Patient reporting no weakness or dizziness or headache.  Per EMS patient was tachypneic.  Patient was not hypoxic he was placed on oxygen prior to arrival here.    ROS:   Pertinent positives and negatives are stated within HPI, all other systems reviewed and are negative.  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    Past Surgical History:  has a past surgical history that includes Dental surgery; Cardiac catheterization (08/25/2023); Tonsillectomy; Colonoscopy; and Upper gastrointestinal endoscopy (N/A, 7/16/2024).    Social History:  reports that he has been smoking cigarettes. He started smoking about 64 years ago. He has a 16.2 pack-year smoking history. He has never used smokeless tobacco. He reports that he does not currently use alcohol. He reports current drug use. Drug: Cocaine.    Family History: family history includes Brain Cancer in his mother; Heart Attack in his father.     The

## 2024-08-11 NOTE — PROGRESS NOTES
4 Eyes Skin Assessment     NAME:  Chuck Chavarria Jr.  YOB: 1950  MEDICAL RECORD NUMBER:  [id number]    The patient is being assessed for  Admission    I agree that at least one RN has performed a thorough Head to Toe Skin Assessment on the patient. ALL assessment sites listed below have been assessed.      Areas assessed by both nurses:    Head, Face, Ears, Shoulders, Back, Chest, Arms, Elbows, Hands, Sacrum. Buttock, Coccyx, Ischium, Legs. Feet and Heels, and Under Medical Devices     Discoloration-BLE        Does the Patient have a Wound? No noted wound(s)       Martin Prevention initiated by RN: Yes  Wound Care Orders initiated by RN: No    Pressure Injury (Stage 3,4, Unstageable, DTI, NWPT, and Complex wounds) if present, place Wound referral order by RN under : No    New Ostomies, if present place, Ostomy referral order under : No     Nurse 1 eSignature: Electronically signed by Dickson Eaton RN on 8/11/24 at 5:44 AM EDT    **SHARE this note so that the co-signing nurse can place an eSignature**    Nurse 2 eSignature: {Esignature:[id number]}

## 2024-08-11 NOTE — H&P
Hospital Medicine History & Physical      PCP: Gunner Espinosa MD    Date of Admission: 8/10/2024    Date of Service: Pt seen/examined on 8/11/2024 and Admitted to Inpatient with expected LOS greater than two midnights due to medical therapy.     Chief Complaint: Shortness of breath      History Of Present Illness:    74 y.o. male who presented to [facility] with shortness of breath starting few hours before presentation.  Patient also reported productive cough with chest tightness by EMS and was tachypneic placed on oxygen due to hypoxia.In the ER he was placed on 3 L blood pressure initially was 190/110.  Given IV Solu-Medrol breathing treatment for COPD exacerbation.  Chest x-ray showed worsening patchy opacities concerning for pulmonary edema or infectious etiology cannot be ruled out.  proBNP of 10,317.  He was given Lasix 20 IV push. Troponin of 41 on presentation.  Blood gas with pH of 7.4 pCO2 of 30.3 given his tachypnea he was placed on BiPAP.  Patient reports has been coughing up some yellowish-greenish phlegm.    Past Medical History:          Diagnosis Date    Arthritis     Asthma     Bilateral carotid artery stenosis 01/17/2024    Approximately 50% stenosis on the right side and 30 to 40% stenosis on the left side, CT of the carotids, 2024    Cataract, left eye     Cerebral infarction due to occlusion of right vertebral artery (HCC) 01/17/2024    Hypoplastic, small right vertebral artery, with absent flow proximally CT of the carotids 2024  Patent left vertebral artery, good size    COPD (chronic obstructive pulmonary disease) (HCC)     Hyperlipidemia     Hypertension     Occlusion of right vertebral artery 01/17/2024    Small, hypoplastic right vertebral artery occlusion proximally with widely patent dominant left vertebral artery    Tobacco dependence 01/17/2024       Past Surgical History:          Procedure Laterality Date    CARDIAC CATHETERIZATION  08/25/2023    COLONOSCOPY

## 2024-08-11 NOTE — PROGRESS NOTES
Update  Seen at bedside. Looks ok, no significant difficulty breathing. Off oxygen. No wheezing or physical findings of fluid overload. Recent echo shows no evidence of heart failure    -decrease lasix to 20 mg iv bid.   -continue bronchodilators  -check procalcitonin, continue levofloxacin  -decrease steroids to 30 mg daily  -may be able to dc tomorrow.,     HODA OSPINA MD 11:45 AM 08/11/24

## 2024-08-12 VITALS
TEMPERATURE: 98.6 F | SYSTOLIC BLOOD PRESSURE: 167 MMHG | HEIGHT: 68 IN | HEART RATE: 80 BPM | OXYGEN SATURATION: 100 % | WEIGHT: 132 LBS | RESPIRATION RATE: 13 BRPM | DIASTOLIC BLOOD PRESSURE: 77 MMHG | BODY MASS INDEX: 20 KG/M2

## 2024-08-12 PROBLEM — J44.1 COPD WITH ACUTE EXACERBATION (HCC): Status: ACTIVE | Noted: 2024-08-12

## 2024-08-12 LAB
ANION GAP SERPL CALCULATED.3IONS-SCNC: 9 MMOL/L (ref 7–16)
BASOPHILS # BLD: 0.02 K/UL (ref 0–0.2)
BASOPHILS NFR BLD: 0 % (ref 0–2)
BUN SERPL-MCNC: 18 MG/DL (ref 6–23)
CALCIUM SERPL-MCNC: 8.7 MG/DL (ref 8.6–10.2)
CHLORIDE SERPL-SCNC: 102 MMOL/L (ref 98–107)
CO2 SERPL-SCNC: 28 MMOL/L (ref 22–29)
CREAT SERPL-MCNC: 1.1 MG/DL (ref 0.7–1.2)
EOSINOPHIL # BLD: 0.03 K/UL (ref 0.05–0.5)
EOSINOPHILS RELATIVE PERCENT: 0 % (ref 0–6)
ERYTHROCYTE [DISTWIDTH] IN BLOOD BY AUTOMATED COUNT: 14.6 % (ref 11.5–15)
GFR, ESTIMATED: 69 ML/MIN/1.73M2
GLUCOSE SERPL-MCNC: 93 MG/DL (ref 74–99)
HCT VFR BLD AUTO: 31.6 % (ref 37–54)
HGB BLD-MCNC: 10.6 G/DL (ref 12.5–16.5)
IMM GRANULOCYTES # BLD AUTO: 0.04 K/UL (ref 0–0.58)
IMM GRANULOCYTES NFR BLD: 1 % (ref 0–5)
LYMPHOCYTES NFR BLD: 1.3 K/UL (ref 1.5–4)
LYMPHOCYTES RELATIVE PERCENT: 18 % (ref 20–42)
MAGNESIUM SERPL-MCNC: 1.8 MG/DL (ref 1.6–2.6)
MCH RBC QN AUTO: 29.6 PG (ref 26–35)
MCHC RBC AUTO-ENTMCNC: 33.5 G/DL (ref 32–34.5)
MCV RBC AUTO: 88.3 FL (ref 80–99.9)
MONOCYTES NFR BLD: 1.88 K/UL (ref 0.1–0.95)
MONOCYTES NFR BLD: 25 % (ref 2–12)
NEUTROPHILS NFR BLD: 56 % (ref 43–80)
NEUTS SEG NFR BLD: 4.17 K/UL (ref 1.8–7.3)
PLATELET # BLD AUTO: 296 K/UL (ref 130–450)
PMV BLD AUTO: 9.7 FL (ref 7–12)
POTASSIUM SERPL-SCNC: 3.3 MMOL/L (ref 3.5–5)
RBC # BLD AUTO: 3.58 M/UL (ref 3.8–5.8)
RBC # BLD: ABNORMAL 10*6/UL
RBC # BLD: ABNORMAL 10*6/UL
SODIUM SERPL-SCNC: 139 MMOL/L (ref 132–146)
WBC OTHER # BLD: 7.4 K/UL (ref 4.5–11.5)

## 2024-08-12 PROCEDURE — 83735 ASSAY OF MAGNESIUM: CPT

## 2024-08-12 PROCEDURE — 6370000000 HC RX 637 (ALT 250 FOR IP): Performed by: INTERNAL MEDICINE

## 2024-08-12 PROCEDURE — 2580000003 HC RX 258: Performed by: FAMILY MEDICINE

## 2024-08-12 PROCEDURE — 99239 HOSP IP/OBS DSCHRG MGMT >30: CPT | Performed by: INTERNAL MEDICINE

## 2024-08-12 PROCEDURE — 94640 AIRWAY INHALATION TREATMENT: CPT

## 2024-08-12 PROCEDURE — 36415 COLL VENOUS BLD VENIPUNCTURE: CPT

## 2024-08-12 PROCEDURE — 6370000000 HC RX 637 (ALT 250 FOR IP): Performed by: FAMILY MEDICINE

## 2024-08-12 PROCEDURE — 99232 SBSQ HOSP IP/OBS MODERATE 35: CPT | Performed by: INTERNAL MEDICINE

## 2024-08-12 PROCEDURE — 80048 BASIC METABOLIC PNL TOTAL CA: CPT

## 2024-08-12 PROCEDURE — 6360000002 HC RX W HCPCS: Performed by: INTERNAL MEDICINE

## 2024-08-12 PROCEDURE — G0378 HOSPITAL OBSERVATION PER HR: HCPCS

## 2024-08-12 PROCEDURE — 6360000002 HC RX W HCPCS: Performed by: FAMILY MEDICINE

## 2024-08-12 PROCEDURE — 96366 THER/PROPH/DIAG IV INF ADDON: CPT

## 2024-08-12 PROCEDURE — 94660 CPAP INITIATION&MGMT: CPT

## 2024-08-12 PROCEDURE — 96376 TX/PRO/DX INJ SAME DRUG ADON: CPT

## 2024-08-12 PROCEDURE — 85025 COMPLETE CBC W/AUTO DIFF WBC: CPT

## 2024-08-12 RX ORDER — LEVOFLOXACIN 500 MG/1
500 TABLET, FILM COATED ORAL DAILY
Qty: 5 TABLET | Refills: 0 | Status: SHIPPED | OUTPATIENT
Start: 2024-08-12 | End: 2024-08-17

## 2024-08-12 RX ORDER — POTASSIUM CHLORIDE 20 MEQ/1
40 TABLET, EXTENDED RELEASE ORAL ONCE
Status: COMPLETED | OUTPATIENT
Start: 2024-08-12 | End: 2024-08-12

## 2024-08-12 RX ORDER — HYDRALAZINE HYDROCHLORIDE 50 MG/1
50 TABLET, FILM COATED ORAL 3 TIMES DAILY
Status: DISCONTINUED | OUTPATIENT
Start: 2024-08-12 | End: 2024-08-12 | Stop reason: HOSPADM

## 2024-08-12 RX ORDER — PREDNISONE 10 MG/1
30 TABLET ORAL DAILY
Qty: 12 TABLET | Refills: 0 | Status: SHIPPED | OUTPATIENT
Start: 2024-08-13 | End: 2024-08-17

## 2024-08-12 RX ORDER — POTASSIUM CHLORIDE 20 MEQ/1
40 TABLET, EXTENDED RELEASE ORAL ONCE
Status: DISCONTINUED | OUTPATIENT
Start: 2024-08-12 | End: 2024-08-12

## 2024-08-12 RX ORDER — MAGNESIUM SULFATE IN WATER 40 MG/ML
2000 INJECTION, SOLUTION INTRAVENOUS ONCE
Status: COMPLETED | OUTPATIENT
Start: 2024-08-12 | End: 2024-08-12

## 2024-08-12 RX ORDER — HYDRALAZINE HYDROCHLORIDE 25 MG/1
50 TABLET, FILM COATED ORAL 3 TIMES DAILY
Qty: 120 TABLET | Refills: 3 | Status: SHIPPED | OUTPATIENT
Start: 2024-08-12

## 2024-08-12 RX ADMIN — SODIUM CHLORIDE, PRESERVATIVE FREE 10 ML: 5 INJECTION INTRAVENOUS at 09:31

## 2024-08-12 RX ADMIN — APIXABAN 5 MG: 5 TABLET, FILM COATED ORAL at 09:29

## 2024-08-12 RX ADMIN — Medication 3 MG: at 09:29

## 2024-08-12 RX ADMIN — MAGNESIUM SULFATE HEPTAHYDRATE 2000 MG: 40 INJECTION, SOLUTION INTRAVENOUS at 10:40

## 2024-08-12 RX ADMIN — LEVOFLOXACIN 750 MG: 5 INJECTION, SOLUTION INTRAVENOUS at 03:27

## 2024-08-12 RX ADMIN — PANTOPRAZOLE SODIUM 40 MG: 40 TABLET, DELAYED RELEASE ORAL at 05:00

## 2024-08-12 RX ADMIN — ARFORMOTEROL TARTRATE: 15 SOLUTION RESPIRATORY (INHALATION) at 07:37

## 2024-08-12 RX ADMIN — IPRATROPIUM BROMIDE AND ALBUTEROL SULFATE 1 DOSE: 2.5; .5 SOLUTION RESPIRATORY (INHALATION) at 11:11

## 2024-08-12 RX ADMIN — ASPIRIN 81 MG: 81 TABLET, COATED ORAL at 09:29

## 2024-08-12 RX ADMIN — IPRATROPIUM BROMIDE AND ALBUTEROL SULFATE 1 DOSE: 2.5; .5 SOLUTION RESPIRATORY (INHALATION) at 07:37

## 2024-08-12 RX ADMIN — CYANOCOBALAMIN TAB 1000 MCG 1000 MCG: 1000 TAB at 09:30

## 2024-08-12 RX ADMIN — HYDRALAZINE HYDROCHLORIDE 25 MG: 25 TABLET ORAL at 09:29

## 2024-08-12 RX ADMIN — FUROSEMIDE 20 MG: 10 INJECTION, SOLUTION INTRAMUSCULAR; INTRAVENOUS at 09:30

## 2024-08-12 RX ADMIN — POTASSIUM CHLORIDE 40 MEQ: 1500 TABLET, EXTENDED RELEASE ORAL at 09:48

## 2024-08-12 RX ADMIN — ATORVASTATIN CALCIUM 40 MG: 40 TABLET, FILM COATED ORAL at 09:29

## 2024-08-12 RX ADMIN — METOPROLOL SUCCINATE 25 MG: 25 TABLET, EXTENDED RELEASE ORAL at 09:30

## 2024-08-12 RX ADMIN — PREDNISONE 30 MG: 5 TABLET ORAL at 09:30

## 2024-08-12 RX ADMIN — Medication 1000 UNITS: at 09:29

## 2024-08-12 NOTE — PROGRESS NOTES
CLINICAL PHARMACY NOTE: MEDS TO BEDS    Total # of Prescriptions Filled: 2   The following medications were delivered to the patient:  PREDNISONE 10 MG  LEVOFLOXACIN 500 MG    Additional Documentation:   DELIVERED TO PT

## 2024-08-12 NOTE — PLAN OF CARE

## 2024-08-12 NOTE — DISCHARGE INSTRUCTIONS
Discharge to:  Home  Diet: regular  Activity: activity as tolerated   Exercise: As tolerated     Be compliant with medication  Don't use cocaine.   Complete steroid and antibiotic.   Continue breathing treatments at home.   Follow up with Cardiology and PCP.

## 2024-08-12 NOTE — CARE COORDINATION
Social Work/ Case Management Transition of Care Planning (Kalyn York, W [phone]):     Pt presented to the hospital with SOB. Pt lives alone in a 5th floor apartment with elevator access. Pt's PCP is Dr. Reeys Moore and will use the pharmacy here. Pt is on room air. Pt is on IV Lasix 2x daily, IV Levaquin q24,  Pt has a walker and cane and needs nothing else. Pt states he will discharge home with no needs and if will the bus if needed. SW/CM to follow.  Kalyn York, [facility]  8/12/2024      Readmission Assessment  Number of Days since last admission?: 8-30 days  Previous Disposition: Home with Family  Who is being Interviewed: Patient  What was the patient's/caregiver's perception as to why they think they needed to return back to the hospital?: Other (Comment) (\"felt bad\")  Did you visit your Primary Care Physician after you left the hospital, before you returned this time?: No  Why weren't you able to visit your PCP?: Did not have an appointment  Did you see a specialist, such as Cardiac, Pulmonary, Orthopedic Physician, etc. after you left the hospital?: Yes  Who advised the patient to return to the hospital?: Self-referral  Does the patient report anything that got in the way of taking their medications?: No  In our efforts to provide the best possible care to you and others like you, can you think of anything that we could have done to help you after you left the hospital the first time, so that you might not have needed to return so soon?: Other (Comment) (no response)

## 2024-08-12 NOTE — PROGRESS NOTES
PROGRESS NOTE     CARDIOLOGY    Chief complaint: Seen today for follow up, management & recommendations for shortness of breath    He denies chest pain or shortness of breath today.    Wt Readings from Last 3 Encounters:   08/10/24 59.9 kg (132 lb)   07/08/24 56.7 kg (125 lb)   06/30/24 56.7 kg (125 lb)     Temp Readings from Last 3 Encounters:   08/12/24 97.5 °F (36.4 °C) (Oral)   07/17/24 96.8 °F (36 °C) (Temporal)   06/30/24 97.7 °F (36.5 °C) (Oral)     BP Readings from Last 3 Encounters:   08/12/24 (!) 154/109   07/17/24 (!) 144/87   06/30/24 (!) 142/85     Pulse Readings from Last 3 Encounters:   08/12/24 74   07/17/24 75   06/30/24 89         Intake/Output Summary (Last 24 hours) at 8/12/2024 1059  Last data filed at 8/12/2024 0547  Gross per 24 hour   Intake --   Output 1650 ml   Net -1650 ml       Recent Labs     08/10/24  2257 08/12/24  0641   WBC 5.1 7.4   HGB 10.5* 10.6*   HCT 32.8* 31.6*   MCV 90.6 88.3    296     Recent Labs     08/10/24  2257 08/12/24  0641    139   K 3.8 3.3*    102   CO2 29 28   BUN 12 18   CREATININE 1.1 1.1   MG  --  1.8     No results for input(s): \"PROTIME\", \"INR\" in the last 72 hours.  No results for input(s): \"CKTOTAL\", \"CKMB\", \"CKMBINDEX\", \"TROPONINI\" in the last 72 hours.  No results for input(s): \"BNP\" in the last 72 hours.  No results for input(s): \"CHOL\", \"HDL\", \"TRIG\" in the last 72 hours.    Invalid input(s): \"CHOLHDLR\", \"LDLCALCU\"  Recent Labs     08/10/24  2257 08/11/24  0307   TROPHS 41* 50*         magnesium sulfate 2000 mg in 50 mL IVPB premix, Once  hydrALAZINE (APRESOLINE) tablet 50 mg, TID  sodium chloride flush 0.9 % injection 5-40 mL, 2 times per day  sodium chloride flush 0.9 % injection 5-40 mL, PRN  0.9 % sodium chloride infusion, PRN  potassium chloride (KLOR-CON M) extended release tablet 40 mEq, PRN   Or  potassium bicarb-citric acid (EFFER-K) effervescent tablet 40 mEq, PRN   Or  potassium chloride 10 mEq/100 mL IVPB (Peripheral

## 2024-08-12 NOTE — PLAN OF CARE
Problem: Chronic Conditions and Co-morbidities  Goal: Patient's chronic conditions and co-morbidity symptoms are monitored and maintained or improved  8/12/2024 1127 by Brandee Iglesias RN  Outcome: Progressing  8/12/2024 0113 by Torri Morris RN  Outcome: Progressing     Problem: Discharge Planning  Goal: Discharge to home or other facility with appropriate resources  8/12/2024 1127 by Brandee Iglesias RN  Outcome: Progressing  8/12/2024 0113 by Torri Morris RN  Outcome: Progressing     Problem: Safety - Adult  Goal: Free from fall injury  8/12/2024 1127 by Brandee Iglesias RN  Outcome: Progressing  8/12/2024 0113 by Torri Morris RN  Outcome: Progressing     Problem: Skin/Tissue Integrity  Goal: Absence of new skin breakdown  Description: 1.  Monitor for areas of redness and/or skin breakdown  2.  Assess vascular access sites hourly  3.  Every 4-6 hours minimum:  Change oxygen saturation probe site  4.  Every 4-6 hours:  If on nasal continuous positive airway pressure, respiratory therapy assess nares and determine need for appliance change or resting period.  8/12/2024 1127 by Brandee Iglesias RN  Outcome: Progressing  8/12/2024 0113 by Torri Morris RN  Outcome: Progressing     Problem: Pain  Goal: Verbalizes/displays adequate comfort level or baseline comfort level  8/12/2024 1127 by Brandee Iglesias RN  Outcome: Progressing  8/12/2024 0113 by Torri Morris RN  Outcome: Progressing

## 2024-08-13 LAB
EKG ATRIAL RATE: 83 BPM
EKG P AXIS: 92 DEGREES
EKG P-R INTERVAL: 138 MS
EKG Q-T INTERVAL: 414 MS
EKG QRS DURATION: 126 MS
EKG QTC CALCULATION (BAZETT): 486 MS
EKG R AXIS: -87 DEGREES
EKG T AXIS: 93 DEGREES
EKG VENTRICULAR RATE: 83 BPM

## 2024-08-13 PROCEDURE — 93010 ELECTROCARDIOGRAM REPORT: CPT | Performed by: INTERNAL MEDICINE

## 2024-08-16 LAB
MICROORGANISM SPEC CULT: NORMAL
MICROORGANISM SPEC CULT: NORMAL
SERVICE CMNT-IMP: NORMAL
SERVICE CMNT-IMP: NORMAL
SPECIMEN DESCRIPTION: NORMAL
SPECIMEN DESCRIPTION: NORMAL

## 2024-08-24 ENCOUNTER — APPOINTMENT (OUTPATIENT)
Dept: CT IMAGING | Age: 74
End: 2024-08-24
Payer: COMMERCIAL

## 2024-08-24 ENCOUNTER — HOSPITAL ENCOUNTER (EMERGENCY)
Age: 74
Discharge: HOME | End: 2024-08-24
Attending: EMERGENCY MEDICINE
Payer: COMMERCIAL

## 2024-08-24 ENCOUNTER — APPOINTMENT (OUTPATIENT)
Dept: GENERAL RADIOLOGY | Age: 74
End: 2024-08-24
Payer: COMMERCIAL

## 2024-08-24 VITALS
DIASTOLIC BLOOD PRESSURE: 88 MMHG | SYSTOLIC BLOOD PRESSURE: 138 MMHG | RESPIRATION RATE: 18 BRPM | TEMPERATURE: 97.7 F | HEART RATE: 68 BPM | OXYGEN SATURATION: 98 %

## 2024-08-24 DIAGNOSIS — J44.9 CHRONIC OBSTRUCTIVE PULMONARY DISEASE, UNSPECIFIED COPD TYPE (HCC): Primary | ICD-10-CM

## 2024-08-24 DIAGNOSIS — R07.9 CHEST PAIN, UNSPECIFIED TYPE: ICD-10-CM

## 2024-08-24 LAB
ALBUMIN SERPL-MCNC: 3.3 G/DL (ref 3.5–5.2)
ALP SERPL-CCNC: 118 U/L (ref 40–129)
ALT SERPL-CCNC: 22 U/L (ref 0–40)
ANION GAP SERPL CALCULATED.3IONS-SCNC: 11 MMOL/L (ref 7–16)
AST SERPL-CCNC: 32 U/L (ref 0–39)
B PARAP IS1001 DNA NPH QL NAA+NON-PROBE: NOT DETECTED
B PERT DNA SPEC QL NAA+PROBE: NOT DETECTED
BASOPHILS # BLD: 0.06 K/UL (ref 0–0.2)
BASOPHILS NFR BLD: 1 % (ref 0–2)
BILIRUB SERPL-MCNC: 0.3 MG/DL (ref 0–1.2)
BNP SERPL-MCNC: 6851 PG/ML (ref 0–450)
BUN SERPL-MCNC: 12 MG/DL (ref 6–23)
C PNEUM DNA NPH QL NAA+NON-PROBE: NOT DETECTED
CALCIUM SERPL-MCNC: 8.9 MG/DL (ref 8.6–10.2)
CHLORIDE SERPL-SCNC: 103 MMOL/L (ref 98–107)
CO2 SERPL-SCNC: 27 MMOL/L (ref 22–29)
CREAT SERPL-MCNC: 1.1 MG/DL (ref 0.7–1.2)
D-DIMER QUANTITATIVE: 279 NG/ML DDU (ref 0–230)
EOSINOPHIL # BLD: 0.08 K/UL (ref 0.05–0.5)
EOSINOPHILS RELATIVE PERCENT: 1 % (ref 0–6)
ERYTHROCYTE [DISTWIDTH] IN BLOOD BY AUTOMATED COUNT: 15.7 % (ref 11.5–15)
FLUAV RNA NPH QL NAA+NON-PROBE: NOT DETECTED
FLUBV RNA NPH QL NAA+NON-PROBE: NOT DETECTED
GFR, ESTIMATED: 74 ML/MIN/1.73M2
GLUCOSE SERPL-MCNC: 101 MG/DL (ref 74–99)
HADV DNA NPH QL NAA+NON-PROBE: NOT DETECTED
HCOV 229E RNA NPH QL NAA+NON-PROBE: NOT DETECTED
HCOV HKU1 RNA NPH QL NAA+NON-PROBE: NOT DETECTED
HCOV NL63 RNA NPH QL NAA+NON-PROBE: NOT DETECTED
HCOV OC43 RNA NPH QL NAA+NON-PROBE: NOT DETECTED
HCT VFR BLD AUTO: 37.6 % (ref 37–54)
HGB BLD-MCNC: 11.7 G/DL (ref 12.5–16.5)
HMPV RNA NPH QL NAA+NON-PROBE: NOT DETECTED
HPIV1 RNA NPH QL NAA+NON-PROBE: NOT DETECTED
HPIV2 RNA NPH QL NAA+NON-PROBE: NOT DETECTED
HPIV3 RNA NPH QL NAA+NON-PROBE: NOT DETECTED
HPIV4 RNA NPH QL NAA+NON-PROBE: NOT DETECTED
IMM GRANULOCYTES # BLD AUTO: 0.04 K/UL (ref 0–0.58)
IMM GRANULOCYTES NFR BLD: 1 % (ref 0–5)
LYMPHOCYTES NFR BLD: 1.09 K/UL (ref 1.5–4)
LYMPHOCYTES RELATIVE PERCENT: 14 % (ref 20–42)
M PNEUMO DNA NPH QL NAA+NON-PROBE: NOT DETECTED
MCH RBC QN AUTO: 28.1 PG (ref 26–35)
MCHC RBC AUTO-ENTMCNC: 31.1 G/DL (ref 32–34.5)
MCV RBC AUTO: 90.2 FL (ref 80–99.9)
MONOCYTES NFR BLD: 1.03 K/UL (ref 0.1–0.95)
MONOCYTES NFR BLD: 13 % (ref 2–12)
NEUTROPHILS NFR BLD: 71 % (ref 43–80)
NEUTS SEG NFR BLD: 5.59 K/UL (ref 1.8–7.3)
PLATELET # BLD AUTO: 172 K/UL (ref 130–450)
PMV BLD AUTO: 10.2 FL (ref 7–12)
POTASSIUM SERPL-SCNC: 4.4 MMOL/L (ref 3.5–5)
PROT SERPL-MCNC: 7.1 G/DL (ref 6.4–8.3)
RBC # BLD AUTO: 4.17 M/UL (ref 3.8–5.8)
RSV RNA NPH QL NAA+NON-PROBE: NOT DETECTED
RV+EV RNA NPH QL NAA+NON-PROBE: NOT DETECTED
SARS-COV-2 RNA NPH QL NAA+NON-PROBE: NOT DETECTED
SODIUM SERPL-SCNC: 141 MMOL/L (ref 132–146)
SPECIMEN DESCRIPTION: NORMAL
TROPONIN I SERPL HS-MCNC: 32 NG/L (ref 0–11)
TROPONIN I SERPL HS-MCNC: 39 NG/L (ref 0–11)
WBC OTHER # BLD: 7.9 K/UL (ref 4.5–11.5)

## 2024-08-24 PROCEDURE — 71045 X-RAY EXAM CHEST 1 VIEW: CPT

## 2024-08-24 PROCEDURE — 80053 COMPREHEN METABOLIC PANEL: CPT

## 2024-08-24 PROCEDURE — 85025 COMPLETE CBC W/AUTO DIFF WBC: CPT

## 2024-08-24 PROCEDURE — 0202U NFCT DS 22 TRGT SARS-COV-2: CPT

## 2024-08-24 PROCEDURE — 6360000004 HC RX CONTRAST MEDICATION: Performed by: RADIOLOGY

## 2024-08-24 PROCEDURE — 93005 ELECTROCARDIOGRAM TRACING: CPT

## 2024-08-24 PROCEDURE — 6370000000 HC RX 637 (ALT 250 FOR IP)

## 2024-08-24 PROCEDURE — 71275 CT ANGIOGRAPHY CHEST: CPT

## 2024-08-24 PROCEDURE — 84484 ASSAY OF TROPONIN QUANT: CPT

## 2024-08-24 PROCEDURE — 94640 AIRWAY INHALATION TREATMENT: CPT

## 2024-08-24 PROCEDURE — 99285 EMERGENCY DEPT VISIT HI MDM: CPT

## 2024-08-24 PROCEDURE — 85379 FIBRIN DEGRADATION QUANT: CPT

## 2024-08-24 PROCEDURE — 83880 ASSAY OF NATRIURETIC PEPTIDE: CPT

## 2024-08-24 RX ORDER — IPRATROPIUM BROMIDE AND ALBUTEROL SULFATE 2.5; .5 MG/3ML; MG/3ML
3 SOLUTION RESPIRATORY (INHALATION) ONCE
Status: COMPLETED | OUTPATIENT
Start: 2024-08-24 | End: 2024-08-24

## 2024-08-24 RX ORDER — IOPAMIDOL 755 MG/ML
75 INJECTION, SOLUTION INTRAVASCULAR
Status: COMPLETED | OUTPATIENT
Start: 2024-08-24 | End: 2024-08-24

## 2024-08-24 RX ADMIN — IOPAMIDOL 75 ML: 755 INJECTION, SOLUTION INTRAVENOUS at 14:40

## 2024-08-24 RX ADMIN — IPRATROPIUM BROMIDE AND ALBUTEROL SULFATE 3 DOSE: 2.5; .5 SOLUTION RESPIRATORY (INHALATION) at 11:45

## 2024-08-24 NOTE — DISCHARGE INSTRUCTIONS
Return to the ED if symptoms worsen or persist. Follow up with PCP. Take you home medications as directed by your PCP.

## 2024-08-24 NOTE — ED PROVIDER NOTES
[facility] EMERGENCY DEPARTMENT  EMERGENCY DEPARTMENT ENCOUNTER        Pt Name: Chuck Chavarria Jr.  MRN: [id number]  Birthdate 1950  Date of evaluation: 8/24/2024  Provider: Oleg De La Cruz MD  PCP: Gunner Espinosa MD  Note Started: 10:48 AM EDT 8/24/24    CHIEF COMPLAINT       Chief Complaint   Patient presents with    Shortness of Breath     Starting this AM- Afib on EKG- no hx afib    Chest Pain     Minor mid chest       HISTORY OF PRESENT ILLNESS: 1 or more Elements   History From: Patient    Limitations to history : None  Social Determinants : None    Chuck Chavarria Jr. is a 74 y.o. male with past medical history of essential hypertension, CHF, esophageal dysphagia, COPD, carotid artery stenosis, who presents to the emergency department with complaints of increasing shortness of breath started this morning.  Patient does not wear oxygen at home.  Patient states that his symptoms are worsened by exertion.  Patient said that he has been having some chest pain that is worsened on inspiration.  Patient stated that nothing seems to be making symptoms better or worse.  Patient did try his albuterol inhaler at home which did not help.  On further chart review patient does have a history of atrial fibrillation and was recently admitted for atrial fibrillation.  Patient currently taking metoprolol for symptoms.  Patient denies any fever, chills, nausea, vomiting, headaches, dizziness, vision changes, neck pain, abdominal pain, cough, constipation.    Nursing Notes were all reviewed and agreed with or any disagreements were addressed in the HPI.    ROS:   Pertinent positives and negatives are stated within HPI, all other systems reviewed and are negative.      --------------------------------------------- PAST HISTORY ---------------------------------------------  Past Medical History:  has a past medical history of Arthritis, Asthma, Bilateral carotid  echocardiographic evidence of endocarditis, please consider SRAVANTHI if clinical suspicion for endocarditis is high.    Signed by: Bethany Velasco MD on 7/11/2024  6:24 PM       Controlled Substance Monitored by me:   Acute and Chronic Pain Monitoring:        No data to display                   ---------------------------------------------------PHYSICAL EXAM--------------------------------------    Physical Exam  Vitals and nursing note reviewed.   Constitutional:       General: He is not in acute distress.     Appearance: Normal appearance. He is not ill-appearing.   HENT:      Head: Normocephalic.      Nose: No congestion or rhinorrhea.      Mouth/Throat:      Pharynx: No oropharyngeal exudate or posterior oropharyngeal erythema.   Eyes:      General:         Right eye: No discharge.         Left eye: No discharge.      Extraocular Movements: Extraocular movements intact.      Pupils: Pupils are equal, round, and reactive to light.   Cardiovascular:      Rate and Rhythm: Normal rate and regular rhythm.      Pulses: Normal pulses.      Heart sounds: No murmur heard.     No friction rub.   Pulmonary:      Effort: Pulmonary effort is normal. No respiratory distress.      Breath sounds: No stridor. Wheezing present. No rhonchi.      Comments: Diminished breath sounds at the bases of the lungs, scattered wheezing.  Abdominal:      General: Abdomen is flat. There is no distension.      Palpations: There is no mass.      Tenderness: There is no abdominal tenderness.      Hernia: No hernia is present.   Musculoskeletal:         General: No swelling, tenderness, deformity or signs of injury. Normal range of motion.      Cervical back: Normal range of motion and neck supple. No rigidity or tenderness.   Skin:     General: Skin is warm.      Capillary Refill: Capillary refill takes less than 2 seconds.      Coloration: Skin is not jaundiced or pale.      Findings: No bruising or erythema.   Neurological:      General: No focal  this note:    CTA PULMONARY W CONTRAST   Final Result   1. No evidence of pulmonary artery embolism.   2. Stable right upper lobe pulmonary nodules measuring up to 3 cm.  CT chest   recommended for follow-up at 3 months moderate to high suspicion remains   3. 2 cm soft tissue nodule focus right lung apex with adjacent   pleuroparenchymal scarring.   4. Right-sided Bochdalek's hernia with right lower lobe atelectasis.   5. Multiple pulmonary nodules. Most severe: 2 cm right solid pulmonary nodule   within the upper lobe.   6. Nodule size greater than or equal to 8 mm.      RECOMMENDATIONS:   Lung rads category 4A follow-up recommended CT chest 3-6 months.  PET-CT or   tissue sampling may be considered when clinically appropriate         XR CHEST PORTABLE   Final Result   1. There is no acute cardiopulmonary disease   2.        No results found.    No results found.    Procedures:      ------------------------- NURSING NOTES AND VITALS REVIEWED ---------------------------   The nursing notes within the ED encounter and vital signs as below have been reviewed by myself and ED attending.  /88   Pulse 68   Temp 97.7 °F (36.5 °C) (Oral)   Resp 18   SpO2 98%   Oxygen Saturation Interpretation: Normal    The patient’s available past medical records and past encounters were reviewed by myself and ED attending        ------------------------------ ED COURSE/MEDICAL DECISION MAKING----------------------    Medical Decision Making/Differential Diagnosis:    CC/HPI Summary, Pertinent Physical Exam Findings, Social Determinants of health, Records Reviewed, DDx, testing done/not done, ED Course, Reassessment, disposition considerations/shared decision making with patient, consults, disposition:      Medical Decision Making/ED COURSE:    Chronic Conditions affecting care:    has a past medical history of Arthritis, Asthma, Bilateral carotid artery stenosis (01/17/2024), Cataract, left eye, Cerebral infarction due to

## 2024-08-26 LAB
EKG ATRIAL RATE: 108 BPM
EKG Q-T INTERVAL: 420 MS
EKG QRS DURATION: 132 MS
EKG QTC CALCULATION (BAZETT): 513 MS
EKG R AXIS: -90 DEGREES
EKG T AXIS: 90 DEGREES
EKG VENTRICULAR RATE: 90 BPM

## 2024-08-26 PROCEDURE — 93010 ELECTROCARDIOGRAM REPORT: CPT | Performed by: INTERNAL MEDICINE

## 2024-09-08 ENCOUNTER — APPOINTMENT (OUTPATIENT)
Dept: GENERAL RADIOLOGY | Age: 74
DRG: 140 | End: 2024-09-08
Payer: COMMERCIAL

## 2024-09-08 ENCOUNTER — HOSPITAL ENCOUNTER (EMERGENCY)
Age: 74
Discharge: HOME | DRG: 140 | End: 2024-09-08
Attending: EMERGENCY MEDICINE
Payer: COMMERCIAL

## 2024-09-08 VITALS
SYSTOLIC BLOOD PRESSURE: 152 MMHG | BODY MASS INDEX: 19.7 KG/M2 | WEIGHT: 130 LBS | HEART RATE: 80 BPM | OXYGEN SATURATION: 95 % | TEMPERATURE: 98.9 F | RESPIRATION RATE: 20 BRPM | DIASTOLIC BLOOD PRESSURE: 78 MMHG | HEIGHT: 68 IN

## 2024-09-08 DIAGNOSIS — J44.9 COPD WITHOUT EXACERBATION (HCC): Primary | ICD-10-CM

## 2024-09-08 LAB
ALBUMIN SERPL-MCNC: 3.4 G/DL (ref 3.5–5.2)
ALP SERPL-CCNC: 78 U/L (ref 40–129)
ALT SERPL-CCNC: 12 U/L (ref 0–40)
ANION GAP SERPL CALCULATED.3IONS-SCNC: 11 MMOL/L (ref 7–16)
AST SERPL-CCNC: 24 U/L (ref 0–39)
BASOPHILS # BLD: 0.06 K/UL (ref 0–0.2)
BASOPHILS NFR BLD: 1 % (ref 0–2)
BILIRUB SERPL-MCNC: 0.4 MG/DL (ref 0–1.2)
BNP SERPL-MCNC: 6304 PG/ML (ref 0–450)
BUN SERPL-MCNC: 11 MG/DL (ref 6–23)
CALCIUM SERPL-MCNC: 9.4 MG/DL (ref 8.6–10.2)
CHLORIDE SERPL-SCNC: 104 MMOL/L (ref 98–107)
CO2 SERPL-SCNC: 26 MMOL/L (ref 22–29)
CREAT SERPL-MCNC: 1.1 MG/DL (ref 0.7–1.2)
EOSINOPHIL # BLD: 0.33 K/UL (ref 0.05–0.5)
EOSINOPHILS RELATIVE PERCENT: 5 % (ref 0–6)
ERYTHROCYTE [DISTWIDTH] IN BLOOD BY AUTOMATED COUNT: 15.4 % (ref 11.5–15)
GFR, ESTIMATED: 74 ML/MIN/1.73M2
GLUCOSE SERPL-MCNC: 99 MG/DL (ref 74–99)
HCT VFR BLD AUTO: 34 % (ref 37–54)
HGB BLD-MCNC: 11.1 G/DL (ref 12.5–16.5)
IMM GRANULOCYTES # BLD AUTO: <0.03 K/UL (ref 0–0.58)
IMM GRANULOCYTES NFR BLD: 0 % (ref 0–5)
LYMPHOCYTES NFR BLD: 0.92 K/UL (ref 1.5–4)
LYMPHOCYTES RELATIVE PERCENT: 15 % (ref 20–42)
MCH RBC QN AUTO: 29.8 PG (ref 26–35)
MCHC RBC AUTO-ENTMCNC: 32.6 G/DL (ref 32–34.5)
MCV RBC AUTO: 91.4 FL (ref 80–99.9)
MONOCYTES NFR BLD: 0.84 K/UL (ref 0.1–0.95)
MONOCYTES NFR BLD: 13 % (ref 2–12)
NEUTROPHILS NFR BLD: 66 % (ref 43–80)
NEUTS SEG NFR BLD: 4.15 K/UL (ref 1.8–7.3)
PLATELET # BLD AUTO: 224 K/UL (ref 130–450)
PMV BLD AUTO: 10.4 FL (ref 7–12)
POTASSIUM SERPL-SCNC: 4.1 MMOL/L (ref 3.5–5)
PROT SERPL-MCNC: 7.4 G/DL (ref 6.4–8.3)
RBC # BLD AUTO: 3.72 M/UL (ref 3.8–5.8)
SODIUM SERPL-SCNC: 141 MMOL/L (ref 132–146)
TROPONIN I SERPL HS-MCNC: 42 NG/L (ref 0–11)
TROPONIN I SERPL HS-MCNC: 42 NG/L (ref 0–11)
WBC OTHER # BLD: 6.3 K/UL (ref 4.5–11.5)

## 2024-09-08 PROCEDURE — 85025 COMPLETE CBC W/AUTO DIFF WBC: CPT

## 2024-09-08 PROCEDURE — 6370000000 HC RX 637 (ALT 250 FOR IP): Performed by: EMERGENCY MEDICINE

## 2024-09-08 PROCEDURE — 84484 ASSAY OF TROPONIN QUANT: CPT

## 2024-09-08 PROCEDURE — 93005 ELECTROCARDIOGRAM TRACING: CPT | Performed by: EMERGENCY MEDICINE

## 2024-09-08 PROCEDURE — 71045 X-RAY EXAM CHEST 1 VIEW: CPT

## 2024-09-08 PROCEDURE — 99285 EMERGENCY DEPT VISIT HI MDM: CPT

## 2024-09-08 PROCEDURE — 80053 COMPREHEN METABOLIC PANEL: CPT

## 2024-09-08 PROCEDURE — 94640 AIRWAY INHALATION TREATMENT: CPT

## 2024-09-08 PROCEDURE — 83880 ASSAY OF NATRIURETIC PEPTIDE: CPT

## 2024-09-08 RX ORDER — PREDNISONE 20 MG/1
TABLET ORAL
Qty: 10 TABLET | Refills: 0 | Status: SHIPPED | OUTPATIENT
Start: 2024-09-08 | End: 2024-09-09

## 2024-09-08 RX ORDER — IPRATROPIUM BROMIDE AND ALBUTEROL SULFATE 2.5; .5 MG/3ML; MG/3ML
1 SOLUTION RESPIRATORY (INHALATION)
Status: COMPLETED | OUTPATIENT
Start: 2024-09-08 | End: 2024-09-08

## 2024-09-08 RX ADMIN — IPRATROPIUM BROMIDE AND ALBUTEROL SULFATE 1 DOSE: .5; 3 SOLUTION RESPIRATORY (INHALATION) at 06:16

## 2024-09-08 RX ADMIN — IPRATROPIUM BROMIDE AND ALBUTEROL SULFATE 1 DOSE: .5; 3 SOLUTION RESPIRATORY (INHALATION) at 06:14

## 2024-09-08 RX ADMIN — IPRATROPIUM BROMIDE AND ALBUTEROL SULFATE 1 DOSE: .5; 3 SOLUTION RESPIRATORY (INHALATION) at 06:15

## 2024-09-08 ASSESSMENT — PAIN DESCRIPTION - PAIN TYPE: TYPE: ACUTE PAIN

## 2024-09-08 ASSESSMENT — PAIN DESCRIPTION - FREQUENCY: FREQUENCY: INTERMITTENT

## 2024-09-08 ASSESSMENT — PAIN - FUNCTIONAL ASSESSMENT
PAIN_FUNCTIONAL_ASSESSMENT: 0-10
PAIN_FUNCTIONAL_ASSESSMENT: ACTIVITIES ARE NOT PREVENTED

## 2024-09-08 ASSESSMENT — PAIN DESCRIPTION - LOCATION: LOCATION: CHEST

## 2024-09-08 ASSESSMENT — PAIN SCALES - GENERAL: PAINLEVEL_OUTOF10: 3

## 2024-09-08 ASSESSMENT — PAIN DESCRIPTION - ORIENTATION: ORIENTATION: MID

## 2024-09-08 ASSESSMENT — PAIN DESCRIPTION - ONSET: ONSET: SUDDEN

## 2024-09-08 ASSESSMENT — PAIN DESCRIPTION - DESCRIPTORS: DESCRIPTORS: TIGHTNESS

## 2024-09-09 ENCOUNTER — HOSPITAL ENCOUNTER (INPATIENT)
Age: 74
LOS: 4 days | Discharge: HOME | DRG: 140 | End: 2024-09-13
Attending: EMERGENCY MEDICINE | Admitting: STUDENT IN AN ORGANIZED HEALTH CARE EDUCATION/TRAINING PROGRAM
Payer: COMMERCIAL

## 2024-09-09 ENCOUNTER — APPOINTMENT (OUTPATIENT)
Dept: GENERAL RADIOLOGY | Age: 74
DRG: 140 | End: 2024-09-09
Payer: COMMERCIAL

## 2024-09-09 ENCOUNTER — APPOINTMENT (OUTPATIENT)
Dept: CT IMAGING | Age: 74
DRG: 140 | End: 2024-09-09
Payer: COMMERCIAL

## 2024-09-09 DIAGNOSIS — I50.20 HFREF (HEART FAILURE WITH REDUCED EJECTION FRACTION) (HCC): ICD-10-CM

## 2024-09-09 DIAGNOSIS — J44.1 COPD WITH ACUTE EXACERBATION (HCC): Primary | ICD-10-CM

## 2024-09-09 DIAGNOSIS — I50.9 CONGESTIVE HEART FAILURE, UNSPECIFIED HF CHRONICITY, UNSPECIFIED HEART FAILURE TYPE (HCC): ICD-10-CM

## 2024-09-09 DIAGNOSIS — I50.30 HEART FAILURE WITH PRESERVED EJECTION FRACTION, UNSPECIFIED HF CHRONICITY (HCC): ICD-10-CM

## 2024-09-09 DIAGNOSIS — Z59.82 TRANSPORTATION INSECURITY: ICD-10-CM

## 2024-09-09 DIAGNOSIS — R06.02 SOB (SHORTNESS OF BREATH): ICD-10-CM

## 2024-09-09 PROBLEM — J96.20 ACUTE ON CHRONIC RESPIRATORY FAILURE (HCC): Status: ACTIVE | Noted: 2024-09-09

## 2024-09-09 PROBLEM — J44.9 COPD (CHRONIC OBSTRUCTIVE PULMONARY DISEASE) (HCC): Status: ACTIVE | Noted: 2024-09-09

## 2024-09-09 PROBLEM — F19.10 POLYSUBSTANCE ABUSE (HCC): Status: ACTIVE | Noted: 2024-09-09

## 2024-09-09 PROBLEM — D72.829 LEUKOCYTOSIS: Status: ACTIVE | Noted: 2024-09-09

## 2024-09-09 PROBLEM — D72.829 LEUKOCYTOSIS: Status: RESOLVED | Noted: 2024-09-09 | Resolved: 2024-09-09

## 2024-09-09 LAB
ALBUMIN SERPL-MCNC: 3.2 G/DL (ref 3.5–5.2)
ALP SERPL-CCNC: 78 U/L (ref 40–129)
ALT SERPL-CCNC: 11 U/L (ref 0–40)
AMPHET UR QL SCN: NEGATIVE
ANION GAP SERPL CALCULATED.3IONS-SCNC: 15 MMOL/L (ref 7–16)
AST SERPL-CCNC: 24 U/L (ref 0–39)
B PARAP IS1001 DNA NPH QL NAA+NON-PROBE: NOT DETECTED
B PERT DNA SPEC QL NAA+PROBE: NOT DETECTED
BARBITURATES UR QL SCN: NEGATIVE
BASOPHILS # BLD: 0.05 K/UL (ref 0–0.2)
BASOPHILS NFR BLD: 1 % (ref 0–2)
BENZODIAZ UR QL: NEGATIVE
BILIRUB SERPL-MCNC: 0.9 MG/DL (ref 0–1.2)
BILIRUB UR QL STRIP: NEGATIVE
BNP SERPL-MCNC: ABNORMAL PG/ML (ref 0–450)
BUN SERPL-MCNC: 13 MG/DL (ref 6–23)
BUPRENORPHINE UR QL: NEGATIVE
C PNEUM DNA NPH QL NAA+NON-PROBE: NOT DETECTED
CALCIUM SERPL-MCNC: 9 MG/DL (ref 8.6–10.2)
CANNABINOIDS UR QL SCN: NEGATIVE
CHLORIDE SERPL-SCNC: 99 MMOL/L (ref 98–107)
CLARITY UR: CLEAR
CO2 SERPL-SCNC: 22 MMOL/L (ref 22–29)
COCAINE UR QL SCN: POSITIVE
COLOR UR: YELLOW
CREAT SERPL-MCNC: 1.1 MG/DL (ref 0.7–1.2)
EKG ATRIAL RATE: 105 BPM
EKG ATRIAL RATE: 99 BPM
EKG P AXIS: 63 DEGREES
EKG P AXIS: 63 DEGREES
EKG P-R INTERVAL: 146 MS
EKG P-R INTERVAL: 150 MS
EKG Q-T INTERVAL: 390 MS
EKG Q-T INTERVAL: 404 MS
EKG QRS DURATION: 128 MS
EKG QRS DURATION: 130 MS
EKG QTC CALCULATION (BAZETT): 515 MS
EKG QTC CALCULATION (BAZETT): 518 MS
EKG R AXIS: -85 DEGREES
EKG R AXIS: -89 DEGREES
EKG T AXIS: 83 DEGREES
EKG T AXIS: 88 DEGREES
EKG VENTRICULAR RATE: 105 BPM
EKG VENTRICULAR RATE: 99 BPM
EOSINOPHIL # BLD: 0.08 K/UL (ref 0.05–0.5)
EOSINOPHILS RELATIVE PERCENT: 1 % (ref 0–6)
ERYTHROCYTE [DISTWIDTH] IN BLOOD BY AUTOMATED COUNT: 15 % (ref 11.5–15)
FENTANYL UR QL: NEGATIVE
FLUAV RNA NPH QL NAA+NON-PROBE: NOT DETECTED
FLUBV RNA NPH QL NAA+NON-PROBE: NOT DETECTED
GFR, ESTIMATED: 74 ML/MIN/1.73M2
GLUCOSE SERPL-MCNC: 102 MG/DL (ref 74–99)
GLUCOSE UR STRIP-MCNC: NEGATIVE MG/DL
HADV DNA NPH QL NAA+NON-PROBE: NOT DETECTED
HCOV 229E RNA NPH QL NAA+NON-PROBE: NOT DETECTED
HCOV HKU1 RNA NPH QL NAA+NON-PROBE: NOT DETECTED
HCOV NL63 RNA NPH QL NAA+NON-PROBE: NOT DETECTED
HCOV OC43 RNA NPH QL NAA+NON-PROBE: NOT DETECTED
HCT VFR BLD AUTO: 33.5 % (ref 37–54)
HGB BLD-MCNC: 11.3 G/DL (ref 12.5–16.5)
HGB UR QL STRIP.AUTO: NEGATIVE
HMPV RNA NPH QL NAA+NON-PROBE: NOT DETECTED
HPIV1 RNA NPH QL NAA+NON-PROBE: NOT DETECTED
HPIV2 RNA NPH QL NAA+NON-PROBE: NOT DETECTED
HPIV3 RNA NPH QL NAA+NON-PROBE: NOT DETECTED
HPIV4 RNA NPH QL NAA+NON-PROBE: NOT DETECTED
IMM GRANULOCYTES # BLD AUTO: <0.03 K/UL (ref 0–0.58)
IMM GRANULOCYTES NFR BLD: 0 % (ref 0–5)
KETONES UR STRIP-MCNC: NEGATIVE MG/DL
LEUKOCYTE ESTERASE UR QL STRIP: NEGATIVE
LYMPHOCYTES NFR BLD: 0.85 K/UL (ref 1.5–4)
LYMPHOCYTES RELATIVE PERCENT: 11 % (ref 20–42)
M PNEUMO DNA NPH QL NAA+NON-PROBE: NOT DETECTED
MCH RBC QN AUTO: 30.4 PG (ref 26–35)
MCHC RBC AUTO-ENTMCNC: 33.7 G/DL (ref 32–34.5)
MCV RBC AUTO: 90.1 FL (ref 80–99.9)
METHADONE UR QL: NEGATIVE
MONOCYTES NFR BLD: 1.1 K/UL (ref 0.1–0.95)
MONOCYTES NFR BLD: 15 % (ref 2–12)
NEUTROPHILS NFR BLD: 72 % (ref 43–80)
NEUTS SEG NFR BLD: 5.33 K/UL (ref 1.8–7.3)
NITRITE UR QL STRIP: NEGATIVE
OPIATES UR QL SCN: NEGATIVE
OXYCODONE UR QL SCN: NEGATIVE
PCP UR QL SCN: NEGATIVE
PH UR STRIP: 6 [PH] (ref 5–9)
PLATELET # BLD AUTO: 211 K/UL (ref 130–450)
PMV BLD AUTO: 10.1 FL (ref 7–12)
POTASSIUM SERPL-SCNC: 3.9 MMOL/L (ref 3.5–5)
PROCALCITONIN SERPL-MCNC: 0.11 NG/ML (ref 0–0.08)
PROT SERPL-MCNC: 7.4 G/DL (ref 6.4–8.3)
PROT UR STRIP-MCNC: 100 MG/DL
RBC # BLD AUTO: 3.72 M/UL (ref 3.8–5.8)
RBC #/AREA URNS HPF: ABNORMAL /HPF
RSV RNA NPH QL NAA+NON-PROBE: NOT DETECTED
RV+EV RNA NPH QL NAA+NON-PROBE: NOT DETECTED
SARS-COV-2 RNA NPH QL NAA+NON-PROBE: NOT DETECTED
SODIUM SERPL-SCNC: 136 MMOL/L (ref 132–146)
SP GR UR STRIP: 1.02 (ref 1–1.03)
SPECIMEN DESCRIPTION: NORMAL
TEST INFORMATION: ABNORMAL
TROPONIN I SERPL HS-MCNC: 53 NG/L (ref 0–11)
UROBILINOGEN UR STRIP-ACNC: 0.2 EU/DL (ref 0–1)
WBC #/AREA URNS HPF: ABNORMAL /HPF
WBC OTHER # BLD: 7.4 K/UL (ref 4.5–11.5)

## 2024-09-09 PROCEDURE — 80307 DRUG TEST PRSMV CHEM ANLYZR: CPT

## 2024-09-09 PROCEDURE — 93005 ELECTROCARDIOGRAM TRACING: CPT | Performed by: EMERGENCY MEDICINE

## 2024-09-09 PROCEDURE — 85025 COMPLETE CBC W/AUTO DIFF WBC: CPT

## 2024-09-09 PROCEDURE — 84484 ASSAY OF TROPONIN QUANT: CPT

## 2024-09-09 PROCEDURE — 94640 AIRWAY INHALATION TREATMENT: CPT

## 2024-09-09 PROCEDURE — 84145 PROCALCITONIN (PCT): CPT

## 2024-09-09 PROCEDURE — 81001 URINALYSIS AUTO W/SCOPE: CPT

## 2024-09-09 PROCEDURE — 6360000002 HC RX W HCPCS: Performed by: NURSE PRACTITIONER

## 2024-09-09 PROCEDURE — 1200000000 HC SEMI PRIVATE

## 2024-09-09 PROCEDURE — 80053 COMPREHEN METABOLIC PANEL: CPT

## 2024-09-09 PROCEDURE — 6370000000 HC RX 637 (ALT 250 FOR IP): Performed by: NURSE PRACTITIONER

## 2024-09-09 PROCEDURE — 2580000003 HC RX 258: Performed by: NURSE PRACTITIONER

## 2024-09-09 PROCEDURE — 93010 ELECTROCARDIOGRAM REPORT: CPT | Performed by: INTERNAL MEDICINE

## 2024-09-09 PROCEDURE — 0202U NFCT DS 22 TRGT SARS-COV-2: CPT

## 2024-09-09 PROCEDURE — 71250 CT THORAX DX C-: CPT

## 2024-09-09 PROCEDURE — 83880 ASSAY OF NATRIURETIC PEPTIDE: CPT

## 2024-09-09 PROCEDURE — 99222 1ST HOSP IP/OBS MODERATE 55: CPT | Performed by: STUDENT IN AN ORGANIZED HEALTH CARE EDUCATION/TRAINING PROGRAM

## 2024-09-09 PROCEDURE — 87040 BLOOD CULTURE FOR BACTERIA: CPT

## 2024-09-09 PROCEDURE — 6370000000 HC RX 637 (ALT 250 FOR IP): Performed by: EMERGENCY MEDICINE

## 2024-09-09 PROCEDURE — 87086 URINE CULTURE/COLONY COUNT: CPT

## 2024-09-09 PROCEDURE — 71045 X-RAY EXAM CHEST 1 VIEW: CPT

## 2024-09-09 PROCEDURE — 99222 1ST HOSP IP/OBS MODERATE 55: CPT | Performed by: NURSE PRACTITIONER

## 2024-09-09 PROCEDURE — 99285 EMERGENCY DEPT VISIT HI MDM: CPT

## 2024-09-09 PROCEDURE — 6360000002 HC RX W HCPCS: Performed by: EMERGENCY MEDICINE

## 2024-09-09 PROCEDURE — 6370000000 HC RX 637 (ALT 250 FOR IP): Performed by: STUDENT IN AN ORGANIZED HEALTH CARE EDUCATION/TRAINING PROGRAM

## 2024-09-09 RX ORDER — AZITHROMYCIN 250 MG/1
500 TABLET, FILM COATED ORAL DAILY
Status: DISCONTINUED | OUTPATIENT
Start: 2024-09-09 | End: 2024-09-10

## 2024-09-09 RX ORDER — POTASSIUM CHLORIDE 1500 MG/1
40 TABLET, EXTENDED RELEASE ORAL PRN
Status: DISCONTINUED | OUTPATIENT
Start: 2024-09-09 | End: 2024-09-13 | Stop reason: HOSPADM

## 2024-09-09 RX ORDER — MAGNESIUM SULFATE IN WATER 40 MG/ML
2000 INJECTION, SOLUTION INTRAVENOUS PRN
Status: DISCONTINUED | OUTPATIENT
Start: 2024-09-09 | End: 2024-09-13 | Stop reason: HOSPADM

## 2024-09-09 RX ORDER — SODIUM CHLORIDE 9 MG/ML
INJECTION, SOLUTION INTRAVENOUS PRN
Status: DISCONTINUED | OUTPATIENT
Start: 2024-09-09 | End: 2024-09-13 | Stop reason: HOSPADM

## 2024-09-09 RX ORDER — MECOBALAMIN 5000 MCG
5 TABLET,DISINTEGRATING ORAL NIGHTLY PRN
Status: DISCONTINUED | OUTPATIENT
Start: 2024-09-09 | End: 2024-09-13 | Stop reason: HOSPADM

## 2024-09-09 RX ORDER — HYDRALAZINE HYDROCHLORIDE 50 MG/1
50 TABLET, FILM COATED ORAL 3 TIMES DAILY
Status: DISCONTINUED | OUTPATIENT
Start: 2024-09-09 | End: 2024-09-12

## 2024-09-09 RX ORDER — ENOXAPARIN SODIUM 100 MG/ML
40 INJECTION SUBCUTANEOUS DAILY
Status: DISCONTINUED | OUTPATIENT
Start: 2024-09-09 | End: 2024-09-09

## 2024-09-09 RX ORDER — ATORVASTATIN CALCIUM 40 MG/1
40 TABLET, FILM COATED ORAL DAILY
Status: DISCONTINUED | OUTPATIENT
Start: 2024-09-09 | End: 2024-09-13 | Stop reason: HOSPADM

## 2024-09-09 RX ORDER — ASPIRIN 81 MG/1
81 TABLET ORAL DAILY
Status: DISCONTINUED | OUTPATIENT
Start: 2024-09-09 | End: 2024-09-13 | Stop reason: HOSPADM

## 2024-09-09 RX ORDER — ACETAMINOPHEN 325 MG/1
650 TABLET ORAL EVERY 6 HOURS PRN
Status: DISCONTINUED | OUTPATIENT
Start: 2024-09-09 | End: 2024-09-13 | Stop reason: HOSPADM

## 2024-09-09 RX ORDER — IPRATROPIUM BROMIDE AND ALBUTEROL SULFATE 2.5; .5 MG/3ML; MG/3ML
1 SOLUTION RESPIRATORY (INHALATION)
Status: COMPLETED | OUTPATIENT
Start: 2024-09-09 | End: 2024-09-09

## 2024-09-09 RX ORDER — ONDANSETRON 2 MG/ML
4 INJECTION INTRAMUSCULAR; INTRAVENOUS EVERY 6 HOURS PRN
Status: DISCONTINUED | OUTPATIENT
Start: 2024-09-09 | End: 2024-09-09

## 2024-09-09 RX ORDER — PROCHLORPERAZINE EDISYLATE 5 MG/ML
10 INJECTION INTRAMUSCULAR; INTRAVENOUS EVERY 6 HOURS PRN
Status: DISCONTINUED | OUTPATIENT
Start: 2024-09-09 | End: 2024-09-13 | Stop reason: HOSPADM

## 2024-09-09 RX ORDER — SODIUM CHLORIDE 0.9 % (FLUSH) 0.9 %
5-40 SYRINGE (ML) INJECTION EVERY 12 HOURS SCHEDULED
Status: DISCONTINUED | OUTPATIENT
Start: 2024-09-09 | End: 2024-09-13 | Stop reason: HOSPADM

## 2024-09-09 RX ORDER — ONDANSETRON 4 MG/1
4 TABLET, ORALLY DISINTEGRATING ORAL EVERY 8 HOURS PRN
Status: DISCONTINUED | OUTPATIENT
Start: 2024-09-09 | End: 2024-09-09

## 2024-09-09 RX ORDER — METOPROLOL SUCCINATE 25 MG/1
25 TABLET, EXTENDED RELEASE ORAL 2 TIMES DAILY
Status: DISCONTINUED | OUTPATIENT
Start: 2024-09-09 | End: 2024-09-13 | Stop reason: HOSPADM

## 2024-09-09 RX ORDER — POLYETHYLENE GLYCOL 3350 17 G/17G
17 POWDER, FOR SOLUTION ORAL DAILY PRN
Status: DISCONTINUED | OUTPATIENT
Start: 2024-09-09 | End: 2024-09-13 | Stop reason: HOSPADM

## 2024-09-09 RX ORDER — PANTOPRAZOLE SODIUM 40 MG/1
40 TABLET, DELAYED RELEASE ORAL
Status: DISCONTINUED | OUTPATIENT
Start: 2024-09-09 | End: 2024-09-13 | Stop reason: HOSPADM

## 2024-09-09 RX ORDER — FUROSEMIDE 10 MG/ML
40 INJECTION INTRAMUSCULAR; INTRAVENOUS 2 TIMES DAILY
Status: DISCONTINUED | OUTPATIENT
Start: 2024-09-09 | End: 2024-09-09

## 2024-09-09 RX ORDER — POTASSIUM CHLORIDE 7.45 MG/ML
10 INJECTION INTRAVENOUS PRN
Status: DISCONTINUED | OUTPATIENT
Start: 2024-09-09 | End: 2024-09-13 | Stop reason: HOSPADM

## 2024-09-09 RX ORDER — PROCHLORPERAZINE MALEATE 10 MG
10 TABLET ORAL EVERY 6 HOURS PRN
Status: DISCONTINUED | OUTPATIENT
Start: 2024-09-09 | End: 2024-09-13 | Stop reason: HOSPADM

## 2024-09-09 RX ORDER — SODIUM CHLORIDE 0.9 % (FLUSH) 0.9 %
5-40 SYRINGE (ML) INJECTION PRN
Status: DISCONTINUED | OUTPATIENT
Start: 2024-09-09 | End: 2024-09-13 | Stop reason: HOSPADM

## 2024-09-09 RX ORDER — ACETAMINOPHEN 650 MG/1
650 SUPPOSITORY RECTAL EVERY 6 HOURS PRN
Status: DISCONTINUED | OUTPATIENT
Start: 2024-09-09 | End: 2024-09-13 | Stop reason: HOSPADM

## 2024-09-09 RX ORDER — FUROSEMIDE 10 MG/ML
20 INJECTION INTRAMUSCULAR; INTRAVENOUS ONCE
Status: COMPLETED | OUTPATIENT
Start: 2024-09-09 | End: 2024-09-09

## 2024-09-09 RX ORDER — IPRATROPIUM BROMIDE AND ALBUTEROL SULFATE 2.5; .5 MG/3ML; MG/3ML
1 SOLUTION RESPIRATORY (INHALATION) EVERY 4 HOURS PRN
Status: DISCONTINUED | OUTPATIENT
Start: 2024-09-09 | End: 2024-09-13 | Stop reason: HOSPADM

## 2024-09-09 RX ORDER — FUROSEMIDE 10 MG/ML
40 INJECTION INTRAMUSCULAR; INTRAVENOUS 2 TIMES DAILY
Status: DISCONTINUED | OUTPATIENT
Start: 2024-09-09 | End: 2024-09-10

## 2024-09-09 RX ADMIN — APIXABAN 5 MG: 5 TABLET, FILM COATED ORAL at 10:47

## 2024-09-09 RX ADMIN — ASPIRIN 81 MG: 81 TABLET, COATED ORAL at 10:47

## 2024-09-09 RX ADMIN — FUROSEMIDE 40 MG: 10 INJECTION, SOLUTION INTRAMUSCULAR; INTRAVENOUS at 17:51

## 2024-09-09 RX ADMIN — ATORVASTATIN CALCIUM 40 MG: 40 TABLET, FILM COATED ORAL at 10:48

## 2024-09-09 RX ADMIN — IPRATROPIUM BROMIDE 0.5 MG: 0.5 SOLUTION RESPIRATORY (INHALATION) at 10:30

## 2024-09-09 RX ADMIN — AZITHROMYCIN DIHYDRATE 500 MG: 250 TABLET ORAL at 12:18

## 2024-09-09 RX ADMIN — METOPROLOL SUCCINATE 25 MG: 25 TABLET, EXTENDED RELEASE ORAL at 10:48

## 2024-09-09 RX ADMIN — APIXABAN 5 MG: 5 TABLET, FILM COATED ORAL at 22:12

## 2024-09-09 RX ADMIN — HYDRALAZINE HYDROCHLORIDE 50 MG: 50 TABLET ORAL at 10:47

## 2024-09-09 RX ADMIN — ARFORMOTEROL TARTRATE: 15 SOLUTION RESPIRATORY (INHALATION) at 19:54

## 2024-09-09 RX ADMIN — IPRATROPIUM BROMIDE AND ALBUTEROL SULFATE 1 DOSE: .5; 3 SOLUTION RESPIRATORY (INHALATION) at 05:20

## 2024-09-09 RX ADMIN — ARFORMOTEROL TARTRATE: 15 SOLUTION RESPIRATORY (INHALATION) at 10:30

## 2024-09-09 RX ADMIN — METOPROLOL SUCCINATE 25 MG: 25 TABLET, EXTENDED RELEASE ORAL at 22:12

## 2024-09-09 RX ADMIN — SODIUM CHLORIDE, PRESERVATIVE FREE 10 ML: 5 INJECTION INTRAVENOUS at 22:12

## 2024-09-09 RX ADMIN — IPRATROPIUM BROMIDE 0.5 MG: 0.5 SOLUTION RESPIRATORY (INHALATION) at 14:36

## 2024-09-09 RX ADMIN — IPRATROPIUM BROMIDE AND ALBUTEROL SULFATE 1 DOSE: .5; 3 SOLUTION RESPIRATORY (INHALATION) at 05:35

## 2024-09-09 RX ADMIN — IPRATROPIUM BROMIDE AND ALBUTEROL SULFATE 1 DOSE: .5; 3 SOLUTION RESPIRATORY (INHALATION) at 05:25

## 2024-09-09 RX ADMIN — IPRATROPIUM BROMIDE 0.5 MG: 0.5 SOLUTION RESPIRATORY (INHALATION) at 19:55

## 2024-09-09 RX ADMIN — FUROSEMIDE 20 MG: 10 INJECTION, SOLUTION INTRAMUSCULAR; INTRAVENOUS at 06:06

## 2024-09-09 RX ADMIN — HYDRALAZINE HYDROCHLORIDE 50 MG: 50 TABLET ORAL at 22:12

## 2024-09-09 RX ADMIN — PANTOPRAZOLE SODIUM 40 MG: 40 TABLET, DELAYED RELEASE ORAL at 17:51

## 2024-09-09 SDOH — ECONOMIC STABILITY - TRANSPORTATION SECURITY: TRANSPORTATION INSECURITY: Z59.82

## 2024-09-09 ASSESSMENT — LIFESTYLE VARIABLES
HOW OFTEN DO YOU HAVE A DRINK CONTAINING ALCOHOL: MONTHLY OR LESS
HOW MANY STANDARD DRINKS CONTAINING ALCOHOL DO YOU HAVE ON A TYPICAL DAY: 1 OR 2

## 2024-09-10 LAB
ANION GAP SERPL CALCULATED.3IONS-SCNC: 15 MMOL/L (ref 7–16)
BNP SERPL-MCNC: ABNORMAL PG/ML (ref 0–450)
BUN SERPL-MCNC: 32 MG/DL (ref 6–23)
CALCIUM SERPL-MCNC: 9.3 MG/DL (ref 8.6–10.2)
CHLORIDE SERPL-SCNC: 99 MMOL/L (ref 98–107)
CHOLEST SERPL-MCNC: 159 MG/DL
CO2 SERPL-SCNC: 26 MMOL/L (ref 22–29)
CREAT SERPL-MCNC: 1.6 MG/DL (ref 0.7–1.2)
GFR, ESTIMATED: 46 ML/MIN/1.73M2
GLUCOSE SERPL-MCNC: 114 MG/DL (ref 74–99)
HDLC SERPL-MCNC: 71 MG/DL
L PNEUMO1 AG UR QL IA.RAPID: NEGATIVE
LDLC SERPL CALC-MCNC: 79 MG/DL
MAGNESIUM SERPL-MCNC: 1.9 MG/DL (ref 1.6–2.6)
MICROORGANISM SPEC CULT: NO GROWTH
POTASSIUM SERPL-SCNC: 4.1 MMOL/L (ref 3.5–5)
S PNEUM AG SPEC QL: NEGATIVE
SERVICE CMNT-IMP: NORMAL
SODIUM SERPL-SCNC: 140 MMOL/L (ref 132–146)
SPECIMEN DESCRIPTION: NORMAL
SPECIMEN SOURCE: NORMAL
TRIGL SERPL-MCNC: 46 MG/DL
TROPONIN I SERPL HS-MCNC: 50 NG/L (ref 0–11)
VLDLC SERPL CALC-MCNC: 9 MG/DL

## 2024-09-10 PROCEDURE — 2580000003 HC RX 258: Performed by: NURSE PRACTITIONER

## 2024-09-10 PROCEDURE — 6360000002 HC RX W HCPCS: Performed by: NURSE PRACTITIONER

## 2024-09-10 PROCEDURE — 6370000000 HC RX 637 (ALT 250 FOR IP): Performed by: NURSE PRACTITIONER

## 2024-09-10 PROCEDURE — 84484 ASSAY OF TROPONIN QUANT: CPT

## 2024-09-10 PROCEDURE — 87449 NOS EACH ORGANISM AG IA: CPT

## 2024-09-10 PROCEDURE — 93005 ELECTROCARDIOGRAM TRACING: CPT | Performed by: STUDENT IN AN ORGANIZED HEALTH CARE EDUCATION/TRAINING PROGRAM

## 2024-09-10 PROCEDURE — 6360000002 HC RX W HCPCS: Performed by: STUDENT IN AN ORGANIZED HEALTH CARE EDUCATION/TRAINING PROGRAM

## 2024-09-10 PROCEDURE — 6370000000 HC RX 637 (ALT 250 FOR IP): Performed by: STUDENT IN AN ORGANIZED HEALTH CARE EDUCATION/TRAINING PROGRAM

## 2024-09-10 PROCEDURE — 87899 AGENT NOS ASSAY W/OPTIC: CPT

## 2024-09-10 PROCEDURE — 94640 AIRWAY INHALATION TREATMENT: CPT

## 2024-09-10 PROCEDURE — 1200000000 HC SEMI PRIVATE

## 2024-09-10 PROCEDURE — 83735 ASSAY OF MAGNESIUM: CPT

## 2024-09-10 PROCEDURE — 83880 ASSAY OF NATRIURETIC PEPTIDE: CPT

## 2024-09-10 PROCEDURE — 36415 COLL VENOUS BLD VENIPUNCTURE: CPT

## 2024-09-10 PROCEDURE — 80061 LIPID PANEL: CPT

## 2024-09-10 PROCEDURE — 2580000003 HC RX 258: Performed by: STUDENT IN AN ORGANIZED HEALTH CARE EDUCATION/TRAINING PROGRAM

## 2024-09-10 PROCEDURE — 80048 BASIC METABOLIC PNL TOTAL CA: CPT

## 2024-09-10 PROCEDURE — 99232 SBSQ HOSP IP/OBS MODERATE 35: CPT | Performed by: STUDENT IN AN ORGANIZED HEALTH CARE EDUCATION/TRAINING PROGRAM

## 2024-09-10 RX ORDER — POTASSIUM CHLORIDE 1500 MG/1
20 TABLET, EXTENDED RELEASE ORAL ONCE
Status: COMPLETED | OUTPATIENT
Start: 2024-09-10 | End: 2024-09-10

## 2024-09-10 RX ORDER — SODIUM CHLORIDE 9 MG/ML
INJECTION, SOLUTION INTRAVENOUS ONCE
Status: COMPLETED | OUTPATIENT
Start: 2024-09-10 | End: 2024-09-10

## 2024-09-10 RX ORDER — MAGNESIUM SULFATE 1 G/100ML
1000 INJECTION INTRAVENOUS ONCE
Status: COMPLETED | OUTPATIENT
Start: 2024-09-10 | End: 2024-09-10

## 2024-09-10 RX ORDER — GUAIFENESIN 400 MG/1
400 TABLET ORAL 3 TIMES DAILY
Status: DISCONTINUED | OUTPATIENT
Start: 2024-09-10 | End: 2024-09-13 | Stop reason: HOSPADM

## 2024-09-10 RX ADMIN — SODIUM CHLORIDE: 9 INJECTION, SOLUTION INTRAVENOUS at 15:44

## 2024-09-10 RX ADMIN — PANTOPRAZOLE SODIUM 40 MG: 40 TABLET, DELAYED RELEASE ORAL at 05:16

## 2024-09-10 RX ADMIN — GUAIFENESIN 400 MG: 400 TABLET ORAL at 21:13

## 2024-09-10 RX ADMIN — IPRATROPIUM BROMIDE 0.5 MG: 0.5 SOLUTION RESPIRATORY (INHALATION) at 12:51

## 2024-09-10 RX ADMIN — ARFORMOTEROL TARTRATE: 15 SOLUTION RESPIRATORY (INHALATION) at 08:02

## 2024-09-10 RX ADMIN — POTASSIUM CHLORIDE 20 MEQ: 1500 TABLET, EXTENDED RELEASE ORAL at 15:40

## 2024-09-10 RX ADMIN — ASPIRIN 81 MG: 81 TABLET, COATED ORAL at 08:32

## 2024-09-10 RX ADMIN — APIXABAN 5 MG: 5 TABLET, FILM COATED ORAL at 21:13

## 2024-09-10 RX ADMIN — AZITHROMYCIN DIHYDRATE 500 MG: 250 TABLET ORAL at 08:32

## 2024-09-10 RX ADMIN — SODIUM CHLORIDE, PRESERVATIVE FREE 10 ML: 5 INJECTION INTRAVENOUS at 21:13

## 2024-09-10 RX ADMIN — ARFORMOTEROL TARTRATE: 15 SOLUTION RESPIRATORY (INHALATION) at 18:02

## 2024-09-10 RX ADMIN — SODIUM CHLORIDE, PRESERVATIVE FREE 10 ML: 5 INJECTION INTRAVENOUS at 08:33

## 2024-09-10 RX ADMIN — ATORVASTATIN CALCIUM 40 MG: 40 TABLET, FILM COATED ORAL at 08:32

## 2024-09-10 RX ADMIN — PANTOPRAZOLE SODIUM 40 MG: 40 TABLET, DELAYED RELEASE ORAL at 15:40

## 2024-09-10 RX ADMIN — HYDRALAZINE HYDROCHLORIDE 50 MG: 50 TABLET ORAL at 13:20

## 2024-09-10 RX ADMIN — HYDRALAZINE HYDROCHLORIDE 50 MG: 50 TABLET ORAL at 21:13

## 2024-09-10 RX ADMIN — IPRATROPIUM BROMIDE 0.5 MG: 0.5 SOLUTION RESPIRATORY (INHALATION) at 18:03

## 2024-09-10 RX ADMIN — FUROSEMIDE 40 MG: 10 INJECTION, SOLUTION INTRAMUSCULAR; INTRAVENOUS at 08:32

## 2024-09-10 RX ADMIN — MAGNESIUM SULFATE HEPTAHYDRATE 1000 MG: 1 INJECTION, SOLUTION INTRAVENOUS at 15:46

## 2024-09-10 RX ADMIN — APIXABAN 5 MG: 5 TABLET, FILM COATED ORAL at 08:32

## 2024-09-10 RX ADMIN — IPRATROPIUM BROMIDE 0.5 MG: 0.5 SOLUTION RESPIRATORY (INHALATION) at 08:02

## 2024-09-10 RX ADMIN — GUAIFENESIN 400 MG: 400 TABLET ORAL at 15:40

## 2024-09-10 RX ADMIN — METOPROLOL SUCCINATE 25 MG: 25 TABLET, EXTENDED RELEASE ORAL at 21:13

## 2024-09-10 RX ADMIN — HYDRALAZINE HYDROCHLORIDE 50 MG: 50 TABLET ORAL at 05:16

## 2024-09-10 RX ADMIN — METOPROLOL SUCCINATE 25 MG: 25 TABLET, EXTENDED RELEASE ORAL at 08:32

## 2024-09-10 ASSESSMENT — PAIN SCALES - GENERAL: PAINLEVEL_OUTOF10: 0

## 2024-09-11 ENCOUNTER — APPOINTMENT (OUTPATIENT)
Age: 74
DRG: 140 | End: 2024-09-11
Attending: INTERNAL MEDICINE
Payer: COMMERCIAL

## 2024-09-11 LAB
ANION GAP SERPL CALCULATED.3IONS-SCNC: 11 MMOL/L (ref 7–16)
BASOPHILS # BLD: 0.07 K/UL (ref 0–0.2)
BASOPHILS NFR BLD: 1 % (ref 0–2)
BUN SERPL-MCNC: 33 MG/DL (ref 6–23)
CALCIUM SERPL-MCNC: 8.6 MG/DL (ref 8.6–10.2)
CHLORIDE SERPL-SCNC: 103 MMOL/L (ref 98–107)
CO2 SERPL-SCNC: 25 MMOL/L (ref 22–29)
CREAT SERPL-MCNC: 1.7 MG/DL (ref 0.7–1.2)
ECHO BSA: 1.7 M2
ECHO LA DIAMETER INDEX: 2.35 CM/M2
ECHO LA DIAMETER: 4 CM
ECHO LA VOL A-L A2C: 87 ML (ref 18–58)
ECHO LA VOL A-L A4C: 65 ML (ref 18–58)
ECHO LA VOL BP: 79 ML (ref 18–58)
ECHO LA VOL MOD A2C: 81 ML (ref 18–58)
ECHO LA VOL MOD A4C: 61 ML (ref 18–58)
ECHO LA VOL/BSA BIPLANE: 46 ML/M2 (ref 16–34)
ECHO LA VOLUME AREA LENGTH: 85 ML
ECHO LA VOLUME INDEX A-L A2C: 51 ML/M2 (ref 16–34)
ECHO LA VOLUME INDEX A-L A4C: 38 ML/M2 (ref 16–34)
ECHO LA VOLUME INDEX AREA LENGTH: 50 ML/M2 (ref 16–34)
ECHO LA VOLUME INDEX MOD A2C: 48 ML/M2 (ref 16–34)
ECHO LA VOLUME INDEX MOD A4C: 36 ML/M2 (ref 16–34)
ECHO LV EF PHYSICIAN: 40 %
ECHO LV FRACTIONAL SHORTENING: 28 % (ref 28–44)
ECHO LV INTERNAL DIMENSION DIASTOLE INDEX: 2.94 CM/M2
ECHO LV INTERNAL DIMENSION DIASTOLIC: 5 CM (ref 4.2–5.9)
ECHO LV INTERNAL DIMENSION SYSTOLIC INDEX: 2.12 CM/M2
ECHO LV INTERNAL DIMENSION SYSTOLIC: 3.6 CM
ECHO LV IVSD: 0.9 CM (ref 0.6–1)
ECHO LV MASS 2D: 169.9 G (ref 88–224)
ECHO LV MASS INDEX 2D: 100 G/M2 (ref 49–115)
ECHO LV POSTERIOR WALL DIASTOLIC: 1 CM (ref 0.6–1)
ECHO LV RELATIVE WALL THICKNESS RATIO: 0.4
ECHO MV "A" WAVE DURATION: 79.9 MSEC
ECHO MV A VELOCITY: 0.35 M/S
ECHO MV E DECELERATION TIME (DT): 137.4 MS
ECHO MV E VELOCITY: 0.92 M/S
ECHO MV E/A RATIO: 2.63
ECHO RV INTERNAL DIMENSION: 3.3 CM
EKG ATRIAL RATE: 86 BPM
EKG P AXIS: 62 DEGREES
EKG P-R INTERVAL: 154 MS
EKG Q-T INTERVAL: 452 MS
EKG QRS DURATION: 138 MS
EKG QTC CALCULATION (BAZETT): 540 MS
EKG R AXIS: -93 DEGREES
EKG T AXIS: 108 DEGREES
EKG VENTRICULAR RATE: 86 BPM
EOSINOPHIL # BLD: 0.47 K/UL (ref 0.05–0.5)
EOSINOPHILS RELATIVE PERCENT: 6 % (ref 0–6)
ERYTHROCYTE [DISTWIDTH] IN BLOOD BY AUTOMATED COUNT: 14.8 % (ref 11.5–15)
GFR, ESTIMATED: 41 ML/MIN/1.73M2
GLUCOSE SERPL-MCNC: 101 MG/DL (ref 74–99)
HCT VFR BLD AUTO: 32.3 % (ref 37–54)
HGB BLD-MCNC: 10.7 G/DL (ref 12.5–16.5)
IMM GRANULOCYTES # BLD AUTO: 0.04 K/UL (ref 0–0.58)
IMM GRANULOCYTES NFR BLD: 1 % (ref 0–5)
LV EF: 40 %
LYMPHOCYTES NFR BLD: 1.24 K/UL (ref 1.5–4)
LYMPHOCYTES RELATIVE PERCENT: 16 % (ref 20–42)
MAGNESIUM SERPL-MCNC: 2 MG/DL (ref 1.6–2.6)
MCH RBC QN AUTO: 29.5 PG (ref 26–35)
MCHC RBC AUTO-ENTMCNC: 33.1 G/DL (ref 32–34.5)
MCV RBC AUTO: 89 FL (ref 80–99.9)
MONOCYTES NFR BLD: 0.84 K/UL (ref 0.1–0.95)
MONOCYTES NFR BLD: 11 % (ref 2–12)
NEUTROPHILS NFR BLD: 66 % (ref 43–80)
NEUTS SEG NFR BLD: 5.04 K/UL (ref 1.8–7.3)
PLATELET # BLD AUTO: 244 K/UL (ref 130–450)
PMV BLD AUTO: 10.6 FL (ref 7–12)
POTASSIUM SERPL-SCNC: 3.6 MMOL/L (ref 3.5–5)
RBC # BLD AUTO: 3.63 M/UL (ref 3.8–5.8)
SODIUM SERPL-SCNC: 139 MMOL/L (ref 132–146)
WBC OTHER # BLD: 7.7 K/UL (ref 4.5–11.5)

## 2024-09-11 PROCEDURE — 93308 TTE F-UP OR LMTD: CPT

## 2024-09-11 PROCEDURE — 1200000000 HC SEMI PRIVATE

## 2024-09-11 PROCEDURE — 6360000002 HC RX W HCPCS: Performed by: NURSE PRACTITIONER

## 2024-09-11 PROCEDURE — 93308 TTE F-UP OR LMTD: CPT | Performed by: INTERNAL MEDICINE

## 2024-09-11 PROCEDURE — 94640 AIRWAY INHALATION TREATMENT: CPT

## 2024-09-11 PROCEDURE — 83735 ASSAY OF MAGNESIUM: CPT

## 2024-09-11 PROCEDURE — 36415 COLL VENOUS BLD VENIPUNCTURE: CPT

## 2024-09-11 PROCEDURE — 2580000003 HC RX 258

## 2024-09-11 PROCEDURE — APPSS60 APP SPLIT SHARED TIME 46-60 MINUTES

## 2024-09-11 PROCEDURE — 6370000000 HC RX 637 (ALT 250 FOR IP): Performed by: STUDENT IN AN ORGANIZED HEALTH CARE EDUCATION/TRAINING PROGRAM

## 2024-09-11 PROCEDURE — 93321 DOPPLER ECHO F-UP/LMTD STD: CPT | Performed by: INTERNAL MEDICINE

## 2024-09-11 PROCEDURE — 93010 ELECTROCARDIOGRAM REPORT: CPT | Performed by: INTERNAL MEDICINE

## 2024-09-11 PROCEDURE — 6370000000 HC RX 637 (ALT 250 FOR IP): Performed by: NURSE PRACTITIONER

## 2024-09-11 PROCEDURE — 93325 DOPPLER ECHO COLOR FLOW MAPG: CPT | Performed by: INTERNAL MEDICINE

## 2024-09-11 PROCEDURE — 85025 COMPLETE CBC W/AUTO DIFF WBC: CPT

## 2024-09-11 PROCEDURE — 80048 BASIC METABOLIC PNL TOTAL CA: CPT

## 2024-09-11 PROCEDURE — 2580000003 HC RX 258: Performed by: NURSE PRACTITIONER

## 2024-09-11 PROCEDURE — 99232 SBSQ HOSP IP/OBS MODERATE 35: CPT

## 2024-09-11 RX ORDER — SODIUM CHLORIDE 9 MG/ML
INJECTION, SOLUTION INTRAVENOUS CONTINUOUS
Status: DISCONTINUED | OUTPATIENT
Start: 2024-09-11 | End: 2024-09-11

## 2024-09-11 RX ADMIN — IPRATROPIUM BROMIDE 0.5 MG: 0.5 SOLUTION RESPIRATORY (INHALATION) at 10:08

## 2024-09-11 RX ADMIN — METOPROLOL SUCCINATE 25 MG: 25 TABLET, EXTENDED RELEASE ORAL at 08:47

## 2024-09-11 RX ADMIN — APIXABAN 5 MG: 5 TABLET, FILM COATED ORAL at 20:36

## 2024-09-11 RX ADMIN — PANTOPRAZOLE SODIUM 40 MG: 40 TABLET, DELAYED RELEASE ORAL at 14:31

## 2024-09-11 RX ADMIN — PANTOPRAZOLE SODIUM 40 MG: 40 TABLET, DELAYED RELEASE ORAL at 05:56

## 2024-09-11 RX ADMIN — IPRATROPIUM BROMIDE 0.5 MG: 0.5 SOLUTION RESPIRATORY (INHALATION) at 01:46

## 2024-09-11 RX ADMIN — ATORVASTATIN CALCIUM 40 MG: 40 TABLET, FILM COATED ORAL at 08:47

## 2024-09-11 RX ADMIN — APIXABAN 5 MG: 5 TABLET, FILM COATED ORAL at 08:47

## 2024-09-11 RX ADMIN — GUAIFENESIN 400 MG: 400 TABLET ORAL at 08:47

## 2024-09-11 RX ADMIN — HYDRALAZINE HYDROCHLORIDE 50 MG: 50 TABLET ORAL at 14:31

## 2024-09-11 RX ADMIN — Medication 5 MG: at 01:19

## 2024-09-11 RX ADMIN — IPRATROPIUM BROMIDE 0.5 MG: 0.5 SOLUTION RESPIRATORY (INHALATION) at 19:22

## 2024-09-11 RX ADMIN — SODIUM CHLORIDE, PRESERVATIVE FREE 10 ML: 5 INJECTION INTRAVENOUS at 20:36

## 2024-09-11 RX ADMIN — SODIUM CHLORIDE, PRESERVATIVE FREE 10 ML: 5 INJECTION INTRAVENOUS at 08:47

## 2024-09-11 RX ADMIN — ARFORMOTEROL TARTRATE: 15 SOLUTION RESPIRATORY (INHALATION) at 19:22

## 2024-09-11 RX ADMIN — METOPROLOL SUCCINATE 25 MG: 25 TABLET, EXTENDED RELEASE ORAL at 20:36

## 2024-09-11 RX ADMIN — IPRATROPIUM BROMIDE 0.5 MG: 0.5 SOLUTION RESPIRATORY (INHALATION) at 14:17

## 2024-09-11 RX ADMIN — HYDRALAZINE HYDROCHLORIDE 50 MG: 50 TABLET ORAL at 05:56

## 2024-09-11 RX ADMIN — ASPIRIN 81 MG: 81 TABLET, COATED ORAL at 08:47

## 2024-09-11 RX ADMIN — GUAIFENESIN 400 MG: 400 TABLET ORAL at 14:31

## 2024-09-11 RX ADMIN — SODIUM CHLORIDE: 9 INJECTION, SOLUTION INTRAVENOUS at 09:56

## 2024-09-11 RX ADMIN — GUAIFENESIN 400 MG: 400 TABLET ORAL at 20:36

## 2024-09-11 RX ADMIN — HYDRALAZINE HYDROCHLORIDE 50 MG: 50 TABLET ORAL at 20:36

## 2024-09-11 RX ADMIN — ARFORMOTEROL TARTRATE: 15 SOLUTION RESPIRATORY (INHALATION) at 10:08

## 2024-09-11 ASSESSMENT — PAIN SCALES - GENERAL: PAINLEVEL_OUTOF10: 0

## 2024-09-12 ENCOUNTER — APPOINTMENT (OUTPATIENT)
Dept: ULTRASOUND IMAGING | Age: 74
DRG: 140 | End: 2024-09-12
Payer: COMMERCIAL

## 2024-09-12 LAB
ANION GAP SERPL CALCULATED.3IONS-SCNC: 10 MMOL/L (ref 7–16)
BNP SERPL-MCNC: 5009 PG/ML (ref 0–450)
BUN SERPL-MCNC: 30 MG/DL (ref 6–23)
CALCIUM SERPL-MCNC: 8.8 MG/DL (ref 8.6–10.2)
CHLORIDE SERPL-SCNC: 109 MMOL/L (ref 98–107)
CO2 SERPL-SCNC: 24 MMOL/L (ref 22–29)
CREAT SERPL-MCNC: 1.6 MG/DL (ref 0.7–1.2)
ECHO BSA: 1.7 M2
GFR, ESTIMATED: 46 ML/MIN/1.73M2
GLUCOSE SERPL-MCNC: 92 MG/DL (ref 74–99)
MAGNESIUM SERPL-MCNC: 1.8 MG/DL (ref 1.6–2.6)
POTASSIUM SERPL-SCNC: 3.8 MMOL/L (ref 3.5–5)
SODIUM SERPL-SCNC: 143 MMOL/L (ref 132–146)

## 2024-09-12 PROCEDURE — 83880 ASSAY OF NATRIURETIC PEPTIDE: CPT

## 2024-09-12 PROCEDURE — 6370000000 HC RX 637 (ALT 250 FOR IP): Performed by: STUDENT IN AN ORGANIZED HEALTH CARE EDUCATION/TRAINING PROGRAM

## 2024-09-12 PROCEDURE — 94640 AIRWAY INHALATION TREATMENT: CPT

## 2024-09-12 PROCEDURE — 1200000000 HC SEMI PRIVATE

## 2024-09-12 PROCEDURE — 2580000003 HC RX 258: Performed by: NURSE PRACTITIONER

## 2024-09-12 PROCEDURE — 36415 COLL VENOUS BLD VENIPUNCTURE: CPT

## 2024-09-12 PROCEDURE — 6370000000 HC RX 637 (ALT 250 FOR IP): Performed by: NURSE PRACTITIONER

## 2024-09-12 PROCEDURE — 6360000002 HC RX W HCPCS: Performed by: NURSE PRACTITIONER

## 2024-09-12 PROCEDURE — 2580000003 HC RX 258: Performed by: INTERNAL MEDICINE

## 2024-09-12 PROCEDURE — 51798 US URINE CAPACITY MEASURE: CPT

## 2024-09-12 PROCEDURE — 80048 BASIC METABOLIC PNL TOTAL CA: CPT

## 2024-09-12 PROCEDURE — 99232 SBSQ HOSP IP/OBS MODERATE 35: CPT

## 2024-09-12 PROCEDURE — 83735 ASSAY OF MAGNESIUM: CPT

## 2024-09-12 PROCEDURE — 6370000000 HC RX 637 (ALT 250 FOR IP): Performed by: INTERNAL MEDICINE

## 2024-09-12 PROCEDURE — 76770 US EXAM ABDO BACK WALL COMP: CPT

## 2024-09-12 RX ORDER — POTASSIUM CHLORIDE 1500 MG/1
20 TABLET, EXTENDED RELEASE ORAL ONCE
Status: COMPLETED | OUTPATIENT
Start: 2024-09-12 | End: 2024-09-12

## 2024-09-12 RX ORDER — ISOSORBIDE MONONITRATE 30 MG/1
30 TABLET, EXTENDED RELEASE ORAL DAILY
Status: DISCONTINUED | OUTPATIENT
Start: 2024-09-12 | End: 2024-09-13 | Stop reason: HOSPADM

## 2024-09-12 RX ORDER — 0.9 % SODIUM CHLORIDE 0.9 %
1000 INTRAVENOUS SOLUTION INTRAVENOUS ONCE
Status: COMPLETED | OUTPATIENT
Start: 2024-09-12 | End: 2024-09-12

## 2024-09-12 RX ADMIN — IPRATROPIUM BROMIDE 0.5 MG: 0.5 SOLUTION RESPIRATORY (INHALATION) at 01:01

## 2024-09-12 RX ADMIN — GUAIFENESIN 400 MG: 400 TABLET ORAL at 21:08

## 2024-09-12 RX ADMIN — ARFORMOTEROL TARTRATE: 15 SOLUTION RESPIRATORY (INHALATION) at 07:39

## 2024-09-12 RX ADMIN — HYDRALAZINE HYDROCHLORIDE 75 MG: 50 TABLET ORAL at 14:00

## 2024-09-12 RX ADMIN — SODIUM CHLORIDE, PRESERVATIVE FREE 10 ML: 5 INJECTION INTRAVENOUS at 09:15

## 2024-09-12 RX ADMIN — METOPROLOL SUCCINATE 25 MG: 25 TABLET, EXTENDED RELEASE ORAL at 21:08

## 2024-09-12 RX ADMIN — PANTOPRAZOLE SODIUM 40 MG: 40 TABLET, DELAYED RELEASE ORAL at 06:39

## 2024-09-12 RX ADMIN — SODIUM CHLORIDE, PRESERVATIVE FREE 10 ML: 5 INJECTION INTRAVENOUS at 21:08

## 2024-09-12 RX ADMIN — Medication 5 MG: at 23:02

## 2024-09-12 RX ADMIN — METOPROLOL SUCCINATE 25 MG: 25 TABLET, EXTENDED RELEASE ORAL at 09:15

## 2024-09-12 RX ADMIN — SODIUM CHLORIDE 1000 ML: 9 INJECTION, SOLUTION INTRAVENOUS at 15:36

## 2024-09-12 RX ADMIN — PANTOPRAZOLE SODIUM 40 MG: 40 TABLET, DELAYED RELEASE ORAL at 15:36

## 2024-09-12 RX ADMIN — IPRATROPIUM BROMIDE 0.5 MG: 0.5 SOLUTION RESPIRATORY (INHALATION) at 18:43

## 2024-09-12 RX ADMIN — Medication 5 MG: at 01:28

## 2024-09-12 RX ADMIN — ISOSORBIDE MONONITRATE 30 MG: 30 TABLET, EXTENDED RELEASE ORAL at 09:14

## 2024-09-12 RX ADMIN — HYDRALAZINE HYDROCHLORIDE 75 MG: 50 TABLET ORAL at 21:08

## 2024-09-12 RX ADMIN — POTASSIUM CHLORIDE 20 MEQ: 1500 TABLET, EXTENDED RELEASE ORAL at 09:15

## 2024-09-12 RX ADMIN — IPRATROPIUM BROMIDE 0.5 MG: 0.5 SOLUTION RESPIRATORY (INHALATION) at 07:40

## 2024-09-12 RX ADMIN — HYDRALAZINE HYDROCHLORIDE 50 MG: 50 TABLET ORAL at 06:39

## 2024-09-12 RX ADMIN — APIXABAN 5 MG: 5 TABLET, FILM COATED ORAL at 21:08

## 2024-09-12 RX ADMIN — ARFORMOTEROL TARTRATE: 15 SOLUTION RESPIRATORY (INHALATION) at 18:43

## 2024-09-12 RX ADMIN — APIXABAN 5 MG: 5 TABLET, FILM COATED ORAL at 09:15

## 2024-09-12 RX ADMIN — GUAIFENESIN 400 MG: 400 TABLET ORAL at 09:15

## 2024-09-12 RX ADMIN — ASPIRIN 81 MG: 81 TABLET, COATED ORAL at 09:15

## 2024-09-12 RX ADMIN — ATORVASTATIN CALCIUM 40 MG: 40 TABLET, FILM COATED ORAL at 09:15

## 2024-09-12 RX ADMIN — GUAIFENESIN 400 MG: 400 TABLET ORAL at 14:00

## 2024-09-12 ASSESSMENT — PAIN SCALES - GENERAL: PAINLEVEL_OUTOF10: 0

## 2024-09-13 VITALS
HEIGHT: 68 IN | HEART RATE: 84 BPM | OXYGEN SATURATION: 100 % | WEIGHT: 129.4 LBS | BODY MASS INDEX: 19.61 KG/M2 | TEMPERATURE: 99 F | DIASTOLIC BLOOD PRESSURE: 89 MMHG | RESPIRATION RATE: 17 BRPM | SYSTOLIC BLOOD PRESSURE: 128 MMHG

## 2024-09-13 LAB
ANION GAP SERPL CALCULATED.3IONS-SCNC: 13 MMOL/L (ref 7–16)
BUN SERPL-MCNC: 20 MG/DL (ref 6–23)
CALCIUM SERPL-MCNC: 8.8 MG/DL (ref 8.6–10.2)
CHLORIDE SERPL-SCNC: 109 MMOL/L (ref 98–107)
CO2 SERPL-SCNC: 19 MMOL/L (ref 22–29)
CREAT SERPL-MCNC: 1.2 MG/DL (ref 0.7–1.2)
GFR, ESTIMATED: 62 ML/MIN/1.73M2
GLUCOSE SERPL-MCNC: 113 MG/DL (ref 74–99)
MAGNESIUM SERPL-MCNC: 1.7 MG/DL (ref 1.6–2.6)
POTASSIUM SERPL-SCNC: 3.6 MMOL/L (ref 3.5–5)
SODIUM SERPL-SCNC: 141 MMOL/L (ref 132–146)

## 2024-09-13 PROCEDURE — 94669 MECHANICAL CHEST WALL OSCILL: CPT

## 2024-09-13 PROCEDURE — 2580000003 HC RX 258: Performed by: NURSE PRACTITIONER

## 2024-09-13 PROCEDURE — 6370000000 HC RX 637 (ALT 250 FOR IP): Performed by: INTERNAL MEDICINE

## 2024-09-13 PROCEDURE — 80048 BASIC METABOLIC PNL TOTAL CA: CPT

## 2024-09-13 PROCEDURE — 99232 SBSQ HOSP IP/OBS MODERATE 35: CPT

## 2024-09-13 PROCEDURE — 6370000000 HC RX 637 (ALT 250 FOR IP): Performed by: NURSE PRACTITIONER

## 2024-09-13 PROCEDURE — 83735 ASSAY OF MAGNESIUM: CPT

## 2024-09-13 PROCEDURE — 6360000002 HC RX W HCPCS: Performed by: NURSE PRACTITIONER

## 2024-09-13 PROCEDURE — 36415 COLL VENOUS BLD VENIPUNCTURE: CPT

## 2024-09-13 PROCEDURE — 6370000000 HC RX 637 (ALT 250 FOR IP): Performed by: STUDENT IN AN ORGANIZED HEALTH CARE EDUCATION/TRAINING PROGRAM

## 2024-09-13 PROCEDURE — 94640 AIRWAY INHALATION TREATMENT: CPT

## 2024-09-13 RX ORDER — HYDRALAZINE HYDROCHLORIDE 25 MG/1
75 TABLET, FILM COATED ORAL 3 TIMES DAILY
Qty: 90 TABLET | Refills: 3 | Status: SHIPPED | OUTPATIENT
Start: 2024-09-13

## 2024-09-13 RX ORDER — ISOSORBIDE MONONITRATE 30 MG/1
30 TABLET, EXTENDED RELEASE ORAL DAILY
Qty: 30 TABLET | Refills: 3 | Status: SHIPPED | OUTPATIENT
Start: 2024-09-14

## 2024-09-13 RX ADMIN — IPRATROPIUM BROMIDE 0.5 MG: 0.5 SOLUTION RESPIRATORY (INHALATION) at 13:29

## 2024-09-13 RX ADMIN — METOPROLOL SUCCINATE 25 MG: 25 TABLET, EXTENDED RELEASE ORAL at 09:11

## 2024-09-13 RX ADMIN — GUAIFENESIN 400 MG: 400 TABLET ORAL at 15:24

## 2024-09-13 RX ADMIN — HYDRALAZINE HYDROCHLORIDE 75 MG: 50 TABLET ORAL at 06:05

## 2024-09-13 RX ADMIN — IPRATROPIUM BROMIDE 0.5 MG: 0.5 SOLUTION RESPIRATORY (INHALATION) at 09:54

## 2024-09-13 RX ADMIN — PANTOPRAZOLE SODIUM 40 MG: 40 TABLET, DELAYED RELEASE ORAL at 06:05

## 2024-09-13 RX ADMIN — GUAIFENESIN 400 MG: 400 TABLET ORAL at 09:12

## 2024-09-13 RX ADMIN — IPRATROPIUM BROMIDE 0.5 MG: 0.5 SOLUTION RESPIRATORY (INHALATION) at 01:09

## 2024-09-13 RX ADMIN — PANTOPRAZOLE SODIUM 40 MG: 40 TABLET, DELAYED RELEASE ORAL at 15:47

## 2024-09-13 RX ADMIN — ATORVASTATIN CALCIUM 40 MG: 40 TABLET, FILM COATED ORAL at 09:11

## 2024-09-13 RX ADMIN — SODIUM CHLORIDE, PRESERVATIVE FREE 10 ML: 5 INJECTION INTRAVENOUS at 09:12

## 2024-09-13 RX ADMIN — ISOSORBIDE MONONITRATE 30 MG: 30 TABLET, EXTENDED RELEASE ORAL at 09:11

## 2024-09-13 RX ADMIN — APIXABAN 5 MG: 5 TABLET, FILM COATED ORAL at 09:12

## 2024-09-13 RX ADMIN — HYDRALAZINE HYDROCHLORIDE 75 MG: 50 TABLET ORAL at 15:24

## 2024-09-13 RX ADMIN — ARFORMOTEROL TARTRATE: 15 SOLUTION RESPIRATORY (INHALATION) at 09:54

## 2024-09-13 RX ADMIN — ASPIRIN 81 MG: 81 TABLET, COATED ORAL at 09:11

## 2024-09-17 ENCOUNTER — TELEPHONE (OUTPATIENT)
Dept: OTHER | Age: 74
End: 2024-09-17

## 2024-09-17 DIAGNOSIS — Z59.82 LACK OF ACCESS TO TRANSPORTATION: ICD-10-CM

## 2024-09-17 DIAGNOSIS — Z13.9 ENCOUNTER FOR SCREENING INVOLVING SOCIAL DETERMINANTS OF HEALTH (SDOH): Primary | ICD-10-CM

## 2024-09-17 SDOH — ECONOMIC STABILITY - TRANSPORTATION SECURITY: TRANSPORTATION INSECURITY: Z59.82

## 2024-09-30 ENCOUNTER — HOSPITAL ENCOUNTER (OUTPATIENT)
Dept: OTHER | Age: 74
Setting detail: THERAPIES SERIES
Discharge: HOME | End: 2024-09-30

## 2024-09-30 NOTE — PROGRESS NOTES
Patient was a no show for today's visit. I attempted to call to reschedule, but the phone number listed was disconnected.

## 2024-10-04 ENCOUNTER — HOSPITAL ENCOUNTER (INPATIENT)
Age: 74
LOS: 12 days | Discharge: HOME | DRG: 133 | End: 2024-10-16
Attending: EMERGENCY MEDICINE | Admitting: INTERNAL MEDICINE
Payer: COMMERCIAL

## 2024-10-04 ENCOUNTER — APPOINTMENT (OUTPATIENT)
Dept: GENERAL RADIOLOGY | Age: 74
DRG: 133 | End: 2024-10-04
Payer: COMMERCIAL

## 2024-10-04 DIAGNOSIS — J96.01 ACUTE RESPIRATORY FAILURE WITH HYPOXIA: Primary | ICD-10-CM

## 2024-10-04 PROBLEM — N18.2 STAGE 2 CHRONIC KIDNEY DISEASE: Status: ACTIVE | Noted: 2024-10-04

## 2024-10-04 LAB
AADO2: 254.4 MMHG
ALBUMIN SERPL-MCNC: 3.3 G/DL (ref 3.5–5.2)
ALP SERPL-CCNC: 85 U/L (ref 40–129)
ALT SERPL-CCNC: 10 U/L (ref 0–40)
ANION GAP SERPL CALCULATED.3IONS-SCNC: 16 MMOL/L (ref 7–16)
AST SERPL-CCNC: 23 U/L (ref 0–39)
B PARAP IS1001 DNA NPH QL NAA+NON-PROBE: NOT DETECTED
B PERT DNA SPEC QL NAA+PROBE: NOT DETECTED
B.E.: -8.5 MMOL/L (ref -3–3)
BASOPHILS # BLD: 0.05 K/UL (ref 0–0.2)
BASOPHILS NFR BLD: 1 % (ref 0–2)
BILIRUB SERPL-MCNC: 0.6 MG/DL (ref 0–1.2)
BNP SERPL-MCNC: 7672 PG/ML (ref 0–450)
BUN SERPL-MCNC: 18 MG/DL (ref 6–23)
C PNEUM DNA NPH QL NAA+NON-PROBE: NOT DETECTED
CALCIUM SERPL-MCNC: 8.9 MG/DL (ref 8.6–10.2)
CHLORIDE SERPL-SCNC: 105 MMOL/L (ref 98–107)
CO2 SERPL-SCNC: 21 MMOL/L (ref 22–29)
COHB: 0.3 % (ref 0–1.5)
CREAT SERPL-MCNC: 1.3 MG/DL (ref 0.7–1.2)
CRITICAL: ABNORMAL
DATE ANALYZED: ABNORMAL
DATE OF COLLECTION: ABNORMAL
EKG ATRIAL RATE: 93 BPM
EKG P AXIS: 64 DEGREES
EKG P-R INTERVAL: 150 MS
EKG Q-T INTERVAL: 424 MS
EKG QRS DURATION: 144 MS
EKG QTC CALCULATION (BAZETT): 527 MS
EKG R AXIS: -96 DEGREES
EKG T AXIS: 73 DEGREES
EKG VENTRICULAR RATE: 93 BPM
EOSINOPHIL # BLD: 0.2 K/UL (ref 0.05–0.5)
EOSINOPHILS RELATIVE PERCENT: 4 % (ref 0–6)
ERYTHROCYTE [DISTWIDTH] IN BLOOD BY AUTOMATED COUNT: 15 % (ref 11.5–15)
FIO2: 60 %
FLUAV RNA NPH QL NAA+NON-PROBE: NOT DETECTED
FLUBV RNA NPH QL NAA+NON-PROBE: NOT DETECTED
GFR, ESTIMATED: 60 ML/MIN/1.73M2
GLUCOSE SERPL-MCNC: 107 MG/DL (ref 74–99)
HADV DNA NPH QL NAA+NON-PROBE: NOT DETECTED
HCO3: 16.9 MMOL/L (ref 22–26)
HCOV 229E RNA NPH QL NAA+NON-PROBE: NOT DETECTED
HCOV HKU1 RNA NPH QL NAA+NON-PROBE: NOT DETECTED
HCOV NL63 RNA NPH QL NAA+NON-PROBE: NOT DETECTED
HCOV OC43 RNA NPH QL NAA+NON-PROBE: NOT DETECTED
HCT VFR BLD AUTO: 32.9 % (ref 37–54)
HGB BLD-MCNC: 10.5 G/DL (ref 12.5–16.5)
HHB: 2.3 % (ref 0–5)
HMPV RNA NPH QL NAA+NON-PROBE: NOT DETECTED
HPIV1 RNA NPH QL NAA+NON-PROBE: NOT DETECTED
HPIV2 RNA NPH QL NAA+NON-PROBE: NOT DETECTED
HPIV3 RNA NPH QL NAA+NON-PROBE: NOT DETECTED
HPIV4 RNA NPH QL NAA+NON-PROBE: NOT DETECTED
IMM GRANULOCYTES # BLD AUTO: <0.03 K/UL (ref 0–0.58)
IMM GRANULOCYTES NFR BLD: 0 % (ref 0–5)
LAB: ABNORMAL
LYMPHOCYTES NFR BLD: 0.55 K/UL (ref 1.5–4)
LYMPHOCYTES RELATIVE PERCENT: 11 % (ref 20–42)
Lab: 1125
M PNEUMO DNA NPH QL NAA+NON-PROBE: NOT DETECTED
MCH RBC QN AUTO: 28.6 PG (ref 26–35)
MCHC RBC AUTO-ENTMCNC: 31.9 G/DL (ref 32–34.5)
MCV RBC AUTO: 89.6 FL (ref 80–99.9)
METHB: 0.4 % (ref 0–1.5)
MODE: ABNORMAL
MONOCYTES NFR BLD: 0.63 K/UL (ref 0.1–0.95)
MONOCYTES NFR BLD: 13 % (ref 2–12)
NEUTROPHILS NFR BLD: 70 % (ref 43–80)
NEUTS SEG NFR BLD: 3.36 K/UL (ref 1.8–7.3)
O2 SATURATION: 97.7 % (ref 92–98.5)
O2HB: 97 % (ref 94–97)
OPERATOR ID: 2067
PATIENT TEMP: 37 C
PCO2: 34.7 MMHG (ref 35–45)
PEEP/CPAP: 8 CMH2O
PFO2: 2 MMHG/%
PH BLOOD GAS: 7.31 (ref 7.35–7.45)
PLATELET # BLD AUTO: 195 K/UL (ref 130–450)
PMV BLD AUTO: 9.5 FL (ref 7–12)
PO2: 120.2 MMHG (ref 75–100)
POTASSIUM SERPL-SCNC: 3.9 MMOL/L (ref 3.5–5)
PROT SERPL-MCNC: 7.6 G/DL (ref 6.4–8.3)
PS: 12 CMH20
RBC # BLD AUTO: 3.67 M/UL (ref 3.8–5.8)
RBC # BLD: NORMAL 10*6/UL
RI(T): 2.12
RSV RNA NPH QL NAA+NON-PROBE: NOT DETECTED
RV+EV RNA NPH QL NAA+NON-PROBE: DETECTED
SARS-COV-2 RNA NPH QL NAA+NON-PROBE: NOT DETECTED
SODIUM SERPL-SCNC: 142 MMOL/L (ref 132–146)
SOURCE, BLOOD GAS: ABNORMAL
SPECIMEN DESCRIPTION: ABNORMAL
THB: 11.6 G/DL (ref 11.5–16.5)
TIME ANALYZED: 1130
TROPONIN I SERPL HS-MCNC: 41 NG/L (ref 0–11)
WBC OTHER # BLD: 4.8 K/UL (ref 4.5–11.5)

## 2024-10-04 PROCEDURE — 6360000002 HC RX W HCPCS: Performed by: NURSE PRACTITIONER

## 2024-10-04 PROCEDURE — 71045 X-RAY EXAM CHEST 1 VIEW: CPT

## 2024-10-04 PROCEDURE — 2580000003 HC RX 258: Performed by: EMERGENCY MEDICINE

## 2024-10-04 PROCEDURE — 0202U NFCT DS 22 TRGT SARS-COV-2: CPT

## 2024-10-04 PROCEDURE — 99285 EMERGENCY DEPT VISIT HI MDM: CPT

## 2024-10-04 PROCEDURE — 80053 COMPREHEN METABOLIC PANEL: CPT

## 2024-10-04 PROCEDURE — 2580000003 HC RX 258: Performed by: NURSE PRACTITIONER

## 2024-10-04 PROCEDURE — 6370000000 HC RX 637 (ALT 250 FOR IP): Performed by: NURSE PRACTITIONER

## 2024-10-04 PROCEDURE — 93010 ELECTROCARDIOGRAM REPORT: CPT | Performed by: INTERNAL MEDICINE

## 2024-10-04 PROCEDURE — 82805 BLOOD GASES W/O2 SATURATION: CPT

## 2024-10-04 PROCEDURE — 2060000000 HC ICU INTERMEDIATE R&B

## 2024-10-04 PROCEDURE — 94640 AIRWAY INHALATION TREATMENT: CPT

## 2024-10-04 PROCEDURE — 85025 COMPLETE CBC W/AUTO DIFF WBC: CPT

## 2024-10-04 PROCEDURE — 6360000002 HC RX W HCPCS: Performed by: EMERGENCY MEDICINE

## 2024-10-04 PROCEDURE — 83880 ASSAY OF NATRIURETIC PEPTIDE: CPT

## 2024-10-04 PROCEDURE — 6370000000 HC RX 637 (ALT 250 FOR IP): Performed by: EMERGENCY MEDICINE

## 2024-10-04 PROCEDURE — 96374 THER/PROPH/DIAG INJ IV PUSH: CPT

## 2024-10-04 PROCEDURE — 5A09457 ASSISTANCE WITH RESPIRATORY VENTILATION, 24-96 CONSECUTIVE HOURS, CONTINUOUS POSITIVE AIRWAY PRESSURE: ICD-10-PCS | Performed by: EMERGENCY MEDICINE

## 2024-10-04 PROCEDURE — 84484 ASSAY OF TROPONIN QUANT: CPT

## 2024-10-04 PROCEDURE — 96375 TX/PRO/DX INJ NEW DRUG ADDON: CPT

## 2024-10-04 PROCEDURE — 99222 1ST HOSP IP/OBS MODERATE 55: CPT | Performed by: NURSE PRACTITIONER

## 2024-10-04 PROCEDURE — 94664 DEMO&/EVAL PT USE INHALER: CPT

## 2024-10-04 PROCEDURE — 93005 ELECTROCARDIOGRAM TRACING: CPT | Performed by: EMERGENCY MEDICINE

## 2024-10-04 PROCEDURE — 94660 CPAP INITIATION&MGMT: CPT

## 2024-10-04 RX ORDER — FUROSEMIDE 10 MG/ML
20 INJECTION INTRAMUSCULAR; INTRAVENOUS ONCE
Status: COMPLETED | OUTPATIENT
Start: 2024-10-04 | End: 2024-10-04

## 2024-10-04 RX ORDER — ASPIRIN 81 MG/1
81 TABLET ORAL DAILY
Status: DISCONTINUED | OUTPATIENT
Start: 2024-10-04 | End: 2024-10-16 | Stop reason: HOSPADM

## 2024-10-04 RX ORDER — ONDANSETRON 2 MG/ML
4 INJECTION INTRAMUSCULAR; INTRAVENOUS EVERY 6 HOURS PRN
Status: DISCONTINUED | OUTPATIENT
Start: 2024-10-04 | End: 2024-10-04

## 2024-10-04 RX ORDER — GUAIFENESIN/DEXTROMETHORPHAN 100-10MG/5
5 SYRUP ORAL EVERY 4 HOURS PRN
Status: DISCONTINUED | OUTPATIENT
Start: 2024-10-04 | End: 2024-10-16 | Stop reason: HOSPADM

## 2024-10-04 RX ORDER — SODIUM CHLORIDE 9 MG/ML
INJECTION, SOLUTION INTRAVENOUS PRN
Status: DISCONTINUED | OUTPATIENT
Start: 2024-10-04 | End: 2024-10-16 | Stop reason: HOSPADM

## 2024-10-04 RX ORDER — ATORVASTATIN CALCIUM 40 MG/1
40 TABLET, FILM COATED ORAL NIGHTLY
Status: DISCONTINUED | OUTPATIENT
Start: 2024-10-04 | End: 2024-10-16 | Stop reason: HOSPADM

## 2024-10-04 RX ORDER — PROCHLORPERAZINE MALEATE 10 MG
10 TABLET ORAL EVERY 6 HOURS PRN
Status: DISCONTINUED | OUTPATIENT
Start: 2024-10-04 | End: 2024-10-16 | Stop reason: HOSPADM

## 2024-10-04 RX ORDER — POTASSIUM CHLORIDE 7.45 MG/ML
10 INJECTION INTRAVENOUS PRN
Status: DISCONTINUED | OUTPATIENT
Start: 2024-10-04 | End: 2024-10-16 | Stop reason: HOSPADM

## 2024-10-04 RX ORDER — PANTOPRAZOLE SODIUM 40 MG/1
40 TABLET, DELAYED RELEASE ORAL
Status: DISCONTINUED | OUTPATIENT
Start: 2024-10-05 | End: 2024-10-10

## 2024-10-04 RX ORDER — PROCHLORPERAZINE EDISYLATE 5 MG/ML
10 INJECTION INTRAMUSCULAR; INTRAVENOUS EVERY 6 HOURS PRN
Status: DISCONTINUED | OUTPATIENT
Start: 2024-10-04 | End: 2024-10-16 | Stop reason: HOSPADM

## 2024-10-04 RX ORDER — IPRATROPIUM BROMIDE AND ALBUTEROL SULFATE 2.5; .5 MG/3ML; MG/3ML
1 SOLUTION RESPIRATORY (INHALATION)
Status: DISCONTINUED | OUTPATIENT
Start: 2024-10-04 | End: 2024-10-16 | Stop reason: HOSPADM

## 2024-10-04 RX ORDER — SODIUM CHLORIDE 0.9 % (FLUSH) 0.9 %
5-40 SYRINGE (ML) INJECTION EVERY 12 HOURS SCHEDULED
Status: DISCONTINUED | OUTPATIENT
Start: 2024-10-04 | End: 2024-10-16 | Stop reason: HOSPADM

## 2024-10-04 RX ORDER — NICOTINE 21 MG/24HR
1 PATCH, TRANSDERMAL 24 HOURS TRANSDERMAL DAILY
Status: DISCONTINUED | OUTPATIENT
Start: 2024-10-04 | End: 2024-10-16 | Stop reason: HOSPADM

## 2024-10-04 RX ORDER — POLYETHYLENE GLYCOL 3350 17 G/17G
17 POWDER, FOR SOLUTION ORAL DAILY PRN
Status: DISCONTINUED | OUTPATIENT
Start: 2024-10-04 | End: 2024-10-16 | Stop reason: HOSPADM

## 2024-10-04 RX ORDER — ONDANSETRON 4 MG/1
4 TABLET, ORALLY DISINTEGRATING ORAL EVERY 8 HOURS PRN
Status: DISCONTINUED | OUTPATIENT
Start: 2024-10-04 | End: 2024-10-04

## 2024-10-04 RX ORDER — SODIUM CHLORIDE 0.9 % (FLUSH) 0.9 %
5-40 SYRINGE (ML) INJECTION PRN
Status: DISCONTINUED | OUTPATIENT
Start: 2024-10-04 | End: 2024-10-16 | Stop reason: HOSPADM

## 2024-10-04 RX ORDER — LANOLIN ALCOHOL/MO/W.PET/CERES
3 CREAM (GRAM) TOPICAL NIGHTLY PRN
Status: DISCONTINUED | OUTPATIENT
Start: 2024-10-04 | End: 2024-10-16 | Stop reason: HOSPADM

## 2024-10-04 RX ORDER — HYDRALAZINE HYDROCHLORIDE 20 MG/ML
10 INJECTION INTRAMUSCULAR; INTRAVENOUS ONCE
Status: DISCONTINUED | OUTPATIENT
Start: 2024-10-04 | End: 2024-10-04

## 2024-10-04 RX ORDER — IPRATROPIUM BROMIDE AND ALBUTEROL SULFATE 2.5; .5 MG/3ML; MG/3ML
1 SOLUTION RESPIRATORY (INHALATION)
Status: COMPLETED | OUTPATIENT
Start: 2024-10-04 | End: 2024-10-04

## 2024-10-04 RX ORDER — ACETAMINOPHEN 325 MG/1
650 TABLET ORAL EVERY 6 HOURS PRN
Status: DISCONTINUED | OUTPATIENT
Start: 2024-10-04 | End: 2024-10-16 | Stop reason: HOSPADM

## 2024-10-04 RX ORDER — METOPROLOL SUCCINATE 25 MG/1
25 TABLET, EXTENDED RELEASE ORAL 2 TIMES DAILY
Status: DISCONTINUED | OUTPATIENT
Start: 2024-10-04 | End: 2024-10-09

## 2024-10-04 RX ORDER — ACETAMINOPHEN 650 MG/1
650 SUPPOSITORY RECTAL EVERY 6 HOURS PRN
Status: DISCONTINUED | OUTPATIENT
Start: 2024-10-04 | End: 2024-10-16 | Stop reason: HOSPADM

## 2024-10-04 RX ORDER — POTASSIUM CHLORIDE 1500 MG/1
40 TABLET, EXTENDED RELEASE ORAL PRN
Status: DISCONTINUED | OUTPATIENT
Start: 2024-10-04 | End: 2024-10-16 | Stop reason: HOSPADM

## 2024-10-04 RX ORDER — ISOSORBIDE MONONITRATE 30 MG/1
30 TABLET, EXTENDED RELEASE ORAL DAILY
Status: DISCONTINUED | OUTPATIENT
Start: 2024-10-04 | End: 2024-10-16 | Stop reason: HOSPADM

## 2024-10-04 RX ORDER — VITAMIN B COMPLEX
1000 TABLET ORAL DAILY
Status: DISCONTINUED | OUTPATIENT
Start: 2024-10-04 | End: 2024-10-16 | Stop reason: HOSPADM

## 2024-10-04 RX ORDER — LABETALOL HYDROCHLORIDE 5 MG/ML
10 INJECTION, SOLUTION INTRAVENOUS ONCE
Status: COMPLETED | OUTPATIENT
Start: 2024-10-04 | End: 2024-10-04

## 2024-10-04 RX ORDER — MAGNESIUM SULFATE IN WATER 40 MG/ML
2000 INJECTION, SOLUTION INTRAVENOUS PRN
Status: DISCONTINUED | OUTPATIENT
Start: 2024-10-04 | End: 2024-10-16 | Stop reason: HOSPADM

## 2024-10-04 RX ADMIN — IPRATROPIUM BROMIDE AND ALBUTEROL SULFATE 1 DOSE: 2.5; .5 SOLUTION RESPIRATORY (INHALATION) at 20:15

## 2024-10-04 RX ADMIN — FUROSEMIDE 20 MG: 10 INJECTION, SOLUTION INTRAMUSCULAR; INTRAVENOUS at 13:50

## 2024-10-04 RX ADMIN — IPRATROPIUM BROMIDE AND ALBUTEROL SULFATE 1 DOSE: .5; 3 SOLUTION RESPIRATORY (INHALATION) at 10:30

## 2024-10-04 RX ADMIN — ATORVASTATIN CALCIUM 40 MG: 40 TABLET, FILM COATED ORAL at 21:37

## 2024-10-04 RX ADMIN — ARFORMOTEROL TARTRATE: 15 SOLUTION RESPIRATORY (INHALATION) at 20:15

## 2024-10-04 RX ADMIN — IPRATROPIUM BROMIDE AND ALBUTEROL SULFATE 1 DOSE: .5; 3 SOLUTION RESPIRATORY (INHALATION) at 10:36

## 2024-10-04 RX ADMIN — HYDRALAZINE HYDROCHLORIDE 75 MG: 50 TABLET ORAL at 21:37

## 2024-10-04 RX ADMIN — METHYLPREDNISOLONE SODIUM SUCCINATE 125 MG: 125 INJECTION INTRAMUSCULAR; INTRAVENOUS at 09:43

## 2024-10-04 RX ADMIN — LABETALOL HYDROCHLORIDE 10 MG: 5 INJECTION INTRAVENOUS at 11:15

## 2024-10-04 RX ADMIN — IPRATROPIUM BROMIDE AND ALBUTEROL SULFATE 1 DOSE: 2.5; .5 SOLUTION RESPIRATORY (INHALATION) at 17:20

## 2024-10-04 RX ADMIN — SODIUM CHLORIDE, PRESERVATIVE FREE 10 ML: 5 INJECTION INTRAVENOUS at 21:37

## 2024-10-04 RX ADMIN — IPRATROPIUM BROMIDE AND ALBUTEROL SULFATE 1 DOSE: .5; 3 SOLUTION RESPIRATORY (INHALATION) at 10:42

## 2024-10-04 RX ADMIN — APIXABAN 5 MG: 5 TABLET, FILM COATED ORAL at 21:37

## 2024-10-04 RX ADMIN — METOPROLOL SUCCINATE 25 MG: 25 TABLET, EXTENDED RELEASE ORAL at 21:37

## 2024-10-04 NOTE — H&P
[facility] Hospitalist Group History and Physical      CHIEF COMPLAINT: Shortness of breath    History of Present Illness: This is a 74-year-old male with past medical history of asthma, COPD, CVA, HLD HLD and HTN who presents to the ED with complaints of shortness of breath.  Patient was recently hospitalized 9/9/2024 - 9/13/2024 for the same complaints.  He was followed by both nephrology and cardiology who optimized his medications.  On the day of his discharge patient passed a 6-minute walk test without any oxygen requirements.  He was discharged home with a follow-up with his PCP in 1 week nephrology in 3 to 4 weeks.  Patient was recently scheduled to follow with CHF clinic and per EMR patient never showed for his appointment when the clinic attempted to reach out to him his numbers disconnected.  Patient presents today with increasing shortness of breath.  He states his shortness of breath started about a day ago.  He reports it is intermittent and both with rest and exertion.  Patient has a productive cough for clear green mucus.  Patient denies fever chills nausea or vomiting.  Per ED physician while in ED patient was noted to be bradycardic in a tripod position so he was placed on BiPAP.    ED course is as follows: Vital signs-T98.4, HR 80, /97, SpO2 100% on BiPAP.  ABG pH 7.30, pCO2 34.7, pO2 120, HCO3 16.9.  CO2 21, creatinine 1.3, glucose 107, BNP 7672, troponin 41, Hgb 10.5.  RVP positive for rhinovirus.  CXR shows pulmonary artery congestion.    Patient will be admitted for further management.    Informant(s) for H&P: Patient    REVIEW OF SYSTEMS:  A comprehensive review of systems was negative except for: what is in the HPI      PMH:  Past Medical History:   Diagnosis Date    Arthritis     Asthma     Bilateral carotid artery stenosis 01/17/2024    Approximately 50% stenosis on the right side and 30 to 40% stenosis on the left side, CT of the carotids, 2024    Cataract, left eye      CHEST PORTABLE   Final Result   Pulmonary venous congestion with bilateral interstitial changes suspicious   for pulmonary edema.                 ASSESSMENT:      Principal Problem:    SOB (shortness of breath)  Active Problems:    HTN (hypertension)    Cocaine abuse (HCC)    HLD (hyperlipidemia)    HFrEF (heart failure with reduced ejection fraction) ([facility])    A-fib (HCC)    Stage 2 chronic kidney disease  Resolved Problems:    * No resolved hospital problems. *      PLAN:    Acute on chronic hypoxic and hypoxemic respiratory failure: Likely 2/2 HFrEF exacerbation and rhinovirus.  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  Follows with Dr. Smith.  CXR shows pulmonary venous congestion bilaterally.  Follows with [facility]ology-seen by Dr. Carrasco last admission. BNP on discharge was 5009 now 7672-patient was a no-show to CHF clinic 4 days ago per EMR. will give Lasix 20 mg x 1.  Monitor BMP.  Treat viral infection symptomatically-Robitussin and DuoNeb treatments as needed.  Sputum culture. wean oxygen to maintain SpO2 greater than 92%.  Proximal atrial fibrillation: Resume outpatient dose of Eliquis and metoprolol  CKD stage II: Creatinine 1.3.  Baseline creatinine 1.1.  Continue to monitor creatinine with diuresis.  Avoid nephrotoxic meds.  HTN: Resume outpatient dose of hydralazine 25 mg 3 times daily and metoprolol 25 mg daily.  HLD: Resume outpatient dose of Lipitor 40 mg daily and ASA 81 mg daily  Cocaine abuse: UDS pending    Code Status: Full code  DVT prophylaxis: Eliquis    In review of EMR, evaluation, management, and diagnosis. Care plan has been discussed with attending. Time spent 55 minutes.     NOTE: This report was transcribed using voice recognition software. Every effort was made to ensure accuracy; however, inadvertent computerized transcription errors may be present.  Electronically signed by AJSSI Delcid CNP on 10/4/2024 at 12:36 PM

## 2024-10-04 NOTE — ED NOTES
Pt reported SOB and SP02 was 85%. Pt placed on 2L NC and respiratory called for breathing treatment.

## 2024-10-04 NOTE — ED PROVIDER NOTES
Department of Emergency Medicine   ED  Provider Note  Admit Date/RoomTime: 10/4/2024  8:55 AM  ED Room: 20/20          History of Present Illness:  10/4/24, Time: 11:03 AM EDT  Chief Complaint   Patient presents with    Asthma     Asthma flare up per pt- given 1 Duoneb and 125 solumedrol by EMS                Chuck Chavarria Jr. is a 74 y.o. male presenting to the ED for shortness of breath.  Patient was short of breath since last night.  Came on gradually, nothing makes it better or worse, no associated pain.  Does have a history of COPD.  Received a Solu-Medrol in the field.  Also received 1 DuoNeb.  No pain.  No paresthesias.  No cough or sputum.  No other symptoms or complaints.    Review of Systems:   Pertinent positives and negatives are stated within HPI, all other systems reviewed and are negative.        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    Past Surgical History:  has a past surgical history that includes Dental surgery; Cardiac catheterization (08/25/2023); Tonsillectomy; Colonoscopy; and Upper gastrointestinal endoscopy (N/A, 7/16/2024).    Social History:  reports that he has been smoking cigarettes. He started smoking about 64 years ago. He has a 16.2 pack-year smoking history. He has never used smokeless tobacco. He reports that he does not currently use alcohol. He reports current drug use. Drug: Cocaine.    Family History: family history includes Brain Cancer in his mother; Heart Attack in his father.. Unless otherwise noted, family history is non contributory    The patient’s home medications

## 2024-10-05 LAB
ANION GAP SERPL CALCULATED.3IONS-SCNC: 16 MMOL/L (ref 7–16)
BASOPHILS # BLD: 0 K/UL (ref 0–0.2)
BASOPHILS NFR BLD: 0 % (ref 0–2)
BUN SERPL-MCNC: 29 MG/DL (ref 6–23)
CALCIUM SERPL-MCNC: 9.3 MG/DL (ref 8.6–10.2)
CHLORIDE SERPL-SCNC: 105 MMOL/L (ref 98–107)
CO2 SERPL-SCNC: 21 MMOL/L (ref 22–29)
CREAT SERPL-MCNC: 1.5 MG/DL (ref 0.7–1.2)
EOSINOPHIL # BLD: 0 K/UL (ref 0.05–0.5)
EOSINOPHILS RELATIVE PERCENT: 0 % (ref 0–6)
ERYTHROCYTE [DISTWIDTH] IN BLOOD BY AUTOMATED COUNT: 15.2 % (ref 11.5–15)
GFR, ESTIMATED: 50 ML/MIN/1.73M2
GLUCOSE SERPL-MCNC: 136 MG/DL (ref 74–99)
HCT VFR BLD AUTO: 31.9 % (ref 37–54)
HGB BLD-MCNC: 10.4 G/DL (ref 12.5–16.5)
LYMPHOCYTES NFR BLD: 0.49 K/UL (ref 1.5–4)
LYMPHOCYTES RELATIVE PERCENT: 10 % (ref 20–42)
MCH RBC QN AUTO: 28.2 PG (ref 26–35)
MCHC RBC AUTO-ENTMCNC: 32.6 G/DL (ref 32–34.5)
MCV RBC AUTO: 86.4 FL (ref 80–99.9)
MONOCYTES NFR BLD: 0.41 K/UL (ref 0.1–0.95)
MONOCYTES NFR BLD: 9 % (ref 2–12)
NEUTROPHILS NFR BLD: 81 % (ref 43–80)
NEUTS SEG NFR BLD: 3.8 K/UL (ref 1.8–7.3)
PLATELET # BLD AUTO: 213 K/UL (ref 130–450)
PMV BLD AUTO: 10.1 FL (ref 7–12)
POTASSIUM SERPL-SCNC: 4 MMOL/L (ref 3.5–5)
RBC # BLD AUTO: 3.69 M/UL (ref 3.8–5.8)
RBC # BLD: ABNORMAL 10*6/UL
SODIUM SERPL-SCNC: 142 MMOL/L (ref 132–146)
TROPONIN I SERPL HS-MCNC: 41 NG/L (ref 0–11)
WBC OTHER # BLD: 4.7 K/UL (ref 4.5–11.5)

## 2024-10-05 PROCEDURE — 94660 CPAP INITIATION&MGMT: CPT

## 2024-10-05 PROCEDURE — 85025 COMPLETE CBC W/AUTO DIFF WBC: CPT

## 2024-10-05 PROCEDURE — 80048 BASIC METABOLIC PNL TOTAL CA: CPT

## 2024-10-05 PROCEDURE — 2060000000 HC ICU INTERMEDIATE R&B

## 2024-10-05 PROCEDURE — 6370000000 HC RX 637 (ALT 250 FOR IP): Performed by: HOSPITALIST

## 2024-10-05 PROCEDURE — 2580000003 HC RX 258: Performed by: NURSE PRACTITIONER

## 2024-10-05 PROCEDURE — 6370000000 HC RX 637 (ALT 250 FOR IP): Performed by: NURSE PRACTITIONER

## 2024-10-05 PROCEDURE — 36415 COLL VENOUS BLD VENIPUNCTURE: CPT

## 2024-10-05 PROCEDURE — 84484 ASSAY OF TROPONIN QUANT: CPT

## 2024-10-05 PROCEDURE — 93005 ELECTROCARDIOGRAM TRACING: CPT | Performed by: INTERNAL MEDICINE

## 2024-10-05 PROCEDURE — 94640 AIRWAY INHALATION TREATMENT: CPT

## 2024-10-05 PROCEDURE — 99232 SBSQ HOSP IP/OBS MODERATE 35: CPT | Performed by: INTERNAL MEDICINE

## 2024-10-05 PROCEDURE — 6360000002 HC RX W HCPCS: Performed by: NURSE PRACTITIONER

## 2024-10-05 RX ADMIN — HYDRALAZINE HYDROCHLORIDE 75 MG: 50 TABLET ORAL at 20:54

## 2024-10-05 RX ADMIN — Medication 3 MG: at 22:44

## 2024-10-05 RX ADMIN — ATORVASTATIN CALCIUM 40 MG: 40 TABLET, FILM COATED ORAL at 20:54

## 2024-10-05 RX ADMIN — METOPROLOL SUCCINATE 25 MG: 25 TABLET, EXTENDED RELEASE ORAL at 08:30

## 2024-10-05 RX ADMIN — PANTOPRAZOLE SODIUM 40 MG: 40 TABLET, DELAYED RELEASE ORAL at 06:14

## 2024-10-05 RX ADMIN — ASPIRIN 81 MG: 81 TABLET, COATED ORAL at 08:30

## 2024-10-05 RX ADMIN — IPRATROPIUM BROMIDE AND ALBUTEROL SULFATE 1 DOSE: 2.5; .5 SOLUTION RESPIRATORY (INHALATION) at 08:58

## 2024-10-05 RX ADMIN — APIXABAN 5 MG: 5 TABLET, FILM COATED ORAL at 20:54

## 2024-10-05 RX ADMIN — HYDRALAZINE HYDROCHLORIDE 75 MG: 50 TABLET ORAL at 13:23

## 2024-10-05 RX ADMIN — SODIUM CHLORIDE, PRESERVATIVE FREE 10 ML: 5 INJECTION INTRAVENOUS at 08:31

## 2024-10-05 RX ADMIN — SODIUM CHLORIDE, PRESERVATIVE FREE 10 ML: 5 INJECTION INTRAVENOUS at 20:55

## 2024-10-05 RX ADMIN — PANTOPRAZOLE SODIUM 40 MG: 40 TABLET, DELAYED RELEASE ORAL at 15:41

## 2024-10-05 RX ADMIN — ISOSORBIDE MONONITRATE 30 MG: 30 TABLET, EXTENDED RELEASE ORAL at 08:30

## 2024-10-05 RX ADMIN — LIDOCAINE HYDROCHLORIDE: 20 SOLUTION ORAL at 21:02

## 2024-10-05 RX ADMIN — ARFORMOTEROL TARTRATE: 15 SOLUTION RESPIRATORY (INHALATION) at 08:58

## 2024-10-05 RX ADMIN — IPRATROPIUM BROMIDE AND ALBUTEROL SULFATE 1 DOSE: 2.5; .5 SOLUTION RESPIRATORY (INHALATION) at 12:38

## 2024-10-05 RX ADMIN — METOPROLOL SUCCINATE 25 MG: 25 TABLET, EXTENDED RELEASE ORAL at 20:54

## 2024-10-05 RX ADMIN — IPRATROPIUM BROMIDE AND ALBUTEROL SULFATE 1 DOSE: 2.5; .5 SOLUTION RESPIRATORY (INHALATION) at 19:38

## 2024-10-05 RX ADMIN — Medication 1000 UNITS: at 08:30

## 2024-10-05 RX ADMIN — APIXABAN 5 MG: 5 TABLET, FILM COATED ORAL at 08:30

## 2024-10-05 RX ADMIN — IPRATROPIUM BROMIDE AND ALBUTEROL SULFATE 1 DOSE: 2.5; .5 SOLUTION RESPIRATORY (INHALATION) at 16:20

## 2024-10-05 RX ADMIN — HYDRALAZINE HYDROCHLORIDE 75 MG: 50 TABLET ORAL at 08:30

## 2024-10-05 RX ADMIN — ARFORMOTEROL TARTRATE: 15 SOLUTION RESPIRATORY (INHALATION) at 19:38

## 2024-10-05 RX ADMIN — ACETAMINOPHEN 650 MG: 325 TABLET ORAL at 15:41

## 2024-10-05 RX ADMIN — GUAIFENESIN SYRUP AND DEXTROMETHORPHAN 5 ML: 100; 10 SYRUP ORAL at 06:14

## 2024-10-05 ASSESSMENT — PAIN DESCRIPTION - DESCRIPTORS
DESCRIPTORS: ACHING;DISCOMFORT
DESCRIPTORS: ACHING;CRAMPING;DISCOMFORT

## 2024-10-05 ASSESSMENT — PAIN SCALES - GENERAL
PAINLEVEL_OUTOF10: 9
PAINLEVEL_OUTOF10: 5

## 2024-10-05 ASSESSMENT — PAIN DESCRIPTION - LOCATION
LOCATION: CHEST
LOCATION: HIP

## 2024-10-05 ASSESSMENT — PAIN - FUNCTIONAL ASSESSMENT: PAIN_FUNCTIONAL_ASSESSMENT: PREVENTS OR INTERFERES WITH ALL ACTIVE AND SOME PASSIVE ACTIVITIES

## 2024-10-05 ASSESSMENT — PAIN DESCRIPTION - ORIENTATION
ORIENTATION: RIGHT
ORIENTATION: LEFT

## 2024-10-05 NOTE — PROGRESS NOTES
[facility] Hospitalist Progress Note    SYNOPSIS: Patient admitted on 10/4/2024 for SOB (shortness of breath)    74-year-old male with past medical history of asthma, COPD, CVA, HLD HLD and HTN who presented to the ED with complaints of shortness of breath. Patient was recently hospitalized 2024 - 2024 for the same complaints. He was followed by both nephrology and cardiology who optimized his medications. On the day of his discharge patient passed a 6-minute walk test without any oxygen requirements. He was discharged home with a follow-up with his PCP in 1 week nephrology in 3 to 4 weeks. Patient was recently scheduled to follow with CHF clinic and per EMR patient never showed for his appointment when the clinic attempted to reach out to him but his numbers were disconnected. In the ED, patient was placed on BIPAP for respiratory support. RVP was positive for rhinovirus. CXR showed pulmonary vascular congestion.  Patient was admitted and started on IV diuresis for further management.     SUBJECTIVE:  Stable overnight. No other overnight issues reported.   Patient seen and examined  Records reviewed.   Patient endorses a cough, but states his breathing is better today.      Temp (24hrs), Av.6 °F (36.4 °C), Min:97.2 °F (36.2 °C), Max:97.9 °F (36.6 °C)    DIET: ADULT DIET; Regular  CODE: Full Code    Intake/Output Summary (Last 24 hours) at 10/5/2024 0940  Last data filed at 10/4/2024 2220  Gross per 24 hour   Intake --   Output 200 ml   Net -200 ml       Review of Systems  All bolded are positive; please see HPI  General:  Fever, chills, diaphoresis, fatigue, malaise, night sweats, weight loss  Psychological:  Anxiety, disorientation, hallucinations.  ENT:  Epistaxis, headaches, vertigo, visual changes.  Cardiovascular:  Chest pain, irregular heartbeats, palpitations, paroxysmal nocturnal dyspnea.  Respiratory:  Shortness of breath, coughing, sputum production, hemoptysis, wheezing,      Recent Labs     10/04/24  0910 10/05/24  0617   WBC 4.8 4.7   HGB 10.5* 10.4*   HCT 32.9* 31.9*    213       Recent Labs     10/04/24  0910 10/05/24  0617    142   K 3.9 4.0    105   CO2 21* 21*   BUN 18 29*   CREATININE 1.3* 1.5*   CALCIUM 8.9 9.3       Recent Labs     10/04/24  0910   ALKPHOS 85   ALT 10   AST 23   BILITOT 0.6       No results for input(s): \"INR\" in the last 72 hours.    No results for input(s): \"CKTOTAL\", \"TROPONINI\" in the last 72 hours.    Chronic labs:    Lab Results   Component Value Date    CHOL 159 09/10/2024    TRIG 46 09/10/2024    HDL 71 09/10/2024    TSH 2.97 07/08/2024    INR 1.1 09/10/2015    LABA1C 5.8 (H) 01/18/2024       Radiology: REVIEWED DAILY    +++++++++++++++++++++++++++++++++++++++++++++++++  Eula Bloom MD  [facility]- [place], [place]  +++++++++++++++++++++++++++++++++++++++++++++++++  NOTE: This report was transcribed using voice recognition software. Every effort was made to ensure accuracy; however, inadvertent computerized transcription errors may be present.

## 2024-10-05 NOTE — PLAN OF CARE
Problem: Chronic Conditions and Co-morbidities  Goal: Patient's chronic conditions and co-morbidity symptoms are monitored and maintained or improved  Outcome: Progressing  Flowsheets (Taken 10/4/2024 2115)  Care Plan - Patient's Chronic Conditions and Co-Morbidity Symptoms are Monitored and Maintained or Improved:   Monitor and assess patient's chronic conditions and comorbid symptoms for stability, deterioration, or improvement   Collaborate with multidisciplinary team to address chronic and comorbid conditions and prevent exacerbation or deterioration     Problem: Discharge Planning  Goal: Discharge to home or other facility with appropriate resources  Outcome: Progressing  Flowsheets (Taken 10/4/2024 2115)  Discharge to home or other facility with appropriate resources: Identify barriers to discharge with patient and caregiver     Problem: Safety - Adult  Goal: Free from fall injury  Outcome: Progressing

## 2024-10-05 NOTE — PROGRESS NOTES
4 Eyes Skin Assessment     NAME:  Chuck Chavarria Jr.  YOB: 1950  MEDICAL RECORD NUMBER:  [id number]    The patient is being assessed for  Admission    I agree that at least one RN has performed a thorough Head to Toe Skin Assessment on the patient. ALL assessment sites listed below have been assessed.      Areas assessed by both nurses:    Head, Face, Ears, Shoulders, Back, Chest, Arms, Elbows, Hands, Sacrum. Buttock, Coccyx, Ischium, Legs. Feet and Heels, and Under Medical Devices         Does the Patient have a Wound? Yes wound(s) were present on assessment. LDA wound assessment was Initiated and completed by RN       Martin Prevention initiated by RN: Yes  Wound Care Orders initiated by RN: Yes    Pressure Injury (Stage 3,4, Unstageable, DTI, NWPT, and Complex wounds) if present, place Wound referral order by RN under : Yes    New Ostomies, if present place, Ostomy referral order under : No     Nurse 1 eSignature: Electronically signed by Tonny Barrientos RN on 10/4/24 at 10:57 PM EDT    **SHARE this note so that the co-signing nurse can place an eSignature**    Nurse 2 eSignature: Electronically signed by Elke Blake RN on 10/4/24 at 10:59 PM EDT

## 2024-10-06 ENCOUNTER — APPOINTMENT (OUTPATIENT)
Dept: GENERAL RADIOLOGY | Age: 74
DRG: 133 | End: 2024-10-06
Payer: COMMERCIAL

## 2024-10-06 LAB
ANION GAP SERPL CALCULATED.3IONS-SCNC: 11 MMOL/L (ref 7–16)
ANION GAP SERPL CALCULATED.3IONS-SCNC: 11 MMOL/L (ref 7–16)
BNP SERPL-MCNC: ABNORMAL PG/ML (ref 0–450)
BUN SERPL-MCNC: 31 MG/DL (ref 6–23)
BUN SERPL-MCNC: 31 MG/DL (ref 6–23)
CALCIUM SERPL-MCNC: 8.9 MG/DL (ref 8.6–10.2)
CALCIUM SERPL-MCNC: 9.1 MG/DL (ref 8.6–10.2)
CHLORIDE SERPL-SCNC: 111 MMOL/L (ref 98–107)
CHLORIDE SERPL-SCNC: 111 MMOL/L (ref 98–107)
CO2 SERPL-SCNC: 21 MMOL/L (ref 22–29)
CO2 SERPL-SCNC: 21 MMOL/L (ref 22–29)
CREAT SERPL-MCNC: 1.4 MG/DL (ref 0.7–1.2)
CREAT SERPL-MCNC: 1.5 MG/DL (ref 0.7–1.2)
GFR, ESTIMATED: 48 ML/MIN/1.73M2
GFR, ESTIMATED: 53 ML/MIN/1.73M2
GLUCOSE SERPL-MCNC: 105 MG/DL (ref 74–99)
GLUCOSE SERPL-MCNC: 156 MG/DL (ref 74–99)
POTASSIUM SERPL-SCNC: 4.2 MMOL/L (ref 3.5–5)
POTASSIUM SERPL-SCNC: 5 MMOL/L (ref 3.5–5)
SODIUM SERPL-SCNC: 143 MMOL/L (ref 132–146)
SODIUM SERPL-SCNC: 143 MMOL/L (ref 132–146)
TROPONIN I SERPL HS-MCNC: 71 NG/L (ref 0–11)

## 2024-10-06 PROCEDURE — 6370000000 HC RX 637 (ALT 250 FOR IP): Performed by: HOSPITALIST

## 2024-10-06 PROCEDURE — 99232 SBSQ HOSP IP/OBS MODERATE 35: CPT | Performed by: INTERNAL MEDICINE

## 2024-10-06 PROCEDURE — 6370000000 HC RX 637 (ALT 250 FOR IP): Performed by: NURSE PRACTITIONER

## 2024-10-06 PROCEDURE — 80048 BASIC METABOLIC PNL TOTAL CA: CPT

## 2024-10-06 PROCEDURE — 2700000000 HC OXYGEN THERAPY PER DAY

## 2024-10-06 PROCEDURE — 36415 COLL VENOUS BLD VENIPUNCTURE: CPT

## 2024-10-06 PROCEDURE — 94640 AIRWAY INHALATION TREATMENT: CPT

## 2024-10-06 PROCEDURE — 2580000003 HC RX 258: Performed by: NURSE PRACTITIONER

## 2024-10-06 PROCEDURE — 6360000002 HC RX W HCPCS: Performed by: NURSE PRACTITIONER

## 2024-10-06 PROCEDURE — 2580000003 HC RX 258: Performed by: INTERNAL MEDICINE

## 2024-10-06 PROCEDURE — 84484 ASSAY OF TROPONIN QUANT: CPT

## 2024-10-06 PROCEDURE — 2060000000 HC ICU INTERMEDIATE R&B

## 2024-10-06 PROCEDURE — 83880 ASSAY OF NATRIURETIC PEPTIDE: CPT

## 2024-10-06 PROCEDURE — 71045 X-RAY EXAM CHEST 1 VIEW: CPT

## 2024-10-06 PROCEDURE — 94660 CPAP INITIATION&MGMT: CPT

## 2024-10-06 RX ORDER — TRAZODONE HYDROCHLORIDE 50 MG/1
25 TABLET, FILM COATED ORAL ONCE
Status: COMPLETED | OUTPATIENT
Start: 2024-10-06 | End: 2024-10-06

## 2024-10-06 RX ORDER — TRAZODONE HYDROCHLORIDE 50 MG/1
25 TABLET, FILM COATED ORAL NIGHTLY
Status: DISCONTINUED | OUTPATIENT
Start: 2024-10-06 | End: 2024-10-06

## 2024-10-06 RX ORDER — SODIUM CHLORIDE 9 MG/ML
INJECTION, SOLUTION INTRAVENOUS CONTINUOUS
Status: ACTIVE | OUTPATIENT
Start: 2024-10-06 | End: 2024-10-07

## 2024-10-06 RX ADMIN — IPRATROPIUM BROMIDE AND ALBUTEROL SULFATE 1 DOSE: 2.5; .5 SOLUTION RESPIRATORY (INHALATION) at 09:07

## 2024-10-06 RX ADMIN — SODIUM CHLORIDE: 9 INJECTION, SOLUTION INTRAVENOUS at 11:00

## 2024-10-06 RX ADMIN — ARFORMOTEROL TARTRATE: 15 SOLUTION RESPIRATORY (INHALATION) at 19:45

## 2024-10-06 RX ADMIN — ARFORMOTEROL TARTRATE: 15 SOLUTION RESPIRATORY (INHALATION) at 09:08

## 2024-10-06 RX ADMIN — ATORVASTATIN CALCIUM 40 MG: 40 TABLET, FILM COATED ORAL at 20:40

## 2024-10-06 RX ADMIN — GUAIFENESIN SYRUP AND DEXTROMETHORPHAN 5 ML: 100; 10 SYRUP ORAL at 04:12

## 2024-10-06 RX ADMIN — Medication 3 MG: at 22:49

## 2024-10-06 RX ADMIN — ISOSORBIDE MONONITRATE 30 MG: 30 TABLET, EXTENDED RELEASE ORAL at 11:35

## 2024-10-06 RX ADMIN — TRAZODONE HYDROCHLORIDE 25 MG: 50 TABLET ORAL at 04:07

## 2024-10-06 RX ADMIN — HYDRALAZINE HYDROCHLORIDE 75 MG: 50 TABLET ORAL at 20:41

## 2024-10-06 RX ADMIN — APIXABAN 5 MG: 5 TABLET, FILM COATED ORAL at 20:41

## 2024-10-06 RX ADMIN — METOPROLOL SUCCINATE 25 MG: 25 TABLET, EXTENDED RELEASE ORAL at 20:41

## 2024-10-06 RX ADMIN — IPRATROPIUM BROMIDE AND ALBUTEROL SULFATE 1 DOSE: 2.5; .5 SOLUTION RESPIRATORY (INHALATION) at 17:12

## 2024-10-06 RX ADMIN — SODIUM CHLORIDE, PRESERVATIVE FREE 10 ML: 5 INJECTION INTRAVENOUS at 20:43

## 2024-10-06 RX ADMIN — APIXABAN 5 MG: 5 TABLET, FILM COATED ORAL at 11:35

## 2024-10-06 RX ADMIN — ACETAMINOPHEN 650 MG: 325 TABLET ORAL at 16:32

## 2024-10-06 RX ADMIN — ASPIRIN 81 MG: 81 TABLET, COATED ORAL at 11:35

## 2024-10-06 RX ADMIN — Medication 1000 UNITS: at 11:35

## 2024-10-06 RX ADMIN — IPRATROPIUM BROMIDE AND ALBUTEROL SULFATE 1 DOSE: 2.5; .5 SOLUTION RESPIRATORY (INHALATION) at 19:45

## 2024-10-06 RX ADMIN — PANTOPRAZOLE SODIUM 40 MG: 40 TABLET, DELAYED RELEASE ORAL at 16:33

## 2024-10-06 RX ADMIN — SODIUM CHLORIDE, PRESERVATIVE FREE 10 ML: 5 INJECTION INTRAVENOUS at 10:58

## 2024-10-06 RX ADMIN — HYDRALAZINE HYDROCHLORIDE 75 MG: 50 TABLET ORAL at 13:46

## 2024-10-06 RX ADMIN — GUAIFENESIN SYRUP AND DEXTROMETHORPHAN 5 ML: 100; 10 SYRUP ORAL at 11:36

## 2024-10-06 ASSESSMENT — PAIN DESCRIPTION - DESCRIPTORS: DESCRIPTORS: ACHING

## 2024-10-06 ASSESSMENT — PAIN SCALES - GENERAL
PAINLEVEL_OUTOF10: 0
PAINLEVEL_OUTOF10: 0
PAINLEVEL_OUTOF10: 2
PAINLEVEL_OUTOF10: 0

## 2024-10-06 ASSESSMENT — PAIN DESCRIPTION - ORIENTATION: ORIENTATION: RIGHT;LEFT

## 2024-10-06 ASSESSMENT — PAIN DESCRIPTION - LOCATION: LOCATION: LEG

## 2024-10-06 NOTE — PLAN OF CARE
Problem: Chronic Conditions and Co-morbidities  Goal: Patient's chronic conditions and co-morbidity symptoms are monitored and maintained or improved  10/6/2024 1204 by Jeaneth Sr RN  Outcome: Progressing  10/6/2024 0334 by Vanessa Barrientos RN  Outcome: Progressing     Problem: Discharge Planning  Goal: Discharge to home or other facility with appropriate resources  10/6/2024 1204 by Jeaneth Sr RN  Outcome: Progressing  10/6/2024 0334 by Vanessa Barrientos RN  Outcome: Progressing     Problem: Safety - Adult  Goal: Free from fall injury  10/6/2024 1204 by Jeaneth Sr RN  Outcome: Progressing  10/6/2024 0334 by Vanessa Barrientos RN  Outcome: Progressing     Problem: Pain  Goal: Verbalizes/displays adequate comfort level or baseline comfort level  10/6/2024 1204 by Jeaneth Sr RN  Outcome: Progressing  10/6/2024 0334 by Vanessa Barrientos RN  Outcome: Progressing     Problem: Skin/Tissue Integrity  Goal: Absence of new skin breakdown  Description: 1.  Monitor for areas of redness and/or skin breakdown  2.  Assess vascular access sites hourly  3.  Every 4-6 hours minimum:  Change oxygen saturation probe site  4.  Every 4-6 hours:  If on nasal continuous positive airway pressure, respiratory therapy assess nares and determine need for appliance change or resting period.  Outcome: Progressing

## 2024-10-06 NOTE — PROGRESS NOTES
[facility] Hospitalist Progress Note    SYNOPSIS: Patient admitted on 10/4/2024 for SOB (shortness of breath)    74-year-old male with past medical history of asthma, COPD, CVA, HLD HLD and HTN who presented to the ED with complaints of shortness of breath. Patient was recently hospitalized 2024 - 2024 for the same complaints. He was followed by both nephrology and cardiology who optimized his medications. On the day of his discharge patient passed a 6-minute walk test without any oxygen requirements. He was discharged home with a follow-up with his PCP in 1 week nephrology in 3 to 4 weeks. Patient was recently scheduled to follow with CHF clinic and per EMR patient never showed for his appointment when the clinic attempted to reach out to him but his numbers were disconnected. In the ED, patient was placed on BIPAP for respiratory support. RVP was positive for rhinovirus. CXR showed pulmonary vascular congestion.  Patient was admitted and started on IV diuresis for further management.    SUBJECTIVE:  Stable overnight. No other overnight issues reported.   Patient seen and examined  Records reviewed.   Patient states he is feeling okay this morning, he is on BIPAP but denies any SOB or difficulty breathing.       Temp (24hrs), Av.6 °F (36.4 °C), Min:97.1 °F (36.2 °C), Max:98 °F (36.7 °C)    DIET: ADULT DIET; Regular  CODE: Full Code    Intake/Output Summary (Last 24 hours) at 10/6/2024 1028  Last data filed at 10/6/2024 0800  Gross per 24 hour   Intake 200 ml   Output --   Net 200 ml       Review of Systems  All bolded are positive; please see HPI  General:  Fever, chills, diaphoresis, fatigue, malaise, night sweats, weight loss  Psychological:  Anxiety, disorientation, hallucinations.  ENT:  Epistaxis, headaches, vertigo, visual changes.  Cardiovascular:  Chest pain, irregular heartbeats, palpitations, paroxysmal nocturnal dyspnea.  Respiratory:  Shortness of breath, coughing, sputum

## 2024-10-06 NOTE — PROGRESS NOTES
10/06/24 0103   NIV Type   $NIV $Daily Charge   NIV Started/Stopped On   Equipment Type v60   Mode Bilevel   Mask Type Full face mask   Mask Size Small   Assessment   SpO2 100 %   Level of Consciousness 0   Comfort Level Good   Using Accessory Muscles Yes   Mask Compliance Good   Skin Assessment Clean, dry, & intact   Skin Protection for O2 Device Yes   Orientation Middle   Location Nose   Intervention(s) Skin Barrier   Settings/Measurements   IPAP 10 cmH20   CPAP/EPAP 6 cmH2O   Vt (Measured) 533 mL   Rate Ordered 12   FiO2  50 %   Minute Volume (L/min) 9.3 Liters   Mask Leak (lpm) 47 lpm   Patient's Home Machine No   Alarm Settings   Alarms On Y   Low Pressure (cmH2O) 8 cmH2O   High Pressure (cmH2O) 24 cmH2O   RR Low (bpm) 10   RR High (bpm) 40 br/min     Date: 10/6/2024    Time: 1:04 AM    Patient Placed On BIPAP/CPAP/ Non-Invasive Ventilation?  Yes    If no must comment.  Facial area red/color change? No           If YES are Blister/Lesion present?No   If yes must notify nursing staff  BIPAP/CPAP skin barrier?  Yes    Skin barrier type:mepilexlite       Comments:        Brenda Yu RCP

## 2024-10-07 LAB
ANION GAP SERPL CALCULATED.3IONS-SCNC: 12 MMOL/L (ref 7–16)
BUN SERPL-MCNC: 28 MG/DL (ref 6–23)
CALCIUM SERPL-MCNC: 8.6 MG/DL (ref 8.6–10.2)
CHLORIDE SERPL-SCNC: 113 MMOL/L (ref 98–107)
CO2 SERPL-SCNC: 17 MMOL/L (ref 22–29)
CREAT SERPL-MCNC: 1.3 MG/DL (ref 0.7–1.2)
GFR, ESTIMATED: 60 ML/MIN/1.73M2
GLUCOSE SERPL-MCNC: 110 MG/DL (ref 74–99)
POTASSIUM SERPL-SCNC: 4.7 MMOL/L (ref 3.5–5)
SODIUM SERPL-SCNC: 142 MMOL/L (ref 132–146)

## 2024-10-07 PROCEDURE — 97530 THERAPEUTIC ACTIVITIES: CPT

## 2024-10-07 PROCEDURE — 6370000000 HC RX 637 (ALT 250 FOR IP): Performed by: NURSE PRACTITIONER

## 2024-10-07 PROCEDURE — 80048 BASIC METABOLIC PNL TOTAL CA: CPT

## 2024-10-07 PROCEDURE — 6370000000 HC RX 637 (ALT 250 FOR IP): Performed by: HOSPITALIST

## 2024-10-07 PROCEDURE — 94660 CPAP INITIATION&MGMT: CPT

## 2024-10-07 PROCEDURE — 97161 PT EVAL LOW COMPLEX 20 MIN: CPT

## 2024-10-07 PROCEDURE — 2580000003 HC RX 258: Performed by: NURSE PRACTITIONER

## 2024-10-07 PROCEDURE — 94640 AIRWAY INHALATION TREATMENT: CPT

## 2024-10-07 PROCEDURE — 36415 COLL VENOUS BLD VENIPUNCTURE: CPT

## 2024-10-07 PROCEDURE — 6360000002 HC RX W HCPCS: Performed by: NURSE PRACTITIONER

## 2024-10-07 PROCEDURE — 97165 OT EVAL LOW COMPLEX 30 MIN: CPT

## 2024-10-07 PROCEDURE — 6370000000 HC RX 637 (ALT 250 FOR IP): Performed by: INTERNAL MEDICINE

## 2024-10-07 PROCEDURE — 99232 SBSQ HOSP IP/OBS MODERATE 35: CPT | Performed by: INTERNAL MEDICINE

## 2024-10-07 PROCEDURE — 2580000003 HC RX 258: Performed by: INTERNAL MEDICINE

## 2024-10-07 PROCEDURE — 2060000000 HC ICU INTERMEDIATE R&B

## 2024-10-07 PROCEDURE — 97535 SELF CARE MNGMENT TRAINING: CPT

## 2024-10-07 PROCEDURE — 2700000000 HC OXYGEN THERAPY PER DAY

## 2024-10-07 RX ORDER — TRAZODONE HYDROCHLORIDE 50 MG/1
50 TABLET, FILM COATED ORAL ONCE
Status: COMPLETED | OUTPATIENT
Start: 2024-10-07 | End: 2024-10-07

## 2024-10-07 RX ORDER — SODIUM CHLORIDE 9 MG/ML
INJECTION, SOLUTION INTRAVENOUS CONTINUOUS
Status: DISCONTINUED | OUTPATIENT
Start: 2024-10-07 | End: 2024-10-08

## 2024-10-07 RX ADMIN — IPRATROPIUM BROMIDE AND ALBUTEROL SULFATE 1 DOSE: 2.5; .5 SOLUTION RESPIRATORY (INHALATION) at 06:14

## 2024-10-07 RX ADMIN — IPRATROPIUM BROMIDE AND ALBUTEROL SULFATE 1 DOSE: 2.5; .5 SOLUTION RESPIRATORY (INHALATION) at 20:12

## 2024-10-07 RX ADMIN — METOPROLOL SUCCINATE 25 MG: 25 TABLET, EXTENDED RELEASE ORAL at 08:51

## 2024-10-07 RX ADMIN — HYDRALAZINE HYDROCHLORIDE 75 MG: 50 TABLET ORAL at 08:52

## 2024-10-07 RX ADMIN — TRAZODONE HYDROCHLORIDE 50 MG: 50 TABLET ORAL at 02:10

## 2024-10-07 RX ADMIN — ARFORMOTEROL TARTRATE: 15 SOLUTION RESPIRATORY (INHALATION) at 06:14

## 2024-10-07 RX ADMIN — ASPIRIN 81 MG: 81 TABLET, COATED ORAL at 08:52

## 2024-10-07 RX ADMIN — SODIUM CHLORIDE, PRESERVATIVE FREE 10 ML: 5 INJECTION INTRAVENOUS at 08:53

## 2024-10-07 RX ADMIN — ARFORMOTEROL TARTRATE: 15 SOLUTION RESPIRATORY (INHALATION) at 20:13

## 2024-10-07 RX ADMIN — PANTOPRAZOLE SODIUM 40 MG: 40 TABLET, DELAYED RELEASE ORAL at 16:09

## 2024-10-07 RX ADMIN — APIXABAN 5 MG: 5 TABLET, FILM COATED ORAL at 20:11

## 2024-10-07 RX ADMIN — ISOSORBIDE MONONITRATE 30 MG: 30 TABLET, EXTENDED RELEASE ORAL at 08:52

## 2024-10-07 RX ADMIN — HYDRALAZINE HYDROCHLORIDE 75 MG: 50 TABLET ORAL at 14:35

## 2024-10-07 RX ADMIN — GUAIFENESIN SYRUP AND DEXTROMETHORPHAN 5 ML: 100; 10 SYRUP ORAL at 02:09

## 2024-10-07 RX ADMIN — PANTOPRAZOLE SODIUM 40 MG: 40 TABLET, DELAYED RELEASE ORAL at 06:36

## 2024-10-07 RX ADMIN — ATORVASTATIN CALCIUM 40 MG: 40 TABLET, FILM COATED ORAL at 20:10

## 2024-10-07 RX ADMIN — METOPROLOL SUCCINATE 25 MG: 25 TABLET, EXTENDED RELEASE ORAL at 20:11

## 2024-10-07 RX ADMIN — IPRATROPIUM BROMIDE AND ALBUTEROL SULFATE 1 DOSE: 2.5; .5 SOLUTION RESPIRATORY (INHALATION) at 12:36

## 2024-10-07 RX ADMIN — APIXABAN 5 MG: 5 TABLET, FILM COATED ORAL at 08:53

## 2024-10-07 RX ADMIN — PETROLATUM: 420 OINTMENT TOPICAL at 20:17

## 2024-10-07 RX ADMIN — SODIUM CHLORIDE, PRESERVATIVE FREE 10 ML: 5 INJECTION INTRAVENOUS at 20:11

## 2024-10-07 RX ADMIN — Medication 1000 UNITS: at 08:52

## 2024-10-07 RX ADMIN — PROCHLORPERAZINE EDISYLATE 10 MG: 5 INJECTION INTRAMUSCULAR; INTRAVENOUS at 19:28

## 2024-10-07 RX ADMIN — SODIUM CHLORIDE: 9 INJECTION, SOLUTION INTRAVENOUS at 22:44

## 2024-10-07 RX ADMIN — SODIUM CHLORIDE: 9 INJECTION, SOLUTION INTRAVENOUS at 12:12

## 2024-10-07 RX ADMIN — HYDRALAZINE HYDROCHLORIDE 75 MG: 50 TABLET ORAL at 20:11

## 2024-10-07 RX ADMIN — IPRATROPIUM BROMIDE AND ALBUTEROL SULFATE 1 DOSE: 2.5; .5 SOLUTION RESPIRATORY (INHALATION) at 16:08

## 2024-10-07 ASSESSMENT — PAIN SCALES - GENERAL: PAINLEVEL_OUTOF10: 0

## 2024-10-07 NOTE — PROGRESS NOTES
Physical Therapy  Physical Therapy Initial Assessment     Name: Chuck Chavarria Jr.  : 1950  MRN: [id number]      Date of Service: 10/7/2024    Evaluating PT:  Elina Liang PT, DPT [id number]    Room #:  7416/7416-A  Diagnosis:  SOB (shortness of breath) [R06.02]  Acute respiratory failure with hypoxia [J96.01]  PMHx/PSHx:   has a past medical history of Arthritis, Asthma, Bilateral carotid artery stenosis, Cataract, left eye, Cerebral infarction due to occlusion of right vertebral artery (HCC), COPD (chronic obstructive pulmonary disease) (HCC), Hyperlipidemia, Hypertension, Occlusion of right vertebral artery, and Tobacco dependence.  Procedure/Surgery:  None this admission  Precautions:  Fall risk, alarms, continuous O2 monitor, droplet isolation (Rhinovirus)  Equipment Needs:  TBD    SUBJECTIVE:    Pt lives alone in a 5th floor apartment with a level entry and elevator access. Pt ambulated with a SBQC PTA.    OBJECTIVE:   Initial Evaluation  Date: 10/7/24 Treatment Date:  Short Term/ Long Term   Goals   AM-PAC 6 Clicks      Was pt agreeable to Eval/treatment? Yes     Does pt have pain? Denied     Bed Mobility  Rolling: SBA  Supine to sit: SBA  Sit to supine: SBA  Scooting: SBA  Rolling: Independent  Supine to sit: Independent  Sit to supine: Independent  Scooting: Independent   Transfers Sit to stand: Kaylyn  Stand to sit: Kaylyn  Stand pivot: Kaylyn with R SBQC  Sit to stand: Independent  Stand to sit: Independent  Stand pivot: Mod I with AAD   Ambulation    65 feet with R SBQC and Kaylyn  >200 feet with AAD and Mod I   Stair negotiation: ascended and descended  NT  TBD   ROM BUE:  Refer to OT  BLE:  WFL     Strength BUE:  Refer to OT  BLE:  5/5 knee extension, 5/5 ankle DF, 5/5 ankle PF     Balance Sitting EOB:  SBA  Dynamic Standing:  Kaylyn with R SBQC  Sitting EOB:  Independent  Dynamic Standing:  Mod I with AAD     Pt is A & O x 4  Sensation:  Pt denies numbness and tingling to extremities  Edema:   Unremarkable   Vitals:   SpO2: 90-95% on RA    Therapeutic Exercises:  Dynamic balance activities while sitting EOB, dynamic balance activities in standing, multi-directional turns, obstacle negotiation    Patient education  Pt educated on role of PT in acute setting, benefits of upright mobility, use of call light in room, safety, sequencing of SBQC.    Patient response to education:   Pt verbalized understanding Pt demonstrated skill Pt requires further education in this area   Yes Partially  Reinforce     ASSESSMENT:    Conditions Requiring Skilled Therapeutic Intervention:    [x]Decreased strength     [x]Decreased ROM  [x]Decreased functional mobility  [x]Decreased balance   [x]Decreased endurance   [x]Decreased posture  [x]Decreased sensation  [x]Decreased coordination   [x]Decreased vision  [x]Decreased safety awareness   [x]Increased pain       Comments:  Pt was supine in bed upon therapist arrival, agreeable to session. Pt was pleasant and cooperative. Patient participated in mobility as documented above. Bed mobility required increased time due to deconditioning. Upright mobility revealed mildly reduced postural stability during obstacle negotiation, but pt relied very little on SBQC for maintaining midline. Pt self-selected the distance provided above, as indicated by fatigue. After session, patient was assisted back to bed, per request, and was left skillfully positioned with all needs met, RN present, questions answered, and call light within reach.     Treatment:  Patient practiced and was instructed in the following treatment:    Bed mobility - Cues provided for sequencing, physical assist provided as needed.  Transfers - Cues provided for safety, hand/feet placement, physical assist provided as needed.  Ambulation - Cues provided for sequencing of SBQC, physical assist provided as needed.  Skilled positioning - To prevent skin breakdown and contractures.  Skilled assessment of vitals.  Education -

## 2024-10-07 NOTE — CARE COORDINATION
Met with pt to discuss discharge planning, pt wouldn't open eyes for conversation and mumbled his answer. Per conversation, pt lives alone in one story home, reviewing last admission he lives in an apartment on the fifth floor with elevator. Pt has a NIV in room, per pt he has a cpap at home, along with walker and cane(per previous notes). Pt was recently discharged on 9/13, did not show for PCP appointment per [facility] notes. When asked how he was getting home, he mumbled 'bus'. Will follow for any needs.MADDIE Nolan, DANYELLE  .Case Management Assessment  Initial Evaluation    Date/Time of Evaluation: 10/7/2024 10:44 AM  Assessment Completed by: MADDIE Nolan, DANYELLE    If patient is discharged prior to next notation, then this note serves as note for discharge by case management.    Patient Name: Chuck Chavarria Jr.                   YOB: 1950  Diagnosis: SOB (shortness of breath) [R06.02]  Acute respiratory failure with hypoxia [J96.01]                   Date / Time: 10/4/2024  8:55 AM    Patient Admission Status: Inpatient   Readmission Risk (Low < 19, Mod (19-27), High > 27): Readmission Risk Score: 28.6    Current PCP: Gunner Espinosa MD  PCP verified by ? Yes    Chart Reviewed: Yes      History Provided by: Patient  Patient Orientation: Alert and Oriented    Patient Cognition: Alert    Hospitalization in the last 30 days (Readmission):  Yes    If yes, Readmission Assessment in  Navigator will be completed.    Advance Directives:      Code Status: Full Code   Patient's Primary Decision Maker is: Legal Next of Kin    Primary Decision Maker: Ashia Tillman - Brother/Sister - [phone]    Secondary Decision Maker: Elena Tillman - Other - [phone]    Supplemental (Other) Decision Maker: BRIANNAEVANGELINA - Grandchild - [phone]    Discharge Planning:    Patient lives with:   Type of Home:    Primary Care Giver: Self  Patient Support Systems include: Family Members   Current  of SOB (shortness of breath) [R06.02]  Acute respiratory failure with hypoxia [J96.01]    IF APPLICABLE: The Patient and/or patient representative Chuck and his family were provided with a choice of provider and agrees with the discharge plan. Freedom of choice list with basic dialogue that supports the patient's individualized plan of care/goals and shares the quality data associated with the providers was provided to: Patient   Patient Representative Name:       The Patient and/or Patient Representative Agree with the Discharge Plan? Yes    MADDIE Nolan, LSW  Case Management Department  Ph: [phone] Fax: [phone]

## 2024-10-07 NOTE — PLAN OF CARE
Problem: Chronic Conditions and Co-morbidities  Goal: Patient's chronic conditions and co-morbidity symptoms are monitored and maintained or improved  10/7/2024 0945 by Sabrina Watt RN  Outcome: Progressing  10/7/2024 0341 by Vanessa Barrientos RN  Outcome: Progressing     Problem: Discharge Planning  Goal: Discharge to home or other facility with appropriate resources  10/7/2024 0945 by Sabrina Watt RN  Outcome: Progressing  10/7/2024 0341 by Vanessa Barrientos RN  Outcome: Progressing     Problem: Safety - Adult  Goal: Free from fall injury  10/7/2024 0945 by Sabrina Watt RN  Outcome: Progressing  10/7/2024 0341 by Vanessa Barrientos RN  Outcome: Progressing     Problem: Pain  Goal: Verbalizes/displays adequate comfort level or baseline comfort level  10/7/2024 0945 by Sabrina Watt RN  Outcome: Progressing  10/7/2024 0341 by Vanessa Barrientos RN  Outcome: Progressing     Problem: Skin/Tissue Integrity  Goal: Absence of new skin breakdown  Description: 1.  Monitor for areas of redness and/or skin breakdown  2.  Assess vascular access sites hourly  3.  Every 4-6 hours minimum:  Change oxygen saturation probe site  4.  Every 4-6 hours:  If on nasal continuous positive airway pressure, respiratory therapy assess nares and determine need for appliance change or resting period.  10/7/2024 0945 by Sabrina Watt RN  Outcome: Progressing  10/7/2024 0341 by Vanessa Barrientos RN  Outcome: Progressing

## 2024-10-07 NOTE — PROGRESS NOTES
[facility] Hospitalist Progress Note    SYNOPSIS: Patient admitted on 10/4/2024 for SOB (shortness of breath)    74-year-old male with past medical history of asthma, COPD, CVA, HLD HLD and HTN who presented to the ED with complaints of shortness of breath. Patient was recently hospitalized 2024 - 2024 for the same complaints. He was followed by both nephrology and cardiology who optimized his medications. On the day of his discharge patient passed a 6-minute walk test without any oxygen requirements. He was discharged home with a follow-up with his PCP in 1 week nephrology in 3 to 4 weeks. Patient was recently scheduled to follow with CHF clinic and per EMR patient never showed for his appointment when the clinic attempted to reach out to him but his numbers were disconnected. In the ED, patient was placed on BIPAP for respiratory support. RVP was positive for rhinovirus. CXR showed pulmonary vascular congestion.  Patient was admitted and started on IV diuresis for further management. Patient developed HNENY thought to be prerenal due to poor oral intake. Diuresis held and fluids started with improvement.     SUBJECTIVE:  Stable overnight. No other overnight issues reported.   Patient seen and examined  Records reviewed.     Temp (24hrs), Av.3 °F (36.3 °C), Min:96.9 °F (36.1 °C), Max:97.7 °F (36.5 °C)    DIET: ADULT DIET; Regular  CODE: Full Code    Intake/Output Summary (Last 24 hours) at 10/7/2024 1159  Last data filed at 10/7/2024 0644  Gross per 24 hour   Intake --   Output 600 ml   Net -600 ml       Review of Systems  All bolded are positive; please see HPI  General:  Fever, chills, diaphoresis, fatigue, malaise, night sweats, weight loss  Psychological:  Anxiety, disorientation, hallucinations.  ENT:  Epistaxis, headaches, vertigo, visual changes.  Cardiovascular:  Chest pain, irregular heartbeats, palpitations, paroxysmal nocturnal dyspnea.  Respiratory:  Shortness of breath, coughing,

## 2024-10-07 NOTE — PROGRESS NOTES
OCCUPATIONAL THERAPY INITIAL EVALUATION    [facility]  [address] [place], [place]        Date:10/7/2024                                                  Patient Name: Chuck Chavarria Jr.    MRN: [id number]    : 1950    Room: [address] [place] [address]      Evaluating OT: Gavin Cano OTR/L; [id number]       Referring Provider: Eula Bloom MD     Specific Provider Orders/Date: OT Eval and Treat 10/07/24 0830       Diagnosis: SOB (shortness of breath);  Stage 2 chronic kidney disease; A-fib; HLD (hyperlipidemia); Hypertension.    Surgery: None this admission.     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     Recommended Adaptive Equipment: extended tub bench     Precautions:  Fall Risk, droplet isolation rhinovirus, continuous pulse ox     Assessment of current deficits    [x] Functional mobility  [x]ADLs  [x] Strength               [x]Cognition    [x] Functional transfers   [x] IADLs         [x] Safety Awareness   [x]Endurance    [x] Fine Coordination              [x] Balance      [] Vision/perception   []Sensation     []Gross Motor Coordination  [] ROM  [] Delirium                   [] Motor Control     OT PLAN OF CARE   OT POC based on physician orders, patient diagnosis and results of clinical assessment    Frequency/Duration 1-3 days/wk for 2 weeks PRN   Specific OT Treatment Interventions to include:   * Instruction/training on adapted ADL techniques and AE recommendations to increase functional independence within precautions       * Training on energy conservation strategies, correct breathing pattern and techniques to improve independence/tolerance  Mobility  Log Roll: NT   Supine to sit: Stand by Assist   Sit to supine: Stand by Assist   Supine to sit: Independent   Sit to supine: Independent    Functional Transfers Sit to stand:Minimal Assist   Stand to sit:Minimal Assist   Stand pivot: Minimal Assist w/ quad cane  Commode: NT   Sit to stand: Modified Rogers   Stand to sit: Modified Rogers   Stand pivot:  Modified Rogers   Commode:  Modified Rogers    Functional Mobility Minimal Assist   Use of quad cane to<>from room doorway x3.  Modified Rogers w/ use of Appropriate AD   Balance Sitting:     Static - Stand by Assist      Dynamic - Stand by Assist   Standing: Minimal Assist w/ quad cane  Sitting:     Static:  Independent      Dynamic:  Independent   Standing:  Modified Rogers    Activity Tolerance Fair tolerance w/ light activity  Good tolerance w/ light to mod activity   Visual/  Perceptual WFL     Safety Fair  Good  during ADL completion   Vitals Pt on RA upon arrival. SpO2 90-95% throughout session. RN aware.       Hand Dominance Right   AROM (PROM) Strength Additional Info:  Goal: (PRN)   RUE  WFL 4-/5 grossly tested Fair+  and FMC/dexterity noted during ADL tasks   Improve overall RUE strength WFL for participation in functional tasks       LUE WFL 4-/5  grossly tested Fair+  and FMC/dexterity noted during ADL tasks   Improve overall LUE strength WFL for participation in functional tasks       Hearing: WFL  Sensation: No c/o numbness or tingling  Tone: WFL  Edema: Unremarkable    Comment: Cleared by RN to see pt. Upon arrival patient supine in bed and agreeable to OT session. At end of session, patient supine in bed with call light and phone within reach, all lines and tubes intact.  Overall patient demonstrated decreased independence, activity tolerance, standing balance, and safety during completion of ADL/functional transfer/mobility tasks. Therapist facilitated ADL tasks, functional transfers, functional  mobility, bed mobility to address safety awareness, implementation of fall prevention strategies, & functional engagement throughout daily activities. Pt would benefit from continued skilled OT to increase safety and independence with completion of ADL/IADL tasks for functional independence and quality of life.    Treatment: OT treatment provided this date includes:   ADL-  Instruction/training on safety and adapted techniques for completion of ADLs: Therapist facilitated & pt educated on activity modifications/adaptations to promote implementation of fall prevention strategies, EC/WS strategies, & safety awareness throughout ADLs.   Mobility-  Instruction/training on safety and improved independence with bed mobility/functional transfers and functional mobility w/ use of quad cane.   Sitting EOB ~8 minutes to improve dynamic sitting balance and activity tolerance during ADLs.   Activity tolerance- Instruction/training on energy conservation/work simplification for completion of ADLs.   Skilled positioning/alignment-  Therapist facilitated proper positioning/alignment throughout session to maintain skin/joint integrity & proper body mechanics.    Rehab Potential: Good for established goals     LTG: maximize independence with ADLs to return to PLOF    Patient and/or family were instructed on functional diagnosis, prognosis/goals and OT plan of care. Demonstrated fair understanding.     Eval Complexity: Low  History: Expanded chart review of medical records and additional review of physical, cognitive, or psychosocial history related to current functional performance  Exam: 3+ performance deficits  Assistance/Modification: Min/mod assistance or modifications required to perform tasks. May have comorbidities that affect occupational performance.    Evaluation time includes thorough review of current medical information, gathering information on past medical & social history & PLOF, completion of standardized testing,

## 2024-10-07 NOTE — PLAN OF CARE
Patient's chart updated to reflect:      .    - HF care plan, HF education points and HF discharge instructions.  -Orders: 2 gram sodium diet, daily weights, I/O.  -PCP and cardiology follow up appointments to be scheduled within 7 days of hospital discharge.  -CHF education session will be provided to the patient prior to hospital discharge.    Jennifer Cash RN   Heart Failure Navigator

## 2024-10-07 NOTE — NURSE NAVIGATOR
[facility]NN attempted to see patient at the bedside for HF education session. Patient very drowsy and unable to participate in education session today. Patient seen by HFNN on previous admission and was referred to the CHF clinic. Patient recently discharged to home and had missed his initial CHF clinic appointment. Phone number on file was disconnected, unable to reschedule or verify transportation needs.  Attempted to verify patient phone number but states he does not know it offhand. Patient states that he lives alone. Appointments for CHF clinic rescheduled. Will return to reinforce education prior to discharge.   Electronically signed by Adri Johnson RN on 10/7/2024 at 12:16 PM

## 2024-10-07 NOTE — FLOWSHEET NOTE
Inpatient Wound Care (Initial consult) 7416    Admit Date: 10/4/2024  8:55 AM    Reason for consult:  Rash    Significant history: Per H&P    CHIEF COMPLAINT: Shortness of breath     History of Present Illness: This is a 74-year-old male with past medical history of asthma, COPD, CVA, HLD HLD and HTN who presents to the ED with complaints of shortness of breath.  Patient was recently hospitalized 9/9/2024 - 9/13/2024 for the same complaints.  He was followed by both nephrology and cardiology who optimized his medications.  On the day of his discharge patient passed a 6-minute walk test without any oxygen requirements.  He was discharged home with a follow-up with his PCP in 1 week nephrology in 3 to 4 weeks.  Patient was recently scheduled to follow with CHF clinic and per EMR patient never showed for his appointment when the clinic attempted to reach out to him his numbers disconnected.  Patient presents today with increasing shortness of breath.  He states his shortness of breath started about a day ago.  He reports it is intermittent and both with rest and exertion.  Patient has a productive cough for clear green mucus.  Patient denies fever chills nausea or vomiting.  Per ED physician while in ED patient was noted to be bradycardic in a tripod position so he was placed on BiPAP.    Findings:     10/07/24 1351   Skin Integumentary    Skin Color Hypopigmentation   Skin Condition/Temp   (BLE)   Skin Integrity Site 2   Skin Integrity Location 2 Rash   Location 2 BUE         Impression:  Skin assessment complete, see above documentation    Plan: Aquaphor  TAPS  Heel protectors  Patient will need continued preventative care      Oriana Marcum RN 10/7/2024 2:29 PM

## 2024-10-07 NOTE — PROGRESS NOTES
Spiritual Health History and Assessment/Progress Note  [facility]    Initial Encounter, Spiritual/Emotional Needs (High Readmission),  ,  ,      Name: Chuck Chavarria Jr. MRN: [id number]    Age: 74 y.o.     Sex: male   Language: English   Holiness: Zoroastrianism   SOB (shortness of breath)     Date: 10/7/2024                           Spiritual Assessment began in [facility]        Referral/Consult From: Rounding   Encounter Overview/Reason: Initial Encounter, Spiritual/Emotional Needs (High Readmission)  Service Provided For: Patient    Zaira, Belief, Meaning:   Patient identifies as spiritual, has beliefs or practices that help with coping during difficult times, and Other: Patient states that he has spiritual beliefs and would describe himself as a Sikhism from the Zoroastrianism denomination.   Family/Friends No family/friends present      Importance and Influence:  Patient has spiritual/personal beliefs that influence decisions regarding their health  Family/Friends No family/friends present    Community:  Patient feels well-supported. Support system includes: Extended family  Family/Friends No family/friends present    Assessment and Plan of Care:     Patient Interventions include: Explored spiritual coping/struggle/distress and Affirmed coping skills/support systems  Family/Friends Interventions include: No family/friends present    Patient Plan of Care: Spiritual Care available upon further referral  Family/Friends Plan of Care: No family/friends present    Electronically signed by BAIRON Carrillo on 10/7/2024 at 11:44 AM

## 2024-10-08 LAB
ANION GAP SERPL CALCULATED.3IONS-SCNC: 9 MMOL/L (ref 7–16)
BUN SERPL-MCNC: 22 MG/DL (ref 6–23)
CALCIUM SERPL-MCNC: 8.5 MG/DL (ref 8.6–10.2)
CHLORIDE SERPL-SCNC: 111 MMOL/L (ref 98–107)
CHLORIDE UR-SCNC: 27 MMOL/L
CO2 SERPL-SCNC: 22 MMOL/L (ref 22–29)
CREAT SERPL-MCNC: 1.5 MG/DL (ref 0.7–1.2)
GFR, ESTIMATED: 50 ML/MIN/1.73M2
GLUCOSE SERPL-MCNC: 106 MG/DL (ref 74–99)
OSMOLALITY UR: 485 MOSM/KG (ref 300–900)
POTASSIUM SERPL-SCNC: 4 MMOL/L (ref 3.5–5)
SODIUM SERPL-SCNC: 142 MMOL/L (ref 132–146)
SODIUM UR-SCNC: 22 MMOL/L

## 2024-10-08 PROCEDURE — 6370000000 HC RX 637 (ALT 250 FOR IP): Performed by: NURSE PRACTITIONER

## 2024-10-08 PROCEDURE — 99232 SBSQ HOSP IP/OBS MODERATE 35: CPT | Performed by: INTERNAL MEDICINE

## 2024-10-08 PROCEDURE — 80048 BASIC METABOLIC PNL TOTAL CA: CPT

## 2024-10-08 PROCEDURE — 2060000000 HC ICU INTERMEDIATE R&B

## 2024-10-08 PROCEDURE — 84300 ASSAY OF URINE SODIUM: CPT

## 2024-10-08 PROCEDURE — 2580000003 HC RX 258: Performed by: INTERNAL MEDICINE

## 2024-10-08 PROCEDURE — 83935 ASSAY OF URINE OSMOLALITY: CPT

## 2024-10-08 PROCEDURE — 2700000000 HC OXYGEN THERAPY PER DAY

## 2024-10-08 PROCEDURE — 6360000002 HC RX W HCPCS: Performed by: NURSE PRACTITIONER

## 2024-10-08 PROCEDURE — 51798 US URINE CAPACITY MEASURE: CPT

## 2024-10-08 PROCEDURE — 2580000003 HC RX 258: Performed by: NURSE PRACTITIONER

## 2024-10-08 PROCEDURE — 36415 COLL VENOUS BLD VENIPUNCTURE: CPT

## 2024-10-08 PROCEDURE — 94660 CPAP INITIATION&MGMT: CPT

## 2024-10-08 PROCEDURE — 94640 AIRWAY INHALATION TREATMENT: CPT

## 2024-10-08 PROCEDURE — 82436 ASSAY OF URINE CHLORIDE: CPT

## 2024-10-08 RX ORDER — SODIUM CHLORIDE 9 MG/ML
INJECTION, SOLUTION INTRAVENOUS CONTINUOUS
Status: DISCONTINUED | OUTPATIENT
Start: 2024-10-08 | End: 2024-10-09

## 2024-10-08 RX ADMIN — SODIUM CHLORIDE, PRESERVATIVE FREE 10 ML: 5 INJECTION INTRAVENOUS at 09:51

## 2024-10-08 RX ADMIN — IPRATROPIUM BROMIDE AND ALBUTEROL SULFATE 1 DOSE: 2.5; .5 SOLUTION RESPIRATORY (INHALATION) at 12:42

## 2024-10-08 RX ADMIN — ARFORMOTEROL TARTRATE: 15 SOLUTION RESPIRATORY (INHALATION) at 06:32

## 2024-10-08 RX ADMIN — HYDRALAZINE HYDROCHLORIDE 75 MG: 50 TABLET ORAL at 22:22

## 2024-10-08 RX ADMIN — HYDRALAZINE HYDROCHLORIDE 75 MG: 50 TABLET ORAL at 09:50

## 2024-10-08 RX ADMIN — SODIUM CHLORIDE, PRESERVATIVE FREE 10 ML: 5 INJECTION INTRAVENOUS at 22:23

## 2024-10-08 RX ADMIN — SODIUM CHLORIDE: 9 INJECTION, SOLUTION INTRAVENOUS at 13:03

## 2024-10-08 RX ADMIN — PANTOPRAZOLE SODIUM 40 MG: 40 TABLET, DELAYED RELEASE ORAL at 09:50

## 2024-10-08 RX ADMIN — APIXABAN 5 MG: 5 TABLET, FILM COATED ORAL at 09:50

## 2024-10-08 RX ADMIN — IPRATROPIUM BROMIDE AND ALBUTEROL SULFATE 1 DOSE: 2.5; .5 SOLUTION RESPIRATORY (INHALATION) at 19:47

## 2024-10-08 RX ADMIN — PETROLATUM: 420 OINTMENT TOPICAL at 09:49

## 2024-10-08 RX ADMIN — APIXABAN 5 MG: 5 TABLET, FILM COATED ORAL at 22:23

## 2024-10-08 RX ADMIN — Medication 1000 UNITS: at 09:50

## 2024-10-08 RX ADMIN — ARFORMOTEROL TARTRATE: 15 SOLUTION RESPIRATORY (INHALATION) at 19:47

## 2024-10-08 RX ADMIN — IPRATROPIUM BROMIDE AND ALBUTEROL SULFATE 1 DOSE: 2.5; .5 SOLUTION RESPIRATORY (INHALATION) at 16:05

## 2024-10-08 RX ADMIN — PETROLATUM: 420 OINTMENT TOPICAL at 22:23

## 2024-10-08 RX ADMIN — METOPROLOL SUCCINATE 25 MG: 25 TABLET, EXTENDED RELEASE ORAL at 09:50

## 2024-10-08 RX ADMIN — IPRATROPIUM BROMIDE AND ALBUTEROL SULFATE 1 DOSE: 2.5; .5 SOLUTION RESPIRATORY (INHALATION) at 00:36

## 2024-10-08 RX ADMIN — IPRATROPIUM BROMIDE AND ALBUTEROL SULFATE 1 DOSE: 2.5; .5 SOLUTION RESPIRATORY (INHALATION) at 06:32

## 2024-10-08 RX ADMIN — METOPROLOL SUCCINATE 25 MG: 25 TABLET, EXTENDED RELEASE ORAL at 22:23

## 2024-10-08 RX ADMIN — ASPIRIN 81 MG: 81 TABLET, COATED ORAL at 09:50

## 2024-10-08 RX ADMIN — ISOSORBIDE MONONITRATE 30 MG: 30 TABLET, EXTENDED RELEASE ORAL at 09:50

## 2024-10-08 RX ADMIN — HYDRALAZINE HYDROCHLORIDE 75 MG: 50 TABLET ORAL at 15:02

## 2024-10-08 RX ADMIN — Medication 3 MG: at 00:09

## 2024-10-08 RX ADMIN — ACETAMINOPHEN 650 MG: 325 TABLET ORAL at 00:09

## 2024-10-08 RX ADMIN — ATORVASTATIN CALCIUM 40 MG: 40 TABLET, FILM COATED ORAL at 22:22

## 2024-10-08 RX ADMIN — PANTOPRAZOLE SODIUM 40 MG: 40 TABLET, DELAYED RELEASE ORAL at 16:58

## 2024-10-08 ASSESSMENT — PAIN SCALES - WONG BAKER: WONGBAKER_NUMERICALRESPONSE: NO HURT

## 2024-10-08 ASSESSMENT — PAIN DESCRIPTION - DESCRIPTORS: DESCRIPTORS: SORE

## 2024-10-08 ASSESSMENT — PAIN DESCRIPTION - LOCATION: LOCATION: CHEST

## 2024-10-08 ASSESSMENT — PAIN SCALES - GENERAL
PAINLEVEL_OUTOF10: 0
PAINLEVEL_OUTOF10: 6
PAINLEVEL_OUTOF10: 0

## 2024-10-08 NOTE — CONSULTS
Department of Internal Medicine  Nephrology Nurse Practitioner Consult Note      Reason for Consult:  HENNY   Requesting Physician:  Cristy KEENE     CHIEF COMPLAINT:  SOB    History Obtained From:  patient, electronic medical record    HISTORY OF PRESENT ILLNESS:  Briefly Mr. Chavarria is a 74-year-old man with history of HTN, HFrEF 45% due to nonischemic cardiomyopathy, PAF, COPD, CVA, bilateral carotid artery stenosis, right upper lobe lung mass, hyperlipidemia, polysubstance abuse, who was admitted on 10/4/24 after presenting to the ED with complaints of SOB. Patient tested positive for COVID and CXR showed pneumonia. On admission, his creatinine was 1.3 mg/dL and today it is 1.5 mg/dL, reason for this consultation. His home medications include hydralazine, metoprolol, isosorbide.     Past Medical History:        Diagnosis Date    Arthritis     Asthma     Bilateral carotid artery stenosis 01/17/2024    Approximately 50% stenosis on the right side and 30 to 40% stenosis on the left side, CT of the carotids, 2024    Cataract, left eye     Cerebral infarction due to occlusion of right vertebral artery (HCC) 01/17/2024    Hypoplastic, small right vertebral artery, with absent flow proximally CT of the carotids 2024  Patent left vertebral artery, good size    COPD (chronic obstructive pulmonary disease) (HCC)     Hyperlipidemia     Hypertension     Occlusion of right vertebral artery 01/17/2024    Small, hypoplastic right vertebral artery occlusion proximally with widely patent dominant left vertebral artery    Tobacco dependence 01/17/2024     Past Surgical History:        Procedure Laterality Date    CARDIAC CATHETERIZATION  08/25/2023    COLONOSCOPY      DENTAL SURGERY      TONSILLECTOMY      UPPER GASTROINTESTINAL ENDOSCOPY N/A 7/16/2024    ESOPHAGOGASTRODUODENOSCOPY [place] [facility] [place]     Current Medications:    Current Facility-Administered Medications: 0.9 % sodium  History:    TOBACCO:   reports that he has been smoking cigarettes. He started smoking about 64 years ago. He has a 16.2 pack-year smoking history. He has never used smokeless tobacco.  ETOH:   reports that he does not currently use alcohol.  DRUGS:   reports current drug use. Drug: Cocaine.    Family History:       Problem Relation Age of Onset    Brain Cancer Mother     Heart Attack Father      REVIEW OF SYSTEMS:    CONSTITUTIONAL:  negative  EYES:  negative  HEENT:  negative  RESPIRATORY:  negative  CARDIOVASCULAR:  negative  GASTROINTESTINAL:  negative    PHYSICAL EXAM:      Vitals:    VITALS:  BP (!) 151/94   Pulse 80   Temp 97.8 °F (36.6 °C) (Temporal)   Resp 20   Ht 1.727 m (5' 8\")   Wt 57.2 kg (126 lb)   SpO2 99%   BMI 19.16 kg/m²   24HR INTAKE/OUTPUT:    Intake/Output Summary (Last 24 hours) at 10/8/2024 1607  Last data filed at 10/8/2024 1500  Gross per 24 hour   Intake 180 ml   Output 900 ml   Net -720 ml       Constitutional:  alert and oriented, NAD   HEENT:  normocephalic, atraumatic   Respiratory:  decreased, SOB  Cardiovascular/Edema:  regular rate and rhythm, no edema  Gastrointestinal:  normal bowel sounds x 4  Neurologic:  normal   Skin:  clean and dry     DATA:    CBC with Differential:    Lab Results   Component Value Date/Time    WBC 4.7 10/05/2024 06:17 AM    RBC 3.69 10/05/2024 06:17 AM    HGB 10.4 10/05/2024 06:17 AM    HCT 31.9 10/05/2024 06:17 AM     10/05/2024 06:17 AM    MCV 86.4 10/05/2024 06:17 AM    MCH 28.2 10/05/2024 06:17 AM    MCHC 32.6 10/05/2024 06:17 AM    RDW 15.2 10/05/2024 06:17 AM    METASPCT 1 07/15/2024 05:20 AM    LYMPHOPCT 10 10/05/2024 06:17 AM    MONOPCT 9 10/05/2024 06:17 AM    MYELOPCT 1 07/15/2024 05:20 AM    EOSPCT 0 10/05/2024 06:17 AM    BASOPCT 0 10/05/2024 06:17 AM    MONOSABS 0.41 10/05/2024 06:17 AM    LYMPHSABS 0.49 10/05/2024 06:17 AM    EOSABS 0.00 10/05/2024 06:17 AM    BASOSABS 0.00 10/05/2024 06:17 AM     CMP:    Lab Results   Component

## 2024-10-08 NOTE — CARE COORDINATION
Reviewed chart, BUN 22 creatinine 1.5, IVF for gentle hydration. Renal consult. Pt from home, plan is to return home. Met with pt, pt states he can take the bus, we discussed using his insurance for transport home, he is agreeable. Currently on 2LO2, will need an ambulatory pulse ox.Sharla Stubbs, MSW, LSW

## 2024-10-08 NOTE — PLAN OF CARE
Problem: Chronic Conditions and Co-morbidities  Goal: Patient's chronic conditions and co-morbidity symptoms are monitored and maintained or improved  10/8/2024 1531 by Loreto Cantrell RN  Outcome: Progressing  10/8/2024 0202 by Ene Crabtree RN  Outcome: Progressing     Problem: Discharge Planning  Goal: Discharge to home or other facility with appropriate resources  10/8/2024 1531 by Loreto Cantrell RN  Outcome: Progressing  10/8/2024 1342 by Erna Stanley RN  Outcome: Progressing  10/8/2024 0202 by Ene Crabtree RN  Outcome: Progressing     Problem: Safety - Adult  Goal: Free from fall injury  10/8/2024 1531 by Loreto Cantrell RN  Outcome: Progressing  10/8/2024 1342 by Erna Stanley RN  Outcome: Progressing  10/8/2024 0202 by Ene Crabtree RN  Outcome: Progressing     Problem: Pain  Goal: Verbalizes/displays adequate comfort level or baseline comfort level  10/8/2024 1342 by Erna Stanley RN  Outcome: Progressing  10/8/2024 0202 by Ene Crabtree RN  Outcome: Progressing     Problem: Skin/Tissue Integrity  Goal: Absence of new skin breakdown  Description: 1.  Monitor for areas of redness and/or skin breakdown  2.  Assess vascular access sites hourly  3.  Every 4-6 hours minimum:  Change oxygen saturation probe site  4.  Every 4-6 hours:  If on nasal continuous positive airway pressure, respiratory therapy assess nares and determine need for appliance change or resting period.  10/8/2024 1342 by Erna Stanley RN  Outcome: Progressing  10/8/2024 0202 by Ene Crabtree RN  Outcome: Progressing

## 2024-10-08 NOTE — PROGRESS NOTES
[facility] Hospitalist Progress Note    SYNOPSIS: Patient admitted on 10/4/2024 for SOB (shortness of breath)    74-year-old male with past medical history of asthma, COPD, CVA, HLD HLD and HTN who presented to the ED with complaints of shortness of breath. Patient was recently hospitalized 2024 - 2024 for the same complaints. He was followed by both nephrology and cardiology who optimized his medications. On the day of his discharge patient passed a 6-minute walk test without any oxygen requirements. He was discharged home with a follow-up with his PCP in 1 week nephrology in 3 to 4 weeks. Patient was recently scheduled to follow with CHF clinic and per EMR patient never showed for his appointment when the clinic attempted to reach out to him but his numbers were disconnected. In the ED, patient was placed on BIPAP for respiratory support. RVP was positive for rhinovirus. CXR showed pulmonary vascular congestion.  Patient was admitted and started on IV diuresis for further management. Patient developed HENNY thought to be prerenal due to poor oral intake. Diuresis held and fluids started with improvement initially. Nephrology consulted.     SUBJECTIVE:  Stable overnight. No other overnight issues reported.   Patient seen and examined  Records reviewed.   Patient states he is not SOB but he feels weak with a poor appetite.    Temp (24hrs), Av.6 °F (36.4 °C), Min:97.1 °F (36.2 °C), Max:97.9 °F (36.6 °C)    DIET: ADULT DIET; Regular  CODE: Full Code    Intake/Output Summary (Last 24 hours) at 10/8/2024 1137  Last data filed at 10/8/2024 0810  Gross per 24 hour   Intake 1149.41 ml   Output 450 ml   Net 699.41 ml       Review of Systems  All bolded are positive; please see HPI  General:  Fever, chills, diaphoresis, fatigue, malaise, night sweats, weight loss  Psychological:  Anxiety, disorientation, hallucinations.  ENT:  Epistaxis, headaches, vertigo, visual changes.  Cardiovascular:  Chest pain,  magnesium sulfate, polyethylene glycol, acetaminophen **OR** acetaminophen, melatonin, guaiFENesin-dextromethorphan, prochlorperazine **OR** prochlorperazine    Labs:     No results for input(s): \"WBC\", \"HGB\", \"HCT\", \"PLT\" in the last 72 hours.      Recent Labs     10/06/24  1756 10/07/24  0435 10/08/24  0541    142 142   K 4.2 4.7 4.0   * 113* 111*   CO2 21* 17* 22   BUN 31* 28* 22   CREATININE 1.4* 1.3* 1.5*   CALCIUM 8.9 8.6 8.5*       No results for input(s): \"ALKPHOS\", \"ALT\", \"AST\", \"BILITOT\", \"AMYLASE\", \"LIPASE\" in the last 72 hours.    Invalid input(s): \"PROT\", \"ALB\"      No results for input(s): \"INR\" in the last 72 hours.    No results for input(s): \"CKTOTAL\", \"TROPONINI\" in the last 72 hours.    Chronic labs:    Lab Results   Component Value Date    CHOL 159 09/10/2024    TRIG 46 09/10/2024    HDL 71 09/10/2024    TSH 2.97 07/08/2024    INR 1.1 09/10/2015    LABA1C 5.8 (H) 01/18/2024       Radiology: REVIEWED DAILY    +++++++++++++++++++++++++++++++++++++++++++++++++  Eula Bloom MD  [facility]  [address] [place], [place]  +++++++++++++++++++++++++++++++++++++++++++++++++  NOTE: This report was transcribed using voice recognition software. Every effort was made to ensure accuracy; however, inadvertent computerized transcription errors may be present.

## 2024-10-08 NOTE — PLAN OF CARE
Problem: Discharge Planning  Goal: Discharge to home or other facility with appropriate resources  10/8/2024 1342 by Erna Stanley RN  Outcome: Progressing  10/8/2024 0202 by Ene Crabtree RN  Outcome: Progressing     Problem: Discharge Planning  Goal: Discharge to home or other facility with appropriate resources  10/8/2024 1342 by Erna Stanley RN  Outcome: Progressing  10/8/2024 0202 by Ene Crabtree RN  Outcome: Progressing     Problem: Chronic Conditions and Co-morbidities  Goal: Patient's chronic conditions and co-morbidity symptoms are monitored and maintained or improved  10/8/2024 0202 by Ene Crabtree RN  Outcome: Progressing     Problem: Safety - Adult  Goal: Free from fall injury  10/8/2024 1342 by Erna Stanley RN  Outcome: Progressing  10/8/2024 0202 by Ene Crabtree RN  Outcome: Progressing     Problem: Skin/Tissue Integrity  Goal: Absence of new skin breakdown  Description: 1.  Monitor for areas of redness and/or skin breakdown  2.  Assess vascular access sites hourly  3.  Every 4-6 hours minimum:  Change oxygen saturation probe site  4.  Every 4-6 hours:  If on nasal continuous positive airway pressure, respiratory therapy assess nares and determine need for appliance change or resting period.  10/8/2024 1342 by Erna Stanley RN  Outcome: Progressing  10/8/2024 0202 by Ene Crabtree RN  Outcome: Progressing     Problem: Pain  Goal: Verbalizes/displays adequate comfort level or baseline comfort level  10/8/2024 1342 by Erna Stanley RN  Outcome: Progressing  10/8/2024 0202 by Ene Crabtree RN  Outcome: Progressing      DVT prophylaxis

## 2024-10-09 ENCOUNTER — APPOINTMENT (OUTPATIENT)
Dept: GENERAL RADIOLOGY | Age: 74
DRG: 133 | End: 2024-10-09
Payer: COMMERCIAL

## 2024-10-09 PROBLEM — J96.01 ACUTE RESPIRATORY FAILURE WITH HYPOXIA: Status: ACTIVE | Noted: 2024-09-09

## 2024-10-09 LAB
AADO2: 148.6 MMHG
AMPHET UR QL SCN: NEGATIVE
ANION GAP SERPL CALCULATED.3IONS-SCNC: 10 MMOL/L (ref 7–16)
B.E.: -4.5 MMOL/L (ref -3–3)
BARBITURATES UR QL SCN: NEGATIVE
BASOPHILS # BLD: 0.06 K/UL (ref 0–0.2)
BASOPHILS NFR BLD: 1 % (ref 0–2)
BENZODIAZ UR QL: NEGATIVE
BUN SERPL-MCNC: 21 MG/DL (ref 6–23)
BUPRENORPHINE UR QL: NEGATIVE
CALCIUM SERPL-MCNC: 8.4 MG/DL (ref 8.6–10.2)
CANNABINOIDS UR QL SCN: NEGATIVE
CHLORIDE SERPL-SCNC: 116 MMOL/L (ref 98–107)
CO2 SERPL-SCNC: 18 MMOL/L (ref 22–29)
COCAINE UR QL SCN: POSITIVE
COHB: 0.3 % (ref 0–1.5)
COMMENT: ABNORMAL
CREAT SERPL-MCNC: 1.3 MG/DL (ref 0.7–1.2)
CREAT UR-MCNC: 103.9 MG/DL (ref 40–278)
CREAT UR-MCNC: 104 MG/DL (ref 40–278)
CRITICAL: ABNORMAL
DATE ANALYZED: ABNORMAL
DATE OF COLLECTION: ABNORMAL
EOSINOPHIL # BLD: 0.37 K/UL (ref 0.05–0.5)
EOSINOPHILS RELATIVE PERCENT: 7 % (ref 0–6)
ERYTHROCYTE [DISTWIDTH] IN BLOOD BY AUTOMATED COUNT: 15.8 % (ref 11.5–15)
ERYTHROCYTE [DISTWIDTH] IN BLOOD BY AUTOMATED COUNT: 15.8 % (ref 11.5–15)
FENTANYL UR QL: NEGATIVE
FIO2: 50 %
FOLATE SERPL-MCNC: 13.2 NG/ML (ref 4.8–24.2)
GFR, ESTIMATED: 58 ML/MIN/1.73M2
GLUCOSE SERPL-MCNC: 129 MG/DL (ref 74–99)
HCO3: 20.7 MMOL/L (ref 22–26)
HCT VFR BLD AUTO: 30.8 % (ref 37–54)
HCT VFR BLD AUTO: 32.6 % (ref 37–54)
HGB BLD-MCNC: 10.1 G/DL (ref 12.5–16.5)
HGB BLD-MCNC: 9.7 G/DL (ref 12.5–16.5)
HHB: 1.1 % (ref 0–5)
IMM GRANULOCYTES # BLD AUTO: 0.03 K/UL (ref 0–0.58)
IMM GRANULOCYTES NFR BLD: 1 % (ref 0–5)
IRON SATN MFR SERPL: 16 % (ref 20–55)
IRON SERPL-MCNC: 40 UG/DL (ref 59–158)
LAB: ABNORMAL
LACTATE BLDV-SCNC: 0.8 MMOL/L (ref 0.5–2.2)
LYMPHOCYTES NFR BLD: 0.88 K/UL (ref 1.5–4)
LYMPHOCYTES RELATIVE PERCENT: 15 % (ref 20–42)
Lab: 221
MAGNESIUM SERPL-MCNC: 1.8 MG/DL (ref 1.6–2.6)
MCH RBC QN AUTO: 28.3 PG (ref 26–35)
MCH RBC QN AUTO: 28.4 PG (ref 26–35)
MCHC RBC AUTO-ENTMCNC: 31 G/DL (ref 32–34.5)
MCHC RBC AUTO-ENTMCNC: 31.5 G/DL (ref 32–34.5)
MCV RBC AUTO: 89.8 FL (ref 80–99.9)
MCV RBC AUTO: 91.6 FL (ref 80–99.9)
METHADONE UR QL: NEGATIVE
METHB: 0.6 % (ref 0–1.5)
MICROALBUMIN UR-MCNC: 1417 MG/L (ref 0–19)
MICROALBUMIN/CREAT UR-RTO: 1363 MCG/MG CREAT (ref 0–30)
MONOCYTES NFR BLD: 0.75 K/UL (ref 0.1–0.95)
MONOCYTES NFR BLD: 13 % (ref 2–12)
NEUTROPHILS NFR BLD: 63 % (ref 43–80)
NEUTS SEG NFR BLD: 3.62 K/UL (ref 1.8–7.3)
O2 SATURATION: 98.9 % (ref 92–98.5)
O2HB: 98 % (ref 94–97)
OPERATOR ID: 5134
OPIATES UR QL SCN: POSITIVE
OXYCODONE UR QL SCN: NEGATIVE
PATIENT TEMP: 37 C
PCO2: 38.5 MMHG (ref 35–45)
PCP UR QL SCN: NEGATIVE
PFO2: 3.29 MMHG/%
PH BLOOD GAS: 7.35 (ref 7.35–7.45)
PHOSPHATE SERPL-MCNC: 3.3 MG/DL (ref 2.5–4.5)
PLATELET # BLD AUTO: 197 K/UL (ref 130–450)
PLATELET # BLD AUTO: 201 K/UL (ref 130–450)
PMV BLD AUTO: 10.2 FL (ref 7–12)
PMV BLD AUTO: 10.7 FL (ref 7–12)
PO2: 164.6 MMHG (ref 75–100)
POTASSIUM SERPL-SCNC: 4.2 MMOL/L (ref 3.5–5)
RBC # BLD AUTO: 3.43 M/UL (ref 3.8–5.8)
RBC # BLD AUTO: 3.56 M/UL (ref 3.8–5.8)
RI(T): 0.9
SODIUM SERPL-SCNC: 144 MMOL/L (ref 132–146)
SOURCE, BLOOD GAS: ABNORMAL
TEST INFORMATION: ABNORMAL
THB: 11 G/DL (ref 11.5–16.5)
TIBC SERPL-MCNC: 254 UG/DL (ref 250–450)
TIME ANALYZED: 222
TOTAL PROTEIN, URINE: 227 MG/DL (ref 0–12)
TROPONIN I SERPL HS-MCNC: 628 NG/L (ref 0–11)
TROPONIN I SERPL HS-MCNC: 747 NG/L (ref 0–11)
TROPONIN I SERPL HS-MCNC: 752 NG/L (ref 0–11)
TROPONIN I SERPL HS-MCNC: 763 NG/L (ref 0–11)
TROPONIN I SERPL HS-MCNC: 780 NG/L (ref 0–11)
TROPONIN I SERPL HS-MCNC: NORMAL NG/L (ref 0–22)
TROPONIN INTERP: NORMAL
TROPONIN T SERPL-MCNC: NORMAL NG/ML
URINE TOTAL PROTEIN CREATININE RATIO: 2.18 (ref 0–0.2)
VIT B12 SERPL-MCNC: 713 PG/ML (ref 211–946)
WBC OTHER # BLD: 5.7 K/UL (ref 4.5–11.5)
WBC OTHER # BLD: 7.9 K/UL (ref 4.5–11.5)

## 2024-10-09 PROCEDURE — 6370000000 HC RX 637 (ALT 250 FOR IP): Performed by: NURSE PRACTITIONER

## 2024-10-09 PROCEDURE — 82607 VITAMIN B-12: CPT

## 2024-10-09 PROCEDURE — 94660 CPAP INITIATION&MGMT: CPT

## 2024-10-09 PROCEDURE — 82570 ASSAY OF URINE CREATININE: CPT

## 2024-10-09 PROCEDURE — 6360000002 HC RX W HCPCS: Performed by: CLINICAL NURSE SPECIALIST

## 2024-10-09 PROCEDURE — 2700000000 HC OXYGEN THERAPY PER DAY

## 2024-10-09 PROCEDURE — 85730 THROMBOPLASTIN TIME PARTIAL: CPT

## 2024-10-09 PROCEDURE — 84100 ASSAY OF PHOSPHORUS: CPT

## 2024-10-09 PROCEDURE — 6360000002 HC RX W HCPCS: Performed by: INTERNAL MEDICINE

## 2024-10-09 PROCEDURE — 84484 ASSAY OF TROPONIN QUANT: CPT

## 2024-10-09 PROCEDURE — 2580000003 HC RX 258: Performed by: NURSE PRACTITIONER

## 2024-10-09 PROCEDURE — 84156 ASSAY OF PROTEIN URINE: CPT

## 2024-10-09 PROCEDURE — APPSS180 APP SPLIT SHARED TIME > 60 MINUTES: Performed by: CLINICAL NURSE SPECIALIST

## 2024-10-09 PROCEDURE — 6360000002 HC RX W HCPCS: Performed by: NURSE PRACTITIONER

## 2024-10-09 PROCEDURE — 83550 IRON BINDING TEST: CPT

## 2024-10-09 PROCEDURE — 71045 X-RAY EXAM CHEST 1 VIEW: CPT

## 2024-10-09 PROCEDURE — 99232 SBSQ HOSP IP/OBS MODERATE 35: CPT | Performed by: INTERNAL MEDICINE

## 2024-10-09 PROCEDURE — 85027 COMPLETE CBC AUTOMATED: CPT

## 2024-10-09 PROCEDURE — 80048 BASIC METABOLIC PNL TOTAL CA: CPT

## 2024-10-09 PROCEDURE — 6370000000 HC RX 637 (ALT 250 FOR IP): Performed by: CLINICAL NURSE SPECIALIST

## 2024-10-09 PROCEDURE — 83605 ASSAY OF LACTIC ACID: CPT

## 2024-10-09 PROCEDURE — 2060000000 HC ICU INTERMEDIATE R&B

## 2024-10-09 PROCEDURE — 94640 AIRWAY INHALATION TREATMENT: CPT

## 2024-10-09 PROCEDURE — 85025 COMPLETE CBC W/AUTO DIFF WBC: CPT

## 2024-10-09 PROCEDURE — 83540 ASSAY OF IRON: CPT

## 2024-10-09 PROCEDURE — 83735 ASSAY OF MAGNESIUM: CPT

## 2024-10-09 PROCEDURE — 82043 UR ALBUMIN QUANTITATIVE: CPT

## 2024-10-09 PROCEDURE — 99222 1ST HOSP IP/OBS MODERATE 55: CPT | Performed by: INTERNAL MEDICINE

## 2024-10-09 PROCEDURE — 6360000002 HC RX W HCPCS

## 2024-10-09 PROCEDURE — 82805 BLOOD GASES W/O2 SATURATION: CPT

## 2024-10-09 PROCEDURE — 82746 ASSAY OF FOLIC ACID SERUM: CPT

## 2024-10-09 PROCEDURE — 2580000003 HC RX 258: Performed by: INTERNAL MEDICINE

## 2024-10-09 PROCEDURE — 36415 COLL VENOUS BLD VENIPUNCTURE: CPT

## 2024-10-09 PROCEDURE — 6360000002 HC RX W HCPCS: Performed by: HOSPITALIST

## 2024-10-09 PROCEDURE — 80307 DRUG TEST PRSMV CHEM ANLYZR: CPT

## 2024-10-09 PROCEDURE — 93005 ELECTROCARDIOGRAM TRACING: CPT

## 2024-10-09 RX ORDER — HEPARIN SODIUM 10000 [USP'U]/100ML
5-30 INJECTION, SOLUTION INTRAVENOUS CONTINUOUS
Status: DISCONTINUED | OUTPATIENT
Start: 2024-10-09 | End: 2024-10-09

## 2024-10-09 RX ORDER — HEPARIN SODIUM 1000 [USP'U]/ML
60 INJECTION, SOLUTION INTRAVENOUS; SUBCUTANEOUS ONCE
Status: COMPLETED | OUTPATIENT
Start: 2024-10-09 | End: 2024-10-09

## 2024-10-09 RX ORDER — HYDRALAZINE HYDROCHLORIDE 50 MG/1
100 TABLET, FILM COATED ORAL 3 TIMES DAILY
Status: DISCONTINUED | OUTPATIENT
Start: 2024-10-09 | End: 2024-10-16 | Stop reason: HOSPADM

## 2024-10-09 RX ORDER — HEPARIN SODIUM 1000 [USP'U]/ML
60 INJECTION, SOLUTION INTRAVENOUS; SUBCUTANEOUS PRN
Status: DISCONTINUED | OUTPATIENT
Start: 2024-10-09 | End: 2024-10-11

## 2024-10-09 RX ORDER — MORPHINE SULFATE 2 MG/ML
1 INJECTION, SOLUTION INTRAMUSCULAR; INTRAVENOUS ONCE
Status: COMPLETED | OUTPATIENT
Start: 2024-10-09 | End: 2024-10-09

## 2024-10-09 RX ORDER — CARVEDILOL 25 MG/1
25 TABLET ORAL 2 TIMES DAILY WITH MEALS
Status: DISCONTINUED | OUTPATIENT
Start: 2024-10-09 | End: 2024-10-16 | Stop reason: HOSPADM

## 2024-10-09 RX ORDER — HYDROXYZINE HYDROCHLORIDE 50 MG/ML
25 INJECTION, SOLUTION INTRAMUSCULAR EVERY 6 HOURS PRN
Status: DISCONTINUED | OUTPATIENT
Start: 2024-10-09 | End: 2024-10-16 | Stop reason: HOSPADM

## 2024-10-09 RX ORDER — HEPARIN SODIUM 1000 [USP'U]/ML
30 INJECTION, SOLUTION INTRAVENOUS; SUBCUTANEOUS PRN
Status: DISCONTINUED | OUTPATIENT
Start: 2024-10-09 | End: 2024-10-11

## 2024-10-09 RX ORDER — MAGNESIUM SULFATE IN WATER 40 MG/ML
2000 INJECTION, SOLUTION INTRAVENOUS ONCE
Status: COMPLETED | OUTPATIENT
Start: 2024-10-09 | End: 2024-10-09

## 2024-10-09 RX ORDER — DEXTROSE, SODIUM CHLORIDE, SODIUM LACTATE, POTASSIUM CHLORIDE, AND CALCIUM CHLORIDE 5; .6; .31; .03; .02 G/100ML; G/100ML; G/100ML; G/100ML; G/100ML
INJECTION, SOLUTION INTRAVENOUS CONTINUOUS
Status: DISCONTINUED | OUTPATIENT
Start: 2024-10-09 | End: 2024-10-11

## 2024-10-09 RX ADMIN — PETROLATUM: 420 OINTMENT TOPICAL at 09:00

## 2024-10-09 RX ADMIN — IPRATROPIUM BROMIDE AND ALBUTEROL SULFATE 1 DOSE: 2.5; .5 SOLUTION RESPIRATORY (INHALATION) at 19:34

## 2024-10-09 RX ADMIN — IPRATROPIUM BROMIDE AND ALBUTEROL SULFATE 1 DOSE: 2.5; .5 SOLUTION RESPIRATORY (INHALATION) at 09:51

## 2024-10-09 RX ADMIN — SODIUM CHLORIDE, SODIUM LACTATE, POTASSIUM CHLORIDE, CALCIUM CHLORIDE AND DEXTROSE MONOHYDRATE: 5; 600; 310; 30; 20 INJECTION, SOLUTION INTRAVENOUS at 16:19

## 2024-10-09 RX ADMIN — HYDRALAZINE HYDROCHLORIDE 75 MG: 50 TABLET ORAL at 14:26

## 2024-10-09 RX ADMIN — MAGNESIUM SULFATE HEPTAHYDRATE 2000 MG: 40 INJECTION, SOLUTION INTRAVENOUS at 14:16

## 2024-10-09 RX ADMIN — HEPARIN SODIUM 3300 UNITS: 1000 INJECTION INTRAVENOUS; SUBCUTANEOUS at 14:08

## 2024-10-09 RX ADMIN — SODIUM CHLORIDE, PRESERVATIVE FREE 10 ML: 5 INJECTION INTRAVENOUS at 21:02

## 2024-10-09 RX ADMIN — HEPARIN SODIUM 12 UNITS/KG/HR: 10000 INJECTION, SOLUTION INTRAVENOUS at 14:10

## 2024-10-09 RX ADMIN — PETROLATUM: 420 OINTMENT TOPICAL at 21:03

## 2024-10-09 RX ADMIN — IPRATROPIUM BROMIDE AND ALBUTEROL SULFATE 1 DOSE: 2.5; .5 SOLUTION RESPIRATORY (INHALATION) at 14:41

## 2024-10-09 RX ADMIN — ARFORMOTEROL TARTRATE: 15 SOLUTION RESPIRATORY (INHALATION) at 09:51

## 2024-10-09 RX ADMIN — SODIUM CHLORIDE 25 MG: 9 INJECTION, SOLUTION INTRAVENOUS at 15:09

## 2024-10-09 RX ADMIN — ATORVASTATIN CALCIUM 40 MG: 40 TABLET, FILM COATED ORAL at 20:57

## 2024-10-09 RX ADMIN — HYDROXYZINE HYDROCHLORIDE 25 MG: 50 INJECTION, SOLUTION INTRAMUSCULAR at 03:59

## 2024-10-09 RX ADMIN — PANTOPRAZOLE SODIUM 40 MG: 40 TABLET, DELAYED RELEASE ORAL at 16:22

## 2024-10-09 RX ADMIN — HYDRALAZINE HYDROCHLORIDE 100 MG: 50 TABLET ORAL at 20:57

## 2024-10-09 RX ADMIN — APIXABAN 5 MG: 5 TABLET, FILM COATED ORAL at 16:22

## 2024-10-09 RX ADMIN — ARFORMOTEROL TARTRATE: 15 SOLUTION RESPIRATORY (INHALATION) at 19:34

## 2024-10-09 RX ADMIN — IPRATROPIUM BROMIDE AND ALBUTEROL SULFATE 1 DOSE: 2.5; .5 SOLUTION RESPIRATORY (INHALATION) at 02:33

## 2024-10-09 RX ADMIN — METOPROLOL SUCCINATE 25 MG: 25 TABLET, EXTENDED RELEASE ORAL at 15:06

## 2024-10-09 RX ADMIN — MORPHINE SULFATE 1 MG: 2 INJECTION, SOLUTION INTRAMUSCULAR; INTRAVENOUS at 00:52

## 2024-10-09 RX ADMIN — SODIUM CHLORIDE 100 MG: 9 INJECTION, SOLUTION INTRAVENOUS at 16:14

## 2024-10-09 RX ADMIN — PANTOPRAZOLE SODIUM 40 MG: 40 TABLET, DELAYED RELEASE ORAL at 06:46

## 2024-10-09 RX ADMIN — ISOSORBIDE MONONITRATE 30 MG: 30 TABLET, EXTENDED RELEASE ORAL at 15:06

## 2024-10-09 RX ADMIN — CARVEDILOL 25 MG: 25 TABLET, FILM COATED ORAL at 16:21

## 2024-10-09 RX ADMIN — SODIUM CHLORIDE, PRESERVATIVE FREE 10 ML: 5 INJECTION INTRAVENOUS at 10:00

## 2024-10-09 ASSESSMENT — PAIN SCALES - GENERAL
PAINLEVEL_OUTOF10: 0
PAINLEVEL_OUTOF10: 0

## 2024-10-09 NOTE — CONSULTS
Inpatient Cardiology Consultation      Reason for Consult:  AHRF, Rhinovirus, EKG changes, elevated troponin likely demand insetting of severe hypoxia during RRT    Consulting Physician: Dr. Ledbetter    Requesting Physician:  Dr Bloom    Date of Consultation: 10/9/2024    HISTORY OF PRESENT ILLNESS:   Chuck Chavarria Jr.  is a 74 y.o.  male known to [facility] cardiology through Dr. Carrasco. He was last seen inpatient by Dr. Carrasco on 9/11/2024 for shortness of breath and T wave inversions in lateral leads troponin 53-50.  BUN 33 CR 1.7 HENNY proBNP 11,578 urine drug screen positive for cocaine.  Echocardiogram was completed.  ShowingLVEF 40%.  No advanced cardiac testing planned as patient had had a negative left heart catheterization 8/2023  Discharge cardiac medications: Imdur 30 mg daily, Apresoline 25 mg 3 times daily, Eliquis 5 mg twice daily, aspirin 81 mg daily, atorvastatin 40 mg daily, Toprol-XL 25 mg twice daily.  Recorded weight on day of discharge 129 pounds  PMH: see below    ED 10/24/2024 via EMS for shortness of breath EMS gave Solu-Medrol and DuoNeb prior to arrival  Arrival vitals: /113 HR 80  ED treatment: Solu-Medrol, labetalol 10 mg, DuoNeb inhalers, Lasix IV 20 mg  Significant Labs since admit: NT proBNP 13,158 initial troponin 10/6/2024 41-71 >> repeat troponins 10/9/2024 780 >> 763 >> 752 BUN 31 CR 1.5>> BUN 21 CR 1.3 K4.2 mag 1.8 lactic acid 0.8 WBC 7.9 Hgb 10.1  iron 40 iron percent saturation 16 received IV Ferrlecit  ABGs on FiO2 50%: pH 7.349 pCO2 39 pO2 165 HCO3 21  Full respiratory panel positive for rhino/enterovirus and droplet isolation  Respiratory culture pending  Urine drug screen was ordered but never collected  RAD: Today portable chest x-ray pending  Portable chest x-ray 10/6/2024: Improvement without resolution of pulmonary interstitial edema  Initial chest x-ray portable 10/4/2024 pulmonary venous congestion with bilateral interstitial changes suspicious for  stenosis.  OM1: No angiographically significant stenosis.  OM2: No angiographically significant stenosis.  Ramus: No angiographically significant stenosis.  RCA: Dominant.  No angiographically significant stenosis.  PDA: No angiographically significant stenosis.  PLB: Small vessel.  Past Surgical History:    Past Surgical History:   Procedure Laterality Date    CARDIAC CATHETERIZATION  08/25/2023    COLONOSCOPY      DENTAL SURGERY      TONSILLECTOMY      UPPER GASTROINTESTINAL ENDOSCOPY N/A 7/16/2024    ESOPHAGOGASTRODUODENOSCOPY performed by Todd Chow MD at [facility] [facility]       Medications Prior to admit:  Prior to Admission medications    Medication Sig Start Date End Date Taking? Authorizing Provider   hydrALAZINE (APRESOLINE) 25 MG tablet Take 3 tablets by mouth 3 times daily 9/13/24   Jomar Torres MD   isosorbide mononitrate (IMDUR) 30 MG extended release tablet Take 1 tablet by mouth daily 9/14/24   Jomar Torres MD   pantoprazole (PROTONIX) 40 MG tablet Take 1 tablet by mouth 2 times daily (before meals) 7/18/24   Baltazar Huffman MD   budesonide-formoterol (SYMBICORT) 160-4.5 MCG/ACT AERO Inhale 2 puffs into the lungs 2 times daily 7/17/24   Baltazar Huffman MD   tiotropium (SPIRIVA HANDIHALER) 18 MCG inhalation capsule Inhale 1 capsule into the lungs daily 7/17/24   Baltazar Huffman MD   albuterol sulfate HFA (PROVENTIL;VENTOLIN;PROAIR) 108 (90 Base) MCG/ACT inhaler Inhale 2 puffs into the lungs every 4 hours as needed for Wheezing 7/10/24   Baltazar Huffman MD   apixaban (ELIQUIS) 5 MG TABS tablet Take 1 tablet by mouth 2 times daily 7/10/24   Baltazar Huffman MD   metoprolol succinate (TOPROL XL) 25 MG extended release tablet Take 1 tablet by mouth in the morning and at bedtime 7/10/24   Baltazar Huffman MD   melatonin 3 MG TABS tablet Take 1.5 tablets by mouth nightly as needed (insomnia) 7/10/24   Baltazar Huffman MD   nicotine (NICODERM CQ) 21 MG/24HR Place 1 patch onto the skin daily 7/10/24   Nathanael

## 2024-10-09 NOTE — PROGRESS NOTES
Component Value Date/Time    WBC 7.9 10/09/2024 11:55 AM    RBC 3.56 10/09/2024 11:55 AM    HGB 10.1 10/09/2024 11:55 AM    HCT 32.6 10/09/2024 11:55 AM     10/09/2024 11:55 AM    MCV 91.6 10/09/2024 11:55 AM    MCH 28.4 10/09/2024 11:55 AM    MCHC 31.0 10/09/2024 11:55 AM    RDW 15.8 10/09/2024 11:55 AM    METASPCT 1 07/15/2024 05:20 AM    LYMPHOPCT 15 10/09/2024 02:30 AM    MONOPCT 13 10/09/2024 02:30 AM    MYELOPCT 1 07/15/2024 05:20 AM    EOSPCT 7 10/09/2024 02:30 AM    BASOPCT 1 10/09/2024 02:30 AM    MONOSABS 0.75 10/09/2024 02:30 AM    LYMPHSABS 0.88 10/09/2024 02:30 AM    EOSABS 0.37 10/09/2024 02:30 AM    BASOSABS 0.06 10/09/2024 02:30 AM     CMP:    Lab Results   Component Value Date/Time     10/09/2024 02:30 AM    K 4.2 10/09/2024 02:30 AM    K 4.8 08/28/2022 05:15 AM     10/09/2024 02:30 AM    CO2 18 10/09/2024 02:30 AM    BUN 21 10/09/2024 02:30 AM    CREATININE 1.3 10/09/2024 02:30 AM    GFRAA >60 08/28/2022 05:15 AM    LABGLOM 58 10/09/2024 02:30 AM    LABGLOM >60 01/18/2024 07:07 AM    GLUCOSE 129 10/09/2024 02:30 AM    CALCIUM 8.4 10/09/2024 02:30 AM    BILITOT 0.6 10/04/2024 09:10 AM    ALKPHOS 85 10/04/2024 09:10 AM    AST 23 10/04/2024 09:10 AM    ALT 10 10/04/2024 09:10 AM       Magnesium:    Lab Results   Component Value Date/Time    MG 1.8 10/09/2024 02:30 AM     Phosphorus:    Lab Results   Component Value Date/Time    PHOS 3.3 10/09/2024 02:30 AM       Radiology review:  ONE XRAY VIEW OF THE CHEST 10/6/2024 5:41 pm     FINDINGS:  Mild improvement of pulmonary vascular congestion and interstitial edema.  Stable appearance of the cardiomediastinal silhouette allowing for technical  differences.   Possible layered right pleural effusion.     IMPRESSION:  Improvement without resolution of pulmonary interstitial edema.      BRIEF SUMMARY OF INITIAL CONSULT:    Briefly Mr. Chavarria is a 74-year-old man with history of HTN, HFrEF 45% due to nonischemic cardiomyopathy, PAF,  COPD, CVA, bilateral carotid artery stenosis, right upper lobe lung mass, hyperlipidemia, polysubstance abuse, who was admitted on 10/4/24 after presenting to the ED with complaints of SOB. Patient tested positive for rhinovirus. On admission, his creatinine was 1.3 mg/dL and today it is 1.5 mg/dL, reason for this consultation. His home medications include hydralazine, metoprolol, isosorbide.     IMPRESSION/RECOMMENDATIONS:        HENNY stage I on CKD, probably hemodynamically mediated due to volume depletion with poor PO intake. Renal function worsening today, creatinine 1.3 mg/dL today.     CKD stage II, protein excretion, kidney ultrasound showing bilateral kidney echogenicity consistent with medical renal disease. Urine protein 227, Microalbumin urine 1417, UACR 1363, UPCR 2.18.     HFrEF 40%, proBNP 7672 > 14770  HTN, on hydralazine, metoprolol, isosorbide   Normocytic anemia, iron 40, TIBC 254, folate 13.2, B12 713, Iron sat% 16, start ferrlicit   -----------------------  Rhinovirus   Hyperlipidemia, on atorvastatin  COPD  History of PAF, on apixaban and metoprolol   Cocaine abuse      Plan:     Change IV fluids to D5LR@ 60 mL/hour  Start ferrlicit x 4 doses   Monitor renal function  Continue hydralazine 75 mg 3 times daily, metoprolol succinate 25 mg twice daily, isosorbide 30 mg daily  Continue cholecalciferol 1000 units daily.   Avoid hypotension      JASSI Dumas - NP    I saw and evaluated the patient, performing the key elements of the service. I discussed the findings, assessment and plan with NP and agree with her findings and plans as documented in her note.        Catherine Cook MD

## 2024-10-09 NOTE — SIGNIFICANT EVENT
Critical Care - Rapid Response Team Note      Date of event: 10/9/2024   Time of event: 0242    Chuck Chavarria Jr. 74 y.o. year old male   YOB: 1950     PCP:  Gunner Espinosa MD   [facility]   Witnessed? : [x]Yes  [] No  Initial Code status: [x] Full  [] DNR-CCA  []DNR-CC    []Limited  ______________________________________________________________________  Reason for RRT:   Other: Shortness of breath     Chief Complaint for this admission:   Chief Complaint   Patient presents with    Asthma     Asthma flare up per pt- given 1 Duoneb and 125 solumedrol by EMS       Admit date:  10/4/2024     Admitting Diagnosis: SOB (shortness of breath) [R06.02]  Acute respiratory failure with hypoxia [J96.01]      Current Diagnosis: The encounter diagnosis was Acute respiratory failure with hypoxia.     Subjective:     Rapid response was called for shortness of breath   Patient admitted for AHRF in setting of Rhinovirus   Upon my assessment at bedside, patent is awake and on BiPAP  Reports SOB and CP. Chest pain is midline, intermittent and with no radiation; No associated nausea or sweating   Reveiwed vital signs, remained hemodynamically stable  Reviewed prior CXR and Labs  Obtained ABG, showing mild metabolic acidosis   Obtained STAT CBC, BMP, Mg, Phos, Lactic and troponin   Patient Alert and Oriented x2    Objective:   Initial Assessment on arrival:  Vital signs: BP: 140/90, RR: 20-25, HR: 80-90 SpO2:99%    Airway and Condition: Conscious, Pulse present, Breathing, and Airway Open/Clear noted    Lungs And Circulation: clear to auscultation bilaterally noted                  Neurologic: pupils reactive and follows commands noted           Subsequent Assessment After Initial Interventions:   Interim Vital Sign Checks: [x] Yes  [] No      RRT Assessment and Plan:    Chuck Chavarria Jr. is a 74 y.o. male with  has a past medical history of Arthritis, Asthma, Bilateral carotid artery stenosis,

## 2024-10-09 NOTE — PROGRESS NOTES
When to assist patient in eating took bipap and placed him on 4 liters as he began to eat he started to cough and was observed with labored breathing, stopped him from eating mouth cleared respiratory came in to administer breathing treatment, placed back on bi pap, and message sent to physician, who messaged she will come to see him. New orders placed after she rounded and stated he was able to eat lunch. Stayed at bedside with lunch and no complications while eating.

## 2024-10-09 NOTE — CARE COORDINATION
Reviewed chart, new cardio consult, renal following, pt to continue IVF and ferrlicit X4 dose. PT/OT 17/24. Plan is home per pt, pt on 4LO2, RA is baseline. Will  need an ambulatory pulse ox at discharge. Pt will need insurance called for transport, [phone] opt 3.Sharla Stubbs, MSW, LSW

## 2024-10-09 NOTE — PROGRESS NOTES
[facility] Hospitalist Progress Note    SYNOPSIS: Patient admitted on 10/4/2024 for SOB (shortness of breath)    74-year-old male with past medical history of asthma, COPD, CVA, HLD HLD and HTN who presented to the ED with complaints of shortness of breath. Patient was recently hospitalized 2024 - 2024 for the same complaints. He was followed by both nephrology and cardiology who optimized his medications. On the day of his discharge patient passed a 6-minute walk test without any oxygen requirements. He was discharged home with a follow-up with his PCP in 1 week nephrology in 3 to 4 weeks. Patient was recently scheduled to follow with CHF clinic and per EMR patient never showed for his appointment when the clinic attempted to reach out to him but his numbers were disconnected. In the ED, patient was placed on BIPAP for respiratory support. RVP was positive for rhinovirus. CXR showed pulmonary vascular congestion.  Patient was admitted and started on IV diuresis for further management. Patient developed HENNY thought to be prerenal due to poor oral intake. Diuresis held and fluids started with improvement initially. Nephrology consulted.     Patient had RRT called on 10/9 over night due to increased WOB. Patient was placed on BIPAP with improvement. He was also noted to be hypoxic with troponin of 780--> 763. EKG showed bifascicular block. Patient started on heparin ggt and Cardiology consulted.     SUBJECTIVE:  Stable overnight. No other overnight issues reported.   Patient seen and examined  Records reviewed.   RRT over night as mentioned above.   Patient remains on BIPAP . He states he is hungry and does not feel short of breath at this time.     Temp (24hrs), Av.1 °F (36.7 °C), Min:97.8 °F (36.6 °C), Max:98.8 °F (37.1 °C)    DIET: ADULT DIET; Regular  CODE: Full Code    Intake/Output Summary (Last 24 hours) at 10/9/2024 1126  Last data filed at 10/9/2024 0611  Gross per 24 hour   Intake  chloride       Scheduled Medications    heparin (porcine)  60 Units/kg IntraVENous Once    white petrolatum   Topical BID    sodium chloride flush  5-40 mL IntraVENous 2 times per day    ipratropium 0.5 mg-albuterol 2.5 mg  1 Dose Inhalation Q4H WA RT    nicotine  1 patch TransDERmal Daily    aspirin  81 mg Oral Daily    atorvastatin  40 mg Oral Nightly    arformoterol 15 mcg-budesonide 0.5 mg neb solution   Nebulization BID RT    hydrALAZINE  75 mg Oral TID    isosorbide mononitrate  30 mg Oral Daily    metoprolol succinate  25 mg Oral BID    pantoprazole  40 mg Oral BID AC    Vitamin D  1,000 Units Oral Daily     PRN Meds: hydrOXYzine, heparin (porcine), heparin (porcine), sodium chloride flush, sodium chloride, potassium chloride **OR** potassium alternative oral replacement **OR** potassium chloride, magnesium sulfate, polyethylene glycol, acetaminophen **OR** acetaminophen, melatonin, guaiFENesin-dextromethorphan, prochlorperazine **OR** prochlorperazine    Labs:     Recent Labs     10/09/24  0230   WBC 5.7   HGB 9.7*   HCT 30.8*            Recent Labs     10/07/24  0435 10/08/24  0541 10/09/24  0230    142 144   K 4.7 4.0 4.2   * 111* 116*   CO2 17* 22 18*   BUN 28* 22 21   CREATININE 1.3* 1.5* 1.3*   CALCIUM 8.6 8.5* 8.4*   PHOS  --   --  3.3       No results for input(s): \"ALKPHOS\", \"ALT\", \"AST\", \"BILITOT\", \"AMYLASE\", \"LIPASE\" in the last 72 hours.    Invalid input(s): \"PROT\", \"ALB\"      No results for input(s): \"INR\" in the last 72 hours.    No results for input(s): \"CKTOTAL\", \"TROPONINI\" in the last 72 hours.    Chronic labs:    Lab Results   Component Value Date    CHOL 159 09/10/2024    TRIG 46 09/10/2024    HDL 71 09/10/2024    TSH 2.97 07/08/2024    INR 1.1 09/10/2015    LABA1C 5.8 (H) 01/18/2024       Radiology: REVIEWED DAILY    +++++++++++++++++++++++++++++++++++++++++++++++++  Eula Bloom MD  [facility]

## 2024-10-09 NOTE — PLAN OF CARE
Addendum to RRT note.   ECG obtained. Reviewed and compared to prior ECG.  Sinus Rhythm with PAC; Bifasicular block. Noted ST segment changes lead V5.

## 2024-10-09 NOTE — PROGRESS NOTES
Cardiology consult placed via [facility] serve to Dr Carrasco.  Madie FRAZIER taking messages

## 2024-10-09 NOTE — CONSULTS
[facility]   Inpatient CHF Nurse Navigator Consult      Cardiologist: Dr. Luna Chavarria Jr. is a 74 y.o. (1950) male with a history of HFrEF, most recent EF:  Lab Results   Component Value Date    LVEF 40 09/11/2024       Patient was awake and alert, laying in bed during the consultation and is agreeable to heart failure education. He was engaged and asked appropriate questions throughout the education session.     Barriers identified during consult contributing to HF Hospitalization:  [] Limited medication adherence   [] Poor health literacy, education regarding HF medications provided   [] Pill box provided to patient  [] Difficulty affording medications  [] Difficulty obtaining/ managing medications  [] Prescription assistance information given     [] Not weighing themselves daily  [x] Weight log provided for easy monitoring  [] Scale provided     [] Not following low sodium diet  [] Food insecurity   [x] 2 gram sodium diet education provided   [] Low sodium recipes provided  [] Sodium free seasoning provided   [] Low sodium meal delivery options given to patient  [] Dietician consulted     [] Lack of transportation to appointments     [] Depression, given chronic illness  [] Primary team notified     [] Goals of care need addressed  [] Palliative care consulted     [] CHF CHW consulted, to assist with N/A.         Chart Reviewed:  Diet: ADULT DIET; Regular   Daily Weights: Patient Vitals for the past 96 hrs (Last 3 readings):   Weight   10/09/24 0611 54.9 kg (121 lb)   10/08/24 0459 57.2 kg (126 lb)   10/07/24 1500 58.1 kg (128 lb 1.6 oz)     I/O:   Intake/Output Summary (Last 24 hours) at 10/9/2024 1204  Last data filed at 10/9/2024 0611  Gross per 24 hour   Intake 452.31 ml   Output 1625 ml   Net -1172.69 ml       [] Nursing staff/manager notified of inaccurate barnes weights or I/O        Discharge Plan:  Above identified barriers reviewed and needs

## 2024-10-10 ENCOUNTER — APPOINTMENT (OUTPATIENT)
Dept: ULTRASOUND IMAGING | Age: 74
DRG: 133 | End: 2024-10-10
Payer: COMMERCIAL

## 2024-10-10 ENCOUNTER — APPOINTMENT (OUTPATIENT)
Dept: GENERAL RADIOLOGY | Age: 74
DRG: 133 | End: 2024-10-10
Payer: COMMERCIAL

## 2024-10-10 LAB
B.E.: -4.8 MMOL/L (ref -3–3)
B.E.: -5.2 MMOL/L (ref -3–3)
BNP SERPL-MCNC: ABNORMAL PG/ML (ref 0–450)
COHB: 0.2 % (ref 0–1.5)
COHB: 0.3 % (ref 0–1.5)
CRITICAL: ABNORMAL
CRITICAL: ABNORMAL
DATE ANALYZED: ABNORMAL
DATE ANALYZED: ABNORMAL
DATE OF COLLECTION: ABNORMAL
DATE OF COLLECTION: ABNORMAL
EKG ATRIAL RATE: 90 BPM
EKG ATRIAL RATE: 97 BPM
EKG P AXIS: 72 DEGREES
EKG P AXIS: 73 DEGREES
EKG P-R INTERVAL: 140 MS
EKG P-R INTERVAL: 142 MS
EKG Q-T INTERVAL: 386 MS
EKG Q-T INTERVAL: 434 MS
EKG QRS DURATION: 128 MS
EKG QRS DURATION: 138 MS
EKG QTC CALCULATION (BAZETT): 490 MS
EKG QTC CALCULATION (BAZETT): 530 MS
EKG R AXIS: -86 DEGREES
EKG R AXIS: -87 DEGREES
EKG T AXIS: 78 DEGREES
EKG T AXIS: 95 DEGREES
EKG VENTRICULAR RATE: 90 BPM
EKG VENTRICULAR RATE: 97 BPM
HCO3: 21.6 MMOL/L (ref 22–26)
HCO3: 21.7 MMOL/L (ref 22–26)
HHB: 16.1 % (ref 0–5)
HHB: 2.3 % (ref 0–5)
LAB: ABNORMAL
LAB: ABNORMAL
Lab: 1008
Lab: 1025
METHB: 0.4 % (ref 0–1.5)
METHB: 0.6 % (ref 0–1.5)
MODE: ABNORMAL
MODE: ABNORMAL
O2 SATURATION: 83.8 % (ref 92–98.5)
O2 SATURATION: 97.7 % (ref 92–98.5)
O2HB: 83.1 % (ref 94–97)
O2HB: 97 % (ref 94–97)
OPERATOR ID: 8217
OPERATOR ID: 8217
PATIENT TEMP: 37 C
PATIENT TEMP: 37 C
PCO2: 46.8 MMHG (ref 35–45)
PCO2: 47.9 MMHG (ref 35–45)
PH BLOOD GAS: 7.27 (ref 7.35–7.45)
PH BLOOD GAS: 7.29 (ref 7.35–7.45)
PO2: 116.2 MMHG (ref 75–100)
PO2: 56.5 MMHG (ref 75–100)
SOURCE, BLOOD GAS: ABNORMAL
SOURCE, BLOOD GAS: ABNORMAL
THB: 10.4 G/DL (ref 11.5–16.5)
THB: 10.9 G/DL (ref 11.5–16.5)
TIME ANALYZED: 1012
TIME ANALYZED: 1030
TROPONIN I SERPL HS-MCNC: 932 NG/L (ref 0–11)

## 2024-10-10 PROCEDURE — 99255 IP/OBS CONSLTJ NEW/EST HI 80: CPT | Performed by: INTERNAL MEDICINE

## 2024-10-10 PROCEDURE — 94640 AIRWAY INHALATION TREATMENT: CPT

## 2024-10-10 PROCEDURE — 99232 SBSQ HOSP IP/OBS MODERATE 35: CPT | Performed by: FAMILY MEDICINE

## 2024-10-10 PROCEDURE — 6360000002 HC RX W HCPCS: Performed by: NURSE PRACTITIONER

## 2024-10-10 PROCEDURE — 6360000002 HC RX W HCPCS: Performed by: INTERNAL MEDICINE

## 2024-10-10 PROCEDURE — 36600 WITHDRAWAL OF ARTERIAL BLOOD: CPT

## 2024-10-10 PROCEDURE — 94660 CPAP INITIATION&MGMT: CPT

## 2024-10-10 PROCEDURE — 71045 X-RAY EXAM CHEST 1 VIEW: CPT

## 2024-10-10 PROCEDURE — 36415 COLL VENOUS BLD VENIPUNCTURE: CPT

## 2024-10-10 PROCEDURE — 2580000003 HC RX 258: Performed by: INTERNAL MEDICINE

## 2024-10-10 PROCEDURE — 2700000000 HC OXYGEN THERAPY PER DAY

## 2024-10-10 PROCEDURE — 97535 SELF CARE MNGMENT TRAINING: CPT

## 2024-10-10 PROCEDURE — 2060000000 HC ICU INTERMEDIATE R&B

## 2024-10-10 PROCEDURE — 6360000002 HC RX W HCPCS

## 2024-10-10 PROCEDURE — 2580000003 HC RX 258: Performed by: NURSE PRACTITIONER

## 2024-10-10 PROCEDURE — 84484 ASSAY OF TROPONIN QUANT: CPT

## 2024-10-10 PROCEDURE — 6370000000 HC RX 637 (ALT 250 FOR IP): Performed by: NURSE PRACTITIONER

## 2024-10-10 PROCEDURE — 76770 US EXAM ABDO BACK WALL COMP: CPT

## 2024-10-10 PROCEDURE — 93010 ELECTROCARDIOGRAM REPORT: CPT | Performed by: INTERNAL MEDICINE

## 2024-10-10 PROCEDURE — 82805 BLOOD GASES W/O2 SATURATION: CPT

## 2024-10-10 PROCEDURE — 97530 THERAPEUTIC ACTIVITIES: CPT

## 2024-10-10 PROCEDURE — 2580000003 HC RX 258: Performed by: FAMILY MEDICINE

## 2024-10-10 PROCEDURE — 83880 ASSAY OF NATRIURETIC PEPTIDE: CPT

## 2024-10-10 PROCEDURE — 6370000000 HC RX 637 (ALT 250 FOR IP): Performed by: CLINICAL NURSE SPECIALIST

## 2024-10-10 PROCEDURE — 6360000002 HC RX W HCPCS: Performed by: FAMILY MEDICINE

## 2024-10-10 RX ORDER — LEVOFLOXACIN 5 MG/ML
500 INJECTION, SOLUTION INTRAVENOUS EVERY 24 HOURS
Status: DISCONTINUED | OUTPATIENT
Start: 2024-10-10 | End: 2024-10-10 | Stop reason: DRUGHIGH

## 2024-10-10 RX ORDER — LEVOFLOXACIN 5 MG/ML
500 INJECTION, SOLUTION INTRAVENOUS ONCE
Status: COMPLETED | OUTPATIENT
Start: 2024-10-10 | End: 2024-10-10

## 2024-10-10 RX ORDER — LEVOFLOXACIN 5 MG/ML
250 INJECTION, SOLUTION INTRAVENOUS EVERY 24 HOURS
Status: DISCONTINUED | OUTPATIENT
Start: 2024-10-11 | End: 2024-10-12

## 2024-10-10 RX ADMIN — HYDRALAZINE HYDROCHLORIDE 100 MG: 50 TABLET ORAL at 20:35

## 2024-10-10 RX ADMIN — SODIUM CHLORIDE, PRESERVATIVE FREE 10 ML: 5 INJECTION INTRAVENOUS at 09:33

## 2024-10-10 RX ADMIN — HYDROXYZINE HYDROCHLORIDE 25 MG: 50 INJECTION, SOLUTION INTRAMUSCULAR at 02:23

## 2024-10-10 RX ADMIN — PETROLATUM: 420 OINTMENT TOPICAL at 20:36

## 2024-10-10 RX ADMIN — PANTOPRAZOLE SODIUM 40 MG: 40 TABLET, DELAYED RELEASE ORAL at 06:38

## 2024-10-10 RX ADMIN — APIXABAN 5 MG: 5 TABLET, FILM COATED ORAL at 09:30

## 2024-10-10 RX ADMIN — IPRATROPIUM BROMIDE AND ALBUTEROL SULFATE 1 DOSE: 2.5; .5 SOLUTION RESPIRATORY (INHALATION) at 14:28

## 2024-10-10 RX ADMIN — IPRATROPIUM BROMIDE AND ALBUTEROL SULFATE 1 DOSE: 2.5; .5 SOLUTION RESPIRATORY (INHALATION) at 06:24

## 2024-10-10 RX ADMIN — WATER 40 MG: 1 INJECTION INTRAMUSCULAR; INTRAVENOUS; SUBCUTANEOUS at 17:47

## 2024-10-10 RX ADMIN — CARVEDILOL 25 MG: 25 TABLET, FILM COATED ORAL at 09:30

## 2024-10-10 RX ADMIN — Medication 1000 UNITS: at 09:30

## 2024-10-10 RX ADMIN — SODIUM CHLORIDE, SODIUM LACTATE, POTASSIUM CHLORIDE, CALCIUM CHLORIDE AND DEXTROSE MONOHYDRATE: 5; 600; 310; 30; 20 INJECTION, SOLUTION INTRAVENOUS at 10:22

## 2024-10-10 RX ADMIN — GUAIFENESIN SYRUP AND DEXTROMETHORPHAN 5 ML: 100; 10 SYRUP ORAL at 02:23

## 2024-10-10 RX ADMIN — HYDRALAZINE HYDROCHLORIDE 100 MG: 50 TABLET ORAL at 14:00

## 2024-10-10 RX ADMIN — LEVOFLOXACIN 500 MG: 500 INJECTION, SOLUTION INTRAVENOUS at 14:07

## 2024-10-10 RX ADMIN — APIXABAN 5 MG: 5 TABLET, FILM COATED ORAL at 20:35

## 2024-10-10 RX ADMIN — CARVEDILOL 25 MG: 25 TABLET, FILM COATED ORAL at 17:48

## 2024-10-10 RX ADMIN — ISOSORBIDE MONONITRATE 30 MG: 30 TABLET, EXTENDED RELEASE ORAL at 09:30

## 2024-10-10 RX ADMIN — PETROLATUM: 420 OINTMENT TOPICAL at 10:15

## 2024-10-10 RX ADMIN — HYDRALAZINE HYDROCHLORIDE 100 MG: 50 TABLET ORAL at 09:30

## 2024-10-10 RX ADMIN — ARFORMOTEROL TARTRATE: 15 SOLUTION RESPIRATORY (INHALATION) at 06:26

## 2024-10-10 RX ADMIN — ASPIRIN 81 MG: 81 TABLET, COATED ORAL at 09:30

## 2024-10-10 RX ADMIN — SODIUM CHLORIDE, PRESERVATIVE FREE 10 ML: 5 INJECTION INTRAVENOUS at 20:36

## 2024-10-10 RX ADMIN — SODIUM CHLORIDE 125 MG: 9 INJECTION, SOLUTION INTRAVENOUS at 12:45

## 2024-10-10 RX ADMIN — WATER 40 MG: 1 INJECTION INTRAMUSCULAR; INTRAVENOUS; SUBCUTANEOUS at 10:16

## 2024-10-10 RX ADMIN — ATORVASTATIN CALCIUM 40 MG: 40 TABLET, FILM COATED ORAL at 20:35

## 2024-10-10 RX ADMIN — SODIUM CHLORIDE, PRESERVATIVE FREE 40 MG: 5 INJECTION INTRAVENOUS at 17:47

## 2024-10-10 ASSESSMENT — PAIN SCALES - WONG BAKER
WONGBAKER_NUMERICALRESPONSE: NO HURT

## 2024-10-10 NOTE — PROGRESS NOTES
Occupational Therapy  OT BEDSIDE TREATMENT NOTE      Date:10/10/2024  Patient Name: Chuck Chavarria Jr.  MRN: [id number]  : 1950  Room: [address] [place], [place] [address]     Evaluating OT: Gavin Cano OTDANIEL/STEPHANIA; [id number]        Referring Provider: Eula Bloom MD     Specific Provider Orders/Date: OT Eval and Treat 10/07/24 0830        Diagnosis: SOB (shortness of breath);  Stage 2 chronic kidney disease; A-fib; HLD (hyperlipidemia); Hypertension.    Surgery: None this admission.     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      Recommended Adaptive Equipment: extended tub bench      Precautions:  Fall Risk, droplet isolation rhinovirus, continuous pulse ox      Assessment of current deficits    [x] Functional mobility            [x]ADLs           [x] Strength                  [x]Cognition    [x] Functional transfers          [x] IADLs         [x] Safety Awareness   [x]Endurance    [x] Fine Coordination                         [x] Balance      [] Vision/perception   []Sensation      []Gross Motor Coordination             [] ROM           [] Delirium                   [] Motor Control      OT PLAN OF CARE   OT POC based on physician orders, patient diagnosis and results of clinical assessment     Frequency/Duration 1-3 days/wk for 2 weeks PRN   Specific OT Treatment Interventions to include:   * Instruction/training on adapted ADL techniques and AE recommendations to increase functional independence within precautions       * Training on energy conservation strategies, correct breathing pattern and techniques to improve independence/tolerance for self-care routine  * Functional transfer/mobility training/DME recommendations for  hips in standing Modified [place]    Bathing Minimal Assist  Simulated seated EOB   Modified [place]    Toileting Moderate Assist   patient's external urine management system was removed by patient, brief provided at patient's request Modified [place]    Bed Mobility  Log Roll: NT   Supine to sit: Stand by Assist   Sit to supine: Stand by Assist   supine><sit: SBA Supine to sit: Independent   Sit to supine: Independent    Functional Transfers Sit to stand:Minimal Assist   Stand to sit:Minimal Assist   Stand pivot: Minimal Assist w/ quad cane  Commode: NT   sit-stand: SBA from EOB, completed x 3 Sit to stand: Modified [place]   Stand to sit: Modified [place]   Stand pivot:  Modified [place]   Commode:  Modified [place]    Functional Mobility Minimal Assist   Use of quad cane to<>from room doorway x3.  CGA with hand held assistance, near bedside  Modified [place] w/ use of Appropriate AD   Balance Sitting:     Static - Stand by Assist      Dynamic - Stand by Assist   Standing: Minimal Assist w/ quad cane  sitting: independent  Standing: CGA Sitting:     Static:  Independent      Dynamic:  Independent   Standing:  Modified [place]    Activity Tolerance Fair tolerance w/ light activity  SOB with all activity, on bipap for first part of session, removed and placed on nasal cannula for lunch - nursing aware and approved Good tolerance w/ light to mod activity   Visual/  Perceptual WFL       Safety Fair   Good  during ADL completion   Vitals Pt on RA upon arrival. SpO2 90-95% throughout session. RN aware.  O2 monitored throughout, on bipap at 100% consistently, on nasal cannula %       Comments: RN approved patient's participation in OOB/functional activities. Upon arrival, patient supine. Patient was pleasant and cooperative and agreed to participation in OT treatment. Patient participated in bed mobility, functional transfers, functional mobility, upper body dressing,

## 2024-10-10 NOTE — PLAN OF CARE
Problem: Chronic Conditions and Co-morbidities  Goal: Patient's chronic conditions and co-morbidity symptoms are monitored and maintained or improved  10/9/2024 2336 by Dora Ordaz RN  Outcome: Progressing     Problem: Discharge Planning  Goal: Discharge to home or other facility with appropriate resources  10/9/2024 2336 by Dora Ordaz RN  Outcome: Progressing     Problem: Safety - Adult  Goal: Free from fall injury  10/9/2024 2336 by Dora Ordaz RN  Outcome: Progressing     Problem: Pain  Goal: Verbalizes/displays adequate comfort level or baseline comfort level  10/9/2024 2336 by Dora Ordaz RN  Outcome: Progressing     Problem: Skin/Tissue Integrity  Goal: Absence of new skin breakdown  Description: 1.  Monitor for areas of redness and/or skin breakdown  2.  Assess vascular access sites hourly  3.  Every 4-6 hours minimum:  Change oxygen saturation probe site  4.  Every 4-6 hours:  If on nasal continuous positive airway pressure, respiratory therapy assess nares and determine need for appliance change or resting period.  10/9/2024 2336 by Dora Ordaz RN  Outcome: Progressing

## 2024-10-10 NOTE — CONSULTS
[facility]  Department of Internal Medicine  Division of Pulmonary, Critical Care and Sleep Medicine  Consult Note    Keagan Spivey DO, MPH, [facility], FACOI, FACP  Jeaneth Smith DO, [facility]  Florian Booker MD, MS  Tanya Perry, APRN-CNP    Patient: Chuck Chavarria Jr.  MRN: [id number]  : 1950    Encounter Time: 12:58 PM     Date of Admission: 10/4/2024  8:55 AM    Primary Care Physician: Gunner Espinosa MD    Reason for Consultation: XOPD     HISTORY OF PRESENT ILLNESS : Chuck Chavarria Jr. 74 y.o. male was seen in consultation regarding the above chief compliant.    Patient presented to ER 2024 for shortness of breath/chest tightness x 1 day.  Of note patient was at the ER  for similar symptoms and diagnosed with COPD exacerbation and discharged on prednisone although patient did not  prescription for steroids.  Patient given DuoNeb and Solu-Medrol upon arrival.  Patient was not hypoxic.  Patient found to have elevated BNP and CXR showing right basilar infiltrate versus pulmonary edema and was given IV Lasix in ER.  Patient with worsening HENNY since admission 1.1 > 1.7.  Patient with mild fever 100.5 upon arrival and started on azithromycin while cultures pending.  UDS positive for cocaine.  Patient received 3 doses of Lasix IV before being discontinued due to worsening HENNY.  Patient taken off of ATB and to be monitored.  Cardiology consulted for shortness of breath and T wave inversion in lateral leads.  VS upon arrival 100.5, 19 respirations, 117 pulse, 140/94, 95% room air.  Labs: Potassium 3.6, BUN 33, creatinine 1.1 > 1.7, GFR 41, magnesium 2.0, glucose 101, calcium 8.6, procalcitonin 0.11, proBNP 11,500 > 11,000 (: 6304), troponin 53 > 50 (: 42), cholesterol 159, HDL 71, LDL 79, triglycerides 46, albumin 3.2, UDS positive cocaine, WBC 7.7, H&H 11.3 > 10.7/32.2, platelet 244, blood and urine cultures pending, viral panel negative, UA  diplopia.      No scleral icterus.  ENT:    No Headaches, hearing loss or vertigo.      No mouth sores or sore throat.   Cardiovascular:  + chest discomfort, palpitations.  Respiratory:  + cough, No wheezing      No sputum production.      No hemoptysis, pleuritic pain.  Gastrointestinal:  No abdominal pain, appetite loss, blood in stools.      No hematemesis  Genitourinary:  No dysuria, trouble voiding or hematuria.      No nocturia.  Musculoskeletal:   No weakness or joint complaints.  Integumentary: No rashes or pruritis.  Neurological:  No headache, numbness or tingling.     Psychiatric:   No effect on mood, memory, mentation, or behavior.     No anxiety or depression.  Endocrine:   No excessive thirst, fluid intake, or urination.      No tremor.  Hematologic: No abnormal bruising or bleeding.  Lymphatic:  No swollen lymph nodes.  Immunologic:  No hives or hx of anaphylaxis      OBJECTIVE:     PHYSICAL EXAM:   VITALS:   Vitals:    10/10/24 0623 10/10/24 0630 10/10/24 0755 10/10/24 1043   BP:   (!) 163/92 129/75   Pulse: 75  73 74   Resp: 19  21 12   Temp:   97.2 °F (36.2 °C) 97.4 °F (36.3 °C)   TempSrc:   Temporal Temporal   SpO2: 99%  99% 98%   Weight:  56.3 kg (124 lb 1.6 oz)     Height:          No intake or output data in the 24 hours ending 10/10/24 1258     CONSTITUTIONAL:   A&O x 3, NAD  SKIN:     No rash, No suspicious lesions, No skin discoloration  HEENT:     EOMI, MMM, No thrush  NECK:    No bruits, No JVP appreciated  CV:      Sinus,  No murmur, No rubs, No gallops  PULMONARY:   Couse BS,  No Wheezing, No Rales, No Rhonchi      No noted egophony  ABDOMEN:     Soft, non-tender. BS normal. No R/R/G  EXT:    No deformities .  No clubbing.       - lower extremity edema, No venous stasis  PULSE:   Appears equal and palpable.  PSYCHIATRIC:  Seems appropriate, No acute psychosis  MS:    No fractures, No gross weakness  NEUROLOGIC:   The clinical assessment is non-focal     DATA: IMAGING & TESTING:      LABORATORY TESTS:    CBC:   Lab Results   Component Value Date/Time    WBC 7.9 10/09/2024 11:55 AM    RBC 3.56 10/09/2024 11:55 AM    HGB 10.1 10/09/2024 11:55 AM    HCT 32.6 10/09/2024 11:55 AM    MCV 91.6 10/09/2024 11:55 AM    MCH 28.4 10/09/2024 11:55 AM    MCHC 31.0 10/09/2024 11:55 AM    RDW 15.8 10/09/2024 11:55 AM     10/09/2024 11:55 AM    MPV 10.7 10/09/2024 11:55 AM     BMP:    Lab Results   Component Value Date/Time     10/09/2024 02:30 AM    K 4.2 10/09/2024 02:30 AM    K 4.8 08/28/2022 05:15 AM     10/09/2024 02:30 AM    CO2 18 10/09/2024 02:30 AM    BUN 21 10/09/2024 02:30 AM    CREATININE 1.3 10/09/2024 02:30 AM    CALCIUM 8.4 10/09/2024 02:30 AM    GFRAA >60 08/28/2022 05:15 AM    LABGLOM 58 10/09/2024 02:30 AM    LABGLOM >60 01/18/2024 07:07 AM    GLUCOSE 129 10/09/2024 02:30 AM     Magnesium:    Lab Results   Component Value Date/Time    MG 1.8 10/09/2024 02:30 AM     Phosphorus:    Lab Results   Component Value Date/Time    PHOS 3.3 10/09/2024 02:30 AM        PRO-BNP:   Lab Results   Component Value Date    PROBNP 13,158 (H) 10/06/2024    PROBNP 7,672 (H) 10/04/2024      ABGs:   Lab Results   Component Value Date/Time    PH 7.285 10/10/2024 10:25 AM    PO2 116.2 10/10/2024 10:25 AM    PCO2 46.8 10/10/2024 10:25 AM     Hemoglobin A1C: No components found for: \"HGBA1C\"    IMAGING:  Imaging tests were completed and reviewed and discussed radiology and care team involved and reveals       US RETROPERITONEAL COMPLETE    Result Date: 9/12/2024  EXAMINATION: RETROPERITONEAL ULTRASOUND OF THE KIDNEYS AND URINARY BLADDER 9/12/2024 COMPARISON: None HISTORY: ORDERING SYSTEM PROVIDED HISTORY: HENNY TECHNOLOGIST PROVIDED HISTORY: Reason for exam:->HENNY FINDINGS: Kidneys: The right kidney measures 9.4 cm in length and the left kidney measures 9.7 cm in length. Kidneys demonstrate increased echogenicity to suggest medical renal disease bilaterally..  No evidence of hydronephrosis or

## 2024-10-10 NOTE — PROGRESS NOTES
Renal Dose Adjustment Policy (Generic)     This patient is on medication that requires renal, weight, and/or indication dose adjustment.      Date Body Weight IBW  Adjusted BW SCr  CrCl Dialysis status   10/10/2024 56.3 kg (124 lb 1.6 oz) Ideal body weight: 68.4 kg (150 lb 12.7 oz) Serum creatinine: 1.3 mg/dL (H) 10/09/24 0230  Estimated creatinine clearance: 40 mL/min (A) N/a       Pharmacy has dose-adjusted the following medication(s):    Date Previous Order Adjusted Order   10/10/2024 Levaquin 500 md qd x 5 days Levaquin 500 mg x 1 followed by 250 mg qd x 4 days       These changes were made per protocol according to the [facility]   Automatic Renal Dose Adjustment Policy.     *Please note this dose may need readjusted if patient's condition changes.    Please contact pharmacy with any questions regarding these changes.    Jasvir Ann, PharmD, BCPS 10/10/2024 12:49 PM

## 2024-10-10 NOTE — PROGRESS NOTES
[facility] Hospitalist Progress Note    SYNOPSIS: Patient admitted on 10/4/2024 for SOB (shortness of breath)    74-year-old male with past medical history of asthma, COPD, CVA, HLD HLD and HTN who presented to the ED with complaints of shortness of breath. Patient was recently hospitalized 2024 - 2024 for the same complaints. He was followed by both nephrology and cardiology who optimized his medications. On the day of his discharge patient passed a 6-minute walk test without any oxygen requirements. He was discharged home with a follow-up with his PCP in 1 week nephrology in 3 to 4 weeks. Patient was recently scheduled to follow with CHF clinic and per EMR patient never showed for his appointment when the clinic attempted to reach out to him but his numbers were disconnected. In the ED, patient was placed on BIPAP for respiratory support. RVP was positive for rhinovirus. CXR showed pulmonary vascular congestion.  Patient was admitted and started on IV diuresis for further management. Patient developed HENNY thought to be prerenal due to poor oral intake. Diuresis held and fluids started with improvement initially. Nephrology consulted.     Patient had RRT called on 10/9 over night due to increased WOB. Patient was placed on BIPAP with improvement. He was also noted to be hypoxic with troponin of 780--> 763. EKG showed bifascicular block. Patient started on heparin ggt and Cardiology consulted.     SUBJECTIVE:  Seen and examined chart reviewed discussed with nursing staff and case management.  Patient asking to be discharged he is more short of breath having increased work of breathing.  Repeat chest x-ray and ABG this morning    Temp (24hrs), Av.3 °F (36.3 °C), Min:97.1 °F (36.2 °C), Max:97.4 °F (36.3 °C)    DIET: ADULT DIET; Regular  CODE: Full Code  No intake or output data in the 24 hours ending 10/10/24 1229      Review of Systems  All bolded are positive; please see HPI  General:  Fever,  1022    sodium chloride       Scheduled Medications    methylPREDNISolone  40 mg IntraVENous Q8H    ferric gluconate (FERRLECIT) 125 mg in sodium chloride 0.9 % 100 mL IVPB  125 mg IntraVENous Once    hydrALAZINE  100 mg Oral TID    carvedilol  25 mg Oral BID WC    apixaban  5 mg Oral BID    white petrolatum   Topical BID    sodium chloride flush  5-40 mL IntraVENous 2 times per day    ipratropium 0.5 mg-albuterol 2.5 mg  1 Dose Inhalation Q4H WA RT    nicotine  1 patch TransDERmal Daily    aspirin  81 mg Oral Daily    atorvastatin  40 mg Oral Nightly    arformoterol 15 mcg-budesonide 0.5 mg neb solution   Nebulization BID RT    isosorbide mononitrate  30 mg Oral Daily    pantoprazole  40 mg Oral BID AC    Vitamin D  1,000 Units Oral Daily     PRN Meds: hydrOXYzine, heparin (porcine), heparin (porcine), sodium chloride flush, sodium chloride, potassium chloride **OR** potassium alternative oral replacement **OR** potassium chloride, magnesium sulfate, polyethylene glycol, acetaminophen **OR** acetaminophen, melatonin, guaiFENesin-dextromethorphan, prochlorperazine **OR** prochlorperazine    Labs:     Recent Labs     10/09/24  0230 10/09/24  1155   WBC 5.7 7.9   HGB 9.7* 10.1*   HCT 30.8* 32.6*    201         Recent Labs     10/08/24  0541 10/09/24  0230    144   K 4.0 4.2   * 116*   CO2 22 18*   BUN 22 21   CREATININE 1.5* 1.3*   CALCIUM 8.5* 8.4*   PHOS  --  3.3       No results for input(s): \"ALKPHOS\", \"ALT\", \"AST\", \"BILITOT\", \"AMYLASE\", \"LIPASE\" in the last 72 hours.    Invalid input(s): \"PROT\", \"ALB\"      No results for input(s): \"INR\" in the last 72 hours.    No results for input(s): \"CKTOTAL\", \"TROPONINI\" in the last 72 hours.    Chronic labs:    Lab Results   Component Value Date    CHOL 159 09/10/2024    TRIG 46 09/10/2024    HDL 71 09/10/2024    TSH 2.97 07/08/2024    INR 1.1 09/10/2015    LABA1C 5.8 (H) 01/18/2024       Radiology: REVIEWED

## 2024-10-10 NOTE — PLAN OF CARE
Problem: Chronic Conditions and Co-morbidities  Goal: Patient's chronic conditions and co-morbidity symptoms are monitored and maintained or improved  10/10/2024 1231 by Anita Haney RN  Outcome: Progressing  10/9/2024 2336 by Dora Ordaz RN  Outcome: Progressing  10/9/2024 2335 by Dora Ordaz RN  Outcome: Progressing     Problem: Discharge Planning  Goal: Discharge to home or other facility with appropriate resources  10/10/2024 1231 by Anita Haney RN  Outcome: Progressing  10/9/2024 2336 by Dora Ordaz RN  Outcome: Progressing  10/9/2024 2335 by Dora Ordaz RN  Outcome: Progressing     Problem: Safety - Adult  Goal: Free from fall injury  10/10/2024 1231 by Anita Haney RN  Outcome: Progressing  10/9/2024 2336 by Dora Ordaz RN  Outcome: Progressing  10/9/2024 2335 by Dora Ordaz RN  Outcome: Progressing     Problem: Pain  Goal: Verbalizes/displays adequate comfort level or baseline comfort level  10/10/2024 1231 by Anita Haney RN  Outcome: Progressing  10/9/2024 2336 by Dora Ordaz RN  Outcome: Progressing  10/9/2024 2335 by Dora Ordaz RN  Outcome: Progressing     Problem: Skin/Tissue Integrity  Goal: Absence of new skin breakdown  Description: 1.  Monitor for areas of redness and/or skin breakdown  2.  Assess vascular access sites hourly  3.  Every 4-6 hours minimum:  Change oxygen saturation probe site  4.  Every 4-6 hours:  If on nasal continuous positive airway pressure, respiratory therapy assess nares and determine need for appliance change or resting period.  10/10/2024 1231 by Anita Haney RN  Outcome: Progressing  10/9/2024 2336 by Dora Ordaz RN  Outcome: Progressing  10/9/2024 2335 by Dora Ordaz RN  Outcome: Progressing     Problem: Respiratory - Adult  Goal: Achieves optimal ventilation and oxygenation  Outcome: Progressing

## 2024-10-10 NOTE — PROGRESS NOTES
Department of Internal Medicine  Nephrology Nurse Practitioner Progress Note    Events reviewed.     SUBJECTIVE: We are following Mr Chavraria for HENNY. Reports no complaints. Appears short of breath on 4 L via NC     PHYSICAL EXAM:      Vitals:    VITALS:  /75   Pulse 74   Temp 97.4 °F (36.3 °C) (Temporal)   Resp 12   Ht 1.727 m (5' 8\")   Wt 56.3 kg (124 lb 1.6 oz)   SpO2 98%   BMI 18.87 kg/m²   24HR INTAKE/OUTPUT:  No intake or output data in the 24 hours ending 10/10/24 1319      Constitutional:  alert and oriented, NAD   HEENT:  normocephalic, atraumatic   Respiratory:  decreased, SOB  Cardiovascular/Edema:  regular rate and rhythm, no edema  Gastrointestinal:  normal bowel sounds x 4  Neurologic:  normal   Skin:  clean and dry     Scheduled Meds:   methylPREDNISolone  40 mg IntraVENous Q8H    levofloxacin  500 mg IntraVENous Once    Followed by    [START ON 10/11/2024] levofloxacin  250 mg IntraVENous Q24H    ferric gluconate (FERRLECIT) 125 mg in sodium chloride 0.9 % 100 mL IVPB  125 mg IntraVENous Once    hydrALAZINE  100 mg Oral TID    carvedilol  25 mg Oral BID WC    apixaban  5 mg Oral BID    white petrolatum   Topical BID    sodium chloride flush  5-40 mL IntraVENous 2 times per day    ipratropium 0.5 mg-albuterol 2.5 mg  1 Dose Inhalation Q4H WA RT    nicotine  1 patch TransDERmal Daily    aspirin  81 mg Oral Daily    atorvastatin  40 mg Oral Nightly    arformoterol 15 mcg-budesonide 0.5 mg neb solution   Nebulization BID RT    isosorbide mononitrate  30 mg Oral Daily    pantoprazole  40 mg Oral BID AC    Vitamin D  1,000 Units Oral Daily     Continuous Infusions:   dextrose 5% in lactated ringers 60 mL/hr at 10/10/24 1022    sodium chloride       PRN Meds:.hydrOXYzine, heparin (porcine), heparin (porcine), sodium chloride flush, sodium chloride, potassium chloride **OR** potassium alternative oral replacement **OR** potassium chloride, magnesium sulfate, polyethylene glycol, acetaminophen  edema.      BRIEF SUMMARY OF INITIAL CONSULT:    Briefly Mr. Chavarria is a 74-year-old man with history of HTN, HFrEF 45% due to nonischemic cardiomyopathy, PAF, COPD, CVA, bilateral carotid artery stenosis, right upper lobe lung mass, hyperlipidemia, polysubstance abuse, who was admitted on 10/4/24 after presenting to the ED with complaints of SOB. Patient tested positive for rhinovirus. On admission, his creatinine was 1.3 mg/dL and today it is 1.5 mg/dL, reason for this consultation. His home medications include hydralazine, metoprolol, isosorbide.     IMPRESSION/RECOMMENDATIONS:        HENNY stage I on CKD, probably hemodynamically mediated due to volume depletion with poor PO intake. Creatinine 1.3 mg/dL today.     CKD stage II, protein excretion, kidney ultrasound showing bilateral kidney echogenicity consistent with medical renal disease. Urine protein 227, Microalbumin urine 1417, UACR 1363, UPCR 2.18.     HFrEF 40%, proBNP 7672 > 40107 > pending.   HTN, on hydralazine, metoprolol, isosorbide   Normocytic anemia, iron 40, TIBC 254, folate 13.2, B12 713, Iron sat% 16, on ferrlicit   -----------------------  Rhinovirus, on solu-medrol.   Hyperlipidemia, on atorvastatin  COPD  History of PAF, on apixaban and metoprolol   Cocaine abuse      Plan:     ProBNP pending.   Continue D5LR@ 60 mL/hour  Continue ferrlicit x 4 doses   Monitor renal function  Continue carvedilol 25 mg po bid, hydralazine 100 mg 3 times daily, isosorbide 30 mg daily  Continue cholecalciferol 1000 units daily.   Avoid hypotension      JASSI Dumas - NP    I saw and evaluated the patient, performing the key elements of the service. I discussed the findings, assessment and plan with NP and agree with her findings and plans as documented in her note.        Catherine Cook MD

## 2024-10-11 LAB
ALBUMIN SERPL-MCNC: 3.1 G/DL (ref 3.5–5.2)
ALP SERPL-CCNC: 80 U/L (ref 40–129)
ALT SERPL-CCNC: 17 U/L (ref 0–40)
ANION GAP SERPL CALCULATED.3IONS-SCNC: 13 MMOL/L (ref 7–16)
AST SERPL-CCNC: 33 U/L (ref 0–39)
BASOPHILS # BLD: 0 K/UL (ref 0–0.2)
BASOPHILS NFR BLD: 0 % (ref 0–2)
BILIRUB SERPL-MCNC: 0.3 MG/DL (ref 0–1.2)
BUN SERPL-MCNC: 30 MG/DL (ref 6–23)
CALCIUM SERPL-MCNC: 9.2 MG/DL (ref 8.6–10.2)
CHLORIDE SERPL-SCNC: 113 MMOL/L (ref 98–107)
CO2 SERPL-SCNC: 18 MMOL/L (ref 22–29)
CREAT SERPL-MCNC: 1.6 MG/DL (ref 0.7–1.2)
EOSINOPHIL # BLD: 0 K/UL (ref 0.05–0.5)
EOSINOPHILS RELATIVE PERCENT: 0 % (ref 0–6)
ERYTHROCYTE [DISTWIDTH] IN BLOOD BY AUTOMATED COUNT: 15.8 % (ref 11.5–15)
GFR, ESTIMATED: 46 ML/MIN/1.73M2
GLUCOSE SERPL-MCNC: 149 MG/DL (ref 74–99)
HCT VFR BLD AUTO: 29.5 % (ref 37–54)
HGB BLD-MCNC: 9.4 G/DL (ref 12.5–16.5)
IMM GRANULOCYTES # BLD AUTO: 0.03 K/UL (ref 0–0.58)
IMM GRANULOCYTES NFR BLD: 1 % (ref 0–5)
LYMPHOCYTES NFR BLD: 0.5 K/UL (ref 1.5–4)
LYMPHOCYTES RELATIVE PERCENT: 8 % (ref 20–42)
MAGNESIUM SERPL-MCNC: 2.1 MG/DL (ref 1.6–2.6)
MCH RBC QN AUTO: 28.6 PG (ref 26–35)
MCHC RBC AUTO-ENTMCNC: 31.9 G/DL (ref 32–34.5)
MCV RBC AUTO: 89.7 FL (ref 80–99.9)
MONOCYTES NFR BLD: 0.2 K/UL (ref 0.1–0.95)
MONOCYTES NFR BLD: 3 % (ref 2–12)
NEUTROPHILS NFR BLD: 88 % (ref 43–80)
NEUTS SEG NFR BLD: 5.36 K/UL (ref 1.8–7.3)
PLATELET # BLD AUTO: 196 K/UL (ref 130–450)
PMV BLD AUTO: 10.4 FL (ref 7–12)
POTASSIUM SERPL-SCNC: 4.3 MMOL/L (ref 3.5–5)
PROT SERPL-MCNC: 6.7 G/DL (ref 6.4–8.3)
RBC # BLD AUTO: 3.29 M/UL (ref 3.8–5.8)
RBC # BLD: ABNORMAL 10*6/UL
SODIUM SERPL-SCNC: 144 MMOL/L (ref 132–146)
WBC OTHER # BLD: 6.1 K/UL (ref 4.5–11.5)

## 2024-10-11 PROCEDURE — 6360000002 HC RX W HCPCS: Performed by: INTERNAL MEDICINE

## 2024-10-11 PROCEDURE — 94640 AIRWAY INHALATION TREATMENT: CPT

## 2024-10-11 PROCEDURE — 2060000000 HC ICU INTERMEDIATE R&B

## 2024-10-11 PROCEDURE — 2580000003 HC RX 258: Performed by: NURSE PRACTITIONER

## 2024-10-11 PROCEDURE — 85025 COMPLETE CBC W/AUTO DIFF WBC: CPT

## 2024-10-11 PROCEDURE — 2580000003 HC RX 258: Performed by: FAMILY MEDICINE

## 2024-10-11 PROCEDURE — 6370000000 HC RX 637 (ALT 250 FOR IP): Performed by: NURSE PRACTITIONER

## 2024-10-11 PROCEDURE — 2700000000 HC OXYGEN THERAPY PER DAY

## 2024-10-11 PROCEDURE — 83735 ASSAY OF MAGNESIUM: CPT

## 2024-10-11 PROCEDURE — 6360000002 HC RX W HCPCS

## 2024-10-11 PROCEDURE — 99232 SBSQ HOSP IP/OBS MODERATE 35: CPT | Performed by: FAMILY MEDICINE

## 2024-10-11 PROCEDURE — 6360000002 HC RX W HCPCS: Performed by: FAMILY MEDICINE

## 2024-10-11 PROCEDURE — 6360000002 HC RX W HCPCS: Performed by: NURSE PRACTITIONER

## 2024-10-11 PROCEDURE — 51798 US URINE CAPACITY MEASURE: CPT

## 2024-10-11 PROCEDURE — 2580000003 HC RX 258: Performed by: INTERNAL MEDICINE

## 2024-10-11 PROCEDURE — 36415 COLL VENOUS BLD VENIPUNCTURE: CPT

## 2024-10-11 PROCEDURE — 94660 CPAP INITIATION&MGMT: CPT

## 2024-10-11 PROCEDURE — 97530 THERAPEUTIC ACTIVITIES: CPT

## 2024-10-11 PROCEDURE — 6370000000 HC RX 637 (ALT 250 FOR IP): Performed by: CLINICAL NURSE SPECIALIST

## 2024-10-11 PROCEDURE — 80053 COMPREHEN METABOLIC PANEL: CPT

## 2024-10-11 RX ORDER — FUROSEMIDE 10 MG/ML
40 INJECTION INTRAMUSCULAR; INTRAVENOUS ONCE
Status: COMPLETED | OUTPATIENT
Start: 2024-10-11 | End: 2024-10-11

## 2024-10-11 RX ADMIN — WATER 40 MG: 1 INJECTION INTRAMUSCULAR; INTRAVENOUS; SUBCUTANEOUS at 09:53

## 2024-10-11 RX ADMIN — ATORVASTATIN CALCIUM 40 MG: 40 TABLET, FILM COATED ORAL at 20:25

## 2024-10-11 RX ADMIN — LEVOFLOXACIN 250 MG: 250 INJECTION, SOLUTION INTRAVENOUS at 15:23

## 2024-10-11 RX ADMIN — HYDRALAZINE HYDROCHLORIDE 100 MG: 50 TABLET ORAL at 09:48

## 2024-10-11 RX ADMIN — HYDRALAZINE HYDROCHLORIDE 100 MG: 50 TABLET ORAL at 15:09

## 2024-10-11 RX ADMIN — IPRATROPIUM BROMIDE AND ALBUTEROL SULFATE 1 DOSE: 2.5; .5 SOLUTION RESPIRATORY (INHALATION) at 06:31

## 2024-10-11 RX ADMIN — IPRATROPIUM BROMIDE AND ALBUTEROL SULFATE 1 DOSE: 2.5; .5 SOLUTION RESPIRATORY (INHALATION) at 18:26

## 2024-10-11 RX ADMIN — HYDRALAZINE HYDROCHLORIDE 100 MG: 50 TABLET ORAL at 20:25

## 2024-10-11 RX ADMIN — SODIUM CHLORIDE, PRESERVATIVE FREE 40 MG: 5 INJECTION INTRAVENOUS at 09:47

## 2024-10-11 RX ADMIN — SODIUM CHLORIDE, PRESERVATIVE FREE 40 MG: 5 INJECTION INTRAVENOUS at 15:08

## 2024-10-11 RX ADMIN — CARVEDILOL 25 MG: 25 TABLET, FILM COATED ORAL at 09:53

## 2024-10-11 RX ADMIN — APIXABAN 5 MG: 5 TABLET, FILM COATED ORAL at 09:50

## 2024-10-11 RX ADMIN — HYDROXYZINE HYDROCHLORIDE 25 MG: 50 INJECTION, SOLUTION INTRAMUSCULAR at 00:10

## 2024-10-11 RX ADMIN — ISOSORBIDE MONONITRATE 30 MG: 30 TABLET, EXTENDED RELEASE ORAL at 09:48

## 2024-10-11 RX ADMIN — IPRATROPIUM BROMIDE AND ALBUTEROL SULFATE 1 DOSE: 2.5; .5 SOLUTION RESPIRATORY (INHALATION) at 13:01

## 2024-10-11 RX ADMIN — ARFORMOTEROL TARTRATE: 15 SOLUTION RESPIRATORY (INHALATION) at 18:26

## 2024-10-11 RX ADMIN — Medication 1000 UNITS: at 09:48

## 2024-10-11 RX ADMIN — ACETAMINOPHEN 650 MG: 325 TABLET ORAL at 20:25

## 2024-10-11 RX ADMIN — HYDROXYZINE HYDROCHLORIDE 25 MG: 50 INJECTION, SOLUTION INTRAMUSCULAR at 20:25

## 2024-10-11 RX ADMIN — SODIUM CHLORIDE, PRESERVATIVE FREE 10 ML: 5 INJECTION INTRAVENOUS at 09:50

## 2024-10-11 RX ADMIN — Medication 3 MG: at 20:24

## 2024-10-11 RX ADMIN — ASPIRIN 81 MG: 81 TABLET, COATED ORAL at 09:48

## 2024-10-11 RX ADMIN — PETROLATUM: 420 OINTMENT TOPICAL at 09:51

## 2024-10-11 RX ADMIN — SODIUM CHLORIDE, SODIUM LACTATE, POTASSIUM CHLORIDE, CALCIUM CHLORIDE AND DEXTROSE MONOHYDRATE: 5; 600; 310; 30; 20 INJECTION, SOLUTION INTRAVENOUS at 09:57

## 2024-10-11 RX ADMIN — WATER 40 MG: 1 INJECTION INTRAMUSCULAR; INTRAVENOUS; SUBCUTANEOUS at 02:38

## 2024-10-11 RX ADMIN — FUROSEMIDE 40 MG: 10 INJECTION, SOLUTION INTRAMUSCULAR; INTRAVENOUS at 15:08

## 2024-10-11 RX ADMIN — WATER 40 MG: 1 INJECTION INTRAMUSCULAR; INTRAVENOUS; SUBCUTANEOUS at 19:44

## 2024-10-11 RX ADMIN — ARFORMOTEROL TARTRATE: 15 SOLUTION RESPIRATORY (INHALATION) at 06:31

## 2024-10-11 RX ADMIN — APIXABAN 5 MG: 5 TABLET, FILM COATED ORAL at 20:25

## 2024-10-11 RX ADMIN — CARVEDILOL 25 MG: 25 TABLET, FILM COATED ORAL at 19:44

## 2024-10-11 ASSESSMENT — PAIN DESCRIPTION - DESCRIPTORS: DESCRIPTORS: BURNING

## 2024-10-11 ASSESSMENT — PAIN SCALES - GENERAL
PAINLEVEL_OUTOF10: 0

## 2024-10-11 ASSESSMENT — PAIN DESCRIPTION - ORIENTATION: ORIENTATION: LEFT

## 2024-10-11 ASSESSMENT — PAIN SCALES - WONG BAKER
WONGBAKER_NUMERICALRESPONSE: NO HURT

## 2024-10-11 ASSESSMENT — PAIN DESCRIPTION - LOCATION: LOCATION: ABDOMEN

## 2024-10-11 NOTE — CARE COORDINATION
Reviewed chart, therapy worked with him today, does not need home O2. Will need transport through [facility] [phone].Sharla Stubbs, MSW, LSW

## 2024-10-11 NOTE — PROGRESS NOTES
SPEECH LANGUAGE PATHOLOGY  DAILY PROGRESS NOTE        PATIENT NAME:  Chuck Chavarria Jr.      ROOM:  7416/7416-A :  1950         TODAY'S DATE:  10/11/2024       Pt remains on BiPAP.   Will attempt swallow eval tomorrow if respiratory status is improved.    157.48

## 2024-10-11 NOTE — PLAN OF CARE
Problem: Chronic Conditions and Co-morbidities  Goal: Patient's chronic conditions and co-morbidity symptoms are monitored and maintained or improved  10/11/2024 0128 by Loreto Coles RN  Outcome: Progressing  10/10/2024 1231 by Anita Haney RN  Outcome: Progressing     Problem: Discharge Planning  Goal: Discharge to home or other facility with appropriate resources  10/11/2024 0128 by Loreto Coles RN  Outcome: Progressing  10/10/2024 1231 by Anita Haney RN  Outcome: Progressing     Problem: Safety - Adult  Goal: Free from fall injury  10/11/2024 0128 by Loreto Coles RN  Outcome: Progressing  10/10/2024 1231 by Anita Haney RN  Outcome: Progressing     Problem: Pain  Goal: Verbalizes/displays adequate comfort level or baseline comfort level  10/11/2024 0128 by Loreto Coles RN  Outcome: Progressing  10/10/2024 1231 by Antia Haney RN  Outcome: Progressing     Problem: Skin/Tissue Integrity  Goal: Absence of new skin breakdown  Description: 1.  Monitor for areas of redness and/or skin breakdown  2.  Assess vascular access sites hourly  3.  Every 4-6 hours minimum:  Change oxygen saturation probe site  4.  Every 4-6 hours:  If on nasal continuous positive airway pressure, respiratory therapy assess nares and determine need for appliance change or resting period.  10/11/2024 0128 by Loreto Coles RN  Outcome: Progressing  10/10/2024 1231 by Anita Haney RN  Outcome: Progressing     Problem: Respiratory - Adult  Goal: Achieves optimal ventilation and oxygenation  10/11/2024 0128 by Loreto Coles RN  Outcome: Progressing  10/10/2024 1231 by Anita Haney RN  Outcome: Progressing

## 2024-10-11 NOTE — PROGRESS NOTES
Comprehensive Nutrition Assessment    Type and Reason for Visit:  Initial, RD Nutrition Re-Screen/LOS    Nutrition Recommendations/Plan:   Continue current diet  Start Ensure TID     Malnutrition Assessment:  Malnutrition Status:  At risk for malnutrition (Comment) (10/11/24 0287)    Context:  Acute Illness     Findings of the 6 clinical characteristics of malnutrition:  Energy Intake:  75% or less of estimated energy requirements for 7 or more days  Weight Loss:  No significant weight loss     Body Fat Loss:  Unable to assess (droplet iso)     Muscle Mass Loss:  Unable to assess (droplet iso)    Fluid Accumulation:  No significant fluid accumulation     Strength:  Not Performed    Nutrition Assessment:    Pt admit w/ SOB found +rhinovirus requiring BIPAP. Noted HENNY on CKD & recent admit last month w/ similar complaints. PMHx COPD, CVA, CHF, substance abuse, HTN, HLD. Pt w/ variable intakes since admit. Pt pending SLP eval. Will add ONS and continue to monitor.    Nutrition Related Findings:    pt alert, bipap, active BS, soft abd, no edema, fluids WDL Wound Type: None       Current Nutrition Intake & Therapies:    Average Meal Intake: 26-50% (average per doc flow)  Average Supplements Intake: None Ordered  ADULT DIET; Regular  ADULT ORAL NUTRITION SUPPLEMENT; Breakfast, Lunch, Dinner; Standard High Calorie/High Protein Oral Supplement    Anthropometric Measures:  Height: 172.7 cm (5' 8\")  Ideal Body Weight (IBW): 154 lbs (70 kg)    Admission Body Weight: 56.2 kg (124 lb) (10/4 actual)  Current Body Weight: 56.2 kg (124 lb), 80.5 % IBW.    Current BMI (kg/m2): 18.9  Usual Body Weight: 54.4 kg (120 lb) (1/2024 actual per EMR)  % Weight Change (Calculated): 3.3                    BMI Categories: Underweight (BMI less than 22) age over 65    Estimated Daily Nutrient Needs:  Energy Requirements Based On: Formula  Weight Used for Energy Requirements: Current  Energy (kcal/day): MSJ x 1.2 SF = 6920-8939  Weight Used  for Protein Requirements: Current  Protein (g/day): 75-85 (1.3-1.5 g/kg CBW)  Method Used for Fluid Requirements: 1 ml/kcal  Fluid (ml/day): 4864-1870    Nutrition Diagnosis:   Inadequate oral intake related to inadequate protein-energy intake (+Rhinovirus/hx COPD) as evidenced by intake 26-50%    Nutrition Interventions:   Food and/or Nutrient Delivery: Continue Current Diet, Start Oral Nutrition Supplement  Nutrition Education/Counseling: Education not appropriate  Coordination of Nutrition Care: Continue to monitor while inpatient       Goals:     Goals: PO intake 75% or greater, by next RD assessment       Nutrition Monitoring and Evaluation:      Food/Nutrient Intake Outcomes: Food and Nutrient Intake, Supplement Intake  Physical Signs/Symptoms Outcomes: Biochemical Data, GI Status, Fluid Status or Edema, Nutrition Focused Physical Findings, Weight, Skin    Discharge Planning:    Continue Oral Nutrition Supplement     Juany Kaiser, MS, RD, LD  Contact: [phone]

## 2024-10-11 NOTE — PROGRESS NOTES
Department of Internal Medicine  Nephrology Progress Note    Events reviewed.     SUBJECTIVE: We are following Mr Deon for HENNY. Reports no complaints.      PHYSICAL EXAM:      Vitals:    VITALS:  /80   Pulse 80   Temp 97.8 °F (36.6 °C) (Temporal)   Resp 16   Ht 1.727 m (5' 8\")   Wt 56.3 kg (124 lb 1.6 oz)   SpO2 98%   BMI 18.87 kg/m²   24HR INTAKE/OUTPUT:    Intake/Output Summary (Last 24 hours) at 10/11/2024 1241  Last data filed at 10/10/2024 1321  Gross per 24 hour   Intake 0 ml   Output --   Net 0 ml         Constitutional:  alert and oriented, NAD   HEENT:  normocephalic, atraumatic   Respiratory:  decreased, SOB  Cardiovascular/Edema:  regular rate and rhythm, no edema  Gastrointestinal:  normal bowel sounds x 4  Neurologic:  normal   Skin:  clean and dry     Scheduled Meds:   methylPREDNISolone  40 mg IntraVENous Q8H    levofloxacin  250 mg IntraVENous Q24H    pantoprazole (PROTONIX) 40 mg in sodium chloride (PF) 0.9 % 10 mL injection  40 mg IntraVENous BID AC    hydrALAZINE  100 mg Oral TID    carvedilol  25 mg Oral BID WC    apixaban  5 mg Oral BID    white petrolatum   Topical BID    sodium chloride flush  5-40 mL IntraVENous 2 times per day    ipratropium 0.5 mg-albuterol 2.5 mg  1 Dose Inhalation Q4H WA RT    nicotine  1 patch TransDERmal Daily    aspirin  81 mg Oral Daily    atorvastatin  40 mg Oral Nightly    arformoterol 15 mcg-budesonide 0.5 mg neb solution   Nebulization BID RT    isosorbide mononitrate  30 mg Oral Daily    Vitamin D  1,000 Units Oral Daily     Continuous Infusions:   dextrose 5% in lactated ringers 60 mL/hr at 10/11/24 0957    sodium chloride       PRN Meds:.hydrOXYzine, sodium chloride flush, sodium chloride, potassium chloride **OR** potassium alternative oral replacement **OR** potassium chloride, magnesium sulfate, polyethylene glycol, acetaminophen **OR** acetaminophen, melatonin, guaiFENesin-dextromethorphan, prochlorperazine **OR** prochlorperazine     DATA:  nonischemic cardiomyopathy, PAF, COPD, CVA, bilateral carotid artery stenosis, right upper lobe lung mass, hyperlipidemia, polysubstance abuse, who was admitted on 10/4/24 after presenting to the ED with complaints of SOB. Patient tested positive for rhinovirus. On admission, his creatinine was 1.3 mg/dL and today it is 1.5 mg/dL, reason for this consultation. His home medications include hydralazine, metoprolol, isosorbide.     IMPRESSION/RECOMMENDATIONS:        HENNY stage I on CKD, probably hemodynamically mediated due to volume depletion with poor PO intake.  Renal function without further improvement despite IV fluids administration.    CKD stage II, protein excretion, kidney ultrasound showing bilateral kidney echogenicity consistent with medical renal disease. Urine protein 227, Microalbumin urine 1417, UACR 1363, UPCR 2.18.     HFrEF 40%, proBNP 7672 > 12712 > 15,679  HTN, on hydralazine, metoprolol, isosorbide   Normocytic anemia, iron 40, TIBC 254, folate 13.2, B12 713, Iron sat% 16, s/p ferrlicit   -----------------------  Rhinovirus, on solu-medrol.   Presumptive pneumonia, on levofloxacin  Hyperlipidemia, on atorvastatin  COPD  History of PAF, on apixaban and metoprolol   Cocaine abuse      Plan:       Discontinue IV fluid  Lasix 40 mg IV x 1  Continue to monitor renal function  Continue carvedilol 25 mg po bid, hydralazine 100 mg 3 times daily, isosorbide 30 mg daily  Continue cholecalciferol 1000 units daily.   Avoid hypotension      Catherine Cook MD

## 2024-10-11 NOTE — PROGRESS NOTES
Physical Therapy  Treatment Note    Name: Chuck Chavarria Jr.  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  Procedure/Surgery:  None this admission  Precautions:  Fall risk, alarms, continuous O2 monitor, droplet isolation (Rhinovirus), incontinent  Equipment Needs:  TBD    SUBJECTIVE:    Pt lives alone in a 5th floor apartment with a level entry and elevator access. Pt ambulated with a SBQC PTA.    OBJECTIVE:   Initial Evaluation  Date: 10/7/24 Treatment Date: 10/11/24 Short Term/ Long Term   Goals   AM-PAC 6 Clicks     Was pt agreeable to Eval/treatment? Yes Yes    Does pt have pain? Denied Denied    Bed Mobility  Rolling: SBA  Supine to sit: SBA  Sit to supine: SBA  Scooting: SBA Rolling: SBA  Supine to sit: SBA  Sit to supine: NT  Scooting: SBA Rolling: Independent  Supine to sit: Independent  Sit to supine: Independent  Scooting: Independent   Transfers Sit to stand: Kaylyn  Stand to sit: Kaylyn  Stand pivot: Kaylyn with R SBQC Sit to stand: Kaylyn (from EOB), ModA (from chair)  Stand to sit: Kaylyn  Stand pivot: Kaylyn with R SBQC Sit to stand: Independent  Stand to sit: Independent  Stand pivot: Mod I with AAD   Ambulation    65 feet with R SBQC and Kaylyn 20' x2 with R SBQC and Kaylyn >200 feet with AAD and Mod I   Stair negotiation: ascended and descended  NT NT TBD   ROM BUE:  Refer to OT  BLE:  WFL     Strength BUE:  Refer to OT  BLE:  5/5 knee extension, 5/5 ankle DF, 5/5 ankle PF     Balance

## 2024-10-11 NOTE — PROGRESS NOTES
[facility] Hospitalist Progress Note    SYNOPSIS: Patient admitted on 10/4/2024 for SOB (shortness of breath)    74-year-old male with past medical history of asthma, COPD, CVA, HLD HLD and HTN who presented to the ED with complaints of shortness of breath. Patient was recently hospitalized 2024 - 2024 for the same complaints. He was followed by both nephrology and cardiology who optimized his medications. On the day of his discharge patient passed a 6-minute walk test without any oxygen requirements. He was discharged home with a follow-up with his PCP in 1 week nephrology in 3 to 4 weeks. Patient was recently scheduled to follow with CHF clinic and per EMR patient never showed for his appointment when the clinic attempted to reach out to him but his numbers were disconnected. In the ED, patient was placed on BIPAP for respiratory support. RVP was positive for rhinovirus. CXR showed pulmonary vascular congestion.  Patient was admitted and started on IV diuresis for further management. Patient developed HENNY thought to be prerenal due to poor oral intake. Diuresis held and fluids started with improvement initially. Nephrology consulted.     Patient had RRT called on 10/9 over night due to increased WOB. Patient was placed on BIPAP with improvement. He was also noted to be hypoxic with troponin of 780--> 763. EKG showed bifascicular block. Patient started on heparin ggt and Cardiology consulted.     SUBJECTIVE:  Patient seen and examined chart reviewed discussed with nursing staff and case management.  Patient is still requiring BiPAP at night and naps, nasal cannula O2 when off BiPAP.  He does complain of shortness of breath and cough.  Creatinine is 1.6, bicarb 18 hemoglobin 9.4    Temp (24hrs), Av.5 °F (36.4 °C), Min:97.2 °F (36.2 °C), Max:97.8 °F (36.6 °C)    DIET: ADULT DIET; Regular  CODE: Full Code    Intake/Output Summary (Last 24 hours) at 10/11/2024 0981  Last data filed at 10/10/2024  and  Rhinovirus possible pneumonia  Initially required BiPAP.    Respiratory viral panel positive for rhinovirus.  Increased WOB over night. Currently on BIPAP and improved.  BNP on discharge last admission was 5009, 7672 on admission    Patient was started on IV diuresis on admission but developed worsening HENNY. Diuresis held   Nephrology consulted, patient on IVF for HENNY   ECHO 9/11/24 showed EF of 40%  Hold off on further diuresis due to HENNY   strict IsOs, daily weights   Fluid and salt restricted diet  Continue with Robitussin and DuoNebs as needed  Sputum culture pending  ABGs reviewed chest x-ray reviewed  Will rule out start patient on IV antibiotics for possible pneumonia consult pulmonology team input appreciated severe allergy to Rocephin and doxycycline will start Levaquin  Steroids, breathing treatments diuresis as tolerated per nephrology    NSTEMI vs demand ischemia  Markedly elevated troponin over night at time of RRT at 780, now down trending. Likely demand ischemia due to hypoxia which was confirmed at time of RRT  EKG showing bifascicular block   Will start heparin ggt for now given marked troponin elevation and consult cardiology  Trend troponin   Telemetry     Paroxysmal atrial fibrillation  Heparin ggt  Metoprolol    HENNY on CKD stage II- iworsening  baseline creatinine 1.1, currently 1.5-->1.5---> 1.3 --> 1.5--> 1.3   Likely prerenal in the setting of decreased oral intake due to infection. Patient appears dry on exam  Continue gentle IVFs today NS at 100 ml/hr  Nephrology following   Daily BMP   Monitor urine output   Avoid nephrotoxic agents    HTN  Continue outpatient dose of hydralazine 25 mg 3 times daily and metoprolol 25 mg daily.    HLD  Continue outpatient dose of Lipitor 40 mg daily and ASA 81 mg daily      DVT Prophylaxis Eliquis   GI Prophylaxis [] PPI,  [] H2 Blocker,  [] Carafate,  [x] Diet/Tube Feeds   Disposition Patient requires continued admission due to awaiting improvement in

## 2024-10-12 ENCOUNTER — APPOINTMENT (OUTPATIENT)
Dept: GENERAL RADIOLOGY | Age: 74
DRG: 133 | End: 2024-10-12
Payer: COMMERCIAL

## 2024-10-12 LAB
ALBUMIN SERPL-MCNC: 3.2 G/DL (ref 3.5–5.2)
ALP SERPL-CCNC: 79 U/L (ref 40–129)
ALT SERPL-CCNC: 20 U/L (ref 0–40)
ANION GAP SERPL CALCULATED.3IONS-SCNC: 10 MMOL/L (ref 7–16)
AST SERPL-CCNC: 43 U/L (ref 0–39)
BASOPHILS # BLD: 0 K/UL (ref 0–0.2)
BASOPHILS NFR BLD: 0 % (ref 0–2)
BILIRUB SERPL-MCNC: 0.2 MG/DL (ref 0–1.2)
BUN SERPL-MCNC: 39 MG/DL (ref 6–23)
CALCIUM SERPL-MCNC: 9.3 MG/DL (ref 8.6–10.2)
CHLORIDE SERPL-SCNC: 110 MMOL/L (ref 98–107)
CO2 SERPL-SCNC: 22 MMOL/L (ref 22–29)
CREAT SERPL-MCNC: 1.7 MG/DL (ref 0.7–1.2)
EOSINOPHIL # BLD: 0 K/UL (ref 0.05–0.5)
EOSINOPHILS RELATIVE PERCENT: 0 % (ref 0–6)
ERYTHROCYTE [DISTWIDTH] IN BLOOD BY AUTOMATED COUNT: 15.9 % (ref 11.5–15)
GFR, ESTIMATED: 43 ML/MIN/1.73M2
GLUCOSE SERPL-MCNC: 151 MG/DL (ref 74–99)
HCT VFR BLD AUTO: 28.9 % (ref 37–54)
HGB BLD-MCNC: 9.1 G/DL (ref 12.5–16.5)
LYMPHOCYTES NFR BLD: 0.07 K/UL (ref 1.5–4)
LYMPHOCYTES RELATIVE PERCENT: 1 % (ref 20–42)
MAGNESIUM SERPL-MCNC: 2 MG/DL (ref 1.6–2.6)
MCH RBC QN AUTO: 28.1 PG (ref 26–35)
MCHC RBC AUTO-ENTMCNC: 31.5 G/DL (ref 32–34.5)
MCV RBC AUTO: 89.2 FL (ref 80–99.9)
MONOCYTES NFR BLD: 0.07 K/UL (ref 0.1–0.95)
MONOCYTES NFR BLD: 1 % (ref 2–12)
NEUTROPHILS NFR BLD: 98 % (ref 43–80)
NEUTS SEG NFR BLD: 8.25 K/UL (ref 1.8–7.3)
NUCLEATED RED BLOOD CELLS: 3 PER 100 WBC
PLATELET # BLD AUTO: 183 K/UL (ref 130–450)
PMV BLD AUTO: 10.7 FL (ref 7–12)
POTASSIUM SERPL-SCNC: 4.5 MMOL/L (ref 3.5–5)
PROT SERPL-MCNC: 6.8 G/DL (ref 6.4–8.3)
RBC # BLD AUTO: 3.24 M/UL (ref 3.8–5.8)
RBC # BLD: ABNORMAL 10*6/UL
SODIUM SERPL-SCNC: 142 MMOL/L (ref 132–146)
WBC OTHER # BLD: 8.4 K/UL (ref 4.5–11.5)

## 2024-10-12 PROCEDURE — 2580000003 HC RX 258: Performed by: NURSE PRACTITIONER

## 2024-10-12 PROCEDURE — 83735 ASSAY OF MAGNESIUM: CPT

## 2024-10-12 PROCEDURE — 85025 COMPLETE CBC W/AUTO DIFF WBC: CPT

## 2024-10-12 PROCEDURE — 1200000000 HC SEMI PRIVATE

## 2024-10-12 PROCEDURE — 6370000000 HC RX 637 (ALT 250 FOR IP): Performed by: CLINICAL NURSE SPECIALIST

## 2024-10-12 PROCEDURE — 92610 EVALUATE SWALLOWING FUNCTION: CPT

## 2024-10-12 PROCEDURE — 2580000003 HC RX 258: Performed by: FAMILY MEDICINE

## 2024-10-12 PROCEDURE — 6360000002 HC RX W HCPCS: Performed by: FAMILY MEDICINE

## 2024-10-12 PROCEDURE — 6370000000 HC RX 637 (ALT 250 FOR IP): Performed by: FAMILY MEDICINE

## 2024-10-12 PROCEDURE — 6370000000 HC RX 637 (ALT 250 FOR IP): Performed by: NURSE PRACTITIONER

## 2024-10-12 PROCEDURE — 36415 COLL VENOUS BLD VENIPUNCTURE: CPT

## 2024-10-12 PROCEDURE — 94660 CPAP INITIATION&MGMT: CPT

## 2024-10-12 PROCEDURE — 80053 COMPREHEN METABOLIC PANEL: CPT

## 2024-10-12 PROCEDURE — 6360000002 HC RX W HCPCS: Performed by: NURSE PRACTITIONER

## 2024-10-12 PROCEDURE — 99232 SBSQ HOSP IP/OBS MODERATE 35: CPT | Performed by: FAMILY MEDICINE

## 2024-10-12 PROCEDURE — 99232 SBSQ HOSP IP/OBS MODERATE 35: CPT | Performed by: INTERNAL MEDICINE

## 2024-10-12 PROCEDURE — 94640 AIRWAY INHALATION TREATMENT: CPT

## 2024-10-12 PROCEDURE — 6370000000 HC RX 637 (ALT 250 FOR IP): Performed by: INTERNAL MEDICINE

## 2024-10-12 PROCEDURE — 71045 X-RAY EXAM CHEST 1 VIEW: CPT

## 2024-10-12 PROCEDURE — 2700000000 HC OXYGEN THERAPY PER DAY

## 2024-10-12 RX ORDER — PREDNISONE 20 MG/1
40 TABLET ORAL DAILY
Status: DISCONTINUED | OUTPATIENT
Start: 2024-10-12 | End: 2024-10-12

## 2024-10-12 RX ORDER — PREDNISONE 20 MG/1
20 TABLET ORAL DAILY
Status: COMPLETED | OUTPATIENT
Start: 2024-10-13 | End: 2024-10-16

## 2024-10-12 RX ADMIN — ARFORMOTEROL TARTRATE: 15 SOLUTION RESPIRATORY (INHALATION) at 07:54

## 2024-10-12 RX ADMIN — IPRATROPIUM BROMIDE AND ALBUTEROL SULFATE 1 DOSE: 2.5; .5 SOLUTION RESPIRATORY (INHALATION) at 15:57

## 2024-10-12 RX ADMIN — SODIUM CHLORIDE, PRESERVATIVE FREE 10 ML: 5 INJECTION INTRAVENOUS at 20:40

## 2024-10-12 RX ADMIN — ISOSORBIDE MONONITRATE 30 MG: 30 TABLET, EXTENDED RELEASE ORAL at 08:44

## 2024-10-12 RX ADMIN — HYDRALAZINE HYDROCHLORIDE 100 MG: 50 TABLET ORAL at 08:45

## 2024-10-12 RX ADMIN — PETROLATUM: 420 OINTMENT TOPICAL at 11:49

## 2024-10-12 RX ADMIN — IPRATROPIUM BROMIDE AND ALBUTEROL SULFATE 1 DOSE: 2.5; .5 SOLUTION RESPIRATORY (INHALATION) at 12:19

## 2024-10-12 RX ADMIN — ATORVASTATIN CALCIUM 40 MG: 40 TABLET, FILM COATED ORAL at 20:40

## 2024-10-12 RX ADMIN — HYDRALAZINE HYDROCHLORIDE 100 MG: 50 TABLET ORAL at 20:39

## 2024-10-12 RX ADMIN — IPRATROPIUM BROMIDE AND ALBUTEROL SULFATE 1 DOSE: 2.5; .5 SOLUTION RESPIRATORY (INHALATION) at 19:52

## 2024-10-12 RX ADMIN — CARVEDILOL 25 MG: 25 TABLET, FILM COATED ORAL at 08:45

## 2024-10-12 RX ADMIN — ARFORMOTEROL TARTRATE: 15 SOLUTION RESPIRATORY (INHALATION) at 19:52

## 2024-10-12 RX ADMIN — Medication 1000 UNITS: at 08:45

## 2024-10-12 RX ADMIN — SODIUM CHLORIDE, PRESERVATIVE FREE 40 MG: 5 INJECTION INTRAVENOUS at 03:48

## 2024-10-12 RX ADMIN — PETROLATUM: 420 OINTMENT TOPICAL at 20:40

## 2024-10-12 RX ADMIN — IPRATROPIUM BROMIDE AND ALBUTEROL SULFATE 1 DOSE: 2.5; .5 SOLUTION RESPIRATORY (INHALATION) at 07:54

## 2024-10-12 RX ADMIN — APIXABAN 5 MG: 5 TABLET, FILM COATED ORAL at 08:44

## 2024-10-12 RX ADMIN — CARVEDILOL 25 MG: 25 TABLET, FILM COATED ORAL at 16:57

## 2024-10-12 RX ADMIN — SODIUM CHLORIDE, PRESERVATIVE FREE 40 MG: 5 INJECTION INTRAVENOUS at 16:57

## 2024-10-12 RX ADMIN — APIXABAN 5 MG: 5 TABLET, FILM COATED ORAL at 20:39

## 2024-10-12 RX ADMIN — PREDNISONE 40 MG: 20 TABLET ORAL at 11:34

## 2024-10-12 RX ADMIN — WATER 40 MG: 1 INJECTION INTRAMUSCULAR; INTRAVENOUS; SUBCUTANEOUS at 02:21

## 2024-10-12 RX ADMIN — SODIUM CHLORIDE, PRESERVATIVE FREE 10 ML: 5 INJECTION INTRAVENOUS at 08:46

## 2024-10-12 RX ADMIN — ASPIRIN 81 MG: 81 TABLET, COATED ORAL at 08:45

## 2024-10-12 RX ADMIN — HYDRALAZINE HYDROCHLORIDE 100 MG: 50 TABLET ORAL at 13:59

## 2024-10-12 ASSESSMENT — PAIN SCALES - GENERAL
PAINLEVEL_OUTOF10: 0
PAINLEVEL_OUTOF10: 0

## 2024-10-12 NOTE — PLAN OF CARE
Problem: Chronic Conditions and Co-morbidities  Goal: Patient's chronic conditions and co-morbidity symptoms are monitored and maintained or improved  Outcome: Progressing     Problem: Discharge Planning  Goal: Discharge to home or other facility with appropriate resources  Outcome: Progressing     Problem: Safety - Adult  Goal: Free from fall injury  Outcome: Progressing     Problem: Pain  Goal: Verbalizes/displays adequate comfort level or baseline comfort level  Outcome: Progressing     Problem: Skin/Tissue Integrity  Goal: Absence of new skin breakdown  Description: 1.  Monitor for areas of redness and/or skin breakdown  2.  Assess vascular access sites hourly  3.  Every 4-6 hours minimum:  Change oxygen saturation probe site  4.  Every 4-6 hours:  If on nasal continuous positive airway pressure, respiratory therapy assess nares and determine need for appliance change or resting period.  Outcome: Progressing     Problem: Respiratory - Adult  Goal: Achieves optimal ventilation and oxygenation  Outcome: Progressing     Problem: Nutrition Deficit:  Goal: Optimize nutritional status  Outcome: Progressing

## 2024-10-12 NOTE — PROGRESS NOTES
[facility] Hospitalist Progress Note    SYNOPSIS: Patient admitted on 10/4/2024 for SOB (shortness of breath)    74-year-old male with past medical history of asthma, COPD, CVA, HLD HLD and HTN who presented to the ED with complaints of shortness of breath. Patient was recently hospitalized 2024 - 2024 for the same complaints. He was followed by both nephrology and cardiology who optimized his medications. On the day of his discharge patient passed a 6-minute walk test without any oxygen requirements. He was discharged home with a follow-up with his PCP in 1 week nephrology in 3 to 4 weeks. Patient was recently scheduled to follow with CHF clinic and per EMR patient never showed for his appointment when the clinic attempted to reach out to him but his numbers were disconnected. In the ED, patient was placed on BIPAP for respiratory support. RVP was positive for rhinovirus. CXR showed pulmonary vascular congestion.  Patient was admitted and started on IV diuresis for further management. Patient developed HENNY thought to be prerenal due to poor oral intake. Diuresis held and fluids started with improvement initially. Nephrology consulted.     Patient had RRT called on 10/9 over night due to increased WOB. Patient was placed on BIPAP with improvement. He was also noted to be hypoxic with troponin of 780--> 763. EKG showed bifascicular block. Patient started on heparin ggt and Cardiology consulted.     SUBJECTIVE:  Seen and examined chart reviewed discussed with nursing staff no overnight events reported 4 to 5 L of oxygen via nasal cannula shortness of breath is improved.  Change IV steroids to oral prednisone.  Creatinine is 1.7 today      Temp (24hrs), Av.6 °F (36.4 °C), Min:97 °F (36.1 °C), Max:98.1 °F (36.7 °C)    DIET: ADULT DIET; Regular  ADULT ORAL NUTRITION SUPPLEMENT; Breakfast, Lunch, Dinner; Standard High Calorie/High Protein Oral Supplement  CODE: Full Code    Intake/Output Summary  failure multifactorial secondary to  Acute on chronic systolic heart failure and  Rhinovirus possible pneumonia  Initially required BiPAP.    Respiratory viral panel positive for rhinovirus.  Increased WOB over night. Currently on BIPAP and improved.  BNP on discharge last admission was 5009, 7672 on admission    Patient was started on IV diuresis on admission but developed worsening HENNY. Diuresis held   Nephrology consulted, patient on IVF for HNENY   ECHO 9/11/24 showed EF of 40%  Hold off on further diuresis due to HENNY   strict IsOs, daily weights   Fluid and salt restricted diet  Continue with Robitussin and DuoNebs as needed  Sputum culture pending  ABGs reviewed chest x-ray reviewed  Will rule out start patient on IV antibiotics for possible pneumonia consult pulmonology team input appreciated severe allergy to Rocephin and doxycycline will start Levaquin  Steroids, breathing treatments diuresis as tolerated per nephrology    NSTEMI vs demand ischemia  Markedly elevated troponin over night at time of RRT at 780, now down trending. Likely demand ischemia due to hypoxia which was confirmed at time of RRT  EKG showing bifascicular block   Will start heparin ggt for now given marked troponin elevation and consult cardiology  Trend troponin   Telemetry     Paroxysmal atrial fibrillation  Heparin ggt  Metoprolol    HENNY on CKD stage II- iworsening  baseline creatinine 1.1, currently 1.5-->1.5---> 1.3 --> 1.5--> 1.3   Likely prerenal in the setting of decreased oral intake due to infection. Patient appears dry on exam  Continue gentle IVFs today NS at 100 ml/hr  Nephrology following   Daily BMP   Monitor urine output   Avoid nephrotoxic agents    HTN  Continue outpatient dose of hydralazine 25 mg 3 times daily and metoprolol 25 mg daily.    HLD  Continue outpatient dose of Lipitor 40 mg daily and ASA 81 mg daily      DVT Prophylaxis Eliquis   GI Prophylaxis [] PPI,  [] H2 Blocker,  [] Carafate,  [x] Diet/Tube Feeds

## 2024-10-12 NOTE — PROGRESS NOTES
4 Eyes Skin Assessment     NAME:  Chuck Chavarria Jr.  YOB: 1950  MEDICAL RECORD NUMBER:  [id number]    The patient is being assessed for  Transfer to New Unit    I agree that at least one RN has performed a thorough Head to Toe Skin Assessment on the patient. ALL assessment sites listed below have been assessed.      Areas assessed by both nurses:    Head, Face, Ears, Shoulders, Back, Chest, Arms, Elbows, Hands, Sacrum. Buttock, Coccyx, Ischium, Legs. Feet and Heels, and Under Medical Devices         Does the Patient have a Wound? No noted wound(s). Pt has dry, flaky skin (BUE, BLE). Aquaphor applied. Hypopigmentation noted on BLE (including bottom of feet), BUE (hands). Wound care consult already ordered for hypopigmentation.        Martin Prevention initiated by RN: Yes  Wound Care Orders initiated by RN: Yes    Pressure Injury (Stage 3,4, Unstageable, DTI, NWPT, and Complex wounds) if present, place Wound referral order by RN under : No    New Ostomies, if present place, Ostomy referral order under : No     Nurse 1 eSignature: Electronically signed by Gabbi Perez RN on 10/12/24 at 6:54 PM EDT    **SHARE this note so that the co-signing nurse can place an eSignature**    Nurse 2 eSignature: Electronically signed by Dipika Marcum RN on 10/12/24 at 6:55 PM EDT

## 2024-10-12 NOTE — PROGRESS NOTES
Report called to [facility] waiting transport    0647 pt taken down up to transport to [facility] at this time - belongings - clothing, cell phone, cane in bed with patient - pt states all belongings in hand

## 2024-10-12 NOTE — PROGRESS NOTES
Date: 10/12/2024    Time: 3:09 AM    Patient Placed On BIPAP/CPAP/ Non-Invasive Ventilation?  Patient continues on bipap    If no must comment.  Facial area red/color change? No           If YES are Blister/Lesion present?No   If yes must notify nursing staff  BIPAP/CPAP skin barrier?  Yes    Skin barrier type:mepilexlite       Comments:        Taniya Cardona RCP

## 2024-10-12 NOTE — PROGRESS NOTES
SPEECH/LANGUAGE PATHOLOGY  CLINICAL ASSESSMENT OF SWALLOWING FUNCTION   and PLAN OF CARE      PATIENT NAME:  Chuck Chavarria Jr.  (male)     MRN:  [id number]    :  1950  (74 y.o.)  STATUS:  Inpatient: Room 8408/8408-A    TODAY'S DATE:  10/12/2024  ORDER DATE, DESCRIPTION AND REFERRING PROVIDER: Baltazar Huffman MD   REASON FOR REFERRAL: swallow function   EVALUATING THERAPIST: AURELIANO Mancera                 RESULTS:    DYSPHAGIA DIAGNOSIS:   Clinical indicators of minimal-mild oropharyngeal phase dysphagia     A Video Swallow Study (MBSS) is recommended and requires a physician order IF silent aspiration is suspected based on medical presentation      DIET RECOMMENDATIONS:  Regular consistency solids (IDDSI level 7) with  thin liquids (IDDSI level 0)     FEEDING RECOMMENDATIONS:     Assistance level:  Set-up is required for all oral intake      Compensatory strategies recommended: Thorough oral care to prevent colonization of oral bacteria   Upright in bed/ chair as tolerated  Fully alert for all PO  SINGLE cup sips  SMALL bites  NO STRAW      Discussed recommendations with:  patient nurse via phone    SPEECH THERAPY  PLAN OF CARE   The dysphagia POC is established based on physician order, dysphagia diagnosis and results of clinical assessment     Skilled SLP intervention for dysphagia management on acute care up to 5 x per week until goals met, pt plateaus in function and/or discharged from hospital    Conditions Requiring Skilled Therapeutic Intervention for dysphagia:    Patient is performing below functional baseline d/t  current acute condition, respiratory compromise, multiple medications, and/or increased dependency upon caregivers.  audible crackle/moist respirations that increased with PO intake   Underlying moist cough decreases ability to determine presence or absence of clinical indicators of dysphagia    Specific dysphagia interventions to include:     MBSS to fully assess oropharyngeal  FUNCTION:    PAST HISTORY OF OROPHARYNGEAL DYSPHAGIA?: none reported    Home diet: Regular consistency solids (IDDSI level 7) with  thin liquids (IDDSI level 0)  Current Diet Order:  ADULT DIET; Regular  ADULT ORAL NUTRITION SUPPLEMENT; Breakfast, Lunch, Dinner; Standard High Calorie/High Protein Oral Supplement    PROCEDURE:  Consistencies Administered During the Evaluation   Liquids: thin liquid   Solids:  Pureed  and Soft and bite sized      Method of Intake:   cup, straw, spoon  Self fed      Position:   Sitting in bed with head elevated above 75 degrees    CLINICAL ASSESSMENT:  Oral Stage:       Impaired oral initiation  prolonged mastication resulting in poor oral manipulation, poor bolus formation and increased oral residue post swallow  Decreased mastication due to:  poor/missing dentition  , decreased lingual control, and disorganized chewing pattern      Pharyngeal Stage:    Throat clearing present after presentation of thin liquid WITH STRAW ONLY  Wet/gurgly vocal quality was noted after presentation of thin liquid WITH STRAW ONLY    Cognition:   Confusion noted    Oral Peripheral Examination   Generalized oral weakness    Current Respiratory Status    4 liters O2 via nasal cannula     Parameters of Speech Production  Respiration:  Shortness of breath  Quality:   Harsh  Intensity: Within functional limits    Volitional Swallow: Present     Volitional Cough:  Present     Pain: No pain reported.    EDUCATION:   The Speech Language Pathologist (SLP) completed education regarding results of evaluation and that intervention is warranted at this time.  Learner: Patient  Education: Reviewed results and recommendations of this evaluation  Evaluation of Education:  Verbalizes understanding    This plan may be re-evaluated and revised as warranted.      Evaluation Time includes thorough review of current medical information, gathering information on past medical history/social history and prior level of function,  completion of standardized testing/informal observation of tasks, assessment of data and education on plan of care and goals.    [x]The admitting diagnosis and active problem list, have been reviewed prior to initiation of this evaluation.        ACTIVE PROBLEM LIST:   Patient Active Problem List   Diagnosis    Chest pain    Abdominal pain, epigastric    HTN (hypertension)    Tobacco abuse    Cocaine abuse (HCC)    Mild persistent asthma without complication    LAFB (left anterior fascicular block)    HLD (hyperlipidemia)    HENNY (acute kidney injury) (HCC)    Pneumonia    SOB (shortness of breath)    HFrEF (heart failure with reduced ejection fraction) ([facility])    Loculated pleural effusion    Bilateral carotid artery stenosis    Tobacco dependence    Occlusion of right vertebral artery    Acute stroke due to ischemia ([facility])    Arthritis    A-fib (HCC)    Esophageal dysphagia    Acute diastolic CHF (congestive heart failure) (HCC)    COPD with acute exacerbation (HCC)    COPD (chronic obstructive pulmonary disease) ([facility])    (HFpEF) heart failure with preserved ejection fraction (HCC)    Polysubstance abuse (HCC)    Acute respiratory failure with hypoxia    Pyrexia of unknown origin following delivery    Stage 2 chronic kidney disease         CPT code:  41832  bedside swallow sanket Jones M.A., CCC-SLP

## 2024-10-12 NOTE — PROGRESS NOTES
[facility]  Department of Internal Medicine  Division of Pulmonary, Critical Care and Sleep Medicine  Consult Note    Keagan Spivey DO, MPH, [facility], FACOI, FACP  Jeaneth Smith DO, [facility]  Florian Booker MD, MS  Tanya Perry, APRN-CNP    Patient: Chuck Chavarria Jr.  MRN: [id number]  : 1950    Encounter Time: 2:36 PM     Date of Admission: 10/4/2024  8:55 AM    Primary Care Physician: Gunner Espinosa MD    Reason for Consultation: XOPD     HISTORY OF PRESENT ILLNESS : Chuck Chavarria Jr. 74 y.o. male was seen in consultation regarding the above chief compliant.    Patient presented to ER 2024 for shortness of breath/chest tightness x 1 day.  Of note patient was at the ER  for similar symptoms and diagnosed with COPD exacerbation and discharged on prednisone although patient did not  prescription for steroids.  Patient given DuoNeb and Solu-Medrol upon arrival.  Patient was not hypoxic.  Patient found to have elevated BNP and CXR showing right basilar infiltrate versus pulmonary edema and was given IV Lasix in ER.  Patient with worsening HENNY since admission 1.1 > 1.7.  Patient with mild fever 100.5 upon arrival and started on azithromycin while cultures pending.  UDS positive for cocaine.  Patient received 3 doses of Lasix IV before being discontinued due to worsening HENNY.  Patient taken off of ATB and to be monitored.  Cardiology consulted for shortness of breath and T wave inversion in lateral leads.  VS upon arrival 100.5, 19 respirations, 117 pulse, 140/94, 95% room air.  Labs: Potassium 3.6, BUN 33, creatinine 1.1 > 1.7, GFR 41, magnesium 2.0, glucose 101, calcium 8.6, procalcitonin 0.11, proBNP 11,500 > 11,000 (: 6304), troponin 53 > 50 (: 42), cholesterol 159, HDL 71, LDL 79, triglycerides 46, albumin 3.2, UDS positive cocaine, WBC 7.7, H&H 11.3 > 10.7/32.2, platelet 244, blood and urine cultures pending, viral panel negative, UA  non-focal     DATA: IMAGING & TESTING:     LABORATORY TESTS:    CBC:   Lab Results   Component Value Date/Time    WBC 8.4 10/12/2024 06:27 AM    RBC 3.24 10/12/2024 06:27 AM    HGB 9.1 10/12/2024 06:27 AM    HCT 28.9 10/12/2024 06:27 AM    MCV 89.2 10/12/2024 06:27 AM    MCH 28.1 10/12/2024 06:27 AM    MCHC 31.5 10/12/2024 06:27 AM    RDW 15.9 10/12/2024 06:27 AM     10/12/2024 06:27 AM    MPV 10.7 10/12/2024 06:27 AM     BMP:    Lab Results   Component Value Date/Time     10/12/2024 06:27 AM    K 4.5 10/12/2024 06:27 AM    K 4.8 08/28/2022 05:15 AM     10/12/2024 06:27 AM    CO2 22 10/12/2024 06:27 AM    BUN 39 10/12/2024 06:27 AM    CREATININE 1.7 10/12/2024 06:27 AM    CALCIUM 9.3 10/12/2024 06:27 AM    GFRAA >60 08/28/2022 05:15 AM    LABGLOM 43 10/12/2024 06:27 AM    LABGLOM >60 01/18/2024 07:07 AM    GLUCOSE 151 10/12/2024 06:27 AM     Magnesium:    Lab Results   Component Value Date/Time    MG 2.0 10/12/2024 06:27 AM     Phosphorus:    Lab Results   Component Value Date/Time    PHOS 3.3 10/09/2024 02:30 AM        PRO-BNP:   Lab Results   Component Value Date    PROBNP 15,679 (H) 10/10/2024    PROBNP 13,158 (H) 10/06/2024      ABGs:   Lab Results   Component Value Date/Time    PH 7.285 10/10/2024 10:25 AM    PO2 116.2 10/10/2024 10:25 AM    PCO2 46.8 10/10/2024 10:25 AM     Hemoglobin A1C: No components found for: \"HGBA1C\"    IMAGING:  Imaging tests were completed and reviewed and discussed radiology and care team involved and reveals       US RETROPERITONEAL COMPLETE    Result Date: 9/12/2024  EXAMINATION: RETROPERITONEAL ULTRASOUND OF THE KIDNEYS AND URINARY BLADDER 9/12/2024 COMPARISON: None HISTORY: ORDERING SYSTEM PROVIDED HISTORY: HENNY TECHNOLOGIST PROVIDED HISTORY: Reason for exam:->HENNY FINDINGS: Kidneys: The right kidney measures 9.4 cm in length and the left kidney measures 9.7 cm in length. Kidneys demonstrate increased echogenicity to suggest medical renal disease bilaterally..  No  evidence of hydronephrosis or intrarenal stones. Bladder: Unremarkable appearance of the bladder.  There is no significant wall thickening.  No underlying mass or lesion.     Increased echogenicity of the kidneys bilaterally to suggest medical renal disease. No evidence of hydronephrosis or intrarenal stones.     Echo (TTE) limited (PRN contrast/bubble/strain/3D)    Result Date: 9/11/2024    Left Ventricle: Moderately reduced left ventricular systolic function. EF by visual approximation is 40%. Left ventricle is mildly dilated. Normal wall thickness.   Mitral Valve: Mild regurgitation.   Tricuspid Valve: Mild regurgitation.       Assessment:   ecurring cocaine use  Exacerbation of COPD.  He continues to smoke.  Fever  Clinically he was mildly hypervolemic on admission.  He is euvolemic now.  He does not appear to require routine diuretic use.  Nonischemic cardiomyopathy.  EF 45% then improved to 50 to 55%.  Now back down to 40% in the setting of recurring cocaine use.  I told him again to stop smoking and stop using cocaine.  He states that given that he knows.  Increase hydralazine and add Imdur.  No ACE/ARB due to renal insufficiency.  Mild much regurgitation  Paroxysmal atrial fibrillation  Acute on chronic renal sufficiency.  Creatinine is at his baseline.  Chronically elevated troponins.  His troponins remain flat and at his baseline.  He does not have any ischemic symptoms.  There are no regional wall motion abnormalities on his echo.  IVC filter  Hypertension  Bacteremia  Noncompliance  Lung masses  Replace potassium  No further cardiolog        Plan:   Wean oxygen to home level  Stop smoking discussed  Discuss drug couseling  Bronchodialtors  Change to po steroids wean off over 5 days  AVAPS at night  Placement         Keagan Spivey DO MPH, FCCP, MACOI, FACP  Professor of Internal Medicine  Pulmonary, Critical Care and Sleep Medicine

## 2024-10-12 NOTE — PROGRESS NOTES
Department of Internal Medicine  Nephrology Progress Note    Events reviewed.     SUBJECTIVE: We are following Mr Deon for HENNY. Reports no complaints.      PHYSICAL EXAM:      Vitals:    VITALS:  /81   Pulse 84   Temp 97 °F (36.1 °C) (Temporal)   Resp 19   Ht 1.727 m (5' 8\")   Wt 57 kg (125 lb 10.6 oz)   SpO2 99%   BMI 19.11 kg/m²   24HR INTAKE/OUTPUT:    Intake/Output Summary (Last 24 hours) at 10/12/2024 1025  Last data filed at 10/12/2024 0542  Gross per 24 hour   Intake 650 ml   Output 900 ml   Net -250 ml         Constitutional:  alert and oriented, NAD   HEENT:  normocephalic, atraumatic   Respiratory:  decreased, SOB  Cardiovascular/Edema:  regular rate and rhythm, no edema  Gastrointestinal:  normal bowel sounds x 4  Neurologic:  normal   Skin:  clean and dry     Scheduled Meds:   predniSONE  40 mg Oral Daily    pantoprazole (PROTONIX) 40 mg in sodium chloride (PF) 0.9 % 10 mL injection  40 mg IntraVENous BID AC    hydrALAZINE  100 mg Oral TID    carvedilol  25 mg Oral BID WC    apixaban  5 mg Oral BID    white petrolatum   Topical BID    sodium chloride flush  5-40 mL IntraVENous 2 times per day    ipratropium 0.5 mg-albuterol 2.5 mg  1 Dose Inhalation Q4H WA RT    nicotine  1 patch TransDERmal Daily    aspirin  81 mg Oral Daily    atorvastatin  40 mg Oral Nightly    arformoterol 15 mcg-budesonide 0.5 mg neb solution   Nebulization BID RT    isosorbide mononitrate  30 mg Oral Daily    Vitamin D  1,000 Units Oral Daily     Continuous Infusions:   sodium chloride       PRN Meds:.hydrOXYzine, sodium chloride flush, sodium chloride, potassium chloride **OR** potassium alternative oral replacement **OR** potassium chloride, magnesium sulfate, polyethylene glycol, acetaminophen **OR** acetaminophen, melatonin, guaiFENesin-dextromethorphan, prochlorperazine **OR** prochlorperazine     DATA:    CBC with Differential:    Lab Results   Component Value Date/Time    WBC 8.4 10/12/2024 06:27 AM    RBC

## 2024-10-13 ENCOUNTER — APPOINTMENT (OUTPATIENT)
Dept: GENERAL RADIOLOGY | Age: 74
DRG: 133 | End: 2024-10-13
Payer: COMMERCIAL

## 2024-10-13 ENCOUNTER — APPOINTMENT (OUTPATIENT)
Dept: CT IMAGING | Age: 74
DRG: 133 | End: 2024-10-13
Payer: COMMERCIAL

## 2024-10-13 PROBLEM — Z78.9: Status: ACTIVE | Noted: 2024-10-13

## 2024-10-13 LAB
ALBUMIN SERPL-MCNC: 2.5 G/DL (ref 3.5–5.2)
ALBUMIN SERPL-MCNC: 3.1 G/DL (ref 3.5–5.2)
ALP SERPL-CCNC: 76 U/L (ref 40–129)
ALP SERPL-CCNC: 79 U/L (ref 40–129)
ALT SERPL-CCNC: 17 U/L (ref 0–40)
ALT SERPL-CCNC: 21 U/L (ref 0–40)
AMMONIA PLAS-SCNC: 21 UMOL/L (ref 16–60)
ANION GAP SERPL CALCULATED.3IONS-SCNC: 11 MMOL/L (ref 7–16)
ANION GAP SERPL CALCULATED.3IONS-SCNC: 11 MMOL/L (ref 7–16)
AST SERPL-CCNC: 39 U/L (ref 0–39)
AST SERPL-CCNC: 45 U/L (ref 0–39)
B.E.: -2.8 MMOL/L (ref -3–3)
BASOPHILS # BLD: 0 K/UL (ref 0–0.2)
BASOPHILS # BLD: 0.01 K/UL (ref 0–0.2)
BASOPHILS NFR BLD: 0 % (ref 0–2)
BASOPHILS NFR BLD: 0 % (ref 0–2)
BILIRUB SERPL-MCNC: 0.2 MG/DL (ref 0–1.2)
BILIRUB SERPL-MCNC: 0.2 MG/DL (ref 0–1.2)
BUN SERPL-MCNC: 45 MG/DL (ref 6–23)
BUN SERPL-MCNC: 48 MG/DL (ref 6–23)
CALCIUM SERPL-MCNC: 9 MG/DL (ref 8.6–10.2)
CALCIUM SERPL-MCNC: 9.1 MG/DL (ref 8.6–10.2)
CHLORIDE SERPL-SCNC: 110 MMOL/L (ref 98–107)
CHLORIDE SERPL-SCNC: 112 MMOL/L (ref 98–107)
CO2 SERPL-SCNC: 15 MMOL/L (ref 22–29)
CO2 SERPL-SCNC: 21 MMOL/L (ref 22–29)
COHB: 0.3 % (ref 0–1.5)
CREAT SERPL-MCNC: 1.5 MG/DL (ref 0.7–1.2)
CREAT SERPL-MCNC: 1.6 MG/DL (ref 0.7–1.2)
CRITICAL: ABNORMAL
DATE ANALYZED: ABNORMAL
DATE OF COLLECTION: ABNORMAL
EOSINOPHIL # BLD: 0 K/UL (ref 0.05–0.5)
EOSINOPHIL # BLD: 0 K/UL (ref 0.05–0.5)
EOSINOPHILS RELATIVE PERCENT: 0 % (ref 0–6)
EOSINOPHILS RELATIVE PERCENT: 0 % (ref 0–6)
ERYTHROCYTE [DISTWIDTH] IN BLOOD BY AUTOMATED COUNT: 16.1 % (ref 11.5–15)
ERYTHROCYTE [DISTWIDTH] IN BLOOD BY AUTOMATED COUNT: 16.7 % (ref 11.5–15)
GFR, ESTIMATED: 47 ML/MIN/1.73M2
GFR, ESTIMATED: 47 ML/MIN/1.73M2
GLUCOSE BLD-MCNC: 168 MG/DL (ref 74–99)
GLUCOSE SERPL-MCNC: 107 MG/DL (ref 74–99)
GLUCOSE SERPL-MCNC: 163 MG/DL (ref 74–99)
HCO3: 21.5 MMOL/L (ref 22–26)
HCT VFR BLD AUTO: 29.3 % (ref 37–54)
HCT VFR BLD AUTO: 30.1 % (ref 37–54)
HGB BLD-MCNC: 8.8 G/DL (ref 12.5–16.5)
HGB BLD-MCNC: 9.4 G/DL (ref 12.5–16.5)
HHB: 1.6 % (ref 0–5)
IMM GRANULOCYTES # BLD AUTO: 0.16 K/UL (ref 0–0.58)
IMM GRANULOCYTES # BLD AUTO: 0.16 K/UL (ref 0–0.58)
IMM GRANULOCYTES NFR BLD: 2 % (ref 0–5)
IMM GRANULOCYTES NFR BLD: 2 % (ref 0–5)
INR PPP: 1.4
LAB: ABNORMAL
LACTATE BLDV-SCNC: 3.3 MMOL/L (ref 0.5–2.2)
LYMPHOCYTES NFR BLD: 0.34 K/UL (ref 1.5–4)
LYMPHOCYTES NFR BLD: 0.39 K/UL (ref 1.5–4)
LYMPHOCYTES RELATIVE PERCENT: 3 % (ref 20–42)
LYMPHOCYTES RELATIVE PERCENT: 4 % (ref 20–42)
Lab: 1135
MAGNESIUM SERPL-MCNC: 2 MG/DL (ref 1.6–2.6)
MAGNESIUM SERPL-MCNC: 2 MG/DL (ref 1.6–2.6)
MCH RBC QN AUTO: 28.2 PG (ref 26–35)
MCH RBC QN AUTO: 28.7 PG (ref 26–35)
MCHC RBC AUTO-ENTMCNC: 29.2 G/DL (ref 32–34.5)
MCHC RBC AUTO-ENTMCNC: 32.1 G/DL (ref 32–34.5)
MCV RBC AUTO: 89.3 FL (ref 80–99.9)
MCV RBC AUTO: 96.5 FL (ref 80–99.9)
METHB: 0.7 % (ref 0–1.5)
MODE: ABNORMAL
MONOCYTES NFR BLD: 0.92 K/UL (ref 0.1–0.95)
MONOCYTES NFR BLD: 1.17 K/UL (ref 0.1–0.95)
MONOCYTES NFR BLD: 12 % (ref 2–12)
MONOCYTES NFR BLD: 8 % (ref 2–12)
NEUTROPHILS NFR BLD: 83 % (ref 43–80)
NEUTROPHILS NFR BLD: 87 % (ref 43–80)
NEUTS SEG NFR BLD: 8.36 K/UL (ref 1.8–7.3)
NEUTS SEG NFR BLD: 9.52 K/UL (ref 1.8–7.3)
O2 SATURATION: 98.4 % (ref 92–98.5)
O2HB: 97.4 % (ref 94–97)
OPERATOR ID: 8217
PARTIAL THROMBOPLASTIN TIME: 31.7 SEC (ref 24.5–35.1)
PATIENT TEMP: 37 C
PCO2: 35.2 MMHG (ref 35–45)
PH BLOOD GAS: 7.4 (ref 7.35–7.45)
PHOSPHATE SERPL-MCNC: 2.2 MG/DL (ref 2.5–4.5)
PLATELET # BLD AUTO: 164 K/UL (ref 130–450)
PLATELET # BLD AUTO: 182 K/UL (ref 130–450)
PMV BLD AUTO: 10.3 FL (ref 7–12)
PMV BLD AUTO: 10.5 FL (ref 7–12)
PO2: 133.9 MMHG (ref 75–100)
POTASSIUM SERPL-SCNC: 4.4 MMOL/L (ref 3.5–5)
POTASSIUM SERPL-SCNC: 5.2 MMOL/L (ref 3.5–5)
PROT SERPL-MCNC: 6.5 G/DL (ref 6.4–8.3)
PROT SERPL-MCNC: 6.7 G/DL (ref 6.4–8.3)
PROTHROMBIN TIME: 15.5 SEC (ref 9.3–12.4)
RBC # BLD AUTO: 3.12 M/UL (ref 3.8–5.8)
RBC # BLD AUTO: 3.28 M/UL (ref 3.8–5.8)
RBC # BLD: ABNORMAL 10*6/UL
SODIUM SERPL-SCNC: 138 MMOL/L (ref 132–146)
SODIUM SERPL-SCNC: 142 MMOL/L (ref 132–146)
SOURCE, BLOOD GAS: ABNORMAL
THB: 10.4 G/DL (ref 11.5–16.5)
TIME ANALYZED: 1140
TROPONIN I SERPL HS-MCNC: 437 NG/L (ref 0–11)
TSH SERPL DL<=0.05 MIU/L-ACNC: 0.85 UIU/ML (ref 0.27–4.2)
WBC OTHER # BLD: 10.1 K/UL (ref 4.5–11.5)
WBC OTHER # BLD: 10.9 K/UL (ref 4.5–11.5)

## 2024-10-13 PROCEDURE — 82962 GLUCOSE BLOOD TEST: CPT

## 2024-10-13 PROCEDURE — 6360000002 HC RX W HCPCS: Performed by: NURSE PRACTITIONER

## 2024-10-13 PROCEDURE — 85610 PROTHROMBIN TIME: CPT

## 2024-10-13 PROCEDURE — 6360000004 HC RX CONTRAST MEDICATION: Performed by: RADIOLOGY

## 2024-10-13 PROCEDURE — 6370000000 HC RX 637 (ALT 250 FOR IP): Performed by: FAMILY MEDICINE

## 2024-10-13 PROCEDURE — 6370000000 HC RX 637 (ALT 250 FOR IP): Performed by: NURSE PRACTITIONER

## 2024-10-13 PROCEDURE — 85730 THROMBOPLASTIN TIME PARTIAL: CPT

## 2024-10-13 PROCEDURE — 0042T CT BRAIN PERFUSION: CPT

## 2024-10-13 PROCEDURE — 84484 ASSAY OF TROPONIN QUANT: CPT

## 2024-10-13 PROCEDURE — 84443 ASSAY THYROID STIM HORMONE: CPT

## 2024-10-13 PROCEDURE — 1200000000 HC SEMI PRIVATE

## 2024-10-13 PROCEDURE — 83735 ASSAY OF MAGNESIUM: CPT

## 2024-10-13 PROCEDURE — 82140 ASSAY OF AMMONIA: CPT

## 2024-10-13 PROCEDURE — 84100 ASSAY OF PHOSPHORUS: CPT

## 2024-10-13 PROCEDURE — 71045 X-RAY EXAM CHEST 1 VIEW: CPT

## 2024-10-13 PROCEDURE — 85025 COMPLETE CBC W/AUTO DIFF WBC: CPT

## 2024-10-13 PROCEDURE — 36415 COLL VENOUS BLD VENIPUNCTURE: CPT

## 2024-10-13 PROCEDURE — 94640 AIRWAY INHALATION TREATMENT: CPT

## 2024-10-13 PROCEDURE — 70496 CT ANGIOGRAPHY HEAD: CPT

## 2024-10-13 PROCEDURE — 2580000003 HC RX 258: Performed by: NURSE PRACTITIONER

## 2024-10-13 PROCEDURE — 99291 CRITICAL CARE FIRST HOUR: CPT

## 2024-10-13 PROCEDURE — 2700000000 HC OXYGEN THERAPY PER DAY

## 2024-10-13 PROCEDURE — 83605 ASSAY OF LACTIC ACID: CPT

## 2024-10-13 PROCEDURE — 93005 ELECTROCARDIOGRAM TRACING: CPT | Performed by: FAMILY MEDICINE

## 2024-10-13 PROCEDURE — 70450 CT HEAD/BRAIN W/O DYE: CPT

## 2024-10-13 PROCEDURE — 94660 CPAP INITIATION&MGMT: CPT

## 2024-10-13 PROCEDURE — 6370000000 HC RX 637 (ALT 250 FOR IP): Performed by: INTERNAL MEDICINE

## 2024-10-13 PROCEDURE — 70498 CT ANGIOGRAPHY NECK: CPT

## 2024-10-13 PROCEDURE — 82805 BLOOD GASES W/O2 SATURATION: CPT

## 2024-10-13 PROCEDURE — 99232 SBSQ HOSP IP/OBS MODERATE 35: CPT | Performed by: FAMILY MEDICINE

## 2024-10-13 PROCEDURE — 80053 COMPREHEN METABOLIC PANEL: CPT

## 2024-10-13 PROCEDURE — 6360000002 HC RX W HCPCS

## 2024-10-13 PROCEDURE — 6370000000 HC RX 637 (ALT 250 FOR IP): Performed by: CLINICAL NURSE SPECIALIST

## 2024-10-13 RX ORDER — SODIUM BICARBONATE 650 MG/1
650 TABLET ORAL 4 TIMES DAILY
Status: DISCONTINUED | OUTPATIENT
Start: 2024-10-13 | End: 2024-10-15

## 2024-10-13 RX ORDER — IOPAMIDOL 755 MG/ML
75 INJECTION, SOLUTION INTRAVASCULAR
Status: COMPLETED | OUTPATIENT
Start: 2024-10-13 | End: 2024-10-13

## 2024-10-13 RX ORDER — LEVETIRACETAM 500 MG/5ML
1000 INJECTION, SOLUTION, CONCENTRATE INTRAVENOUS ONCE
Status: COMPLETED | OUTPATIENT
Start: 2024-10-13 | End: 2024-10-13

## 2024-10-13 RX ADMIN — ASPIRIN 81 MG: 81 TABLET, COATED ORAL at 07:56

## 2024-10-13 RX ADMIN — SODIUM CHLORIDE, PRESERVATIVE FREE 40 MG: 5 INJECTION INTRAVENOUS at 17:18

## 2024-10-13 RX ADMIN — PETROLATUM: 420 OINTMENT TOPICAL at 07:58

## 2024-10-13 RX ADMIN — HYDRALAZINE HYDROCHLORIDE 100 MG: 50 TABLET ORAL at 15:14

## 2024-10-13 RX ADMIN — ARFORMOTEROL TARTRATE: 15 SOLUTION RESPIRATORY (INHALATION) at 19:49

## 2024-10-13 RX ADMIN — APIXABAN 5 MG: 5 TABLET, FILM COATED ORAL at 20:36

## 2024-10-13 RX ADMIN — SODIUM BICARBONATE 650 MG: 650 TABLET ORAL at 20:36

## 2024-10-13 RX ADMIN — SODIUM BICARBONATE 650 MG: 650 TABLET ORAL at 17:17

## 2024-10-13 RX ADMIN — ISOSORBIDE MONONITRATE 30 MG: 30 TABLET, EXTENDED RELEASE ORAL at 07:55

## 2024-10-13 RX ADMIN — IPRATROPIUM BROMIDE AND ALBUTEROL SULFATE 1 DOSE: 2.5; .5 SOLUTION RESPIRATORY (INHALATION) at 05:37

## 2024-10-13 RX ADMIN — IPRATROPIUM BROMIDE AND ALBUTEROL SULFATE 1 DOSE: 2.5; .5 SOLUTION RESPIRATORY (INHALATION) at 19:49

## 2024-10-13 RX ADMIN — Medication 1000 UNITS: at 07:55

## 2024-10-13 RX ADMIN — IPRATROPIUM BROMIDE AND ALBUTEROL SULFATE 1 DOSE: 2.5; .5 SOLUTION RESPIRATORY (INHALATION) at 10:51

## 2024-10-13 RX ADMIN — CARVEDILOL 25 MG: 25 TABLET, FILM COATED ORAL at 17:17

## 2024-10-13 RX ADMIN — IOPAMIDOL 120 ML: 755 INJECTION, SOLUTION INTRAVENOUS at 12:17

## 2024-10-13 RX ADMIN — SODIUM CHLORIDE, PRESERVATIVE FREE 40 MG: 5 INJECTION INTRAVENOUS at 04:31

## 2024-10-13 RX ADMIN — SODIUM BICARBONATE 650 MG: 650 TABLET ORAL at 10:21

## 2024-10-13 RX ADMIN — HYDRALAZINE HYDROCHLORIDE 100 MG: 50 TABLET ORAL at 20:36

## 2024-10-13 RX ADMIN — IPRATROPIUM BROMIDE AND ALBUTEROL SULFATE 1 DOSE: 2.5; .5 SOLUTION RESPIRATORY (INHALATION) at 15:50

## 2024-10-13 RX ADMIN — HYDRALAZINE HYDROCHLORIDE 100 MG: 50 TABLET ORAL at 07:54

## 2024-10-13 RX ADMIN — ARFORMOTEROL TARTRATE: 15 SOLUTION RESPIRATORY (INHALATION) at 05:37

## 2024-10-13 RX ADMIN — CARVEDILOL 25 MG: 25 TABLET, FILM COATED ORAL at 07:54

## 2024-10-13 RX ADMIN — SODIUM CHLORIDE, PRESERVATIVE FREE 10 ML: 5 INJECTION INTRAVENOUS at 07:57

## 2024-10-13 RX ADMIN — ATORVASTATIN CALCIUM 40 MG: 40 TABLET, FILM COATED ORAL at 20:36

## 2024-10-13 RX ADMIN — SODIUM CHLORIDE, PRESERVATIVE FREE 10 ML: 5 INJECTION INTRAVENOUS at 20:42

## 2024-10-13 RX ADMIN — LEVETIRACETAM 1000 MG: 100 INJECTION INTRAVENOUS at 12:26

## 2024-10-13 RX ADMIN — PETROLATUM: 420 OINTMENT TOPICAL at 20:42

## 2024-10-13 RX ADMIN — APIXABAN 5 MG: 5 TABLET, FILM COATED ORAL at 07:56

## 2024-10-13 RX ADMIN — PREDNISONE 20 MG: 20 TABLET ORAL at 07:56

## 2024-10-13 NOTE — SIGNIFICANT EVENT
Critical Care - Rapid Response Team Note      Date of event: 10/13/2024   Time of event: 11:26 am    Chuck Chavarria Jr. 74 y.o. year old male   YOB: 1950     PCP:  Gunner Espinosa MD     Location: [facility]/84-A   Witnessed? : [x]Yes  [] No  Initial Code status: [x] Full  [] DNR-CCA  []DNR-CC    []Limited  ______________________________________________________________________  Reason for RRT:   Altered Mental Status    Chief Complaint for this admission:   Chief Complaint   Patient presents with    Asthma     Asthma flare up per pt- given 1 Duoneb and 125 solumedrol by EMS       Admit date:  10/4/2024     Admitting Diagnosis: SOB (shortness of breath) [R06.02]  Acute respiratory failure with hypoxia [J96.01]      Subjective: 74-year-old male with past medical history of asthma, COPD, CVA, HLD HLD and HTN who presented to the ED with complaints of shortness of breath     RRT called for acute altered mental status and being unresponsive, last well known was 10 am, ABG  Ph 7.403 PCO2 35.2 PO2 133 cr 1.6 lactic acid 3, stroke alert called,  head CT, head and neck CTA and CT brain perfusion done, radiologist called and told us nothing acute is going on, when we where doing the CT, patient started waking up and following commands, looked like post ictal phase, I ordered 1 gr of Kepra, EEG and MRI is supposed to be ordered by Neurology NP who was present during stroke alert, neurology consult placed      Labs since last 72 hours:   CBC:   Lab Results   Component Value Date/Time    WBC 10.9 10/13/2024 11:40 AM    RBC 3.28 10/13/2024 11:40 AM    HGB 9.4 10/13/2024 11:40 AM    HCT 29.3 10/13/2024 11:40 AM    MCV 89.3 10/13/2024 11:40 AM    MCH 28.7 10/13/2024 11:40 AM    MCHC 32.1 10/13/2024 11:40 AM    RDW 16.1 10/13/2024 11:40 AM     10/13/2024 11:40 AM    MPV 10.3 10/13/2024 11:40 AM     CMP:    Lab Results   Component Value Date/Time     10/13/2024 11:40 AM    K 4.4 10/13/2024 11:40 AM    K  4.8 08/28/2022 05:15 AM     10/13/2024 11:40 AM    CO2 21 10/13/2024 11:40 AM    BUN 45 10/13/2024 11:40 AM    CREATININE 1.6 10/13/2024 11:40 AM    GFRAA >60 08/28/2022 05:15 AM    LABGLOM 47 10/13/2024 11:40 AM    LABGLOM >60 01/18/2024 07:07 AM    GLUCOSE 163 10/13/2024 11:40 AM    CALCIUM 9.1 10/13/2024 11:40 AM    BILITOT 0.2 10/13/2024 11:40 AM    ALKPHOS 76 10/13/2024 11:40 AM    AST 39 10/13/2024 11:40 AM    ALT 21 10/13/2024 11:40 AM     Uric Acid:  No results found for: \"LABURIC\", \"URICACID\"  PT/INR:    Lab Results   Component Value Date/Time    PROTIME 15.5 10/13/2024 11:40 AM    INR 1.4 10/13/2024 11:40 AM     Troponin:    Lab Results   Component Value Date/Time    TROPONINI 0.11 09/11/2015 09:15 AM     ABG:    Lab Results   Component Value Date/Time    PH 7.403 10/13/2024 11:35 AM    PCO2 35.2 10/13/2024 11:35 AM    PO2 133.9 10/13/2024 11:35 AM    HCO3 21.5 10/13/2024 11:35 AM    BE -2.8 10/13/2024 11:35 AM    O2SAT 98.4 10/13/2024 11:35 AM     TSH:    Lab Results   Component Value Date/Time    TSH 0.85 10/13/2024 11:40 AM         Imaging since last 7 days:  XR CHEST PORTABLE    Result Date: 10/12/2024  No significant interval changes since October 10 persistent infiltrate predominant in the right lung from the upper to the lower segments.     US RETROPERITONEAL COMPLETE    Result Date: 10/11/2024  1. Increased echogenicity of the renal cortices consistent with intrinsic renal disease. No hydronephrosis bilaterally. 2. Large left and small right pleural effusions. 3. Trace amount of perihepatic and left upper quadrant ascites.     XR CHEST PORTABLE    Result Date: 10/10/2024  Interval decrease right pleural effusion and bilateral atelectasis/pneumonia versus edema.     XR CHEST PORTABLE    Result Date: 10/9/2024  1. Significant interval worsening of right pleural effusion now small to moderate in size. 2. Mild interval worsening of pulmonary venous hypertension and bilateral interstitial edema.      XR CHEST PORTABLE    Result Date: 10/6/2024  Improvement without resolution of pulmonary interstitial edema.         Objective:   Initial Assessment on arrival:  Vital signs: Temp: 97.2, BP: 142/73, RR: 16, HR: 70     Airway and Condition: Airway Open/Clear noted    Lungs And Circulation: rales heard in RLL  noted                  Neurologic: follows commands noted    Initial Interventions: Labs ordered: ABG, Lactic acid, CMP, troponin,Mg, Phos,   Imaging ordered: head CT, Head and Neck CTA, Brain CT perfusion, EKG  Meds/Fluids/Rx given: kepra 1 gr            RRT Assessment and Plan:    Chuck Chavarria Jr. is a 74 y.o. male with  has a past medical history of Arthritis, Asthma, Bilateral carotid artery stenosis, Cataract, left eye, Cerebral infarction due to occlusion of right vertebral artery (HCC), COPD (chronic obstructive pulmonary disease) (HCC), Hyperlipidemia, Hypertension, Occlusion of right vertebral artery, and Tobacco dependence. who was admitted on 10/4/2024 with admitting diagnosis SOB (shortness of breath) [R06.02]  Acute respiratory failure with hypoxia [J96.01]  .    RRT was called on 10/13/2024 . Initial assessment and interventions as noted above.     Current problems include:   Altered mental status possibly post ictal phase, rule out stroke    Lactic acidosis, post ictal   Acute kidney failure     Plan:   Kepra 1 gr  Brain MRI and EEG  Neurology consulted   ?    Code status: [x] Full  [] DNR-CCA  []DNR-CC []Limited    Disposition:  [x] No transfer   [] Transfer to monitor floor  [] Transfer to: [] MICU [] NICU [] CCU [] SICU    Patient’s family updated:     [x] Yes  [] No   Discussed with:  [x] Critical Care Intensivist: RASHAAD Barraza         [x] Primary Care Provider: LEX HERNANDEZ       [] Other: ?    Kristin Farrar MD PGY-2  10/13/2024 12:47 PM  Attending Physician: LEX Pettit             to get better

## 2024-10-13 NOTE — SIGNIFICANT EVENT
Chuck Chavarria  is a 74 y.o.  male     Team S was called at 11:49 AM    Assessment:     Probable seizure  Patient became unresponsive, not following commands  Began to wake up after CTs were completed  Checked on patient later in the day he is alert and ooriented    Plan:     MRI Brain  EEG  Neurology eval      Past Medical History:  ry/Radiology:     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 (HCC) 01/17/2024    Hypoplastic, small right vertebral artery, with absent flow proximally CT of the carotids 2024  Patent left vertebral artery, good size    COPD (chronic obstructive pulmonary disease) (HCC)     Hyperlipidemia     Hypertension     Occlusion of right vertebral artery 01/17/2024    Small, hypoplastic right vertebral artery occlusion proximally with widely patent dominant left vertebral artery    Tobacco dependence 01/17/2024       Past Surgical History:  ry/Radiology:     Past Surgical History:   Procedure Laterality Date    CARDIAC CATHETERIZATION  08/25/2023    COLONOSCOPY      DENTAL SURGERY      TONSILLECTOMY      UPPER GASTROINTESTINAL ENDOSCOPY N/A 7/16/2024    ESOPHAGOGASTRODUODENOSCOPY performed by Todd Chow MD at [facility] ENDOSCOPY       Allergies:  ry/Radiology:     Doxycycline and Rocephin [ceftriaxone]    Medications:  ry/Radiology:     Prior to Admission medications    Medication Sig Start Date End Date Taking? Authorizing Provider   hydrALAZINE (APRESOLINE) 25 MG tablet Take 3 tablets by mouth 3 times daily 9/13/24   Jomar Torres MD   isosorbide mononitrate (IMDUR) 30 MG extended release tablet Take 1 tablet by mouth daily 9/14/24   Jomar Torres MD   pantoprazole (PROTONIX) 40 MG tablet Take 1 tablet by mouth 2 times  violations in inclusion criteria including mild/rapidly resolving deficit    Mental Status: Lethargic, able to follow simple commands    Cranial Nerves:  I: smell    II: visual acuity     II: visual fields Bilateral threat   II: pupils MADDY   III,VII: ptosis None   III,IV,VI: extraocular muscles  Does not track examiner   V: mastication    V: facial light touch sensation  Normal   V,VII: corneal reflex     VII: facial muscle function - upper  Normal   VII: facial muscle function - lower Normal   VIII: hearing Normal   IX: soft palate elevation     IX,X: gag reflex    XI: trapezius strength     XI: sternocleidomastoid strength    XI: neck extension strength     XII: tongue strength  Normal     Motor:  Withdraws to pain all extremities  Arms falls to bed  Normal bulk and tone  No abnormal movements      Sensory:  Withdraws to pain all extremities    Coordination:   No resting tremors    Gait:  Deferred for patient safety/fall consideration    DTR:   1+ throughout    No reflexes    No Babinskis  No Felder's    No other pathological reflexes    Laboratory/Radiology:  ry/Radiology:     CBC with Differential:    Lab Results   Component Value Date/Time    WBC 10.9 10/13/2024 11:40 AM    RBC 3.28 10/13/2024 11:40 AM    HGB 9.4 10/13/2024 11:40 AM    HCT 29.3 10/13/2024 11:40 AM     10/13/2024 11:40 AM    MCV 89.3 10/13/2024 11:40 AM    MCH 28.7 10/13/2024 11:40 AM    MCHC 32.1 10/13/2024 11:40 AM    RDW 16.1 10/13/2024 11:40 AM    NRBC 3 10/12/2024 06:27 AM    METASPCT 1 07/15/2024 05:20 AM    LYMPHOPCT 3 10/13/2024 11:40 AM    MONOPCT 8 10/13/2024 11:40 AM    MYELOPCT 1 07/15/2024 05:20 AM    EOSPCT 0 10/13/2024 11:40 AM    BASOPCT 0 10/13/2024 11:40 AM    MONOSABS 0.92 10/13/2024 11:40 AM    LYMPHSABS 0.34 10/13/2024 11:40 AM    EOSABS 0.00 10/13/2024 11:40 AM    BASOSABS 0.00 10/13/2024 11:40 AM     CMP:    Lab Results   Component Value Date/Time     10/13/2024 11:40 AM    K 4.4 10/13/2024 11:40 AM    K 4.8

## 2024-10-13 NOTE — PLAN OF CARE
Problem: Chronic Conditions and Co-morbidities  Goal: Patient's chronic conditions and co-morbidity symptoms are monitored and maintained or improved  Outcome: Progressing     Problem: Discharge Planning  Goal: Discharge to home or other facility with appropriate resources  Outcome: Progressing     Problem: Safety - Adult  Goal: Free from fall injury  Outcome: Progressing     Problem: Pain  Goal: Verbalizes/displays adequate comfort level or baseline comfort level  Outcome: Progressing     Problem: Skin/Tissue Integrity  Goal: Absence of new skin breakdown  Description: 1.  Monitor for areas of redness and/or skin breakdown  2.  Assess vascular access sites hourly  3.  Every 4-6 hours minimum:  Change oxygen saturation probe site  4.  Every 4-6 hours:  If on nasal continuous positive airway pressure, respiratory therapy assess nares and determine need for appliance change or resting period.  Outcome: Progressing     Problem: Respiratory - Adult  Goal: Achieves optimal ventilation and oxygenation  Outcome: Progressing     Problem: Nutrition Deficit:  Goal: Optimize nutritional status  Outcome: Progressing     Problem: ABCDS Injury Assessment  Goal: Absence of physical injury  Outcome: Progressing

## 2024-10-13 NOTE — PROGRESS NOTES
Department of Internal Medicine  Nephrology Progress Note    Events reviewed.     SUBJECTIVE: We are following Mr Chavarria for HENNY. Reports SOB, RN notified.     PHYSICAL EXAM:      Vitals:    VITALS:  BP (!) 151/96   Pulse 74   Temp 98.7 °F (37.1 °C) (Oral)   Resp 20   Ht 1.727 m (5' 8\")   Wt 56.7 kg (125 lb)   SpO2 97%   BMI 19.01 kg/m²   24HR INTAKE/OUTPUT:    Intake/Output Summary (Last 24 hours) at 10/13/2024 1011  Last data filed at 10/13/2024 0319  Gross per 24 hour   Intake 600 ml   Output 500 ml   Net 100 ml         Constitutional:  alert and oriented, NAD   HEENT:  normocephalic, atraumatic   Respiratory:  decreased, SOB  Cardiovascular/Edema:  regular rate and rhythm, no edema  Gastrointestinal:  normal bowel sounds x 4  Neurologic:  normal   Skin:  clean and dry     Scheduled Meds:   sodium bicarbonate  650 mg Oral 4x Daily    predniSONE  20 mg Oral Daily    pantoprazole (PROTONIX) 40 mg in sodium chloride (PF) 0.9 % 10 mL injection  40 mg IntraVENous BID AC    hydrALAZINE  100 mg Oral TID    carvedilol  25 mg Oral BID WC    apixaban  5 mg Oral BID    white petrolatum   Topical BID    sodium chloride flush  5-40 mL IntraVENous 2 times per day    ipratropium 0.5 mg-albuterol 2.5 mg  1 Dose Inhalation Q4H WA RT    nicotine  1 patch TransDERmal Daily    aspirin  81 mg Oral Daily    atorvastatin  40 mg Oral Nightly    arformoterol 15 mcg-budesonide 0.5 mg neb solution   Nebulization BID RT    isosorbide mononitrate  30 mg Oral Daily    Vitamin D  1,000 Units Oral Daily     Continuous Infusions:   sodium chloride       PRN Meds:.hydrOXYzine, sodium chloride flush, sodium chloride, potassium chloride **OR** potassium alternative oral replacement **OR** potassium chloride, magnesium sulfate, polyethylene glycol, acetaminophen **OR** acetaminophen, melatonin, guaiFENesin-dextromethorphan, prochlorperazine **OR** prochlorperazine     DATA:    CBC with Differential:    Lab Results   Component Value

## 2024-10-13 NOTE — PROGRESS NOTES
[facility] Hospitalist Progress Note    SYNOPSIS: Patient admitted on 10/4/2024 for SOB (shortness of breath)    74-year-old male with past medical history of asthma, COPD, CVA, HLD HLD and HTN who presented to the ED with complaints of shortness of breath. Patient was recently hospitalized 2024 - 2024 for the same complaints. He was followed by both nephrology and cardiology who optimized his medications. On the day of his discharge patient passed a 6-minute walk test without any oxygen requirements. He was discharged home with a follow-up with his PCP in 1 week nephrology in 3 to 4 weeks. Patient was recently scheduled to follow with CHF clinic and per EMR patient never showed for his appointment when the clinic attempted to reach out to him but his numbers were disconnected. In the ED, patient was placed on BIPAP for respiratory support. RVP was positive for rhinovirus. CXR showed pulmonary vascular congestion.  Patient was admitted and started on IV diuresis for further management. Patient developed HENNY thought to be prerenal due to poor oral intake. Diuresis held and fluids started with improvement initially. Nephrology consulted.     Patient had RRT called on 10/9 over night due to increased WOB. Patient was placed on BIPAP with improvement. He was also noted to be hypoxic with troponin of 780--> 763. EKG showed bifascicular block. Patient started on heparin ggt and Cardiology consulted.     SUBJECTIVE:  Seen and examined chart reviewed discussed with staff currently on 4 L of oxygen via nasal cannula shortness of breath is improving he has no chest pain he is tolerating his diet overall he seems to be improving he is currently on oral steroid  Potassium 5.2 this morning bicarb 15 creatinine 1.5    Temp (24hrs), Av.9 °F (36.6 °C), Min:97 °F (36.1 °C), Max:98.7 °F (37.1 °C)    DIET: ADULT DIET; Regular  ADULT ORAL NUTRITION SUPPLEMENT; Breakfast, Lunch, Dinner; Standard High Calorie/High    ALKPHOS 80 79 79   ALT 17 20 17   AST 33 43* 45*   BILITOT 0.3 0.2 0.2         No results for input(s): \"INR\" in the last 72 hours.    No results for input(s): \"CKTOTAL\", \"TROPONINI\" in the last 72 hours.    Chronic labs:    Lab Results   Component Value Date    CHOL 159 09/10/2024    TRIG 46 09/10/2024    HDL 71 09/10/2024    TSH 2.97 07/08/2024    INR 1.1 09/10/2015    LABA1C 5.8 (H) 01/18/2024       Radiology: REVIEWED DAILY    +++++++++++++++++++++++++++++++++++++++++++++++++  Baltazar Huffman MD  [facility]- Hospitalist  [place], [place]  +++++++++++++++++++++++++++++++++++++++++++++++++  NOTE: This report was transcribed using voice recognition software. Every effort was made to ensure accuracy; however, inadvertent computerized transcription errors may be present.

## 2024-10-14 PROBLEM — R56.9 SEIZURE-LIKE ACTIVITY (HCC): Status: ACTIVE | Noted: 2024-10-14

## 2024-10-14 LAB
ALBUMIN SERPL-MCNC: 2.8 G/DL (ref 3.5–5.2)
ALP SERPL-CCNC: 67 U/L (ref 40–129)
ALT SERPL-CCNC: 20 U/L (ref 0–40)
ANION GAP SERPL CALCULATED.3IONS-SCNC: 10 MMOL/L (ref 7–16)
AST SERPL-CCNC: 35 U/L (ref 0–39)
BASOPHILS # BLD: 0 K/UL (ref 0–0.2)
BASOPHILS NFR BLD: 0 % (ref 0–2)
BILIRUB SERPL-MCNC: 0.2 MG/DL (ref 0–1.2)
BUN SERPL-MCNC: 42 MG/DL (ref 6–23)
CALCIUM SERPL-MCNC: 9.3 MG/DL (ref 8.6–10.2)
CHLORIDE SERPL-SCNC: 108 MMOL/L (ref 98–107)
CO2 SERPL-SCNC: 23 MMOL/L (ref 22–29)
CREAT SERPL-MCNC: 1.5 MG/DL (ref 0.7–1.2)
EOSINOPHIL # BLD: 0.09 K/UL (ref 0.05–0.5)
EOSINOPHILS RELATIVE PERCENT: 1 % (ref 0–6)
ERYTHROCYTE [DISTWIDTH] IN BLOOD BY AUTOMATED COUNT: 15.9 % (ref 11.5–15)
GFR, ESTIMATED: 49 ML/MIN/1.73M2
GLUCOSE SERPL-MCNC: 91 MG/DL (ref 74–99)
HCT VFR BLD AUTO: 29.6 % (ref 37–54)
HGB BLD-MCNC: 9.2 G/DL (ref 12.5–16.5)
LYMPHOCYTES NFR BLD: 0.85 K/UL (ref 1.5–4)
LYMPHOCYTES RELATIVE PERCENT: 9 % (ref 20–42)
MAGNESIUM SERPL-MCNC: 1.9 MG/DL (ref 1.6–2.6)
MCH RBC QN AUTO: 28.1 PG (ref 26–35)
MCHC RBC AUTO-ENTMCNC: 31.1 G/DL (ref 32–34.5)
MCV RBC AUTO: 90.5 FL (ref 80–99.9)
MONOCYTES NFR BLD: 1.11 K/UL (ref 0.1–0.95)
MONOCYTES NFR BLD: 11 % (ref 2–12)
NEUTROPHILS NFR BLD: 79 % (ref 43–80)
NEUTS SEG NFR BLD: 7.66 K/UL (ref 1.8–7.3)
NUCLEATED RED BLOOD CELLS: 1 PER 100 WBC
PLATELET # BLD AUTO: 179 K/UL (ref 130–450)
PMV BLD AUTO: 11.1 FL (ref 7–12)
POTASSIUM SERPL-SCNC: 4.1 MMOL/L (ref 3.5–5)
PROT SERPL-MCNC: 6.2 G/DL (ref 6.4–8.3)
RBC # BLD AUTO: 3.27 M/UL (ref 3.8–5.8)
RBC # BLD: ABNORMAL 10*6/UL
RBC # BLD: ABNORMAL 10*6/UL
SODIUM SERPL-SCNC: 141 MMOL/L (ref 132–146)
WBC OTHER # BLD: 9.7 K/UL (ref 4.5–11.5)

## 2024-10-14 PROCEDURE — 83735 ASSAY OF MAGNESIUM: CPT

## 2024-10-14 PROCEDURE — 6370000000 HC RX 637 (ALT 250 FOR IP): Performed by: NURSE PRACTITIONER

## 2024-10-14 PROCEDURE — 2580000003 HC RX 258: Performed by: NURSE PRACTITIONER

## 2024-10-14 PROCEDURE — 6370000000 HC RX 637 (ALT 250 FOR IP): Performed by: CLINICAL NURSE SPECIALIST

## 2024-10-14 PROCEDURE — 85025 COMPLETE CBC W/AUTO DIFF WBC: CPT

## 2024-10-14 PROCEDURE — 94640 AIRWAY INHALATION TREATMENT: CPT

## 2024-10-14 PROCEDURE — 6370000000 HC RX 637 (ALT 250 FOR IP): Performed by: INTERNAL MEDICINE

## 2024-10-14 PROCEDURE — 2700000000 HC OXYGEN THERAPY PER DAY

## 2024-10-14 PROCEDURE — 99232 SBSQ HOSP IP/OBS MODERATE 35: CPT | Performed by: FAMILY MEDICINE

## 2024-10-14 PROCEDURE — 80053 COMPREHEN METABOLIC PANEL: CPT

## 2024-10-14 PROCEDURE — 1200000000 HC SEMI PRIVATE

## 2024-10-14 PROCEDURE — 6360000002 HC RX W HCPCS: Performed by: NURSE PRACTITIONER

## 2024-10-14 PROCEDURE — 36415 COLL VENOUS BLD VENIPUNCTURE: CPT

## 2024-10-14 PROCEDURE — 99232 SBSQ HOSP IP/OBS MODERATE 35: CPT | Performed by: INTERNAL MEDICINE

## 2024-10-14 PROCEDURE — 94660 CPAP INITIATION&MGMT: CPT

## 2024-10-14 PROCEDURE — 99232 SBSQ HOSP IP/OBS MODERATE 35: CPT

## 2024-10-14 PROCEDURE — 6370000000 HC RX 637 (ALT 250 FOR IP): Performed by: FAMILY MEDICINE

## 2024-10-14 RX ADMIN — HYDRALAZINE HYDROCHLORIDE 100 MG: 50 TABLET ORAL at 20:39

## 2024-10-14 RX ADMIN — PETROLATUM: 420 OINTMENT TOPICAL at 08:24

## 2024-10-14 RX ADMIN — ASPIRIN 81 MG: 81 TABLET, COATED ORAL at 08:23

## 2024-10-14 RX ADMIN — IPRATROPIUM BROMIDE AND ALBUTEROL SULFATE 1 DOSE: 2.5; .5 SOLUTION RESPIRATORY (INHALATION) at 09:28

## 2024-10-14 RX ADMIN — SODIUM CHLORIDE, PRESERVATIVE FREE 10 ML: 5 INJECTION INTRAVENOUS at 08:22

## 2024-10-14 RX ADMIN — SODIUM CHLORIDE, PRESERVATIVE FREE 10 ML: 5 INJECTION INTRAVENOUS at 20:39

## 2024-10-14 RX ADMIN — ISOSORBIDE MONONITRATE 30 MG: 30 TABLET, EXTENDED RELEASE ORAL at 08:23

## 2024-10-14 RX ADMIN — PREDNISONE 20 MG: 20 TABLET ORAL at 08:23

## 2024-10-14 RX ADMIN — APIXABAN 5 MG: 5 TABLET, FILM COATED ORAL at 08:23

## 2024-10-14 RX ADMIN — SODIUM CHLORIDE, PRESERVATIVE FREE 40 MG: 5 INJECTION INTRAVENOUS at 08:23

## 2024-10-14 RX ADMIN — ARFORMOTEROL TARTRATE: 15 SOLUTION RESPIRATORY (INHALATION) at 05:34

## 2024-10-14 RX ADMIN — HYDRALAZINE HYDROCHLORIDE 100 MG: 50 TABLET ORAL at 08:23

## 2024-10-14 RX ADMIN — PETROLATUM: 420 OINTMENT TOPICAL at 20:39

## 2024-10-14 RX ADMIN — ATORVASTATIN CALCIUM 40 MG: 40 TABLET, FILM COATED ORAL at 20:39

## 2024-10-14 RX ADMIN — SODIUM BICARBONATE 650 MG: 650 TABLET ORAL at 20:39

## 2024-10-14 RX ADMIN — Medication 1000 UNITS: at 08:23

## 2024-10-14 RX ADMIN — IPRATROPIUM BROMIDE AND ALBUTEROL SULFATE 1 DOSE: 2.5; .5 SOLUTION RESPIRATORY (INHALATION) at 05:34

## 2024-10-14 RX ADMIN — SODIUM BICARBONATE 650 MG: 650 TABLET ORAL at 17:47

## 2024-10-14 RX ADMIN — APIXABAN 5 MG: 5 TABLET, FILM COATED ORAL at 20:39

## 2024-10-14 RX ADMIN — IPRATROPIUM BROMIDE AND ALBUTEROL SULFATE 1 DOSE: 2.5; .5 SOLUTION RESPIRATORY (INHALATION) at 14:14

## 2024-10-14 RX ADMIN — CARVEDILOL 25 MG: 25 TABLET, FILM COATED ORAL at 08:23

## 2024-10-14 RX ADMIN — SODIUM CHLORIDE, PRESERVATIVE FREE 40 MG: 5 INJECTION INTRAVENOUS at 17:47

## 2024-10-14 RX ADMIN — SODIUM BICARBONATE 650 MG: 650 TABLET ORAL at 08:23

## 2024-10-14 RX ADMIN — CARVEDILOL 25 MG: 25 TABLET, FILM COATED ORAL at 17:46

## 2024-10-14 ASSESSMENT — PAIN SCALES - WONG BAKER: WONGBAKER_NUMERICALRESPONSE: NO HURT

## 2024-10-14 NOTE — PROGRESS NOTES
10/14- Spoke to RN and she will looking getting more info-RN put on screening that sister filled out and she is \"unsure\" about his cardiac surgery

## 2024-10-14 NOTE — PROGRESS NOTES
Notified attending  Blank facial expression  Pt stated \"can't breathe\"  Spo2 90% RA  3L NC 97%  Pt stated breathing \"feels a little better\"  Delayed responses

## 2024-10-14 NOTE — PROGRESS NOTES
[facility] Hospitalist Progress Note    SYNOPSIS: Patient admitted on 10/4/2024 for SOB (shortness of breath)    74-year-old male with past medical history of asthma, COPD, CVA, HLD HLD and HTN who presented to the ED with complaints of shortness of breath. Patient was recently hospitalized 2024 - 2024 for the same complaints. He was followed by both nephrology and cardiology who optimized his medications. On the day of his discharge patient passed a 6-minute walk test without any oxygen requirements. He was discharged home with a follow-up with his PCP in 1 week nephrology in 3 to 4 weeks. Patient was recently scheduled to follow with CHF clinic and per EMR patient never showed for his appointment when the clinic attempted to reach out to him but his numbers were disconnected. In the ED, patient was placed on BIPAP for respiratory support. RVP was positive for rhinovirus. CXR showed pulmonary vascular congestion.  Patient was admitted and started on IV diuresis for further management. Patient developed HENNY thought to be prerenal due to poor oral intake. Diuresis held and fluids started with improvement initially. Nephrology consulted.     Patient had RRT called on 10/9 over night due to increased WOB. Patient was placed on BIPAP with improvement. He was also noted to be hypoxic with troponin of 780--> 763. EKG showed bifascicular block. Patient started on heparin ggt and Cardiology consulted.     RRT 10/13 for altered mental status    SUBJECTIVE:  Patient seen and examined chart reviewed discussed with nursing staff  Discussed with case management  Patient AAOx3 this morning has no complaints he is on room air  .  RRT was called yesterday afternoon for confusion/lethargy and concern for possible seizure.  Patient CT head negative.  ABGs unremarkable  He is being followed by neurology plan for MRI and EEG  Temp (24hrs), Av.9 °F (36.6 °C), Min:97 °F (36.1 °C), Max:98.8 °F (37.1 °C)    DIET:  recognition software. Every effort was made to ensure accuracy; however, inadvertent computerized transcription errors may be present.

## 2024-10-14 NOTE — CARE COORDINATION
CM update note.  RRT called yesterday d/t AMS and being unresponsive. Neurology consulted.  MRI Brain an EEG ordered.  Patient placed back on supplemental oxygen at 3 L NC, baseline is room air.  Remains in droplet isolation for rhinovirus.  Will need updated therapy evals to assist with discharge planning.  Discharge goal is to return to home.  No preference for [employer] if home oxygen is needed.  He will need transportation home on day of discharge.  He is agreeable to using his insurance for transport home ([phone]).  CM/SW to follow.  Dontae Mcnamara RN -[phone].

## 2024-10-14 NOTE — PROGRESS NOTES
Department of Internal Medicine  Nephrology Progress Note    Events reviewed.     SUBJECTIVE: We are following Mr Deon for HENNY. Reports no new complaints today.     PHYSICAL EXAM:      Vitals:    VITALS:  /68   Pulse 83   Temp 98.6 °F (37 °C) (Temporal)   Resp 20   Ht 1.727 m (5' 8\")   Wt 56.8 kg (125 lb 3.2 oz)   SpO2 97%   BMI 19.04 kg/m²   24HR INTAKE/OUTPUT:    Intake/Output Summary (Last 24 hours) at 10/14/2024 1450  Last data filed at 10/14/2024 0031  Gross per 24 hour   Intake --   Output 800 ml   Net -800 ml         Constitutional:  alert and oriented, NAD   HEENT:  normocephalic, atraumatic   Respiratory:  decreased, SOB  Cardiovascular/Edema:  regular rate and rhythm, no edema  Gastrointestinal:  normal bowel sounds x 4  Neurologic:  normal   Skin:  clean and dry     Scheduled Meds:   sodium bicarbonate  650 mg Oral 4x Daily    predniSONE  20 mg Oral Daily    pantoprazole (PROTONIX) 40 mg in sodium chloride (PF) 0.9 % 10 mL injection  40 mg IntraVENous BID AC    hydrALAZINE  100 mg Oral TID    carvedilol  25 mg Oral BID WC    apixaban  5 mg Oral BID    white petrolatum   Topical BID    sodium chloride flush  5-40 mL IntraVENous 2 times per day    ipratropium 0.5 mg-albuterol 2.5 mg  1 Dose Inhalation Q4H WA RT    nicotine  1 patch TransDERmal Daily    aspirin  81 mg Oral Daily    atorvastatin  40 mg Oral Nightly    arformoterol 15 mcg-budesonide 0.5 mg neb solution   Nebulization BID RT    isosorbide mononitrate  30 mg Oral Daily    Vitamin D  1,000 Units Oral Daily     Continuous Infusions:   sodium chloride       PRN Meds:.hydrOXYzine, sodium chloride flush, sodium chloride, potassium chloride **OR** potassium alternative oral replacement **OR** potassium chloride, magnesium sulfate, polyethylene glycol, acetaminophen **OR** acetaminophen, melatonin, guaiFENesin-dextromethorphan, prochlorperazine **OR** prochlorperazine     DATA:    CBC with Differential:    Lab Results   Component Value

## 2024-10-14 NOTE — PLAN OF CARE
Problem: Chronic Conditions and Co-morbidities  Goal: Patient's chronic conditions and co-morbidity symptoms are monitored and maintained or improved  10/14/2024 0238 by Tiffanie Sanders RN  Outcome: Progressing  Flowsheets (Taken 10/14/2024 0227)  Care Plan - Patient's Chronic Conditions and Co-Morbidity Symptoms are Monitored and Maintained or Improved: Monitor and assess patient's chronic conditions and comorbid symptoms for stability, deterioration, or improvement  10/13/2024 1631 by Gabbi Perez RN  Outcome: Progressing     Problem: Discharge Planning  Goal: Discharge to home or other facility with appropriate resources  10/14/2024 0238 by Tiffanie Sanders RN  Outcome: Progressing  Flowsheets (Taken 10/14/2024 0227)  Discharge to home or other facility with appropriate resources: Identify barriers to discharge with patient and caregiver  10/13/2024 1631 by Gabbi Perez RN  Outcome: Progressing     Problem: Safety - Adult  Goal: Free from fall injury  10/13/2024 1631 by Gabbi Perez RN  Outcome: Progressing     Problem: Pain  Goal: Verbalizes/displays adequate comfort level or baseline comfort level  10/13/2024 1631 by Gabbi Perez RN  Outcome: Progressing     Problem: Skin/Tissue Integrity  Goal: Absence of new skin breakdown  Description: 1.  Monitor for areas of redness and/or skin breakdown  2.  Assess vascular access sites hourly  3.  Every 4-6 hours minimum:  Change oxygen saturation probe site  4.  Every 4-6 hours:  If on nasal continuous positive airway pressure, respiratory therapy assess nares and determine need for appliance change or resting period.  10/13/2024 1631 by Gabbi Perez RN  Outcome: Progressing     Problem: Respiratory - Adult  Goal: Achieves optimal ventilation and oxygenation  10/13/2024 1631 by Gabbi Perez RN  Outcome: Progressing     Problem: Nutrition Deficit:  Goal: Optimize nutritional status  10/13/2024 1631 by Gabbi Perez RN  Outcome:

## 2024-10-14 NOTE — PROGRESS NOTES
Chuck Chavarria Jr. is a 74 y.o. right handed male     Neurology following for stroke alert    PMH of arthritis, asthma, carotid stenosis, COPD, hyperlipidemia, hypertension, previous CVA    Assessment:     Altered mental status secondary to possible seizure  Patient became unresponsive, not following commands  No history of seizures but history of stroke  MRI and EEG pending        Plan:     MRI brain  EEG  Neurology will follow    History of Present Illness:     Patient recently presented to the hospital on 10/4/24 after experiencing shortness of breath.  Patient was hospitalized from September 9 through September 13 for the same complaints.  When he was discharged she passed a 6-minute walk test without any oxygen requirements.  He was discharged home and was advised to follow-up with his primary care physician in 1 week and with nephrology in 3 to 4 weeks.  He was scheduled to follow-up with the CHF clinic and missed his appointment.  Attempts were made to reach out to him but his number was disconnected.     While he was in the emergency room he was placed on BiPAP for respiratory support.  He was positive for rhinovirus.  His chest x-ray showed pulmonary vascular congestion.  He was admitted for further evaluation and management.     Patient's last known well was 10:30 AM.  At that time he was awake, alert, and \"normal\".  When the nurse checked on him he was lethargic, unable to answer questions or follow any commands.  Stroke alert was called and he was taken emergently to CAT scan.        Before being placed on the CAT scan table he was able to wiggle his toes and grasp with his bilateral hands.  After his CT scan was over he was able to state his name, tell me the year and location and stick his tongue out.  He was also still able to grasp his bilateral hands and wiggle his toes on his bilateral feet.  This presentation appears to be postictal.  Will workup for seizures      Subjective:     Patient lying  indicated, follow-up MRI of the brain could be   performed for further evaluation.   3. No definite hypoperfusion or perfusion mismatch seen.  The area of parent   abnormality noted on the cord dated analysis images is felt to be most likely   artifactual as discussed above.   4. Irregular calcified plaque about both carotid bifurcations and proximal   ICAs.  There is about 60% stenosis of both proximal ICAs by NASCET criteria.     All labs and imaging studies reviewed independently today         Ange Carson, JASSI - CNP  10:48 AM  10/14/2024

## 2024-10-14 NOTE — PROGRESS NOTES
[facility]  PULMONARY/CRITICAL CARE PROGRESS NOTE    Patient: Chuck Chavarria Jr.  MRN: [id number]  : 1950    Encounter Date: 10/14/2024  Encounter Time: 3:25 PM     Date of Admission: .10/4/2024  8:55 AM    Consulting Physician:  Primary Care Physician:     Gunner Espinosa MD     [phone]    Reason for Consultation: Dyspnea     Problem List:  Patient Active Problem List   Diagnosis    Chest pain    Abdominal pain, epigastric    HTN (hypertension)    Tobacco abuse    Cocaine abuse (HCC)    Mild persistent asthma without complication    LAFB (left anterior fascicular block)    HLD (hyperlipidemia)    HENNY (acute kidney injury) (HCC)    Pneumonia    SOB (shortness of breath)    HFrEF (heart failure with reduced ejection fraction) ([facility])    Loculated pleural effusion    Bilateral carotid artery stenosis    Tobacco dependence    Occlusion of right vertebral artery    Acute stroke due to ischemia ([facility])    Arthritis    A-fib (HCC)    Esophageal dysphagia    Acute diastolic CHF (congestive heart failure) (HCC)    COPD with acute exacerbation (HCC)    COPD (chronic obstructive pulmonary disease) (HCC)    (HFpEF) heart failure with preserved ejection fraction (HCC)    Polysubstance abuse (HCC)    Acute respiratory failure with hypoxia    Pyrexia of unknown origin following delivery    Stage 2 chronic kidney disease    National Institutes of Health (NIH) Stroke Scale level of consciousness score 0, alert; keenly responsive    Seizure-like activity (HCC)     SUBJECTIVE: Patient seen and examined.  Overnight the patient had no shortness of breath, cough, increased work of breathing.    HOME MEDICATIONS:  Prior to Admission medications    Medication Sig Start Date End Date Taking? Authorizing Provider   hydrALAZINE (APRESOLINE) 25 MG tablet Take 3 tablets by mouth 3 times daily 24   Jomar Torres MD   isosorbide mononitrate (IMDUR) 30 MG extended release tablet Take 1 tablet by  Supplement    INTAKE/OUTPUTS:  I/O last 3 completed shifts:  In: 600 [P.O.:600]  Out: 1500 [Urine:1500]    Intake/Output Summary (Last 24 hours) at 10/14/2024 1525  Last data filed at 10/14/2024 0031  Gross per 24 hour   Intake --   Output 800 ml   Net -800 ml       General Appearance: alert and oriented to person, place and time, well-developed and   well-nourished, in no acute distress   Eyes: pupils equal, round, and reactive to light, extraocular eye movements intact, conjunctivae normal and sclera anicteric   Neck: neck supple and non tender without mass, no thyromegaly, no thyroid nodules and no cervical adenopathy   Pulmonary/Chest: decreased breath sounds at bases, no accessory muscles of inspiration noted  Cardiovascular: normal rate, regular rhythm, normal S1 and S2, no murmurs, rubs, clicks or gallops, distal pulses intact, no carotid bruits, no murmurs, no gallops, no carotid bruits and no JVD   Abdomen: soft, non-tender, non-distended, normal bowel sounds, no masses or organomegaly   Extremities: no cyanosis, no clubbing, no edema  Musculoskeletal: normal range of motion, no joint swelling, deformity or tenderness   Neurologic: reflexes normal and symmetric, no cranial nerve deficit noted    LABS/IMAGING:    CBC:  Lab Results   Component Value Date    WBC 9.7 10/14/2024    HGB 9.2 (L) 10/14/2024    HCT 29.6 (L) 10/14/2024    MCV 90.5 10/14/2024     10/14/2024    LYMPHOPCT 9 (L) 10/14/2024    RBC 3.27 (L) 10/14/2024    MCH 28.1 10/14/2024    MCHC 31.1 (L) 10/14/2024    RDW 15.9 (H) 10/14/2024    NEUTOPHILPCT 79 10/14/2024    MONOPCT 11 10/14/2024    EOSPCT 1 10/14/2024    BASOPCT 0 10/14/2024    NEUTROABS 7.66 (H) 10/14/2024    LYMPHSABS 0.85 (L) 10/14/2024    MONOSABS 1.11 (H) 10/14/2024    EOSABS 0.09 10/14/2024    BASOSABS 0.00 10/14/2024       Recent Labs     10/14/24  0432 10/13/24  1140 10/13/24  0430   WBC 9.7 10.9 10.1   HGB 9.2* 9.4* 8.8*   HCT 29.6* 29.3* 30.1*   MCV 90.5 89.3 96.5   PLT  ascites.         XR CHEST PORTABLE   Final Result   Interval decrease right pleural effusion and bilateral atelectasis/pneumonia   versus edema.         XR CHEST PORTABLE   Final Result   1. Significant interval worsening of right pleural effusion now small to   moderate in size.   2. Mild interval worsening of pulmonary venous hypertension and bilateral   interstitial edema.         XR CHEST PORTABLE   Final Result   Improvement without resolution of pulmonary interstitial edema.         XR CHEST PORTABLE   Final Result   Pulmonary venous congestion with bilateral interstitial changes suspicious   for pulmonary edema.         MRI BRAIN W WO CONTRAST    (Results Pending)     ASSESSMENT:  Recurring cocaine use  Exacerbation of COPD.  He continues to smoke.  Fever  Clinically he was mildly hypervolemic on admission.  He is euvolemic now.  He does not appear to require routine diuretic use.  Nonischemic cardiomyopathy.  EF 45% then improved to 50 to 55%.  Now back down to 40% in the setting of recurring cocaine use.  I told him again to stop smoking and stop using cocaine.  He states that given that he knows.  Increase hydralazine and add Imdur.  No ACE/ARB due to renal insufficiency.  Mild much regurgitation  Paroxysmal atrial fibrillation  Acute on chronic renal sufficiency.  Creatinine is at his baseline.  Chronically elevated troponins.  His troponins remain flat and at his baseline.  He does not have any ischemic symptoms.  There are no regional wall motion abnormalities on his echo.  IVC filter  Hypertension  Bacteremia  Noncompliance  Lung masses  Replace potassium  No further cardiology    PLAN:  Wean oxygen to home level  Smoking cessation discussed with patient   Discuss drug couseling  Bronchodilators to continue   Change to po steroids wean off over 5 days  AVAPS at night  Placement     I have spent a total time of 35 minutes of this patient encounter reviewing chart, labs, coordinating care with

## 2024-10-15 ENCOUNTER — HOSPITAL ENCOUNTER (OUTPATIENT)
Dept: OTHER | Age: 74
Setting detail: THERAPIES SERIES
End: 2024-10-15
Payer: COMMERCIAL

## 2024-10-15 ENCOUNTER — APPOINTMENT (OUTPATIENT)
Dept: MRI IMAGING | Age: 74
DRG: 133 | End: 2024-10-15
Payer: COMMERCIAL

## 2024-10-15 ENCOUNTER — APPOINTMENT (OUTPATIENT)
Dept: NEUROLOGY | Age: 74
DRG: 133 | End: 2024-10-15
Payer: COMMERCIAL

## 2024-10-15 LAB
ALBUMIN SERPL-MCNC: 3 G/DL (ref 3.5–5.2)
ALP SERPL-CCNC: 68 U/L (ref 40–129)
ALT SERPL-CCNC: 20 U/L (ref 0–40)
ANION GAP SERPL CALCULATED.3IONS-SCNC: 12 MMOL/L (ref 7–16)
AST SERPL-CCNC: 35 U/L (ref 0–39)
BASOPHILS # BLD: 0.01 K/UL (ref 0–0.2)
BASOPHILS NFR BLD: 0 % (ref 0–2)
BILIRUB SERPL-MCNC: 0.2 MG/DL (ref 0–1.2)
BUN SERPL-MCNC: 37 MG/DL (ref 6–23)
CALCIUM SERPL-MCNC: 9.3 MG/DL (ref 8.6–10.2)
CHLORIDE SERPL-SCNC: 107 MMOL/L (ref 98–107)
CO2 SERPL-SCNC: 24 MMOL/L (ref 22–29)
CREAT SERPL-MCNC: 1.3 MG/DL (ref 0.7–1.2)
EOSINOPHIL # BLD: 0.03 K/UL (ref 0.05–0.5)
EOSINOPHILS RELATIVE PERCENT: 0 % (ref 0–6)
ERYTHROCYTE [DISTWIDTH] IN BLOOD BY AUTOMATED COUNT: 16.1 % (ref 11.5–15)
GFR, ESTIMATED: 56 ML/MIN/1.73M2
GLUCOSE SERPL-MCNC: 113 MG/DL (ref 74–99)
HCT VFR BLD AUTO: 28.5 % (ref 37–54)
HGB BLD-MCNC: 9 G/DL (ref 12.5–16.5)
IMM GRANULOCYTES # BLD AUTO: 0.1 K/UL (ref 0–0.58)
IMM GRANULOCYTES NFR BLD: 1 % (ref 0–5)
LYMPHOCYTES NFR BLD: 0.84 K/UL (ref 1.5–4)
LYMPHOCYTES RELATIVE PERCENT: 10 % (ref 20–42)
MAGNESIUM SERPL-MCNC: 1.9 MG/DL (ref 1.6–2.6)
MCH RBC QN AUTO: 28.2 PG (ref 26–35)
MCHC RBC AUTO-ENTMCNC: 31.6 G/DL (ref 32–34.5)
MCV RBC AUTO: 89.3 FL (ref 80–99.9)
MONOCYTES NFR BLD: 1.14 K/UL (ref 0.1–0.95)
MONOCYTES NFR BLD: 13 % (ref 2–12)
NEUTROPHILS NFR BLD: 75 % (ref 43–80)
NEUTS SEG NFR BLD: 6.36 K/UL (ref 1.8–7.3)
PLATELET # BLD AUTO: 170 K/UL (ref 130–450)
PMV BLD AUTO: 10.9 FL (ref 7–12)
POTASSIUM SERPL-SCNC: 4 MMOL/L (ref 3.5–5)
PROT SERPL-MCNC: 6.1 G/DL (ref 6.4–8.3)
RBC # BLD AUTO: 3.19 M/UL (ref 3.8–5.8)
SODIUM SERPL-SCNC: 143 MMOL/L (ref 132–146)
WBC OTHER # BLD: 8.5 K/UL (ref 4.5–11.5)

## 2024-10-15 PROCEDURE — 6370000000 HC RX 637 (ALT 250 FOR IP): Performed by: INTERNAL MEDICINE

## 2024-10-15 PROCEDURE — 6360000002 HC RX W HCPCS: Performed by: NURSE PRACTITIONER

## 2024-10-15 PROCEDURE — 2580000003 HC RX 258: Performed by: NURSE PRACTITIONER

## 2024-10-15 PROCEDURE — 6370000000 HC RX 637 (ALT 250 FOR IP): Performed by: CLINICAL NURSE SPECIALIST

## 2024-10-15 PROCEDURE — 70551 MRI BRAIN STEM W/O DYE: CPT

## 2024-10-15 PROCEDURE — 2700000000 HC OXYGEN THERAPY PER DAY

## 2024-10-15 PROCEDURE — 97535 SELF CARE MNGMENT TRAINING: CPT

## 2024-10-15 PROCEDURE — 95816 EEG AWAKE AND DROWSY: CPT | Performed by: PSYCHIATRY & NEUROLOGY

## 2024-10-15 PROCEDURE — 6370000000 HC RX 637 (ALT 250 FOR IP): Performed by: NURSE PRACTITIONER

## 2024-10-15 PROCEDURE — 94660 CPAP INITIATION&MGMT: CPT

## 2024-10-15 PROCEDURE — 95816 EEG AWAKE AND DROWSY: CPT

## 2024-10-15 PROCEDURE — 99232 SBSQ HOSP IP/OBS MODERATE 35: CPT | Performed by: INTERNAL MEDICINE

## 2024-10-15 PROCEDURE — 1200000000 HC SEMI PRIVATE

## 2024-10-15 PROCEDURE — 99232 SBSQ HOSP IP/OBS MODERATE 35: CPT | Performed by: PHYSICIAN ASSISTANT

## 2024-10-15 PROCEDURE — 36415 COLL VENOUS BLD VENIPUNCTURE: CPT

## 2024-10-15 PROCEDURE — 97530 THERAPEUTIC ACTIVITIES: CPT

## 2024-10-15 PROCEDURE — 94640 AIRWAY INHALATION TREATMENT: CPT

## 2024-10-15 PROCEDURE — 85025 COMPLETE CBC W/AUTO DIFF WBC: CPT

## 2024-10-15 PROCEDURE — 80053 COMPREHEN METABOLIC PANEL: CPT

## 2024-10-15 PROCEDURE — 83735 ASSAY OF MAGNESIUM: CPT

## 2024-10-15 PROCEDURE — 6370000000 HC RX 637 (ALT 250 FOR IP): Performed by: FAMILY MEDICINE

## 2024-10-15 RX ORDER — PANTOPRAZOLE SODIUM 40 MG/1
40 TABLET, DELAYED RELEASE ORAL
Status: DISCONTINUED | OUTPATIENT
Start: 2024-10-15 | End: 2024-10-16 | Stop reason: HOSPADM

## 2024-10-15 RX ADMIN — SODIUM BICARBONATE 650 MG: 650 TABLET ORAL at 16:43

## 2024-10-15 RX ADMIN — APIXABAN 5 MG: 5 TABLET, FILM COATED ORAL at 07:52

## 2024-10-15 RX ADMIN — PANTOPRAZOLE SODIUM 40 MG: 40 TABLET, DELAYED RELEASE ORAL at 16:43

## 2024-10-15 RX ADMIN — HYDRALAZINE HYDROCHLORIDE 100 MG: 50 TABLET ORAL at 13:55

## 2024-10-15 RX ADMIN — PREDNISONE 20 MG: 20 TABLET ORAL at 07:52

## 2024-10-15 RX ADMIN — SODIUM CHLORIDE, PRESERVATIVE FREE 10 ML: 5 INJECTION INTRAVENOUS at 07:52

## 2024-10-15 RX ADMIN — PETROLATUM: 420 OINTMENT TOPICAL at 07:53

## 2024-10-15 RX ADMIN — SODIUM CHLORIDE, PRESERVATIVE FREE 40 MG: 5 INJECTION INTRAVENOUS at 05:09

## 2024-10-15 RX ADMIN — ARFORMOTEROL TARTRATE: 15 SOLUTION RESPIRATORY (INHALATION) at 05:49

## 2024-10-15 RX ADMIN — ASPIRIN 81 MG: 81 TABLET, COATED ORAL at 07:52

## 2024-10-15 RX ADMIN — CARVEDILOL 25 MG: 25 TABLET, FILM COATED ORAL at 07:52

## 2024-10-15 RX ADMIN — ISOSORBIDE MONONITRATE 30 MG: 30 TABLET, EXTENDED RELEASE ORAL at 07:52

## 2024-10-15 RX ADMIN — Medication 1000 UNITS: at 07:52

## 2024-10-15 RX ADMIN — IPRATROPIUM BROMIDE AND ALBUTEROL SULFATE 1 DOSE: 2.5; .5 SOLUTION RESPIRATORY (INHALATION) at 05:49

## 2024-10-15 RX ADMIN — CARVEDILOL 25 MG: 25 TABLET, FILM COATED ORAL at 16:43

## 2024-10-15 RX ADMIN — HYDRALAZINE HYDROCHLORIDE 100 MG: 50 TABLET ORAL at 20:58

## 2024-10-15 RX ADMIN — HYDRALAZINE HYDROCHLORIDE 100 MG: 50 TABLET ORAL at 07:52

## 2024-10-15 RX ADMIN — ATORVASTATIN CALCIUM 40 MG: 40 TABLET, FILM COATED ORAL at 20:58

## 2024-10-15 RX ADMIN — Medication 3 MG: at 20:58

## 2024-10-15 RX ADMIN — SODIUM BICARBONATE 650 MG: 650 TABLET ORAL at 07:52

## 2024-10-15 RX ADMIN — SODIUM BICARBONATE 650 MG: 650 TABLET ORAL at 20:58

## 2024-10-15 RX ADMIN — IPRATROPIUM BROMIDE AND ALBUTEROL SULFATE 1 DOSE: 2.5; .5 SOLUTION RESPIRATORY (INHALATION) at 15:26

## 2024-10-15 RX ADMIN — SODIUM CHLORIDE, PRESERVATIVE FREE 10 ML: 5 INJECTION INTRAVENOUS at 20:58

## 2024-10-15 RX ADMIN — PETROLATUM: 420 OINTMENT TOPICAL at 20:58

## 2024-10-15 RX ADMIN — SODIUM BICARBONATE 650 MG: 650 TABLET ORAL at 13:54

## 2024-10-15 RX ADMIN — APIXABAN 5 MG: 5 TABLET, FILM COATED ORAL at 20:58

## 2024-10-15 NOTE — PROGRESS NOTES
Physical Therapy  Treatment Note    Name: Chuck Chavarria Jr.  : 1950  MRN: [id number]      Date of Service: 10/15/2024    Evaluating PT:  Elina Liang PT, DPT [id number]    Room #:  8408/8408-A  Diagnosis:  SOB (shortness of breath) [R06.02]  Acute respiratory failure with hypoxia [J96.01]  PMHx/PSHx:   has a past medical history of Arthritis, Asthma, Bilateral carotid artery stenosis, Cataract, left eye, Cerebral infarction due to occlusion of right vertebral artery (HCC), COPD (chronic obstructive pulmonary disease) (HCC), Hyperlipidemia, Hypertension, Occlusion of right vertebral artery, and Tobacco dependence.  Procedure/Surgery:  None this admission  Precautions:  Fall risk, alarms, continuous O2 monitor, droplet isolation (Rhinovirus), incontinent  Equipment Needs:  TBD    SUBJECTIVE:    Pt lives alone in a 5th floor apartment with a level entry and elevator access. Pt ambulated with a SBQC PTA.    OBJECTIVE:   Initial Evaluation  Date: 10/7/24 Treatment Date: 10/15/24 Short Term/ Long Term   Goals   AM-PAC 6 Clicks     Was pt agreeable to Eval/treatment? Yes Yes    Does pt have pain? Denied No complaints of pain    Bed Mobility  Rolling: SBA  Supine to sit: SBA  Sit to supine: SBA  Scooting: SBA Rolling: NT  Supine to sit: Supervision  Sit to supine: NT  Scooting: Supervision  Rolling: Independent  Supine to sit: Independent  Sit to supine: Independent  Scooting: Independent   Transfers Sit to stand: Kaylyn  Stand to sit: Kaylyn  Stand pivot: Kaylyn with R SBQC Sit to stand: Supervision  Stand to sit: Supervision  Stand pivot: Supervision with SBQC Sit to stand: Independent  Stand to sit: Independent  Stand pivot: Mod I with AAD   Ambulation    65 feet with R SBQC and Kaylyn 100, 50 feet with SBQC with Supervision >200 feet with AAD and Mod I   Stair negotiation: ascended and descended  NT NT TBD   ROM BUE:  Refer to OT  BLE:  WFL NT    Strength BUE:  Refer to OT  BLE:  5/5 knee extension, 5/5 ankle

## 2024-10-15 NOTE — PROGRESS NOTES
Occupational Therapy  OT BEDSIDE TREATMENT NOTE      Date:10/15/2024  Patient Name: Chuck Chavarria Jr.  MRN: [id number]  : 1950  Room: [address] [place], [place] [address]     Evaluating OT: Gavin ARAUJO/STEPHANIA; [id number]        Referring Provider: Eula Bloom MD     Specific Provider Orders/Date: OT Eval and Treat 10/07/24 0830        Diagnosis: SOB (shortness of breath);  Stage 2 chronic kidney disease; A-fib; HLD (hyperlipidemia); Hypertension.    Surgery: None this admission.     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      Recommended Adaptive Equipment: extended tub bench      Precautions:  Fall Risk, droplet isolation rhinovirus, continuous pulse ox      Assessment of current deficits    [x] Functional mobility            [x]ADLs           [x] Strength                  [x]Cognition    [x] Functional transfers          [x] IADLs         [x] Safety Awareness   [x]Endurance    [x] Fine Coordination                         [x] Balance      [] Vision/perception   []Sensation      []Gross Motor Coordination             [] ROM           [] Delirium                   [] Motor Control      OT PLAN OF CARE   OT POC based on physician orders, patient diagnosis and results of clinical assessment     Frequency/Duration 1-3 days/wk for 2 weeks PRN   Specific OT Treatment Interventions to include:   * Instruction/training on adapted ADL techniques and AE recommendations to increase functional independence within precautions       * Training on energy conservation strategies, correct breathing pattern and techniques to improve independence/tolerance for self-care routine  * Functional transfer/mobility training/DME recommendations for  increased independence, safety, and fall prevention  * Patient/Family education to increase follow through with safety techniques and functional independence  * Recommendation of environmental modifications for increased safety with functional transfers/mobility and ADLs  * Cognitive retraining/development of therapeutic activities to improve problem solving, judgement, memory, and attention for increased safety/participation in ADL/IADL tasks  * Therapeutic exercise to improve motor endurance, ROM, and functional strength for ADLs/functional transfers  * Therapeutic activities to facilitate/challenge dynamic balance, stand tolerance for increased safety and independence with ADLs  * Positioning to improve skin integrity, interaction with environment and functional independence     Home Living: Pt lives alone in a 5th floor apartment w/ elevator access.   Bathroom setup: Tub/shower   Equipment owned: quad cane     Prior Level of Function: IND with ADLs , IND with IADLs; engaged in functional mobility with use of  quad cane  Driving: No  Occupation: None reported     Pain Level: Pt w/ no c/o pain during session  Cognition: A&O: 4/4; Follows 1-2 step directions. Poor eye contact              Memory:  Good              Sequencing:  Fair              Problem solving:  Fair              Judgement/safety:  Fair                Functional Assessment:  AM-PAC Daily Activity Raw Score: 18/24    Initial Eval Status  Date: 10/7/24 Treatment Status  Date:10/15/24 STGs = LTGs  Time frame: 10-14 days   Feeding Setup  For tray table at bed-level  Setup;    To complete self feeding while sitting up in the bed.    Independent    Grooming Stand by Assist seated Sup;    To complete washing face and hands while at sink.    Modified [place]    UB Dressing Stand by Assist   To don gown posteriorly seated EOB Sup;    To simulate candy/doff of gown   Modified [place]    LB Dressing Minimal Assist   To don briefs seated EOB  Min  A;    To perform candy/doff of socks with assistance to candy. Pt declined use of LB AE.    Modified [place]    Bathing Minimal Assist  Simulated seated EOB  N/T - pt declined.  Modified [place]    Toileting Moderate Assist  Sup   Modified [place]    Bed Mobility  Log Roll: NT   Supine to sit: Stand by Assist   Sit to supine: Stand by Assist   supine><sit: Sup Supine to sit: Independent   Sit to supine: Independent    Functional Transfers Sit to stand:Minimal Assist   Stand to sit:Minimal Assist   Stand pivot: Minimal Assist w/ quad cane  Commode: NT   sit-stand: Sup from EOB, completed x 3 Sit to stand: Modified [place]   Stand to sit: Modified [place]   Stand pivot:  Modified [place]   Commode:  Modified [place]    Functional Mobility Minimal Assist   Use of quad cane to<>from room doorway x3.  Sup   with use of SBQC   Modified [place] w/ use of Appropriate AD   Balance Sitting:     Static - Stand by Assist      Dynamic - Stand by Assist   Standing: Minimal Assist w/ quad cane  sitting: independent  Standing: Sup Sitting:     Static:  Independent      Dynamic:  Independent   Standing:  Modified [place]    Activity Tolerance Fair tolerance w/ light activity Fair  Good tolerance w/ light to mod activity   Visual/  Perceptual WFL  WFL     Safety Fair  Fair Good  during ADL completion   Vitals Pt on RA upon arrival. SpO2 90-95% throughout session. RN aware.  O2 monitored throughout 90-94%       Comments: Upon arrival, patient supine with HOB elevated and completing meal. Patient was pleasant and cooperative and agreed to participation in treatment. Patient participated in bed mobility, functional transfers, functional mobility, upper body dressing, lower body dressing, grooming, and self feeding with cues and assistance as needed. Patient required increased time with rest breaks provided following all activity. At end of session, patient in chair with call light and phone

## 2024-10-15 NOTE — PROGRESS NOTES
[facility] Hospitalist Progress Note    Admitting Date and Time: 10/4/2024  8:55 AM  Admit Dx: SOB (shortness of breath) [R06.02]  Acute respiratory failure with hypoxia [J96.01]    Synopsis:    74-year-old male with past medical history of asthma, COPD, CVA, HLD HLD and HTN who presented to the ED with complaints of shortness of breath. Patient was recently hospitalized 9/9/2024 - 9/13/2024 for the same complaints. He was followed by both nephrology and cardiology who optimized his medications. On the day of his discharge patient passed a 6-minute walk test without any oxygen requirements. He was discharged home with a follow-up with his PCP in 1 week nephrology in 3 to 4 weeks. Patient was recently scheduled to follow with CHF clinic and per EMR patient never showed for his appointment when the clinic attempted to reach out to him but his numbers were disconnected. In the ED, patient was placed on BIPAP for respiratory support. RVP was positive for rhinovirus. CXR showed pulmonary vascular congestion.  Patient was admitted and started on IV diuresis for further management. Patient developed HENNY thought to be prerenal due to poor oral intake. Diuresis held and fluids started with improvement initially. Nephrology consulted.      Patient had RRT called on 10/9 over night due to increased WOB. Patient was placed on BIPAP with improvement. He was also noted to be hypoxic with troponin of 780--> 763. EKG showed bifascicular block. Patient started on heparin ggt and Cardiology consulted.      RRT 10/13 for altered mental status    10/15: Mentation at baseline.  Seen on room air.  No complaints today.  Neurology reconsulted and awaiting MRI brain EEG prior to discharge.    Subjective:  Patient is being followed for SOB (shortness of breath) [R06.02]  Acute respiratory failure with hypoxia [J96.01]     Patient seen and examined at bedside this morning.  No complaints today.    ROS: denies fever, chills, cp, sob,

## 2024-10-15 NOTE — PROGRESS NOTES
[facility]  PULMONARY/CRITICAL CARE PROGRESS NOTE    Patient: Chuck Chavarria Jr.  MRN: [id number]  : 1950    Encounter Date: 10/15/2024  Encounter Time: 12:47 PM     Date of Admission: .10/4/2024  8:55 AM    Consulting Physician:  Primary Care Physician:     Gunner Espinosa MD     [phone]    Reason for Consultation: Dyspnea     Problem List:  Patient Active Problem List   Diagnosis    Chest pain    Abdominal pain, epigastric    HTN (hypertension)    Tobacco abuse    Cocaine abuse (HCC)    Mild persistent asthma without complication    LAFB (left anterior fascicular block)    HLD (hyperlipidemia)    HENNY (acute kidney injury) (HCC)    Pneumonia    SOB (shortness of breath)    HFrEF (heart failure with reduced ejection fraction) ([facility])    Loculated pleural effusion    Bilateral carotid artery stenosis    Tobacco dependence    Occlusion of right vertebral artery    Acute stroke due to ischemia ([facility])    Arthritis    A-fib (HCC)    Esophageal dysphagia    Acute diastolic CHF (congestive heart failure) (HCC)    COPD with acute exacerbation (HCC)    COPD (chronic obstructive pulmonary disease) (HCC)    (HFpEF) heart failure with preserved ejection fraction (HCC)    Polysubstance abuse (HCC)    Acute respiratory failure with hypoxia    Pyrexia of unknown origin following delivery    Stage 2 chronic kidney disease    National Institutes of Health (NIH) Stroke Scale level of consciousness score 0, alert; keenly responsive    Seizure-like activity (HCC)     SUBJECTIVE: Patient seen and examined.  Overnight the patient had no shortness of breath, cough, increased work of breathing.    HOME MEDICATIONS:  Prior to Admission medications    Medication Sig Start Date End Date Taking? Authorizing Provider   hydrALAZINE (APRESOLINE) 25 MG tablet Take 3 tablets by mouth 3 times daily 24   Jomar Torres MD   isosorbide mononitrate (IMDUR) 30 MG extended release tablet Take 1 tablet by  basal ganglia, most likely old   infarcts.  If clinically indicated, follow-up MRI of the brain could be   performed for further evaluation.   3. No definite hypoperfusion or perfusion mismatch seen.  The area of parent   abnormality noted on the cord dated analysis images is felt to be most likely   artifactual as discussed above.   4. Irregular calcified plaque about both carotid bifurcations and proximal   ICAs.  There is about 60% stenosis of both proximal ICAs by NASCET criteria.   This is similar compared to the prior CTA study.   5. Chronic occlusion of the small caliber right vertebral artery again noted.   6. No evidence of intracranial large vessel occlusion or stenosis.         CTA HEAD W CONTRAST   Final Result   1. No acute intracranial abnormality.   2. Stable chronic white matter ischemic changes and areas of hypodensity in   the left basal ganglia and caudate and right basal ganglia, most likely old   infarcts.  If clinically indicated, follow-up MRI of the brain could be   performed for further evaluation.   3. No definite hypoperfusion or perfusion mismatch seen.  The area of parent   abnormality noted on the cord dated analysis images is felt to be most likely   artifactual as discussed above.   4. Irregular calcified plaque about both carotid bifurcations and proximal   ICAs.  There is about 60% stenosis of both proximal ICAs by NASCET criteria.   This is similar compared to the prior CTA study.   5. Chronic occlusion of the small caliber right vertebral artery again noted.   6. No evidence of intracranial large vessel occlusion or stenosis.         CTA NECK W CONTRAST   Final Result   1. No acute intracranial abnormality.   2. Stable chronic white matter ischemic changes and areas of hypodensity in   the left basal ganglia and caudate and right basal ganglia, most likely old   infarcts.  If clinically indicated, follow-up MRI of the brain could be   performed for further evaluation.   3. No  coordinating care with interdisciplinary teams, face to face encounter with patient, providing counseling/education to patient/family.     Thank you Huseyin Rosenberg MD very much for allowing me to see this patient in consultation and follow up.    Care reviewed with nursing staff, medical and surgical specialty care, primary care and the patient's family as available. Restraints are ordered when the patient can do harm to him/herself by pulling out devices.    Jose Maria Edouard MD, MELIJAH.

## 2024-10-15 NOTE — PROGRESS NOTES
Physician Progress Note      PATIENT:               BRANDEN REED  CSN #:                  [id number]  :                       1950  ADMIT DATE:       10/4/2024 8:55 AM  DISCH DATE:  RESPONDING  PROVIDER #:        Huseyin Villagran MD          QUERY TEXT:    Pt admitted with acute respiratory failure. Pt noted to have possible stroke   with AMS on 10/13. If possible, please document in the progress notes and   discharge summary if you are evaluating and/or treating any of the following:    The medical record reflects the following:  Risk Factors: +rhinovirus  Clinical Indicators: per nursing oriented x4 on admission until 10/13, per   neurology 10/13 \"...Probable seizure. Patient became unresponsive, not   following commands. Began to wake up after CTs were completed. Checked on   patient later in the day he is alert and oriented...\", per RRT note 10/13   \"...Altered mental status possibly post ictal phase, rule out stroke...\", per   IM 10/15 \"...Mentation at baseline.  Seen on room air.  No complaints today.    Neurology reconsulted and awaiting MRI brain EEG prior to discharge...\"  Treatment: Prednisone, supportive care, MRI brain, EEG  Options provided:  -- Encephalopathy due to, Please document etiology.  -- Metabolic encephalopathy  -- Delirium due to, Please specify cause.  -- Delirium  -- Other - I will add my own diagnosis  -- Disagree - Not applicable / Not valid  -- Disagree - Clinically unable to determine / Unknown  -- Refer to Clinical Documentation Reviewer    PROVIDER RESPONSE TEXT:    This patient has metabolic encephalopathy.    Query created by: Ann Marie April on 10/15/2024 11:50 AM      Electronically signed by:  Huseyin Villagran MD 10/15/2024 12:02 PM

## 2024-10-15 NOTE — PROGRESS NOTES
Chuck Chavarria Jr. is a 74 y.o. right handed male     Neurology following for stroke alert    [facility] of arthritis, asthma, carotid stenosis, COPD, hyperlipidemia, hypertension, previous CVA    Patient recently presented to the hospital on 10/4/24 after experiencing shortness of breath.  Patient was hospitalized from September 9 through September 13 for the same complaints.  When he was discharged she passed a 6-minute walk test without any oxygen requirements.  He was discharged home and was advised to follow-up with his primary care physician in 1 week and with nephrology in 3 to 4 weeks.  He was scheduled to follow-up with the CHF clinic and missed his appointment.  Attempts were made to reach out to him but his number was disconnected.     While he was in the emergency room he was placed on BiPAP for respiratory support.  He was positive for rhinovirus.  His chest x-ray showed pulmonary vascular congestion.  He was admitted for further evaluation and management.     Patient's last known well was 10:30 AM.  At that time he was awake, alert, and \"normal\".  When the nurse checked on him he was lethargic, unable to answer questions or follow any commands.  Stroke alert was called and he was taken emergently to CAT scan.     Before being placed on the CAT scan table he was able to wiggle his toes and grasp with his bilateral hands.  After his CT scan was over he was able to state his name, tell me the year and location and stick his tongue out.  He was also still able to grasp his bilateral hands and wiggle his toes on his bilateral feet.  This presentation appears to be postictal.  Will workup for seizures      Subjective:     Patient lying in bed awake and alert.  He is oriented x 4 and able to follow all commands without difficulty.  He has had no further episodes of unresponsiveness.    He is awaiting his EEG and MRI brain    Objective:       BP (!) 156/90   Pulse 82   Temp 97.4 °F (36.3 °C) (Oral)   Resp 18       METASPCT 1 07/15/2024 05:20 AM    LYMPHOPCT 10 10/15/2024 04:34 AM    MONOPCT 13 10/15/2024 04:34 AM    MYELOPCT 1 07/15/2024 05:20 AM    EOSPCT 0 10/15/2024 04:34 AM    BASOPCT 0 10/15/2024 04:34 AM    MONOSABS 1.14 10/15/2024 04:34 AM    LYMPHSABS 0.84 10/15/2024 04:34 AM    EOSABS 0.03 10/15/2024 04:34 AM    BASOSABS 0.01 10/15/2024 04:34 AM     CMP:    Lab Results   Component Value Date/Time     10/15/2024 04:34 AM    K 4.0 10/15/2024 04:34 AM    K 4.8 08/28/2022 05:15 AM     10/15/2024 04:34 AM    CO2 24 10/15/2024 04:34 AM    BUN 37 10/15/2024 04:34 AM    CREATININE 1.3 10/15/2024 04:34 AM    GFRAA >60 08/28/2022 05:15 AM    LABGLOM 56 10/15/2024 04:34 AM    LABGLOM >60 01/18/2024 07:07 AM    GLUCOSE 113 10/15/2024 04:34 AM    CALCIUM 9.3 10/15/2024 04:34 AM    BILITOT 0.2 10/15/2024 04:34 AM    ALKPHOS 68 10/15/2024 04:34 AM    AST 35 10/15/2024 04:34 AM    ALT 20 10/15/2024 04:34 AM     HgBA1c:    Lab Results   Component Value Date/Time    LABA1C 5.8 01/18/2024 09:45 AM     FLP:    Lab Results   Component Value Date/Time    TRIG 46 09/10/2024 04:30 AM    HDL 71 09/10/2024 04:30 AM     Lab Results   Component Value Date    LDL 79 09/10/2024     CT head/CTA head/CTA neck/CTP  1. No acute intracranial abnormality.   2. Stable chronic white matter ischemic changes and areas of hypodensity in   the left basal ganglia and caudate and right basal ganglia, most likely old   infarcts.  If clinically indicated, follow-up MRI of the brain could be   performed for further evaluation.   3. No definite hypoperfusion or perfusion mismatch seen.  The area of parent   abnormality noted on the cord dated analysis images is felt to be most likely   artifactual as discussed above.   4. Irregular calcified plaque about both carotid bifurcations and proximal   ICAs.  There is about 60% stenosis of both proximal ICAs by NASCET criteria.     All labs and imaging studies reviewed independently today       Assessment:

## 2024-10-15 NOTE — CARE COORDINATION
CM update note.  Discharge goal is home when medically ready.  He does not have transportation to get home.  Will need to use his insurance for transport home ([phone]).  EEG completed today.  Read pending.  MRI need completed.  Pt/ot updates today.  CM/Sw to follow. Dontae Mcnamara RN [phone]

## 2024-10-15 NOTE — PROGRESS NOTES
Department of Internal Medicine  Nephrology Progress Note    Events reviewed.     SUBJECTIVE: We are following Mr Deon for HENNY. Reports no new complaints today.     PHYSICAL EXAM:      Vitals:    VITALS:  BP (!) 156/90   Pulse 82   Temp 97.4 °F (36.3 °C) (Oral)   Resp 18   Ht 1.727 m (5' 8\")   Wt 74.8 kg (164 lb 14.4 oz)   SpO2 94%   BMI 25.07 kg/m²   24HR INTAKE/OUTPUT:    Intake/Output Summary (Last 24 hours) at 10/15/2024 1057  Last data filed at 10/15/2024 0513  Gross per 24 hour   Intake 240 ml   Output 840 ml   Net -600 ml         Constitutional:  alert and oriented, NAD   HEENT:  normocephalic, atraumatic   Respiratory:  decreased, SOB  Cardiovascular/Edema:  regular rate and rhythm, no edema  Gastrointestinal:  normal bowel sounds x 4  Neurologic:  normal   Skin:  clean and dry     Scheduled Meds:   sodium bicarbonate  650 mg Oral 4x Daily    predniSONE  20 mg Oral Daily    pantoprazole (PROTONIX) 40 mg in sodium chloride (PF) 0.9 % 10 mL injection  40 mg IntraVENous BID AC    hydrALAZINE  100 mg Oral TID    carvedilol  25 mg Oral BID WC    apixaban  5 mg Oral BID    white petrolatum   Topical BID    sodium chloride flush  5-40 mL IntraVENous 2 times per day    ipratropium 0.5 mg-albuterol 2.5 mg  1 Dose Inhalation Q4H WA RT    nicotine  1 patch TransDERmal Daily    aspirin  81 mg Oral Daily    atorvastatin  40 mg Oral Nightly    arformoterol 15 mcg-budesonide 0.5 mg neb solution   Nebulization BID RT    isosorbide mononitrate  30 mg Oral Daily    Vitamin D  1,000 Units Oral Daily     Continuous Infusions:   sodium chloride       PRN Meds:.hydrOXYzine, sodium chloride flush, sodium chloride, potassium chloride **OR** potassium alternative oral replacement **OR** potassium chloride, magnesium sulfate, polyethylene glycol, acetaminophen **OR** acetaminophen, melatonin, guaiFENesin-dextromethorphan, prochlorperazine **OR** prochlorperazine     DATA:    CBC with Differential:    Lab Results

## 2024-10-15 NOTE — PROCEDURES
[facility] Neurodiagnostic Report    MRN: [id number]  PATIENT NAME: Chuck Chavarria Jr.  DATE OF REPORT: 10/15/2024    DATE OF SERVICE: 10/15/2024  PHYSICIAN NAME: Tony Brito DO  STUDY ORDERED BY: Ange Chen      Patient's : 1950  Patient's Age: 74 y.o.  Gender: male    PROCEDURE: Routine EEG with video      Clinical Interpretation:   This abnormal study showed evidence of:    A mild nonspecific encephalopathy    No seizures or epileptiform discharges were noted during this study.     __________________________  Electronically signed by: Tony Brito DO, 10/15/2024 1:05 PM      Patient Clinical Information   Reason for Study: The patient is undergoing evaluation for altered mental status  Patient State: Awake  Primary neurological diagnosis: Altered mental status  Primary indication for monitoring: Diagnosis of nonconvulsive seizures    Pertinent Medications    prednisone    hydroxyzine      Sedatives administered: No  Intubated: No  Pharmacological paralytic: No    Reporting Period  Start of Study: 1301, 10/15/2024   End of Study:  1326, 10/15/2024       EEG Description  Digital video and scalp EEG monitoring was performed using the standard protocol for this laboratory. Scalp electrodes were applied in the international 10/20 system. Multiple digital montage arrangements were utilized for evaluation. EKG and video were recorded.     Background:      Occipital rhythm (posterior dominant rhythm or PDR): Present  Frequency: 7.5 Hz  Voltage: Medium  Organization: fair   Reactivity to eye opening/closure: Fair    Drowsiness: Present - normal  Sleep: Absent      Technical and Activation Procedures:  Hyperventilation: Not done  Photic stimulation: Done - physiologic driving noted intermittently  Reactivity to stimulation: Present    Abnormalities:    I. Seizures?  None    II. Rhythmic or Periodic Patterns?  None    III. Other Abnormalities?  None

## 2024-10-16 VITALS
BODY MASS INDEX: 24.86 KG/M2 | OXYGEN SATURATION: 100 % | HEART RATE: 88 BPM | HEIGHT: 68 IN | WEIGHT: 164 LBS | RESPIRATION RATE: 18 BRPM | DIASTOLIC BLOOD PRESSURE: 83 MMHG | SYSTOLIC BLOOD PRESSURE: 164 MMHG | TEMPERATURE: 97 F

## 2024-10-16 PROBLEM — R41.82 ALTERED MENTAL STATUS: Status: ACTIVE | Noted: 2024-10-16

## 2024-10-16 LAB
ALBUMIN SERPL-MCNC: 2.8 G/DL (ref 3.5–5.2)
ALP SERPL-CCNC: 76 U/L (ref 40–129)
ALT SERPL-CCNC: 25 U/L (ref 0–40)
ANION GAP SERPL CALCULATED.3IONS-SCNC: 10 MMOL/L (ref 7–16)
AST SERPL-CCNC: 42 U/L (ref 0–39)
BASOPHILS # BLD: 0.01 K/UL (ref 0–0.2)
BASOPHILS NFR BLD: 0 % (ref 0–2)
BILIRUB SERPL-MCNC: 0.2 MG/DL (ref 0–1.2)
BUN SERPL-MCNC: 36 MG/DL (ref 6–23)
CALCIUM SERPL-MCNC: 9.3 MG/DL (ref 8.6–10.2)
CHLORIDE SERPL-SCNC: 109 MMOL/L (ref 98–107)
CO2 SERPL-SCNC: 28 MMOL/L (ref 22–29)
CREAT SERPL-MCNC: 1.3 MG/DL (ref 0.7–1.2)
EKG ATRIAL RATE: 73 BPM
EKG P AXIS: 60 DEGREES
EKG P-R INTERVAL: 142 MS
EKG Q-T INTERVAL: 434 MS
EKG QRS DURATION: 126 MS
EKG QTC CALCULATION (BAZETT): 478 MS
EKG R AXIS: -89 DEGREES
EKG T AXIS: 77 DEGREES
EKG VENTRICULAR RATE: 73 BPM
EOSINOPHIL # BLD: 0.09 K/UL (ref 0.05–0.5)
EOSINOPHILS RELATIVE PERCENT: 1 % (ref 0–6)
ERYTHROCYTE [DISTWIDTH] IN BLOOD BY AUTOMATED COUNT: 16.2 % (ref 11.5–15)
GFR, ESTIMATED: 58 ML/MIN/1.73M2
GLUCOSE SERPL-MCNC: 148 MG/DL (ref 74–99)
HCT VFR BLD AUTO: 31.7 % (ref 37–54)
HGB BLD-MCNC: 10 G/DL (ref 12.5–16.5)
IMM GRANULOCYTES # BLD AUTO: 0.11 K/UL (ref 0–0.58)
IMM GRANULOCYTES NFR BLD: 1 % (ref 0–5)
LYMPHOCYTES NFR BLD: 1.04 K/UL (ref 1.5–4)
LYMPHOCYTES RELATIVE PERCENT: 13 % (ref 20–42)
MAGNESIUM SERPL-MCNC: 1.7 MG/DL (ref 1.6–2.6)
MCH RBC QN AUTO: 28.4 PG (ref 26–35)
MCHC RBC AUTO-ENTMCNC: 31.5 G/DL (ref 32–34.5)
MCV RBC AUTO: 90.1 FL (ref 80–99.9)
MONOCYTES NFR BLD: 0.96 K/UL (ref 0.1–0.95)
MONOCYTES NFR BLD: 12 % (ref 2–12)
NEUTROPHILS NFR BLD: 74 % (ref 43–80)
NEUTS SEG NFR BLD: 6.11 K/UL (ref 1.8–7.3)
PLATELET # BLD AUTO: 185 K/UL (ref 130–450)
PMV BLD AUTO: 10.7 FL (ref 7–12)
POTASSIUM SERPL-SCNC: 3.7 MMOL/L (ref 3.5–5)
PROT SERPL-MCNC: 6.2 G/DL (ref 6.4–8.3)
RBC # BLD AUTO: 3.52 M/UL (ref 3.8–5.8)
SODIUM SERPL-SCNC: 147 MMOL/L (ref 132–146)
WBC OTHER # BLD: 8.3 K/UL (ref 4.5–11.5)

## 2024-10-16 PROCEDURE — 85025 COMPLETE CBC W/AUTO DIFF WBC: CPT

## 2024-10-16 PROCEDURE — 36415 COLL VENOUS BLD VENIPUNCTURE: CPT

## 2024-10-16 PROCEDURE — 6370000000 HC RX 637 (ALT 250 FOR IP): Performed by: NURSE PRACTITIONER

## 2024-10-16 PROCEDURE — 83735 ASSAY OF MAGNESIUM: CPT

## 2024-10-16 PROCEDURE — 99232 SBSQ HOSP IP/OBS MODERATE 35: CPT | Performed by: INTERNAL MEDICINE

## 2024-10-16 PROCEDURE — 6370000000 HC RX 637 (ALT 250 FOR IP): Performed by: CLINICAL NURSE SPECIALIST

## 2024-10-16 PROCEDURE — 99239 HOSP IP/OBS DSCHRG MGMT >30: CPT | Performed by: INTERNAL MEDICINE

## 2024-10-16 PROCEDURE — 6360000002 HC RX W HCPCS: Performed by: NURSE PRACTITIONER

## 2024-10-16 PROCEDURE — 94660 CPAP INITIATION&MGMT: CPT

## 2024-10-16 PROCEDURE — 80053 COMPREHEN METABOLIC PANEL: CPT

## 2024-10-16 PROCEDURE — 99232 SBSQ HOSP IP/OBS MODERATE 35: CPT | Performed by: NURSE PRACTITIONER

## 2024-10-16 PROCEDURE — 6370000000 HC RX 637 (ALT 250 FOR IP): Performed by: INTERNAL MEDICINE

## 2024-10-16 PROCEDURE — 93010 ELECTROCARDIOGRAM REPORT: CPT | Performed by: INTERNAL MEDICINE

## 2024-10-16 PROCEDURE — 94640 AIRWAY INHALATION TREATMENT: CPT

## 2024-10-16 PROCEDURE — 2580000003 HC RX 258: Performed by: NURSE PRACTITIONER

## 2024-10-16 RX ORDER — CARVEDILOL 25 MG/1
25 TABLET ORAL 2 TIMES DAILY WITH MEALS
Qty: 60 TABLET | Refills: 0 | Status: SHIPPED | OUTPATIENT
Start: 2024-10-16

## 2024-10-16 RX ADMIN — PANTOPRAZOLE SODIUM 40 MG: 40 TABLET, DELAYED RELEASE ORAL at 08:13

## 2024-10-16 RX ADMIN — APIXABAN 5 MG: 5 TABLET, FILM COATED ORAL at 08:13

## 2024-10-16 RX ADMIN — HYDRALAZINE HYDROCHLORIDE 100 MG: 50 TABLET ORAL at 14:48

## 2024-10-16 RX ADMIN — CARVEDILOL 25 MG: 25 TABLET, FILM COATED ORAL at 08:13

## 2024-10-16 RX ADMIN — ARFORMOTEROL TARTRATE: 15 SOLUTION RESPIRATORY (INHALATION) at 07:45

## 2024-10-16 RX ADMIN — ISOSORBIDE MONONITRATE 30 MG: 30 TABLET, EXTENDED RELEASE ORAL at 08:13

## 2024-10-16 RX ADMIN — SODIUM CHLORIDE, PRESERVATIVE FREE 10 ML: 5 INJECTION INTRAVENOUS at 08:14

## 2024-10-16 RX ADMIN — PREDNISONE 20 MG: 20 TABLET ORAL at 08:13

## 2024-10-16 RX ADMIN — HYDRALAZINE HYDROCHLORIDE 100 MG: 50 TABLET ORAL at 08:13

## 2024-10-16 RX ADMIN — Medication 1000 UNITS: at 08:13

## 2024-10-16 RX ADMIN — ASPIRIN 81 MG: 81 TABLET, COATED ORAL at 08:13

## 2024-10-16 RX ADMIN — PANTOPRAZOLE SODIUM 40 MG: 40 TABLET, DELAYED RELEASE ORAL at 14:48

## 2024-10-16 RX ADMIN — IPRATROPIUM BROMIDE AND ALBUTEROL SULFATE 1 DOSE: 2.5; .5 SOLUTION RESPIRATORY (INHALATION) at 07:45

## 2024-10-16 RX ADMIN — PETROLATUM: 420 OINTMENT TOPICAL at 08:14

## 2024-10-16 NOTE — CARE COORDINATION
Discharge order noted. Per internal med discharge summary note today, EEG negative. MRI brain limited due to motion but negative for acute findings. Discharge home today after receiving clearance from neurology, pulmonology, nephrology. Plan is home and patient does not have transportation home.  Will need to use his insurance for transport home ([phone]).  RN aware.   Audrey Aggarwal RN   [phone]

## 2024-10-16 NOTE — PROGRESS NOTES
[facility]  PULMONARY/CRITICAL CARE PROGRESS NOTE    Patient: Chuck Chavarria Jr.  MRN: [id number]  : 1950    Encounter Date: 10/16/2024  Encounter Time: 11:44 AM     Date of Admission: .10/4/2024  8:55 AM    Consulting Physician:  Primary Care Physician:     Gunner Espinosa MD     [phone]    Reason for Consultation: Dyspnea     Problem List:  Patient Active Problem List   Diagnosis    Chest pain    Abdominal pain, epigastric    HTN (hypertension)    Tobacco abuse    Cocaine abuse (HCC)    Mild persistent asthma without complication    LAFB (left anterior fascicular block)    HLD (hyperlipidemia)    HENNY (acute kidney injury) (HCC)    Pneumonia    SOB (shortness of breath)    HFrEF (heart failure with reduced ejection fraction) ([facility])    Loculated pleural effusion    Bilateral carotid artery stenosis    Tobacco dependence    Occlusion of right vertebral artery    Acute stroke due to ischemia ([facility])    Arthritis    A-fib (HCC)    Esophageal dysphagia    Acute diastolic CHF (congestive heart failure) (HCC)    COPD with acute exacerbation (HCC)    COPD (chronic obstructive pulmonary disease) (HCC)    (HFpEF) heart failure with preserved ejection fraction (HCC)    Polysubstance abuse (HCC)    Acute respiratory failure with hypoxia    Pyrexia of unknown origin following delivery    Stage 2 chronic kidney disease    National Institutes of Health (NIH) Stroke Scale level of consciousness score 0, alert; keenly responsive    Seizure-like activity (HCC)     SUBJECTIVE: Patient seen and examined.  Overnight the patient had no shortness of breath, cough, increased work of breathing.    HOME MEDICATIONS:  Prior to Admission medications    Medication Sig Start Date End Date Taking? Authorizing Provider   carvedilol (COREG) 25 MG tablet Take 1 tablet by mouth 2 times daily (with meals) 10/16/24  Yes Huseyin Rosenberg MD   hydrALAZINE (APRESOLINE) 25 MG tablet Take 3 tablets by mouth 3  IntraVENous, 2 times per day  sodium chloride flush 0.9 % injection 5-40 mL, 5-40 mL, IntraVENous, PRN  0.9 % sodium chloride infusion, , IntraVENous, PRN  potassium chloride (KLOR-CON M) extended release tablet 40 mEq, 40 mEq, Oral, PRN **OR** potassium bicarb-citric acid (EFFER-K) effervescent tablet 40 mEq, 40 mEq, Oral, PRN **OR** potassium chloride 10 mEq/100 mL IVPB (Peripheral Line), 10 mEq, IntraVENous, PRN  magnesium sulfate 2000 mg in 50 mL IVPB premix, 2,000 mg, IntraVENous, PRN  polyethylene glycol (GLYCOLAX) packet 17 g, 17 g, Oral, Daily PRN  acetaminophen (TYLENOL) tablet 650 mg, 650 mg, Oral, Q6H PRN **OR** acetaminophen (TYLENOL) suppository 650 mg, 650 mg, Rectal, Q6H PRN  melatonin tablet 3 mg, 3 mg, Oral, Nightly PRN  ipratropium 0.5 mg-albuterol 2.5 mg (DUONEB) nebulizer solution 1 Dose, 1 Dose, Inhalation, Q4H WA RT  guaiFENesin-dextromethorphan (ROBITUSSIN DM) 100-10 MG/5ML syrup 5 mL, 5 mL, Oral, Q4H PRN  nicotine (NICODERM CQ) 14 MG/24HR 1 patch, 1 patch, TransDERmal, Daily  aspirin EC tablet 81 mg, 81 mg, Oral, Daily  atorvastatin (LIPITOR) tablet 40 mg, 40 mg, Oral, Nightly  arformoterol 15 mcg-budesonide 0.5 mg neb solution, , Nebulization, BID RT  isosorbide mononitrate (IMDUR) extended release tablet 30 mg, 30 mg, Oral, Daily  Vitamin D (CHOLECALCIFEROL) tablet 1,000 Units, 1,000 Units, Oral, Daily  prochlorperazine (COMPAZINE) injection 10 mg, 10 mg, IntraVENous, Q6H PRN **OR** prochlorperazine (COMPAZINE) tablet 10 mg, 10 mg, Oral, Q6H PRN    ALLERGIES:  Allergies   Allergen Reactions    Doxycycline Shortness Of Breath    Rocephin [Ceftriaxone] Shortness Of Breath       REVIEW OF SYSTEMS:  General ROS: Negative For: chills, fatigue, fever, malaise, night sweats or sleep disturbance   ENT ROS: Negative For: epistaxis, headaches, sinus pain, sneezing or sore throat   Respiratory ROS: Negative For: hemoptysis, pleuritic type chest pains  Cardiovascular ROS: Negative For: chest pain,    infarcts.  If clinically indicated, follow-up MRI of the brain could be   performed for further evaluation.   3. No definite hypoperfusion or perfusion mismatch seen.  The area of parent   abnormality noted on the cord dated analysis images is felt to be most likely   artifactual as discussed above.   4. Irregular calcified plaque about both carotid bifurcations and proximal   ICAs.  There is about 60% stenosis of both proximal ICAs by NASCET criteria.   This is similar compared to the prior CTA study.   5. Chronic occlusion of the small caliber right vertebral artery again noted.   6. No evidence of intracranial large vessel occlusion or stenosis.         CTA HEAD W CONTRAST   Final Result   1. No acute intracranial abnormality.   2. Stable chronic white matter ischemic changes and areas of hypodensity in   the left basal ganglia and caudate and right basal ganglia, most likely old   infarcts.  If clinically indicated, follow-up MRI of the brain could be   performed for further evaluation.   3. No definite hypoperfusion or perfusion mismatch seen.  The area of parent   abnormality noted on the cord dated analysis images is felt to be most likely   artifactual as discussed above.   4. Irregular calcified plaque about both carotid bifurcations and proximal   ICAs.  There is about 60% stenosis of both proximal ICAs by NASCET criteria.   This is similar compared to the prior CTA study.   5. Chronic occlusion of the small caliber right vertebral artery again noted.   6. No evidence of intracranial large vessel occlusion or stenosis.         CTA NECK W CONTRAST   Final Result   1. No acute intracranial abnormality.   2. Stable chronic white matter ischemic changes and areas of hypodensity in   the left basal ganglia and caudate and right basal ganglia, most likely old   infarcts.  If clinically indicated, follow-up MRI of the brain could be   performed for further evaluation.   3. No definite hypoperfusion or perfusion

## 2024-10-16 NOTE — PROGRESS NOTES
Department of Internal Medicine  Nephrology Progress Note    Events reviewed.     SUBJECTIVE: We are following Mr Deon for HENNY. Reports no new complaints today.     PHYSICAL EXAM:      Vitals:    VITALS:  BP (!) 164/83   Pulse 88   Temp 97 °F (36.1 °C) (Temporal)   Resp 18   Ht 1.727 m (5' 8\")   Wt 74.4 kg (164 lb)   SpO2 100%   BMI 24.94 kg/m²   24HR INTAKE/OUTPUT:  No intake or output data in the 24 hours ending 10/16/24 1321        Constitutional:  alert and oriented, NAD   HEENT:  normocephalic, atraumatic   Respiratory:  decreased, SOB  Cardiovascular/Edema:  regular rate and rhythm, no edema  Gastrointestinal:  normal bowel sounds x 4  Neurologic:  normal   Skin:  clean and dry     Scheduled Meds:   pantoprazole  40 mg Oral BID AC    hydrALAZINE  100 mg Oral TID    carvedilol  25 mg Oral BID WC    apixaban  5 mg Oral BID    white petrolatum   Topical BID    sodium chloride flush  5-40 mL IntraVENous 2 times per day    ipratropium 0.5 mg-albuterol 2.5 mg  1 Dose Inhalation Q4H WA RT    nicotine  1 patch TransDERmal Daily    aspirin  81 mg Oral Daily    atorvastatin  40 mg Oral Nightly    arformoterol 15 mcg-budesonide 0.5 mg neb solution   Nebulization BID RT    isosorbide mononitrate  30 mg Oral Daily    Vitamin D  1,000 Units Oral Daily     Continuous Infusions:   sodium chloride       PRN Meds:.gadobenate dimeglumine, hydrOXYzine, sodium chloride flush, sodium chloride, potassium chloride **OR** potassium alternative oral replacement **OR** potassium chloride, magnesium sulfate, polyethylene glycol, acetaminophen **OR** acetaminophen, melatonin, guaiFENesin-dextromethorphan, prochlorperazine **OR** prochlorperazine     DATA:    CBC with Differential:    Lab Results   Component Value Date/Time    WBC 8.3 10/16/2024 04:45 AM    RBC 3.52 10/16/2024 04:45 AM    HGB 10.0 10/16/2024 04:45 AM    HCT 31.7 10/16/2024 04:45 AM     10/16/2024 04:45 AM    MCV 90.1 10/16/2024 04:45 AM    MCH 28.4

## 2024-10-16 NOTE — PLAN OF CARE
Problem: Chronic Conditions and Co-morbidities  Goal: Patient's chronic conditions and co-morbidity symptoms are monitored and maintained or improved  10/16/2024 1518 by Silva Cunningham RN  Outcome: Adequate for Discharge  10/16/2024 1032 by Silva Cunningham RN  Outcome: Progressing     Problem: Discharge Planning  Goal: Discharge to home or other facility with appropriate resources  10/16/2024 1518 by Silva Cunningham RN  Outcome: Adequate for Discharge  10/16/2024 1032 by Silva Cunningham RN  Outcome: Progressing     Problem: Safety - Adult  Goal: Free from fall injury  10/16/2024 1518 by Silva Cunningham RN  Outcome: Adequate for Discharge  10/16/2024 1032 by Silva Cunningham RN  Outcome: Progressing     Problem: Pain  Goal: Verbalizes/displays adequate comfort level or baseline comfort level  10/16/2024 1518 by Silva Cunningham RN  Outcome: Adequate for Discharge  10/16/2024 1032 by Silva Cunningham RN  Outcome: Progressing     Problem: Skin/Tissue Integrity  Goal: Absence of new skin breakdown  Description: 1.  Monitor for areas of redness and/or skin breakdown  2.  Assess vascular access sites hourly  3.  Every 4-6 hours minimum:  Change oxygen saturation probe site  4.  Every 4-6 hours:  If on nasal continuous positive airway pressure, respiratory therapy assess nares and determine need for appliance change or resting period.  10/16/2024 1518 by Silva Cunningham RN  Outcome: Adequate for Discharge  10/16/2024 1032 by Silva Cunningham RN  Outcome: Progressing     Problem: Respiratory - Adult  Goal: Achieves optimal ventilation and oxygenation  10/16/2024 1518 by Silva Cunningham RN  Outcome: Adequate for Discharge  10/16/2024 1032 by Silva Cunningham RN  Outcome: Progressing     Problem: Nutrition Deficit:  Goal: Optimize nutritional status  10/16/2024 1518 by Silva Cunningham RN  Outcome: Adequate for Discharge  10/16/2024 1032 by Silva Cunningham RN  Outcome: Progressing     Problem: ABCDS Injury Assessment  Goal:

## 2024-10-16 NOTE — DISCHARGE SUMMARY
[facility] Hospitalist Physician Discharge Summary     Patient ID:  Chuck Chavarria Jr.  [id number]  74 y.o.  1950    Admit date: 10/4/2024    Discharge date and time:  10/16/2024  10:22 AM    Hospital Course:   Patient Chuck Chavarria Jr. is a 74 y.o. presented with SOB (shortness of breath) [R06.02]  Acute respiratory failure with hypoxia [J96.01]    74-year-old male with past medical history of asthma, COPD, CVA, HLD HLD and HTN who presented to the ED with complaints of shortness of breath. Patient was recently hospitalized 9/9/2024 - 9/13/2024 for the same complaints. He was followed by both nephrology and cardiology who optimized his medications. On the day of his discharge patient passed a 6-minute walk test without any oxygen requirements. He was discharged home with a follow-up with his PCP in 1 week nephrology in 3 to 4 weeks. Patient was recently scheduled to follow with CHF clinic and per EMR patient never showed for his appointment when the clinic attempted to reach out to him but his numbers were disconnected. In the ED, patient was placed on BIPAP for respiratory support. RVP was positive for rhinovirus. CXR showed pulmonary vascular congestion.  Patient was admitted and started on IV diuresis for further management. Patient developed HENNY thought to be prerenal due to poor oral intake. Diuresis held and fluids started with improvement initially. Nephrology consulted.      Patient had RRT called on 10/9 over night due to increased WOB. Patient was placed on BIPAP with improvement. He was also noted to be hypoxic with troponin of 780--> 763. EKG showed bifascicular block. Patient started on heparin ggt and Cardiology consulted.      RRT 10/13 for altered mental status     10/15: Mentation at baseline.  Seen on room air.  No complaints today.  Neurology reconsulted and awaiting MRI brain EEG prior to discharge.    10/16: EEG negative.  MRI brain limited due to motion but negative for acute  consciousness score 0, alert; keenly responsive    Seizure-like activity (HCC)  Resolved Problems:    * No resolved hospital problems. *      Consults:  IP CONSULT TO HOSPITALIST  IP CONSULT TO HEART FAILURE NURSE/COORDINATOR  IP CONSULT TO NEPHROLOGY  IP CONSULT TO CARDIOLOGY  IP CONSULT TO SOCIAL WORK  IP CONSULT TO PULMONOLOGY  IP CONSULT TO NEUROLOGY      I/O last 3 completed shifts:  In: -   Out: 600 [Urine:600]  No intake/output data recorded.      LABS:  Recent Labs     10/14/24  0432 10/15/24  0434 10/16/24  0445    143 147*   K 4.1 4.0 3.7   * 107 109*   CO2 23 24 28   BUN 42* 37* 36*   CREATININE 1.5* 1.3* 1.3*   GLUCOSE 91 113* 148*   CALCIUM 9.3 9.3 9.3       Recent Labs     10/14/24  0432 10/15/24  0434 10/16/24  0445   WBC 9.7 8.5 8.3   RBC 3.27* 3.19* 3.52*   HGB 9.2* 9.0* 10.0*   HCT 29.6* 28.5* 31.7*   MCV 90.5 89.3 90.1   MCH 28.1 28.2 28.4   MCHC 31.1* 31.6* 31.5*   RDW 15.9* 16.1* 16.2*    170 185   MPV 11.1 10.9 10.7       Recent Labs     10/13/24  1127   POCGLU 168*       Imaging:  XR CHEST PORTABLE    Result Date: 10/4/2024  EXAMINATION: ONE XRAY VIEW OF THE CHEST 10/4/2024 9:41 am COMPARISON: 09/09/2024 HISTORY: ORDERING SYSTEM PROVIDED HISTORY: Shortness of breath TECHNOLOGIST PROVIDED HISTORY: Reason for exam:->Shortness of breath FINDINGS: Cardiac silhouette is within normal limits. Pulmonary venous congestion is noted.  There are bilateral interstitial changes suspicious for pulmonary edema.  No pneumothorax is identified. Bony structures are unremarkable.     Pulmonary venous congestion with bilateral interstitial changes suspicious for pulmonary edema.       Patient Instructions:      Medication List        START taking these medications      carvedilol 25 MG tablet  Commonly known as: COREG  Take 1 tablet by mouth 2 times daily (with meals)            CONTINUE taking these medications      albuterol sulfate  (90 Base) MCG/ACT inhaler  Commonly known as:

## 2024-10-16 NOTE — CARE COORDINATION
reached out to patients PCP office Dr Gunner Moore at [phone] to schedule follow up D/C appointment.  spoke to office staff and scheduled appointment for 10/21 at 10:15 AM in office.      spoke to patient on room phone and provided an update on scheduled appointment.     Information added to D/C summary.

## 2024-10-16 NOTE — PROGRESS NOTES
10/15/24 2111   NIV Type   NIV Started/Stopped On   Equipment Type V60   Mode Bilevel   Mask Type Full face mask   Mask Size Medium   Assessment   Respirations 22   Level of Consciousness 0   Comfort Level Good   Using Accessory Muscles No   Mask Compliance Good   Skin Assessment Clean, dry, & intact   Settings/Measurements   PIP Observed 11 cm H20   IPAP 10 cmH20   CPAP/EPAP 5 cmH2O   Vt (Measured) 731 mL   Rate Ordered 12   Insp Rise Time (%) 2 %   FiO2  40 %   I Time/ I Time % 0.8 s   Minute Volume (L/min) 16.2 Liters   Mask Leak (lpm) 82 lpm   Patient's Home Machine No   Alarm Settings   Alarms On Y

## 2024-10-16 NOTE — PROGRESS NOTES
Patient was informed of the importance of holding still during the MRI exam. Patient states he understands. MRI technologist checked on patient multiple times during the exam because patient continued to move throughout the exam. Patient squeezed back before contrast was given and stated he was having trouble breathing and can't lay down flat any longer. MRI technologist asked 2x if he was sure he wanted to discontinue his exam. Patient said he didn't want to continue. Order changed to without contrast. Patient sent back upstairs.

## 2024-10-16 NOTE — PROGRESS NOTES
CLINICAL PHARMACY NOTE: MEDS TO BEDS    Total # of Prescriptions Filled: 1   The following medications were delivered to the patient:  Carvedilol 25 mg    Additional Documentation:   Delivered to Silva at the desk

## 2024-10-16 NOTE — PROGRESS NOTES
[facility]  Neuro Inpatient Follow Up        Chuck Chavarria Jr. is a 74 y.o. 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 Recently hospitalized in Sept for similar complaints.    Urine tox +cocaine and has hx of abuse     Stroke alert on 10/14 for AMS--the nurse checked on him he was lethargic, unable to answer questions or follow any commands. He improved in CT concerning for postictal behavior. CT negative    MRI brain and EEG negative.     Subjective:     He is lying in bed awake, alert, and follows all commands.  No further seizure-like activity.  No other new neuro complaints.  No family present.    No chest pain or palpitations  No SOB  No vertigo, lightheadedness or loss of consciousness  No falls, tripping or stumbling  No incontinence of bowels or bladder  No itching or bruising appreciated  No numbness, tingling or focal arm/leg weakness  No speech or swallowing troubles    ROS otherwise negative     Current Facility-Administered Medications   Medication Dose Route Frequency Provider Last Rate Last Admin    pantoprazole (PROTONIX) tablet 40 mg  40 mg Oral BID  Huseyin Rosenberg MD   40 mg at 10/16/24 0813    gadobenate dimeglumine (MULTIHANCE) injection 15 mL  15 mL IntraVENous ONCE PRN Patel Hendricks DO        hydrOXYzine (VISTARIL) injection 25 mg  25 mg IntraMUSCular Q6H PRN Viki Wray MD   25 mg at 10/11/24 2025    hydrALAZINE (APRESOLINE) tablet 100 mg  100 mg Oral TID Swetha Severino, APRN - CNS   100 mg at 10/16/24 0813    carvedilol (COREG) tablet 25 mg  25 mg Oral BID  Swetha Severino, APRN - CNS   25 mg at 10/16/24 0813     white matter ischemic changes and areas of hypodensity in   the left basal ganglia and caudate and right basal ganglia, most likely old   infarcts.  If clinically indicated, follow-up MRI of the brain could be   performed for further evaluation.   3. No definite hypoperfusion or perfusion mismatch seen.  The area of parent   abnormality noted on the cord dated analysis images is felt to be most likely   artifactual as discussed above.   4. Irregular calcified plaque about both carotid bifurcations and proximal   ICAs.  There is about 60% stenosis of both proximal ICAs by NASCET criteria.     EEG 10/15  Clinical Interpretation:   This abnormal study showed evidence of:  A mild nonspecific encephalopathy  No seizures or epileptiform discharges were noted during this study.     MRI brain WO  IMPRESSION:  1. Significantly limited exam due to motion.  2. No acute intracranial ischemia.  3. No gross intracranial hemorrhage or edema.     All labs and imaging studies reviewed independently today     Assessment:     Altered mental status: in the setting of acute respiratory failure and cocaine abuse-- Metabolic encephalopathy versus provoked seizure.  No convincing evidence to support underlying epilepsy and no further seizure-like spells.    Plan:     -Continue to monitor off antisz medications  -Discussed cocaine cessation    Will sign off--call or reconsult with new issues    JASSI Mcintosh - CNP  9:17 AM  10/16/2024

## 2024-10-18 ENCOUNTER — HOSPITAL ENCOUNTER (INPATIENT)
Age: 74
LOS: 1 days | Discharge: HOME | DRG: 140 | End: 2024-10-21
Attending: EMERGENCY MEDICINE | Admitting: STUDENT IN AN ORGANIZED HEALTH CARE EDUCATION/TRAINING PROGRAM
Payer: COMMERCIAL

## 2024-10-18 ENCOUNTER — APPOINTMENT (OUTPATIENT)
Dept: GENERAL RADIOLOGY | Age: 74
DRG: 140 | End: 2024-10-18
Payer: COMMERCIAL

## 2024-10-18 DIAGNOSIS — I48.91 ATRIAL FIBRILLATION, UNSPECIFIED TYPE (HCC): ICD-10-CM

## 2024-10-18 DIAGNOSIS — J44.1 COPD EXACERBATION (HCC): Primary | ICD-10-CM

## 2024-10-18 DIAGNOSIS — J96.01 ACUTE RESPIRATORY FAILURE WITH HYPOXIA: ICD-10-CM

## 2024-10-18 LAB
ALBUMIN SERPL-MCNC: 3.1 G/DL (ref 3.5–5.2)
ALP SERPL-CCNC: 68 U/L (ref 40–129)
ALT SERPL-CCNC: 32 U/L (ref 0–40)
ANION GAP SERPL CALCULATED.3IONS-SCNC: 4 MMOL/L (ref 7–16)
AST SERPL-CCNC: 48 U/L (ref 0–39)
B PARAP IS1001 DNA NPH QL NAA+NON-PROBE: NOT DETECTED
B PERT DNA SPEC QL NAA+PROBE: NOT DETECTED
BASOPHILS # BLD: 0.01 K/UL (ref 0–0.2)
BASOPHILS NFR BLD: 0 % (ref 0–2)
BILIRUB SERPL-MCNC: 0.3 MG/DL (ref 0–1.2)
BNP SERPL-MCNC: ABNORMAL PG/ML (ref 0–450)
BUN SERPL-MCNC: 25 MG/DL (ref 6–23)
C PNEUM DNA NPH QL NAA+NON-PROBE: NOT DETECTED
CALCIUM SERPL-MCNC: 9.3 MG/DL (ref 8.6–10.2)
CHLORIDE SERPL-SCNC: 104 MMOL/L (ref 98–107)
CO2 SERPL-SCNC: 35 MMOL/L (ref 22–29)
CREAT SERPL-MCNC: 1.1 MG/DL (ref 0.7–1.2)
EOSINOPHIL # BLD: 0.39 K/UL (ref 0.05–0.5)
EOSINOPHILS RELATIVE PERCENT: 4 % (ref 0–6)
ERYTHROCYTE [DISTWIDTH] IN BLOOD BY AUTOMATED COUNT: 16.7 % (ref 11.5–15)
FLUAV RNA NPH QL NAA+NON-PROBE: NOT DETECTED
FLUBV RNA NPH QL NAA+NON-PROBE: NOT DETECTED
GFR, ESTIMATED: 74 ML/MIN/1.73M2
GLUCOSE SERPL-MCNC: 98 MG/DL (ref 74–99)
HADV DNA NPH QL NAA+NON-PROBE: NOT DETECTED
HCOV 229E RNA NPH QL NAA+NON-PROBE: NOT DETECTED
HCOV HKU1 RNA NPH QL NAA+NON-PROBE: NOT DETECTED
HCOV NL63 RNA NPH QL NAA+NON-PROBE: NOT DETECTED
HCOV OC43 RNA NPH QL NAA+NON-PROBE: NOT DETECTED
HCT VFR BLD AUTO: 32.1 % (ref 37–54)
HGB BLD-MCNC: 10 G/DL (ref 12.5–16.5)
HMPV RNA NPH QL NAA+NON-PROBE: NOT DETECTED
HPIV1 RNA NPH QL NAA+NON-PROBE: NOT DETECTED
HPIV2 RNA NPH QL NAA+NON-PROBE: NOT DETECTED
HPIV3 RNA NPH QL NAA+NON-PROBE: NOT DETECTED
HPIV4 RNA NPH QL NAA+NON-PROBE: NOT DETECTED
IMM GRANULOCYTES # BLD AUTO: 0.08 K/UL (ref 0–0.58)
IMM GRANULOCYTES NFR BLD: 1 % (ref 0–5)
LYMPHOCYTES NFR BLD: 1.01 K/UL (ref 1.5–4)
LYMPHOCYTES RELATIVE PERCENT: 10 % (ref 20–42)
M PNEUMO DNA NPH QL NAA+NON-PROBE: NOT DETECTED
MCH RBC QN AUTO: 28.3 PG (ref 26–35)
MCHC RBC AUTO-ENTMCNC: 31.2 G/DL (ref 32–34.5)
MCV RBC AUTO: 90.9 FL (ref 80–99.9)
MONOCYTES NFR BLD: 1.24 K/UL (ref 0.1–0.95)
MONOCYTES NFR BLD: 12 % (ref 2–12)
NEUTROPHILS NFR BLD: 73 % (ref 43–80)
NEUTS SEG NFR BLD: 7.36 K/UL (ref 1.8–7.3)
PLATELET # BLD AUTO: 188 K/UL (ref 130–450)
PMV BLD AUTO: 10.5 FL (ref 7–12)
POTASSIUM SERPL-SCNC: 4.2 MMOL/L (ref 3.5–5)
PROT SERPL-MCNC: 6.2 G/DL (ref 6.4–8.3)
RBC # BLD AUTO: 3.53 M/UL (ref 3.8–5.8)
RSV RNA NPH QL NAA+NON-PROBE: NOT DETECTED
RV+EV RNA NPH QL NAA+NON-PROBE: NOT DETECTED
SARS-COV-2 RNA NPH QL NAA+NON-PROBE: NOT DETECTED
SODIUM SERPL-SCNC: 143 MMOL/L (ref 132–146)
SPECIMEN DESCRIPTION: NORMAL
TROPONIN I SERPL HS-MCNC: 427 NG/L (ref 0–11)
TROPONIN I SERPL HS-MCNC: 457 NG/L (ref 0–11)
WBC OTHER # BLD: 10.1 K/UL (ref 4.5–11.5)

## 2024-10-18 PROCEDURE — 96375 TX/PRO/DX INJ NEW DRUG ADDON: CPT

## 2024-10-18 PROCEDURE — 94664 DEMO&/EVAL PT USE INHALER: CPT

## 2024-10-18 PROCEDURE — 6360000002 HC RX W HCPCS: Performed by: STUDENT IN AN ORGANIZED HEALTH CARE EDUCATION/TRAINING PROGRAM

## 2024-10-18 PROCEDURE — 84484 ASSAY OF TROPONIN QUANT: CPT

## 2024-10-18 PROCEDURE — 2700000000 HC OXYGEN THERAPY PER DAY

## 2024-10-18 PROCEDURE — 6370000000 HC RX 637 (ALT 250 FOR IP)

## 2024-10-18 PROCEDURE — 80053 COMPREHEN METABOLIC PANEL: CPT

## 2024-10-18 PROCEDURE — 85025 COMPLETE CBC W/AUTO DIFF WBC: CPT

## 2024-10-18 PROCEDURE — 99221 1ST HOSP IP/OBS SF/LOW 40: CPT | Performed by: STUDENT IN AN ORGANIZED HEALTH CARE EDUCATION/TRAINING PROGRAM

## 2024-10-18 PROCEDURE — 83880 ASSAY OF NATRIURETIC PEPTIDE: CPT

## 2024-10-18 PROCEDURE — 96374 THER/PROPH/DIAG INJ IV PUSH: CPT

## 2024-10-18 PROCEDURE — 94640 AIRWAY INHALATION TREATMENT: CPT

## 2024-10-18 PROCEDURE — 2580000003 HC RX 258: Performed by: STUDENT IN AN ORGANIZED HEALTH CARE EDUCATION/TRAINING PROGRAM

## 2024-10-18 PROCEDURE — 96372 THER/PROPH/DIAG INJ SC/IM: CPT

## 2024-10-18 PROCEDURE — 6370000000 HC RX 637 (ALT 250 FOR IP): Performed by: STUDENT IN AN ORGANIZED HEALTH CARE EDUCATION/TRAINING PROGRAM

## 2024-10-18 PROCEDURE — 71045 X-RAY EXAM CHEST 1 VIEW: CPT

## 2024-10-18 PROCEDURE — G0378 HOSPITAL OBSERVATION PER HR: HCPCS

## 2024-10-18 PROCEDURE — 96376 TX/PRO/DX INJ SAME DRUG ADON: CPT

## 2024-10-18 PROCEDURE — 6360000002 HC RX W HCPCS

## 2024-10-18 PROCEDURE — 99285 EMERGENCY DEPT VISIT HI MDM: CPT

## 2024-10-18 PROCEDURE — 93005 ELECTROCARDIOGRAM TRACING: CPT | Performed by: EMERGENCY MEDICINE

## 2024-10-18 PROCEDURE — 0202U NFCT DS 22 TRGT SARS-COV-2: CPT

## 2024-10-18 PROCEDURE — 2580000003 HC RX 258

## 2024-10-18 RX ORDER — FUROSEMIDE 10 MG/ML
40 INJECTION INTRAMUSCULAR; INTRAVENOUS ONCE
Status: COMPLETED | OUTPATIENT
Start: 2024-10-18 | End: 2024-10-18

## 2024-10-18 RX ORDER — ATORVASTATIN CALCIUM 40 MG/1
40 TABLET, FILM COATED ORAL DAILY
Status: DISCONTINUED | OUTPATIENT
Start: 2024-10-18 | End: 2024-10-21 | Stop reason: HOSPADM

## 2024-10-18 RX ORDER — IPRATROPIUM BROMIDE AND ALBUTEROL SULFATE 2.5; .5 MG/3ML; MG/3ML
1 SOLUTION RESPIRATORY (INHALATION)
Status: DISCONTINUED | OUTPATIENT
Start: 2024-10-18 | End: 2024-10-21 | Stop reason: HOSPADM

## 2024-10-18 RX ORDER — GUAIFENESIN/DEXTROMETHORPHAN 100-10MG/5
5 SYRUP ORAL EVERY 4 HOURS PRN
Status: DISCONTINUED | OUTPATIENT
Start: 2024-10-18 | End: 2024-10-21 | Stop reason: HOSPADM

## 2024-10-18 RX ORDER — PANTOPRAZOLE SODIUM 40 MG/1
40 TABLET, DELAYED RELEASE ORAL
Status: DISCONTINUED | OUTPATIENT
Start: 2024-10-19 | End: 2024-10-21 | Stop reason: HOSPADM

## 2024-10-18 RX ORDER — ISOSORBIDE MONONITRATE 30 MG/1
30 TABLET, EXTENDED RELEASE ORAL DAILY
Status: DISCONTINUED | OUTPATIENT
Start: 2024-10-18 | End: 2024-10-21 | Stop reason: HOSPADM

## 2024-10-18 RX ORDER — ASPIRIN 81 MG/1
81 TABLET ORAL DAILY
Status: DISCONTINUED | OUTPATIENT
Start: 2024-10-18 | End: 2024-10-21 | Stop reason: HOSPADM

## 2024-10-18 RX ORDER — ACETAMINOPHEN 325 MG/1
650 TABLET ORAL EVERY 6 HOURS PRN
Status: DISCONTINUED | OUTPATIENT
Start: 2024-10-18 | End: 2024-10-21 | Stop reason: HOSPADM

## 2024-10-18 RX ORDER — POLYETHYLENE GLYCOL 3350 17 G/17G
17 POWDER, FOR SOLUTION ORAL DAILY PRN
Status: DISCONTINUED | OUTPATIENT
Start: 2024-10-18 | End: 2024-10-21 | Stop reason: HOSPADM

## 2024-10-18 RX ORDER — SODIUM CHLORIDE 0.9 % (FLUSH) 0.9 %
5-40 SYRINGE (ML) INJECTION PRN
Status: DISCONTINUED | OUTPATIENT
Start: 2024-10-18 | End: 2024-10-21 | Stop reason: HOSPADM

## 2024-10-18 RX ORDER — IPRATROPIUM BROMIDE AND ALBUTEROL SULFATE 2.5; .5 MG/3ML; MG/3ML
3 SOLUTION RESPIRATORY (INHALATION) ONCE
Status: COMPLETED | OUTPATIENT
Start: 2024-10-18 | End: 2024-10-18

## 2024-10-18 RX ORDER — ACETAMINOPHEN 650 MG/1
650 SUPPOSITORY RECTAL EVERY 6 HOURS PRN
Status: DISCONTINUED | OUTPATIENT
Start: 2024-10-18 | End: 2024-10-21 | Stop reason: HOSPADM

## 2024-10-18 RX ORDER — SODIUM CHLORIDE 0.9 % (FLUSH) 0.9 %
5-40 SYRINGE (ML) INJECTION EVERY 12 HOURS SCHEDULED
Status: DISCONTINUED | OUTPATIENT
Start: 2024-10-18 | End: 2024-10-21 | Stop reason: HOSPADM

## 2024-10-18 RX ORDER — CARVEDILOL 25 MG/1
25 TABLET ORAL 2 TIMES DAILY WITH MEALS
Status: DISCONTINUED | OUTPATIENT
Start: 2024-10-18 | End: 2024-10-21 | Stop reason: HOSPADM

## 2024-10-18 RX ORDER — ONDANSETRON 4 MG/1
4 TABLET, ORALLY DISINTEGRATING ORAL EVERY 8 HOURS PRN
Status: DISCONTINUED | OUTPATIENT
Start: 2024-10-18 | End: 2024-10-21 | Stop reason: HOSPADM

## 2024-10-18 RX ORDER — SODIUM CHLORIDE 9 MG/ML
INJECTION, SOLUTION INTRAVENOUS PRN
Status: DISCONTINUED | OUTPATIENT
Start: 2024-10-18 | End: 2024-10-21 | Stop reason: HOSPADM

## 2024-10-18 RX ORDER — NICOTINE 21 MG/24HR
1 PATCH, TRANSDERMAL 24 HOURS TRANSDERMAL DAILY
Status: DISCONTINUED | OUTPATIENT
Start: 2024-10-18 | End: 2024-10-21 | Stop reason: HOSPADM

## 2024-10-18 RX ORDER — MECOBALAMIN 5000 MCG
5 TABLET,DISINTEGRATING ORAL NIGHTLY PRN
Status: DISCONTINUED | OUTPATIENT
Start: 2024-10-18 | End: 2024-10-21 | Stop reason: HOSPADM

## 2024-10-18 RX ORDER — BENZONATATE 100 MG/1
100 CAPSULE ORAL 3 TIMES DAILY PRN
Status: DISCONTINUED | OUTPATIENT
Start: 2024-10-18 | End: 2024-10-21 | Stop reason: HOSPADM

## 2024-10-18 RX ORDER — ONDANSETRON 2 MG/ML
4 INJECTION INTRAMUSCULAR; INTRAVENOUS EVERY 6 HOURS PRN
Status: DISCONTINUED | OUTPATIENT
Start: 2024-10-18 | End: 2024-10-21 | Stop reason: HOSPADM

## 2024-10-18 RX ORDER — ENOXAPARIN SODIUM 100 MG/ML
40 INJECTION SUBCUTANEOUS DAILY
Status: DISCONTINUED | OUTPATIENT
Start: 2024-10-18 | End: 2024-10-18

## 2024-10-18 RX ORDER — PREDNISONE 20 MG/1
40 TABLET ORAL DAILY
Status: DISCONTINUED | OUTPATIENT
Start: 2024-10-21 | End: 2024-10-21 | Stop reason: HOSPADM

## 2024-10-18 RX ADMIN — ARFORMOTEROL TARTRATE: 15 SOLUTION RESPIRATORY (INHALATION) at 21:06

## 2024-10-18 RX ADMIN — ENOXAPARIN SODIUM 40 MG: 100 INJECTION SUBCUTANEOUS at 18:38

## 2024-10-18 RX ADMIN — IPRATROPIUM BROMIDE AND ALBUTEROL SULFATE 1 DOSE: 2.5; .5 SOLUTION RESPIRATORY (INHALATION) at 21:07

## 2024-10-18 RX ADMIN — METHYLPREDNISOLONE SODIUM SUCCINATE 125 MG: 125 INJECTION INTRAMUSCULAR; INTRAVENOUS at 09:37

## 2024-10-18 RX ADMIN — FUROSEMIDE 40 MG: 10 INJECTION, SOLUTION INTRAMUSCULAR; INTRAVENOUS at 18:34

## 2024-10-18 RX ADMIN — IPRATROPIUM BROMIDE AND ALBUTEROL SULFATE 3 DOSE: 2.5; .5 SOLUTION RESPIRATORY (INHALATION) at 10:26

## 2024-10-18 RX ADMIN — APIXABAN 5 MG: 5 TABLET, FILM COATED ORAL at 21:04

## 2024-10-18 RX ADMIN — ISOSORBIDE MONONITRATE 30 MG: 30 TABLET, EXTENDED RELEASE ORAL at 21:04

## 2024-10-18 RX ADMIN — ATORVASTATIN CALCIUM 40 MG: 40 TABLET, FILM COATED ORAL at 21:05

## 2024-10-18 RX ADMIN — SODIUM CHLORIDE, PRESERVATIVE FREE 10 ML: 5 INJECTION INTRAVENOUS at 21:04

## 2024-10-18 RX ADMIN — IPRATROPIUM BROMIDE AND ALBUTEROL SULFATE 1 DOSE: 2.5; .5 SOLUTION RESPIRATORY (INHALATION) at 16:46

## 2024-10-18 RX ADMIN — CARVEDILOL 25 MG: 25 TABLET, FILM COATED ORAL at 21:04

## 2024-10-18 RX ADMIN — WATER 40 MG: 1 INJECTION INTRAMUSCULAR; INTRAVENOUS; SUBCUTANEOUS at 18:34

## 2024-10-18 RX ADMIN — HYDRALAZINE HYDROCHLORIDE 75 MG: 50 TABLET ORAL at 21:03

## 2024-10-18 RX ADMIN — ASPIRIN 81 MG: 81 TABLET, COATED ORAL at 21:06

## 2024-10-18 RX ADMIN — WATER 40 MG: 1 INJECTION INTRAMUSCULAR; INTRAVENOUS; SUBCUTANEOUS at 21:06

## 2024-10-18 ASSESSMENT — PAIN SCALES - GENERAL
PAINLEVEL_OUTOF10: 0
PAINLEVEL_OUTOF10: 0

## 2024-10-18 ASSESSMENT — PAIN - FUNCTIONAL ASSESSMENT: PAIN_FUNCTIONAL_ASSESSMENT: NONE - DENIES PAIN

## 2024-10-18 NOTE — H&P
Hospitalist History & Physical      PCP: Gunner Espinosa MD    Date of Admission: 10/18/2024    Date of Service: Pt seen/examined on 10/18/2024 and is admitted to Inpatient with expected LOS greater than two midnights due to medical therapy.      Placed in Observation.    Chief Complaint:  shortness of breath    History Of Present Illness:  75 YO male patient with medical history as outlined who presented to the ER for shortness of breath since this morning, worsening, associated with wheezing. He received one breathing treatment by EMS. Continues to smokes.   Patient was discharged couple days ago after he was treated for COPD exacerbation, CHF.   Patient denies chest pain, back pain, abdominal pain, nausea, vomiting, fevers, chills.  Having some cough but no sputum.    On evaluation he is afebrile, /100 he HR 79 RR 18 SpO2 100% on 2 L nasal cannula.  Labs with normal WBC, proBNP 10,000, troponin 457 and repeat 427, BUN 25, creatinine 1.1.  Patient was given Solu-Medrol 125 mg IV once, DuoNebs x 3 but continued to have shortness of breath and wheezing per ER evaluation.  [facility] was called [facility].  Case was discussed with ER provider.          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 2. Small right pleural effusion.     CT HEAD WO CONTRAST    Result Date: 10/13/2024  EXAMINATION: CTA OF THE HEAD WITH CONTRAST WITH PERFUSION; CTA OF THE NECK; CTA OF THE HEAD WITH CONTRAST; CT OF THE HEAD WITHOUT CONTRAST 10/13/2024 12:10 pm: TECHNIQUE: CTA of the head/brain was performed with the administration of intravenous contrast. Multiplanar reformatted images are provided for review.  MIP images are provided for review. Automated exposure control, iterative reconstruction, and/or weight based adjustment of the mA/kV was utilized to reduce the radiation dose to as low as reasonably achievable.; CTA of the neck was performed with the administration of intravenous contrast. Multiplanar reformatted images are provided for review.  MIP images are provided for review. Stenosis of the internal carotid arteries measured using NASCET criteria. Automated exposure control, iterative reconstruction, and/or weight based adjustment of the mA/kV was utilized to reduce the radiation dose to as low as reasonably achievable.; CT of the head was performed without the administration of intravenous contrast. Automated exposure control, iterative reconstruction, and/or weight based adjustment of the mA/kV was utilized to reduce the radiation dose to as low as reasonably achievable. Noncontrast CT of the head with reconstructed 2-D images are also provided for review. COMPARISON: Head CT, CTA head and perfusion study dated 01/17/2024 HISTORY: ORDERING SYSTEM PROVIDED HISTORY: AMS TECHNOLOGIST PROVIDED HISTORY: Reason for exam:->AMS What reading provider will be dictating this exam?->CRC; ORDERING SYSTEM PROVIDED HISTORY: AMS TECHNOLOGIST PROVIDED HISTORY: Reason for exam:->AMS Has a \"code stroke\" or \"stroke alert\" been called?->Yes What reading provider will be dictating this exam?->CRC FINDINGS: CT HEAD: BRAIN/VENTRICLES:  No acute intracranial hemorrhage or extraaxial fluid collection.  administration of intravenous contrast. Multiplanar reformatted images are provided for review.  MIP images are provided for review. Automated exposure control, iterative reconstruction, and/or weight based adjustment of the mA/kV was utilized to reduce the radiation dose to as low as reasonably achievable.; CTA of the neck was performed with the administration of intravenous contrast. Multiplanar reformatted images are provided for review.  MIP images are provided for review. Stenosis of the internal carotid arteries measured using NASCET criteria. Automated exposure control, iterative reconstruction, and/or weight based adjustment of the mA/kV was utilized to reduce the radiation dose to as low as reasonably achievable.; CT of the head was performed without the administration of intravenous contrast. Automated exposure control, iterative reconstruction, and/or weight based adjustment of the mA/kV was utilized to reduce the radiation dose to as low as reasonably achievable. Noncontrast CT of the head with reconstructed 2-D images are also provided for review. COMPARISON: Head CT, CTA head and perfusion study dated 01/17/2024 HISTORY: ORDERING SYSTEM PROVIDED HISTORY: AMS TECHNOLOGIST PROVIDED HISTORY: Reason for exam:->AMS What reading provider will be dictating this exam?->CRC; ORDERING SYSTEM PROVIDED HISTORY: AMS TECHNOLOGIST PROVIDED HISTORY: Reason for exam:->AMS Has a \"code stroke\" or \"stroke alert\" been called?->Yes What reading provider will be dictating this exam?->CRC FINDINGS: CT HEAD: BRAIN/VENTRICLES:  No acute intracranial hemorrhage or extraaxial fluid collection. Grey-white differentiation is maintained.  No evidence of mass, mass effect or midline shift.  No evidence of hydrocephalus.  There is a small area of low density involving the left basal ganglia and caudate, as well as small area of low-density in the right basal ganglia, similar compared to the prior study and most likely related to old  Cardiac silhouette is within normal limits. Pulmonary venous congestion is noted.  There are bilateral interstitial changes suspicious for pulmonary edema.  No pneumothorax is identified. Bony structures are unremarkable.     Pulmonary venous congestion with bilateral interstitial changes suspicious for pulmonary edema.       ASSESSMENT AND PLAN:    COPD exacerbation: Solu-Medrol follow-up by prednisone, DuoNebs while awake every 4 hours, Brovana, Pulmicort, Robitussin, benzonatate, encourage ambulation, incentive spirometry.  Anticipated discharge tomorrow  Mild acute on chronic diastolic congestive heart failure: He is proBNP 30,000. 5 K less from a week ago.  Lasix 40 mg IV once.  Continue to monitor hemodynamic status.  Resume home meds  Stage II CKD  Hyperlipidemia  Hypertension  Smoking  A-fib on Eliquis    Continue other home medications as indicated              Diet: ADULT DIET; Regular; Low Sodium (2 gm); High Fiber; 1500 ml  Code Status: Full Code  Surrogate decision maker confirmed with patient:   Extended Emergency Contact Information  Primary Emergency Contact: Ashia Tillman  Home Phone: [phone]  Mobile Phone: [phone]  Relation: Brother/Sister  Secondary Emergency Contact: Elena Tillman  Home Phone: [phone]  Mobile Phone: [phone]  Relation: Other    DVT Prophylaxis: []Lovenox []Heparin []PCD [x] Warfarin/NOAC []Encouraged ambulation  Disposition: [x]Med/Surg [] Intermediate [] ICU/CCU  Admit status: [] Observation [x] Inpatient     +++++++++++++++++++++++++++++++++++++++++++++++++  Maria Marino Milanes, MD  [facility]  [place], [place]  +++++++++++++++++++++++++++++++++++++++++++++++++  NOTE: This report was transcribed using voice recognition software. Every effort was made to ensure accuracy; however, inadvertent computerized transcription errors may be present.

## 2024-10-18 NOTE — PROGRESS NOTES
Patient arrived to room 8208A via stretcher from ED. Resp easy and reg on with O2 on at 3LNC at rest. Pulse ox 99%. Denies pain or other c/o's. Oriented to unit procedures, call bell use, and patient safety processes. Verbalizes understanding.

## 2024-10-18 NOTE — ED PROVIDER NOTES
[facility]  EMERGENCY DEPARTMENT ENCOUNTER        Pt Name: Chuck Chavarria Jr.  MRN: [id number]  Birthdate 1950  Date of evaluation: 10/18/2024  Provider: Oleg De La Cruz MD  PCP: Gunner Espinosa MD  Note Started: 9:21 AM EDT 10/18/24    CHIEF COMPLAINT       Chief Complaint   Patient presents with    Shortness of Breath     Patient c/o SOB and wheezing x this am , patient was given 1 duoneb treatment per EMS .        HISTORY OF PRESENT ILLNESS: 1 or more Elements   History From: Patient    Limitations to history : None  Social Determinants : None    Chuck Chavarria Jr. is a 74 y.o. male with past medical history of A-fib, hypertension, hyperlipidemia, COPD who presents to the emergency department complaints of shortness of breath.  Patient states that symptoms started earlier in the morning and been getting worse.  Patient stated that he continues to smoke, does not use any oxygen.  Patient states that nothing seems to be making symptoms better or worse.  He denies any fever or chills.  Patient does endorse some slight chest pain that does not radiate.  Patient denies any recent travel history, or any contact with anyone sick.    Nursing Notes were all reviewed and agreed with or any disagreements were addressed in the HPI.    ROS:   Pertinent positives and negatives are stated within HPI, all other systems reviewed and are negative.      --------------------------------------------- PAST HISTORY ---------------------------------------------  Past Medical History:  has a past medical history of A-fib (HCC), Arthritis, Bilateral carotid artery stenosis, Cataract, left eye, Cerebral infarction due to occlusion of right vertebral artery (HCC), Cocaine abuse (HCC), COPD (chronic obstructive pulmonary disease) (HCC), Hyperlipidemia, Hypertension, Occlusion of right vertebral artery, and Tobacco dependence.    Past Surgical History:  has a past surgical history that includes Dental surgery;  limited exam due to motion. 2. No acute intracranial ischemia. 3. No gross intracranial hemorrhage or edema.     XR CHEST PORTABLE    Result Date: 10/13/2024  EXAMINATION: ONE XRAY VIEW OF THE CHEST 10/13/2024 12:32 pm COMPARISON: 10/12/2024 HISTORY: ORDERING SYSTEM PROVIDED HISTORY: dyspnea TECHNOLOGIST PROVIDED HISTORY: Reason for exam:->dyspnea FINDINGS: There is increased patchy parenchymal density at the right lung base concerning for pneumonia and or atelectasis.  There is blunting of the costophrenic angle at this time suggestive of a small effusion. There are coarse interstitial markings within the lungs concerning for underlying fibrotic disease.  The overall appearance is process is stable. The contour of the mediastinum and heart size are stable.     1. Increased right basilar parenchymal density concerning for pneumonia and/or atelectasis. 2. Small right pleural effusion.     CT HEAD WO CONTRAST    Result Date: 10/13/2024  EXAMINATION: CTA OF THE HEAD WITH CONTRAST WITH PERFUSION; CTA OF THE NECK; CTA OF THE HEAD WITH CONTRAST; CT OF THE HEAD WITHOUT CONTRAST 10/13/2024 12:10 pm: TECHNIQUE: CTA of the head/brain was performed with the administration of intravenous contrast. Multiplanar reformatted images are provided for review.  MIP images are provided for review. Automated exposure control, iterative reconstruction, and/or weight based adjustment of the mA/kV was utilized to reduce the radiation dose to as low as reasonably achievable.; CTA of the neck was performed with the administration of intravenous contrast. Multiplanar reformatted images are provided for review.  MIP images are provided for review. Stenosis of the internal carotid arteries measured using NASCET criteria. Automated exposure control, iterative reconstruction, and/or weight based adjustment of the mA/kV was utilized to reduce the radiation dose to as low as reasonably achievable.; CT of the head was performed without the  administration of intravenous contrast. Automated exposure control, iterative reconstruction, and/or weight based adjustment of the mA/kV was utilized to reduce the radiation dose to as low as reasonably achievable. Noncontrast CT of the head with reconstructed 2-D images are also provided for review. COMPARISON: Head CT, CTA head and perfusion study dated 01/17/2024 HISTORY: ORDERING SYSTEM PROVIDED HISTORY: AMS TECHNOLOGIST PROVIDED HISTORY: Reason for exam:->AMS What reading provider will be dictating this exam?->CRC; ORDERING SYSTEM PROVIDED HISTORY: AMS TECHNOLOGIST PROVIDED HISTORY: Reason for exam:->AMS Has a \"code stroke\" or \"stroke alert\" been called?->Yes What reading provider will be dictating this exam?->CRC FINDINGS: CT HEAD: BRAIN/VENTRICLES:  No acute intracranial hemorrhage or extraaxial fluid collection. Grey-white differentiation is maintained.  No evidence of mass, mass effect or midline shift.  No evidence of hydrocephalus.  There is a small area of low density involving the left basal ganglia and caudate, as well as small area of low-density in the right basal ganglia, similar compared to the prior study and most likely related to old infarct.  Patchy low density is seen in the deep white matter bilaterally, consistent with chronic microvascular ischemic disease, similar compared to the prior study. ORBITS: The visualized portion of the orbits demonstrate no acute abnormality. SINUSES:  The visualized paranasal sinuses and mastoid air cells demonstrate no acute abnormality.  There is a small retention cyst in the right sphenoid sinus. SOFT TISSUES/SKULL: No acute abnormality of the visualized skull or soft tissues. CTA NECK: AORTIC ARCH/ARCH VESSELS: No dissection or arterial injury.  No significant stenosis of the brachiocephalic or subclavian arteries. CAROTID ARTERIES: There is irregular calcified plaque about both carotid bifurcations and proximal ICAs.  There is about 60% stenosis of the

## 2024-10-19 LAB
AMPHET UR QL SCN: NEGATIVE
ANION GAP SERPL CALCULATED.3IONS-SCNC: 13 MMOL/L (ref 7–16)
BARBITURATES UR QL SCN: NEGATIVE
BASOPHILS # BLD: 0 K/UL (ref 0–0.2)
BASOPHILS NFR BLD: 0 % (ref 0–2)
BENZODIAZ UR QL: NEGATIVE
BUN SERPL-MCNC: 39 MG/DL (ref 6–23)
BUPRENORPHINE UR QL: NEGATIVE
CALCIUM SERPL-MCNC: 8.8 MG/DL (ref 8.6–10.2)
CANNABINOIDS UR QL SCN: NEGATIVE
CHLORIDE SERPL-SCNC: 97 MMOL/L (ref 98–107)
CO2 SERPL-SCNC: 29 MMOL/L (ref 22–29)
COCAINE UR QL SCN: NEGATIVE
CREAT SERPL-MCNC: 1.2 MG/DL (ref 0.7–1.2)
EOSINOPHIL # BLD: 0 K/UL (ref 0.05–0.5)
EOSINOPHILS RELATIVE PERCENT: 0 % (ref 0–6)
ERYTHROCYTE [DISTWIDTH] IN BLOOD BY AUTOMATED COUNT: 16.7 % (ref 11.5–15)
FENTANYL UR QL: NEGATIVE
GFR, ESTIMATED: 65 ML/MIN/1.73M2
GLUCOSE SERPL-MCNC: 149 MG/DL (ref 74–99)
HCT VFR BLD AUTO: 32.1 % (ref 37–54)
HGB BLD-MCNC: 10.3 G/DL (ref 12.5–16.5)
LYMPHOCYTES NFR BLD: 0.89 K/UL (ref 1.5–4)
LYMPHOCYTES RELATIVE PERCENT: 12 % (ref 20–42)
MAGNESIUM SERPL-MCNC: 1.6 MG/DL (ref 1.6–2.6)
MCH RBC QN AUTO: 28.6 PG (ref 26–35)
MCHC RBC AUTO-ENTMCNC: 32.1 G/DL (ref 32–34.5)
MCV RBC AUTO: 89.2 FL (ref 80–99.9)
METHADONE UR QL: NEGATIVE
MONOCYTES NFR BLD: 0.37 K/UL (ref 0.1–0.95)
MONOCYTES NFR BLD: 5 % (ref 2–12)
NEUTROPHILS NFR BLD: 83 % (ref 43–80)
NEUTS SEG NFR BLD: 6.14 K/UL (ref 1.8–7.3)
OPIATES UR QL SCN: NEGATIVE
OXYCODONE UR QL SCN: NEGATIVE
PCP UR QL SCN: NEGATIVE
PHOSPHATE SERPL-MCNC: 3.8 MG/DL (ref 2.5–4.5)
PLATELET # BLD AUTO: 190 K/UL (ref 130–450)
PMV BLD AUTO: 10.5 FL (ref 7–12)
POTASSIUM SERPL-SCNC: 3.9 MMOL/L (ref 3.5–5)
RBC # BLD AUTO: 3.6 M/UL (ref 3.8–5.8)
RBC # BLD: ABNORMAL 10*6/UL
SODIUM SERPL-SCNC: 139 MMOL/L (ref 132–146)
TEST INFORMATION: NORMAL
WBC OTHER # BLD: 7.4 K/UL (ref 4.5–11.5)

## 2024-10-19 PROCEDURE — 99232 SBSQ HOSP IP/OBS MODERATE 35: CPT | Performed by: STUDENT IN AN ORGANIZED HEALTH CARE EDUCATION/TRAINING PROGRAM

## 2024-10-19 PROCEDURE — G0378 HOSPITAL OBSERVATION PER HR: HCPCS

## 2024-10-19 PROCEDURE — 2700000000 HC OXYGEN THERAPY PER DAY

## 2024-10-19 PROCEDURE — 6370000000 HC RX 637 (ALT 250 FOR IP): Performed by: STUDENT IN AN ORGANIZED HEALTH CARE EDUCATION/TRAINING PROGRAM

## 2024-10-19 PROCEDURE — 96366 THER/PROPH/DIAG IV INF ADDON: CPT

## 2024-10-19 PROCEDURE — 6360000002 HC RX W HCPCS: Performed by: STUDENT IN AN ORGANIZED HEALTH CARE EDUCATION/TRAINING PROGRAM

## 2024-10-19 PROCEDURE — 99223 1ST HOSP IP/OBS HIGH 75: CPT | Performed by: INTERNAL MEDICINE

## 2024-10-19 PROCEDURE — 94640 AIRWAY INHALATION TREATMENT: CPT

## 2024-10-19 PROCEDURE — 87449 NOS EACH ORGANISM AG IA: CPT

## 2024-10-19 PROCEDURE — 83735 ASSAY OF MAGNESIUM: CPT

## 2024-10-19 PROCEDURE — 87899 AGENT NOS ASSAY W/OPTIC: CPT

## 2024-10-19 PROCEDURE — 84100 ASSAY OF PHOSPHORUS: CPT

## 2024-10-19 PROCEDURE — 80307 DRUG TEST PRSMV CHEM ANLYZR: CPT

## 2024-10-19 PROCEDURE — 36415 COLL VENOUS BLD VENIPUNCTURE: CPT

## 2024-10-19 PROCEDURE — 2580000003 HC RX 258: Performed by: STUDENT IN AN ORGANIZED HEALTH CARE EDUCATION/TRAINING PROGRAM

## 2024-10-19 PROCEDURE — 96376 TX/PRO/DX INJ SAME DRUG ADON: CPT

## 2024-10-19 PROCEDURE — 80048 BASIC METABOLIC PNL TOTAL CA: CPT

## 2024-10-19 PROCEDURE — 85025 COMPLETE CBC W/AUTO DIFF WBC: CPT

## 2024-10-19 PROCEDURE — APPSS180 APP SPLIT SHARED TIME > 60 MINUTES: Performed by: CLINICAL NURSE SPECIALIST

## 2024-10-19 PROCEDURE — 96365 THER/PROPH/DIAG IV INF INIT: CPT

## 2024-10-19 RX ORDER — LISINOPRIL 2.5 MG/1
2.5 TABLET ORAL DAILY
Status: DISCONTINUED | OUTPATIENT
Start: 2024-10-19 | End: 2024-10-20

## 2024-10-19 RX ORDER — AZITHROMYCIN 250 MG/1
250 TABLET, FILM COATED ORAL DAILY
Status: DISCONTINUED | OUTPATIENT
Start: 2024-10-19 | End: 2024-10-21 | Stop reason: HOSPADM

## 2024-10-19 RX ORDER — MAGNESIUM SULFATE IN WATER 40 MG/ML
2000 INJECTION, SOLUTION INTRAVENOUS ONCE
Status: COMPLETED | OUTPATIENT
Start: 2024-10-19 | End: 2024-10-19

## 2024-10-19 RX ORDER — AMMONIUM LACTATE 12 G/100G
LOTION TOPICAL PRN
Status: DISCONTINUED | OUTPATIENT
Start: 2024-10-20 | End: 2024-10-21 | Stop reason: HOSPADM

## 2024-10-19 RX ORDER — LANOLIN ALCOHOL/MO/W.PET/CERES
400 CREAM (GRAM) TOPICAL DAILY
Status: DISCONTINUED | OUTPATIENT
Start: 2024-10-19 | End: 2024-10-21 | Stop reason: HOSPADM

## 2024-10-19 RX ORDER — FUROSEMIDE 10 MG/ML
20 INJECTION INTRAMUSCULAR; INTRAVENOUS DAILY
Status: DISCONTINUED | OUTPATIENT
Start: 2024-10-19 | End: 2024-10-21 | Stop reason: HOSPADM

## 2024-10-19 RX ORDER — POTASSIUM CHLORIDE 1500 MG/1
20 TABLET, EXTENDED RELEASE ORAL ONCE
Status: COMPLETED | OUTPATIENT
Start: 2024-10-19 | End: 2024-10-19

## 2024-10-19 RX ORDER — MAGNESIUM SULFATE IN WATER 40 MG/ML
2000 INJECTION, SOLUTION INTRAVENOUS ONCE
Status: DISCONTINUED | OUTPATIENT
Start: 2024-10-19 | End: 2024-10-19 | Stop reason: SDUPTHER

## 2024-10-19 RX ADMIN — CARVEDILOL 25 MG: 25 TABLET, FILM COATED ORAL at 09:07

## 2024-10-19 RX ADMIN — SODIUM CHLORIDE, PRESERVATIVE FREE 10 ML: 5 INJECTION INTRAVENOUS at 09:09

## 2024-10-19 RX ADMIN — ATORVASTATIN CALCIUM 40 MG: 40 TABLET, FILM COATED ORAL at 09:08

## 2024-10-19 RX ADMIN — MAGNESIUM SULFATE HEPTAHYDRATE 2000 MG: 40 INJECTION, SOLUTION INTRAVENOUS at 14:05

## 2024-10-19 RX ADMIN — SODIUM CHLORIDE, PRESERVATIVE FREE 10 ML: 5 INJECTION INTRAVENOUS at 22:29

## 2024-10-19 RX ADMIN — IPRATROPIUM BROMIDE AND ALBUTEROL SULFATE 1 DOSE: 2.5; .5 SOLUTION RESPIRATORY (INHALATION) at 16:46

## 2024-10-19 RX ADMIN — APIXABAN 5 MG: 5 TABLET, FILM COATED ORAL at 09:08

## 2024-10-19 RX ADMIN — AZITHROMYCIN DIHYDRATE 250 MG: 250 TABLET ORAL at 10:58

## 2024-10-19 RX ADMIN — IPRATROPIUM BROMIDE AND ALBUTEROL SULFATE 1 DOSE: 2.5; .5 SOLUTION RESPIRATORY (INHALATION) at 10:20

## 2024-10-19 RX ADMIN — FUROSEMIDE 20 MG: 10 INJECTION, SOLUTION INTRAMUSCULAR; INTRAVENOUS at 09:15

## 2024-10-19 RX ADMIN — SODIUM CHLORIDE, PRESERVATIVE FREE 10 ML: 5 INJECTION INTRAVENOUS at 15:59

## 2024-10-19 RX ADMIN — ARFORMOTEROL TARTRATE: 15 SOLUTION RESPIRATORY (INHALATION) at 20:22

## 2024-10-19 RX ADMIN — ARFORMOTEROL TARTRATE: 15 SOLUTION RESPIRATORY (INHALATION) at 10:20

## 2024-10-19 RX ADMIN — PANTOPRAZOLE SODIUM 40 MG: 40 TABLET, DELAYED RELEASE ORAL at 06:15

## 2024-10-19 RX ADMIN — WATER 40 MG: 1 INJECTION INTRAMUSCULAR; INTRAVENOUS; SUBCUTANEOUS at 22:29

## 2024-10-19 RX ADMIN — WATER 40 MG: 1 INJECTION INTRAMUSCULAR; INTRAVENOUS; SUBCUTANEOUS at 15:59

## 2024-10-19 RX ADMIN — POTASSIUM CHLORIDE 20 MEQ: 1500 TABLET, EXTENDED RELEASE ORAL at 14:01

## 2024-10-19 RX ADMIN — APIXABAN 5 MG: 5 TABLET, FILM COATED ORAL at 22:28

## 2024-10-19 RX ADMIN — IPRATROPIUM BROMIDE AND ALBUTEROL SULFATE 1 DOSE: 2.5; .5 SOLUTION RESPIRATORY (INHALATION) at 12:14

## 2024-10-19 RX ADMIN — HYDRALAZINE HYDROCHLORIDE 75 MG: 50 TABLET ORAL at 22:28

## 2024-10-19 RX ADMIN — Medication 400 MG: at 14:14

## 2024-10-19 RX ADMIN — ASPIRIN 81 MG: 81 TABLET, COATED ORAL at 09:07

## 2024-10-19 RX ADMIN — PANTOPRAZOLE SODIUM 40 MG: 40 TABLET, DELAYED RELEASE ORAL at 15:59

## 2024-10-19 RX ADMIN — HYDRALAZINE HYDROCHLORIDE 75 MG: 50 TABLET ORAL at 14:01

## 2024-10-19 RX ADMIN — ISOSORBIDE MONONITRATE 30 MG: 30 TABLET, EXTENDED RELEASE ORAL at 09:08

## 2024-10-19 RX ADMIN — SODIUM CHLORIDE, PRESERVATIVE FREE 10 ML: 5 INJECTION INTRAVENOUS at 06:15

## 2024-10-19 RX ADMIN — HYDRALAZINE HYDROCHLORIDE 75 MG: 50 TABLET ORAL at 09:08

## 2024-10-19 RX ADMIN — WATER 40 MG: 1 INJECTION INTRAMUSCULAR; INTRAVENOUS; SUBCUTANEOUS at 09:07

## 2024-10-19 RX ADMIN — WATER 40 MG: 1 INJECTION INTRAMUSCULAR; INTRAVENOUS; SUBCUTANEOUS at 06:15

## 2024-10-19 RX ADMIN — IPRATROPIUM BROMIDE AND ALBUTEROL SULFATE 1 DOSE: 2.5; .5 SOLUTION RESPIRATORY (INHALATION) at 20:22

## 2024-10-19 RX ADMIN — CARVEDILOL 25 MG: 25 TABLET, FILM COATED ORAL at 16:05

## 2024-10-19 ASSESSMENT — PAIN SCALES - GENERAL
PAINLEVEL_OUTOF10: 0

## 2024-10-19 NOTE — PROGRESS NOTES
[facility] Hospitalist Progress Note    SYNOPSIS: Patient admitted on 10/18/2024 for COPD exacerbation (HCC)    74-year-old male with past medical history of asthma, COPD, CVA, HLD HLD and HTN who presented to the ED with complaints of shortness of breath. Patient was recently hospitalized 10/4/2024-10/16/2024,  2024 - 2024 for the same complaints.   Presented with worsening SOB and wheezing.He received one breathing treatment by EMS. Continues to smokes.   Endorses some cough, with minimal mucus production.  On evaluation he is afebrile, /100 he HR 79 RR 18 SpO2 100% on 2 L nasal cannula.  Labs with normal WBC, proBNP 10,000, troponin 457 and repeat 427, BUN 25, creatinine 1.1.  Patient was given Solu-Medrol 125 mg IV once, DuoNebs x 3 but continued to have shortness of breath and wheezing.    10/19 still having some cough and SOB, on iv diuresis, cardiology has been consulted    SUBJECTIVE:  Stable overnight. No other overnight issues reported.   Patient seen and examined  Records reviewed.         Temp (24hrs), Av.4 °F (36.3 °C), Min:97 °F (36.1 °C), Max:97.8 °F (36.6 °C)    DIET: ADULT DIET; Regular; Low Sodium (2 gm); High Fiber; 1500 ml  CODE: Full Code    Intake/Output Summary (Last 24 hours) at 10/19/2024 1348  Last data filed at 10/19/2024 1211  Gross per 24 hour   Intake 840 ml   Output 2150 ml   Net -1310 ml       Review of Systems  All bolded are positive; please see HPI  General:  Fever, chills, diaphoresis, fatigue, malaise, night sweats, weight loss  Psychological:  Anxiety, disorientation, hallucinations.  ENT:  Epistaxis, headaches, vertigo, visual changes.  Cardiovascular:  Chest pain, irregular heartbeats, palpitations, paroxysmal nocturnal dyspnea.  Respiratory:  Shortness of breath, coughing, sputum production, hemoptysis, wheezing, orthopnea.  Gastrointestinal:  Nausea, vomiting, diarrhea, heartburn, constipation, abdominal pain, hematemesis, hematochezia, melena,  anti-inflammatory properties  -Supportive care for rhinovirus.      DVT Prophylaxis [x] Eliquis, []  Heparin, [] SCDs, [x] Ambulation   GI Prophylaxis [] PPI,  [] H2 Blocker,  [] Carafate,  [x] Diet/Tube Feeds   Disposition Patient requires continued admission due to pending clinical improvement, cardiology recommendations   MDM [] Low, [x] Moderate,[]  High       Medications:  REVIEWED DAILY    Infusion Medications    sodium chloride       Scheduled Medications    furosemide  20 mg IntraVENous Daily    azithromycin  250 mg Oral Daily    magnesium sulfate  2,000 mg IntraVENous Once    magnesium sulfate  2,000 mg IntraVENous Once    magnesium oxide  400 mg Oral Daily    sodium chloride flush  5-40 mL IntraVENous 2 times per day    ipratropium 0.5 mg-albuterol 2.5 mg  1 Dose Inhalation Q4H WA RT    methylPREDNISolone  40 mg IntraVENous Q6H    Followed by    [START ON 10/21/2024] predniSONE  40 mg Oral Daily    apixaban  5 mg Oral BID    aspirin  81 mg Oral Daily    atorvastatin  40 mg Oral Daily    arformoterol 15 mcg-budesonide 0.25 mg neb solution   Nebulization BID RT    carvedilol  25 mg Oral BID WC    hydrALAZINE  75 mg Oral TID    isosorbide mononitrate  30 mg Oral Daily    nicotine  1 patch TransDERmal Daily    pantoprazole  40 mg Oral BID AC     PRN Meds: sodium chloride flush, sodium chloride, ondansetron **OR** ondansetron, polyethylene glycol, acetaminophen **OR** acetaminophen, guaiFENesin-dextromethorphan, benzonatate, melatonin    Labs:     Recent Labs     10/18/24  0930 10/19/24  1015   WBC 10.1 7.4   HGB 10.0* 10.3*   HCT 32.1* 32.1*    190       Recent Labs     10/18/24  0930 10/19/24  1015    139   K 4.2 3.9    97*   CO2 35* 29   BUN 25* 39*   CREATININE 1.1 1.2   CALCIUM 9.3 8.8       Recent Labs     10/18/24  0930   ALKPHOS 68   ALT 32   AST 48*   BILITOT 0.3       No results for input(s): \"INR\" in the last 72 hours.    No results for input(s): \"CKTOTAL\", \"TROPONINI\" in the last

## 2024-10-19 NOTE — CONSULTS
Department of Podiatry   Consult Note        Reason for Consult:  Scaly dried skin to both lower extremities, onychomycosis        HISTORY OF PRESENT ILLNESS:      Patient is a 74 y.o. male with significant past medical history of Afib, bilateral carotid artery stenosis, cerebral infarct, COPD, HLD, and HTN. Patient states they have had dried scaly itching skin for a long time. Pt also has long painful nails and is unable to trim them themself.  Patient denies any N/V/D/F/C/SOB/CP and has no other pedal complaints at this time.     Past Medical History:        Diagnosis Date    A-fib ([facility])     Arthritis     Bilateral carotid artery stenosis 01/17/2024    Approximately 50% stenosis on the right side and 30 to 40% stenosis on the left side, CT of the carotids, 2024    Cataract, left eye     Cerebral infarction due to occlusion of right vertebral artery ([facility]) 01/17/2024    Hypoplastic, small right vertebral artery, with absent flow proximally CT of the carotids 2024  Patent left vertebral artery, good size    Cocaine abuse ([facility])     COPD (chronic obstructive pulmonary disease) ([facility])     Hyperlipidemia     Hypertension     Occlusion of right vertebral artery 01/17/2024    Small, hypoplastic right vertebral artery occlusion proximally with widely patent dominant left vertebral artery    Tobacco dependence 01/17/2024       Past Surgical History:        Procedure Laterality Date    CARDIAC CATHETERIZATION  08/25/2023    COLONOSCOPY      DENTAL SURGERY      TONSILLECTOMY      UPPER GASTROINTESTINAL ENDOSCOPY N/A 7/16/2024    ESOPHAGOGASTRODUODENOSCOPY performed by Todd Chow MD at [facility] ENDOSCOPY       Medications Prior to Admission:    Medications Prior to Admission: carvedilol (COREG) 25 MG tablet, Take 1 tablet by mouth 2 times daily (with meals)  hydrALAZINE (APRESOLINE) 25 MG tablet, Take 3 tablets by mouth 3 times daily  isosorbide mononitrate (IMDUR) 30 MG extended release tablet, Take 1 tablet by mouth  daily  pantoprazole (PROTONIX) 40 MG tablet, Take 1 tablet by mouth 2 times daily (before meals)  budesonide-formoterol (SYMBICORT) 160-4.5 MCG/ACT AERO, Inhale 2 puffs into the lungs 2 times daily  tiotropium (SPIRIVA HANDIHALER) 18 MCG inhalation capsule, Inhale 1 capsule into the lungs daily  albuterol sulfate HFA (PROVENTIL;VENTOLIN;PROAIR) 108 (90 Base) MCG/ACT inhaler, Inhale 2 puffs into the lungs every 4 hours as needed for Wheezing  apixaban (ELIQUIS) 5 MG TABS tablet, Take 1 tablet by mouth 2 times daily  nicotine (NICODERM CQ) 21 MG/24HR, Place 1 patch onto the skin daily  VITAMIN A PO, Take 1 tablet by mouth daily  atorvastatin (LIPITOR) 40 MG tablet, Take 1 tablet by mouth daily  aspirin 81 MG EC tablet, Take 1 tablet by mouth daily  Vitamin D (CHOLECALCIFEROL) 25 MCG (1000 UT) TABS tablet, Take 1 tablet by mouth daily  melatonin 3 MG TABS tablet, Take 1.5 tablets by mouth nightly as needed (insomnia)    Allergies:  Doxycycline and Rocephin [ceftriaxone]    Social History:   TOBACCO:   reports that he has been smoking cigarettes. He started smoking about 64 years ago. He has a 16.2 pack-year smoking history. He has never used smokeless tobacco.  ETOH:   reports that he does not currently use alcohol.  DRUGS:   Social History     Substance and Sexual Activity   Drug Use Yes    Types: Cocaine    Comment: pt reports last use 11/19/23       Family History:       Problem Relation Age of Onset    Brain Cancer Mother     Heart Attack Father          REVIEW OF SYSTEMS:    All pertinent positives and negatives as noted in HPI       LOWER EXTREMITY EXAMINATION     VASCULAR:  Left DP pulse is palpable. Left PT pulse is palpable. Right DP pulse is palpable. Right PT pulse is palpable. CFT < 5 seconds B/L.  Skin temperature is warm to warm from the tibial tuberosity to the distal aspect of the digits dorsally. Hair growth absent to the distal aspects dorsally.    NEUROLOGIC:  Protective sensation is  10.0* 10.3*   HCT 32.1* 32.1*    190     Recent Labs     10/19/24  1015      K 3.9   CL 97*   CO2 29   PHOS 3.8   BUN 39*   CREATININE 1.2     No results for input(s): \"INR\", \"APTT\" in the last 72 hours.    Invalid input(s): \"PROT\"    ASSESSMENTS:   -Onychomycosis  -xerosis  -COPD    PLAN:    - Patient was examined and evaluated.Reviewed patient's recent lab results, charts and pertinent diagnostic imaging. Reviewed ancillary service notes.  - Nails debrided x 10  with a sterile nipper without incident.   - Ammonium Lactate to be applied daily to bilateral lower extremities.  - Podiatry to sign off at this time. Please re-consult if new podiatric concerns arise.  - Pt to follow up at my office within one week of discharge       Thank you for the opportunity to take part in the patient's care. Please do not hesitate to call for any questions or concerns.

## 2024-10-19 NOTE — PROGRESS NOTES
Pulse oximetry assessment   97% at rest on room air (if 88% or less, skip next steps)  87% while ambulating on room air  99% at rest on 2LPM  96% while ambulating on 2LPM

## 2024-10-19 NOTE — PROGRESS NOTES
4 Eyes Skin Assessment     NAME:  Chuck Chavarria Jr.  YOB: 1950  MEDICAL RECORD NUMBER:  [id number]    The patient is being assessed for  Admission    I agree that at least one RN has performed a thorough Head to Toe Skin Assessment on the patient. ALL assessment sites listed below have been assessed.      Areas assessed by both nurses:    Head, Face, Ears, Shoulders, Back, Chest, Arms, Elbows, Hands, Sacrum. Buttock, Coccyx, Ischium, Legs. Feet and Heels, and Under Medical Devices         Does the Patient have a Wound? No noted wound(s)       Martin Prevention initiated by RN: Yes  Wound Care Orders initiated by RN: No    Pressure Injury (Stage 3,4, Unstageable, DTI, NWPT, and Complex wounds) if present, place Wound referral order by RN under : No    New Ostomies, if present place, Ostomy referral order under : No     Nurse 1 eSignature: Electronically signed by Lina Gonzalez RN on 10/19/24 at 12:28 AM EDT    **SHARE this note so that the co-signing nurse can place an eSignature**    Nurse 2 eSignature: Electronically signed by Luc Rogers RN on 10/19/24 at 3:45 AM EDT

## 2024-10-19 NOTE — CONSULTS
Inpatient Cardiology Consultation      Reason for Consult:  CHF Exacerbation    Consulting Physician: Dr Hankins    Requesting Physician:  Dr. Zayas    Date of Consultation: 10/19/2024    HISTORY OF PRESENT ILLNESS:   Chuck Chavarria Jr.  is a 74 y.o.  male known to Dr. Carrasco - seen last inpatient 9/11/24 for SOB & T wave inversion in Lat leads.   proBNP 11,500 > 11,000 (9/8: 6304), troponin 53 > 50 (9/8: 42) chronic  UDS positive cocaine Increase hydralazine and add Imdur. No ACE/ARB due to renal insufficiency.     Seen by Dr Ledbetter inpatient 10/9/2024 HF in setting of Rhinovirus / hypoxia during RRT .  NT proBNP 13,158 initial troponin 10/6/2024 41-71 >> repeat troponins 10/9/2024 780 >> 763 >> 752 BUN 31 CR 1.5>> BUN 21 CR 1.3 Pleuritic chest pain.  Probably secondary to viral infection./ Elevated troponin in the setting of hypoxia, anemia, HENNY on CKD, uncontrolled hypertension.  Increase hydralazine to 100 mg 3 times daily.  Change metoprolol to Coreg 25 mg twice daily due to history of cocaine abuse and to help controlling his blood pressure  No cardiac testing or advanced interventions planned.  ? AMS - EEG negative and MRI brain limited due to motion no acute finding   Discharged home no home O2 10/16/24 - wt on day of D/C 164lbs?? Accurate D/C cardiac meds:  Eliquis, aspirin, lipitor, apresoline 25 TID, Imdur 30, Coreg was discontinued on discharge??     ED 10/18/24 via EMS for SOB/wheezing DuoNeb given by EMS  Arrival vitals: /100 HR 79 SpO2 100% on 2 L nasal cannula afebrile  Significant Labs: Troponin 457-427.  NT proBNP 10,553.  BUN 25 CR 1.1 K4.2 albumin 3.1 AST 48 ALT 32 WBC 7.4 Hgb 10.3   Full respiratory panel negative  RAD: Portable chest x-ray cardiomegaly with increased central vascular congestion.  Opacifications have partially dissipated or decreased in right middle lobe from prior.  ED treatment: Solu-Medrol, and DuoNeb    Patient seen and examined.  Recent vitals: /92  years 0.25 packs/day  Alcohol: \"Intermittent\"  Marijuana: Denies  Illicit: Continues to use cocaine  Ambulates with a cane  Lives at home in an apartment  Full Code       Family History: Noncontributory secondary to age      REVIEW OF SYSTEMS:     See HPI       PHYSICAL EXAM:   BP (!) 143/92   Pulse 74   Temp 97.8 °F (36.6 °C) (Temporal)   Resp 16   Ht 1.727 m (5' 8\")   Wt 61.5 kg (135 lb 9.3 oz) Comment: bed scale  SpO2 97%   BMI 20.62 kg/m²   CONST:  Well developed, cachectic elderly -American male, who appears stated age. Awake, alert, cooperative, no apparent distress at rest on nasal cannula oxygen of 2 L  HEENT:   Head- Normocephalic, atraumatic   Eyes- Conjunctivae pink  Throat- Oral mucosa pink and moist  Neck-  No stridor, trachea midline, ? jugular venous distention / hepatojugular reflex difficult exam due to cachexia  CHEST: Chest symmetrical and non-tender to palpation. No accessory muscle use or intercostal retractions  RESPIRATORY: Lung sounds -diminished throughout fields   CARDIOVASCULAR:     No carotid bruit  Heart Inspection- shows no noted pulsations  Heart Palpation- no heaves or thrills  Heart Ausculation- Regular rate and rhythm, no murmur. No s3, s4 or rub   PV: No lower extremity edema. No varicosities. Pedal pulses palpable  ABDOMEN: Soft, non-tender to light palpation. Bowel sounds present.   MS fair muscle strength and tone.  Positive atrophy no abnormal movements.   : Deferred /external Jesus catheter naun urine  SKIN: Warm and dry  /vitiligo  NEURO / PSYCH: Oriented to person, place and time.  Memory deficits noted speech clear and appropriate. Follows all commands. Pleasant affect     DATA:    ECG: Reviewed with Dr. Perez    Tele strips: Sinus rhythm 70s to 80s with PACs    Diagnostic:  See above      Wt Readings from Last 3 Encounters:   10/18/24 61.5 kg (135 lb 9.3 oz)   10/16/24 74.4 kg (164 lb)   09/13/24 58.7 kg (129 lb 6.4 oz)         Intake/Output Summary (Last  Mitral Valve: Mild regurgitation.    Tricuspid Valve: Mild regurgitation.    -ECG 10/13/2024: personally reviewed  Normal sinus rhythm, right bundle branch block, left anterior fascicular block    -Cardiac cath 8/24/2023,  Angiographic Results/findings:  Left Main: Heavily calcified.  No angiographically significant stenosis.  LAD: Tortuous.  No angiographically significant stenosis.  D1: No angiographically significant stenosis.  Cx: No angiographically significant stenosis.  OM1: No angiographically significant stenosis.  OM2: No angiographically significant stenosis.  Ramus: No angiographically significant stenosis.  RCA: Dominant.  No angiographically significant stenosis.  PDA: No angiographically significant stenosis.  PLB: Small vessel.        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  Patient was recently admitted about a week ago and had rhinovirus and COPD exacerbation.  He was diuresed with improvement of his symptoms.  He came back again with shortness of breath 3 days after discharge.    SOB:  Unclear if CHF exacerbation or COPD exacerbation, more likely COPD as his BNP has improved from before.  Continue with diuresis with Lasix IV  HFmrEF (EF 40%),  Continue Lasix IV 20 mg daily  Continue GDMT  Continue Coreg 25 mg twice daily  Continue hydralazine 75 mg 3 times daily  Continue Imdur 30 mg daily  Start lisinopril 2.5 mg daily  Paroxysmal atrial fibrillation:  Continue Eliquis 5 mg twice daily  Continue Coreg as above    -----------------------------------------------------------------------------------------------------------------------------------------------  CARDIOLOGY ATTENDING ATTESTATION  I spent > 51%

## 2024-10-20 LAB
ANION GAP SERPL CALCULATED.3IONS-SCNC: 13 MMOL/L (ref 7–16)
BASOPHILS # BLD: 0 K/UL (ref 0–0.2)
BASOPHILS NFR BLD: 0 % (ref 0–2)
BUN SERPL-MCNC: 39 MG/DL (ref 6–23)
CALCIUM SERPL-MCNC: 8.8 MG/DL (ref 8.6–10.2)
CHLORIDE SERPL-SCNC: 101 MMOL/L (ref 98–107)
CO2 SERPL-SCNC: 30 MMOL/L (ref 22–29)
CREAT SERPL-MCNC: 1.2 MG/DL (ref 0.7–1.2)
EOSINOPHIL # BLD: 0 K/UL (ref 0.05–0.5)
EOSINOPHILS RELATIVE PERCENT: 0 % (ref 0–6)
ERYTHROCYTE [DISTWIDTH] IN BLOOD BY AUTOMATED COUNT: 16.8 % (ref 11.5–15)
GFR, ESTIMATED: 61 ML/MIN/1.73M2
GLUCOSE SERPL-MCNC: 184 MG/DL (ref 74–99)
HCT VFR BLD AUTO: 29.2 % (ref 37–54)
HGB BLD-MCNC: 9.4 G/DL (ref 12.5–16.5)
L PNEUMO1 AG UR QL IA.RAPID: NEGATIVE
LYMPHOCYTES NFR BLD: 0 K/UL (ref 1.5–4)
LYMPHOCYTES RELATIVE PERCENT: 0 % (ref 20–42)
MAGNESIUM SERPL-MCNC: 2.1 MG/DL (ref 1.6–2.6)
MCH RBC QN AUTO: 28.7 PG (ref 26–35)
MCHC RBC AUTO-ENTMCNC: 32.2 G/DL (ref 32–34.5)
MCV RBC AUTO: 89 FL (ref 80–99.9)
MONOCYTES NFR BLD: 0.4 K/UL (ref 0.1–0.95)
MONOCYTES NFR BLD: 4 % (ref 2–12)
NEUTROPHILS NFR BLD: 97 % (ref 43–80)
NEUTS SEG NFR BLD: 11.1 K/UL (ref 1.8–7.3)
PLATELET # BLD AUTO: 183 K/UL (ref 130–450)
PMV BLD AUTO: 10.6 FL (ref 7–12)
POTASSIUM SERPL-SCNC: 4 MMOL/L (ref 3.5–5)
RBC # BLD AUTO: 3.28 M/UL (ref 3.8–5.8)
RBC # BLD: ABNORMAL 10*6/UL
S PNEUM AG SPEC QL: NEGATIVE
SODIUM SERPL-SCNC: 144 MMOL/L (ref 132–146)
WBC OTHER # BLD: 11.5 K/UL (ref 4.5–11.5)

## 2024-10-20 PROCEDURE — 97165 OT EVAL LOW COMPLEX 30 MIN: CPT

## 2024-10-20 PROCEDURE — 94667 MNPJ CHEST WALL 1ST: CPT

## 2024-10-20 PROCEDURE — 83735 ASSAY OF MAGNESIUM: CPT

## 2024-10-20 PROCEDURE — 2700000000 HC OXYGEN THERAPY PER DAY

## 2024-10-20 PROCEDURE — 97535 SELF CARE MNGMENT TRAINING: CPT

## 2024-10-20 PROCEDURE — 99232 SBSQ HOSP IP/OBS MODERATE 35: CPT | Performed by: STUDENT IN AN ORGANIZED HEALTH CARE EDUCATION/TRAINING PROGRAM

## 2024-10-20 PROCEDURE — 85025 COMPLETE CBC W/AUTO DIFF WBC: CPT

## 2024-10-20 PROCEDURE — 94669 MECHANICAL CHEST WALL OSCILL: CPT

## 2024-10-20 PROCEDURE — 99233 SBSQ HOSP IP/OBS HIGH 50: CPT | Performed by: INTERNAL MEDICINE

## 2024-10-20 PROCEDURE — 6360000002 HC RX W HCPCS: Performed by: STUDENT IN AN ORGANIZED HEALTH CARE EDUCATION/TRAINING PROGRAM

## 2024-10-20 PROCEDURE — 94640 AIRWAY INHALATION TREATMENT: CPT

## 2024-10-20 PROCEDURE — 36415 COLL VENOUS BLD VENIPUNCTURE: CPT

## 2024-10-20 PROCEDURE — 96376 TX/PRO/DX INJ SAME DRUG ADON: CPT

## 2024-10-20 PROCEDURE — 6370000000 HC RX 637 (ALT 250 FOR IP): Performed by: STUDENT IN AN ORGANIZED HEALTH CARE EDUCATION/TRAINING PROGRAM

## 2024-10-20 PROCEDURE — 1200000000 HC SEMI PRIVATE

## 2024-10-20 PROCEDURE — 94668 MNPJ CHEST WALL SBSQ: CPT

## 2024-10-20 PROCEDURE — 80048 BASIC METABOLIC PNL TOTAL CA: CPT

## 2024-10-20 PROCEDURE — 2580000003 HC RX 258: Performed by: STUDENT IN AN ORGANIZED HEALTH CARE EDUCATION/TRAINING PROGRAM

## 2024-10-20 PROCEDURE — 6370000000 HC RX 637 (ALT 250 FOR IP): Performed by: INTERNAL MEDICINE

## 2024-10-20 RX ORDER — LISINOPRIL 10 MG/1
5 TABLET ORAL DAILY
Status: DISCONTINUED | OUTPATIENT
Start: 2024-10-21 | End: 2024-10-21 | Stop reason: HOSPADM

## 2024-10-20 RX ADMIN — SODIUM CHLORIDE, PRESERVATIVE FREE 10 ML: 5 INJECTION INTRAVENOUS at 08:19

## 2024-10-20 RX ADMIN — CARVEDILOL 25 MG: 25 TABLET, FILM COATED ORAL at 17:24

## 2024-10-20 RX ADMIN — SODIUM CHLORIDE, PRESERVATIVE FREE 10 ML: 5 INJECTION INTRAVENOUS at 19:53

## 2024-10-20 RX ADMIN — HYDRALAZINE HYDROCHLORIDE 75 MG: 50 TABLET ORAL at 08:18

## 2024-10-20 RX ADMIN — APIXABAN 5 MG: 5 TABLET, FILM COATED ORAL at 19:53

## 2024-10-20 RX ADMIN — IPRATROPIUM BROMIDE AND ALBUTEROL SULFATE 1 DOSE: 2.5; .5 SOLUTION RESPIRATORY (INHALATION) at 06:42

## 2024-10-20 RX ADMIN — IPRATROPIUM BROMIDE AND ALBUTEROL SULFATE 1 DOSE: 2.5; .5 SOLUTION RESPIRATORY (INHALATION) at 21:11

## 2024-10-20 RX ADMIN — ASPIRIN 81 MG: 81 TABLET, COATED ORAL at 08:18

## 2024-10-20 RX ADMIN — Medication 400 MG: at 08:18

## 2024-10-20 RX ADMIN — SODIUM CHLORIDE, PRESERVATIVE FREE 10 ML: 5 INJECTION INTRAVENOUS at 05:19

## 2024-10-20 RX ADMIN — PANTOPRAZOLE SODIUM 40 MG: 40 TABLET, DELAYED RELEASE ORAL at 08:19

## 2024-10-20 RX ADMIN — PANTOPRAZOLE SODIUM 40 MG: 40 TABLET, DELAYED RELEASE ORAL at 17:25

## 2024-10-20 RX ADMIN — LISINOPRIL 2.5 MG: 2.5 TABLET ORAL at 08:19

## 2024-10-20 RX ADMIN — HYDRALAZINE HYDROCHLORIDE 75 MG: 50 TABLET ORAL at 17:24

## 2024-10-20 RX ADMIN — APIXABAN 5 MG: 5 TABLET, FILM COATED ORAL at 08:18

## 2024-10-20 RX ADMIN — AZITHROMYCIN DIHYDRATE 250 MG: 250 TABLET ORAL at 08:18

## 2024-10-20 RX ADMIN — WATER 40 MG: 1 INJECTION INTRAMUSCULAR; INTRAVENOUS; SUBCUTANEOUS at 05:19

## 2024-10-20 RX ADMIN — CARVEDILOL 25 MG: 25 TABLET, FILM COATED ORAL at 08:18

## 2024-10-20 RX ADMIN — ISOSORBIDE MONONITRATE 30 MG: 30 TABLET, EXTENDED RELEASE ORAL at 08:17

## 2024-10-20 RX ADMIN — FUROSEMIDE 20 MG: 10 INJECTION, SOLUTION INTRAMUSCULAR; INTRAVENOUS at 08:18

## 2024-10-20 RX ADMIN — IPRATROPIUM BROMIDE AND ALBUTEROL SULFATE 1 DOSE: 2.5; .5 SOLUTION RESPIRATORY (INHALATION) at 16:40

## 2024-10-20 RX ADMIN — ARFORMOTEROL TARTRATE: 15 SOLUTION RESPIRATORY (INHALATION) at 06:42

## 2024-10-20 RX ADMIN — HYDRALAZINE HYDROCHLORIDE 75 MG: 50 TABLET ORAL at 19:53

## 2024-10-20 RX ADMIN — ATORVASTATIN CALCIUM 40 MG: 40 TABLET, FILM COATED ORAL at 08:18

## 2024-10-20 RX ADMIN — WATER 40 MG: 1 INJECTION INTRAMUSCULAR; INTRAVENOUS; SUBCUTANEOUS at 08:17

## 2024-10-20 RX ADMIN — IPRATROPIUM BROMIDE AND ALBUTEROL SULFATE 1 DOSE: 2.5; .5 SOLUTION RESPIRATORY (INHALATION) at 12:05

## 2024-10-20 RX ADMIN — ARFORMOTEROL TARTRATE: 15 SOLUTION RESPIRATORY (INHALATION) at 21:11

## 2024-10-20 NOTE — PLAN OF CARE
Problem: Skin/Tissue Integrity  Goal: Absence of new skin breakdown  Description: 1.  Monitor for areas of redness and/or skin breakdown  2.  Assess vascular access sites hourly  3.  Every 4-6 hours minimum:  Change oxygen saturation probe site  4.  Every 4-6 hours:  If on nasal continuous positive airway pressure, respiratory therapy assess nares and determine need for appliance change or resting period.  10/20/2024 1229 by Jennifer Ch RN  Outcome: Progressing  10/20/2024 0128 by Payal Viera RN  Outcome: Progressing     Problem: ABCDS Injury Assessment  Goal: Absence of physical injury  10/20/2024 1229 by Jennifer Ch RN  Outcome: Progressing  10/20/2024 0128 by Payal Viera RN  Outcome: Progressing     Problem: Safety - Adult  Goal: Free from fall injury  10/20/2024 1229 by Jennifer Ch RN  Outcome: Progressing  10/20/2024 0128 by Payal Viera RN  Outcome: Progressing

## 2024-10-20 NOTE — PROGRESS NOTES
Inpatient Cardiology Progress note        SUBJECTIVE/OBJECTIVE:   Patient reports that his breathing has slightly improved but reports that he feels he is wheezing.      PHYSICAL EXAM:   BP (!) 140/82   Pulse 78   Temp 97.4 °F (36.3 °C) (Temporal)   Resp 14   Ht 1.727 m (5' 8\")   Wt 60.7 kg (133 lb 13.1 oz)   SpO2 99%   BMI 20.35 kg/m²    B/P Range last 24 hours: Systolic (24hrs), Av , Min:117 , Max:140    Diastolic (24hrs), Av, Min:70, Max:96    GENERAL: NAD   HEAD: Normocephalic, atraumatic.   NECK: No JVD. No carotid bruits. No neck masses.    CARDIOVASCULAR: Normal rate, regular rhythm, normal S1, S2, no MRG    LUNGS: Clear to auscultation bilaterally, no wheezing.    ABDOMEN: Abdomen is soft and not distended. No tenderness.    EXTREMITIES: There is no pedal edema.   MUSCULOSKELETAL: No joint swelling. Normal range of motion    SKIN: Skin is moist. No ulcers or lesions.   NEUROLOGICAL: No evidence of obvious neurological deficits.    PSYCHOLOGICAL: Patient is in normal mood.        Intake/Output Summary (Last 24 hours) at 10/20/2024 191  Last data filed at 10/20/2024 1440  Gross per 24 hour   Intake 240 ml   Output --   Net 240 ml       Weight:   Wt Readings from Last 3 Encounters:   10/19/24 60.7 kg (133 lb 13.1 oz)   10/16/24 74.4 kg (164 lb)   24 58.7 kg (129 lb 6.4 oz)       Current Inpatient Medications:   furosemide  20 mg IntraVENous Daily    azithromycin  250 mg Oral Daily    magnesium oxide  400 mg Oral Daily    lisinopril  2.5 mg Oral Daily    sodium chloride flush  5-40 mL IntraVENous 2 times per day    ipratropium 0.5 mg-albuterol 2.5 mg  1 Dose Inhalation Q4H WA RT    [START ON 10/21/2024] predniSONE  40 mg Oral Daily    apixaban  5 mg Oral BID    aspirin  81 mg Oral Daily    atorvastatin  40 mg Oral Daily    arformoterol 15 mcg-budesonide 0.25 mg neb solution   Nebulization BID RT    carvedilol  25 mg Oral BID WC    hydrALAZINE  75 mg Oral TID    isosorbide mononitrate   30 mg Oral Daily    nicotine  1 patch TransDERmal Daily    pantoprazole  40 mg Oral BID AC       IV Infusions (if any):   sodium chloride         DIAGNOSTIC/ LABORATORY DATA:  Labs:   CBC:   Recent Labs     10/19/24  1015 10/20/24  0701   WBC 7.4 11.5   HGB 10.3* 9.4*   HCT 32.1* 29.2*    183     BMP:   Recent Labs     10/19/24  1015 10/20/24  0701    144   K 3.9 4.0   CO2 29 30*   BUN 39* 39*   CREATININE 1.2 1.2   LABGLOM 65 61   CALCIUM 8.8 8.8     Mag:   Recent Labs     10/19/24  1015 10/20/24  0701   MG 1.6 2.1     Phos:   Recent Labs     10/19/24  1015   PHOS 3.8     TFT:   Lab Results   Component Value Date    TSH 0.85 10/13/2024    T4FREE 1.1 07/08/2024      HgA1c:   Lab Results   Component Value Date    LABA1C 5.8 (H) 01/18/2024     No results found for: \"EAG\"    BNP: No results for input(s): \"BNP\" in the last 72 hours.  PT/INR: No results for input(s): \"PROTIME\", \"INR\" in the last 72 hours.  APTT:No results for input(s): \"APTT\" in the last 72 hours.  CARDIAC ENZYMES:  Recent Labs     10/18/24  0930 10/18/24  1108   TROPHS 457* 427*     FASTING LIPID PANEL:  Lab Results   Component Value Date/Time    CHOL 159 09/10/2024 04:30 AM    HDL 71 09/10/2024 04:30 AM    TRIG 46 09/10/2024 04:30 AM     LIVER PROFILE:  Recent Labs     10/18/24  0930   AST 48*   ALT 32         Pertinent imaging studies:    -TTE 9/11/2024:    Left Ventricle: Moderately reduced left ventricular systolic function. EF by visual approximation is 40%. Left ventricle is mildly dilated. Normal wall thickness.    Mitral Valve: Mild regurgitation.    Tricuspid Valve: Mild regurgitation.     -ECG 10/13/2024: personally reviewed  Normal sinus rhythm, right bundle branch block, left anterior fascicular block     -Cardiac cath 8/24/2023,  Angiographic Results/findings:  Left Main: Heavily calcified.  No angiographically significant stenosis.  LAD: Tortuous.  No angiographically significant stenosis.  D1: No angiographically significant  stenosis.  Cx: No angiographically significant stenosis.  OM1: No angiographically significant stenosis.  OM2: No angiographically significant stenosis.  Ramus: No angiographically significant stenosis.  RCA: Dominant.  No angiographically significant stenosis.  PDA: No angiographically significant stenosis.  PLB: Small vessel.           Impression/Plan:     70-year-old male past medical history of NICM with HFmrEF (EF 40%, thought to be toxic related due to cocaine and alcohol abuse), paroxysmal A-fib (on Eliquis), LAFB, RBBB, CVA 1/2024, IVC filter, HTN, HLD, COPD, CKD (creatinine 1.2), anemia), bacteremia 7/2024 who presented to the hospital with shortness of breath.  Patient was recently admitted about a week ago and had rhinovirus and COPD exacerbation.  He was diuresed with improvement of his symptoms.  He came back again with shortness of breath 3 days after discharge.     SOB:  Unclear if CHF exacerbation or COPD exacerbation, more likely COPD as his BNP has improved from before.  Continue with diuresis with Lasix IV  HFmrEF (EF 40%),  Continue Lasix IV 20 mg daily, can switch to PO upon discharge  Continue GDMT  Continue Coreg 25 mg twice daily  Continue hydralazine 75 mg 3 times daily  Continue Imdur 30 mg daily  Increase lisinopril to 5 mg daily  Paroxysmal atrial fibrillation:  Continue Eliquis 5 mg twice daily  Continue Coreg as above       Cardiology service to sign off, please call back with questions or concerns. Thank you for allowing us to participate in patient's care.      Baltazar Hankins MD  Interventional Cardiology/ Structural heart disease

## 2024-10-20 NOTE — PROGRESS NOTES
Occupational Therapy  Facility/Department: [address]  Occupational Therapy Initial Assessment    Name: Chuck Chavarria Jr.  : 1950  MRN: [id number]  Date of Service: 10/20/2024  Room: 8208A      Evaluating OT: Katelynn Fraser OTR/L [id number]    Referring Provider: MD Marquez    Specific Provider Orders/Date: OT Eval and Treat 10/19/2024     Diagnosis: SOB, COPD exacerbation      Pertinent Medical History: COPD, hx CVA, HLD, HTN, CHF, asthma  Recommended Adaptive Equipment: TBD     Precautions:  Fall Risk, adult diet- low sodium, high fiber 1500 ml fluid restriction & 2L O2 NC     Assessment of current deficits    [x] Functional mobility  [x]ADLs  [] Strength               []Cognition    [x] Functional transfers   [x] IADLs         [] Safety Awareness   [x]Endurance    [] Fine Coordination              [x] Balance      [] Vision/perception   []Sensation     []Gross Motor Coordination  [] ROM  [] Delirium                   [] Motor Control     OT PLAN OF CARE   OT POC based on physician orders, patient diagnosis and results of clinical assessment    Frequency/Duration 1-3 days/wk for 2 weeks PRN   Specific OT Treatment Interventions to include:   * Instruction/training on adapted ADL techniques and AE recommendations to increase functional independence within precautions       * Training on energy conservation strategies, correct breathing pattern and techniques to improve independence/tolerance for self-care routine  * Functional transfer/mobility training/DME recommendations for increased independence, safety, and fall prevention  * Patient/Family education to increase follow through with safety techniques and functional independence  * Recommendation of environmental modifications for increased safety with functional transfers/mobility and ADLs  * Therapeutic exercise to improve motor endurance, ROM, and functional strength for ADLs/functional transfers  * Therapeutic activities to facilitate/challenge dynamic

## 2024-10-20 NOTE — PROGRESS NOTES
[facility] Hospitalist Progress Note    SYNOPSIS: Patient admitted on 10/18/2024 for COPD exacerbation (HCC)    74-year-old male with past medical history of asthma, COPD, CVA, HLD HLD and HTN who presented to the ED with complaints of shortness of breath. Patient was recently hospitalized 10/4/2024-10/16/2024,  2024 - 2024 for the same complaints.   Presented with worsening SOB and wheezing.He received one breathing treatment by EMS. Continues to smokes.   Endorses some cough, with minimal mucus production.  On evaluation he is afebrile, /100 he HR 79 RR 18 SpO2 100% on 2 L nasal cannula.  Labs with normal WBC, proBNP 10,000, troponin 457 and repeat 427, BUN 25, creatinine 1.1.  Patient was given Solu-Medrol 125 mg IV once, DuoNebs x 3 but continued to have shortness of breath and wheezing.    10/19 still having some cough and SOB, on iv diuresis, cardiology has been consulted    10/20 improvement in breathing, sob, coughing, tolerating IV diuresis      SUBJECTIVE:  Stable overnight. No other overnight issues reported.   Patient seen and examined  Records reviewed.         Temp (24hrs), Av.6 °F (36.4 °C), Min:97.1 °F (36.2 °C), Max:98.4 °F (36.9 °C)    DIET: ADULT DIET; Regular; Low Sodium (2 gm); High Fiber; 1500 ml  CODE: Full Code    Intake/Output Summary (Last 24 hours) at 10/20/2024 1325  Last data filed at 10/19/2024 1814  Gross per 24 hour   Intake 406.34 ml   Output 500 ml   Net -93.66 ml       Review of Systems  All bolded are positive; please see HPI  General:  Fever, chills, diaphoresis, fatigue, malaise, night sweats, weight loss  Psychological:  Anxiety, disorientation, hallucinations.  ENT:  Epistaxis, headaches, vertigo, visual changes.  Cardiovascular:  Chest pain, irregular heartbeats, palpitations, paroxysmal nocturnal dyspnea.  Respiratory:  Shortness of breath, coughing, sputum production, hemoptysis, wheezing, orthopnea.  Gastrointestinal:  Nausea, vomiting,  consulted, appreciate recommendations  -Monitor labs  -Azithromycin to 500 mg for 5 doses for anti-inflammatory properties  -Supportive care for rhinovirus.  -BNP pending       DVT Prophylaxis [x] Eliquis, []  Heparin, [] SCDs, [x] Ambulation   GI Prophylaxis [] PPI,  [] H2 Blocker,  [] Carafate,  [x] Diet/Tube Feeds   Disposition Patient requires continued admission due to pending clinical improvement, cardiology recommendations   MDM [] Low, [x] Moderate,[]  High       Medications:  REVIEWED DAILY    Infusion Medications    sodium chloride       Scheduled Medications    furosemide  20 mg IntraVENous Daily    azithromycin  250 mg Oral Daily    magnesium oxide  400 mg Oral Daily    lisinopril  2.5 mg Oral Daily    sodium chloride flush  5-40 mL IntraVENous 2 times per day    ipratropium 0.5 mg-albuterol 2.5 mg  1 Dose Inhalation Q4H WA RT    [START ON 10/21/2024] predniSONE  40 mg Oral Daily    apixaban  5 mg Oral BID    aspirin  81 mg Oral Daily    atorvastatin  40 mg Oral Daily    arformoterol 15 mcg-budesonide 0.25 mg neb solution   Nebulization BID RT    carvedilol  25 mg Oral BID WC    hydrALAZINE  75 mg Oral TID    isosorbide mononitrate  30 mg Oral Daily    nicotine  1 patch TransDERmal Daily    pantoprazole  40 mg Oral BID AC     PRN Meds: ammonium lactate, sodium chloride flush, sodium chloride, ondansetron **OR** ondansetron, polyethylene glycol, acetaminophen **OR** acetaminophen, guaiFENesin-dextromethorphan, benzonatate, melatonin    Labs:     Recent Labs     10/18/24  0930 10/19/24  1015 10/20/24  0701   WBC 10.1 7.4 11.5   HGB 10.0* 10.3* 9.4*   HCT 32.1* 32.1* 29.2*    190 183       Recent Labs     10/18/24  0930 10/19/24  1015 10/20/24  0701    139 144   K 4.2 3.9 4.0    97* 101   CO2 35* 29 30*   BUN 25* 39* 39*   CREATININE 1.1 1.2 1.2   CALCIUM 9.3 8.8 8.8   PHOS  --  3.8  --        Recent Labs     10/18/24  0930   ALKPHOS 68   ALT 32   AST 48*   BILITOT 0.3       No results

## 2024-10-20 NOTE — PLAN OF CARE
Problem: Skin/Tissue Integrity  Goal: Absence of new skin breakdown  Description: 1.  Monitor for areas of redness and/or skin breakdown  2.  Assess vascular access sites hourly  3.  Every 4-6 hours minimum:  Change oxygen saturation probe site  4.  Every 4-6 hours:  If on nasal continuous positive airway pressure, respiratory therapy assess nares and determine need for appliance change or resting period.  10/20/2024 0128 by Payal Viera RN  Outcome: Progressing  10/19/2024 1227 by Chichi Hoover RN  Outcome: Progressing     Problem: ABCDS Injury Assessment  Goal: Absence of physical injury  10/20/2024 0128 by Payal Viera RN  Outcome: Progressing  10/19/2024 1227 by Chichi Hoover RN  Outcome: Progressing     Problem: Safety - Adult  Goal: Free from fall injury  10/20/2024 0128 by Payal Viera RN  Outcome: Progressing  10/19/2024 1227 by Chichi Hoover RN  Outcome: Progressing

## 2024-10-21 VITALS
DIASTOLIC BLOOD PRESSURE: 86 MMHG | OXYGEN SATURATION: 100 % | SYSTOLIC BLOOD PRESSURE: 102 MMHG | HEART RATE: 72 BPM | HEIGHT: 68 IN | TEMPERATURE: 98.2 F | BODY MASS INDEX: 20.28 KG/M2 | RESPIRATION RATE: 16 BRPM | WEIGHT: 133.82 LBS

## 2024-10-21 LAB
ANION GAP SERPL CALCULATED.3IONS-SCNC: 7 MMOL/L (ref 7–16)
BASOPHILS # BLD: 0.01 K/UL (ref 0–0.2)
BASOPHILS NFR BLD: 0 % (ref 0–2)
BUN SERPL-MCNC: 35 MG/DL (ref 6–23)
CALCIUM SERPL-MCNC: 8.9 MG/DL (ref 8.6–10.2)
CHLORIDE SERPL-SCNC: 101 MMOL/L (ref 98–107)
CO2 SERPL-SCNC: 34 MMOL/L (ref 22–29)
CREAT SERPL-MCNC: 1.2 MG/DL (ref 0.7–1.2)
EKG ATRIAL RATE: 75 BPM
EKG P AXIS: 64 DEGREES
EKG P-R INTERVAL: 142 MS
EKG Q-T INTERVAL: 454 MS
EKG QRS DURATION: 132 MS
EKG QTC CALCULATION (BAZETT): 506 MS
EKG R AXIS: -89 DEGREES
EKG T AXIS: 73 DEGREES
EKG VENTRICULAR RATE: 75 BPM
EOSINOPHIL # BLD: 0 K/UL (ref 0.05–0.5)
EOSINOPHILS RELATIVE PERCENT: 0 % (ref 0–6)
ERYTHROCYTE [DISTWIDTH] IN BLOOD BY AUTOMATED COUNT: 16.7 % (ref 11.5–15)
GFR, ESTIMATED: 62 ML/MIN/1.73M2
GLUCOSE SERPL-MCNC: 101 MG/DL (ref 74–99)
HCT VFR BLD AUTO: 29 % (ref 37–54)
HGB BLD-MCNC: 9.3 G/DL (ref 12.5–16.5)
IMM GRANULOCYTES # BLD AUTO: 0.12 K/UL (ref 0–0.58)
IMM GRANULOCYTES NFR BLD: 1 % (ref 0–5)
LYMPHOCYTES NFR BLD: 0.65 K/UL (ref 1.5–4)
LYMPHOCYTES RELATIVE PERCENT: 5 % (ref 20–42)
MAGNESIUM SERPL-MCNC: 2 MG/DL (ref 1.6–2.6)
MCH RBC QN AUTO: 28.6 PG (ref 26–35)
MCHC RBC AUTO-ENTMCNC: 32.1 G/DL (ref 32–34.5)
MCV RBC AUTO: 89.2 FL (ref 80–99.9)
MONOCYTES NFR BLD: 1.45 K/UL (ref 0.1–0.95)
MONOCYTES NFR BLD: 11 % (ref 2–12)
NEUTROPHILS NFR BLD: 83 % (ref 43–80)
NEUTS SEG NFR BLD: 11.15 K/UL (ref 1.8–7.3)
PLATELET # BLD AUTO: 173 K/UL (ref 130–450)
PMV BLD AUTO: 10.4 FL (ref 7–12)
POTASSIUM SERPL-SCNC: 3.8 MMOL/L (ref 3.5–5)
RBC # BLD AUTO: 3.25 M/UL (ref 3.8–5.8)
SODIUM SERPL-SCNC: 142 MMOL/L (ref 132–146)
WBC OTHER # BLD: 13.4 K/UL (ref 4.5–11.5)

## 2024-10-21 PROCEDURE — 6370000000 HC RX 637 (ALT 250 FOR IP): Performed by: STUDENT IN AN ORGANIZED HEALTH CARE EDUCATION/TRAINING PROGRAM

## 2024-10-21 PROCEDURE — 99239 HOSP IP/OBS DSCHRG MGMT >30: CPT | Performed by: STUDENT IN AN ORGANIZED HEALTH CARE EDUCATION/TRAINING PROGRAM

## 2024-10-21 PROCEDURE — 36415 COLL VENOUS BLD VENIPUNCTURE: CPT

## 2024-10-21 PROCEDURE — 6360000002 HC RX W HCPCS: Performed by: STUDENT IN AN ORGANIZED HEALTH CARE EDUCATION/TRAINING PROGRAM

## 2024-10-21 PROCEDURE — 83735 ASSAY OF MAGNESIUM: CPT

## 2024-10-21 PROCEDURE — 97161 PT EVAL LOW COMPLEX 20 MIN: CPT

## 2024-10-21 PROCEDURE — 80048 BASIC METABOLIC PNL TOTAL CA: CPT

## 2024-10-21 PROCEDURE — 2580000003 HC RX 258: Performed by: STUDENT IN AN ORGANIZED HEALTH CARE EDUCATION/TRAINING PROGRAM

## 2024-10-21 PROCEDURE — 94668 MNPJ CHEST WALL SBSQ: CPT

## 2024-10-21 PROCEDURE — 85025 COMPLETE CBC W/AUTO DIFF WBC: CPT

## 2024-10-21 PROCEDURE — 6370000000 HC RX 637 (ALT 250 FOR IP): Performed by: INTERNAL MEDICINE

## 2024-10-21 PROCEDURE — 93010 ELECTROCARDIOGRAM REPORT: CPT | Performed by: INTERNAL MEDICINE

## 2024-10-21 PROCEDURE — 94640 AIRWAY INHALATION TREATMENT: CPT

## 2024-10-21 RX ORDER — AZITHROMYCIN 250 MG/1
250 TABLET, FILM COATED ORAL DAILY
Qty: 3 TABLET | Refills: 0 | Status: SHIPPED | OUTPATIENT
Start: 2024-10-21 | End: 2024-10-24

## 2024-10-21 RX ORDER — PREDNISONE 20 MG/1
40 TABLET ORAL DAILY
Qty: 6 TABLET | Refills: 0 | Status: SHIPPED | OUTPATIENT
Start: 2024-10-21 | End: 2024-10-24

## 2024-10-21 RX ORDER — BENZONATATE 100 MG/1
100 CAPSULE ORAL 3 TIMES DAILY PRN
Qty: 30 CAPSULE | Refills: 0 | Status: SHIPPED | OUTPATIENT
Start: 2024-10-21 | End: 2024-10-31

## 2024-10-21 RX ORDER — AMMONIUM LACTATE 12 G/100G
LOTION TOPICAL
Qty: 396 G | Refills: 0 | Status: SHIPPED | OUTPATIENT
Start: 2024-10-21

## 2024-10-21 RX ORDER — GUAIFENESIN/DEXTROMETHORPHAN 100-10MG/5
5 SYRUP ORAL EVERY 4 HOURS PRN
Qty: 236 ML | Refills: 0 | Status: SHIPPED | OUTPATIENT
Start: 2024-10-21 | End: 2024-10-31

## 2024-10-21 RX ORDER — LISINOPRIL 5 MG/1
5 TABLET ORAL DAILY
Qty: 30 TABLET | Refills: 3 | Status: SHIPPED | OUTPATIENT
Start: 2024-10-21

## 2024-10-21 RX ORDER — POLYETHYLENE GLYCOL 3350 17 G/17G
17 POWDER, FOR SOLUTION ORAL DAILY PRN
Qty: 527 G | Refills: 1 | Status: SHIPPED | OUTPATIENT
Start: 2024-10-21 | End: 2024-12-22

## 2024-10-21 RX ORDER — FUROSEMIDE 20 MG/1
20 TABLET ORAL DAILY
Qty: 30 TABLET | Refills: 3 | Status: SHIPPED | OUTPATIENT
Start: 2024-10-21

## 2024-10-21 RX ORDER — POTASSIUM CHLORIDE 1500 MG/1
20 TABLET, EXTENDED RELEASE ORAL ONCE
Status: COMPLETED | OUTPATIENT
Start: 2024-10-21 | End: 2024-10-21

## 2024-10-21 RX ADMIN — FUROSEMIDE 20 MG: 10 INJECTION, SOLUTION INTRAMUSCULAR; INTRAVENOUS at 09:26

## 2024-10-21 RX ADMIN — ISOSORBIDE MONONITRATE 30 MG: 30 TABLET, EXTENDED RELEASE ORAL at 09:26

## 2024-10-21 RX ADMIN — AZITHROMYCIN DIHYDRATE 250 MG: 250 TABLET ORAL at 09:26

## 2024-10-21 RX ADMIN — SODIUM CHLORIDE, PRESERVATIVE FREE 10 ML: 5 INJECTION INTRAVENOUS at 09:27

## 2024-10-21 RX ADMIN — HYDRALAZINE HYDROCHLORIDE 75 MG: 50 TABLET ORAL at 09:27

## 2024-10-21 RX ADMIN — POTASSIUM CHLORIDE 20 MEQ: 1500 TABLET, EXTENDED RELEASE ORAL at 09:26

## 2024-10-21 RX ADMIN — ASPIRIN 81 MG: 81 TABLET, COATED ORAL at 09:26

## 2024-10-21 RX ADMIN — Medication 400 MG: at 09:27

## 2024-10-21 RX ADMIN — PANTOPRAZOLE SODIUM 40 MG: 40 TABLET, DELAYED RELEASE ORAL at 06:14

## 2024-10-21 RX ADMIN — PREDNISONE 40 MG: 20 TABLET ORAL at 09:26

## 2024-10-21 RX ADMIN — ATORVASTATIN CALCIUM 40 MG: 40 TABLET, FILM COATED ORAL at 09:26

## 2024-10-21 RX ADMIN — CARVEDILOL 25 MG: 25 TABLET, FILM COATED ORAL at 09:26

## 2024-10-21 RX ADMIN — IPRATROPIUM BROMIDE AND ALBUTEROL SULFATE 1 DOSE: 2.5; .5 SOLUTION RESPIRATORY (INHALATION) at 13:18

## 2024-10-21 RX ADMIN — APIXABAN 5 MG: 5 TABLET, FILM COATED ORAL at 09:27

## 2024-10-21 RX ADMIN — IPRATROPIUM BROMIDE AND ALBUTEROL SULFATE 1 DOSE: 2.5; .5 SOLUTION RESPIRATORY (INHALATION) at 09:00

## 2024-10-21 RX ADMIN — LISINOPRIL 5 MG: 10 TABLET ORAL at 09:26

## 2024-10-21 RX ADMIN — ARFORMOTEROL TARTRATE: 15 SOLUTION RESPIRATORY (INHALATION) at 09:00

## 2024-10-21 NOTE — CONSULTS
[facility]   Inpatient CHF Nurse Navigator Consult      Cardiologist: Dr. Luna Chavarria Jr. is a 74 y.o. (1950) male with a history of HFrEF, most recent EF:  Lab Results   Component Value Date    LVEF 40 09/11/2024       Patient was awake and alert, laying in bed during the consultation and is agreeable to heart failure education. He was engaged and asked appropriate questions throughout the education session.     Barriers identified during consult contributing to HF Hospitalization:  [] Limited medication adherence   [] Poor health literacy, education regarding HF medications provided   [] Pill box provided to patient  [] Difficulty affording medications  [] Difficulty obtaining/ managing medications  [] Prescription assistance information given     [] Not weighing themselves daily  [x] Weight log provided for easy monitoring  [] Scale provided     [] Not following low sodium diet  [] Food insecurity   [x] 2 gram sodium diet education provided   [] Low sodium recipes provided  [] Sodium free seasoning provided   [] Low sodium meal delivery options given to patient  [] Dietician consulted     [] Lack of transportation to appointments     [] Depression, given chronic illness  [] Primary team notified     [] Goals of care need addressed  [] Palliative care consulted     [] CHF CHW consulted, to assist with N/A.         Chart Reviewed:  Diet: ADULT DIET; Regular; Low Sodium (2 gm); High Fiber; 1500 ml   Daily Weights: Patient Vitals for the past 96 hrs (Last 3 readings):   Weight   10/19/24 1346 60.7 kg (133 lb 13.1 oz)   10/18/24 1850 61.5 kg (135 lb 9.3 oz)     I/O:   Intake/Output Summary (Last 24 hours) at 10/21/2024 0840  Last data filed at 10/21/2024 0719  Gross per 24 hour   Intake 240 ml   Output 1150 ml   Net -910 ml       [] Nursing staff/manager notified of inaccurate barnes weights or I/O        Discharge Plan:  Above identified barriers reviewed and  needs addressed    Patient/family educated on daily monitoring tools for CHF, made aware of signs and symptoms of worsening HF and to notify provider immediately of change in symptoms.     Heart Failure Home Instructions placed in patient's discharge instructions    Per AHA guidelines patient to be closely monitored following discharge with 7 day follow up appointment    Scheduling with the CHF clinic New consult to CHF clinic, appointment scheduled    Future Appointments   Date Time Provider Department Center   10/21/2024 10:15 AM Gunner Espinosa MD [facility]   10/23/2024  7:45 AM [facility] CHF ROOM 4 SEYZ CHF [facility]   10/29/2024 11:00 AM Nico Carrasco, DO Lily Card [facility]   11/22/2024 10:00 AM Dorita Kan, APRN - CNP [place]   1/30/2025  2:30 PM DORINA YFESTUS VAS US 1 SEYZ CARDIO [facility] Rad/Car   1/30/2025  3:00 PM Lee Brooks MD VAS/MED [facility]           Patient Education:  Self Monitoring/management:  Reviewed the introduction to Heart Failure, the HF zones, signs and symptoms to report on day 1 of onset, medications, medication compliance, the importance of obtaining daily weights, following a low sodium diet, reading food labels for the sodium content, keeping physician appointments, and smoking cessation.  Discussed writing / tracking daily weights on a calendar / log because a 5 pound gain in 1 week can sneak up if you are not tracking it.   Advised patient they can reduce the risk for Heart Failure exacerbations by modifying / controlling the risk factors.  Discussed self-managed care which includes the following: take medications as prescribed, report any intolerable side effects of medications to the medical provider (PCP or cardiologist), do not just stop taking the medication; follow a cardiac heart healthy / low sodium diet; weigh yourself daily, exercise regularly- per doctor recommendation and not to smoke or use an excess amount of alcohol.  Discussed calling the

## 2024-10-21 NOTE — DISCHARGE SUMMARY
Hospitalist Discharge Summary    Patient ID: Chuck Chavarria Jr.   Patient : 1950  Patient's PCP: Gunner Espinosa MD    Admit Date: 10/18/2024   Admitting Physician: Ji Zayas MD    Discharge Date:  10/21/2024   Discharge Physician: Ji Zayas MD   Discharge Condition: Stable  Discharge Disposition: Home      Hospital course in brief:  (Please refer to daily progress notes for a comprehensive review of the hospitalization by requesting medical records)    74-year-old male with past medical history of asthma, COPD, CVA, HLD HLD and HTN who presented to the ED with complaints of shortness of breath. Patient was recently hospitalized 10/4/2024-10/16/2024,  2024 - 2024 for the same complaints.   Presented with worsening SOB and wheezing.He received one breathing treatment by EMS. Continues to smokes.   Endorses some cough, with minimal mucus production.  On evaluation he is afebrile, /100 he HR 79 RR 18 SpO2 100% on 2 L nasal cannula.  Labs with normal WBC, proBNP 10,000, troponin 457 and repeat 427, BUN 25, creatinine 1.1.  Patient was given Solu-Medrol 125 mg IV once, DuoNebs x 3 but continued to have shortness of breath and wheezing.     10/19 still having some cough and SOB, on iv diuresis, cardiology has been consulted     10/20 improvement in breathing, sob, coughing, tolerating IV diuresis    10/21 continues to do well, did well with ambulation, no longer requring supplemental oxygen. Azithromycin to complete the course for antiinflammatory properties.  Follow up with Cardiology and Pulmonology as scheduled. Follow up with PCP.        Consults:   IP CONSULT TO INTERNAL MEDICINE  IP CONSULT TO CARDIOLOGY  IP CONSULT TO SMOKING CESSATION PROGRAM  IP CONSULT TO PODIATRY  IP CONSULT TO HEART FAILURE NURSE/COORDINATOR  IP CONSULT TO DIETITIAN    Discharge Diagnoses:  Acute on chronic hypoxic respiratory failure  COPD exacerbation  CHF exacerbation  Rhinovirus infection  History  of polysubstance use  Severe protein calorie malnutrition  Chronic anemia      Discharge Instructions / Follow up:  Follow-up with PCP within 1 week of discharge.  Follow-up with consultants as indicated by them.  Compliance with medications as prescribed on discharge.    Future Appointments   Date Time Provider Department Center   10/23/2024  7:45 AM [facility] CHF ROOM 4 SEYZ CHF [place]   10/25/2024  8:30 AM Gunner Espinosa MD [facility]   10/29/2024 11:00 AM Nico Carrasco, DO Bautista Card [facility]   11/22/2024 10:00 AM Dorita Kan, APRN - CNP [place] CHF [facility]   1/30/2025  2:30 PM DORINA BIGGS VAS US 1 SEYZ CARDIO [facility] Rad/Car   1/30/2025  3:00 PM Lee Brooks MD VASC/MED [facility]       The patient's condition is stable.  At this time the patient is without objective evidence of an acute process requiring continuing hospitalization or inpatient management.  They are stable for discharge with outpatient follow-up.     I have spoken with the patient and discussed the results of the current hospitalization, in addition to providing specific details for the plan of care and counseling regarding the diagnosis and prognosis.  The plan has been discussed in detail and they are aware of the specific conditions for emergent return, as well as the importance of follow-up.  Their questions are answered at this time and they are agreeable with the plan for discharge to home.    Continued appropriate risk factor modification of blood pressure, diabetes and serum lipids will remain essential to reducing risk of future atherosclerotic development    Activity: activity as tolerated    Physical exam:  General appearance: No apparent distress, appears stated age and cooperative.  HEENT: Conjunctivae/corneas clear. Mucous membranes moist.  Neck: Supple. No JVD.  Respiratory:  Clear to auscultation bilaterally.  Normal respiratory effort.   Cardiovascular:  RRR. S1, S2 without MRG.  PV: Pulses palpable. No edema.  pleural effusion.     Improvement without resolution of pulmonary interstitial edema.     XR CHEST PORTABLE    Result Date: 10/4/2024  EXAMINATION: ONE XRAY VIEW OF THE CHEST 10/4/2024 9:41 am COMPARISON: 09/09/2024 HISTORY: ORDERING SYSTEM PROVIDED HISTORY: Shortness of breath TECHNOLOGIST PROVIDED HISTORY: Reason for exam:->Shortness of breath FINDINGS: Cardiac silhouette is within normal limits. Pulmonary venous congestion is noted.  There are bilateral interstitial changes suspicious for pulmonary edema.  No pneumothorax is identified. Bony structures are unremarkable.     Pulmonary venous congestion with bilateral interstitial changes suspicious for pulmonary edema.       Discharge Medications:      Medication List        START taking these medications      ammonium lactate 12 % lotion  Commonly known as: LAC-HYDRIN  Apply topically as needed.Apply to bilateral feet and legs.     azithromycin 250 MG tablet  Commonly known as: ZITHROMAX  Take 1 tablet by mouth daily for 3 doses     benzonatate 100 MG capsule  Commonly known as: TESSALON  Take 1 capsule by mouth 3 times daily as needed for Cough     furosemide 20 MG tablet  Commonly known as: Lasix  Take 1 tablet by mouth daily     guaiFENesin-dextromethorphan 100-10 MG/5ML syrup  Commonly known as: ROBITUSSIN DM  Take 5 mLs by mouth every 4 hours as needed for Cough     lisinopril 5 MG tablet  Commonly known as: PRINIVIL;ZESTRIL  Take 1 tablet by mouth daily     polyethylene glycol 17 g packet  Commonly known as: GLYCOLAX  Take 1 packet by mouth daily as needed for Constipation     predniSONE 20 MG tablet  Commonly known as: DELTASONE  Take 2 tablets by mouth daily for 3 doses            CONTINUE taking these medications      albuterol sulfate  (90 Base) MCG/ACT inhaler  Commonly known as: PROVENTIL;VENTOLIN;PROAIR  Inhale 2 puffs into the lungs every 4 hours as needed for Wheezing     apixaban 5 MG Tabs tablet  Commonly known as: ELIQUIS  Take 1 tablet

## 2024-10-21 NOTE — PROGRESS NOTES
Pulse ox on room air sitting 100%  Pulse ox on room air ambulating 90%  Pulse ox on 1 liters recovery 93%  Pulse ox on 1 liters, ambulating recovery 93%

## 2024-10-21 NOTE — CARE COORDINATION
10/21/24, Discharge acknowledged.  Patient admitted for COPD.  SW met with patient in room to discuss transition of care/SW role.  Patient lives alone in an apartment building where there is elevator access.  DME owned is a cane and WW.  Observed patient having WW in room.  Patient says he uses the cane more than the WW.  PCP is Dr. Reyes Moore and Pharmacy was [facility] on [address].  Discussed [facility] closing which patient says he was aware of and says he would use [facility] on [address] if needed.  Discussed patient having a referral to the Smoking Cessation Program.  Patient was open to having referral made.  Discussed staff coming to speak with patient prior to discharge.  Call placed to Arpita and johnna left on Smoking Cessation consult.  Patient says he will take the bus home upon being released.  No other needs identified.  Updated nursing on the above.  Per RN patient does not quality for home 02.  SW to follow.      Case Management Assessment  Initial Evaluation    Date/Time of Evaluation: 10/21/2024 11:08 AM  Assessment Completed by: DANYELLE Garcia    If patient is discharged prior to next notation, then this note serves as note for discharge by case management.    Patient Name: Chuck Chavarria Jr.                   YOB: 1950  Diagnosis: COPD exacerbation (HCC) [J44.1]                   Date / Time: 10/18/2024  9:04 AM    Patient Admission Status: Inpatient   Readmission Risk (Low < 19, Mod (19-27), High > 27): Readmission Risk Score: 30    Current PCP: Gunner Espinosa MD  PCP verified by ? Yes    Chart Reviewed: Yes      History Provided by: Patient  Patient Orientation: Alert and Oriented    Patient Cognition: Alert    Hospitalization in the last 30 days (Readmission):  Yes    If yes, Readmission Assessment in  Navigator will be completed.    Advance Directives:      Code Status: Full Code   Patient's Primary Decision Maker is:      Primary Decision Maker: Ashia Tillman

## 2024-10-21 NOTE — PROGRESS NOTES
CLINICAL PHARMACY NOTE: MEDS TO BEDS    Total # of Prescriptions Filled: 8   The following medications were delivered to the patient:  Azithromycin 250 mg  Brandi-tussin dm #236  Miralax #510  Benzonatate 100 mg  Ammonium lactate 12% lotion  Prednisone 20 mg  Furosemide 20 mg  Lisinopril 5 mg    Additional Documentation:   Delivered to RN at the desk

## 2024-10-21 NOTE — ACP (ADVANCE CARE PLANNING)
10/21/24, Advance Care Planning   Healthcare Decision Maker:    Primary Decision Maker: Ashia Tillman - Brother/Sister - [phone]    Secondary Decision Maker: Elena Tillman - Other - [phone]    Supplemental (Other) Decision Maker: BRIANNAEVANGELINA - Grandchild - [phone]    Click here to complete Healthcare Decision Makers including selection of the Healthcare Decision Maker Relationship (ie \"Primary\").

## 2024-10-21 NOTE — CARE COORDINATION
reached out to patients PCP office Dr Gunner Moore at [phone] to schedule follow up D/C appointment.  spoke to Winston and scheduled appointment on 10/25 at 8:30 AM in office.     spoke to patient on room phone and provided an update on scheduled appointment.     Information added to D/C summary.

## 2024-10-21 NOTE — PLAN OF CARE
Problem: Skin/Tissue Integrity  Goal: Absence of new skin breakdown  Description: 1.  Monitor for areas of redness and/or skin breakdown  2.  Assess vascular access sites hourly  3.  Every 4-6 hours minimum:  Change oxygen saturation probe site  4.  Every 4-6 hours:  If on nasal continuous positive airway pressure, respiratory therapy assess nares and determine need for appliance change or resting period.  10/20/2024 2245 by Payal Viera RN  Outcome: Progressing  10/20/2024 1229 by Jennifer Ch RN  Outcome: Progressing     Problem: ABCDS Injury Assessment  Goal: Absence of physical injury  10/20/2024 2245 by Payal Viera RN  Outcome: Progressing  10/20/2024 1229 by Jennifer Ch RN  Outcome: Progressing     Problem: Safety - Adult  Goal: Free from fall injury  10/20/2024 2245 by Payal Viera RN  Outcome: Progressing  10/20/2024 1229 by Jennifer Ch RN  Outcome: Progressing

## 2024-10-21 NOTE — PROGRESS NOTES
Ctts received referral from SUNITA Gonzales to discuss Tobacco Treatment Program.  Patient is agreeable to program but requested a return visit after he is done eating.  Ctts left flyer with patient and will come back to room to schedule an intake.     Electronically signed by Radha Garcia on 10/21/2024 at 11:58 AM  Certified Tobacco Treatment Specialist

## 2024-10-21 NOTE — PROGRESS NOTES
Physical Therapy Initial Evaluation    Name: Chuck Chavarria Jr.  : 1950  MRN: [id number]      Date of Service: 10/21/2024    Evaluating PT:  Tony Colvin, PT [id number]    Referring provider/PT Order:  PT Eval and Treat   Electronically Signed By  Ji Zayas MD  NPI:  [id number] Date  Oct 19, 2024  8:49 AM     Room #:  8208/8208-A  Diagnosis:  COPD exacerbation (HCC) [J44.1]  PMHx/PSHx:     has a past medical history of A-fib (HCC), Arthritis, Bilateral carotid artery stenosis, Cataract, left eye, Cerebral infarction due to occlusion of right vertebral artery (HCC), Cocaine abuse (HCC), COPD (chronic obstructive pulmonary disease) (HCC), Hyperlipidemia, Hypertension, Occlusion of right vertebral artery, and Tobacco dependence.    has a past surgical history that includes Dental surgery; Cardiac catheterization (2023); Tonsillectomy; Colonoscopy; and Upper gastrointestinal endoscopy (N/A, 2024).     Procedure/Surgery:  none  Precautions:  Falls,   Equipment Needs: None,    SUBJECTIVE:    Pt lives alone in an apartment level entry & uses elevator to assess 5th floor apartment. Patient ambulated independently  PTA. Equipment owned: None,      OBJECTIVE:   Initial Evaluation  Date: 10/21/24 Treatment Short Term/ Long Term   Goals   AM-PAC 6 Clicks      Was pt agreeable to Eval/treatment? Yes      Does pt have pain? no     Bed Mobility  Rolling: Sup  Supine to sit:   Sup   Sit to supine: Sup   Scooting: Sup   Rolling: Ind  Supine to sit: Ind  Sit to supine: Ind  Scooting: Ind   Transfers Sit to stand: Sup   Stand to sit: Sup  Stand pivot: Sup   Sit to stand: Mod Ind    Stand to sit: Mod Ind    Stand pivot: Mod Ind     Ambulation    20 x 2 feet    Sup in room only to restroom no LOB  Ambulated in navarro with RN prior this date.   100+ feet  and Mod Ind     Stair negotiation: ascended and descended  NT       Strength/ROM:   See OT note for BUE ROM and strength  RLE grossly 4+/5  LLE grossly      [x] Strengthening to improve independence with functional mobility   [x] ROM to improve independence with functional mobility   [x] Balance Training to improve static/dynamic balance and to reduce fall risk  [x] Endurance Training to improve activity tolerance during functional mobility   [x] Transfer Training to improve safety and independence with all functional transfers   [x] Gait Training to improve gait mechanics, endurance and asses need for appropriate assistive device when appropriate.   [x] Stair Training in preparation for safe discharge home and/or into the community when appropriate  [] Positioning to prevent skin breakdown and contractures  [x] Safety and Education Training   [] Patient/Caregiver Education   [] HEP  [] Gait Team to be added to POC  [] Other     PT long term treatment goals are located in above grid    Frequency of treatments: 2-5x/week x 1-2 weeks.    Time in  1315  Time out  1330    Total Treatment Time  0 minutes     Evaluation Time includes thorough review of current medical information, gathering information on past medical history/social history and prior level of function, completion of standardized testing/informal observation of tasks, assessment of data and education on plan of care and goals.    CPT codes:  [x] Low Complexity PT evaluation 56496  [] Moderate Complexity PT evaluation 73864  [] High Complexity PT evaluation 56190  [] PT Re-evaluation 90647  [] Gait training 25777 - minutes  [] Manual therapy 23334  minutes  [] Therapeutic activities 41518 - minutes  [] Therapeutic exercises 09034 - minutes  [] Neuromuscular reeducation 12846 minutes     Tony Colvin, PT [id number]

## 2024-10-23 ENCOUNTER — HOSPITAL ENCOUNTER (OUTPATIENT)
Dept: OTHER | Age: 74
Setting detail: THERAPIES SERIES
Discharge: HOME | End: 2024-10-23
Payer: COMMERCIAL

## 2024-10-23 ENCOUNTER — TELEPHONE (OUTPATIENT)
Dept: OTHER | Age: 74
End: 2024-10-23

## 2024-10-23 VITALS
WEIGHT: 132 LBS | RESPIRATION RATE: 18 BRPM | BODY MASS INDEX: 20.07 KG/M2 | HEART RATE: 93 BPM | SYSTOLIC BLOOD PRESSURE: 167 MMHG | DIASTOLIC BLOOD PRESSURE: 96 MMHG | OXYGEN SATURATION: 97 %

## 2024-10-23 LAB
ANION GAP SERPL CALCULATED.3IONS-SCNC: 8 MMOL/L (ref 7–16)
BNP SERPL-MCNC: 4756 PG/ML (ref 0–450)
BUN SERPL-MCNC: 29 MG/DL (ref 6–23)
CALCIUM SERPL-MCNC: 8.6 MG/DL (ref 8.6–10.2)
CHLORIDE SERPL-SCNC: 103 MMOL/L (ref 98–107)
CO2 SERPL-SCNC: 30 MMOL/L (ref 22–29)
CREAT SERPL-MCNC: 1 MG/DL (ref 0.7–1.2)
GFR, ESTIMATED: 81 ML/MIN/1.73M2
GLUCOSE SERPL-MCNC: 158 MG/DL (ref 74–99)
POTASSIUM SERPL-SCNC: 3.9 MMOL/L (ref 3.5–5)
SODIUM SERPL-SCNC: 141 MMOL/L (ref 132–146)

## 2024-10-23 PROCEDURE — 80048 BASIC METABOLIC PNL TOTAL CA: CPT

## 2024-10-23 PROCEDURE — 83880 ASSAY OF NATRIURETIC PEPTIDE: CPT

## 2024-10-23 PROCEDURE — 99205 OFFICE O/P NEW HI 60 MIN: CPT

## 2024-10-23 RX ORDER — HYDRALAZINE HYDROCHLORIDE 50 MG/1
75 TABLET, FILM COATED ORAL 3 TIMES DAILY
Qty: 90 TABLET | Refills: 1 | Status: SHIPPED | OUTPATIENT
Start: 2024-10-23 | End: 2024-10-24 | Stop reason: DRUGHIGH

## 2024-10-23 RX ORDER — ISOSORBIDE MONONITRATE 30 MG/1
30 TABLET, EXTENDED RELEASE ORAL DAILY
Qty: 30 TABLET | Refills: 1 | Status: SHIPPED | OUTPATIENT
Start: 2024-10-23

## 2024-10-23 ASSESSMENT — PATIENT HEALTH QUESTIONNAIRE - PHQ9
SUM OF ALL RESPONSES TO PHQ QUESTIONS 1-9: 2
2. FEELING DOWN, DEPRESSED OR HOPELESS: SEVERAL DAYS
SUM OF ALL RESPONSES TO PHQ QUESTIONS 1-9: 2
SUM OF ALL RESPONSES TO PHQ QUESTIONS 1-9: 2
SUM OF ALL RESPONSES TO PHQ9 QUESTIONS 1 & 2: 2
SUM OF ALL RESPONSES TO PHQ QUESTIONS 1-9: 2
1. LITTLE INTEREST OR PLEASURE IN DOING THINGS: SEVERAL DAYS

## 2024-10-23 NOTE — TELEPHONE ENCOUNTER
10:57 AM Spoke with pts sister to review medications. Compared meds to list on AVS given upon hospital d/c on 10/21. Will inquire about sending scripts for hydralazine and imdur and advised sister to get baby aspirin OTC. Pt did not have protonix or vitamins. Advised to inquire about these medications at appt with PCP on 10/25.    Electronically signed by Katlin Case RN on 10/23/2024 at 11:00 AM

## 2024-10-23 NOTE — PROGRESS NOTES
educated on importance of compliance and answered any questions regarding their medication  [] Pill box provided to patient  [] Patient using pill packing pharmacy   [] CPAP/BiPAP use  [] Low impact exercise / cardiac rehab   [] LifeVest use  [x] Patient aware of signs and symptoms of worsening HF, CHF clinic phone number provided and made aware to call clinic for sooner if evaluation if needed     [] Difficulty affording medications  [] CHF CHW consulted  [] Prescription assistance information given   [] [facility] medication assistance program information given   [] Sample medications provided to patient to help bridge gap until affordability N/A          Scheduled to follow up in CHF clinic on:   Future Appointments   Date Time Provider Department Center   10/25/2024  8:30 AM [facility]   10/29/2024 11:00 AM Nico Carrasco DO [place] [facility]   11/6/2024  7:45 AM [facility] CHF ROOM 1 [facility] CHF [facility]   11/22/2024 10:00 AM Dorita Kan, APRN - CNP [facility] CHF [facility]   1/30/2025  2:30 PM DORINA NICHOLE US 1 SEY[facility] CARDIO [facility] Rad/Car   1/30/2025  3:00 PM Lee Brooks MD VAS/MED [facility]

## 2024-10-23 NOTE — RESULT ENCOUNTER NOTE
CHF clinic visit and labs reviewed   VS: 167/96, 93    Renal function improving, potassium 3.9  BNP trending down >>4756    Patient/family unsure of medications  GDMT as listed in EMR:   Lisinopril 5 mg daily   Coreg 25 mg twice daily   Hydralazine 75 mg three times daily   Imdur 30 mg daily   Also Lasix 20 mg daily     Will defer medication changes until we confirm what he is actually taking.   Follow up as scheduled  No

## 2024-10-24 ENCOUNTER — HOSPITAL ENCOUNTER (EMERGENCY)
Age: 74
Discharge: HOME | End: 2024-10-24
Attending: EMERGENCY MEDICINE
Payer: COMMERCIAL

## 2024-10-24 ENCOUNTER — APPOINTMENT (OUTPATIENT)
Dept: GENERAL RADIOLOGY | Age: 74
End: 2024-10-24
Payer: COMMERCIAL

## 2024-10-24 ENCOUNTER — TELEPHONE (OUTPATIENT)
Dept: OTHER | Age: 74
End: 2024-10-24

## 2024-10-24 VITALS
HEART RATE: 84 BPM | HEIGHT: 68 IN | TEMPERATURE: 97.6 F | WEIGHT: 137 LBS | SYSTOLIC BLOOD PRESSURE: 145 MMHG | BODY MASS INDEX: 20.76 KG/M2 | RESPIRATION RATE: 15 BRPM | OXYGEN SATURATION: 97 % | DIASTOLIC BLOOD PRESSURE: 87 MMHG

## 2024-10-24 DIAGNOSIS — I48.91 ATRIAL FIBRILLATION, UNSPECIFIED TYPE (HCC): Primary | ICD-10-CM

## 2024-10-24 LAB
ALBUMIN SERPL-MCNC: 3.6 G/DL (ref 3.5–5.2)
ALP SERPL-CCNC: 78 U/L (ref 40–129)
ALT SERPL-CCNC: 37 U/L (ref 0–40)
ANION GAP SERPL CALCULATED.3IONS-SCNC: 6 MMOL/L (ref 7–16)
AST SERPL-CCNC: 44 U/L (ref 0–39)
BASOPHILS # BLD: 0 K/UL (ref 0–0.2)
BASOPHILS NFR BLD: 0 % (ref 0–2)
BILIRUB SERPL-MCNC: 0.3 MG/DL (ref 0–1.2)
BNP SERPL-MCNC: ABNORMAL PG/ML (ref 0–450)
BUN SERPL-MCNC: 22 MG/DL (ref 6–23)
CALCIUM SERPL-MCNC: 9.5 MG/DL (ref 8.6–10.2)
CHLORIDE SERPL-SCNC: 101 MMOL/L (ref 98–107)
CO2 SERPL-SCNC: 37 MMOL/L (ref 22–29)
CREAT SERPL-MCNC: 0.9 MG/DL (ref 0.7–1.2)
EOSINOPHIL # BLD: 0 K/UL (ref 0.05–0.5)
EOSINOPHILS RELATIVE PERCENT: 0 % (ref 0–6)
ERYTHROCYTE [DISTWIDTH] IN BLOOD BY AUTOMATED COUNT: 16.6 % (ref 11.5–15)
GFR, ESTIMATED: 87 ML/MIN/1.73M2
GLUCOSE SERPL-MCNC: 125 MG/DL (ref 74–99)
HCT VFR BLD AUTO: 34.3 % (ref 37–54)
HGB BLD-MCNC: 10.7 G/DL (ref 12.5–16.5)
LYMPHOCYTES NFR BLD: 0.34 K/UL (ref 1.5–4)
LYMPHOCYTES RELATIVE PERCENT: 4 % (ref 20–42)
MAGNESIUM SERPL-MCNC: 1.9 MG/DL (ref 1.6–2.6)
MCH RBC QN AUTO: 28.2 PG (ref 26–35)
MCHC RBC AUTO-ENTMCNC: 31.2 G/DL (ref 32–34.5)
MCV RBC AUTO: 90.5 FL (ref 80–99.9)
MONOCYTES NFR BLD: 0.26 K/UL (ref 0.1–0.95)
MONOCYTES NFR BLD: 3 % (ref 2–12)
NEUTROPHILS NFR BLD: 94 % (ref 43–80)
NEUTS SEG NFR BLD: 9.3 K/UL (ref 1.8–7.3)
NUCLEATED RED BLOOD CELLS: 1 PER 100 WBC
PLATELET # BLD AUTO: 188 K/UL (ref 130–450)
PMV BLD AUTO: 9.6 FL (ref 7–12)
POTASSIUM SERPL-SCNC: 4.2 MMOL/L (ref 3.5–5)
PROT SERPL-MCNC: 6.7 G/DL (ref 6.4–8.3)
RBC # BLD AUTO: 3.79 M/UL (ref 3.8–5.8)
RBC # BLD: ABNORMAL 10*6/UL
SODIUM SERPL-SCNC: 144 MMOL/L (ref 132–146)
TROPONIN I SERPL HS-MCNC: 193 NG/L (ref 0–11)
TROPONIN I SERPL HS-MCNC: 213 NG/L (ref 0–11)
WBC OTHER # BLD: 9.9 K/UL (ref 4.5–11.5)

## 2024-10-24 PROCEDURE — 99285 EMERGENCY DEPT VISIT HI MDM: CPT

## 2024-10-24 PROCEDURE — 93005 ELECTROCARDIOGRAM TRACING: CPT | Performed by: PHYSICIAN ASSISTANT

## 2024-10-24 PROCEDURE — 2580000003 HC RX 258: Performed by: EMERGENCY MEDICINE

## 2024-10-24 PROCEDURE — 94664 DEMO&/EVAL PT USE INHALER: CPT

## 2024-10-24 PROCEDURE — 93005 ELECTROCARDIOGRAM TRACING: CPT | Performed by: EMERGENCY MEDICINE

## 2024-10-24 PROCEDURE — 6360000002 HC RX W HCPCS: Performed by: EMERGENCY MEDICINE

## 2024-10-24 PROCEDURE — 80053 COMPREHEN METABOLIC PANEL: CPT

## 2024-10-24 PROCEDURE — 85025 COMPLETE CBC W/AUTO DIFF WBC: CPT

## 2024-10-24 PROCEDURE — 83735 ASSAY OF MAGNESIUM: CPT

## 2024-10-24 PROCEDURE — 6370000000 HC RX 637 (ALT 250 FOR IP): Performed by: EMERGENCY MEDICINE

## 2024-10-24 PROCEDURE — 83880 ASSAY OF NATRIURETIC PEPTIDE: CPT

## 2024-10-24 PROCEDURE — 96374 THER/PROPH/DIAG INJ IV PUSH: CPT

## 2024-10-24 PROCEDURE — 71045 X-RAY EXAM CHEST 1 VIEW: CPT

## 2024-10-24 PROCEDURE — 84484 ASSAY OF TROPONIN QUANT: CPT

## 2024-10-24 RX ORDER — CARVEDILOL 6.25 MG/1
25 TABLET ORAL ONCE
Status: COMPLETED | OUTPATIENT
Start: 2024-10-24 | End: 2024-10-24

## 2024-10-24 RX ORDER — CARVEDILOL 12.5 MG/1
12.5 TABLET ORAL 2 TIMES DAILY
Qty: 60 TABLET | Refills: 0 | Status: SHIPPED | OUTPATIENT
Start: 2024-10-24 | End: 2024-11-23

## 2024-10-24 RX ORDER — HYDRALAZINE HYDROCHLORIDE 25 MG/1
25 TABLET, FILM COATED ORAL 3 TIMES DAILY
Qty: 90 TABLET | Refills: 3 | Status: SHIPPED | OUTPATIENT
Start: 2024-10-24

## 2024-10-24 RX ORDER — IPRATROPIUM BROMIDE AND ALBUTEROL SULFATE 2.5; .5 MG/3ML; MG/3ML
1 SOLUTION RESPIRATORY (INHALATION) ONCE
Status: COMPLETED | OUTPATIENT
Start: 2024-10-24 | End: 2024-10-24

## 2024-10-24 RX ORDER — CARVEDILOL 6.25 MG/1
12.5 TABLET ORAL ONCE
Status: DISCONTINUED | OUTPATIENT
Start: 2024-10-24 | End: 2024-10-24

## 2024-10-24 RX ORDER — METOPROLOL TARTRATE 1 MG/ML
5 INJECTION, SOLUTION INTRAVENOUS ONCE
Status: DISCONTINUED | OUTPATIENT
Start: 2024-10-24 | End: 2024-10-24

## 2024-10-24 RX ORDER — HYDRALAZINE HYDROCHLORIDE 50 MG/1
50 TABLET, FILM COATED ORAL 3 TIMES DAILY
Qty: 90 TABLET | Refills: 3 | Status: SHIPPED | OUTPATIENT
Start: 2024-10-24

## 2024-10-24 RX ADMIN — WATER 125 MG: 1 INJECTION INTRAMUSCULAR; INTRAVENOUS; SUBCUTANEOUS at 11:34

## 2024-10-24 RX ADMIN — IPRATROPIUM BROMIDE AND ALBUTEROL SULFATE 1 DOSE: 2.5; .5 SOLUTION RESPIRATORY (INHALATION) at 10:49

## 2024-10-24 RX ADMIN — CARVEDILOL 25 MG: 6.25 TABLET, FILM COATED ORAL at 18:50

## 2024-10-24 ASSESSMENT — PAIN - FUNCTIONAL ASSESSMENT: PAIN_FUNCTIONAL_ASSESSMENT: NONE - DENIES PAIN

## 2024-10-24 ASSESSMENT — LIFESTYLE VARIABLES: HOW OFTEN DO YOU HAVE A DRINK CONTAINING ALCOHOL: MONTHLY OR LESS

## 2024-10-24 NOTE — ED PROVIDER NOTES
[facility] EMERGENCY DEPARTMENT  EMERGENCY DEPARTMENT ENCOUNTER        Pt Name: Chuck Chavarria Jr.  MRN: [id number]  Birthdate 1950  Date of evaluation: 10/24/2024  Provider: Kathy Mcclelland DO  PCP: Gunner Espinosa MD  Note Started: 10:23 AM EDT 10/24/24    CHIEF COMPLAINT       Chief Complaint   Patient presents with    Shortness of Breath     Pt states he has asthma and his inhaler is not giving him relief. Recently admitted in mid October        HISTORY OF PRESENT ILLNESS: 1 or more Elements   History From: Patient    Limitations to history : None    Chuck Chavarria Jr. is a 74 y.o. male who presents with concern for worsening shortness breath.  He notes that has been ongoing for the past 3 to 4 days, ambulating makes it better, rest makes it worse, he has been having intermittent cough.  He is still a chronic smoker, he is on Eliquis, not associate with chest pain, lightheadedness, numbness, weakness, tingling.  He does live at home alone and generally ambulates with a cane.  No fevers or chills, no other associated complaints.      EXTERNAL NOTE REVIEW:      On review his last [facility] hospital for COPD exacerbation on 10/18/2024.  Last echo was 9/11/2024 demonstrating moderately reduced left ventricular systolic function, EF was approximately 40% and left ventricular mildly dilated.    REVIEW OF SYSTEMS :      Positives and Pertinent negatives as per HPI.     SURGICAL HISTORY     Past Surgical History:   Procedure Laterality Date    CARDIAC CATHETERIZATION  08/25/2023    COLONOSCOPY      DENTAL SURGERY      TONSILLECTOMY      UPPER GASTROINTESTINAL ENDOSCOPY N/A 7/16/2024    ESOPHAGOGASTRODUODENOSCOPY performed by Todd Chow MD at [facility] ENDOSCOPY       CURRENTMEDICATIONS       Previous Medications    ALBUTEROL SULFATE HFA (PROVENTIL;VENTOLIN;PROAIR) 108 (90 BASE) MCG/ACT INHALER    Inhale 2 puffs into the lungs every 4 hours as needed for Wheezing

## 2024-10-24 NOTE — TELEPHONE ENCOUNTER
[facility] called inquiring about changing the patients Hydralazine order so insurance will cover the medication. They are requesting 2 separate prescriptions: Hydralazine 50 mg TID and a  Hydralazine 25 mg TID to equal the patients current dose. Note will be routed to Brooklynn SY for the updated prescription request.

## 2024-10-25 ENCOUNTER — OFFICE VISIT (OUTPATIENT)
Dept: PRIMARY CARE CLINIC | Age: 74
End: 2024-10-25
Payer: COMMERCIAL

## 2024-10-25 VITALS
BODY MASS INDEX: 18.94 KG/M2 | HEART RATE: 74 BPM | HEIGHT: 68 IN | WEIGHT: 125 LBS | DIASTOLIC BLOOD PRESSURE: 70 MMHG | TEMPERATURE: 97.5 F | SYSTOLIC BLOOD PRESSURE: 142 MMHG | OXYGEN SATURATION: 93 % | RESPIRATION RATE: 16 BRPM

## 2024-10-25 DIAGNOSIS — I10 ESSENTIAL HYPERTENSION: Primary | ICD-10-CM

## 2024-10-25 DIAGNOSIS — I50.20 HFREF (HEART FAILURE WITH REDUCED EJECTION FRACTION) (HCC): ICD-10-CM

## 2024-10-25 DIAGNOSIS — Z72.0 TOBACCO ABUSE: ICD-10-CM

## 2024-10-25 DIAGNOSIS — F14.10 COCAINE ABUSE (HCC): ICD-10-CM

## 2024-10-25 DIAGNOSIS — J44.9 CHRONIC OBSTRUCTIVE PULMONARY DISEASE, UNSPECIFIED COPD TYPE (HCC): ICD-10-CM

## 2024-10-25 DIAGNOSIS — I48.91 ATRIAL FIBRILLATION, UNSPECIFIED TYPE (HCC): ICD-10-CM

## 2024-10-25 DIAGNOSIS — E78.2 MIXED HYPERLIPIDEMIA: ICD-10-CM

## 2024-10-25 PROBLEM — I63.9 ACUTE STROKE DUE TO ISCHEMIA (HCC): Status: RESOLVED | Noted: 2024-01-18 | Resolved: 2024-10-25

## 2024-10-25 PROBLEM — N17.9 AKI (ACUTE KIDNEY INJURY) (HCC): Status: RESOLVED | Noted: 2022-08-26 | Resolved: 2024-10-25

## 2024-10-25 LAB
EKG ATRIAL RATE: 293 BPM
EKG ATRIAL RATE: 312 BPM
EKG Q-T INTERVAL: 372 MS
EKG Q-T INTERVAL: 390 MS
EKG QRS DURATION: 122 MS
EKG QRS DURATION: 128 MS
EKG QTC CALCULATION (BAZETT): 517 MS
EKG QTC CALCULATION (BAZETT): 530 MS
EKG R AXIS: -90 DEGREES
EKG R AXIS: -92 DEGREES
EKG T AXIS: -78 DEGREES
EKG T AXIS: 96 DEGREES
EKG VENTRICULAR RATE: 111 BPM
EKG VENTRICULAR RATE: 116 BPM

## 2024-10-25 PROCEDURE — 4004F PT TOBACCO SCREEN RCVD TLK: CPT | Performed by: INTERNAL MEDICINE

## 2024-10-25 PROCEDURE — 3017F COLORECTAL CA SCREEN DOC REV: CPT | Performed by: INTERNAL MEDICINE

## 2024-10-25 PROCEDURE — 3078F DIAST BP <80 MM HG: CPT | Performed by: INTERNAL MEDICINE

## 2024-10-25 PROCEDURE — 3023F SPIROM DOC REV: CPT | Performed by: INTERNAL MEDICINE

## 2024-10-25 PROCEDURE — G8484 FLU IMMUNIZE NO ADMIN: HCPCS | Performed by: INTERNAL MEDICINE

## 2024-10-25 PROCEDURE — 93010 ELECTROCARDIOGRAM REPORT: CPT | Performed by: INTERNAL MEDICINE

## 2024-10-25 PROCEDURE — G8427 DOCREV CUR MEDS BY ELIG CLIN: HCPCS | Performed by: INTERNAL MEDICINE

## 2024-10-25 PROCEDURE — 1123F ACP DISCUSS/DSCN MKR DOCD: CPT | Performed by: INTERNAL MEDICINE

## 2024-10-25 PROCEDURE — 99214 OFFICE O/P EST MOD 30 MIN: CPT | Performed by: INTERNAL MEDICINE

## 2024-10-25 PROCEDURE — 3077F SYST BP >= 140 MM HG: CPT | Performed by: INTERNAL MEDICINE

## 2024-10-25 PROCEDURE — G8420 CALC BMI NORM PARAMETERS: HCPCS | Performed by: INTERNAL MEDICINE

## 2024-10-25 PROCEDURE — 1111F DSCHRG MED/CURRENT MED MERGE: CPT | Performed by: INTERNAL MEDICINE

## 2024-10-25 ASSESSMENT — ENCOUNTER SYMPTOMS
SHORTNESS OF BREATH: 0
NAUSEA: 0
DIARRHEA: 0
ABDOMINAL PAIN: 0
VOMITING: 0
COUGH: 0

## 2024-10-25 NOTE — PROGRESS NOTES
smoker. Discussed smoking cessation.    All hospital record reviewed     Outpatient Encounter Medications as of 10/25/2024   Medication Sig Dispense Refill    hydrALAZINE (APRESOLINE) 50 MG tablet Take 1 tablet by mouth 3 times daily 90 tablet 3    hydrALAZINE (APRESOLINE) 25 MG tablet Take 1 tablet by mouth 3 times daily 90 tablet 3    carvedilol (COREG) 12.5 MG tablet Take 1 tablet by mouth 2 times daily 60 tablet 0    isosorbide mononitrate (IMDUR) 30 MG extended release tablet Take 1 tablet by mouth daily 30 tablet 1    lisinopril (PRINIVIL;ZESTRIL) 5 MG tablet Take 1 tablet by mouth daily 30 tablet 3    benzonatate (TESSALON) 100 MG capsule Take 1 capsule by mouth 3 times daily as needed for Cough 30 capsule 0    guaiFENesin-dextromethorphan (ROBITUSSIN DM) 100-10 MG/5ML syrup Take 5 mLs by mouth every 4 hours as needed for Cough 236 mL 0    ammonium lactate (LAC-HYDRIN) 12 % lotion Apply topically as needed.Apply to bilateral feet and legs. 396 g 0    furosemide (LASIX) 20 MG tablet Take 1 tablet by mouth daily 30 tablet 3    polyethylene glycol (GLYCOLAX) 17 g packet Take 1 packet by mouth daily as needed for Constipation 527 g 1    carvedilol (COREG) 25 MG tablet Take 1 tablet by mouth 2 times daily (with meals) 60 tablet 0    pantoprazole (PROTONIX) 40 MG tablet Take 1 tablet by mouth 2 times daily (before meals) (Patient not taking: Reported on 10/23/2024) 60 tablet 0    budesonide-formoterol (SYMBICORT) 160-4.5 MCG/ACT AERO Inhale 2 puffs into the lungs 2 times daily 30.6 g 0    tiotropium (SPIRIVA HANDIHALER) 18 MCG inhalation capsule Inhale 1 capsule into the lungs daily 90 capsule 0    albuterol sulfate HFA (PROVENTIL;VENTOLIN;PROAIR) 108 (90 Base) MCG/ACT inhaler Inhale 2 puffs into the lungs every 4 hours as needed for Wheezing 1 each 0    apixaban (ELIQUIS) 5 MG TABS tablet Take 1 tablet by mouth 2 times daily 60 tablet 0    melatonin 3 MG TABS tablet Take 1.5 tablets by mouth nightly as needed

## 2024-10-25 NOTE — PROGRESS NOTES
Physician Progress Note      PATIENT:               BRANDEN REED  CSN #:                  [id number]  :                       1950  ADMIT DATE:       10/4/2024 8:55 AM  DISCH DATE:        10/16/2024 3:30 PM  RESPONDING  PROVIDER #:        Huseyin Villagran MD          QUERY TEXT:    Patient admitted with acute respiratory failure. Noted documentation of NSTEMI   vs demand ischemia per IM notes.  In order to support the diagnosis of   NSTEMI, please include additional clinical indicators in your documentation.    Or please document if the diagnosis of NSTEMI has been ruled out after further   study.    The medical record reflects the following:  Risk Factors: advanced age  Clinical Indicators: troponins 41, 71, 780, 763, 752, 628, 932, 437, per   cardiology consult \"...Elevated troponin in the setting of hypoxia, anemia,   HENNY on CKD, uncontrolled hypertension. [facility] 2023 with no significant   stenosis of any vessel...\", per IM 10/10-10/13 \"...NSTEMI vs demand ischemia.   Markedly elevated troponin over night at time of RRT at 780, now down   trending. Likely demand ischemia due to hypoxia which was confirmed at time of   RRT...\", per IM 10/14 \"...NSTEMI vs demand ischemia. Cardiology following   likely demand ischemia...\", per IM 10/15 \"...NSTEMI 2/ de  Treatment: cardiology consult  Options provided:  -- NSTEMI present as evidenced by, Please document evidence.  -- NSTEMI was ruled out, demand ischemia only  -- Other - I will add my own diagnosis  -- Disagree - Not applicable / Not valid  -- Disagree - Clinically unable to determine / Unknown  -- Refer to Clinical Documentation Reviewer    PROVIDER RESPONSE TEXT:    NSTEMI was ruled out after study, demand ischemia only.    Query created by: Ann Marie, April on 10/18/2024 9:08 AM      Electronically signed by:  Huseyin Villagran MD 10/25/2024 8:10 AM

## 2024-10-25 NOTE — DISCHARGE INSTRUCTIONS
Be sure to take the 12.5 Coreg along with your 25 mg twice daily.  Call cardiology tomorrow for further follow-up.

## 2024-10-25 NOTE — ED NOTES
Patient's heart rate on cardiac monitor is afib with rate of 75-85.  Attending provider dr. Mcclelland made aware. Verbal order to hold Coreg and to discharge patient. Pt's sister Ashia called and notified of discharge per patient request for ride home. Pt is Aox4 and denies any chest pain, SOB, or other complaints at this time.

## 2024-10-25 NOTE — ED NOTES
Assumed care of patient. Pt is resting in bed with even unlabored respirations and call light in reach. Denies any needs or complaints at this time.

## 2024-10-29 ENCOUNTER — OFFICE VISIT (OUTPATIENT)
Dept: CARDIOLOGY CLINIC | Age: 74
End: 2024-10-29
Payer: COMMERCIAL

## 2024-10-29 VITALS
HEIGHT: 68 IN | DIASTOLIC BLOOD PRESSURE: 60 MMHG | SYSTOLIC BLOOD PRESSURE: 118 MMHG | BODY MASS INDEX: 18.67 KG/M2 | RESPIRATION RATE: 18 BRPM | WEIGHT: 123.2 LBS | HEART RATE: 81 BPM

## 2024-10-29 DIAGNOSIS — I10 HYPERTENSION, UNSPECIFIED TYPE: Primary | ICD-10-CM

## 2024-10-29 PROCEDURE — 1123F ACP DISCUSS/DSCN MKR DOCD: CPT | Performed by: INTERNAL MEDICINE

## 2024-10-29 PROCEDURE — 3017F COLORECTAL CA SCREEN DOC REV: CPT | Performed by: INTERNAL MEDICINE

## 2024-10-29 PROCEDURE — G8484 FLU IMMUNIZE NO ADMIN: HCPCS | Performed by: INTERNAL MEDICINE

## 2024-10-29 PROCEDURE — 3078F DIAST BP <80 MM HG: CPT | Performed by: INTERNAL MEDICINE

## 2024-10-29 PROCEDURE — G8420 CALC BMI NORM PARAMETERS: HCPCS | Performed by: INTERNAL MEDICINE

## 2024-10-29 PROCEDURE — 1111F DSCHRG MED/CURRENT MED MERGE: CPT | Performed by: INTERNAL MEDICINE

## 2024-10-29 PROCEDURE — 3074F SYST BP LT 130 MM HG: CPT | Performed by: INTERNAL MEDICINE

## 2024-10-29 PROCEDURE — 99214 OFFICE O/P EST MOD 30 MIN: CPT | Performed by: INTERNAL MEDICINE

## 2024-10-29 PROCEDURE — G8427 DOCREV CUR MEDS BY ELIG CLIN: HCPCS | Performed by: INTERNAL MEDICINE

## 2024-10-29 PROCEDURE — 93000 ELECTROCARDIOGRAM COMPLETE: CPT | Performed by: INTERNAL MEDICINE

## 2024-10-29 PROCEDURE — 4004F PT TOBACCO SCREEN RCVD TLK: CPT | Performed by: INTERNAL MEDICINE

## 2024-10-29 NOTE — PROGRESS NOTES
No bilateral lower extremity edema on exam.  Good distal pulses.   EYES:  Extraocular muscles intact.  PERRL.  Normal lids & conjunctiva.  ENT:  Nares are clear & not bleeding.  Moist mucosa.  Normal lips formation.  No external masses   NEURO: no tremors, full ROM x 4, EOMI.  SKIN:  Warm, dry and intact.  Normal turgor.        EKG: Sinus rhythm with frequent PACs (bigeminy), 81 bpm, left anterior fascicular block.  Right bundle branch block.  Nonspecific ST - T wave changes.      Assessment:   Nonischemic cardiomyopathy as outlined above.  EF 45%.  Difficult exam but he does have some JVD today.  Hypertension, well controled at this time.    COPD  Lung mass  Cocaine use.    He continues to drink alcohol  He continues to smoke and states the person that he is living with is here today.  She states that he has been with her the past week and she has not let him smoke this week.  Acute renal insufficiency        Recommendations:  Increase Lasix to 40 mg for the next 2 days.  She states that she understands and she will make sure that he does this.  I will defer the comorbidities to others..      Thank you for allowing me to participate in your patient's care.      Nico Carrasco DO  [facility]  Interventional Cardiology    Note: This report was completed using computerized voice recognition software. Every effort has been made to ensure accuracy, however; and invert and computerized transcription errors may be present.

## 2024-11-01 ENCOUNTER — TELEPHONE (OUTPATIENT)
Dept: PRIMARY CARE CLINIC | Age: 74
End: 2024-11-01

## 2024-11-01 NOTE — TELEPHONE ENCOUNTER
Patient sister called in asking if a referral can be sent to an ENT for ear pain and so he can get his ears cleaned out?    Call Ashia back at [phone] if you have any questions.

## 2024-11-05 NOTE — PROGRESS NOTES
Physician Progress Note      PATIENT:               BRANDEN REED  [facility] #:                  [id number]  :                       1950  ADMIT DATE:       10/18/2024 9:04 AM  DISCH DATE:        10/21/2024 2:35 PM  RESPONDING  PROVIDER #:        Baltazar Hankins MD          QUERY TEXT:    Noted documentation of acute respiratory failure. Patient on 2L O2 - Room air.   Notes state not in respiratory distress, no labored breathing. In order to   support the diagnosis of acute respiratory failure, please include additional   clinical indicators in your documentation.  Or please document if the   diagnosis of acute respiratory failure has been ruled out after further study.    The medical record reflects the following:  Risk Factors: COPDE, Acute CHF  Clinical Indicators: Per attending notes \"Acute on chronic hypoxic respiratory   failure\". 10/19 Cardiology noted under Assessment \"COPD exacerbation /acute   hypoxic respiratory failure requiring FiO2 2 L; on IV steroids\". Notes also   state \"no apparent distress no labored breathing, No accessory muscle use or   intercostal retractions\". HR and RR WNL. Case management noting patient does   not quality for home 02.  Treatment: 2L O2 - RA  Options provided:  -- Acute on Chronic Respiratory Failure as evidenced by, Please document   evidence.  -- Acute on Chronic Respiratory Failure ruled out after study  -- Other - I will add my own diagnosis  -- Disagree - Not applicable / Not valid  -- Disagree - Clinically unable to determine / Unknown  -- Refer to Clinical Documentation Reviewer    PROVIDER RESPONSE TEXT:    Acute on Chronic Respiratory Failure as evidenced by Elevated BNP    Query created by: Martina Michelle on 10/23/2024 12:56 PM      Electronically signed by:  Baltazar Hankins MD 2024 4:56 PM

## 2024-11-06 ENCOUNTER — HOSPITAL ENCOUNTER (OUTPATIENT)
Dept: OTHER | Age: 74
Setting detail: THERAPIES SERIES
Discharge: HOME | End: 2024-11-06
Payer: COMMERCIAL

## 2024-11-06 VITALS
DIASTOLIC BLOOD PRESSURE: 68 MMHG | WEIGHT: 122.5 LBS | OXYGEN SATURATION: 96 % | HEART RATE: 80 BPM | SYSTOLIC BLOOD PRESSURE: 135 MMHG | BODY MASS INDEX: 18.63 KG/M2

## 2024-11-06 LAB
ANION GAP SERPL CALCULATED.3IONS-SCNC: 10 MMOL/L (ref 7–16)
BNP SERPL-MCNC: 5422 PG/ML (ref 0–450)
BUN SERPL-MCNC: 11 MG/DL (ref 6–23)
CALCIUM SERPL-MCNC: 8.4 MG/DL (ref 8.6–10.2)
CHLORIDE SERPL-SCNC: 105 MMOL/L (ref 98–107)
CO2 SERPL-SCNC: 25 MMOL/L (ref 22–29)
CREAT SERPL-MCNC: 1 MG/DL (ref 0.7–1.2)
GFR, ESTIMATED: 79 ML/MIN/1.73M2
GLUCOSE SERPL-MCNC: 101 MG/DL (ref 74–99)
POTASSIUM SERPL-SCNC: 3.5 MMOL/L (ref 3.5–5)
SODIUM SERPL-SCNC: 140 MMOL/L (ref 132–146)

## 2024-11-06 PROCEDURE — 99215 OFFICE O/P EST HI 40 MIN: CPT

## 2024-11-06 PROCEDURE — 83880 ASSAY OF NATRIURETIC PEPTIDE: CPT

## 2024-11-06 PROCEDURE — 80048 BASIC METABOLIC PNL TOTAL CA: CPT

## 2024-11-06 NOTE — RESULT ENCOUNTER NOTE
CHF clinic visit and labs reviewed  BNP trending down   Refill sent for Eliquis     Follow up as scheduled

## 2024-11-06 NOTE — PROGRESS NOTES
mouth 3 times daily Yes Brooklynn Ibarra APRN - CNS   carvedilol (COREG) 12.5 MG tablet Take 1 tablet by mouth 2 times daily Yes Kathy Mcclelland DO   isosorbide mononitrate (IMDUR) 30 MG extended release tablet Take 1 tablet by mouth daily Yes Brooklynn Ibarra APRN - CNS   lisinopril (PRINIVIL;ZESTRIL) 5 MG tablet Take 1 tablet by mouth daily Yes Ji Zayas MD   furosemide (LASIX) 20 MG tablet Take 1 tablet by mouth daily Yes Ji Zayas MD   polyethylene glycol (GLYCOLAX) 17 g packet Take 1 packet by mouth daily as needed for Constipation Yes Ji Zayas MD   carvedilol (COREG) 25 MG tablet Take 1 tablet by mouth 2 times daily (with meals) Yes Huseyin Rosenberg MD   nicotine (NICODERM CQ) 21 MG/24HR Place 1 patch onto the skin daily Yes Baltazar Huffman MD   atorvastatin (LIPITOR) 40 MG tablet Take 1 tablet by mouth daily Yes Celeste Hernandez DO   ammonium lactate (LAC-HYDRIN) 12 % lotion Apply topically as needed.Apply to bilateral feet and legs.  Ji Zayas MD   pantoprazole (PROTONIX) 40 MG tablet Take 1 tablet by mouth 2 times daily (before meals)  Patient not taking: Reported on 11/6/2024  Baltazar Huffman MD   budesonide-formoterol (SYMBICORT) 160-4.5 MCG/ACT AERO Inhale 2 puffs into the lungs 2 times daily  Patient taking differently: Inhale 2 puffs into the lungs 2 times daily DOES NOT HAVE  Baltazar Huffman MD   tiotropium (SPIRIVA HANDIHALER) 18 MCG inhalation capsule Inhale 1 capsule into the lungs daily  Patient taking differently: Inhale 1 capsule into the lungs daily DOES NOT HAVE  Baltazar Huffman MD   albuterol sulfate HFA (PROVENTIL;VENTOLIN;PROAIR) 108 (90 Base) MCG/ACT inhaler Inhale 2 puffs into the lungs every 4 hours as needed for Wheezing  Patient taking differently: Inhale 2 puffs into the lungs every 4 hours as needed for Wheezing PT DOES NOT HAVE  Baltazar Huffman MD   apixaban (ELIQUIS) 5 MG TABS tablet Take 1 tablet by mouth 2 times daily  Patient taking differently:

## 2024-11-07 ENCOUNTER — HOSPITAL ENCOUNTER (EMERGENCY)
Age: 74
Discharge: HOME | DRG: 140 | End: 2024-11-08
Attending: EMERGENCY MEDICINE
Payer: COMMERCIAL

## 2024-11-07 ENCOUNTER — APPOINTMENT (OUTPATIENT)
Dept: GENERAL RADIOLOGY | Age: 74
DRG: 140 | End: 2024-11-07
Payer: COMMERCIAL

## 2024-11-07 DIAGNOSIS — J44.1 ACUTE EXACERBATION OF CHRONIC OBSTRUCTIVE PULMONARY DISEASE (COPD) (HCC): ICD-10-CM

## 2024-11-07 DIAGNOSIS — I50.9 ACUTE CONGESTIVE HEART FAILURE, UNSPECIFIED HEART FAILURE TYPE (HCC): Primary | ICD-10-CM

## 2024-11-07 LAB
BASOPHILS # BLD: 0.03 K/UL (ref 0–0.2)
BASOPHILS NFR BLD: 1 % (ref 0–2)
EOSINOPHIL # BLD: 0.18 K/UL (ref 0.05–0.5)
EOSINOPHILS RELATIVE PERCENT: 4 % (ref 0–6)
ERYTHROCYTE [DISTWIDTH] IN BLOOD BY AUTOMATED COUNT: 16.3 % (ref 11.5–15)
HCT VFR BLD AUTO: 31.8 % (ref 37–54)
HGB BLD-MCNC: 10.1 G/DL (ref 12.5–16.5)
IMM GRANULOCYTES # BLD AUTO: 0.03 K/UL (ref 0–0.58)
IMM GRANULOCYTES NFR BLD: 1 % (ref 0–5)
LYMPHOCYTES NFR BLD: 1.03 K/UL (ref 1.5–4)
LYMPHOCYTES RELATIVE PERCENT: 25 % (ref 20–42)
MCH RBC QN AUTO: 28.1 PG (ref 26–35)
MCHC RBC AUTO-ENTMCNC: 31.8 G/DL (ref 32–34.5)
MCV RBC AUTO: 88.6 FL (ref 80–99.9)
MONOCYTES NFR BLD: 0.64 K/UL (ref 0.1–0.95)
MONOCYTES NFR BLD: 16 % (ref 2–12)
NEUTROPHILS NFR BLD: 53 % (ref 43–80)
NEUTS SEG NFR BLD: 2.17 K/UL (ref 1.8–7.3)
PLATELET # BLD AUTO: 195 K/UL (ref 130–450)
PMV BLD AUTO: 10.1 FL (ref 7–12)
RBC # BLD AUTO: 3.59 M/UL (ref 3.8–5.8)
WBC OTHER # BLD: 4.1 K/UL (ref 4.5–11.5)

## 2024-11-07 PROCEDURE — 93005 ELECTROCARDIOGRAM TRACING: CPT

## 2024-11-07 PROCEDURE — 6370000000 HC RX 637 (ALT 250 FOR IP)

## 2024-11-07 PROCEDURE — 84484 ASSAY OF TROPONIN QUANT: CPT

## 2024-11-07 PROCEDURE — 81001 URINALYSIS AUTO W/SCOPE: CPT

## 2024-11-07 PROCEDURE — 99285 EMERGENCY DEPT VISIT HI MDM: CPT

## 2024-11-07 PROCEDURE — 83690 ASSAY OF LIPASE: CPT

## 2024-11-07 PROCEDURE — 80053 COMPREHEN METABOLIC PANEL: CPT

## 2024-11-07 PROCEDURE — 94640 AIRWAY INHALATION TREATMENT: CPT

## 2024-11-07 PROCEDURE — 87636 SARSCOV2 & INF A&B AMP PRB: CPT

## 2024-11-07 PROCEDURE — 83880 ASSAY OF NATRIURETIC PEPTIDE: CPT

## 2024-11-07 PROCEDURE — 85025 COMPLETE CBC W/AUTO DIFF WBC: CPT

## 2024-11-07 RX ORDER — IPRATROPIUM BROMIDE AND ALBUTEROL SULFATE 2.5; .5 MG/3ML; MG/3ML
3 SOLUTION RESPIRATORY (INHALATION) ONCE
Status: COMPLETED | OUTPATIENT
Start: 2024-11-07 | End: 2024-11-07

## 2024-11-07 RX ADMIN — IPRATROPIUM BROMIDE AND ALBUTEROL SULFATE 3 DOSE: 2.5; .5 SOLUTION RESPIRATORY (INHALATION) at 23:36

## 2024-11-07 ASSESSMENT — PAIN DESCRIPTION - LOCATION: LOCATION: CHEST;ABDOMEN

## 2024-11-07 ASSESSMENT — PAIN - FUNCTIONAL ASSESSMENT: PAIN_FUNCTIONAL_ASSESSMENT: 0-10

## 2024-11-08 ENCOUNTER — APPOINTMENT (OUTPATIENT)
Dept: GENERAL RADIOLOGY | Age: 74
DRG: 140 | End: 2024-11-08
Payer: COMMERCIAL

## 2024-11-08 VITALS
DIASTOLIC BLOOD PRESSURE: 104 MMHG | WEIGHT: 132 LBS | TEMPERATURE: 98.2 F | RESPIRATION RATE: 15 BRPM | OXYGEN SATURATION: 97 % | SYSTOLIC BLOOD PRESSURE: 175 MMHG | HEIGHT: 69 IN | HEART RATE: 80 BPM | BODY MASS INDEX: 19.55 KG/M2

## 2024-11-08 LAB
ALBUMIN SERPL-MCNC: 3.1 G/DL (ref 3.5–5.2)
ALP SERPL-CCNC: 126 U/L (ref 40–129)
ALT SERPL-CCNC: 23 U/L (ref 0–40)
ANION GAP SERPL CALCULATED.3IONS-SCNC: 9 MMOL/L (ref 7–16)
AST SERPL-CCNC: 39 U/L (ref 0–39)
BILIRUB SERPL-MCNC: 0.3 MG/DL (ref 0–1.2)
BILIRUB UR QL STRIP: NEGATIVE
BNP SERPL-MCNC: 6197 PG/ML (ref 0–450)
BUN SERPL-MCNC: 12 MG/DL (ref 6–23)
CALCIUM SERPL-MCNC: 8.4 MG/DL (ref 8.6–10.2)
CHLORIDE SERPL-SCNC: 105 MMOL/L (ref 98–107)
CLARITY UR: CLEAR
CO2 SERPL-SCNC: 26 MMOL/L (ref 22–29)
COLOR UR: YELLOW
CREAT SERPL-MCNC: 1.2 MG/DL (ref 0.7–1.2)
FLUAV RNA RESP QL NAA+PROBE: NOT DETECTED
FLUBV RNA RESP QL NAA+PROBE: NOT DETECTED
GFR, ESTIMATED: 62 ML/MIN/1.73M2
GLUCOSE SERPL-MCNC: 98 MG/DL (ref 74–99)
GLUCOSE UR STRIP-MCNC: NEGATIVE MG/DL
HGB UR QL STRIP.AUTO: NEGATIVE
KETONES UR STRIP-MCNC: NEGATIVE MG/DL
LEUKOCYTE ESTERASE UR QL STRIP: NEGATIVE
LIPASE SERPL-CCNC: 57 U/L (ref 13–60)
MUCOUS THREADS URNS QL MICRO: PRESENT
NITRITE UR QL STRIP: NEGATIVE
PH UR STRIP: 6.5 [PH] (ref 5–9)
POTASSIUM SERPL-SCNC: 4 MMOL/L (ref 3.5–5)
PROT SERPL-MCNC: 6.7 G/DL (ref 6.4–8.3)
PROT UR STRIP-MCNC: 30 MG/DL
RBC #/AREA URNS HPF: ABNORMAL /HPF
SARS-COV-2 RNA RESP QL NAA+PROBE: NOT DETECTED
SODIUM SERPL-SCNC: 140 MMOL/L (ref 132–146)
SOURCE: NORMAL
SP GR UR STRIP: 1.01 (ref 1–1.03)
SPECIMEN DESCRIPTION: NORMAL
TROPONIN I SERPL HS-MCNC: 46 NG/L (ref 0–11)
TROPONIN I SERPL HS-MCNC: 46 NG/L (ref 0–11)
UROBILINOGEN UR STRIP-ACNC: 0.2 EU/DL (ref 0–1)
WBC #/AREA URNS HPF: ABNORMAL /HPF

## 2024-11-08 PROCEDURE — 84484 ASSAY OF TROPONIN QUANT: CPT

## 2024-11-08 PROCEDURE — 96374 THER/PROPH/DIAG INJ IV PUSH: CPT

## 2024-11-08 PROCEDURE — 6360000002 HC RX W HCPCS

## 2024-11-08 PROCEDURE — 71045 X-RAY EXAM CHEST 1 VIEW: CPT

## 2024-11-08 RX ORDER — FUROSEMIDE 10 MG/ML
40 INJECTION INTRAMUSCULAR; INTRAVENOUS ONCE
Status: COMPLETED | OUTPATIENT
Start: 2024-11-08 | End: 2024-11-08

## 2024-11-08 RX ORDER — AZITHROMYCIN 250 MG/1
TABLET, FILM COATED ORAL
Qty: 6 TABLET | Refills: 0 | Status: ON HOLD | OUTPATIENT
Start: 2024-11-08 | End: 2024-11-15 | Stop reason: HOSPADM

## 2024-11-08 RX ORDER — PREDNISONE 50 MG/1
50 TABLET ORAL DAILY
Qty: 5 TABLET | Refills: 0 | Status: ON HOLD | OUTPATIENT
Start: 2024-11-08 | End: 2024-11-15 | Stop reason: HOSPADM

## 2024-11-08 RX ADMIN — FUROSEMIDE 40 MG: 10 INJECTION, SOLUTION INTRAMUSCULAR; INTRAVENOUS at 01:03

## 2024-11-08 NOTE — ED PROVIDER NOTES
[facility] EMERGENCY DEPARTMENT  EMERGENCY DEPARTMENT ENCOUNTER        Pt Name: Chuck Chavarria Jr.  MRN: [id number]  Birthdate 1950  Date of evaluation: 11/7/2024  Provider: Richard Haddad MD  Attending Provider: Man Campoverde DO  PCP: Gunner Espinosa MD  Note Started: 11:08 PM EST 11/7/24    CHIEF COMPLAINT       Chief Complaint   Patient presents with    Shortness of Breath     And chest tightness; solumedrol 125mg given by ems; started today       HISTORY OF PRESENT ILLNESS: 1 or more Elements   History From: The patient/EMS report        Chuck Chavarria Jr. is a 74 y.o. male with a past medical history of.  Hypertension, hyperlipidemia, congestive heart failure, chronic kidney disease, COPD, atrial fibrillation patient is on Eliquis carotid artery stenosis presenting with shortness of breath.  Patient states that his symptoms started today while at rest.  He has associated chest tightness in the center of his chest.  Does report a nonproductive cough.  No reported fevers.  No history of blood clots.  Patient states that he was watching TV when his symptoms started.  He uses the inhaler which did not improve his symptoms.  He has no baseline oxygen use.  EMS was called.  They placed the patient on 4 L nasal cannula and administered 125 mg of Solu-Medrol.    Nursing Notes were all reviewed and agreed with or any disagreements were addressed in the HPI.      REVIEW OF EXTERNAL NOTE :       Cardiology office visit on 10/29/2024, patient has a history of nonischemic cardiomyopathy with an EF of 45%.  Patient notably had a transient increase in his Lasix for 2 days for JVD following that visit.    PDMP Monitoring:    Last PDMP [place] as Reviewed:  Review User Review Instant Review Result            Urine Drug Screenings (1 yr)       URINE DRUG SCREEN  Collected: 10/19/2024 11:30 AM (Final result)              URINE DRUG SCREEN  Collected: 10/9/2024  1:30 PM (Final

## 2024-11-08 NOTE — ED NOTES
Pt was helped into his clothes. He was given food and drink and was assisted to the lobby via wheelchair with some warm blankets to wait for his sister to pick him up.    Mucosal Advancement Flap Text: Given the location of the defect, shape of the defect and the proximity to free margins a mucosal advancement flap was deemed most appropriate. Incisions were made with a 15 blade scalpel in the appropriate fashion along the cutaneous vermilion border and the mucosal lip. The remaining actinically damaged mucosal tissue was excised.  The mucosal advancement flap was then elevated to the gingival sulcus with care taken to preserve the neurovascular structures and advanced into the primary defect. Care was taken to ensure that precise realignment of the vermilion border was achieved.

## 2024-11-08 NOTE — DISCHARGE INSTRUCTIONS
Take your antibiotics as are prescribed.  Take the steroids as are prescribed.  Use your inhaler at home every 4 hours for the next 2 days and then wean down as tolerated.  Return for worsening symptoms.  Take 2 Lasix tablets for a total of 40 mg for the next 2 days.  Follow-up with your primary care physician and your cardiologist.

## 2024-11-09 LAB
EKG ATRIAL RATE: 131 BPM
EKG P AXIS: 83 DEGREES
EKG P-R INTERVAL: 140 MS
EKG Q-T INTERVAL: 414 MS
EKG QRS DURATION: 124 MS
EKG QTC CALCULATION (BAZETT): 506 MS
EKG R AXIS: -88 DEGREES
EKG T AXIS: 72 DEGREES
EKG VENTRICULAR RATE: 90 BPM

## 2024-11-09 PROCEDURE — 93010 ELECTROCARDIOGRAM REPORT: CPT | Performed by: INTERNAL MEDICINE

## 2024-11-10 ENCOUNTER — HOSPITAL ENCOUNTER (INPATIENT)
Age: 74
LOS: 5 days | Discharge: HOME | DRG: 140 | End: 2024-11-15
Attending: EMERGENCY MEDICINE | Admitting: INTERNAL MEDICINE
Payer: COMMERCIAL

## 2024-11-10 ENCOUNTER — APPOINTMENT (OUTPATIENT)
Dept: GENERAL RADIOLOGY | Age: 74
DRG: 140 | End: 2024-11-10
Payer: COMMERCIAL

## 2024-11-10 DIAGNOSIS — I50.43 ACUTE ON CHRONIC COMBINED SYSTOLIC AND DIASTOLIC CHF (CONGESTIVE HEART FAILURE) (HCC): ICD-10-CM

## 2024-11-10 DIAGNOSIS — R06.03 RESPIRATORY DISTRESS: Primary | ICD-10-CM

## 2024-11-10 DIAGNOSIS — J44.1 COPD WITH ACUTE EXACERBATION (HCC): ICD-10-CM

## 2024-11-10 LAB
AADO2: 136.7 MMHG
ALBUMIN SERPL-MCNC: 3.5 G/DL (ref 3.5–5.2)
ALP SERPL-CCNC: 117 U/L (ref 40–129)
ALT SERPL-CCNC: 23 U/L (ref 0–40)
AMPHET UR QL SCN: NEGATIVE
ANION GAP SERPL CALCULATED.3IONS-SCNC: 7 MMOL/L (ref 7–16)
AST SERPL-CCNC: 32 U/L (ref 0–39)
B PARAP IS1001 DNA NPH QL NAA+NON-PROBE: NOT DETECTED
B PERT DNA SPEC QL NAA+PROBE: NOT DETECTED
B.E.: 0.4 MMOL/L (ref -3–3)
B.E.: 1.4 MMOL/L (ref -3–3)
BARBITURATES UR QL SCN: NEGATIVE
BASOPHILS # BLD: 0.03 K/UL (ref 0–0.2)
BASOPHILS NFR BLD: 0 % (ref 0–2)
BENZODIAZ UR QL: NEGATIVE
BILIRUB SERPL-MCNC: 0.4 MG/DL (ref 0–1.2)
BILIRUB UR QL STRIP: NEGATIVE
BNP SERPL-MCNC: 7057 PG/ML (ref 0–450)
BUN SERPL-MCNC: 22 MG/DL (ref 6–23)
BUPRENORPHINE UR QL: NEGATIVE
C PNEUM DNA NPH QL NAA+NON-PROBE: NOT DETECTED
CALCIUM SERPL-MCNC: 9.1 MG/DL (ref 8.6–10.2)
CANNABINOIDS UR QL SCN: NEGATIVE
CHLORIDE SERPL-SCNC: 106 MMOL/L (ref 98–107)
CLARITY UR: CLEAR
CO2 SERPL-SCNC: 32 MMOL/L (ref 22–29)
COCAINE UR QL SCN: NEGATIVE
COHB: 0.3 % (ref 0–1.5)
COHB: 0.3 % (ref 0–1.5)
COLOR UR: YELLOW
CREAT SERPL-MCNC: 1.2 MG/DL (ref 0.7–1.2)
CRITICAL: ABNORMAL
CRITICAL: ABNORMAL
DATE ANALYZED: ABNORMAL
DATE ANALYZED: ABNORMAL
DATE OF COLLECTION: ABNORMAL
DATE OF COLLECTION: ABNORMAL
EOSINOPHIL # BLD: 0.07 K/UL (ref 0.05–0.5)
EOSINOPHILS RELATIVE PERCENT: 1 % (ref 0–6)
ERYTHROCYTE [DISTWIDTH] IN BLOOD BY AUTOMATED COUNT: 16.5 % (ref 11.5–15)
FENTANYL UR QL: NEGATIVE
FIO2: 100 %
FLUAV RNA NPH QL NAA+NON-PROBE: NOT DETECTED
FLUBV RNA NPH QL NAA+NON-PROBE: NOT DETECTED
GFR, ESTIMATED: 67 ML/MIN/1.73M2
GLUCOSE SERPL-MCNC: 177 MG/DL (ref 74–99)
GLUCOSE UR STRIP-MCNC: NEGATIVE MG/DL
HADV DNA NPH QL NAA+NON-PROBE: NOT DETECTED
HCO3: 26.3 MMOL/L (ref 22–26)
HCO3: 27 MMOL/L (ref 22–26)
HCOV 229E RNA NPH QL NAA+NON-PROBE: NOT DETECTED
HCOV HKU1 RNA NPH QL NAA+NON-PROBE: NOT DETECTED
HCOV NL63 RNA NPH QL NAA+NON-PROBE: NOT DETECTED
HCOV OC43 RNA NPH QL NAA+NON-PROBE: NOT DETECTED
HCT VFR BLD AUTO: 34.4 % (ref 37–54)
HGB BLD-MCNC: 10.6 G/DL (ref 12.5–16.5)
HGB UR QL STRIP.AUTO: NEGATIVE
HHB: 0.3 % (ref 0–5)
HHB: 0.9 % (ref 0–5)
HMPV RNA NPH QL NAA+NON-PROBE: NOT DETECTED
HPIV1 RNA NPH QL NAA+NON-PROBE: NOT DETECTED
HPIV2 RNA NPH QL NAA+NON-PROBE: NOT DETECTED
HPIV3 RNA NPH QL NAA+NON-PROBE: NOT DETECTED
HPIV4 RNA NPH QL NAA+NON-PROBE: NOT DETECTED
IMM GRANULOCYTES # BLD AUTO: 0.05 K/UL (ref 0–0.58)
IMM GRANULOCYTES NFR BLD: 1 % (ref 0–5)
INR PPP: 1.1
KETONES UR STRIP-MCNC: NEGATIVE MG/DL
LAB: ABNORMAL
LAB: ABNORMAL
LACTATE BLDV-SCNC: 1.4 MMOL/L (ref 0.5–1.9)
LACTATE BLDV-SCNC: 2.1 MMOL/L (ref 0.5–1.9)
LEUKOCYTE ESTERASE UR QL STRIP: NEGATIVE
LYMPHOCYTES NFR BLD: 1.48 K/UL (ref 1.5–4)
LYMPHOCYTES RELATIVE PERCENT: 17 % (ref 20–42)
Lab: 1145
Lab: 1530
M PNEUMO DNA NPH QL NAA+NON-PROBE: NOT DETECTED
MAGNESIUM SERPL-MCNC: 1.9 MG/DL (ref 1.6–2.6)
MCH RBC QN AUTO: 28.2 PG (ref 26–35)
MCHC RBC AUTO-ENTMCNC: 30.8 G/DL (ref 32–34.5)
MCV RBC AUTO: 91.5 FL (ref 80–99.9)
METHADONE UR QL: NEGATIVE
METHB: 0.2 % (ref 0–1.5)
METHB: 0.3 % (ref 0–1.5)
MODE: ABNORMAL
MODE: ABNORMAL
MONOCYTES NFR BLD: 0.72 K/UL (ref 0.1–0.95)
MONOCYTES NFR BLD: 8 % (ref 2–12)
NEUTROPHILS NFR BLD: 74 % (ref 43–80)
NEUTS SEG NFR BLD: 6.53 K/UL (ref 1.8–7.3)
NITRITE UR QL STRIP: NEGATIVE
O2 SATURATION: 99.1 % (ref 92–98.5)
O2 SATURATION: 99.7 % (ref 92–98.5)
O2HB: 98.5 % (ref 94–97)
O2HB: 99.2 % (ref 94–97)
OPERATOR ID: 1893
OPERATOR ID: 1893
OPIATES UR QL SCN: NEGATIVE
OXYCODONE UR QL SCN: NEGATIVE
PARTIAL THROMBOPLASTIN TIME: 28.8 SEC (ref 24.5–35.1)
PATIENT TEMP: 37 C
PATIENT TEMP: 37 C
PCO2: 42.4 MMHG (ref 35–45)
PCO2: 52.8 MMHG (ref 35–45)
PCP UR QL SCN: NEGATIVE
PEEP/CPAP: 8 CMH2O
PFO2: 4.99 MMHG/%
PH BLOOD GAS: 7.33 (ref 7.35–7.45)
PH BLOOD GAS: 7.41 (ref 7.35–7.45)
PH UR STRIP: 6.5 [PH] (ref 5–9)
PLATELET # BLD AUTO: 273 K/UL (ref 130–450)
PMV BLD AUTO: 9.7 FL (ref 7–12)
PO2: 193.9 MMHG (ref 75–100)
PO2: 498.5 MMHG (ref 75–100)
POTASSIUM SERPL-SCNC: 4.8 MMOL/L (ref 3.5–5)
PROCALCITONIN SERPL-MCNC: 0.03 NG/ML (ref 0–0.08)
PROT SERPL-MCNC: 7 G/DL (ref 6.4–8.3)
PROT UR STRIP-MCNC: 100 MG/DL
PROTHROMBIN TIME: 11.5 SEC (ref 9.3–12.4)
PS: 15 CMH20
RBC # BLD AUTO: 3.76 M/UL (ref 3.8–5.8)
RBC #/AREA URNS HPF: NORMAL /HPF
RI(T): 0.27
RSV RNA NPH QL NAA+NON-PROBE: NOT DETECTED
RV+EV RNA NPH QL NAA+NON-PROBE: NOT DETECTED
SARS-COV-2 RNA NPH QL NAA+NON-PROBE: NOT DETECTED
SODIUM SERPL-SCNC: 145 MMOL/L (ref 132–146)
SOURCE, BLOOD GAS: ABNORMAL
SOURCE, BLOOD GAS: ABNORMAL
SP GR UR STRIP: 1.02 (ref 1–1.03)
SPECIMEN DESCRIPTION: NORMAL
TEST INFORMATION: NORMAL
THB: 11.2 G/DL (ref 11.5–16.5)
THB: 11.8 G/DL (ref 11.5–16.5)
TIME ANALYZED: 1150
TIME ANALYZED: 1540
TROPONIN I SERPL HS-MCNC: 44 NG/L (ref 0–11)
TROPONIN I SERPL HS-MCNC: 47 NG/L (ref 0–11)
UROBILINOGEN UR STRIP-ACNC: 0.2 EU/DL (ref 0–1)
WBC #/AREA URNS HPF: NORMAL /HPF
WBC OTHER # BLD: 8.9 K/UL (ref 4.5–11.5)

## 2024-11-10 PROCEDURE — 80307 DRUG TEST PRSMV CHEM ANLYZR: CPT

## 2024-11-10 PROCEDURE — 96375 TX/PRO/DX INJ NEW DRUG ADDON: CPT

## 2024-11-10 PROCEDURE — 99223 1ST HOSP IP/OBS HIGH 75: CPT | Performed by: INTERNAL MEDICINE

## 2024-11-10 PROCEDURE — 80053 COMPREHEN METABOLIC PANEL: CPT

## 2024-11-10 PROCEDURE — 87040 BLOOD CULTURE FOR BACTERIA: CPT

## 2024-11-10 PROCEDURE — 93005 ELECTROCARDIOGRAM TRACING: CPT | Performed by: EMERGENCY MEDICINE

## 2024-11-10 PROCEDURE — 84145 PROCALCITONIN (PCT): CPT

## 2024-11-10 PROCEDURE — 6360000002 HC RX W HCPCS: Performed by: EMERGENCY MEDICINE

## 2024-11-10 PROCEDURE — 83880 ASSAY OF NATRIURETIC PEPTIDE: CPT

## 2024-11-10 PROCEDURE — 6360000002 HC RX W HCPCS: Performed by: INTERNAL MEDICINE

## 2024-11-10 PROCEDURE — 6370000000 HC RX 637 (ALT 250 FOR IP): Performed by: INTERNAL MEDICINE

## 2024-11-10 PROCEDURE — 87449 NOS EACH ORGANISM AG IA: CPT

## 2024-11-10 PROCEDURE — 71045 X-RAY EXAM CHEST 1 VIEW: CPT

## 2024-11-10 PROCEDURE — 87899 AGENT NOS ASSAY W/OPTIC: CPT

## 2024-11-10 PROCEDURE — 2580000003 HC RX 258: Performed by: INTERNAL MEDICINE

## 2024-11-10 PROCEDURE — 94660 CPAP INITIATION&MGMT: CPT

## 2024-11-10 PROCEDURE — 99285 EMERGENCY DEPT VISIT HI MDM: CPT

## 2024-11-10 PROCEDURE — 6370000000 HC RX 637 (ALT 250 FOR IP): Performed by: EMERGENCY MEDICINE

## 2024-11-10 PROCEDURE — 84484 ASSAY OF TROPONIN QUANT: CPT

## 2024-11-10 PROCEDURE — 2580000003 HC RX 258: Performed by: EMERGENCY MEDICINE

## 2024-11-10 PROCEDURE — 94640 AIRWAY INHALATION TREATMENT: CPT

## 2024-11-10 PROCEDURE — 81001 URINALYSIS AUTO W/SCOPE: CPT

## 2024-11-10 PROCEDURE — 0202U NFCT DS 22 TRGT SARS-COV-2: CPT

## 2024-11-10 PROCEDURE — 96374 THER/PROPH/DIAG INJ IV PUSH: CPT

## 2024-11-10 PROCEDURE — 83735 ASSAY OF MAGNESIUM: CPT

## 2024-11-10 PROCEDURE — 85610 PROTHROMBIN TIME: CPT

## 2024-11-10 PROCEDURE — 85730 THROMBOPLASTIN TIME PARTIAL: CPT

## 2024-11-10 PROCEDURE — 82805 BLOOD GASES W/O2 SATURATION: CPT

## 2024-11-10 PROCEDURE — 85025 COMPLETE CBC W/AUTO DIFF WBC: CPT

## 2024-11-10 PROCEDURE — 2140000000 HC CCU INTERMEDIATE R&B

## 2024-11-10 PROCEDURE — 5A09357 ASSISTANCE WITH RESPIRATORY VENTILATION, LESS THAN 24 CONSECUTIVE HOURS, CONTINUOUS POSITIVE AIRWAY PRESSURE: ICD-10-PCS | Performed by: INTERNAL MEDICINE

## 2024-11-10 PROCEDURE — 83605 ASSAY OF LACTIC ACID: CPT

## 2024-11-10 RX ORDER — IPRATROPIUM BROMIDE AND ALBUTEROL SULFATE 2.5; .5 MG/3ML; MG/3ML
1 SOLUTION RESPIRATORY (INHALATION)
Status: DISCONTINUED | OUTPATIENT
Start: 2024-11-10 | End: 2024-11-15 | Stop reason: HOSPADM

## 2024-11-10 RX ORDER — ONDANSETRON 2 MG/ML
4 INJECTION INTRAMUSCULAR; INTRAVENOUS EVERY 6 HOURS PRN
Status: DISCONTINUED | OUTPATIENT
Start: 2024-11-10 | End: 2024-11-15 | Stop reason: HOSPADM

## 2024-11-10 RX ORDER — ISOSORBIDE MONONITRATE 30 MG/1
30 TABLET, EXTENDED RELEASE ORAL DAILY
Status: DISCONTINUED | OUTPATIENT
Start: 2024-11-11 | End: 2024-11-15 | Stop reason: HOSPADM

## 2024-11-10 RX ORDER — HYDRALAZINE HYDROCHLORIDE 20 MG/ML
10 INJECTION INTRAMUSCULAR; INTRAVENOUS EVERY 6 HOURS PRN
Status: DISCONTINUED | OUTPATIENT
Start: 2024-11-10 | End: 2024-11-15 | Stop reason: HOSPADM

## 2024-11-10 RX ORDER — ATORVASTATIN CALCIUM 40 MG/1
40 TABLET, FILM COATED ORAL DAILY
Status: DISCONTINUED | OUTPATIENT
Start: 2024-11-11 | End: 2024-11-15 | Stop reason: HOSPADM

## 2024-11-10 RX ORDER — LISINOPRIL 5 MG/1
5 TABLET ORAL DAILY
Status: DISCONTINUED | OUTPATIENT
Start: 2024-11-11 | End: 2024-11-15 | Stop reason: HOSPADM

## 2024-11-10 RX ORDER — BENZONATATE 100 MG/1
100 CAPSULE ORAL 3 TIMES DAILY PRN
Status: DISCONTINUED | OUTPATIENT
Start: 2024-11-10 | End: 2024-11-15 | Stop reason: HOSPADM

## 2024-11-10 RX ORDER — CARVEDILOL 6.25 MG/1
12.5 TABLET ORAL 2 TIMES DAILY WITH MEALS
Status: DISCONTINUED | OUTPATIENT
Start: 2024-11-10 | End: 2024-11-15 | Stop reason: HOSPADM

## 2024-11-10 RX ORDER — ACETAMINOPHEN 650 MG/1
650 SUPPOSITORY RECTAL EVERY 6 HOURS PRN
Status: DISCONTINUED | OUTPATIENT
Start: 2024-11-10 | End: 2024-11-15 | Stop reason: HOSPADM

## 2024-11-10 RX ORDER — AMMONIUM LACTATE 12 G/100G
LOTION TOPICAL PRN
Status: DISCONTINUED | OUTPATIENT
Start: 2024-11-10 | End: 2024-11-15 | Stop reason: HOSPADM

## 2024-11-10 RX ORDER — SODIUM CHLORIDE 0.9 % (FLUSH) 0.9 %
5-40 SYRINGE (ML) INJECTION PRN
Status: DISCONTINUED | OUTPATIENT
Start: 2024-11-10 | End: 2024-11-15 | Stop reason: HOSPADM

## 2024-11-10 RX ORDER — 0.9 % SODIUM CHLORIDE 0.9 %
500 INTRAVENOUS SOLUTION INTRAVENOUS ONCE
Status: COMPLETED | OUTPATIENT
Start: 2024-11-10 | End: 2024-11-10

## 2024-11-10 RX ORDER — POTASSIUM CHLORIDE 1500 MG/1
40 TABLET, EXTENDED RELEASE ORAL PRN
Status: DISCONTINUED | OUTPATIENT
Start: 2024-11-10 | End: 2024-11-15 | Stop reason: HOSPADM

## 2024-11-10 RX ORDER — IPRATROPIUM BROMIDE AND ALBUTEROL SULFATE 2.5; .5 MG/3ML; MG/3ML
3 SOLUTION RESPIRATORY (INHALATION) ONCE
Status: COMPLETED | OUTPATIENT
Start: 2024-11-10 | End: 2024-11-10

## 2024-11-10 RX ORDER — POLYETHYLENE GLYCOL 3350 17 G/17G
17 POWDER, FOR SOLUTION ORAL DAILY PRN
Status: DISCONTINUED | OUTPATIENT
Start: 2024-11-10 | End: 2024-11-15 | Stop reason: HOSPADM

## 2024-11-10 RX ORDER — BUDESONIDE 0.5 MG/2ML
0.5 INHALANT ORAL
Status: DISCONTINUED | OUTPATIENT
Start: 2024-11-10 | End: 2024-11-15 | Stop reason: HOSPADM

## 2024-11-10 RX ORDER — IPRATROPIUM BROMIDE AND ALBUTEROL SULFATE 2.5; .5 MG/3ML; MG/3ML
1 SOLUTION RESPIRATORY (INHALATION)
Status: DISCONTINUED | OUTPATIENT
Start: 2024-11-10 | End: 2024-11-10

## 2024-11-10 RX ORDER — LEVOFLOXACIN 5 MG/ML
750 INJECTION, SOLUTION INTRAVENOUS
Status: DISCONTINUED | OUTPATIENT
Start: 2024-11-12 | End: 2024-11-11

## 2024-11-10 RX ORDER — POTASSIUM CHLORIDE 7.45 MG/ML
10 INJECTION INTRAVENOUS PRN
Status: DISCONTINUED | OUTPATIENT
Start: 2024-11-10 | End: 2024-11-15 | Stop reason: HOSPADM

## 2024-11-10 RX ORDER — ONDANSETRON 4 MG/1
4 TABLET, ORALLY DISINTEGRATING ORAL EVERY 8 HOURS PRN
Status: DISCONTINUED | OUTPATIENT
Start: 2024-11-10 | End: 2024-11-15 | Stop reason: HOSPADM

## 2024-11-10 RX ORDER — MAGNESIUM SULFATE IN WATER 40 MG/ML
2000 INJECTION, SOLUTION INTRAVENOUS PRN
Status: DISCONTINUED | OUTPATIENT
Start: 2024-11-10 | End: 2024-11-15 | Stop reason: HOSPADM

## 2024-11-10 RX ORDER — ENOXAPARIN SODIUM 100 MG/ML
40 INJECTION SUBCUTANEOUS DAILY
Status: DISCONTINUED | OUTPATIENT
Start: 2024-11-11 | End: 2024-11-10

## 2024-11-10 RX ORDER — FUROSEMIDE 20 MG/1
20 TABLET ORAL DAILY
Status: DISCONTINUED | OUTPATIENT
Start: 2024-11-11 | End: 2024-11-15 | Stop reason: HOSPADM

## 2024-11-10 RX ORDER — SODIUM CHLORIDE 0.9 % (FLUSH) 0.9 %
5-40 SYRINGE (ML) INJECTION EVERY 12 HOURS SCHEDULED
Status: DISCONTINUED | OUTPATIENT
Start: 2024-11-10 | End: 2024-11-15 | Stop reason: HOSPADM

## 2024-11-10 RX ORDER — CALCIUM CARBONATE 500 MG/1
500 TABLET, CHEWABLE ORAL 3 TIMES DAILY PRN
Status: DISCONTINUED | OUTPATIENT
Start: 2024-11-10 | End: 2024-11-15 | Stop reason: HOSPADM

## 2024-11-10 RX ORDER — LEVOFLOXACIN 5 MG/ML
750 INJECTION, SOLUTION INTRAVENOUS ONCE
Status: COMPLETED | OUTPATIENT
Start: 2024-11-10 | End: 2024-11-10

## 2024-11-10 RX ORDER — ARFORMOTEROL TARTRATE 15 UG/2ML
15 SOLUTION RESPIRATORY (INHALATION)
Status: DISCONTINUED | OUTPATIENT
Start: 2024-11-10 | End: 2024-11-15 | Stop reason: HOSPADM

## 2024-11-10 RX ORDER — FUROSEMIDE 10 MG/ML
40 INJECTION INTRAMUSCULAR; INTRAVENOUS ONCE
Status: COMPLETED | OUTPATIENT
Start: 2024-11-10 | End: 2024-11-10

## 2024-11-10 RX ORDER — ACETAMINOPHEN 325 MG/1
650 TABLET ORAL EVERY 6 HOURS PRN
Status: DISCONTINUED | OUTPATIENT
Start: 2024-11-10 | End: 2024-11-15 | Stop reason: HOSPADM

## 2024-11-10 RX ORDER — SODIUM CHLORIDE 9 MG/ML
INJECTION, SOLUTION INTRAVENOUS PRN
Status: DISCONTINUED | OUTPATIENT
Start: 2024-11-10 | End: 2024-11-15 | Stop reason: HOSPADM

## 2024-11-10 RX ADMIN — APIXABAN 5 MG: 5 TABLET, FILM COATED ORAL at 21:00

## 2024-11-10 RX ADMIN — FUROSEMIDE 40 MG: 10 INJECTION, SOLUTION INTRAMUSCULAR; INTRAVENOUS at 15:24

## 2024-11-10 RX ADMIN — HYDRALAZINE HYDROCHLORIDE 75 MG: 50 TABLET ORAL at 21:00

## 2024-11-10 RX ADMIN — SODIUM CHLORIDE, PRESERVATIVE FREE 10 ML: 5 INJECTION INTRAVENOUS at 21:01

## 2024-11-10 RX ADMIN — SODIUM CHLORIDE 500 ML: 9 INJECTION, SOLUTION INTRAVENOUS at 15:19

## 2024-11-10 RX ADMIN — IPRATROPIUM BROMIDE AND ALBUTEROL SULFATE 3 DOSE: .5; 3 SOLUTION RESPIRATORY (INHALATION) at 11:56

## 2024-11-10 RX ADMIN — METHYLPREDNISOLONE SODIUM SUCCINATE 62.5 MG: 125 INJECTION, POWDER, LYOPHILIZED, FOR SOLUTION INTRAMUSCULAR; INTRAVENOUS at 12:40

## 2024-11-10 RX ADMIN — METHYLPREDNISOLONE SODIUM SUCCINATE 40 MG: 40 INJECTION INTRAMUSCULAR; INTRAVENOUS at 17:08

## 2024-11-10 RX ADMIN — LEVOFLOXACIN 750 MG: 5 INJECTION, SOLUTION INTRAVENOUS at 15:20

## 2024-11-10 RX ADMIN — ARFORMOTEROL TARTRATE 15 MCG: 15 SOLUTION RESPIRATORY (INHALATION) at 18:11

## 2024-11-10 RX ADMIN — IPRATROPIUM BROMIDE AND ALBUTEROL SULFATE 1 DOSE: 2.5; .5 SOLUTION RESPIRATORY (INHALATION) at 18:11

## 2024-11-10 RX ADMIN — CARVEDILOL 12.5 MG: 6.25 TABLET, FILM COATED ORAL at 17:08

## 2024-11-10 RX ADMIN — BUDESONIDE 500 MCG: 0.5 SUSPENSION RESPIRATORY (INHALATION) at 18:11

## 2024-11-10 ASSESSMENT — PAIN SCALES - GENERAL: PAINLEVEL_OUTOF10: 0

## 2024-11-10 NOTE — H&P
Inpatient H&P      PCP:  Gunner Espinosa MD  Admitting Physician:  Marry Suarez DO  Consultants:  None at this time   Chief Complaint:    Chief Complaint   Patient presents with    Shortness of Breath     Placed on bipap on arrival       History of Present Illness  Chuck Chavarria Jr. is a 74 y.o. male who presents to [facility] ER complaining of SOB.    Chuck Chavarria Jr. has a past medical history that includes asthma, COPD, CVA, hypertension, hyperlipidemia    Chuck presented to the ER in respiratory distress.  He does have a history of COPD along with CHF.  He was placed on BiPAP in the ER.  He also had a ER visit 3 days ago for shortness of breath.  Shortness of breath has been worsening progressively since.  He was found to have acute hypoxic and hypercapnic respiratory failure.  BNP 7357 was given Lasix.  He is wheezy and was given Solu-Medrol and DuoNebs.  He will be admitted for acute hypoxic and hypercapnic respiratory failure.  Discussed patient's case with ED physician.    ER Course  Upon presentation to the ER, routine labwork was performed which revealed BNP 7057, troponin 47, 44, glucose 177, hemoglobin 10.6.  Imaging results are as outlined below in the Imaging section of this note.     Upon arrival to the ER, patient was 184/99.  The patient received Lasix, DuoNeb, Solu-Medrol, IVF in the emergency room and was admitted to [facility].    Last Hospital Admission -I personally reviewed previous admission from 10/18/2024  COPD exacerbation  Decompensated CHF    Last Echocardiogram -I personally reviewed previous echo from 9/11/2024    Left Ventricle: Moderately reduced left ventricular systolic function. EF by visual approximation is 40%. Left ventricle is mildly dilated. Normal wall thickness.    Mitral Valve: Mild regurgitation.    Tricuspid Valve: Mild regurgitation.     ED TRIAGE VITALS  BP: (!) 178/118, Temp: 97.9 °F (36.6 °C), Pulse: 64, Respirations: 18, SpO2: 100 %    Vitals:     11/10/24 1515 11/10/24 1524 11/10/24 1530 11/10/24 1545   BP:  (!) 171/100 (!) 178/118    Pulse: 67 71 76 64   Resp: 15 12 10 18   Temp:       TempSrc:       SpO2: 100% 100% 100% 100%         Histories  Past Medical History:   Diagnosis Date    A-fib ([facility])     Arthritis     Bilateral carotid artery stenosis 01/17/2024    Approximately 50% stenosis on the right side and 30 to 40% stenosis on the left side, CT of the carotids, 2024    Cataract, left eye     Cerebral infarction due to occlusion of right vertebral artery ([facility]) 01/17/2024    Hypoplastic, small right vertebral artery, with absent flow proximally CT of the carotids 2024  Patent left vertebral artery, good size    Cocaine abuse ([facility])     COPD (chronic obstructive pulmonary disease) ([facility])     Hyperlipidemia     Hypertension     Occlusion of right vertebral artery 01/17/2024    Small, hypoplastic right vertebral artery occlusion proximally with widely patent dominant left vertebral artery    Tobacco dependence 01/17/2024     Past Surgical History:   Procedure Laterality Date    CARDIAC CATHETERIZATION  08/25/2023    COLONOSCOPY      DENTAL SURGERY      TONSILLECTOMY      UPPER GASTROINTESTINAL ENDOSCOPY N/A 7/16/2024    ESOPHAGOGASTRODUODENOSCOPY [place] [facility] [place]     Family History   Problem Relation Age of Onset    Brain Cancer Mother     Heart Attack Father        Home Medications  Prior to Admission medications    Medication Sig Start Date End Date Taking? Authorizing Provider   azithromycin (ZITHROMAX) 250 MG tablet 500mg on day 1 followed by 250mg on days 2 - 5 11/8/24 11/18/24  Richard Haddad MD   predniSONE (DELTASONE) 50 MG tablet Take 1 tablet by mouth daily for 5 days 11/8/24 11/13/24  Richard Haddad MD   apixaban (ELIQUIS) 5 MG TABS tablet Take 1 tablet by mouth 2 times daily 11/6/24   Nico Carrasco DO   apixaban (ELIQUIS) 5 MG TABS tablet Take 1 tablet by mouth 2 times daily 11/6/24   Brooklynn Ibarra, APRN -

## 2024-11-10 NOTE — FLOWSHEET NOTE
Patient transferred to unit with the following belongings. Patient oriented to room and shown use of call light.   11/10/24 2636   Belongings   Dental Appliances None   Vision - Corrective Lenses None   Hearing Aid None   Clothing Pants;Shirt;Footwear;At bedside   Jewelry None   Electronic Devices Cell Phone   Weapons (Notify Protective Services/Security) None   Other Valuables Wallet   Home Medications None   Valuables Given To Patient   Provide Name(s) of Who Valuable(s) Were Given To Chuck        swing-through gait

## 2024-11-10 NOTE — PROGRESS NOTES
Renal Dose Adjustment Policy (Generic)     This patient is on medication that requires renal, weight, and/or indication dose adjustment.      Date Body Weight IBW  Adjusted BW SCr  CrCl Dialysis status   11/10/2024   Ideal body weight: 70.7 kg (155 lb 13.8 oz) Serum creatinine: 1.2 mg/dL 11/10/24 1156  Estimated creatinine clearance: 46 mL/min N/a       Pharmacy has dose-adjusted the following medication(s):    Date Previous Order Adjusted Order   11/10/2024 Levaquin 375mg q24hr Levaquin 750mg q48hr       These changes were made per protocol according to the [facility]   Automatic Renal Dose Adjustment Policy.     *Please note this dose may need readjusted if patient's condition changes.    Please contact pharmacy with any questions regarding these changes.    Don Kaiser RPH  11/10/2024  5:07 PM

## 2024-11-10 NOTE — ED PROVIDER NOTES
methylPREDNISolone sodium succ (SOLU-MEDROL) 62.5 mg in sterile water 1 mL injection (62.5 mg IntraVENous Given 11/10/24 1240)   ipratropium 0.5 mg-albuterol 2.5 mg (DUONEB) nebulizer solution 3 Dose (3 Doses Inhalation Given 11/10/24 1156)   sodium chloride 0.9 % bolus 500 mL (0 mLs IntraVENous Stopped 11/10/24 1549)   furosemide (LASIX) injection 40 mg (40 mg IntraVENous Given 11/10/24 1524)                 Medical Decision Making/Differential Diagnosis:    CC/HPI Summary, Social Determinants of health, Records Reviewed, DDx, testing done/not done, ED Course, Reassessment, disposition considerations/shared decision making with patient, consults, disposition:            Medical Decision Making  History From: Patient, EMS, EMR     Limitations to history : acuity of illness    Chuck Chavarria Jr. is a 74 y.o. male who presents via mass in respiratory distress.  History of COPD and CHF, was called today for worsening shortness of breath.  His prehospital oxygenation is not reported however he was put on CPAP, they were unable to establish an IV was given Solu-Medrol IM.  Upon arrival he speaking in 1-2 word sentences pursed lip breathing in obvious respiratory distress.    Nursing Notes were all reviewed and agreed with or any disagreements were addressed in the HPI.      REVIEW OF EXTERNAL NOTE :      ED notes labs and imaging from 11/7/2024, was seen for chest tightness shortness of breath.  Work at that time showed COPD and acute exacerbation of CHF      Cardiology note from 10/29/2024 was seen in follow-up      REVIEW OF SYSTEMS :          ENCOUNTER LIMITATION:    Please note that the HPI, ROS, Past History, and Physical Examination are limited due to this patient's acuity of illness.        Review of Systems:   A complete review of systems was unable to be performed secondary to the limitations noted above      Patient was put on BiPAP upon arrival, nebs diuresis ordered, no leukocytosis viral studies negative,

## 2024-11-10 NOTE — PROGRESS NOTES
4 Eyes Skin Assessment     NAME:  Chuck Chavarria Jr.  YOB: 1950  MEDICAL RECORD NUMBER:  [id number]    The patient is being assessed for  Admission    I agree that at least one RN has performed a thorough Head to Toe Skin Assessment on the patient. ALL assessment sites listed below have been assessed.      Areas assessed by both nurses:    Head, Face, Ears, Shoulders, Back, Chest, Arms, Elbows, Hands, Sacrum. Buttock, Coccyx, Ischium, Legs. Feet and Heels, and Under Medical Devices         Does the Patient have a Wound? No noted wound(s)       Martin Prevention initiated by RN: No  Wound Care Orders initiated by RN: No    Pressure Injury (Stage 3,4, Unstageable, DTI, NWPT, and Complex wounds) if present, place Wound referral order by RN under : No    New Ostomies, if present place, Ostomy referral order under : No     Nurse 1 eSignature: Electronically signed by Reta Murphy RN on 11/10/24 at 6:30 PM EST    **SHARE this note so that the co-signing nurse can place an eSignature**    Nurse 2 eSignature: Electronically signed by Jordyn Rogers RN on 11/10/24 at 6:31 PM EST

## 2024-11-11 LAB
ALBUMIN SERPL-MCNC: 3.2 G/DL (ref 3.5–5.2)
ALP SERPL-CCNC: 110 U/L (ref 40–129)
ALT SERPL-CCNC: 21 U/L (ref 0–40)
ANION GAP SERPL CALCULATED.3IONS-SCNC: 11 MMOL/L (ref 7–16)
AST SERPL-CCNC: 21 U/L (ref 0–39)
BILIRUB SERPL-MCNC: 0.2 MG/DL (ref 0–1.2)
BUN SERPL-MCNC: 30 MG/DL (ref 6–23)
CALCIUM SERPL-MCNC: 8.9 MG/DL (ref 8.6–10.2)
CHLORIDE SERPL-SCNC: 102 MMOL/L (ref 98–107)
CHOLEST SERPL-MCNC: 193 MG/DL
CO2 SERPL-SCNC: 29 MMOL/L (ref 22–29)
CREAT SERPL-MCNC: 1.3 MG/DL (ref 0.7–1.2)
EKG ATRIAL RATE: 79 BPM
EKG P AXIS: 48 DEGREES
EKG P-R INTERVAL: 146 MS
EKG Q-T INTERVAL: 420 MS
EKG QRS DURATION: 124 MS
EKG QTC CALCULATION (BAZETT): 481 MS
EKG R AXIS: -87 DEGREES
EKG T AXIS: 71 DEGREES
EKG VENTRICULAR RATE: 79 BPM
ERYTHROCYTE [DISTWIDTH] IN BLOOD BY AUTOMATED COUNT: 16.2 % (ref 11.5–15)
GFR, ESTIMATED: 60 ML/MIN/1.73M2
GLUCOSE SERPL-MCNC: 164 MG/DL (ref 74–99)
HBA1C MFR BLD: 6 % (ref 4–5.6)
HCT VFR BLD AUTO: 33 % (ref 37–54)
HDLC SERPL-MCNC: 81 MG/DL
HGB BLD-MCNC: 10.6 G/DL (ref 12.5–16.5)
L PNEUMO1 AG UR QL IA.RAPID: NEGATIVE
LDLC SERPL CALC-MCNC: 99 MG/DL
MAGNESIUM SERPL-MCNC: 1.7 MG/DL (ref 1.6–2.6)
MCH RBC QN AUTO: 28.1 PG (ref 26–35)
MCHC RBC AUTO-ENTMCNC: 32.1 G/DL (ref 32–34.5)
MCV RBC AUTO: 87.5 FL (ref 80–99.9)
PHOSPHATE SERPL-MCNC: 3.4 MG/DL (ref 2.5–4.5)
PLATELET # BLD AUTO: 254 K/UL (ref 130–450)
PMV BLD AUTO: 9.8 FL (ref 7–12)
POTASSIUM SERPL-SCNC: 4 MMOL/L (ref 3.5–5)
PROT SERPL-MCNC: 6.6 G/DL (ref 6.4–8.3)
RBC # BLD AUTO: 3.77 M/UL (ref 3.8–5.8)
S PNEUM AG SPEC QL: NEGATIVE
SODIUM SERPL-SCNC: 142 MMOL/L (ref 132–146)
SPECIMEN SOURCE: NORMAL
TRIGL SERPL-MCNC: 65 MG/DL
TSH SERPL DL<=0.05 MIU/L-ACNC: 0.49 UIU/ML (ref 0.27–4.2)
VLDLC SERPL CALC-MCNC: 13 MG/DL
WBC OTHER # BLD: 5.2 K/UL (ref 4.5–11.5)

## 2024-11-11 PROCEDURE — 83735 ASSAY OF MAGNESIUM: CPT

## 2024-11-11 PROCEDURE — 6360000002 HC RX W HCPCS: Performed by: INTERNAL MEDICINE

## 2024-11-11 PROCEDURE — 84100 ASSAY OF PHOSPHORUS: CPT

## 2024-11-11 PROCEDURE — 2580000003 HC RX 258: Performed by: INTERNAL MEDICINE

## 2024-11-11 PROCEDURE — 80053 COMPREHEN METABOLIC PANEL: CPT

## 2024-11-11 PROCEDURE — 36415 COLL VENOUS BLD VENIPUNCTURE: CPT

## 2024-11-11 PROCEDURE — 84443 ASSAY THYROID STIM HORMONE: CPT

## 2024-11-11 PROCEDURE — 80061 LIPID PANEL: CPT

## 2024-11-11 PROCEDURE — 85027 COMPLETE CBC AUTOMATED: CPT

## 2024-11-11 PROCEDURE — 94660 CPAP INITIATION&MGMT: CPT

## 2024-11-11 PROCEDURE — 6370000000 HC RX 637 (ALT 250 FOR IP)

## 2024-11-11 PROCEDURE — 83036 HEMOGLOBIN GLYCOSYLATED A1C: CPT

## 2024-11-11 PROCEDURE — 2700000000 HC OXYGEN THERAPY PER DAY

## 2024-11-11 PROCEDURE — 6370000000 HC RX 637 (ALT 250 FOR IP): Performed by: INTERNAL MEDICINE

## 2024-11-11 PROCEDURE — 99232 SBSQ HOSP IP/OBS MODERATE 35: CPT

## 2024-11-11 PROCEDURE — 94640 AIRWAY INHALATION TREATMENT: CPT

## 2024-11-11 PROCEDURE — 93010 ELECTROCARDIOGRAM REPORT: CPT | Performed by: INTERNAL MEDICINE

## 2024-11-11 PROCEDURE — 2140000000 HC CCU INTERMEDIATE R&B

## 2024-11-11 RX ORDER — AZITHROMYCIN 250 MG/1
500 TABLET, FILM COATED ORAL DAILY
Status: COMPLETED | OUTPATIENT
Start: 2024-11-11 | End: 2024-11-15

## 2024-11-11 RX ORDER — PREDNISONE 20 MG/1
40 TABLET ORAL DAILY
Status: COMPLETED | OUTPATIENT
Start: 2024-11-12 | End: 2024-11-15

## 2024-11-11 RX ORDER — LIDOCAINE 4 G/G
1 PATCH TOPICAL DAILY
Status: COMPLETED | OUTPATIENT
Start: 2024-11-11 | End: 2024-11-12

## 2024-11-11 RX ADMIN — SODIUM CHLORIDE, PRESERVATIVE FREE 10 ML: 5 INJECTION INTRAVENOUS at 08:25

## 2024-11-11 RX ADMIN — HYDRALAZINE HYDROCHLORIDE 75 MG: 50 TABLET ORAL at 21:08

## 2024-11-11 RX ADMIN — IPRATROPIUM BROMIDE AND ALBUTEROL SULFATE 1 DOSE: 2.5; .5 SOLUTION RESPIRATORY (INHALATION) at 21:08

## 2024-11-11 RX ADMIN — LISINOPRIL 5 MG: 10 TABLET ORAL at 08:25

## 2024-11-11 RX ADMIN — FUROSEMIDE 20 MG: 20 TABLET ORAL at 08:25

## 2024-11-11 RX ADMIN — Medication 3 MG: at 21:07

## 2024-11-11 RX ADMIN — IPRATROPIUM BROMIDE AND ALBUTEROL SULFATE 1 DOSE: 2.5; .5 SOLUTION RESPIRATORY (INHALATION) at 16:42

## 2024-11-11 RX ADMIN — ATORVASTATIN CALCIUM 40 MG: 40 TABLET, FILM COATED ORAL at 08:25

## 2024-11-11 RX ADMIN — HYDRALAZINE HYDROCHLORIDE 75 MG: 50 TABLET ORAL at 14:21

## 2024-11-11 RX ADMIN — BUDESONIDE 500 MCG: 0.5 SUSPENSION RESPIRATORY (INHALATION) at 07:23

## 2024-11-11 RX ADMIN — ARFORMOTEROL TARTRATE 15 MCG: 15 SOLUTION RESPIRATORY (INHALATION) at 21:07

## 2024-11-11 RX ADMIN — SODIUM CHLORIDE, PRESERVATIVE FREE 10 ML: 5 INJECTION INTRAVENOUS at 21:08

## 2024-11-11 RX ADMIN — BENZONATATE 100 MG: 100 CAPSULE ORAL at 21:08

## 2024-11-11 RX ADMIN — CARVEDILOL 12.5 MG: 6.25 TABLET, FILM COATED ORAL at 08:25

## 2024-11-11 RX ADMIN — ARFORMOTEROL TARTRATE 15 MCG: 15 SOLUTION RESPIRATORY (INHALATION) at 07:23

## 2024-11-11 RX ADMIN — IPRATROPIUM BROMIDE AND ALBUTEROL SULFATE 1 DOSE: 2.5; .5 SOLUTION RESPIRATORY (INHALATION) at 11:15

## 2024-11-11 RX ADMIN — SALINE NASAL SPRAY 1 SPRAY: 1.5 SOLUTION NASAL at 09:25

## 2024-11-11 RX ADMIN — BUDESONIDE 500 MCG: 0.5 SUSPENSION RESPIRATORY (INHALATION) at 21:08

## 2024-11-11 RX ADMIN — APIXABAN 5 MG: 5 TABLET, FILM COATED ORAL at 21:08

## 2024-11-11 RX ADMIN — HYDRALAZINE HYDROCHLORIDE 75 MG: 50 TABLET ORAL at 08:25

## 2024-11-11 RX ADMIN — CARVEDILOL 12.5 MG: 6.25 TABLET, FILM COATED ORAL at 17:00

## 2024-11-11 RX ADMIN — METHYLPREDNISOLONE SODIUM SUCCINATE 40 MG: 40 INJECTION INTRAMUSCULAR; INTRAVENOUS at 05:07

## 2024-11-11 RX ADMIN — ISOSORBIDE MONONITRATE 30 MG: 30 TABLET, EXTENDED RELEASE ORAL at 08:25

## 2024-11-11 RX ADMIN — IPRATROPIUM BROMIDE AND ALBUTEROL SULFATE 1 DOSE: 2.5; .5 SOLUTION RESPIRATORY (INHALATION) at 07:23

## 2024-11-11 RX ADMIN — APIXABAN 5 MG: 5 TABLET, FILM COATED ORAL at 08:25

## 2024-11-11 RX ADMIN — AZITHROMYCIN DIHYDRATE 500 MG: 250 TABLET, FILM COATED ORAL at 10:30

## 2024-11-11 ASSESSMENT — PAIN SCALES - GENERAL
PAINLEVEL_OUTOF10: 0
PAINLEVEL_OUTOF10: 0

## 2024-11-11 NOTE — CARE COORDINATION
Patient presented to Ed with respiratory distress. Placed on C pap Currently on 3 l of oxygen. Met with patient at bedside to discuss transition of care. Patient lives with his sister Ashia in a 1 story home. Patient has a walker.. He does not have O2 or C pap at home. If needed, he was agreeable to use [facility]  as he had no preference. Referral called to [place] at [facility] to follow. He will need an ambulating pulse ox  prior to discharge. His PCP is Dr. Reyes Moore  . If HHC is needed he has no preference.. His sister Ashia will transport home when medically cleared  Case Management Assessment  Initial Evaluation    Date/Time of Evaluation: 11/11/2024 11:23 AM  Assessment Completed by: Charlene Lomax RN    If patient is discharged prior to next notation, then this note serves as note for discharge by case management.    Patient Name: Chuck Chavarria Jr.                   YOB: 1950  Diagnosis: Respiratory distress [R06.03]  COPD exacerbation (HCC) [J44.1]  COPD with acute exacerbation (HCC) [J44.1]  Acute on chronic combined systolic and diastolic CHF (congestive heart failure) (HCC) [I50.43]                   Date / Time: 11/10/2024 11:46 AM    Patient Admission Status: Inpatient   Readmission Risk (Low < 19, Mod (19-27), High > 27): Readmission Risk Score: 33.5    Current PCP: Gunner Espinosa MD  PCP verified by CM? Yes    Chart Reviewed:       History Provided by: Patient, Other (see comment) (lives with sister)  Patient Orientation: Alert and Oriented    Patient Cognition: Alert    Hospitalization in the last 30 days (Readmission):      If yes, Readmission Assessment in  Navigator will be completed.    Advance Directives:      Code Status: Full Code   Patient's Primary Decision Maker is: Legal Next of Kin    Primary Decision Maker: Ashia Tillman - Brother/Sister - [phone]    Secondary Decision Maker: PrimoOnkatja - Other - [phone]    Supplemental (Other) Decision Maker:

## 2024-11-11 NOTE — PROGRESS NOTES
[facility] Hospitalist Progress Note    Admitting Date and Time: 11/10/2024 11:46 AM  Admit Dx: Respiratory distress [R06.03]  COPD exacerbation (HCC) [J44.1]  COPD with acute exacerbation (HCC) [J44.1]  Acute on chronic combined systolic and diastolic CHF (congestive heart failure) (HCC) [I50.43]    Subjective:  Patient is being followed for Respiratory distress [R06.03]  COPD exacerbation (HCC) [J44.1]  COPD with acute exacerbation (HCC) [J44.1]  Acute on chronic combined systolic and diastolic CHF (congestive heart failure) (HCC) [I50.43]     Patient was seen at bedside.  He feels a lot better as compared to yesterday according to the patient.  Still complaining of cough but denies any shortness of breath, fever, chills, nausea or vomiting.  Patient does not use oxygen at home    ROS: denies fever, chills, cp, sob, n/v, HA unless stated above.      lidocaine  1 patch TransDERmal Daily    [START ON 11/12/2024] predniSONE  40 mg Oral Daily    azithromycin  500 mg Oral Daily    sodium chloride flush  5-40 mL IntraVENous 2 times per day    apixaban  5 mg Oral BID    atorvastatin  40 mg Oral Daily    carvedilol  12.5 mg Oral BID WC    furosemide  20 mg Oral Daily    hydrALAZINE  75 mg Oral TID    isosorbide mononitrate  30 mg Oral Daily    lisinopril  5 mg Oral Daily    budesonide  0.5 mg Nebulization BID RT    arformoterol tartrate  15 mcg Nebulization BID RT    ipratropium 0.5 mg-albuterol 2.5 mg  1 Dose Inhalation Q4H WA RT     benzocaine-menthol, 1 lozenge, Q2H PRN  benzonatate, 100 mg, TID PRN  calcium carbonate, 500 mg, TID PRN  hydrALAZINE, 10 mg, Q6H PRN  melatonin, 3 mg, Nightly PRN  Polyvinyl Alcohol-Povidone PF, 1 drop, PRN  sodium chloride, 1 spray, PRN  sodium chloride flush, 5-40 mL, PRN  sodium chloride, , PRN  potassium chloride, 40 mEq, PRN   Or  potassium alternative oral replacement, 40 mEq, PRN   Or  potassium chloride, 10 mEq, PRN  magnesium sulfate, 2,000 mg, PRN  ondansetron, 4 mg, Q8H  PRN   Or  ondansetron, 4 mg, Q6H PRN  polyethylene glycol, 17 g, Daily PRN  acetaminophen, 650 mg, Q6H PRN   Or  acetaminophen, 650 mg, Q6H PRN  ammonium lactate, , PRN         Objective:    /67   Pulse 73   Temp 97.7 °F (36.5 °C) (Temporal)   Resp 18   SpO2 93%     General Appearance: alert and oriented to person, place and time and in no acute distress  Skin: warm and dry  Head: normocephalic and atraumatic  Eyes: pupils equal, round, and reactive to light, extraocular eye movements intact, conjunctivae normal  Neck: neck supple and non tender without mass   Pulmonary/Chest: Mild bilateral wheeze present but no crackles, normal air movement, no respiratory distress  Cardiovascular: normal rate, normal S1 and S2 and no carotid bruits  Abdomen: soft, non-tender, non-distended, normal bowel sounds, no masses or organomegaly  Extremities: no cyanosis, no clubbing and no edema  Neurologic: no cranial nerve deficit and speech normal      Recent Labs     11/10/24  1156 11/11/24  0631    142   K 4.8 4.0    102   CO2 32* 29   BUN 22 30*   CREATININE 1.2 1.3*   GLUCOSE 177* 164*   CALCIUM 9.1 8.9       Recent Labs     11/10/24  1156 11/11/24  0631   WBC 8.9 5.2   RBC 3.76* 3.77*   HGB 10.6* 10.6*   HCT 34.4* 33.0*   MCV 91.5 87.5   MCH 28.2 28.1   MCHC 30.8* 32.1   RDW 16.5* 16.2*    254   MPV 9.7 9.8       Radiology: Reviewed    Assessment:    Principal Problem:    COPD exacerbation (HCC)  Active Problems:    HTN (hypertension)    Cocaine abuse (HCC)    HLD (hyperlipidemia)    HFrEF (heart failure with reduced ejection fraction) (HCC)    Acute diastolic CHF (congestive heart failure) (HCC)    Acute respiratory failure with hypoxia  Resolved Problems:    * No resolved hospital problems. *      Acute hypoxic and hypercapnic respiratory failure likely due to COPD exacerbation  Nonischemic cardiomyopathy  Hypertension  Lung nodule  History of cocaine abuse, alcohol abuse    -Initial ABG showed

## 2024-11-11 NOTE — ACP (ADVANCE CARE PLANNING)
Advance Care Planning   Healthcare Decision Maker:        Today we documented Decision Maker(s) consistent with Legal Next of Kin hierarchy.     Primary Decision Maker: Ashia Tillman - Brother/Sister - [phone]

## 2024-11-11 NOTE — PROGRESS NOTES
RN spoke with Dr. Rivera regarding diet order. Orders obtained for diet now and NPO at midnight for possible testing in the morning. RN to enter orders.

## 2024-11-11 NOTE — PATIENT CARE CONFERENCE
[facility] Quality Flow/Interdisciplinary Rounds Progress Note        Quality Flow Rounds held on November 11, 2024    Disciplines Attending:  Bedside Nurse, , , and Nursing Unit Leadership    Chuck Deon Sandro was admitted on 11/10/2024 11:46 AM    Anticipated Discharge Date:       Disposition:    Martin Score:  Martin Scale Score: 22    Readmission Risk              Risk of Unplanned Readmission:  59           Discussed patient goal for the day, patient clinical progression, and barriers to discharge.  The following Goal(s) of the Day/Commitment(s) have been identified:  Report labs/diagnostics      Fany Patel RN  November 11, 2024

## 2024-11-11 NOTE — PROGRESS NOTES
Patient is refusing NIV this evening of 15/8.  Patient is currently wearing a 4 liter nasal cannula.  Pulse ox is 100% on the 4 liter nasal cannula with no shortness of breath noted.  NIV remains plugged in and on standby at bedside.

## 2024-11-12 LAB
ANION GAP SERPL CALCULATED.3IONS-SCNC: 12 MMOL/L (ref 7–16)
BASOPHILS # BLD: 0.01 K/UL (ref 0–0.2)
BASOPHILS NFR BLD: 0 % (ref 0–2)
BUN SERPL-MCNC: 34 MG/DL (ref 6–23)
CALCIUM SERPL-MCNC: 9 MG/DL (ref 8.6–10.2)
CHLORIDE SERPL-SCNC: 101 MMOL/L (ref 98–107)
CO2 SERPL-SCNC: 28 MMOL/L (ref 22–29)
CREAT SERPL-MCNC: 1.3 MG/DL (ref 0.7–1.2)
EOSINOPHIL # BLD: 0 K/UL (ref 0.05–0.5)
EOSINOPHILS RELATIVE PERCENT: 0 % (ref 0–6)
ERYTHROCYTE [DISTWIDTH] IN BLOOD BY AUTOMATED COUNT: 16.3 % (ref 11.5–15)
GFR, ESTIMATED: 57 ML/MIN/1.73M2
GLUCOSE SERPL-MCNC: 115 MG/DL (ref 74–99)
HCT VFR BLD AUTO: 31.2 % (ref 37–54)
HGB BLD-MCNC: 10 G/DL (ref 12.5–16.5)
IMM GRANULOCYTES # BLD AUTO: 0.08 K/UL (ref 0–0.58)
IMM GRANULOCYTES NFR BLD: 1 % (ref 0–5)
LYMPHOCYTES NFR BLD: 0.74 K/UL (ref 1.5–4)
LYMPHOCYTES RELATIVE PERCENT: 10 % (ref 20–42)
MCH RBC QN AUTO: 28.2 PG (ref 26–35)
MCHC RBC AUTO-ENTMCNC: 32.1 G/DL (ref 32–34.5)
MCV RBC AUTO: 87.9 FL (ref 80–99.9)
MONOCYTES NFR BLD: 0.93 K/UL (ref 0.1–0.95)
MONOCYTES NFR BLD: 12 % (ref 2–12)
NEUTROPHILS NFR BLD: 77 % (ref 43–80)
NEUTS SEG NFR BLD: 5.93 K/UL (ref 1.8–7.3)
PLATELET # BLD AUTO: 262 K/UL (ref 130–450)
PMV BLD AUTO: 9.7 FL (ref 7–12)
POTASSIUM SERPL-SCNC: 3.9 MMOL/L (ref 3.5–5)
RBC # BLD AUTO: 3.55 M/UL (ref 3.8–5.8)
SODIUM SERPL-SCNC: 141 MMOL/L (ref 132–146)
WBC OTHER # BLD: 7.7 K/UL (ref 4.5–11.5)

## 2024-11-12 PROCEDURE — 99254 IP/OBS CNSLTJ NEW/EST MOD 60: CPT | Performed by: INTERNAL MEDICINE

## 2024-11-12 PROCEDURE — 94640 AIRWAY INHALATION TREATMENT: CPT

## 2024-11-12 PROCEDURE — 99232 SBSQ HOSP IP/OBS MODERATE 35: CPT

## 2024-11-12 PROCEDURE — 6370000000 HC RX 637 (ALT 250 FOR IP)

## 2024-11-12 PROCEDURE — 85025 COMPLETE CBC W/AUTO DIFF WBC: CPT

## 2024-11-12 PROCEDURE — 80048 BASIC METABOLIC PNL TOTAL CA: CPT

## 2024-11-12 PROCEDURE — 2140000000 HC CCU INTERMEDIATE R&B

## 2024-11-12 PROCEDURE — 6370000000 HC RX 637 (ALT 250 FOR IP): Performed by: INTERNAL MEDICINE

## 2024-11-12 PROCEDURE — 94660 CPAP INITIATION&MGMT: CPT

## 2024-11-12 PROCEDURE — 36415 COLL VENOUS BLD VENIPUNCTURE: CPT

## 2024-11-12 PROCEDURE — 2580000003 HC RX 258: Performed by: INTERNAL MEDICINE

## 2024-11-12 PROCEDURE — 6360000002 HC RX W HCPCS: Performed by: INTERNAL MEDICINE

## 2024-11-12 RX ADMIN — APIXABAN 5 MG: 5 TABLET, FILM COATED ORAL at 09:50

## 2024-11-12 RX ADMIN — ATORVASTATIN CALCIUM 40 MG: 40 TABLET, FILM COATED ORAL at 09:50

## 2024-11-12 RX ADMIN — ARFORMOTEROL TARTRATE 15 MCG: 15 SOLUTION RESPIRATORY (INHALATION) at 09:50

## 2024-11-12 RX ADMIN — AZITHROMYCIN DIHYDRATE 500 MG: 250 TABLET, FILM COATED ORAL at 09:49

## 2024-11-12 RX ADMIN — APIXABAN 5 MG: 5 TABLET, FILM COATED ORAL at 20:53

## 2024-11-12 RX ADMIN — BUDESONIDE 500 MCG: 0.5 SUSPENSION RESPIRATORY (INHALATION) at 09:50

## 2024-11-12 RX ADMIN — BUDESONIDE 500 MCG: 0.5 SUSPENSION RESPIRATORY (INHALATION) at 20:22

## 2024-11-12 RX ADMIN — IPRATROPIUM BROMIDE AND ALBUTEROL SULFATE 1 DOSE: 2.5; .5 SOLUTION RESPIRATORY (INHALATION) at 16:21

## 2024-11-12 RX ADMIN — ISOSORBIDE MONONITRATE 30 MG: 30 TABLET, EXTENDED RELEASE ORAL at 09:49

## 2024-11-12 RX ADMIN — HYDRALAZINE HYDROCHLORIDE 75 MG: 50 TABLET ORAL at 20:53

## 2024-11-12 RX ADMIN — SODIUM CHLORIDE, PRESERVATIVE FREE 10 ML: 5 INJECTION INTRAVENOUS at 20:53

## 2024-11-12 RX ADMIN — SODIUM CHLORIDE, PRESERVATIVE FREE 10 ML: 5 INJECTION INTRAVENOUS at 09:50

## 2024-11-12 RX ADMIN — IPRATROPIUM BROMIDE AND ALBUTEROL SULFATE 1 DOSE: 2.5; .5 SOLUTION RESPIRATORY (INHALATION) at 20:22

## 2024-11-12 RX ADMIN — ARFORMOTEROL TARTRATE 15 MCG: 15 SOLUTION RESPIRATORY (INHALATION) at 20:22

## 2024-11-12 RX ADMIN — PREDNISONE 40 MG: 20 TABLET ORAL at 09:49

## 2024-11-12 RX ADMIN — CARVEDILOL 12.5 MG: 6.25 TABLET, FILM COATED ORAL at 18:25

## 2024-11-12 RX ADMIN — FUROSEMIDE 20 MG: 20 TABLET ORAL at 09:50

## 2024-11-12 RX ADMIN — IPRATROPIUM BROMIDE AND ALBUTEROL SULFATE 1 DOSE: 2.5; .5 SOLUTION RESPIRATORY (INHALATION) at 09:50

## 2024-11-12 RX ADMIN — Medication: at 22:37

## 2024-11-12 RX ADMIN — HYDRALAZINE HYDROCHLORIDE 75 MG: 50 TABLET ORAL at 09:50

## 2024-11-12 RX ADMIN — HYDRALAZINE HYDROCHLORIDE 75 MG: 50 TABLET ORAL at 14:20

## 2024-11-12 RX ADMIN — CARVEDILOL 12.5 MG: 6.25 TABLET, FILM COATED ORAL at 09:50

## 2024-11-12 RX ADMIN — LISINOPRIL 5 MG: 10 TABLET ORAL at 09:49

## 2024-11-12 RX ADMIN — IPRATROPIUM BROMIDE AND ALBUTEROL SULFATE 1 DOSE: 2.5; .5 SOLUTION RESPIRATORY (INHALATION) at 12:29

## 2024-11-12 ASSESSMENT — PAIN SCALES - GENERAL
PAINLEVEL_OUTOF10: 0
PAINLEVEL_OUTOF10: 0

## 2024-11-12 NOTE — PATIENT CARE CONFERENCE
[facility] Quality Flow/Interdisciplinary Rounds Progress Note        Quality Flow Rounds held on November 12, 2024    Disciplines Attending:  Bedside Nurse, , , and Nursing Unit Leadership    Chuck Deon Sandro was admitted on 11/10/2024 11:46 AM    Anticipated Discharge Date:       Disposition:    Martin Score:  Martin Scale Score: 19    Readmission Risk              Risk of Unplanned Readmission:  65           Discussed patient goal for the day, patient clinical progression, and barriers to discharge.  The following Goal(s) of the Day/Commitment(s) have been identified:  Report labs/diagnostics       Fany Patel RN  November 12, 2024

## 2024-11-12 NOTE — PLAN OF CARE
Problem: Chronic Conditions and Co-morbidities  Goal: Patient's chronic conditions and co-morbidity symptoms are monitored and maintained or improved  11/12/2024 0947 by Radha Zheng RN  Outcome: Progressing     Problem: Discharge Planning  Goal: Discharge to home or other facility with appropriate resources  11/12/2024 0947 by Radha Zheng RN  Outcome: Progressing     Problem: Safety - Adult  Goal: Free from fall injury  11/12/2024 0947 by Radha Zheng RN  Outcome: Progressing     Problem: Pain  Goal: Verbalizes/displays adequate comfort level or baseline comfort level  11/12/2024 0947 by Radha Zheng RN  Outcome: Progressing

## 2024-11-12 NOTE — CONSULTS
Pulmonary Critical Care Medicine      PULMONARY CRITICAL CARE CONSULTATION NOTE.    11/12/24    CONSULTING  PHYSICIAN: Jomar Torres MD         Assessment / Recommendations-   Acute hypoxic hypercapnic respiratory failure secondary to COPD exacerbation  # CT findings 09/2024; paraseptal emphysema/centrilobular emphysema.  Tiny right-sided pleural effusion.  Pulmonary vascular congestion.  Acute congestive heart failure /HFrEF last ejection fraction 40% 09/2024  Lung nodule -stable since 07/2024  Hypertension  Hyperlipidemia  Cocaine abuse  Active smoker    Plan  COPD exacerbation optimization. weaned off the BiPAP.  ABG status improving  Monitor respiratory status & oxygen saturation.  Not requiring oxygen at the moment.  Continue with oral steroids.  Taper down the steroids as required  Continue on aerosol treatments  Continue on antibiotic regimen azithromycin   Continue on Eliquis for DVT prophylaxis and A-fib  discuss for drug counseling for cocaine abuse  Pulmonology will follow up outpatient for the lung nodule workup.  Might need CT scan versus PET scan in the future.  Consults cardiology for HFrEF.      History of Present Illness    Chuck Chavarria Jr.  is a 74 y.o. male with past medical history of asthma, COPD, CVA, aVF, bilateral carotid artery stenosis hypertension, hyperlipidemia and recurrent cocaine abuse, tobacco dependence presented to ER with complaints of shortness of breath in respiratory distress.  Patient has underlying history of COPD and CHF.  Of note patient has a recent hospital admissions and ER visit for shortness of breath 3 days ago.  Since then he has been having worsening SOB progressively.  On arrival to the ED found to be acute hypoxic and hypercapnic respiratory failure and was placed on BiPAP.  Relevant lab works was sent BNP was found to be 7357 was given Lasix stat.  Other lab works troponin was 47 negative delta gap, glucose 177, hemoglobin 10.6.  Patient was  weakness and numbness.   Psychiatric/Behavioral: Negative for behavioral problems, confusion and sleep disturbance.       Physical Exam    Vitals:    11/12/24 0430 11/12/24 0742 11/12/24 0950 11/12/24 1058   BP:  124/69  111/68   Pulse:  64 75 77   Resp:  13 19 19   Temp:  97.5 °F (36.4 °C)  98.2 °F (36.8 °C)   TempSrc:  Temporal  Temporal   SpO2:  94% 93% 93%   Weight: 59.9 kg (132 lb)      Height: 1.753 m (5' 9\")          General appearance: Not ill appearing, alert, no converstional dyspnea currently on BiPAP  Head: Normocephalic, without obvious abnormality, atraumatic   Eyes:Pupils bilateral equal and reactive, EOM intact, conjunctiva - no icterus , no injection   Throat: Clear, no lesions, Mallampti =, no tonsillar eythema or edema  Neck: Supple, symmetrical, trachea midline, no lymphadenopathy, no JVD, no carotid bruits, no thyromegaly   Lungs: Decreased bilateral  Air movement with wheezing, decreased in bases, normal femitus, resonant to percussion  Heart: RRR, S1, S2 normal, no murmur, click, rub or gallop   Abdomen: soft, non-tender, nondistended. Bowel sounds normal, no hepatomeagly  Extremities: extremities normal, atraumatic, no cyanosis, edema  Musculoskeletal - No deformities   Skin: Skin color, texture, turgor normal. No rashes or lesions   Neurological: No focal deficits, cranial nerves grossly intact, sensations intact   Psychiatric : Mood and effect normal , alert and oriented times 4    LABS and Studies:  Objective:  Vital signs: (most recent): Blood pressure 111/68, pulse 77, temperature 98.2 °F (36.8 °C), temperature source Temporal, resp. rate 19, height 1.753 m (5' 9\"), weight 59.9 kg (132 lb), SpO2 93%.        CBC:   Recent Labs     11/10/24  1156 11/11/24  0631 11/12/24  0608   WBC 8.9 5.2 7.7   HGB 10.6* 10.6* 10.0*   HCT 34.4* 33.0* 31.2*    254 262     BMP:    Recent Labs     11/10/24  1156 11/11/24  0631 11/12/24  0608    142 141   K 4.8 4.0 3.9    102 101   CO2 32*  son

## 2024-11-12 NOTE — CARE COORDINATION
11/12/24  Transition of care update. Patient is being treated for COPD exacerbation. Patient receiving nebulizer treatments and on oral prednisone. Levaquin has been changed to oral azithroymcin.  Patient is pending a new consult with Pulmonary to assess for NIV / sleep study in the community. Patient is currently on room air.  Patient states he was independent prior to admit and lives with his sister Ashia. Discharge goal per patient is home with no needs. Pt. Sister will provide transport home at discharge. SUNITA/SOLE to follow.    Electronically signed by DANYELLE Hanson on 11/12/2024 at 1:24 PM

## 2024-11-12 NOTE — PROGRESS NOTES
[facility] Hospitalist Progress Note    Admitting Date and Time: 11/10/2024 11:46 AM  Admit Dx: Respiratory distress [R06.03]  COPD exacerbation (HCC) [J44.1]  COPD with acute exacerbation (HCC) [J44.1]  Acute on chronic combined systolic and diastolic CHF (congestive heart failure) (HCC) [I50.43]    Subjective:  Patient is being followed for Respiratory distress [R06.03]  COPD exacerbation (HCC) [J44.1]  COPD with acute exacerbation (HCC) [J44.1]  Acute on chronic combined systolic and diastolic CHF (congestive heart failure) (HCC) [I50.43]     Patient was seen at bedside.  He feels a lot better as compared to yesterday according to the patient.    No acute overnight event.  Tolerated well on NIV overnight  Patient does not use oxygen or NIV at home    ROS: denies fever, chills, cp, sob, n/v, HA unless stated above.      lidocaine  1 patch TransDERmal Daily    predniSONE  40 mg Oral Daily    azithromycin  500 mg Oral Daily    sodium chloride flush  5-40 mL IntraVENous 2 times per day    apixaban  5 mg Oral BID    atorvastatin  40 mg Oral Daily    carvedilol  12.5 mg Oral BID WC    furosemide  20 mg Oral Daily    hydrALAZINE  75 mg Oral TID    isosorbide mononitrate  30 mg Oral Daily    lisinopril  5 mg Oral Daily    budesonide  0.5 mg Nebulization BID RT    arformoterol tartrate  15 mcg Nebulization BID RT    ipratropium 0.5 mg-albuterol 2.5 mg  1 Dose Inhalation Q4H WA RT     benzocaine-menthol, 1 lozenge, Q2H PRN  benzonatate, 100 mg, TID PRN  calcium carbonate, 500 mg, TID PRN  hydrALAZINE, 10 mg, Q6H PRN  melatonin, 3 mg, Nightly PRN  Polyvinyl Alcohol-Povidone PF, 1 drop, PRN  sodium chloride, 1 spray, PRN  sodium chloride flush, 5-40 mL, PRN  sodium chloride, , PRN  potassium chloride, 40 mEq, PRN   Or  potassium alternative oral replacement, 40 mEq, PRN   Or  potassium chloride, 10 mEq, PRN  magnesium sulfate, 2,000 mg, PRN  ondansetron, 4 mg, Q8H PRN   Or  ondansetron, 4 mg, Q6H PRN  polyethylene  glycol, 17 g, Daily PRN  acetaminophen, 650 mg, Q6H PRN   Or  acetaminophen, 650 mg, Q6H PRN  ammonium lactate, , PRN         Objective:    /68   Pulse 77   Temp 98.2 °F (36.8 °C) (Temporal)   Resp 19   Ht 1.753 m (5' 9\")   Wt 59.9 kg (132 lb)   SpO2 93%   BMI 19.49 kg/m²     General Appearance: alert and oriented to person, place and time and in no acute distress  Skin: warm and dry  Head: normocephalic and atraumatic  Eyes: pupils equal, round, and reactive to light, extraocular eye movements intact, conjunctivae normal  Neck: neck supple and non tender without mass   Pulmonary/Chest: Mild bilateral wheeze present but no crackles, normal air movement, no respiratory distress  Cardiovascular: normal rate, normal S1 and S2 and no carotid bruits  Abdomen: soft, non-tender, non-distended, normal bowel sounds, no masses or organomegaly  Extremities: no cyanosis, no clubbing and no edema  Neurologic: no cranial nerve deficit and speech normal      Recent Labs     11/10/24  1156 11/11/24  0631 11/12/24  0608    142 141   K 4.8 4.0 3.9    102 101   CO2 32* 29 28   BUN 22 30* 34*   CREATININE 1.2 1.3* 1.3*   GLUCOSE 177* 164* 115*   CALCIUM 9.1 8.9 9.0       Recent Labs     11/10/24  1156 11/11/24  0631 11/12/24  0608   WBC 8.9 5.2 7.7   RBC 3.76* 3.77* 3.55*   HGB 10.6* 10.6* 10.0*   HCT 34.4* 33.0* 31.2*   MCV 91.5 87.5 87.9   MCH 28.2 28.1 28.2   MCHC 30.8* 32.1 32.1   RDW 16.5* 16.2* 16.3*    254 262   MPV 9.7 9.8 9.7       Radiology: Reviewed    Assessment:    Principal Problem:    COPD exacerbation (HCC)  Active Problems:    HTN (hypertension)    Cocaine abuse (HCC)    HLD (hyperlipidemia)    HFrEF (heart failure with reduced ejection fraction) (HCC)    Acute diastolic CHF (congestive heart failure) (HCC)    Acute on chronic respiratory failure  Resolved Problems:    * No resolved hospital problems. *      Acute hypoxic and hypercapnic respiratory failure likely due to COPD

## 2024-11-12 NOTE — PROGRESS NOTES
Spiritual Health History and Assessment/Progress Note  Y  Cristy Theresa    (P) Initial Encounter,  ,  ,      Name: Chuck Chavarria Jr. MRN: [id number]    Age: 74 y.o.     Sex: male   Language: English   Yazidi: Episcopal   COPD exacerbation (HCC)     Date: 11/12/2024                           Spiritual Assessment began in [facility]        Referral/Consult From: (P) Rounding   Encounter Overview/Reason: (P) Initial Encounter  Service Provided For: (P) Patient    Zaira, Belief, Meaning:   Patient identifies as spiritual  Family/Friends No family/friends present      Importance and Influence:  Patient has no beliefs influential to healthcare decision-making identified during this visit  Family/Friends No family/friends present    Community:  Patient feels well-supported. Support system includes: Children and Extended family  Family/Friends No family/friends present    Assessment and Plan of Care:     Patient Interventions include: Facilitated expression of thoughts and feelings and Explored spiritual coping/struggle/distress  Family/Friends Interventions include: No family/friends present    Patient Plan of Care: Spiritual Care available upon further referral  Family/Friends Plan of Care: No family/friends present    Electronically signed by BAIRON NAVAS on 11/12/2024 at 4:15 PM

## 2024-11-12 NOTE — PLAN OF CARE
Problem: Pain  Goal: Verbalizes/displays adequate comfort level or baseline comfort level  Outcome: Progressing     Problem: ABCDS Injury Assessment  Goal: Absence of physical injury  Outcome: Progressing     Problem: Safety - Adult  Goal: Free from fall injury  Outcome: Progressing     Problem: Discharge Planning  Goal: Discharge to home or other facility with appropriate resources  Outcome: Progressing     Problem: Chronic Conditions and Co-morbidities  Goal: Patient's chronic conditions and co-morbidity symptoms are monitored and maintained or improved  Outcome: Progressing

## 2024-11-13 ENCOUNTER — APPOINTMENT (OUTPATIENT)
Dept: ULTRASOUND IMAGING | Age: 74
DRG: 140 | End: 2024-11-13
Payer: COMMERCIAL

## 2024-11-13 PROBLEM — R06.03 RESPIRATORY DISTRESS: Status: ACTIVE | Noted: 2024-11-13

## 2024-11-13 LAB
ANION GAP SERPL CALCULATED.3IONS-SCNC: 12 MMOL/L (ref 7–16)
BASOPHILS # BLD: 0.02 K/UL (ref 0–0.2)
BASOPHILS NFR BLD: 0 % (ref 0–2)
BUN SERPL-MCNC: 39 MG/DL (ref 6–23)
CALCIUM SERPL-MCNC: 9 MG/DL (ref 8.6–10.2)
CHLORIDE SERPL-SCNC: 100 MMOL/L (ref 98–107)
CO2 SERPL-SCNC: 26 MMOL/L (ref 22–29)
CREAT SERPL-MCNC: 1.4 MG/DL (ref 0.7–1.2)
CREAT UR-MCNC: 66.7 MG/DL (ref 40–278)
EOSINOPHIL # BLD: 0 K/UL (ref 0.05–0.5)
EOSINOPHILS RELATIVE PERCENT: 0 % (ref 0–6)
ERYTHROCYTE [DISTWIDTH] IN BLOOD BY AUTOMATED COUNT: 16.3 % (ref 11.5–15)
GFR, ESTIMATED: 51 ML/MIN/1.73M2
GLUCOSE SERPL-MCNC: 142 MG/DL (ref 74–99)
HCT VFR BLD AUTO: 32.5 % (ref 37–54)
HGB BLD-MCNC: 10.5 G/DL (ref 12.5–16.5)
IMM GRANULOCYTES # BLD AUTO: 0.14 K/UL (ref 0–0.58)
IMM GRANULOCYTES NFR BLD: 2 % (ref 0–5)
LYMPHOCYTES NFR BLD: 0.68 K/UL (ref 1.5–4)
LYMPHOCYTES RELATIVE PERCENT: 9 % (ref 20–42)
MAGNESIUM SERPL-MCNC: 2 MG/DL (ref 1.6–2.6)
MCH RBC QN AUTO: 28.3 PG (ref 26–35)
MCHC RBC AUTO-ENTMCNC: 32.3 G/DL (ref 32–34.5)
MCV RBC AUTO: 87.6 FL (ref 80–99.9)
MONOCYTES NFR BLD: 0.9 K/UL (ref 0.1–0.95)
MONOCYTES NFR BLD: 12 % (ref 2–12)
NEUTROPHILS NFR BLD: 77 % (ref 43–80)
NEUTS SEG NFR BLD: 5.7 K/UL (ref 1.8–7.3)
PLATELET # BLD AUTO: 283 K/UL (ref 130–450)
PMV BLD AUTO: 9.7 FL (ref 7–12)
POTASSIUM SERPL-SCNC: 3.5 MMOL/L (ref 3.5–5)
RBC # BLD AUTO: 3.71 M/UL (ref 3.8–5.8)
SODIUM SERPL-SCNC: 138 MMOL/L (ref 132–146)
SODIUM UR-SCNC: 51 MMOL/L
UUN UR-MCNC: 714 MG/DL (ref 800–1666)
WBC OTHER # BLD: 7.4 K/UL (ref 4.5–11.5)

## 2024-11-13 PROCEDURE — 99232 SBSQ HOSP IP/OBS MODERATE 35: CPT

## 2024-11-13 PROCEDURE — 85025 COMPLETE CBC W/AUTO DIFF WBC: CPT

## 2024-11-13 PROCEDURE — 84300 ASSAY OF URINE SODIUM: CPT

## 2024-11-13 PROCEDURE — 84540 ASSAY OF URINE/UREA-N: CPT

## 2024-11-13 PROCEDURE — 2140000000 HC CCU INTERMEDIATE R&B

## 2024-11-13 PROCEDURE — 83735 ASSAY OF MAGNESIUM: CPT

## 2024-11-13 PROCEDURE — 94660 CPAP INITIATION&MGMT: CPT

## 2024-11-13 PROCEDURE — 82570 ASSAY OF URINE CREATININE: CPT

## 2024-11-13 PROCEDURE — 99232 SBSQ HOSP IP/OBS MODERATE 35: CPT | Performed by: INTERNAL MEDICINE

## 2024-11-13 PROCEDURE — 76770 US EXAM ABDO BACK WALL COMP: CPT

## 2024-11-13 PROCEDURE — 6360000002 HC RX W HCPCS: Performed by: INTERNAL MEDICINE

## 2024-11-13 PROCEDURE — 6370000000 HC RX 637 (ALT 250 FOR IP)

## 2024-11-13 PROCEDURE — 6370000000 HC RX 637 (ALT 250 FOR IP): Performed by: INTERNAL MEDICINE

## 2024-11-13 PROCEDURE — 36415 COLL VENOUS BLD VENIPUNCTURE: CPT

## 2024-11-13 PROCEDURE — 80048 BASIC METABOLIC PNL TOTAL CA: CPT

## 2024-11-13 PROCEDURE — 2580000003 HC RX 258: Performed by: INTERNAL MEDICINE

## 2024-11-13 PROCEDURE — 2580000003 HC RX 258

## 2024-11-13 PROCEDURE — 94640 AIRWAY INHALATION TREATMENT: CPT

## 2024-11-13 RX ORDER — SODIUM CHLORIDE 9 MG/ML
INJECTION, SOLUTION INTRAVENOUS CONTINUOUS
Status: ACTIVE | OUTPATIENT
Start: 2024-11-13 | End: 2024-11-13

## 2024-11-13 RX ADMIN — ARFORMOTEROL TARTRATE 15 MCG: 15 SOLUTION RESPIRATORY (INHALATION) at 21:34

## 2024-11-13 RX ADMIN — HYDRALAZINE HYDROCHLORIDE 75 MG: 50 TABLET ORAL at 08:47

## 2024-11-13 RX ADMIN — LISINOPRIL 5 MG: 10 TABLET ORAL at 08:47

## 2024-11-13 RX ADMIN — SODIUM CHLORIDE, PRESERVATIVE FREE 10 ML: 5 INJECTION INTRAVENOUS at 08:46

## 2024-11-13 RX ADMIN — APIXABAN 5 MG: 5 TABLET, FILM COATED ORAL at 08:47

## 2024-11-13 RX ADMIN — AZITHROMYCIN DIHYDRATE 500 MG: 250 TABLET, FILM COATED ORAL at 08:47

## 2024-11-13 RX ADMIN — ATORVASTATIN CALCIUM 40 MG: 40 TABLET, FILM COATED ORAL at 08:47

## 2024-11-13 RX ADMIN — BUDESONIDE 500 MCG: 0.5 SUSPENSION RESPIRATORY (INHALATION) at 09:03

## 2024-11-13 RX ADMIN — ARFORMOTEROL TARTRATE 15 MCG: 15 SOLUTION RESPIRATORY (INHALATION) at 09:03

## 2024-11-13 RX ADMIN — IPRATROPIUM BROMIDE AND ALBUTEROL SULFATE 1 DOSE: 2.5; .5 SOLUTION RESPIRATORY (INHALATION) at 09:02

## 2024-11-13 RX ADMIN — ISOSORBIDE MONONITRATE 30 MG: 30 TABLET, EXTENDED RELEASE ORAL at 08:47

## 2024-11-13 RX ADMIN — CARVEDILOL 12.5 MG: 6.25 TABLET, FILM COATED ORAL at 16:40

## 2024-11-13 RX ADMIN — IPRATROPIUM BROMIDE AND ALBUTEROL SULFATE 1 DOSE: 2.5; .5 SOLUTION RESPIRATORY (INHALATION) at 16:07

## 2024-11-13 RX ADMIN — IPRATROPIUM BROMIDE AND ALBUTEROL SULFATE 1 DOSE: 2.5; .5 SOLUTION RESPIRATORY (INHALATION) at 13:17

## 2024-11-13 RX ADMIN — Medication: at 20:21

## 2024-11-13 RX ADMIN — BUDESONIDE 500 MCG: 0.5 SUSPENSION RESPIRATORY (INHALATION) at 21:34

## 2024-11-13 RX ADMIN — CARVEDILOL 12.5 MG: 6.25 TABLET, FILM COATED ORAL at 08:47

## 2024-11-13 RX ADMIN — HYDRALAZINE HYDROCHLORIDE 75 MG: 50 TABLET ORAL at 14:20

## 2024-11-13 RX ADMIN — FUROSEMIDE 20 MG: 20 TABLET ORAL at 08:46

## 2024-11-13 RX ADMIN — APIXABAN 5 MG: 5 TABLET, FILM COATED ORAL at 20:21

## 2024-11-13 RX ADMIN — HYDRALAZINE HYDROCHLORIDE 75 MG: 50 TABLET ORAL at 20:21

## 2024-11-13 RX ADMIN — POTASSIUM BICARBONATE 40 MEQ: 782 TABLET, EFFERVESCENT ORAL at 12:24

## 2024-11-13 RX ADMIN — PREDNISONE 40 MG: 20 TABLET ORAL at 08:47

## 2024-11-13 RX ADMIN — SODIUM CHLORIDE, PRESERVATIVE FREE 10 ML: 5 INJECTION INTRAVENOUS at 20:21

## 2024-11-13 RX ADMIN — IPRATROPIUM BROMIDE AND ALBUTEROL SULFATE 1 DOSE: 2.5; .5 SOLUTION RESPIRATORY (INHALATION) at 21:34

## 2024-11-13 RX ADMIN — SODIUM CHLORIDE: 9 INJECTION, SOLUTION INTRAVENOUS at 10:31

## 2024-11-13 ASSESSMENT — PAIN SCALES - GENERAL
PAINLEVEL_OUTOF10: 0

## 2024-11-13 NOTE — PROGRESS NOTES
Physician Progress Note      PATIENT:               BRANDEN REED  [facility] #:                  [id number]  :                       1950  ADMIT DATE:       11/10/2024 11:46 AM  DISCH DATE:  RESPONDING  PROVIDER #:        Jomar Torres MD          QUERY TEXT:    Afib per PMH. If possible, please document in progress notes and discharge   summary further specificity regarding the type of atrial fibrillation:    The medical record reflects the following:  Risk Factors: chf, age, HTN  Clinical Indicators: AFIB in PMH  Treatment: Eliquis  Options provided:  -- Paroxysmal Atrial Fibrillation  -- Longstanding Persistent Atrial Fibrillation  -- Permanent Atrial Fibrillation  -- Persistent Atrial Fibrillation  -- Chronic Atrial Fibrillation, unspecified  -- Other - I will add my own diagnosis  -- Disagree - Not applicable / Not valid  -- Disagree - Clinically unable to determine / Unknown  -- Refer to Clinical Documentation Reviewer    PROVIDER RESPONSE TEXT:    This patient has unspecified chronic atrial fibrillation.    Query created by: Martina Michelle on 2024 10:03 AM      QUERY TEXT:    Patient with atrial fibrillation and is maintained on Eliquis. If possible,   please document in progress notes and discharge summary if you are evaluating   and/or treating any of the following:?  ?  The medical record reflects the following:  Risk Factors: 74yrs old, CHF, HTN, CVA  Clinical Indicators: Afib  Treatment: Eliquis  Options provided:  -- Secondary hypercoagulable state in a patient with atrial fibrillation  -- Other - I will add my own diagnosis  -- Disagree - Not applicable / Not valid  -- Disagree - Clinically unable to determine / Unknown  -- Refer to Clinical Documentation Reviewer    PROVIDER RESPONSE TEXT:    Provider disagreed with this query.    Query created by: Martina Michelle on 2024 10:05 AM      Electronically signed by:  Jomar Torres MD 2024 10:10 AM

## 2024-11-13 NOTE — PROGRESS NOTES
Pulmonary Critical Care Medicine           PULMONARY  CRITICAL CARE   SERVICE DAILY PROGRESS  NOTE     11/13/2024   Hospital LOS:  LOS: 3 days     Impression / Recommendations:  Assessment / Recommendations-   Acute hypoxic hypercapnic respiratory failure secondary to COPD exacerbation  # CT findings 09/2024; paraseptal emphysema/centrilobular emphysema.  Tiny right-sided pleural effusion.  Pulmonary vascular congestion.  Acute congestive heart failure /HFrEF last ejection fraction 40% 09/2024  Lung nodule -stable since 07/2024  Hypertension  Hyperlipidemia  Cocaine abuse  Active smoker     Plan  COPD exacerbation optimization. weaned off the BiPAP.   Monitor respiratory status & oxygen saturation.  Not requiring oxygen at the moment.  Continue with oral steroids.  Taper down the steroids as required  Continue on aerosol treatments  Continue on antibiotic regimen azithromycin   Continue on Eliquis for DVT prophylaxis and A-fib  discuss for drug counseling for cocaine abuse  Pulmonology will follow up outpatient for the lung nodule workup.  Might need CT scan versus PET scan in the future.  Consults cardiology for HFrEF.    Interval History/Event Changes:  Patient was seen and examined this morning at the bedside.  He is not in acute distress.  He does not have any complaints today.  Denies any fever chills, rigor, nausea vomiting, abdominal pain.  He is having normal bladder and bowel habits.  Talked with the patient's sister over the phone call and updated about the patient conditions.  Labs were reviewed and updated with the patient's. patient is doing good with the flutter valve and chest physiotherapy.    Allergies  Allergies   Allergen Reactions    Doxycycline Shortness Of Breath    Rocephin [Ceftriaxone] Shortness Of Breath       Review of Systems    A pertinent review of systems was performed and was otherwise non-contributory.    Vitals-     /66   Pulse 71   Temp 98.6 °F (37 °C) (Temporal)

## 2024-11-13 NOTE — PROGRESS NOTES
[facility] Hospitalist Progress Note    Admitting Date and Time: 11/10/2024 11:46 AM  Admit Dx: Respiratory distress [R06.03]  COPD exacerbation (HCC) [J44.1]  COPD with acute exacerbation (HCC) [J44.1]  Acute on chronic combined systolic and diastolic CHF (congestive heart failure) (HCC) [I50.43]    Subjective:  Patient is being followed for Respiratory distress [R06.03]  COPD exacerbation (HCC) [J44.1]  COPD with acute exacerbation (HCC) [J44.1]  Acute on chronic combined systolic and diastolic CHF (congestive heart failure) (HCC) [I50.43]     Patient was seen at bedside.   No acute overnight event.  Tolerated well on NIV overnight    ROS: denies fever, chills, cp, sob, n/v, HA unless stated above.      predniSONE  40 mg Oral Daily    azithromycin  500 mg Oral Daily    sodium chloride flush  5-40 mL IntraVENous 2 times per day    apixaban  5 mg Oral BID    atorvastatin  40 mg Oral Daily    carvedilol  12.5 mg Oral BID WC    [Held by provider] furosemide  20 mg Oral Daily    hydrALAZINE  75 mg Oral TID    isosorbide mononitrate  30 mg Oral Daily    [Held by provider] lisinopril  5 mg Oral Daily    budesonide  0.5 mg Nebulization BID RT    arformoterol tartrate  15 mcg Nebulization BID RT    ipratropium 0.5 mg-albuterol 2.5 mg  1 Dose Inhalation Q4H WA RT     benzocaine-menthol, 1 lozenge, Q2H PRN  benzonatate, 100 mg, TID PRN  calcium carbonate, 500 mg, TID PRN  hydrALAZINE, 10 mg, Q6H PRN  melatonin, 3 mg, Nightly PRN  Polyvinyl Alcohol-Povidone PF, 1 drop, PRN  sodium chloride, 1 spray, PRN  sodium chloride flush, 5-40 mL, PRN  sodium chloride, , PRN  potassium chloride, 40 mEq, PRN   Or  potassium alternative oral replacement, 40 mEq, PRN   Or  potassium chloride, 10 mEq, PRN  magnesium sulfate, 2,000 mg, PRN  ondansetron, 4 mg, Q8H PRN   Or  ondansetron, 4 mg, Q6H PRN  polyethylene glycol, 17 g, Daily PRN  acetaminophen, 650 mg, Q6H PRN   Or  acetaminophen, 650 mg, Q6H PRN  ammonium lactate, , PRN

## 2024-11-13 NOTE — PATIENT CARE CONFERENCE
[facility] Quality Flow/Interdisciplinary Rounds Progress Note        Quality Flow Rounds held on November 13, 2024    Disciplines Attending:  Bedside Nurse, , , and Nursing Unit Leadership    Chuck Deon  was admitted on 11/10/2024 11:46 AM    Anticipated Discharge Date:       Disposition:    Martin Score:  Martin Scale Score: 19    Readmission Risk              Risk of Unplanned Readmission:  67           Discussed patient goal for the day, patient clinical progression, and barriers to discharge.  The following Goal(s) of the Day/Commitment(s) have been identified:  Report labs/diagnostics       Tova Wan RN  November 13, 2024

## 2024-11-13 NOTE — CARE COORDINATION
11/13/24  Transition of Care Update. Patient is being treated for COPD exacerbation.  Patient is now on oral ATB and oral steroids. Walking pulse ox complete and patient will not require home 02 and currently on room air. Creatine noted to be 1.4 today and BUN 39 with update to attending.  Patient continues on nebulizer treatments. Patient may need a nebulizer ordered at discharge should treatments continue post discharge.  Patient states he was independent prior to admit and lives with his sister, Ashia. Discharge goal per patient is home with no needs. Pt. Sister will provide transport home at discharge. SW/CM to follow.     Electronically signed by DANYELLE Hanson on 11/13/2024 at 11:06 AM

## 2024-11-13 NOTE — PLAN OF CARE
Problem: Chronic Conditions and Co-morbidities  Goal: Patient's chronic conditions and co-morbidity symptoms are monitored and maintained or improved  11/13/2024 0725 by Radha Zheng RN  Outcome: Progressing     Problem: Discharge Planning  Goal: Discharge to home or other facility with appropriate resources  11/13/2024 0725 by Radha Zheng RN  Outcome: Progressing     Problem: Safety - Adult  Goal: Free from fall injury  11/13/2024 0725 by Radha Zheng RN  Outcome: Progressing     Problem: Pain  Goal: Verbalizes/displays adequate comfort level or baseline comfort level  11/13/2024 0725 by Radha Zheng RN  Outcome: Progressing

## 2024-11-13 NOTE — PROGRESS NOTES
Physician Progress Note      PATIENT:               BRANDEN REED  [facility] #:                  [id number]  :                       1950  ADMIT DATE:       11/10/2024 11:46 AM  DISCH DATE:  RESPONDING  PROVIDER #:        Jomar Torres MD          QUERY TEXT:    Per documentation, Acute on Chronic Combined CHF in your progress notes under   Admitting Dx and Active Problems stating Acute diastolic CHF. Further, notes   state CXR with no acute findings. Pulmonology noting Acute congestive heart   failure /HFrEF. Please clarify if this is combined, systolic or diastolic CHF,   and include additional clinical indicators for acute CHF.  Or please document   if the diagnosis of acute chf has been ruled out after further study.    The medical record reflects the following:  Risk Factors: NICMP, HTN  Clinical Indicators: Per Active problems \"Acute diastolic CHF\", Admit Dx:   \"Acute on chronic combined systolic and diastolic CHF\", H&P also noting   \"Nonischemic cardiomyopathy- EF 40%. Received lasix in ER. CXR no acute   process.\" Pulmonology consult \"Acute congestive heart failure /HFrEF last   ejection fraction 40% 2024\". BNP 7057.  Chart/assessment negative for:   effusions, JVD, hepatomegaly, HJR, rales, s3, pnd or orthopnea.  Treatment: Imaging, labs, IV lasix x 1, oral Lasix Daily  Options provided:  -- Acute on Chronic Combined CHF present as evidenced by, Please document   evidence.  -- Acute diastolic CHF present as evidenced by, Please document evidence.  -- Acute on Chronic Systolic CHF present as evidenced by  -- Acute CHF was ruled out, Chronic Combined CHF confirmed  -- Acute CHF was ruled out, Chronic Diastolic CHF confirmed  -- Acute CHF was ruled out, Chronic Systolic CHF confirmed  -- Other - I will add my own diagnosis  -- Disagree - Not applicable / Not valid  -- Disagree - Clinically unable to determine / Unknown  -- Refer to Clinical Documentation Reviewer    PROVIDER RESPONSE TEXT:    Acute CHF

## 2024-11-13 NOTE — PLAN OF CARE
Problem: Discharge Planning  Goal: Discharge to home or other facility with appropriate resources  Outcome: Progressing     Problem: Safety - Adult  Goal: Free from fall injury  Outcome: Progressing     Problem: Chronic Conditions and Co-morbidities  Goal: Patient's chronic conditions and co-morbidity symptoms are monitored and maintained or improved  Outcome: Progressing     Problem: ABCDS Injury Assessment  Goal: Absence of physical injury  Outcome: Progressing     Problem: Pain  Goal: Verbalizes/displays adequate comfort level or baseline comfort level  Outcome: Progressing

## 2024-11-14 LAB
ANION GAP SERPL CALCULATED.3IONS-SCNC: 10 MMOL/L (ref 7–16)
BASOPHILS # BLD: 0.03 K/UL (ref 0–0.2)
BASOPHILS NFR BLD: 0 % (ref 0–2)
BUN SERPL-MCNC: 32 MG/DL (ref 6–23)
CALCIUM SERPL-MCNC: 8.9 MG/DL (ref 8.6–10.2)
CHLORIDE SERPL-SCNC: 104 MMOL/L (ref 98–107)
CO2 SERPL-SCNC: 27 MMOL/L (ref 22–29)
CREAT SERPL-MCNC: 1.2 MG/DL (ref 0.7–1.2)
EOSINOPHIL # BLD: 0 K/UL (ref 0.05–0.5)
EOSINOPHILS RELATIVE PERCENT: 0 % (ref 0–6)
ERYTHROCYTE [DISTWIDTH] IN BLOOD BY AUTOMATED COUNT: 16.4 % (ref 11.5–15)
GFR, ESTIMATED: 65 ML/MIN/1.73M2
GLUCOSE SERPL-MCNC: 112 MG/DL (ref 74–99)
HCT VFR BLD AUTO: 32.4 % (ref 37–54)
HGB BLD-MCNC: 10.3 G/DL (ref 12.5–16.5)
IMM GRANULOCYTES # BLD AUTO: 0.24 K/UL (ref 0–0.58)
IMM GRANULOCYTES NFR BLD: 3 % (ref 0–5)
LYMPHOCYTES NFR BLD: 0.79 K/UL (ref 1.5–4)
LYMPHOCYTES RELATIVE PERCENT: 11 % (ref 20–42)
MCH RBC QN AUTO: 28.1 PG (ref 26–35)
MCHC RBC AUTO-ENTMCNC: 31.8 G/DL (ref 32–34.5)
MCV RBC AUTO: 88.3 FL (ref 80–99.9)
MONOCYTES NFR BLD: 1.1 K/UL (ref 0.1–0.95)
MONOCYTES NFR BLD: 15 % (ref 2–12)
NEUTROPHILS NFR BLD: 71 % (ref 43–80)
NEUTS SEG NFR BLD: 5.16 K/UL (ref 1.8–7.3)
PLATELET # BLD AUTO: 286 K/UL (ref 130–450)
PMV BLD AUTO: 9.6 FL (ref 7–12)
POTASSIUM SERPL-SCNC: 4.1 MMOL/L (ref 3.5–5)
RBC # BLD AUTO: 3.67 M/UL (ref 3.8–5.8)
SODIUM SERPL-SCNC: 141 MMOL/L (ref 132–146)
WBC OTHER # BLD: 7.3 K/UL (ref 4.5–11.5)

## 2024-11-14 PROCEDURE — 2580000003 HC RX 258: Performed by: INTERNAL MEDICINE

## 2024-11-14 PROCEDURE — 6370000000 HC RX 637 (ALT 250 FOR IP): Performed by: INTERNAL MEDICINE

## 2024-11-14 PROCEDURE — 99232 SBSQ HOSP IP/OBS MODERATE 35: CPT | Performed by: INTERNAL MEDICINE

## 2024-11-14 PROCEDURE — 1200000000 HC SEMI PRIVATE

## 2024-11-14 PROCEDURE — 36415 COLL VENOUS BLD VENIPUNCTURE: CPT

## 2024-11-14 PROCEDURE — 94640 AIRWAY INHALATION TREATMENT: CPT

## 2024-11-14 PROCEDURE — 6370000000 HC RX 637 (ALT 250 FOR IP)

## 2024-11-14 PROCEDURE — 6360000002 HC RX W HCPCS: Performed by: INTERNAL MEDICINE

## 2024-11-14 PROCEDURE — 94660 CPAP INITIATION&MGMT: CPT

## 2024-11-14 PROCEDURE — 80048 BASIC METABOLIC PNL TOTAL CA: CPT

## 2024-11-14 PROCEDURE — 85025 COMPLETE CBC W/AUTO DIFF WBC: CPT

## 2024-11-14 RX ADMIN — IPRATROPIUM BROMIDE AND ALBUTEROL SULFATE 1 DOSE: 2.5; .5 SOLUTION RESPIRATORY (INHALATION) at 16:18

## 2024-11-14 RX ADMIN — HYDRALAZINE HYDROCHLORIDE 75 MG: 50 TABLET ORAL at 21:44

## 2024-11-14 RX ADMIN — ARFORMOTEROL TARTRATE 15 MCG: 15 SOLUTION RESPIRATORY (INHALATION) at 09:18

## 2024-11-14 RX ADMIN — IPRATROPIUM BROMIDE AND ALBUTEROL SULFATE 1 DOSE: 2.5; .5 SOLUTION RESPIRATORY (INHALATION) at 09:18

## 2024-11-14 RX ADMIN — HYDRALAZINE HYDROCHLORIDE 75 MG: 50 TABLET ORAL at 13:54

## 2024-11-14 RX ADMIN — SODIUM CHLORIDE, PRESERVATIVE FREE 10 ML: 5 INJECTION INTRAVENOUS at 21:44

## 2024-11-14 RX ADMIN — APIXABAN 5 MG: 5 TABLET, FILM COATED ORAL at 21:44

## 2024-11-14 RX ADMIN — ONDANSETRON 4 MG: 2 INJECTION INTRAMUSCULAR; INTRAVENOUS at 16:54

## 2024-11-14 RX ADMIN — CARVEDILOL 12.5 MG: 6.25 TABLET, FILM COATED ORAL at 16:54

## 2024-11-14 RX ADMIN — ISOSORBIDE MONONITRATE 30 MG: 30 TABLET, EXTENDED RELEASE ORAL at 08:35

## 2024-11-14 RX ADMIN — ARFORMOTEROL TARTRATE 15 MCG: 15 SOLUTION RESPIRATORY (INHALATION) at 20:16

## 2024-11-14 RX ADMIN — ATORVASTATIN CALCIUM 40 MG: 40 TABLET, FILM COATED ORAL at 08:35

## 2024-11-14 RX ADMIN — PREDNISONE 40 MG: 20 TABLET ORAL at 08:34

## 2024-11-14 RX ADMIN — SODIUM CHLORIDE, PRESERVATIVE FREE 10 ML: 5 INJECTION INTRAVENOUS at 08:35

## 2024-11-14 RX ADMIN — IPRATROPIUM BROMIDE AND ALBUTEROL SULFATE 1 DOSE: 2.5; .5 SOLUTION RESPIRATORY (INHALATION) at 12:43

## 2024-11-14 RX ADMIN — BUDESONIDE 500 MCG: 0.5 SUSPENSION RESPIRATORY (INHALATION) at 09:18

## 2024-11-14 RX ADMIN — CARVEDILOL 12.5 MG: 6.25 TABLET, FILM COATED ORAL at 08:34

## 2024-11-14 RX ADMIN — AZITHROMYCIN DIHYDRATE 500 MG: 250 TABLET, FILM COATED ORAL at 08:49

## 2024-11-14 RX ADMIN — Medication: at 09:09

## 2024-11-14 RX ADMIN — BUDESONIDE 500 MCG: 0.5 SUSPENSION RESPIRATORY (INHALATION) at 20:16

## 2024-11-14 RX ADMIN — APIXABAN 5 MG: 5 TABLET, FILM COATED ORAL at 08:35

## 2024-11-14 RX ADMIN — HYDRALAZINE HYDROCHLORIDE 75 MG: 50 TABLET ORAL at 08:33

## 2024-11-14 RX ADMIN — IPRATROPIUM BROMIDE AND ALBUTEROL SULFATE 1 DOSE: 2.5; .5 SOLUTION RESPIRATORY (INHALATION) at 20:16

## 2024-11-14 NOTE — PROGRESS NOTES
[facility] Hospitalist Progress Note    Admitting Date and Time: 11/10/2024 11:46 AM  Admit Dx: Respiratory distress [R06.03]  COPD exacerbation (HCC) [J44.1]  COPD with acute exacerbation (HCC) [J44.1]  Acute on chronic combined systolic and diastolic CHF (congestive heart failure) (HCC) [I50.43]    Subjective:  Patient is being followed for Respiratory distress [R06.03]  COPD exacerbation (HCC) [J44.1]  COPD with acute exacerbation (HCC) [J44.1]  Acute on chronic combined systolic and diastolic CHF (congestive heart failure) (HCC) [I50.43]     No new issues    ROS: denies fever, chills, cp, sob, n/v, HA unless stated above.      predniSONE  40 mg Oral Daily    azithromycin  500 mg Oral Daily    sodium chloride flush  5-40 mL IntraVENous 2 times per day    apixaban  5 mg Oral BID    atorvastatin  40 mg Oral Daily    carvedilol  12.5 mg Oral BID WC    [Held by provider] furosemide  20 mg Oral Daily    hydrALAZINE  75 mg Oral TID    isosorbide mononitrate  30 mg Oral Daily    [Held by provider] lisinopril  5 mg Oral Daily    budesonide  0.5 mg Nebulization BID RT    arformoterol tartrate  15 mcg Nebulization BID RT    ipratropium 0.5 mg-albuterol 2.5 mg  1 Dose Inhalation Q4H WA RT     benzocaine-menthol, 1 lozenge, Q2H PRN  benzonatate, 100 mg, TID PRN  calcium carbonate, 500 mg, TID PRN  hydrALAZINE, 10 mg, Q6H PRN  melatonin, 3 mg, Nightly PRN  Polyvinyl Alcohol-Povidone PF, 1 drop, PRN  sodium chloride, 1 spray, PRN  sodium chloride flush, 5-40 mL, PRN  sodium chloride, , PRN  potassium chloride, 40 mEq, PRN   Or  potassium alternative oral replacement, 40 mEq, PRN   Or  potassium chloride, 10 mEq, PRN  magnesium sulfate, 2,000 mg, PRN  ondansetron, 4 mg, Q8H PRN   Or  ondansetron, 4 mg, Q6H PRN  polyethylene glycol, 17 g, Daily PRN  acetaminophen, 650 mg, Q6H PRN   Or  acetaminophen, 650 mg, Q6H PRN  ammonium lactate, , PRN         Objective:    BP (!) 146/80   Pulse 87   Temp 97.9 °F (36.6 °C)  (Temporal)   Resp 18   Ht 1.753 m (5' 9\")   Wt 59.9 kg (132 lb)   SpO2 95%   BMI 19.49 kg/m²     General Appearance: alert and oriented to person, place and time and in no acute distress  Skin: warm and dry  Head: normocephalic and atraumatic  Eyes: pupils equal, round, and reactive to light, extraocular eye movements intact, conjunctivae normal  Neck: neck supple and non tender without mass   Pulmonary/Chest: Mild bilateral wheeze present but no crackles, normal air movement, no respiratory distress  Cardiovascular: normal rate, normal S1 and S2 and no carotid bruits  Abdomen: soft, non-tender, non-distended, normal bowel sounds, no masses or organomegaly  Extremities: no cyanosis, no clubbing and no edema  Neurologic: no cranial nerve deficit and speech normal      Recent Labs     11/12/24  0608 11/13/24  0539 11/14/24  0545    138 141   K 3.9 3.5 4.1    100 104   CO2 28 26 27   BUN 34* 39* 32*   CREATININE 1.3* 1.4* 1.2   GLUCOSE 115* 142* 112*   CALCIUM 9.0 9.0 8.9       Recent Labs     11/12/24  0608 11/13/24  0539 11/14/24  0545   WBC 7.7 7.4 7.3   RBC 3.55* 3.71* 3.67*   HGB 10.0* 10.5* 10.3*   HCT 31.2* 32.5* 32.4*   MCV 87.9 87.6 88.3   MCH 28.2 28.3 28.1   MCHC 32.1 32.3 31.8*   RDW 16.3* 16.3* 16.4*    283 286   MPV 9.7 9.7 9.6       Radiology: Reviewed    Assessment:    Principal Problem:    COPD exacerbation ([facility])  Active Problems:    HENNY (acute kidney injury) ([facility])    HTN (hypertension)    Cocaine abuse (HCC)    HLD (hyperlipidemia)    HFrEF (heart failure with reduced ejection fraction) (HCC)    Acute diastolic CHF (congestive heart failure) ([facility])    Acute on chronic respiratory failure    Respiratory distress  Resolved Problems:    * No resolved hospital problems. *      Acute hypoxic and hypercapnic respiratory failure likely due to COPD exacerbation  Nonischemic cardiomyopathy  Hypertension  Lung nodule  History of cocaine abuse, alcohol abuse  HENNY baseline creatinine is

## 2024-11-14 NOTE — CARE COORDINATION
11/14/24  Transition of Care Update. Patient is being treated for COPD exacerbation. Creatinine improved and 1.2 today. Patient on room air. BP was high this am at 166/70 but has improved.  Patient states he was independent prior to admit and lives with his sister, Ashia. Discharge goal per patient is home with no needs. Pt. Sister will provide transport home at discharge. IF patient is discharge on nebulizer treatments he will need a nebulizer ordered as he does not have one at home. SW/SOLE to follow.    Electronically signed by DANYELLE Hanson on 11/14/2024 at 11:09 AM

## 2024-11-14 NOTE — PROGRESS NOTES
[facility] Quality Flow/Interdisciplinary Rounds Progress Note        Quality Flow Rounds held on November 14, 2024    Disciplines Attending:  Bedside Nurse, , , and Nursing Unit Leadership    Chuck Deon Sandro was admitted on 11/10/2024 11:46 AM    Anticipated Discharge Date:       Disposition:    Martin Score:  Martin Scale Score: 20    Readmission Risk              Risk of Unplanned Readmission:  63           Discussed patient goal for the day, patient clinical progression, and barriers to discharge.  The following Goal(s) of the Day/Commitment(s) have been identified:  Diagnostics - Report Results and Labs - Report Results      Celine Lopez RN  November 14, 2024

## 2024-11-14 NOTE — PROGRESS NOTES
RN notified Dr. Rivas of patient cardiac telemetry order expiring via perfect serve.    Clarisse Renteria RN

## 2024-11-14 NOTE — PROGRESS NOTES
Per patient request RN notified Ashia Tillman via telephone of patient downgrade to [phone].    Clarisse Renteria RN

## 2024-11-14 NOTE — PROGRESS NOTES
Notified Dann Roy NP of patient going into new onset Afib, general floor order cancelled at this time.

## 2024-11-14 NOTE — PLAN OF CARE
Problem: Chronic Conditions and Co-morbidities  Goal: Patient's chronic conditions and co-morbidity symptoms are monitored and maintained or improved  11/14/2024 0754 by Jordyn Rogers RN  Outcome: Progressing     Problem: Discharge Planning  Goal: Discharge to home or other facility with appropriate resources  11/14/2024 0754 by Jordyn Rogers RN  Outcome: Progressing     Problem: Safety - Adult  Goal: Free from fall injury  11/14/2024 0754 by Jordyn Rogers RN  Outcome: Progressing     Problem: ABCDS Injury Assessment  Goal: Absence of physical injury  11/14/2024 0754 by Jordyn Rogers RN  Outcome: Progressing     Problem: Pain  Goal: Verbalizes/displays adequate comfort level or baseline comfort level  11/14/2024 0754 by Jordyn Rogers RN  Outcome: Progressing

## 2024-11-14 NOTE — PROGRESS NOTES
Pulmonary Critical Care Medicine           PULMONARY  CRITICAL CARE   SERVICE DAILY PROGRESS  NOTE     11/14/2024   Hospital LOS:  LOS: 4 days     Impression / Recommendations:  Assessment / Recommendations-   Acute hypoxic hypercapnic respiratory failure secondary to COPD exacerbation-stable  # CT findings 09/2024; paraseptal emphysema/centrilobular emphysema.  Tiny right-sided pleural effusion.  Pulmonary vascular congestion.  Acute congestive heart failure /HFrEF last ejection fraction 40% 09/2024  Lung nodule -stable since 07/2024  Hypertension  Hyperlipidemia  Cocaine abuse  Active smoker     Plan  COPD exacerbation optimization. weaned off the BiPAP.  Respiratory status has been stable  Continue with oral steroids.  Taper down the steroids as required  Continue on aerosol treatments  Continue on antibiotic regimen azithromycin; last dose tomorrow  Continue on oral steroids and taper down and wean off.  Continue on Eliquis for DVT prophylaxis and A-fib  discuss for drug counseling for cocaine abuse  Pulmonology sign off from the case.  He will need pulmonology will follow up outpatient for the lung nodule workup.  Might need CT scan versus PET scan in the future.  Discharge plan as per primary team      Interval History/Event Changes:  Patient was seen and examined this morning at the bedside.  He is awake alert oriented x 3, no acute overnight event.  He has no fresh complaint today.  Patient is symptomatically better doing well.  He is moving normal bowel and bladder movements.  He has been doing fine with respiratory status in the room air.    Allergies  Allergies   Allergen Reactions    Doxycycline Shortness Of Breath    Rocephin [Ceftriaxone] Shortness Of Breath       Review of Systems    A pertinent review of systems was performed and was otherwise non-contributory.    Vitals-     /60   Pulse 78   Temp 97.5 °F (36.4 °C) (Temporal)   Resp 18   Ht 1.753 m (5' 9\")   Wt 59.9 kg (132 lb)

## 2024-11-15 VITALS
OXYGEN SATURATION: 96 % | WEIGHT: 132 LBS | TEMPERATURE: 96.9 F | RESPIRATION RATE: 18 BRPM | SYSTOLIC BLOOD PRESSURE: 141 MMHG | HEIGHT: 69 IN | DIASTOLIC BLOOD PRESSURE: 70 MMHG | BODY MASS INDEX: 19.55 KG/M2 | HEART RATE: 91 BPM

## 2024-11-15 PROCEDURE — 6370000000 HC RX 637 (ALT 250 FOR IP)

## 2024-11-15 PROCEDURE — 6370000000 HC RX 637 (ALT 250 FOR IP): Performed by: INTERNAL MEDICINE

## 2024-11-15 PROCEDURE — 97165 OT EVAL LOW COMPLEX 30 MIN: CPT

## 2024-11-15 PROCEDURE — 97161 PT EVAL LOW COMPLEX 20 MIN: CPT

## 2024-11-15 PROCEDURE — 6360000002 HC RX W HCPCS: Performed by: INTERNAL MEDICINE

## 2024-11-15 PROCEDURE — 94640 AIRWAY INHALATION TREATMENT: CPT

## 2024-11-15 PROCEDURE — 2580000003 HC RX 258: Performed by: INTERNAL MEDICINE

## 2024-11-15 PROCEDURE — 97535 SELF CARE MNGMENT TRAINING: CPT

## 2024-11-15 PROCEDURE — 97530 THERAPEUTIC ACTIVITIES: CPT

## 2024-11-15 PROCEDURE — 94660 CPAP INITIATION&MGMT: CPT

## 2024-11-15 RX ORDER — PREDNISONE 20 MG/1
40 TABLET ORAL DAILY
Qty: 2 TABLET | Refills: 0 | Status: SHIPPED | OUTPATIENT
Start: 2024-11-15 | End: 2024-11-16

## 2024-11-15 RX ORDER — BUDESONIDE AND FORMOTEROL FUMARATE DIHYDRATE 160; 4.5 UG/1; UG/1
2 AEROSOL RESPIRATORY (INHALATION) 2 TIMES DAILY
Qty: 30.6 G | Refills: 0 | Status: SHIPPED | OUTPATIENT
Start: 2024-11-15

## 2024-11-15 RX ORDER — ALBUTEROL SULFATE 90 UG/1
2 INHALANT RESPIRATORY (INHALATION) EVERY 4 HOURS PRN
Qty: 1 EACH | Refills: 1 | Status: SHIPPED | OUTPATIENT
Start: 2024-11-15

## 2024-11-15 RX ORDER — TIOTROPIUM BROMIDE 18 UG/1
18 CAPSULE ORAL; RESPIRATORY (INHALATION) DAILY
Qty: 90 CAPSULE | Refills: 0 | Status: SHIPPED | OUTPATIENT
Start: 2024-11-15

## 2024-11-15 RX ADMIN — ISOSORBIDE MONONITRATE 30 MG: 30 TABLET, EXTENDED RELEASE ORAL at 09:00

## 2024-11-15 RX ADMIN — PREDNISONE 40 MG: 20 TABLET ORAL at 09:00

## 2024-11-15 RX ADMIN — AZITHROMYCIN DIHYDRATE 500 MG: 250 TABLET, FILM COATED ORAL at 08:59

## 2024-11-15 RX ADMIN — HYDRALAZINE HYDROCHLORIDE 75 MG: 50 TABLET ORAL at 08:59

## 2024-11-15 RX ADMIN — CARVEDILOL 12.5 MG: 6.25 TABLET, FILM COATED ORAL at 08:59

## 2024-11-15 RX ADMIN — BUDESONIDE 500 MCG: 0.5 SUSPENSION RESPIRATORY (INHALATION) at 11:33

## 2024-11-15 RX ADMIN — APIXABAN 5 MG: 5 TABLET, FILM COATED ORAL at 08:59

## 2024-11-15 RX ADMIN — IPRATROPIUM BROMIDE AND ALBUTEROL SULFATE 1 DOSE: 2.5; .5 SOLUTION RESPIRATORY (INHALATION) at 11:33

## 2024-11-15 RX ADMIN — ARFORMOTEROL TARTRATE 15 MCG: 15 SOLUTION RESPIRATORY (INHALATION) at 11:34

## 2024-11-15 RX ADMIN — ATORVASTATIN CALCIUM 40 MG: 40 TABLET, FILM COATED ORAL at 08:59

## 2024-11-15 RX ADMIN — SODIUM CHLORIDE, PRESERVATIVE FREE 10 ML: 5 INJECTION INTRAVENOUS at 08:59

## 2024-11-15 ASSESSMENT — PAIN SCALES - GENERAL: PAINLEVEL_OUTOF10: 0

## 2024-11-15 NOTE — PROGRESS NOTES
Physical Therapy  Physical Therapy Initial Assessment       Name: Chuck Chavarria Jr.  : 1950  MRN: [id number]      Date of Service: 11/15/2024    Evaluating PT:  Horacio Lam PT, DPT  [id number]    Room #:  5402/5402-A  Diagnosis:  Respiratory distress [R06.03]  COPD exacerbation (HCC) [J44.1]  COPD with acute exacerbation (HCC) [J44.1]  Acute on chronic combined systolic and diastolic CHF (congestive heart failure) (HCC) [I50.43]  PMHx/PSHx:   has a past medical history of A-fib ([facility]), Arthritis, Bilateral carotid artery stenosis, Cataract, left eye, Cerebral infarction due to occlusion of right vertebral artery (HCC), Cocaine abuse (HCC), COPD (chronic obstructive pulmonary disease) ([facility]), Hyperlipidemia, Hypertension, Occlusion of right vertebral artery, and Tobacco dependence.  Procedure/Surgery:    Precautions:  Falls, Alarm  Equipment Needs:  WW    SUBJECTIVE:    Pt lives with sister in a 1 story home with 2 stairs to enter and 2 rail.  Bed is on first floor and bath is on first floor.  Pt ambulated with SBQC PTA.  Equipment Owned:     OBJECTIVE:   Initial Evaluation  Date: 11/15/24 Treatment Short Term/ Long Term   Goals   AM-PAC 6 Clicks      Was pt agreeable to Eval/treatment? yes     Does pt have pain? No c/o pain     Bed Mobility  Rolling: SBA  Supine to sit: SBA  Sit to supine: NT  Scooting: SBA  Rolling: Independent    Supine to sit: Independent    Sit to supine: Independent    Scooting: Independent     Transfers Sit to stand: Kaylyn  Stand to sit: Kaylyn  Stand pivot: Kaylyn FALLON  Sit to stand: supervision  Stand to sit: supervision  Stand pivot: supervision   Ambulation    60 x 2 feet with Kaylyn FALLON  300 feet with supervision AAD   Stair negotiation: ascended and descended  NT  4 steps with single rail supervision   ROM BUE:  Defer to OT  BLE:  WFL     Strength BUE:  Defer to OT  BLE:  4/5  Improve 1 MMT   Balance Sitting EOB:  SBA  Dynamic Standing:  Kaylyn FALLON  Sitting EOB:  Independent    Dynamic  Clinton WEEMS MD     Diagnosis:  Respiratory distress [R06.03]  COPD exacerbation ([facility]) [J44.1]  COPD with acute exacerbation (formerly [facility]) [J44.1]  Acute on chronic combined systolic and diastolic CHF (congestive heart failure) (formerly [facility]) [I50.43]  Specific instructions for next treatment:  Functional mobility    Current Treatment Recommendations:     [x] Strengthening to improve independence with functional mobility   [x] ROM to improve independence with functional mobility   [x] Balance Training to improve static/dynamic balance and to reduce fall risk  [x] Endurance Training to improve activity tolerance during functional mobility   [x] Transfer Training to improve safety and independence with all functional transfers   [x] Gait Training to improve gait mechanics, endurance and assess need for appropriate assistive device  [x] Stair Training in preparation for safe discharge home and/or into the community   [x] Positioning to prevent skin breakdown and contractures  [x] Safety and Education Training   [x] Patient/Caregiver Education   [x] HEP  [] Other     PT long term treatment goals are located in above grid    Frequency of treatments: 2-5x/week x 1-2 weeks.    Time in  0815  Time out  0840    Total Treatment Time  8 minutes     Evaluation Time includes thorough review of current medical information, gathering information on past medical history/social history and prior level of function, completion of standardized testing/informal observation of tasks, assessment of data and education on plan of care and goals.    CPT codes:  [x] Low Complexity PT evaluation 32568  [] Moderate Complexity PT evaluation 24801  [] High Complexity PT evaluation 90702  [] PT Re-evaluation 52642  [] Gait training 83335 0 minutes  [] Manual therapy 97604 0 minutes  [x] Therapeutic activities 21661 8 minutes  [] Therapeutic exercises 13504 0 minutes  [] Neuromuscular reeducation 34195 0 minutes       Horacio Lam PT, DPT   [id number]

## 2024-11-15 NOTE — PROGRESS NOTES
visitors present throughout session . At end of session, patient sitting in chair with arms  and alarm activated ; with call light and phone within reach; all lines and tubes intact.   Patient presents with decreased safety awareness, activity tolerance , balance , and coordination. Pt demonstrated decreased independence during ADLs, bed mobility , functional transfers, and functional mobility. Pt would benefit from continued skilled OT to increase safety and independence with completion of ADL tasks and functional mobility for improved quality of life and return to PLOF.       Treatment: OT treatment provided this date includes:   ADL-  Instruction/training on safety and adapted techniques for completion of ADLs: Therapist facilitated & pt educated on activity modifications/adaptations to promote implementation of fall prevention strategies, EC/WS strategies, & safety awareness throughout ADLs.   Mobility-  Instruction/training on safety and improved independence with bed mobility/functional transfers  and functional mobility.   Activity tolerance- Instruction/training on energy conservation/work simplification for completion of ADLs:.     Neuromuscular Reeducation to facilitate balance/righting reactions for increased function with ADLs tasks:    Cognitive retraining -  Cues for safety, sequencing, and problem solving    Skilled positioning/alignment-  Therapist facilitated proper positioning/alignment throughout session to maintain skin/joint integrity & proper body mechanics.   Skilled monitoring of vitals- To maximize safe participation throughout functional activities.     Pt appeared to have tolerated session well and appears motivated/cooperative/pleasant . Pt instructed on use of call light for assistance and fall prevention. Pt demo'ing understanding of education provided. Continue to educate.     Rehab Potential: Good  for established goals     LTG: maximize independence and safety with ADLs to return to  PLOF    Patient and/or family were instructed on functional diagnosis, prognosis/goals and OT plan of care. Demonstrated fair + understanding.      Eval Complexity:      Description  Performance deficits  Clinical decision making  Co-morbidities affecting occupational performance  Modification or assistance to complete eval    Low Complexity   1 to 3 [x]  Low [x]  None []  None []   Moderate Complexity   3 to 5 []  Mod []  Maybe [x]  Min to Mod [x]   High Complexity   5 or more []  High []  Yes []  Max []     The above evaluation is classified as low complexity based off the noted performance deficits, personal factors, co-morbidities, assistance required, and other factors as noted in the clinical evaluation and functional testing.     Evaluation time includes thorough review of current medical information, gathering information on past medical & social history & PLOF, completion of standardized testing, informal observation of tasks, consultation with other medical professions/disciplines, assessment of data & development of POC/goals.     Time In: 0820  Time Out: 0843  Total Treatment Time: 8 min    Min Units   OT Eval Low 84942  X     OT Eval Medium 34640      OT Eval High 88828      OT Re-Eval 82851       Therapeutic Ex 51552       Therapeutic Activities 67311       ADL/Self Care 49122  8 1    Orthotic Management 69634       Manual 70541     Neuro Re-Ed 83122       Non-Billable Time                  Ondina Causey, OTD, OTR/L; [id number]

## 2024-11-15 NOTE — DISCHARGE SUMMARY
[facility] Hospitalist Physician Discharge Summary     Patient ID:  Chuck Chavarria Jr.  [id number]  74 y.o.  1950    Admit date: 11/10/2024    Discharge date and time:  11/15/2024  12:09 PM    Hospital Course:   Patient Chuck Chavarria Jr. is a 74 y.o. presented with Respiratory distress [R06.03]  COPD exacerbation (HCC) [J44.1]  COPD with acute exacerbation (HCC) [J44.1]  Acute on chronic combined systolic and diastolic CHF (congestive heart failure) (HCC) [I50.43]    Chuck Chavarria Jr.  is a 74 y.o. male with past medical history of asthma, COPD, CVA, aVF, bilateral carotid artery stenosis hypertension, hyperlipidemia and recurrent cocaine abuse, tobacco dependence presented to ER with complaints of shortness of breath in respiratory distress.  Patient has underlying history of COPD and CHF.  Of note patient has a recent hospital admissions and ER visit for shortness of breath 3 days ago.  Since then he has been having worsening SOB progressively.  On arrival to the ED found to be acute hypoxic and hypercapnic respiratory failure and was placed on BiPAP.  Relevant lab works was sent BNP was found to be 7357 was given Lasix stat.  Other lab works troponin was 47 negative delta gap, glucose 177, hemoglobin 10.6.  Patient was hypertensive 184/99.  Chest x-ray did not show any acute process.     Of note patient's last echo 9/11/2024 moderately reduced left ventricular systolic functions with ejection fraction 40%.    Pulmonology was consulted and patient was started with azithromycin and steroids.  He was eventually weaned off BiPAP and transition to p.o. steroids.  Patient does admit to noncompliance to inhaler regimen at home and states he continues to smoke.  Pulmonology signed off on 11/14.  Stable for discharge today to take azithromycin for 1 more day and to comply to inhaler regimen at home.  He finished prednisone course while admitted.  Stable for discharge home today.      Follow  shifts:  In: 800 [P.O.:800]  Out: 1200 [Urine:1200]  I/O this shift:  In: 180 [P.O.:180]  Out: -       LABS:  Recent Labs     11/13/24 0539 11/14/24  0545    141   K 3.5 4.1    104   CO2 26 27   BUN 39* 32*   CREATININE 1.4* 1.2   GLUCOSE 142* 112*   CALCIUM 9.0 8.9       Recent Labs     11/13/24 0539 11/14/24  0545   WBC 7.4 7.3   RBC 3.71* 3.67*   HGB 10.5* 10.3*   HCT 32.5* 32.4*   MCV 87.6 88.3   MCH 28.3 28.1   MCHC 32.3 31.8*   RDW 16.3* 16.4*    286   MPV 9.7 9.6       No results for input(s): \"POCGLU\" in the last 72 hours.    Imaging:  XR CHEST PORTABLE    Result Date: 11/10/2024  EXAMINATION: ONE XRAY VIEW OF THE CHEST 11/10/2024 12:44 pm COMPARISON: 07/07/2024 HISTORY: ORDERING SYSTEM PROVIDED HISTORY: sob TECHNOLOGIST PROVIDED HISTORY: Reason for exam:->sob FINDINGS: The lungs are without acute focal process.  There is no effusion or pneumothorax. The cardiomediastinal silhouette is without acute process. The osseous structures are without acute process.     No acute process.       Patient Instructions:      Medication List        CHANGE how you take these medications      predniSONE 20 MG tablet  Commonly known as: DELTASONE  Take 2 tablets by mouth daily for 1 day  What changed:   medication strength  how much to take            CONTINUE taking these medications      albuterol sulfate  (90 Base) MCG/ACT inhaler  Commonly known as: PROVENTIL;VENTOLIN;PROAIR  Inhale 2 puffs into the lungs every 4 hours as needed for Wheezing     * apixaban 5 MG Tabs tablet  Commonly known as: ELIQUIS  Take 1 tablet by mouth 2 times daily     * apixaban 5 MG Tabs tablet  Commonly known as: ELIQUIS  Take 1 tablet by mouth 2 times daily     atorvastatin 40 MG tablet  Commonly known as: LIPITOR  Take 1 tablet by mouth daily     budesonide-formoterol 160-4.5 MCG/ACT Aero  Commonly known as: Symbicort  Inhale 2 puffs into the lungs 2 times daily     * carvedilol 25 MG tablet  Commonly known as:

## 2024-11-15 NOTE — PROGRESS NOTES
CLINICAL PHARMACY NOTE: MEDS TO BEDS    Total # of Prescriptions Filled: 4   The following medications were delivered to the patient:  Albuterol hfa  Symbicort 160-4.5  Prednisone 20 mg  Spiriva handihaler     Additional Documentation:    Berenice Freitas (BERNARD) picked up in meds in the pharmacy

## 2024-11-15 NOTE — PROGRESS NOTES
4 Eyes Skin Assessment     NAME:  Chuck Chavarria Jr.  YOB: 1950  MEDICAL RECORD NUMBER:  [id number]    The patient is being assessed for  Transfer to New Unit    I agree that at least one RN has performed a thorough Head to Toe Skin Assessment on the patient. ALL assessment sites listed below have been assessed.      Areas assessed by both nurses:    Head, Face, Ears, Shoulders, Back, Chest, Arms, Elbows, Hands, Sacrum. Buttock, Coccyx, Ischium, Legs. Feet and Heels, and Under Medical Devices hypopigmentation/vitiligo        Does the Patient have a Wound? No noted wound(s)       Martin Prevention initiated by RN: Yes  Wound Care Orders initiated by RN: No    Pressure Injury (Stage 3,4, Unstageable, DTI, NWPT, and Complex wounds) if present, place Wound referral order by RN under : No    New Ostomies, if present place, Ostomy referral order under : No     Nurse 1 eSignature: Electronically signed by CALEB RUDD RN on 11/14/24 at 11:08 PM EST    **SHARE this note so that the co-signing nurse can place an eSignature**    Nurse 2 eSignature: Electronically signed by Martha Feldman RN on 11/15/24 at 1:19 AM EST

## 2024-11-19 ENCOUNTER — OFFICE VISIT (OUTPATIENT)
Age: 74
End: 2024-11-19
Payer: COMMERCIAL

## 2024-11-19 ENCOUNTER — TELEPHONE (OUTPATIENT)
Age: 74
End: 2024-11-19

## 2024-11-19 VITALS
OXYGEN SATURATION: 97 % | DIASTOLIC BLOOD PRESSURE: 64 MMHG | BODY MASS INDEX: 19.08 KG/M2 | SYSTOLIC BLOOD PRESSURE: 124 MMHG | HEART RATE: 83 BPM | WEIGHT: 129.2 LBS | RESPIRATION RATE: 16 BRPM

## 2024-11-19 DIAGNOSIS — J43.8 OTHER EMPHYSEMA (HCC): ICD-10-CM

## 2024-11-19 DIAGNOSIS — I50.22 CHRONIC HFREF (HEART FAILURE WITH REDUCED EJECTION FRACTION) (HCC): Primary | ICD-10-CM

## 2024-11-19 LAB
ANION GAP SERPL CALCULATED.3IONS-SCNC: 10 MMOL/L (ref 7–16)
BUN BLDV-MCNC: 33 MG/DL (ref 6–23)
CALCIUM SERPL-MCNC: 9.8 MG/DL (ref 8.6–10.2)
CHLORIDE BLD-SCNC: 103 MMOL/L (ref 98–107)
CO2: 32 MMOL/L (ref 22–29)
CREAT SERPL-MCNC: 1.2 MG/DL (ref 0.7–1.2)
GFR, ESTIMATED: 64 ML/MIN/1.73M2
GLUCOSE BLD-MCNC: 150 MG/DL (ref 74–99)
NT PRO BNP: 1096 PG/ML (ref 0–450)
POTASSIUM SERPL-SCNC: 4.2 MMOL/L (ref 3.5–5)
SODIUM BLD-SCNC: 145 MMOL/L (ref 132–146)

## 2024-11-19 PROCEDURE — 36415 COLL VENOUS BLD VENIPUNCTURE: CPT | Performed by: CLINICAL NURSE SPECIALIST

## 2024-11-19 PROCEDURE — G8427 DOCREV CUR MEDS BY ELIG CLIN: HCPCS | Performed by: CLINICAL NURSE SPECIALIST

## 2024-11-19 PROCEDURE — 1111F DSCHRG MED/CURRENT MED MERGE: CPT | Performed by: CLINICAL NURSE SPECIALIST

## 2024-11-19 PROCEDURE — 99214 OFFICE O/P EST MOD 30 MIN: CPT | Performed by: CLINICAL NURSE SPECIALIST

## 2024-11-19 PROCEDURE — G8484 FLU IMMUNIZE NO ADMIN: HCPCS | Performed by: CLINICAL NURSE SPECIALIST

## 2024-11-19 PROCEDURE — 3078F DIAST BP <80 MM HG: CPT | Performed by: CLINICAL NURSE SPECIALIST

## 2024-11-19 PROCEDURE — 4004F PT TOBACCO SCREEN RCVD TLK: CPT | Performed by: CLINICAL NURSE SPECIALIST

## 2024-11-19 PROCEDURE — 3017F COLORECTAL CA SCREEN DOC REV: CPT | Performed by: CLINICAL NURSE SPECIALIST

## 2024-11-19 PROCEDURE — 1123F ACP DISCUSS/DSCN MKR DOCD: CPT | Performed by: CLINICAL NURSE SPECIALIST

## 2024-11-19 PROCEDURE — 3074F SYST BP LT 130 MM HG: CPT | Performed by: CLINICAL NURSE SPECIALIST

## 2024-11-19 PROCEDURE — 3023F SPIROM DOC REV: CPT | Performed by: CLINICAL NURSE SPECIALIST

## 2024-11-19 PROCEDURE — G8420 CALC BMI NORM PARAMETERS: HCPCS | Performed by: CLINICAL NURSE SPECIALIST

## 2024-11-19 RX ORDER — ATORVASTATIN CALCIUM 40 MG/1
40 TABLET, FILM COATED ORAL DAILY
Qty: 90 TABLET | Refills: 3 | Status: SHIPPED | OUTPATIENT
Start: 2024-11-19

## 2024-11-19 RX ORDER — FUROSEMIDE 20 MG/1
20 TABLET ORAL DAILY
Qty: 90 TABLET | Refills: 3 | Status: SHIPPED | OUTPATIENT
Start: 2024-11-19

## 2024-11-19 RX ORDER — ASPIRIN 81 MG/1
81 TABLET ORAL DAILY
Qty: 90 TABLET | Refills: 5 | Status: SHIPPED | OUTPATIENT
Start: 2024-11-19

## 2024-11-19 RX ORDER — CARVEDILOL 12.5 MG/1
12.5 TABLET ORAL 2 TIMES DAILY
Qty: 180 TABLET | Refills: 3 | Status: SHIPPED | OUTPATIENT
Start: 2024-11-19 | End: 2025-11-14

## 2024-11-19 RX ORDER — ISOSORBIDE MONONITRATE 30 MG/1
30 TABLET, EXTENDED RELEASE ORAL DAILY
Qty: 90 TABLET | Refills: 1 | Status: SHIPPED | OUTPATIENT
Start: 2024-11-19

## 2024-11-19 RX ORDER — HYDRALAZINE HYDROCHLORIDE 25 MG/1
25 TABLET, FILM COATED ORAL 3 TIMES DAILY
Qty: 270 TABLET | Refills: 3 | Status: SHIPPED | OUTPATIENT
Start: 2024-11-19

## 2024-11-19 RX ORDER — LISINOPRIL 5 MG/1
5 TABLET ORAL DAILY
Qty: 90 TABLET | Refills: 1 | Status: SHIPPED | OUTPATIENT
Start: 2024-11-19

## 2024-11-19 NOTE — TELEPHONE ENCOUNTER
Spoke to patient's sister Ashia informing of instructions below per Brooklynn Ibarra APRN-CNS.  Ashia verbalized understanding.  LVM to Chuck to call the clinic back.

## 2024-11-19 NOTE — PATIENT INSTRUCTIONS
-We will call later today with results of your blood work and any changes to make    -Resume Aspirin 81 mg daily    -Refills sent to [facility]    -Schedule follow up with Dr. Reyes Moore     -Referral sent to pulmonology (lung doctor)    -Follow up with Dr. Carrasco as scheduled     -Follow up in CHF clinic as scheduled    -Weigh yourself daily    -Stay Hydrated, 64 oz     -Diet should sodium restricted to 2 grams    -Again watch your daily weight trends and if you gain water weight please follow below instructions.    -If you gain 3-5 pounds in 2-3 days OR notice that you are retaining fluid in anyway just like you did before, please let us know     -If at any time you feel that you are retaining fluid, your medications are not working, or you feel ill in anyway, then please call us for either same day appointment or the next day, and for instructions. Our goal is to keep you out of the emergency room and the hospital and we have ways to do it. You just need to call us in a timely manner.     -If you become sick for other reasons, and notice that you are not urinating as much, the urine is very dark, you have significant diarrhea or vomiting, then please DO NOT take your water pill and CALL US immediately.      
Acute diverticulitis

## 2024-11-19 NOTE — RESULT ENCOUNTER NOTE
Please let Mr. Chavarria (or his sister) know that his BNP has decreased to 1096    His kidney function is stable    Continue the same medications    Follow up as scheduled

## 2024-11-19 NOTE — TELEPHONE ENCOUNTER
----- Message from JASSI Magana - CNS sent at 11/19/2024 12:44 PM EST -----  Please let Mr. Chavarria (or his sister) know that his BNP has decreased to 1096    His kidney function is stable    Continue the same medications    Follow up as scheduled

## 2024-11-19 NOTE — PROGRESS NOTES
11/14/2024 104   11/13/2024 100     CO2 (mmol/L)   Date Value   11/19/2024 32 (H)   11/14/2024 27   11/13/2024 26     BUN (mg/dL)   Date Value   11/19/2024 33 (H)   11/14/2024 32 (H)   11/13/2024 39 (H)     Creatinine (mg/dL)   Date Value   11/19/2024 1.2   11/14/2024 1.2   11/13/2024 1.4 (H)     Glucose (mg/dL)   Date Value   11/19/2024 150 (H)   11/14/2024 112 (H)   11/13/2024 142 (H)     Calcium (mg/dL)   Date Value   11/19/2024 9.8   11/14/2024 8.9   11/13/2024 9.0     BNP:  NT Pro-BNP (pg/mL)   Date Value   11/19/2024 1,096 (H)   11/10/2024 7,057 (H)   11/07/2024 6,197 (H)      CBC:  WBC (k/uL)   Date Value   11/14/2024 7.3     Hemoglobin (g/dL)   Date Value   11/14/2024 10.3 (L)     Hematocrit (%)   Date Value   11/14/2024 32.4 (L)     Platelets (k/uL)   Date Value   11/14/2024 286     Iron Studies:  Ferritin (ng/mL)   Date Value   01/16/2024 312     Iron (ug/dL)   Date Value   10/09/2024 40 (L)     TIBC (ug/dL)   Date Value   10/09/2024 254     Hepatic:  AST (U/L)   Date Value   11/11/2024 21     ALT (U/L)   Date Value   11/11/2024 21     Total Bilirubin (mg/dL)   Date Value   11/11/2024 0.2     Alkaline Phosphatase (U/L)   Date Value   11/11/2024 110     INR:  INR (no units)   Date Value   11/10/2024 1.1         IMAGING:    CXR 11/10/2024:   FINDINGS:  The lungs are without acute focal process.  There is no effusion or  pneumothorax. The cardiomediastinal silhouette is without acute process. The osseous structures are without acute process.     IMPRESSION:  No acute process.    CARDIAC TESTING:    Stress MPI 9/2015:   Findings:   There is normal perfusion of the left ventricle  myocardium. No evidence of any fixed or reversible perfusion  defect is identified. There is normal left ventricular wall motion  and thickening. No evidence of any decrease or paradoxical wall  motion is noted. The ejection fraction is within normal limits  measuring 61%.     IMPRESSION:  1. No evidence of stress induced myocardial

## 2024-11-22 ENCOUNTER — OFFICE VISIT (OUTPATIENT)
Dept: PRIMARY CARE CLINIC | Age: 74
End: 2024-11-22
Payer: COMMERCIAL

## 2024-11-22 VITALS
SYSTOLIC BLOOD PRESSURE: 120 MMHG | BODY MASS INDEX: 19.11 KG/M2 | OXYGEN SATURATION: 97 % | HEIGHT: 69 IN | DIASTOLIC BLOOD PRESSURE: 70 MMHG | HEART RATE: 74 BPM | WEIGHT: 129 LBS | RESPIRATION RATE: 17 BRPM | TEMPERATURE: 97.6 F

## 2024-11-22 DIAGNOSIS — I10 ESSENTIAL HYPERTENSION: Primary | ICD-10-CM

## 2024-11-22 DIAGNOSIS — Z72.0 TOBACCO ABUSE: ICD-10-CM

## 2024-11-22 DIAGNOSIS — F14.10 COCAINE ABUSE (HCC): ICD-10-CM

## 2024-11-22 DIAGNOSIS — I42.8 NON-ISCHEMIC CARDIOMYOPATHY (HCC): ICD-10-CM

## 2024-11-22 DIAGNOSIS — J44.9 CHRONIC OBSTRUCTIVE PULMONARY DISEASE, UNSPECIFIED COPD TYPE (HCC): ICD-10-CM

## 2024-11-22 DIAGNOSIS — I48.91 ATRIAL FIBRILLATION, UNSPECIFIED TYPE (HCC): ICD-10-CM

## 2024-11-22 DIAGNOSIS — I50.20 HFREF (HEART FAILURE WITH REDUCED EJECTION FRACTION) (HCC): ICD-10-CM

## 2024-11-22 PROCEDURE — 1123F ACP DISCUSS/DSCN MKR DOCD: CPT | Performed by: INTERNAL MEDICINE

## 2024-11-22 PROCEDURE — G8484 FLU IMMUNIZE NO ADMIN: HCPCS | Performed by: INTERNAL MEDICINE

## 2024-11-22 PROCEDURE — 3078F DIAST BP <80 MM HG: CPT | Performed by: INTERNAL MEDICINE

## 2024-11-22 PROCEDURE — 99214 OFFICE O/P EST MOD 30 MIN: CPT | Performed by: INTERNAL MEDICINE

## 2024-11-22 PROCEDURE — 3017F COLORECTAL CA SCREEN DOC REV: CPT | Performed by: INTERNAL MEDICINE

## 2024-11-22 PROCEDURE — 3074F SYST BP LT 130 MM HG: CPT | Performed by: INTERNAL MEDICINE

## 2024-11-22 PROCEDURE — G8420 CALC BMI NORM PARAMETERS: HCPCS | Performed by: INTERNAL MEDICINE

## 2024-11-22 PROCEDURE — 4004F PT TOBACCO SCREEN RCVD TLK: CPT | Performed by: INTERNAL MEDICINE

## 2024-11-22 PROCEDURE — 1111F DSCHRG MED/CURRENT MED MERGE: CPT | Performed by: INTERNAL MEDICINE

## 2024-11-22 PROCEDURE — G8427 DOCREV CUR MEDS BY ELIG CLIN: HCPCS | Performed by: INTERNAL MEDICINE

## 2024-11-22 PROCEDURE — 3023F SPIROM DOC REV: CPT | Performed by: INTERNAL MEDICINE

## 2024-11-22 ASSESSMENT — ENCOUNTER SYMPTOMS
ABDOMINAL PAIN: 0
SHORTNESS OF BREATH: 0
NAUSEA: 0
COUGH: 0
DIARRHEA: 0
VOMITING: 0

## 2024-11-22 NOTE — PROGRESS NOTES
Topic    Hepatitis C screen     Colorectal Cancer Screen     Respiratory Syncytial Virus (RSV) Pregnant or age 60 yrs+ (1 - Risk 60-74 years 1-dose series)    Shingles vaccine (2 of 3)    DTaP/Tdap/Td vaccine (2 - Td or Tdap)    Flu vaccine (1)    COVID-19 Vaccine (1 - 2023-24 season)    Depression Screen     A1C test (Diabetic or Prediabetic)     Lipids     Pneumococcal 65+ years Vaccine     AAA screen     Hepatitis A vaccine     Hepatitis B vaccine     Hib vaccine     Polio vaccine     Meningococcal (ACWY) vaccine     GFR test (Diabetes, CKD 3-4, OR last GFR 15-59)

## 2024-12-06 ENCOUNTER — HOSPITAL ENCOUNTER (OUTPATIENT)
Dept: OTHER | Age: 74
Setting detail: THERAPIES SERIES
Discharge: HOME | End: 2024-12-06
Payer: COMMERCIAL

## 2024-12-06 ENCOUNTER — TELEPHONE (OUTPATIENT)
Dept: OTHER | Age: 74
End: 2024-12-06

## 2024-12-06 VITALS
DIASTOLIC BLOOD PRESSURE: 87 MMHG | OXYGEN SATURATION: 97 % | BODY MASS INDEX: 18.16 KG/M2 | RESPIRATION RATE: 18 BRPM | WEIGHT: 123 LBS | SYSTOLIC BLOOD PRESSURE: 132 MMHG | HEART RATE: 87 BPM

## 2024-12-06 DIAGNOSIS — I50.33 ACUTE ON CHRONIC HEART FAILURE WITH PRESERVED EJECTION FRACTION (HCC): Primary | ICD-10-CM

## 2024-12-06 LAB
ANION GAP SERPL CALCULATED.3IONS-SCNC: 9 MMOL/L (ref 7–16)
BNP SERPL-MCNC: 2040 PG/ML (ref 0–450)
BUN SERPL-MCNC: 14 MG/DL (ref 6–23)
CALCIUM SERPL-MCNC: 9.7 MG/DL (ref 8.6–10.2)
CHLORIDE SERPL-SCNC: 106 MMOL/L (ref 98–107)
CO2 SERPL-SCNC: 30 MMOL/L (ref 22–29)
CREAT SERPL-MCNC: 1.2 MG/DL (ref 0.7–1.2)
GFR, ESTIMATED: 65 ML/MIN/1.73M2
GLUCOSE SERPL-MCNC: 100 MG/DL (ref 74–99)
POTASSIUM SERPL-SCNC: 3.4 MMOL/L (ref 3.5–5)
SODIUM SERPL-SCNC: 145 MMOL/L (ref 132–146)

## 2024-12-06 PROCEDURE — 80048 BASIC METABOLIC PNL TOTAL CA: CPT

## 2024-12-06 PROCEDURE — 83880 ASSAY OF NATRIURETIC PEPTIDE: CPT

## 2024-12-06 PROCEDURE — 99214 OFFICE O/P EST MOD 30 MIN: CPT

## 2024-12-06 RX ORDER — SPIRONOLACTONE 25 MG/1
25 TABLET ORAL DAILY
Qty: 90 TABLET | Refills: 1 | Status: SHIPPED | OUTPATIENT
Start: 2024-12-06

## 2024-12-06 NOTE — PLAN OF CARE
Problem: Chronic Conditions and Co-morbidities  Goal: Patient's chronic conditions and co-morbidity symptoms are monitored and maintained or improved  Flowsheets (Taken 12/6/2024 9246)  Care Plan - Patient's Chronic Conditions and Co-Morbidity Symptoms are Monitored and Maintained or Improved: Monitor and assess patient's chronic conditions and comorbid symptoms for stability, deterioration, or improvement

## 2024-12-06 NOTE — RESULT ENCOUNTER NOTE
Labs and CHF clinic note reviewed  Start spironolactone 25 mg daily   Follow up labs 1 week after starting medication     Follow up as scheduled     Thank you

## 2024-12-06 NOTE — PROGRESS NOTES
Congestive Heart Failure Clinic   [facility]      Referring Provider: Luna  Primary Care Physician: Gunner Espinosa MD   Cardiologist: Luna  Nephrologist:       HISTORY OF PRESENT ILLNESS:     Chuck Chavarria Jr. is a 74 y.o. (1950) male with a history of HFrEF (EF < 40%)  Pre Cupid:     Lab Results   Component Value Date    LVEF 40 09/11/2024     Post Cupid:    Lab Results   Component Value Date    EFBP 52 (A) 01/15/2024         He presents to the CHF clinic for ongoing evaluation and monitoring of heart failure.    In the CHF clinic today he denies any adverse symptoms except:  [x] Dizziness or lightheadedness   [] Syncope or near syncope  [] Recent Fall  [] Shortness of breath at rest   [x] Dyspnea with exertion  [] Decline in functional capacity (unable to perform activities they had previously been able to do)  [x] Fatigue   [] Orthopnea  [] PND  [] Nocturnal cough  [] Hemoptysis  [] Chest pain, pressure, or discomfort  [] Palpitations  [] Abdominal distention  [] Abdominal bloating  [] Early satiety  [] Blood in stool   [] Diarrhea  [] Constipation  [] Nausea/Vomiting  [] Decreased urinary response to oral diuretic   [] Scrotal swelling   [] Lower extremity edema  [] Used PRN doses of oral diuretic   [] Weight gain    Wt Readings from Last 3 Encounters:   12/06/24 55.8 kg (123 lb)   11/22/24 58.5 kg (129 lb)   11/19/24 58.6 kg (129 lb 3.2 oz)         SOCIAL HISTORY:  [x] Denies tobacco, alcohol or illicit drug abuse  [] Tobacco use:  [] ETOH use:  [] Illicit drug use:        MEDICATIONS:    Allergies   Allergen Reactions    Doxycycline Shortness Of Breath    Rocephin [Ceftriaxone] Shortness Of Breath     Prior to Visit Medications    Medication Sig Taking? Authorizing Provider   aspirin 81 MG EC tablet Take 1 tablet by mouth daily Yes Brooklynn Ibarra, APRN - CNS   atorvastatin (LIPITOR) 40 MG tablet Take 1 tablet by mouth daily Yes

## 2024-12-16 ENCOUNTER — TELEPHONE (OUTPATIENT)
Dept: PULMONOLOGY | Age: 74
End: 2024-12-16

## 2024-12-17 ENCOUNTER — OFFICE VISIT (OUTPATIENT)
Dept: PULMONOLOGY | Age: 74
End: 2024-12-17
Payer: COMMERCIAL

## 2024-12-17 VITALS
BODY MASS INDEX: 19.11 KG/M2 | HEART RATE: 82 BPM | SYSTOLIC BLOOD PRESSURE: 122 MMHG | HEIGHT: 69 IN | TEMPERATURE: 97.6 F | WEIGHT: 129 LBS | OXYGEN SATURATION: 97 % | DIASTOLIC BLOOD PRESSURE: 70 MMHG

## 2024-12-17 DIAGNOSIS — J90 PLEURAL EFFUSION ON RIGHT: ICD-10-CM

## 2024-12-17 DIAGNOSIS — R91.8 MASS OF UPPER LOBE OF RIGHT LUNG: Primary | ICD-10-CM

## 2024-12-17 DIAGNOSIS — J44.9 CHRONIC OBSTRUCTIVE PULMONARY DISEASE, UNSPECIFIED COPD TYPE (HCC): ICD-10-CM

## 2024-12-17 PROBLEM — K64.9 HEMORRHOIDS: Status: ACTIVE | Noted: 2024-12-17

## 2024-12-17 PROBLEM — N52.9 PRIMARY ERECTILE DYSFUNCTION: Status: ACTIVE | Noted: 2024-12-17

## 2024-12-17 PROBLEM — K40.31: Status: ACTIVE | Noted: 2024-12-17

## 2024-12-17 PROBLEM — R73.03 PREDIABETES: Status: ACTIVE | Noted: 2017-11-14

## 2024-12-17 PROBLEM — R59.0 INGUINAL LYMPHADENOPATHY: Status: ACTIVE | Noted: 2018-11-15

## 2024-12-17 PROBLEM — J45.909 ASTHMA: Status: ACTIVE | Noted: 2024-12-17

## 2024-12-17 PROBLEM — L80 VITILIGO: Status: ACTIVE | Noted: 2021-07-02

## 2024-12-17 PROBLEM — R91.1 LUNG NODULE: Status: ACTIVE | Noted: 2020-06-09

## 2024-12-17 PROBLEM — B19.20 VIRAL HEPATITIS C: Status: ACTIVE | Noted: 2024-12-17

## 2024-12-17 PROBLEM — M15.9 GENERALIZED OSTEOARTHRITIS OF HAND: Status: ACTIVE | Noted: 2021-07-02

## 2024-12-17 PROBLEM — R79.89 SERUM CREATININE RAISED: Status: ACTIVE | Noted: 2017-11-27

## 2024-12-17 PROBLEM — L29.9 PRURITIC DISORDER: Status: ACTIVE | Noted: 2018-02-26

## 2024-12-17 PROBLEM — E55.9 VITAMIN D DEFICIENCY: Status: ACTIVE | Noted: 2024-12-17

## 2024-12-17 PROCEDURE — 1036F TOBACCO NON-USER: CPT | Performed by: INTERNAL MEDICINE

## 2024-12-17 PROCEDURE — 3017F COLORECTAL CA SCREEN DOC REV: CPT | Performed by: INTERNAL MEDICINE

## 2024-12-17 PROCEDURE — 1123F ACP DISCUSS/DSCN MKR DOCD: CPT | Performed by: INTERNAL MEDICINE

## 2024-12-17 PROCEDURE — G8420 CALC BMI NORM PARAMETERS: HCPCS | Performed by: INTERNAL MEDICINE

## 2024-12-17 PROCEDURE — G8484 FLU IMMUNIZE NO ADMIN: HCPCS | Performed by: INTERNAL MEDICINE

## 2024-12-17 PROCEDURE — 99204 OFFICE O/P NEW MOD 45 MIN: CPT | Performed by: INTERNAL MEDICINE

## 2024-12-17 PROCEDURE — 3078F DIAST BP <80 MM HG: CPT | Performed by: INTERNAL MEDICINE

## 2024-12-17 PROCEDURE — 3074F SYST BP LT 130 MM HG: CPT | Performed by: INTERNAL MEDICINE

## 2024-12-17 PROCEDURE — 3023F SPIROM DOC REV: CPT | Performed by: INTERNAL MEDICINE

## 2024-12-17 PROCEDURE — G8427 DOCREV CUR MEDS BY ELIG CLIN: HCPCS | Performed by: INTERNAL MEDICINE

## 2024-12-17 ASSESSMENT — ENCOUNTER SYMPTOMS
COUGH: 1
SHORTNESS OF BREATH: 1
GASTROINTESTINAL NEGATIVE: 1
WHEEZING: 1
ALLERGIC/IMMUNOLOGIC NEGATIVE: 1
EYES NEGATIVE: 1

## 2024-12-17 NOTE — PROGRESS NOTES
tablet, Take 1 tablet by mouth 3 times daily, Disp: 270 tablet, Rfl: 3    isosorbide mononitrate (IMDUR) 30 MG extended release tablet, Take 1 tablet by mouth daily, Disp: 90 tablet, Rfl: 1    lisinopril (PRINIVIL;ZESTRIL) 5 MG tablet, Take 1 tablet by mouth daily, Disp: 90 tablet, Rfl: 1    apixaban (ELIQUIS) 5 MG TABS tablet, Take 1 tablet by mouth 2 times daily, Disp: 180 tablet, Rfl: 3    empagliflozin (JARDIANCE) 10 MG tablet, Take 1 tablet by mouth daily, Disp: 90 tablet, Rfl: 1    albuterol sulfate HFA (PROVENTIL;VENTOLIN;PROAIR) 108 (90 Base) MCG/ACT inhaler, Inhale 2 puffs into the lungs every 4 hours as needed for Wheezing, Disp: 1 each, Rfl: 1    budesonide-formoterol (SYMBICORT) 160-4.5 MCG/ACT AERO, Inhale 2 puffs into the lungs 2 times daily, Disp: 30.6 g, Rfl: 0    tiotropium (SPIRIVA HANDIHALER) 18 MCG inhalation capsule, Inhale 1 capsule into the lungs daily, Disp: 90 capsule, Rfl: 0    polyethylene glycol (GLYCOLAX) 17 g packet, Take 1 packet by mouth daily as needed for Constipation, Disp: 527 g, Rfl: 1    nicotine (NICODERM CQ) 21 MG/24HR, Place 1 patch onto the skin daily, Disp: 30 patch, Rfl: 0     I reviewed the patient's past medical and surgical history, Medications and Allergies.    Patient seen today for: emphysema       Review of Systems   Constitutional: Negative.    HENT: Negative.     Eyes: Negative.    Respiratory:  Positive for cough, shortness of breath and wheezing.    Cardiovascular:         AF, HTN     Gastrointestinal: Negative.    Endocrine: Negative.    Genitourinary: Negative.    Allergic/Immunologic: Negative.    Neurological:         Stroke in the past         Physical Exam:  Vitals:    12/17/24 1043   BP: 122/70   Pulse: 82   Temp: 97.6 °F (36.4 °C)   SpO2: 97%   Weight: 58.5 kg (129 lb)   Height: 1.753 m (5' 9\")       Physical Exam  Constitutional:       Appearance: Normal appearance.      Comments: Bilateral digital clubbing   HENT:      Head: Normocephalic and

## 2024-12-23 ENCOUNTER — APPOINTMENT (OUTPATIENT)
Dept: GENERAL RADIOLOGY | Age: 74
End: 2024-12-23
Payer: MEDICAID

## 2024-12-23 ENCOUNTER — HOSPITAL ENCOUNTER (EMERGENCY)
Age: 74
Discharge: HOME | End: 2024-12-23
Attending: EMERGENCY MEDICINE
Payer: MEDICAID

## 2024-12-23 ENCOUNTER — APPOINTMENT (OUTPATIENT)
Dept: CT IMAGING | Age: 74
End: 2024-12-23
Payer: MEDICAID

## 2024-12-23 VITALS
HEART RATE: 78 BPM | SYSTOLIC BLOOD PRESSURE: 118 MMHG | RESPIRATION RATE: 18 BRPM | TEMPERATURE: 97.2 F | DIASTOLIC BLOOD PRESSURE: 70 MMHG | OXYGEN SATURATION: 96 %

## 2024-12-23 DIAGNOSIS — S09.90XA CLOSED HEAD INJURY, INITIAL ENCOUNTER: ICD-10-CM
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
## 2024-12-23 PROCEDURE — 70450 CT HEAD/BRAIN W/O DYE: CPT

## 2024-12-23 PROCEDURE — 72131 CT LUMBAR SPINE W/O DYE: CPT

## 2024-12-23 PROCEDURE — 99284 EMERGENCY DEPT VISIT MOD MDM: CPT

## 2024-12-23 PROCEDURE — 72170 X-RAY EXAM OF PELVIS: CPT

## 2024-12-23 PROCEDURE — 71045 X-RAY EXAM CHEST 1 VIEW: CPT

## 2024-12-23 PROCEDURE — 72125 CT NECK SPINE W/O DYE: CPT

## 2024-12-23 ASSESSMENT — LIFESTYLE VARIABLES: HOW OFTEN DO YOU HAVE A DRINK CONTAINING ALCOHOL: MONTHLY OR LESS

## 2024-12-23 NOTE — ED PROVIDER NOTES
intracranial abnormality.         CT CSpine W/O Contrast   Final Result   1. No acute fracture of the cervical spine.   2. Grade 1 anterolisthesis C2 over C3.   3. Multilevel degenerative changes.         CT LUMBAR SPINE WO CONTRAST   Final Result   1. No acute fracture or traumatic malalignment of the lumbar spine.   2. Multilevel degenerative changes of the lumbar spine.         XR CHEST 1 VIEW   Final Result   No acute process.         XR PELVIS (1-2 VIEWS)   Final Result   No acute abnormality of the pelvis.           No results found.    No results found.    PROCEDURES   Unless otherwise noted below, none         PAST MEDICAL HISTORY/Chronic Conditions Affecting Care      has a past medical history of A-fib ([facility]) (11/13/2024), Arthritis, Bilateral carotid artery stenosis (01/17/2024), Cataract, left eye, Cerebral infarction due to occlusion of right vertebral artery ([facility]) (01/17/2024), Cocaine abuse ([facility]), COPD (chronic obstructive pulmonary disease) ([facility]), Hyperlipidemia, Hypertension, Occlusion of right vertebral artery (01/17/2024), and Tobacco dependence (01/17/2024).     EMERGENCY DEPARTMENT COURSE    Vitals:    Vitals:    12/23/24 1757 12/23/24 1934   BP: 132/79 118/70   Pulse: 62 78   Resp: 17 18   Temp: 98.2 °F (36.8 °C) 97.2 °F (36.2 °C)   SpO2: 96% 96%       Patient was given the following medications:  Medications - No data to display      Is this patient to be included in the SEP-1 core measure? No Exclusion criteria - the patient is NOT to be included for SEP-1 Core Measure due to: Infection is not suspected        Medical Decision Making/Differential Diagnosis:    CC/HPI Summary, Social Determinants of health, Records Reviewed, DDx, testing done/not done, ED Course, Reassessment, disposition considerations/shared decision making with patient, consults, disposition:            Medical Decision Making  Amount and/or Complexity of Data Reviewed  Radiology: ordered.        74-year-old gentleman past

## 2024-12-30 ENCOUNTER — TELEPHONE (OUTPATIENT)
Dept: PRIMARY CARE CLINIC | Age: 74
End: 2024-12-30

## 2024-12-30 DIAGNOSIS — I10 ESSENTIAL HYPERTENSION: Primary | ICD-10-CM

## 2024-12-30 DIAGNOSIS — I42.8 NON-ISCHEMIC CARDIOMYOPATHY (HCC): ICD-10-CM

## 2024-12-30 DIAGNOSIS — J44.9 CHRONIC OBSTRUCTIVE PULMONARY DISEASE, UNSPECIFIED COPD TYPE (HCC): ICD-10-CM

## 2024-12-30 DIAGNOSIS — I50.20 HFREF (HEART FAILURE WITH REDUCED EJECTION FRACTION) (HCC): ICD-10-CM

## 2025-01-03 ENCOUNTER — APPOINTMENT (OUTPATIENT)
Dept: OTHER | Age: 75
End: 2025-01-03
Payer: MEDICAID

## 2025-01-14 ENCOUNTER — HOSPITAL ENCOUNTER (OUTPATIENT)
Dept: PULMONOLOGY | Age: 75
Discharge: HOME | End: 2025-01-14
Attending: INTERNAL MEDICINE
Payer: MEDICAID

## 2025-01-14 ENCOUNTER — HOSPITAL ENCOUNTER (OUTPATIENT)
Dept: NUCLEAR MEDICINE | Age: 75
Discharge: HOME | End: 2025-01-16
Attending: INTERNAL MEDICINE
Payer: MEDICAID

## 2025-01-14 DIAGNOSIS — R91.8 MASS OF UPPER LOBE OF RIGHT LUNG: ICD-10-CM

## 2025-01-14 DIAGNOSIS — J44.9 CHRONIC OBSTRUCTIVE PULMONARY DISEASE, UNSPECIFIED COPD TYPE (HCC): ICD-10-CM

## 2025-01-14 PROCEDURE — 94729 DIFFUSING CAPACITY: CPT

## 2025-01-14 PROCEDURE — 3430000000 HC RX DIAGNOSTIC RADIOPHARMACEUTICAL: Performed by: RADIOLOGY

## 2025-01-14 PROCEDURE — 94060 EVALUATION OF WHEEZING: CPT

## 2025-01-14 PROCEDURE — 78815 PET IMAGE W/CT SKULL-THIGH: CPT

## 2025-01-14 PROCEDURE — A9609 HC RX DIAGNOSTIC RADIOPHARMACEUTICAL: HCPCS | Performed by: RADIOLOGY

## 2025-01-14 RX ORDER — FLUDEOXYGLUCOSE F 18 200 MCI/ML
14.2 INJECTION, SOLUTION INTRAVENOUS
Status: COMPLETED | OUTPATIENT
Start: 2025-01-14 | End: 2025-01-14

## 2025-01-14 RX ADMIN — FLUDEOXYGLUCOSE F 18 14.2 MILLICURIE: 200 INJECTION, SOLUTION INTRAVENOUS at 10:34

## 2025-01-15 NOTE — PROCEDURES
[facility]              [address] [place] [address]                           PULMONARY FUNCTION      PATIENT NAME: BRANDEN REED              : 1950  MED REC NO: [id number]                        ROOM:   ACCOUNT NO: [id number]                       ADMIT DATE: 2025  PROVIDER: Darling Campos MD      DATE OF PROCEDURE: 2025    SURGEON:  Darling Campos MD    REFERRING PHYSICIAN:  DARLING CAMPOS    The spirometry shows normal FVC and mild reduction in FEV1.  FEV1/FVC is moderately decreased.  The maximum voluntary ventilation is 44% of the predicted value.    According to Z-score criteria, the FVC is under the area of curve, within standard deviation of -1.64, whereas the FEV1 and FEV1/FVC are outside the area of curve, more than standard deviation of -1.64.  After bronchodilator therapy, there is no improvement seen in the spirometry.  The diffusion capacity is severely decreased.    IMPRESSION:  The above study shows mild obstructive ventilatory impairment with no improvement after bronchodilator therapy.    GOLD criteria stage II chronic obstructive pulmonary disease.          DARLING CAMPOS MD      D:  2025 12:21:44     T:  2025 20:01:08     GENARO/PATRICIA  Job #:  [id number]     Doc#:  [id number]

## 2025-01-17 ENCOUNTER — HOSPITAL ENCOUNTER (OUTPATIENT)
Dept: OTHER | Age: 75
Setting detail: THERAPIES SERIES
Discharge: HOME | End: 2025-01-17
Payer: MEDICAID

## 2025-01-17 VITALS
HEART RATE: 66 BPM | BODY MASS INDEX: 18.46 KG/M2 | DIASTOLIC BLOOD PRESSURE: 78 MMHG | RESPIRATION RATE: 18 BRPM | OXYGEN SATURATION: 97 % | SYSTOLIC BLOOD PRESSURE: 152 MMHG | WEIGHT: 125 LBS

## 2025-01-17 DIAGNOSIS — I50.20 HFREF (HEART FAILURE WITH REDUCED EJECTION FRACTION) (HCC): Primary | ICD-10-CM

## 2025-01-17 LAB
ANION GAP SERPL CALCULATED.3IONS-SCNC: 9 MMOL/L (ref 7–16)
BNP SERPL-MCNC: 3572 PG/ML (ref 0–450)
BUN SERPL-MCNC: 12 MG/DL (ref 6–23)
CALCIUM SERPL-MCNC: 9.5 MG/DL (ref 8.6–10.2)
CHLORIDE SERPL-SCNC: 106 MMOL/L (ref 98–107)
CO2 SERPL-SCNC: 25 MMOL/L (ref 22–29)
CREAT SERPL-MCNC: 1.1 MG/DL (ref 0.7–1.2)
GFR, ESTIMATED: 70 ML/MIN/1.73M2
GLUCOSE SERPL-MCNC: 103 MG/DL (ref 74–99)
POTASSIUM SERPL-SCNC: 4.2 MMOL/L (ref 3.5–5)
SODIUM SERPL-SCNC: 140 MMOL/L (ref 132–146)

## 2025-01-17 PROCEDURE — 83880 ASSAY OF NATRIURETIC PEPTIDE: CPT

## 2025-01-17 PROCEDURE — 80048 BASIC METABOLIC PNL TOTAL CA: CPT

## 2025-01-17 PROCEDURE — 99214 OFFICE O/P EST MOD 30 MIN: CPT

## 2025-01-17 RX ORDER — FUROSEMIDE 20 MG/1
40 TABLET ORAL DAILY
Qty: 60 TABLET | Refills: 3 | Status: SHIPPED | OUTPATIENT
Start: 2025-01-17

## 2025-01-17 RX ORDER — LISINOPRIL 10 MG/1
10 TABLET ORAL DAILY
Qty: 90 TABLET | Refills: 3 | Status: SHIPPED | OUTPATIENT
Start: 2025-01-17

## 2025-01-17 ASSESSMENT — PATIENT HEALTH QUESTIONNAIRE - PHQ9
SUM OF ALL RESPONSES TO PHQ QUESTIONS 1-9: 1
SUM OF ALL RESPONSES TO PHQ QUESTIONS 1-9: 1
SUM OF ALL RESPONSES TO PHQ9 QUESTIONS 1 & 2: 1
SUM OF ALL RESPONSES TO PHQ QUESTIONS 1-9: 1
2. FEELING DOWN, DEPRESSED OR HOPELESS: NOT AT ALL
1. LITTLE INTEREST OR PLEASURE IN DOING THINGS: SEVERAL DAYS
SUM OF ALL RESPONSES TO PHQ QUESTIONS 1-9: 1

## 2025-01-17 NOTE — PLAN OF CARE
Problem: Chronic Conditions and Co-morbidities  Goal: Patient's chronic conditions and co-morbidity symptoms are monitored and maintained or improved  Flowsheets (Taken 1/17/2025 1430)  Care Plan - Patient's Chronic Conditions and Co-Morbidity Symptoms are Monitored and Maintained or Improved: Monitor and assess patient's chronic conditions and comorbid symptoms for stability, deterioration, or improvement

## 2025-01-17 NOTE — PROGRESS NOTES
Signed         Labs and CHF clinic note reviewed  Increase lasix 40 mg daily   Increase lisinopril 10 mg daily   Follow up labs in 1 week   Follow up in CHF clinic in 2 weeks - sooner if needed     Thank you                 Sister Ashia notified of above. Demonstrates understanding.       
Take 1 tablet by mouth daily Yes Dorita Kan APRN - CNP   aspirin 81 MG EC tablet Take 1 tablet by mouth daily Yes Brooklynn Ibarra APRN - CNS   atorvastatin (LIPITOR) 40 MG tablet Take 1 tablet by mouth daily Yes Brooklynn Ibarra APRN - CNS   carvedilol (COREG) 12.5 MG tablet Take 1 tablet by mouth 2 times daily Yes Brooklynn Ibarra APRN - CNS   furosemide (LASIX) 20 MG tablet Take 1 tablet by mouth daily Yes Brooklynn Ibarra APRN - CNS   hydrALAZINE (APRESOLINE) 25 MG tablet Take 1 tablet by mouth 3 times daily Yes Brooklynn Ibarra APRN - CNS   isosorbide mononitrate (IMDUR) 30 MG extended release tablet Take 1 tablet by mouth daily Yes Brooklynn Ibarra APRN - CNS   lisinopril (PRINIVIL;ZESTRIL) 5 MG tablet Take 1 tablet by mouth daily Yes Brooklynn Ibarra APRN - CNS   apixaban (ELIQUIS) 5 MG TABS tablet Take 1 tablet by mouth 2 times daily Yes Brooklynn Ibarra APRN - CNS   empagliflozin (JARDIANCE) 10 MG tablet Take 1 tablet by mouth daily Yes Brooklynn Ibarra APRN - CNS   albuterol sulfate HFA (PROVENTIL;VENTOLIN;PROAIR) 108 (90 Base) MCG/ACT inhaler Inhale 2 puffs into the lungs every 4 hours as needed for Wheezing Yes Huseyin Rosenberg MD   budesonide-formoterol (SYMBICORT) 160-4.5 MCG/ACT AERO Inhale 2 puffs into the lungs 2 times daily Yes Huseyin Rosenberg MD   tiotropium (SPIRIVA HANDIHALER) 18 MCG inhalation capsule Inhale 1 capsule into the lungs daily Yes Huseyin Rosenberg MD   nicotine (NICODERM CQ) 21 MG/24HR Place 1 patch onto the skin daily Yes Baltazar Huffman MD          GUIDELINE DIRECTED MEDICAL THERAPY for HFmrEF / HFrEF / HFimpEF:  ARNI/ACE I/ARB: (1a indication)  Lisinopril 5mg daily  Hydralazine/Nitrates (1a in symptomatic AA population, 2b if unable to tolerate ACE/ARB/ARNI)  Yes  Beta blocker: (1a indication)  Carvedilol (Coreg)  Aldosterone antagonist: (1a indication)  Spironolactone 25 mg daily  SGLT2i: (1a indication)  Jardiance 10 mg daily    If Channel inhibitor (2a

## 2025-01-21 ENCOUNTER — TELEPHONE (OUTPATIENT)
Dept: PULMONOLOGY | Age: 75
End: 2025-01-21

## 2025-01-22 ENCOUNTER — OFFICE VISIT (OUTPATIENT)
Dept: PULMONOLOGY | Age: 75
End: 2025-01-22
Payer: MEDICAID

## 2025-01-22 VITALS
RESPIRATION RATE: 12 BRPM | TEMPERATURE: 97 F | BODY MASS INDEX: 18.64 KG/M2 | DIASTOLIC BLOOD PRESSURE: 62 MMHG | HEART RATE: 86 BPM | WEIGHT: 123 LBS | OXYGEN SATURATION: 95 % | HEIGHT: 68 IN | SYSTOLIC BLOOD PRESSURE: 105 MMHG

## 2025-01-22 DIAGNOSIS — R91.8 MASS OF UPPER LOBE OF RIGHT LUNG: Primary | ICD-10-CM

## 2025-01-22 PROCEDURE — 3074F SYST BP LT 130 MM HG: CPT | Performed by: INTERNAL MEDICINE

## 2025-01-22 PROCEDURE — 1123F ACP DISCUSS/DSCN MKR DOCD: CPT | Performed by: INTERNAL MEDICINE

## 2025-01-22 PROCEDURE — 99214 OFFICE O/P EST MOD 30 MIN: CPT | Performed by: INTERNAL MEDICINE

## 2025-01-22 PROCEDURE — 3078F DIAST BP <80 MM HG: CPT | Performed by: INTERNAL MEDICINE

## 2025-01-22 NOTE — PATIENT INSTRUCTIONS
Jeaneth Smith    [facility]  [address] [place] [id number]  Office: [phone]      Your were seen in the office today for Lung nodule      We  did not make changes to your medications today.       Testing ordered today was CT scan of the chest in 3 months, biodesix testing.     Follow up with Dr. Sheppard in 3 months      Vaccines recommended Influenza              Please do not hesitate to call the office with any questions.

## 2025-01-23 ENCOUNTER — HOSPITAL ENCOUNTER (OUTPATIENT)
Age: 75
Discharge: HOME | End: 2025-01-23
Payer: MEDICAID

## 2025-01-23 DIAGNOSIS — I50.20 HFREF (HEART FAILURE WITH REDUCED EJECTION FRACTION) (HCC): ICD-10-CM

## 2025-01-23 LAB
ANION GAP SERPL CALCULATED.3IONS-SCNC: 13 MMOL/L (ref 7–16)
BUN SERPL-MCNC: 22 MG/DL (ref 6–23)
CALCIUM SERPL-MCNC: 9.6 MG/DL (ref 8.6–10.2)
CHLORIDE SERPL-SCNC: 103 MMOL/L (ref 98–107)
CO2 SERPL-SCNC: 25 MMOL/L (ref 22–29)
CREAT SERPL-MCNC: 1.7 MG/DL (ref 0.7–1.2)
GFR, ESTIMATED: 42 ML/MIN/1.73M2
GLUCOSE SERPL-MCNC: 78 MG/DL (ref 74–99)
POTASSIUM SERPL-SCNC: 3.9 MMOL/L (ref 3.5–5)
SODIUM SERPL-SCNC: 141 MMOL/L (ref 132–146)

## 2025-01-23 PROCEDURE — 36415 COLL VENOUS BLD VENIPUNCTURE: CPT

## 2025-01-23 PROCEDURE — 80048 BASIC METABOLIC PNL TOTAL CA: CPT

## 2025-01-24 ENCOUNTER — OFFICE VISIT (OUTPATIENT)
Dept: PRIMARY CARE CLINIC | Age: 75
End: 2025-01-24
Payer: MEDICAID

## 2025-01-24 VITALS
WEIGHT: 126 LBS | HEART RATE: 81 BPM | OXYGEN SATURATION: 97 % | DIASTOLIC BLOOD PRESSURE: 74 MMHG | HEIGHT: 68 IN | SYSTOLIC BLOOD PRESSURE: 110 MMHG | RESPIRATION RATE: 16 BRPM | BODY MASS INDEX: 19.1 KG/M2 | TEMPERATURE: 97.7 F

## 2025-01-24 DIAGNOSIS — I10 ESSENTIAL HYPERTENSION: Primary | ICD-10-CM

## 2025-01-24 DIAGNOSIS — Z72.0 TOBACCO ABUSE: ICD-10-CM

## 2025-01-24 DIAGNOSIS — F14.10 COCAINE ABUSE (HCC): ICD-10-CM

## 2025-01-24 DIAGNOSIS — E78.2 MIXED HYPERLIPIDEMIA: ICD-10-CM

## 2025-01-24 DIAGNOSIS — I48.91 ATRIAL FIBRILLATION, UNSPECIFIED TYPE (HCC): ICD-10-CM

## 2025-01-24 DIAGNOSIS — J44.9 CHRONIC OBSTRUCTIVE PULMONARY DISEASE, UNSPECIFIED COPD TYPE (HCC): ICD-10-CM

## 2025-01-24 PROCEDURE — 3078F DIAST BP <80 MM HG: CPT | Performed by: INTERNAL MEDICINE

## 2025-01-24 PROCEDURE — 99213 OFFICE O/P EST LOW 20 MIN: CPT | Performed by: INTERNAL MEDICINE

## 2025-01-24 PROCEDURE — 3074F SYST BP LT 130 MM HG: CPT | Performed by: INTERNAL MEDICINE

## 2025-01-24 PROCEDURE — 1123F ACP DISCUSS/DSCN MKR DOCD: CPT | Performed by: INTERNAL MEDICINE

## 2025-01-24 ASSESSMENT — ENCOUNTER SYMPTOMS
ABDOMINAL PAIN: 0
DIARRHEA: 0
SHORTNESS OF BREATH: 0
NAUSEA: 0
COUGH: 0
VOMITING: 0

## 2025-01-24 NOTE — PROGRESS NOTES
Tobacco abuse state quit 4 month now     Mixed hyperlipidemia stable continue lipitor         Outpatient Encounter Medications as of 1/24/2025   Medication Sig Dispense Refill    lisinopril (PRINIVIL;ZESTRIL) 10 MG tablet Take 1 tablet by mouth daily 90 tablet 3    furosemide (LASIX) 20 MG tablet Take 2 tablets by mouth daily 60 tablet 3    tiotropium (SPIRIVA RESPIMAT) 2.5 MCG/ACT AERS inhaler Inhale 2 puffs into the lungs daily 2 each 2    spironolactone (ALDACTONE) 25 MG tablet Take 1 tablet by mouth daily 90 tablet 1    aspirin 81 MG EC tablet Take 1 tablet by mouth daily 90 tablet 5    atorvastatin (LIPITOR) 40 MG tablet Take 1 tablet by mouth daily 90 tablet 3    carvedilol (COREG) 12.5 MG tablet Take 1 tablet by mouth 2 times daily 180 tablet 3    hydrALAZINE (APRESOLINE) 25 MG tablet Take 1 tablet by mouth 3 times daily 270 tablet 3    isosorbide mononitrate (IMDUR) 30 MG extended release tablet Take 1 tablet by mouth daily 90 tablet 1    apixaban (ELIQUIS) 5 MG TABS tablet Take 1 tablet by mouth 2 times daily 180 tablet 3    empagliflozin (JARDIANCE) 10 MG tablet Take 1 tablet by mouth daily 90 tablet 1    albuterol sulfate HFA (PROVENTIL;VENTOLIN;PROAIR) 108 (90 Base) MCG/ACT inhaler Inhale 2 puffs into the lungs every 4 hours as needed for Wheezing 1 each 1    budesonide-formoterol (SYMBICORT) 160-4.5 MCG/ACT AERO Inhale 2 puffs into the lungs 2 times daily 30.6 g 0    tiotropium (SPIRIVA HANDIHALER) 18 MCG inhalation capsule Inhale 1 capsule into the lungs daily 90 capsule 0    nicotine (NICODERM CQ) 21 MG/24HR Place 1 patch onto the skin daily 30 patch 0     No facility-administered encounter medications on file as of 1/24/2025.       Return in about 3 months (around 4/24/2025).        Reviewed recent labs related to Chuck's current problems      Discussed importance of regular Health Maintenance follow up  Health Maintenance   Topic    Colorectal Cancer Screen     Hepatitis B vaccine (1 of 3 -

## 2025-01-27 ENCOUNTER — TELEPHONE (OUTPATIENT)
Dept: CARDIOLOGY CLINIC | Age: 75
End: 2025-01-27

## 2025-01-27 ENCOUNTER — TELEPHONE (OUTPATIENT)
Dept: OTHER | Age: 75
End: 2025-01-27

## 2025-01-27 NOTE — TELEPHONE ENCOUNTER
Patient needs cardiac clearance for right lower lid entropion repair under MAC at an ASC by Dr. Rogers    Spoke with patient's sister (Ashia).  Sister denies patient having any chest pain, shortness of breath or palpitations since last visit in November 2024 with Brooklynn Ibarra NP. Sister states patient is able to complete all activities of daily living, including; climbing one flight of stairs and walking one block without cardiac symptoms. Recommended to hold Aspirin (14) days prior.

## 2025-01-27 NOTE — TELEPHONE ENCOUNTER
Patient's sister (Ashia) notified of Dr. Carrasco's risk assessment/recommendation. [facility] and [facility] will discuss Aspirin.  Note faxed to [facility] [phone].

## 2025-01-27 NOTE — TELEPHONE ENCOUNTER
Called patient's sister Ashia to discuss medication changes.    Dorita Kan, APRN - CNP  Ning Alvarez, GAYLE  Cc: Suzan Santizo RN; Patricia Sommer, RN  Labs reviewed  How is he feeling from a fluid standpoint?  If doing ok decrease lasix to 20 mg daily  Follow up las scheduled    Thank you    Ashia denies that patient has any new symptoms of sob, weight gain, abdominal bloating/distention, or LE swelling. Ashia was given instructions to decrease Lasix to 20 mg daily. She expressed verbal understanding and repeated orders back correctly. Encouraged to call the clinic back with any questions.

## 2025-01-27 NOTE — TELEPHONE ENCOUNTER
He does not have coronary artery disease.  The aspirin would be for his carotid stenosis and vertebral artery occlusion.  I would defer the aspirin recommendations to whomever is managing those.      He performs more than 4 METS of physical activities and has no cardiac symptoms.  He also had a cardiac catheterization 8/25/2023 demonstrating no coronary artery disease.  He would be classified as low risk for perioperative ischemic cardiac events.  No further preop cardiac testing is needed.

## 2025-01-30 ENCOUNTER — OFFICE VISIT (OUTPATIENT)
Dept: VASCULAR SURGERY | Age: 75
End: 2025-01-30
Payer: MEDICAID

## 2025-01-30 ENCOUNTER — TELEPHONE (OUTPATIENT)
Dept: PRIMARY CARE CLINIC | Age: 75
End: 2025-01-30

## 2025-01-30 VITALS — WEIGHT: 124 LBS | BODY MASS INDEX: 18.85 KG/M2

## 2025-01-30 DIAGNOSIS — I65.23 BILATERAL CAROTID ARTERY STENOSIS: Primary | ICD-10-CM

## 2025-01-30 DIAGNOSIS — I63.9 ACUTE STROKE DUE TO ISCHEMIA (HCC): Primary | ICD-10-CM

## 2025-01-30 PROCEDURE — 1123F ACP DISCUSS/DSCN MKR DOCD: CPT | Performed by: SURGERY

## 2025-01-30 PROCEDURE — 99214 OFFICE O/P EST MOD 30 MIN: CPT | Performed by: SURGERY

## 2025-01-30 NOTE — TELEPHONE ENCOUNTER
Patients sister called in stating they were in the office last week and were not asked about a referral for a walk in shower. Also states they did not ask. States it is to hard for her to get him in and out of the tub and his  states if a referral for a walk in shower was sent in she is sure it would be approved. Please give Ashia a call back at [phone]. Thank you.

## 2025-01-30 NOTE — PROGRESS NOTES
lateralizing neurological symptoms like loss of speech, vision or loss of function of extremity.        All the questions were answered.          Indicated follow-up: Return in about 9 months (around 10/30/2025), or if symptoms worsen or fail to improve.

## 2025-01-31 ENCOUNTER — HOSPITAL ENCOUNTER (OUTPATIENT)
Dept: OTHER | Age: 75
Setting detail: THERAPIES SERIES
Discharge: HOME | End: 2025-01-31
Payer: MEDICAID

## 2025-01-31 VITALS
HEART RATE: 74 BPM | DIASTOLIC BLOOD PRESSURE: 56 MMHG | WEIGHT: 124 LBS | BODY MASS INDEX: 18.85 KG/M2 | SYSTOLIC BLOOD PRESSURE: 92 MMHG | OXYGEN SATURATION: 95 % | RESPIRATION RATE: 18 BRPM

## 2025-01-31 LAB
ANION GAP SERPL CALCULATED.3IONS-SCNC: 11 MMOL/L (ref 7–16)
BNP SERPL-MCNC: 1100 PG/ML (ref 0–450)
BUN SERPL-MCNC: 29 MG/DL (ref 6–23)
CALCIUM SERPL-MCNC: 9.7 MG/DL (ref 8.6–10.2)
CHLORIDE SERPL-SCNC: 103 MMOL/L (ref 98–107)
CO2 SERPL-SCNC: 29 MMOL/L (ref 22–29)
CREAT SERPL-MCNC: 1.5 MG/DL (ref 0.7–1.2)
GFR, ESTIMATED: 47 ML/MIN/1.73M2
GLUCOSE SERPL-MCNC: 102 MG/DL (ref 74–99)
POTASSIUM SERPL-SCNC: 4.2 MMOL/L (ref 3.5–5)
SODIUM SERPL-SCNC: 143 MMOL/L (ref 132–146)

## 2025-01-31 PROCEDURE — 99214 OFFICE O/P EST MOD 30 MIN: CPT

## 2025-01-31 PROCEDURE — 83880 ASSAY OF NATRIURETIC PEPTIDE: CPT

## 2025-01-31 PROCEDURE — 80048 BASIC METABOLIC PNL TOTAL CA: CPT

## 2025-01-31 NOTE — PROGRESS NOTES
Congestive Heart Failure Clinic   [facility]      Referring Provider: Luna  Primary Care Physician: Gunner Espinosa MD   Cardiologist: Luna  Nephrologist:       HISTORY OF PRESENT ILLNESS:     Chuck Chavarria Jr. is a 74 y.o. (1950) male with a history of HFrEF (EF < 40%)  Pre Cupid:     Lab Results   Component Value Date    LVEF 40 09/11/2024     Post Cupid:    Lab Results   Component Value Date    EFBP 52 (A) 01/15/2024         He presents to the CHF clinic for ongoing evaluation and monitoring of heart failure.    In the CHF clinic today he denies any adverse symptoms except:  [x] Dizziness or lightheadedness   [] Syncope or near syncope  [] Recent Fall  [] Shortness of breath at rest   [x] Dyspnea with exertion  [] Decline in functional capacity (unable to perform activities they had previously been able to do)  [x] Fatigue   [] Orthopnea  [] PND  [] Nocturnal cough  [] Hemoptysis  [] Chest pain, pressure, or discomfort  [] Palpitations  [] Abdominal distention  [] Abdominal bloating  [] Early satiety  [] Blood in stool   [] Diarrhea  [] Constipation  [] Nausea/Vomiting  [] Decreased urinary response to oral diuretic   [] Scrotal swelling   [] Lower extremity edema  [] Used PRN doses of oral diuretic   [] Weight gain    Wt Readings from Last 3 Encounters:   01/31/25 56.2 kg (124 lb)   01/30/25 56.2 kg (124 lb)   01/24/25 57.2 kg (126 lb)         SOCIAL HISTORY:  [x] Denies tobacco, alcohol or illicit drug abuse  [] Tobacco use:  [] ETOH use:  [] Illicit drug use:        MEDICATIONS:    Allergies   Allergen Reactions    Doxycycline Shortness Of Breath    Rocephin [Ceftriaxone] Shortness Of Breath     Prior to Visit Medications    Medication Sig Taking? Authorizing Provider   lisinopril (PRINIVIL;ZESTRIL) 10 MG tablet Take 1 tablet by mouth daily Yes Dorita Kan, APRN - CNP   furosemide (LASIX) 20 MG tablet Take 2 tablets by mouth

## 2025-01-31 NOTE — PLAN OF CARE
Problem: Chronic Conditions and Co-morbidities  Goal: Patient's chronic conditions and co-morbidity symptoms are monitored and maintained or improved  Flowsheets (Taken 1/31/2025 2776)  Care Plan - Patient's Chronic Conditions and Co-Morbidity Symptoms are Monitored and Maintained or Improved: Monitor and assess patient's chronic conditions and comorbid symptoms for stability, deterioration, or improvement

## 2025-02-13 ENCOUNTER — TELEPHONE (OUTPATIENT)
Dept: PULMONOLOGY | Age: 75
End: 2025-02-13

## 2025-02-13 NOTE — TELEPHONE ENCOUNTER
Mailed letter to patient to inform him of the CT of the Chest that is scheduled for him at [place], [place] . This test is scheduled on  Monday, April 7, 2025 at  10:30 am. Please arrive 30 minutes prior to appointment time. No Test Prep is needed

## 2025-02-14 ENCOUNTER — HOSPITAL ENCOUNTER (OUTPATIENT)
Dept: OTHER | Age: 75
Setting detail: THERAPIES SERIES
Discharge: HOME | End: 2025-02-14
Payer: MEDICAID

## 2025-02-14 VITALS
SYSTOLIC BLOOD PRESSURE: 124 MMHG | BODY MASS INDEX: 19.01 KG/M2 | RESPIRATION RATE: 18 BRPM | WEIGHT: 125 LBS | DIASTOLIC BLOOD PRESSURE: 60 MMHG | HEART RATE: 71 BPM | OXYGEN SATURATION: 97 %

## 2025-02-14 LAB
ANION GAP SERPL CALCULATED.3IONS-SCNC: 10 MMOL/L (ref 7–16)
BNP SERPL-MCNC: 593 PG/ML (ref 0–450)
BUN SERPL-MCNC: 15 MG/DL (ref 6–23)
CALCIUM SERPL-MCNC: 9.3 MG/DL (ref 8.6–10.2)
CHLORIDE SERPL-SCNC: 105 MMOL/L (ref 98–107)
CO2 SERPL-SCNC: 30 MMOL/L (ref 22–29)
CREAT SERPL-MCNC: 1.3 MG/DL (ref 0.7–1.2)
GFR, ESTIMATED: 56 ML/MIN/1.73M2
GLUCOSE SERPL-MCNC: 89 MG/DL (ref 74–99)
POTASSIUM SERPL-SCNC: 3.8 MMOL/L (ref 3.5–5)
SODIUM SERPL-SCNC: 145 MMOL/L (ref 132–146)

## 2025-02-14 PROCEDURE — 83880 ASSAY OF NATRIURETIC PEPTIDE: CPT

## 2025-02-14 PROCEDURE — 80048 BASIC METABOLIC PNL TOTAL CA: CPT

## 2025-02-14 PROCEDURE — 99214 OFFICE O/P EST MOD 30 MIN: CPT

## 2025-02-14 NOTE — PROGRESS NOTES
Congestive Heart Failure Clinic   [facility]      Referring Provider: Luna  Primary Care Physician: Gunner Espinosa MD   Cardiologist: Luna  Nephrologist:       HISTORY OF PRESENT ILLNESS:     Chuck Chavarria Jr. is a 74 y.o. (1950) male with a history of HFrEF (EF < 40%)  Pre Cupid:     Lab Results   Component Value Date    LVEF 40 09/11/2024     Post Cupid:    Lab Results   Component Value Date    EFBP 52 (A) 01/15/2024         He presents to the CHF clinic for ongoing evaluation and monitoring of heart failure.    In the CHF clinic today he denies any adverse symptoms except:  [x] Dizziness or lightheadedness   [] Syncope or near syncope  [] Recent Fall  [] Shortness of breath at rest   [x] Dyspnea with exertion  [] Decline in functional capacity (unable to perform activities they had previously been able to do)  [x] Fatigue   [] Orthopnea  [] PND  [] Nocturnal cough  [] Hemoptysis  [] Chest pain, pressure, or discomfort  [] Palpitations  [] Abdominal distention  [] Abdominal bloating  [] Early satiety  [] Blood in stool   [] Diarrhea  [] Constipation  [] Nausea/Vomiting  [] Decreased urinary response to oral diuretic   [] Scrotal swelling   [] Lower extremity edema  [] Used PRN doses of oral diuretic   [] Weight gain    Wt Readings from Last 3 Encounters:   02/14/25 56.7 kg (125 lb)   01/31/25 56.2 kg (124 lb)   01/30/25 56.2 kg (124 lb)         SOCIAL HISTORY:  [x] Denies tobacco, alcohol or illicit drug abuse  [] Tobacco use:  [] ETOH use:  [] Illicit drug use:        MEDICATIONS:    Allergies   Allergen Reactions    Doxycycline Shortness Of Breath    Rocephin [Ceftriaxone] Shortness Of Breath     Prior to Visit Medications    Medication Sig Taking? Authorizing Provider   lisinopril (PRINIVIL;ZESTRIL) 10 MG tablet Take 1 tablet by mouth daily Yes Dorita Kan, APRN - CNP   furosemide (LASIX) 20 MG tablet Take 2 tablets by mouth

## 2025-02-14 NOTE — PLAN OF CARE
Problem: Chronic Conditions and Co-morbidities  Goal: Patient's chronic conditions and co-morbidity symptoms are monitored and maintained or improved  Flowsheets (Taken 2/14/2025 9721)  Care Plan - Patient's Chronic Conditions and Co-Morbidity Symptoms are Monitored and Maintained or Improved: Monitor and assess patient's chronic conditions and comorbid symptoms for stability, deterioration, or improvement

## 2025-02-24 ENCOUNTER — APPOINTMENT (OUTPATIENT)
Dept: GENERAL RADIOLOGY | Age: 75
DRG: 052 | End: 2025-02-24
Payer: MEDICAID

## 2025-02-24 ENCOUNTER — APPOINTMENT (OUTPATIENT)
Dept: MRI IMAGING | Age: 75
DRG: 052 | End: 2025-02-24
Payer: MEDICAID

## 2025-02-24 ENCOUNTER — APPOINTMENT (OUTPATIENT)
Dept: CT IMAGING | Age: 75
DRG: 052 | End: 2025-02-24
Payer: MEDICAID

## 2025-02-24 ENCOUNTER — HOSPITAL ENCOUNTER (INPATIENT)
Age: 75
LOS: 2 days | Discharge: HOME | DRG: 052 | End: 2025-02-26
Attending: EMERGENCY MEDICINE | Admitting: STUDENT IN AN ORGANIZED HEALTH CARE EDUCATION/TRAINING PROGRAM
Payer: MEDICAID

## 2025-02-24 DIAGNOSIS — R29.90 STROKE-LIKE SYMPTOMS: Primary | ICD-10-CM

## 2025-02-24 PROBLEM — R53.83 LETHARGIC: Status: ACTIVE | Noted: 2025-02-24

## 2025-02-24 LAB
ALBUMIN SERPL-MCNC: 3.8 G/DL (ref 3.5–5.2)
ALP SERPL-CCNC: 75 U/L (ref 40–129)
ALT SERPL-CCNC: 21 U/L (ref 0–40)
AMMONIA PLAS-SCNC: 14 UMOL/L (ref 16–60)
AMPHET UR QL SCN: NEGATIVE
ANION GAP SERPL CALCULATED.3IONS-SCNC: 7 MMOL/L (ref 7–16)
APAP SERPL-MCNC: <5 UG/ML (ref 10–30)
AST SERPL-CCNC: 31 U/L (ref 0–39)
B PARAP IS1001 DNA NPH QL NAA+NON-PROBE: NOT DETECTED
B PERT DNA SPEC QL NAA+PROBE: NOT DETECTED
B.E.: 0.1 MMOL/L (ref -3–3)
BARBITURATES UR QL SCN: NEGATIVE
BASOPHILS # BLD: 0.08 K/UL (ref 0–0.2)
BASOPHILS NFR BLD: 1 % (ref 0–2)
BENZODIAZ UR QL: NEGATIVE
BILIRUB SERPL-MCNC: 0.3 MG/DL (ref 0–1.2)
BILIRUB UR QL STRIP: NEGATIVE
BUN SERPL-MCNC: 13 MG/DL (ref 6–23)
BUPRENORPHINE UR QL: NEGATIVE
C PNEUM DNA NPH QL NAA+NON-PROBE: NOT DETECTED
CALCIUM SERPL-MCNC: 9.4 MG/DL (ref 8.6–10.2)
CANNABINOIDS UR QL SCN: NEGATIVE
CHLORIDE SERPL-SCNC: 104 MMOL/L (ref 98–107)
CLARITY UR: CLEAR
CO2 SERPL-SCNC: 30 MMOL/L (ref 22–29)
COCAINE UR QL SCN: NEGATIVE
COHB: 0 % (ref 0–1.5)
COLOR UR: YELLOW
CREAT SERPL-MCNC: 1.2 MG/DL (ref 0.7–1.2)
CRITICAL: ABNORMAL
DATE ANALYZED: ABNORMAL
DATE OF COLLECTION: ABNORMAL
EKG ATRIAL RATE: 69 BPM
EKG P AXIS: 72 DEGREES
EKG P-R INTERVAL: 176 MS
EKG Q-T INTERVAL: 458 MS
EKG QRS DURATION: 114 MS
EKG QTC CALCULATION (BAZETT): 490 MS
EKG R AXIS: -84 DEGREES
EKG T AXIS: -67 DEGREES
EKG VENTRICULAR RATE: 69 BPM
EOSINOPHIL # BLD: 1.05 K/UL (ref 0.05–0.5)
EOSINOPHILS RELATIVE PERCENT: 14 % (ref 0–6)
ERYTHROCYTE [DISTWIDTH] IN BLOOD BY AUTOMATED COUNT: 15.8 % (ref 11.5–15)
ETHANOLAMINE SERPL-MCNC: <10 MG/DL (ref 0–0.08)
FENTANYL UR QL: NEGATIVE
FLUAV RNA NPH QL NAA+NON-PROBE: NOT DETECTED
FLUBV RNA NPH QL NAA+NON-PROBE: NOT DETECTED
GFR, ESTIMATED: 61 ML/MIN/1.73M2
GLUCOSE SERPL-MCNC: 124 MG/DL (ref 74–99)
GLUCOSE UR STRIP-MCNC: >=1000 MG/DL
HADV DNA NPH QL NAA+NON-PROBE: NOT DETECTED
HCO3: 25.9 MMOL/L (ref 22–26)
HCOV 229E RNA NPH QL NAA+NON-PROBE: NOT DETECTED
HCOV HKU1 RNA NPH QL NAA+NON-PROBE: NOT DETECTED
HCOV NL63 RNA NPH QL NAA+NON-PROBE: NOT DETECTED
HCOV OC43 RNA NPH QL NAA+NON-PROBE: NOT DETECTED
HCT VFR BLD AUTO: 38.3 % (ref 37–54)
HGB BLD-MCNC: 12.4 G/DL (ref 12.5–16.5)
HGB UR QL STRIP.AUTO: NEGATIVE
HHB: 1 % (ref 0–5)
HMPV RNA NPH QL NAA+NON-PROBE: NOT DETECTED
HPIV1 RNA NPH QL NAA+NON-PROBE: NOT DETECTED
HPIV2 RNA NPH QL NAA+NON-PROBE: NOT DETECTED
HPIV3 RNA NPH QL NAA+NON-PROBE: NOT DETECTED
HPIV4 RNA NPH QL NAA+NON-PROBE: NOT DETECTED
IMM GRANULOCYTES # BLD AUTO: 0.04 K/UL (ref 0–0.58)
IMM GRANULOCYTES NFR BLD: 1 % (ref 0–5)
INR PPP: 1
KETONES UR STRIP-MCNC: NEGATIVE MG/DL
LAB: ABNORMAL
LACTATE BLDV-SCNC: 1.3 MMOL/L (ref 0.5–2.2)
LEUKOCYTE ESTERASE UR QL STRIP: NEGATIVE
LIPASE SERPL-CCNC: 63 U/L (ref 13–60)
LYMPHOCYTES NFR BLD: 1.09 K/UL (ref 1.5–4)
LYMPHOCYTES RELATIVE PERCENT: 15 % (ref 20–42)
Lab: 1335
M PNEUMO DNA NPH QL NAA+NON-PROBE: NOT DETECTED
MAGNESIUM SERPL-MCNC: 1.7 MG/DL (ref 1.6–2.6)
MCH RBC QN AUTO: 28.2 PG (ref 26–35)
MCHC RBC AUTO-ENTMCNC: 32.4 G/DL (ref 32–34.5)
MCV RBC AUTO: 87.2 FL (ref 80–99.9)
METHADONE UR QL: NEGATIVE
METHB: 0.2 % (ref 0–1.5)
MODE: ABNORMAL
MONOCYTES NFR BLD: 1.03 K/UL (ref 0.1–0.95)
MONOCYTES NFR BLD: 14 % (ref 2–12)
NEUTROPHILS NFR BLD: 56 % (ref 43–80)
NEUTS SEG NFR BLD: 4.19 K/UL (ref 1.8–7.3)
NITRITE UR QL STRIP: NEGATIVE
O2 SATURATION: 99 % (ref 92–98.5)
O2HB: 98.8 % (ref 94–97)
OPERATOR ID: ABNORMAL
OPIATES UR QL SCN: NEGATIVE
OXYCODONE UR QL SCN: NEGATIVE
PATIENT TEMP: 37 C
PCO2: 46.5 MMHG (ref 35–45)
PCP UR QL SCN: NEGATIVE
PH BLOOD GAS: 7.36 (ref 7.35–7.45)
PH UR STRIP: 6 [PH] (ref 5–8)
PLATELET # BLD AUTO: 185 K/UL (ref 130–450)
PMV BLD AUTO: 9 FL (ref 7–12)
PO2: 143.6 MMHG (ref 75–100)
POTASSIUM SERPL-SCNC: 4.2 MMOL/L (ref 3.5–5)
PROCALCITONIN SERPL-MCNC: 0.05 NG/ML (ref 0–0.08)
PROCALCITONIN SERPL-MCNC: 0.06 NG/ML (ref 0–0.08)
PROT SERPL-MCNC: 7.7 G/DL (ref 6.4–8.3)
PROT UR STRIP-MCNC: ABNORMAL MG/DL
PROTHROMBIN TIME: 11 SEC (ref 9.3–12.4)
RBC # BLD AUTO: 4.39 M/UL (ref 3.8–5.8)
RBC #/AREA URNS HPF: ABNORMAL /HPF
RSV RNA NPH QL NAA+NON-PROBE: NOT DETECTED
RV+EV RNA NPH QL NAA+NON-PROBE: NOT DETECTED
SALICYLATES SERPL-MCNC: <0.3 MG/DL (ref 0–30)
SARS-COV-2 RNA NPH QL NAA+NON-PROBE: NOT DETECTED
SODIUM SERPL-SCNC: 141 MMOL/L (ref 132–146)
SOURCE, BLOOD GAS: ABNORMAL
SP GR UR STRIP: 1.01 (ref 1–1.03)
SPECIMEN DESCRIPTION: NORMAL
T4 FREE SERPL-MCNC: 1 NG/DL (ref 0.9–1.7)
TEST INFORMATION: NORMAL
THB: 13.7 G/DL (ref 11.5–16.5)
TIME ANALYZED: 1404
TOXIC TRICYCLIC SC,BLOOD: NEGATIVE
TROPONIN I SERPL HS-MCNC: 23 NG/L (ref 0–11)
TROPONIN I SERPL HS-MCNC: 25 NG/L (ref 0–11)
TSH SERPL DL<=0.05 MIU/L-ACNC: 2.94 UIU/ML (ref 0.27–4.2)
UROBILINOGEN UR STRIP-ACNC: 0.2 EU/DL (ref 0–1)
WBC #/AREA URNS HPF: ABNORMAL /HPF
WBC OTHER # BLD: 7.5 K/UL (ref 4.5–11.5)

## 2025-02-24 PROCEDURE — 2500000003 HC RX 250 WO HCPCS: Performed by: STUDENT IN AN ORGANIZED HEALTH CARE EDUCATION/TRAINING PROGRAM

## 2025-02-24 PROCEDURE — 83735 ASSAY OF MAGNESIUM: CPT

## 2025-02-24 PROCEDURE — 85610 PROTHROMBIN TIME: CPT

## 2025-02-24 PROCEDURE — G0480 DRUG TEST DEF 1-7 CLASSES: HCPCS

## 2025-02-24 PROCEDURE — 87449 NOS EACH ORGANISM AG IA: CPT

## 2025-02-24 PROCEDURE — 2060000000 HC ICU INTERMEDIATE R&B

## 2025-02-24 PROCEDURE — 93010 ELECTROCARDIOGRAM REPORT: CPT | Performed by: INTERNAL MEDICINE

## 2025-02-24 PROCEDURE — 84145 PROCALCITONIN (PCT): CPT

## 2025-02-24 PROCEDURE — 80307 DRUG TEST PRSMV CHEM ANLYZR: CPT

## 2025-02-24 PROCEDURE — 84439 ASSAY OF FREE THYROXINE: CPT

## 2025-02-24 PROCEDURE — 0202U NFCT DS 22 TRGT SARS-COV-2: CPT

## 2025-02-24 PROCEDURE — 84443 ASSAY THYROID STIM HORMONE: CPT

## 2025-02-24 PROCEDURE — 83690 ASSAY OF LIPASE: CPT

## 2025-02-24 PROCEDURE — 82805 BLOOD GASES W/O2 SATURATION: CPT

## 2025-02-24 PROCEDURE — 93005 ELECTROCARDIOGRAM TRACING: CPT

## 2025-02-24 PROCEDURE — 86738 MYCOPLASMA ANTIBODY: CPT

## 2025-02-24 PROCEDURE — 83605 ASSAY OF LACTIC ACID: CPT

## 2025-02-24 PROCEDURE — 70551 MRI BRAIN STEM W/O DYE: CPT

## 2025-02-24 PROCEDURE — 6360000002 HC RX W HCPCS: Performed by: STUDENT IN AN ORGANIZED HEALTH CARE EDUCATION/TRAINING PROGRAM

## 2025-02-24 PROCEDURE — 81001 URINALYSIS AUTO W/SCOPE: CPT

## 2025-02-24 PROCEDURE — 6370000000 HC RX 637 (ALT 250 FOR IP): Performed by: STUDENT IN AN ORGANIZED HEALTH CARE EDUCATION/TRAINING PROGRAM

## 2025-02-24 PROCEDURE — 80053 COMPREHEN METABOLIC PANEL: CPT

## 2025-02-24 PROCEDURE — 82140 ASSAY OF AMMONIA: CPT

## 2025-02-24 PROCEDURE — 94640 AIRWAY INHALATION TREATMENT: CPT

## 2025-02-24 PROCEDURE — 87899 AGENT NOS ASSAY W/OPTIC: CPT

## 2025-02-24 PROCEDURE — 99222 1ST HOSP IP/OBS MODERATE 55: CPT | Performed by: STUDENT IN AN ORGANIZED HEALTH CARE EDUCATION/TRAINING PROGRAM

## 2025-02-24 PROCEDURE — 99285 EMERGENCY DEPT VISIT HI MDM: CPT

## 2025-02-24 PROCEDURE — 70450 CT HEAD/BRAIN W/O DYE: CPT

## 2025-02-24 PROCEDURE — 2580000003 HC RX 258

## 2025-02-24 PROCEDURE — 80179 DRUG ASSAY SALICYLATE: CPT

## 2025-02-24 PROCEDURE — 71045 X-RAY EXAM CHEST 1 VIEW: CPT

## 2025-02-24 PROCEDURE — 80143 DRUG ASSAY ACETAMINOPHEN: CPT

## 2025-02-24 PROCEDURE — 85025 COMPLETE CBC W/AUTO DIFF WBC: CPT

## 2025-02-24 PROCEDURE — 84484 ASSAY OF TROPONIN QUANT: CPT

## 2025-02-24 PROCEDURE — 87040 BLOOD CULTURE FOR BACTERIA: CPT

## 2025-02-24 RX ORDER — CARVEDILOL 6.25 MG/1
12.5 TABLET ORAL 2 TIMES DAILY
Status: DISCONTINUED | OUTPATIENT
Start: 2025-02-24 | End: 2025-02-26 | Stop reason: HOSPADM

## 2025-02-24 RX ORDER — SODIUM CHLORIDE 9 MG/ML
INJECTION, SOLUTION INTRAVENOUS PRN
Status: DISCONTINUED | OUTPATIENT
Start: 2025-02-24 | End: 2025-02-26 | Stop reason: HOSPADM

## 2025-02-24 RX ORDER — ACETAMINOPHEN 325 MG/1
650 TABLET ORAL EVERY 6 HOURS PRN
Status: DISCONTINUED | OUTPATIENT
Start: 2025-02-24 | End: 2025-02-26 | Stop reason: HOSPADM

## 2025-02-24 RX ORDER — ISOSORBIDE MONONITRATE 30 MG/1
30 TABLET, EXTENDED RELEASE ORAL DAILY
Status: DISCONTINUED | OUTPATIENT
Start: 2025-02-24 | End: 2025-02-26 | Stop reason: HOSPADM

## 2025-02-24 RX ORDER — POTASSIUM CHLORIDE 1500 MG/1
40 TABLET, EXTENDED RELEASE ORAL PRN
Status: DISCONTINUED | OUTPATIENT
Start: 2025-02-24 | End: 2025-02-26 | Stop reason: HOSPADM

## 2025-02-24 RX ORDER — MAGNESIUM SULFATE IN WATER 40 MG/ML
2000 INJECTION, SOLUTION INTRAVENOUS PRN
Status: DISCONTINUED | OUTPATIENT
Start: 2025-02-24 | End: 2025-02-26 | Stop reason: HOSPADM

## 2025-02-24 RX ORDER — ASPIRIN 81 MG/1
81 TABLET ORAL DAILY
Status: DISCONTINUED | OUTPATIENT
Start: 2025-02-25 | End: 2025-02-26 | Stop reason: HOSPADM

## 2025-02-24 RX ORDER — 0.9 % SODIUM CHLORIDE 0.9 %
500 INTRAVENOUS SOLUTION INTRAVENOUS ONCE
Status: COMPLETED | OUTPATIENT
Start: 2025-02-24 | End: 2025-02-24

## 2025-02-24 RX ORDER — POLYETHYLENE GLYCOL 3350 17 G/17G
17 POWDER, FOR SOLUTION ORAL DAILY PRN
Status: DISCONTINUED | OUTPATIENT
Start: 2025-02-24 | End: 2025-02-26 | Stop reason: HOSPADM

## 2025-02-24 RX ORDER — SODIUM CHLORIDE 0.9 % (FLUSH) 0.9 %
5-40 SYRINGE (ML) INJECTION EVERY 12 HOURS SCHEDULED
Status: DISCONTINUED | OUTPATIENT
Start: 2025-02-24 | End: 2025-02-26 | Stop reason: HOSPADM

## 2025-02-24 RX ORDER — ALBUTEROL SULFATE 0.83 MG/ML
2.5 SOLUTION RESPIRATORY (INHALATION) EVERY 6 HOURS PRN
Status: DISCONTINUED | OUTPATIENT
Start: 2025-02-24 | End: 2025-02-26 | Stop reason: HOSPADM

## 2025-02-24 RX ORDER — ONDANSETRON 4 MG/1
4 TABLET, ORALLY DISINTEGRATING ORAL EVERY 8 HOURS PRN
Status: DISCONTINUED | OUTPATIENT
Start: 2025-02-24 | End: 2025-02-26 | Stop reason: HOSPADM

## 2025-02-24 RX ORDER — POTASSIUM CHLORIDE 7.45 MG/ML
10 INJECTION INTRAVENOUS PRN
Status: DISCONTINUED | OUTPATIENT
Start: 2025-02-24 | End: 2025-02-26 | Stop reason: HOSPADM

## 2025-02-24 RX ORDER — TRIAMCINOLONE ACETONIDE 0.25 MG/G
1 CREAM TOPICAL EVERY 4 HOURS PRN
COMMUNITY

## 2025-02-24 RX ORDER — LISINOPRIL 10 MG/1
10 TABLET ORAL DAILY
Status: DISCONTINUED | OUTPATIENT
Start: 2025-02-24 | End: 2025-02-25

## 2025-02-24 RX ORDER — SPIRONOLACTONE 25 MG/1
25 TABLET ORAL DAILY
Status: DISCONTINUED | OUTPATIENT
Start: 2025-02-24 | End: 2025-02-26 | Stop reason: HOSPADM

## 2025-02-24 RX ORDER — SODIUM CHLORIDE 0.9 % (FLUSH) 0.9 %
5-40 SYRINGE (ML) INJECTION PRN
Status: DISCONTINUED | OUTPATIENT
Start: 2025-02-24 | End: 2025-02-26 | Stop reason: HOSPADM

## 2025-02-24 RX ORDER — ALBUTEROL SULFATE 90 UG/1
2 INHALANT RESPIRATORY (INHALATION) EVERY 4 HOURS PRN
Status: DISCONTINUED | OUTPATIENT
Start: 2025-02-24 | End: 2025-02-24 | Stop reason: CLARIF

## 2025-02-24 RX ORDER — ACETAMINOPHEN 650 MG/1
650 SUPPOSITORY RECTAL EVERY 6 HOURS PRN
Status: DISCONTINUED | OUTPATIENT
Start: 2025-02-24 | End: 2025-02-26 | Stop reason: HOSPADM

## 2025-02-24 RX ORDER — ATORVASTATIN CALCIUM 40 MG/1
40 TABLET, FILM COATED ORAL DAILY
Status: DISCONTINUED | OUTPATIENT
Start: 2025-02-24 | End: 2025-02-26 | Stop reason: HOSPADM

## 2025-02-24 RX ORDER — HYDRALAZINE HYDROCHLORIDE 25 MG/1
25 TABLET, FILM COATED ORAL 3 TIMES DAILY
Status: DISCONTINUED | OUTPATIENT
Start: 2025-02-24 | End: 2025-02-26 | Stop reason: HOSPADM

## 2025-02-24 RX ORDER — ONDANSETRON 2 MG/ML
4 INJECTION INTRAMUSCULAR; INTRAVENOUS EVERY 6 HOURS PRN
Status: DISCONTINUED | OUTPATIENT
Start: 2025-02-24 | End: 2025-02-26 | Stop reason: HOSPADM

## 2025-02-24 RX ADMIN — SPIRONOLACTONE 25 MG: 25 TABLET ORAL at 21:23

## 2025-02-24 RX ADMIN — SODIUM CHLORIDE, PRESERVATIVE FREE 10 ML: 5 INJECTION INTRAVENOUS at 21:29

## 2025-02-24 RX ADMIN — HYDRALAZINE HYDROCHLORIDE 25 MG: 25 TABLET ORAL at 21:24

## 2025-02-24 RX ADMIN — CARVEDILOL 12.5 MG: 6.25 TABLET, FILM COATED ORAL at 21:24

## 2025-02-24 RX ADMIN — IPRATROPIUM BROMIDE 0.5 MG: 0.5 SOLUTION RESPIRATORY (INHALATION) at 20:35

## 2025-02-24 RX ADMIN — LISINOPRIL 10 MG: 10 TABLET ORAL at 21:24

## 2025-02-24 RX ADMIN — SODIUM CHLORIDE 500 ML: 9 INJECTION, SOLUTION INTRAVENOUS at 13:17

## 2025-02-24 RX ADMIN — ISOSORBIDE MONONITRATE 30 MG: 30 TABLET, EXTENDED RELEASE ORAL at 21:25

## 2025-02-24 RX ADMIN — APIXABAN 5 MG: 5 TABLET, FILM COATED ORAL at 21:26

## 2025-02-24 RX ADMIN — ARFORMOTEROL TARTRATE: 15 SOLUTION RESPIRATORY (INHALATION) at 20:35

## 2025-02-24 RX ADMIN — ATORVASTATIN CALCIUM 40 MG: 40 TABLET, FILM COATED ORAL at 21:24

## 2025-02-24 ASSESSMENT — LIFESTYLE VARIABLES
HOW MANY STANDARD DRINKS CONTAINING ALCOHOL DO YOU HAVE ON A TYPICAL DAY: PATIENT DOES NOT DRINK
HOW OFTEN DO YOU HAVE A DRINK CONTAINING ALCOHOL: NEVER

## 2025-02-24 NOTE — ED PROVIDER NOTES
Will require admission for further evaluation of[address] PICU  EMERGENCY DEPARTMENT ENCOUNTER      Pt Name: Chuck Chavarria Jr.  MRN: [id number]  Birthdate 1950  Date of evaluation: 2/24/2025  Provider: Isai Danielson MD  PCP: Gunner Espinosa MD  Note Started: 3:34 PM EST 2/24/25    CHIEF COMPLAINT       Chief Complaint   Patient presents with    Altered Mental Status     Sudden onset of lethargy, 86% on RA on 6L NC       HISTORY OF PRESENT ILLNESS: 1 or more Elements   History From: Patient  Limitations to history : None    Chuck Chavarria Jr. is a 74 y.o. male who presents BIBEMS from home with reports of generalized lethargy, onset within the past day.  Patient on arrival is generally weak, does respond yes or no.  Apparently coming from home where he lives with his sister.  Patient considered to be a poor historian.  Unable to meaningfully contribute to the interview at this time.  Reports no acute complaints.  Per EMS, he is found to be 86% on room air, was placed on 6 L nasal cannula with adequate improvement.  Apparently does not use oxygen at baseline.  Denies acute complaints including chest pain, shortness of breath, cough, congestion, dysuria, diarrhea, constipation.    Nursing Notes were all reviewed and agreed with or any disagreements were addressed in the HPI.    REVIEW OF SYSTEMS :    Positives and Pertinent negatives as per HPI.     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     SURGICAL HISTORY     Past Surgical History:   Procedure Laterality Date    CARDIAC

## 2025-02-24 NOTE — H&P
INFECTIOUS DISEASES INITIAL CONSULT NOTE    HPI: 77M h/o Crohn's disease, ileocecectomy, open appy, AFib/Aflutter s/p multiple DCCV p/w acute onset of abdominal pain on 6/23, found to have SBO due to adhesions.  He was initially medically managed, then underwent ex-lap, open TRE, resection of small bowell on 6/26.  CTX/flagyl started.  He was found to have pneumothorax and underwent R pigtail placement on 6/27.  He went back to the OR on 6/28, underwent ileocolic resection with anastomosis, small bowel anastomosis and abdominal wall reconstruction and washout.   6/28 body fluid grow E. casseliflavus.  ID was consulted for abx rec.       PAST MEDICAL & SURGICAL HISTORY:  Essential hypertension  Hypertension      Atrial fibrillation  s/p cardioversion 2010 and 2014  Pt. reports 4 DCCV  Now on Amiodarone 200mg PO bid and Eliquis 5mg PO bid  Last DCCV 4 yrs ago at [facility]      Crohn's disease  s/p partial resection of ileum      Hyperlipidemia      Hypothyroidism      History of depression  On Venlafaxine ER 150mg PO bid      H/O knee surgery      History of cataract surgery            Review of Systems:   Constitutional, eyes, ENT, cardiovascular, respiratory, gastrointestinal, genitourinary, integumentary, neurological, psychiatric and heme/lymph are otherwise negative other than noted above       ANTIBIOTICS:  MEDICATIONS  (STANDING):  acetaminophen   IVPB .. 1000 milliGRAM(s) IV Intermittent every 6 hours  albuterol/ipratropium for Nebulization 3 milliLiter(s) Nebulizer every 6 hours  aMIOdarone Infusion 0.501 mG/Min (16.7 mL/Hr) IV Continuous <Continuous>  cefTRIAXone   IVPB 2000 milliGRAM(s) IV Intermittent every 24 hours  chlorhexidine 2% Cloths 1 Application(s) Topical daily  chlorhexidine 2% Cloths 1 Application(s) Topical <User Schedule>  DAPTOmycin IVPB 800 milliGRAM(s) IV Intermittent every 24 hours  dextrose 5%. 1000 milliLiter(s) (50 mL/Hr) IV Continuous <Continuous>  dextrose 50% Injectable 25 Gram(s) IV Push once  glucagon  Injectable 1 milliGRAM(s) IntraMuscular once  heparin   Injectable 5000 Unit(s) SubCutaneous every 8 hours  insulin lispro (ADMELOG) corrective regimen sliding scale   SubCutaneous every 6 hours  ketorolac   Injectable 15 milliGRAM(s) IV Push every 6 hours  lipid, fat emulsion (Fish Oil and Plant Based) 20% Infusion 0.49 Gm/kG/Day (20.83 mL/Hr) IV Continuous <Continuous>  metroNIDAZOLE  IVPB 500 milliGRAM(s) IV Intermittent every 8 hours  pantoprazole  Injectable 40 milliGRAM(s) IV Push daily  Parenteral Nutrition - Adult 1 Each (71 mL/Hr) TPN Continuous <Continuous>  Parenteral Nutrition - Adult 1 Each (71 mL/Hr) TPN Continuous <Continuous>  sodium phosphate 15 milliMole(s)/250 mL IVPB 15 milliMole(s) IV Intermittent once    MEDICATIONS  (PRN):  dextrose Oral Gel 15 Gram(s) Oral once PRN Blood Glucose LESS THAN 70 milliGRAM(s)/deciliter  hydrALAZINE Injectable 10 milliGRAM(s) IV Push every 8 hours PRN SBP> 160  HYDROmorphone  Injectable 0.2 milliGRAM(s) IV Push every 4 hours PRN Moderate Pain (4 - 6)  HYDROmorphone  Injectable 0.5 milliGRAM(s) IV Push every 4 hours PRN Severe Pain (7 - 10)  sodium chloride 0.9% lock flush 10 milliLiter(s) IV Push every 1 hour PRN Pre/post blood products, medications, blood draw, and to maintain line patency      Allergies    penicillin (Angioedema)    Intolerances        SOCIAL HISTORY: Works as urologist.  Denied smoking/illicit.  Social EtOH.  Lives with his wife.       FAMILY HISTORY:   no FH leading to current infection    Vital Signs Last 24 Hrs  T(C): 37.7 (30 Jun 2023 21:59), Max: 38.4 (29 Jun 2023 23:00)  T(F): 99.9 (30 Jun 2023 21:59), Max: 101.1 (29 Jun 2023 23:00)  HR: 81 (30 Jun 2023 21:15) (67 - 97)  BP: 172/75 (30 Jun 2023 21:15) (160/68 - 220/86)  BP(mean): 108 (30 Jun 2023 21:15) (98 - 130)  RR: 16 (30 Jun 2023 21:15) (12 - 27)  SpO2: 95% (30 Jun 2023 21:15) (91% - 96%)    Parameters below as of 30 Jun 2023 21:15  Patient On (Oxygen Delivery Method): nasal cannula, high flow  O2 Flow (L/min): 40  O2 Concentration (%): 50    06-29-23 @ 07:01  -  06-30-23 @ 07:00  --------------------------------------------------------  IN: 3360.7 mL / OUT: 4103 mL / NET: -742.3 mL    06-30-23 @ 07:01  -  06-30-23 @ 22:06  --------------------------------------------------------  IN: 2081.8 mL / OUT: 4665 mL / NET: -2583.2 mL        PHYSICAL EXAM:  Constitutional: alert, NAD  Eyes: the sclera and conjunctiva were normal.   ENT: the ears and nose were normal in appearance.   Neck: the appearance of the neck was normal and the neck was supple.   Pulmonary: no respiratory distress and lungs were clear to auscultation bilaterally.  R pigtail cath   Heart: heart rate was normal and rhythm regular, normal S1 and S2  Vascular:. there was no peripheral edema  Abdomen: normal bowel sounds, soft, non-tender, wound vac, drain     LABS:                        8.1    15.72 )-----------( 161      ( 30 Jun 2023 11:22 )             25.4     06-30    145  |  108  |  24<H>  ----------------------------<  113<H>  4.0   |  30  |  1.11    Ca    7.3<L>      30 Jun 2023 16:58  Phos  1.6     06-30  Mg     2.3     06-30        Urinalysis Basic - ( 30 Jun 2023 16:58 )    Color: x / Appearance: x / SG: x / pH: x  Gluc: 113 mg/dL / Ketone: x  / Bili: x / Urobili: x   Blood: x / Protein: x / Nitrite: x   Leuk Esterase: x / RBC: x / WBC x   Sq Epi: x / Non Sq Epi: x / Bacteria: x        MICROBIOLOGY:  6/29 BCx ngtd x 2  6/28 Intraabdominal peritoneal fluid: Enterococcus casseliflavus (S to linezolid, amp, R to vanc)      RADIOLOGY & ADDITIONAL STUDIES:  CTPE 6/26    IMPRESSION:  1.  Since 6/25/2014, No pulmonary embolism.  2.  Mild bibasilar groundglass opacities with interlobular septal   thickening consistent with congestive heart failure. Small bilateral   pleural effusions.  3.  Stable from 2014 mediastinal lymphadenopathy. 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  4.  Increased main pulmonary artery dilation which can be seen with   pulmonary hypertension.   lungs every 4 hours as needed for Wheezing 11/15/24   Huseyin Rosenberg MD   budesonide-formoterol (SYMBICORT) 160-4.5 MCG/ACT AERO Inhale 2 puffs into the lungs 2 times daily 11/15/24   Huseyin Rosenberg MD   tiotropium (SPIRIVA HANDIHALER) 18 MCG inhalation capsule Inhale 1 capsule into the lungs daily 11/15/24   Huseyin Rosenberg MD   nicotine (NICODERM CQ) 21 MG/24HR Place 1 patch onto the skin daily  Patient not taking: Reported on 2025 7/10/24   Baltazar Huffman MD        Allergies:     Doxycycline and Rocephin [ceftriaxone]    Social History:     Tobacco:    reports that he quit smoking about 4 months ago. His smoking use included cigarettes. He started smoking about 65 years ago. He has a 16.2 pack-year smoking history. He has never used smokeless tobacco.  Alcohol:      reports that he does not currently use alcohol.  Drug Use:  reports that he does not currently use drugs after having used the following drugs: Cocaine.    Family History:     Family History   Problem Relation Age of Onset    Brain Cancer Mother     Heart Attack Father        Review of Systems:     Positive and Negative as described in HPI.      Physical Exam:   BP (!) 163/88   Pulse 75   Temp 97.4 °F (36.3 °C) (Oral)   Resp 19   Wt 56.7 kg (125 lb)   SpO2 97%   BMI 19.01 kg/m²   Temp (24hrs), Av.9 °F (36.1 °C), Min:96.4 °F (35.8 °C), Max:97.4 °F (36.3 °C)    No results for input(s): \"POCGLU\" in the last 72 hours.  No intake or output data in the 24 hours ending 25 1703    General Appearance: Lethargic  Mental status: oriented to person  Head: normocephalic, atraumatic  Eye: no icterus, redness, pupils equal and reactive, extraocular eye movements intact, conjunctiva clear  Ear: normal external ear, no discharge, hearing intact  Nose: no drainage noted  Mouth: mucous membranes moist  Neck: supple, no carotid bruits, thyroid not palpable  Lungs: Bilateral equal air entry, clear to ausculation, no wheezing, rales or

## 2025-02-25 LAB
ANION GAP SERPL CALCULATED.3IONS-SCNC: 8 MMOL/L (ref 7–16)
BASOPHILS # BLD: 0.05 K/UL (ref 0–0.2)
BASOPHILS NFR BLD: 1 % (ref 0–2)
BUN SERPL-MCNC: 13 MG/DL (ref 6–23)
CALCIUM SERPL-MCNC: 8.9 MG/DL (ref 8.6–10.2)
CHLORIDE SERPL-SCNC: 107 MMOL/L (ref 98–107)
CO2 SERPL-SCNC: 27 MMOL/L (ref 22–29)
CREAT SERPL-MCNC: 1.1 MG/DL (ref 0.7–1.2)
EOSINOPHIL # BLD: 0.79 K/UL (ref 0.05–0.5)
EOSINOPHILS RELATIVE PERCENT: 14 % (ref 0–6)
ERYTHROCYTE [DISTWIDTH] IN BLOOD BY AUTOMATED COUNT: 15.9 % (ref 11.5–15)
GFR, ESTIMATED: 67 ML/MIN/1.73M2
GLUCOSE SERPL-MCNC: 90 MG/DL (ref 74–99)
HCT VFR BLD AUTO: 35 % (ref 37–54)
HGB BLD-MCNC: 11.2 G/DL (ref 12.5–16.5)
IMM GRANULOCYTES # BLD AUTO: 0.03 K/UL (ref 0–0.58)
IMM GRANULOCYTES NFR BLD: 1 % (ref 0–5)
L PNEUMO1 AG UR QL IA.RAPID: NEGATIVE
LYMPHOCYTES NFR BLD: 1.16 K/UL (ref 1.5–4)
LYMPHOCYTES RELATIVE PERCENT: 20 % (ref 20–42)
MCH RBC QN AUTO: 27.8 PG (ref 26–35)
MCHC RBC AUTO-ENTMCNC: 32 G/DL (ref 32–34.5)
MCV RBC AUTO: 86.8 FL (ref 80–99.9)
MONOCYTES NFR BLD: 0.93 K/UL (ref 0.1–0.95)
MONOCYTES NFR BLD: 16 % (ref 2–12)
NEUTROPHILS NFR BLD: 48 % (ref 43–80)
NEUTS SEG NFR BLD: 2.77 K/UL (ref 1.8–7.3)
PLATELET # BLD AUTO: 180 K/UL (ref 130–450)
PMV BLD AUTO: 9.7 FL (ref 7–12)
POTASSIUM SERPL-SCNC: 4.1 MMOL/L (ref 3.5–5)
RBC # BLD AUTO: 4.03 M/UL (ref 3.8–5.8)
S PNEUM AG SPEC QL: NEGATIVE
SODIUM SERPL-SCNC: 142 MMOL/L (ref 132–146)
SPECIMEN SOURCE: NORMAL
WBC OTHER # BLD: 5.7 K/UL (ref 4.5–11.5)

## 2025-02-25 PROCEDURE — 2060000000 HC ICU INTERMEDIATE R&B

## 2025-02-25 PROCEDURE — 6370000000 HC RX 637 (ALT 250 FOR IP): Performed by: STUDENT IN AN ORGANIZED HEALTH CARE EDUCATION/TRAINING PROGRAM

## 2025-02-25 PROCEDURE — 6360000002 HC RX W HCPCS: Performed by: STUDENT IN AN ORGANIZED HEALTH CARE EDUCATION/TRAINING PROGRAM

## 2025-02-25 PROCEDURE — 80048 BASIC METABOLIC PNL TOTAL CA: CPT

## 2025-02-25 PROCEDURE — 36415 COLL VENOUS BLD VENIPUNCTURE: CPT

## 2025-02-25 PROCEDURE — 85025 COMPLETE CBC W/AUTO DIFF WBC: CPT

## 2025-02-25 PROCEDURE — 2500000003 HC RX 250 WO HCPCS: Performed by: STUDENT IN AN ORGANIZED HEALTH CARE EDUCATION/TRAINING PROGRAM

## 2025-02-25 PROCEDURE — 94640 AIRWAY INHALATION TREATMENT: CPT

## 2025-02-25 RX ORDER — LISINOPRIL 20 MG/1
20 TABLET ORAL DAILY
Status: DISCONTINUED | OUTPATIENT
Start: 2025-02-26 | End: 2025-02-26 | Stop reason: HOSPADM

## 2025-02-25 RX ADMIN — HYDRALAZINE HYDROCHLORIDE 25 MG: 25 TABLET ORAL at 19:52

## 2025-02-25 RX ADMIN — CARVEDILOL 12.5 MG: 6.25 TABLET, FILM COATED ORAL at 19:52

## 2025-02-25 RX ADMIN — ATORVASTATIN CALCIUM 40 MG: 40 TABLET, FILM COATED ORAL at 08:33

## 2025-02-25 RX ADMIN — IPRATROPIUM BROMIDE 0.5 MG: 0.5 SOLUTION RESPIRATORY (INHALATION) at 20:35

## 2025-02-25 RX ADMIN — HYDRALAZINE HYDROCHLORIDE 25 MG: 25 TABLET ORAL at 14:45

## 2025-02-25 RX ADMIN — SODIUM CHLORIDE, PRESERVATIVE FREE 10 ML: 5 INJECTION INTRAVENOUS at 19:52

## 2025-02-25 RX ADMIN — IPRATROPIUM BROMIDE 0.5 MG: 0.5 SOLUTION RESPIRATORY (INHALATION) at 11:45

## 2025-02-25 RX ADMIN — IPRATROPIUM BROMIDE 0.5 MG: 0.5 SOLUTION RESPIRATORY (INHALATION) at 07:42

## 2025-02-25 RX ADMIN — ARFORMOTEROL TARTRATE: 15 SOLUTION RESPIRATORY (INHALATION) at 07:42

## 2025-02-25 RX ADMIN — APIXABAN 5 MG: 5 TABLET, FILM COATED ORAL at 08:33

## 2025-02-25 RX ADMIN — CARVEDILOL 12.5 MG: 6.25 TABLET, FILM COATED ORAL at 08:30

## 2025-02-25 RX ADMIN — ARFORMOTEROL TARTRATE: 15 SOLUTION RESPIRATORY (INHALATION) at 20:31

## 2025-02-25 RX ADMIN — LISINOPRIL 10 MG: 10 TABLET ORAL at 08:30

## 2025-02-25 RX ADMIN — IPRATROPIUM BROMIDE 0.5 MG: 0.5 SOLUTION RESPIRATORY (INHALATION) at 03:15

## 2025-02-25 RX ADMIN — ISOSORBIDE MONONITRATE 30 MG: 30 TABLET, EXTENDED RELEASE ORAL at 08:33

## 2025-02-25 RX ADMIN — SODIUM CHLORIDE, PRESERVATIVE FREE 10 ML: 5 INJECTION INTRAVENOUS at 08:30

## 2025-02-25 RX ADMIN — HYDRALAZINE HYDROCHLORIDE 25 MG: 25 TABLET ORAL at 08:30

## 2025-02-25 RX ADMIN — SPIRONOLACTONE 25 MG: 25 TABLET ORAL at 08:30

## 2025-02-25 RX ADMIN — APIXABAN 5 MG: 5 TABLET, FILM COATED ORAL at 19:52

## 2025-02-25 RX ADMIN — ASPIRIN 81 MG: 81 TABLET, COATED ORAL at 08:30

## 2025-02-25 NOTE — PROGRESS NOTES
Patient was brought to room 8210A but was assigned to room 4508B, BERNARD Hansen transporting patient to correct room

## 2025-02-25 NOTE — PROGRESS NOTES
4 Eyes Skin Assessment     NAME:  Chuck Chavarria Jr.  YOB: 1950  MEDICAL RECORD NUMBER:  [id number]    The patient is being assessed for  Admission    I agree that at least one RN has performed a thorough Head to Toe Skin Assessment on the patient. ALL assessment sites listed below have been assessed.      Areas assessed by both nurses:    Head, Face, Ears, Shoulders, Back, Chest, Arms, Elbows, Hands, Sacrum. Buttock, Coccyx, Ischium, and Legs. Feet and Heels        Does the Patient have a Wound? Small abrasion to right lower leg from pt scratching. Psoriasis like rash to BLE and B Hands.        Martin Prevention initiated by RN: No  Wound Care Orders initiated by RN: No    Pressure Injury (Stage 3,4, Unstageable, DTI, NWPT, and Complex wounds) if present, place Wound referral order by RN under : No    New Ostomies, if present place, Ostomy referral order under : No     Nurse 1 eSignature: Electronically signed by Jennifer Cardoso RN on 2/25/25 at 7:30 PM EST    **SHARE this note so that the co-signing nurse can place an eSignature**    Nurse 2 eSignature: Electronically signed by Pavan Mchugh RN on 2/25/25 at 7:31 PM EST

## 2025-02-25 NOTE — PROGRESS NOTES
Hospitalist Progress Note      SYNOPSIS: Patient admitted on 2025 for Stroke-like symptoms  74-year-old male with past medical history of A-fib, COPD presented to the hospital with chief complaint of lethargy, altered mental status from skilled nursing facility.  Further workup with head CT was unremarkable, patient underwent MRI brain which was negative for any acute stroke.  Patient was requiring 6 L of oxygen at nasal cannula during his admission    SUBJECTIVE:  Stable overnight. No other overnight issues reported.   Patient seen and examined at bedside today a.m. patient has been afebrile, saturating at room air.  Patient alert oriented answering to my questions appropriately  Records reviewed.         Temp (24hrs), Av.4 °F (36.3 °C), Min:96.4 °F (35.8 °C), Max:98 °F (36.7 °C)    DIET: ADULT DIET; Regular  CODE: Full Code  No intake or output data in the 24 hours ending 25 1055    Review of Systems  All bolded are positive; please see HPI  General:  Fever, chills, diaphoresis, fatigue, malaise, night sweats, weight loss  Psychological:  Anxiety, disorientation, hallucinations.  ENT:  Epistaxis, headaches, vertigo, visual changes.  Cardiovascular:  Chest pain, irregular heartbeats, palpitations, paroxysmal nocturnal dyspnea.  Respiratory:  Shortness of breath, coughing, sputum production, hemoptysis, wheezing, orthopnea.  Gastrointestinal:  Nausea, vomiting, diarrhea, heartburn, constipation, abdominal pain, hematemesis, hematochezia, melena, acholic stools  Genito-Urinary:  Dysuria, urgency, frequency, hematuria  Musculoskeletal:  Joint pain, joint stiffness, joint swelling, muscle pain  Neurology:  Headache, focal neurological deficits, weakness, numbness, paresthesia  Derm:  Rashes, ulcers, excoriations, bruising  Extremities:  Decreased ROM, peripheral edema, mottling      OBJECTIVE:    BP (!) 147/85   Pulse 72   Temp 97.5 °F (36.4 °C) (Oral)   Resp 16   Wt 56.7 kg (125 lb)   SpO2 94%

## 2025-02-25 NOTE — PROGRESS NOTES
Chart reviewed pending admission to 4500.    Electronically signed by Lina Garcia RN on 2/25/25 at 1:20 PM EST

## 2025-02-26 VITALS
RESPIRATION RATE: 16 BRPM | DIASTOLIC BLOOD PRESSURE: 71 MMHG | WEIGHT: 125 LBS | HEART RATE: 78 BPM | HEIGHT: 68 IN | BODY MASS INDEX: 18.94 KG/M2 | SYSTOLIC BLOOD PRESSURE: 126 MMHG | OXYGEN SATURATION: 96 % | TEMPERATURE: 98.8 F

## 2025-02-26 LAB — MYCOPLASMA AB,IGM: NORMAL

## 2025-02-26 PROCEDURE — 94640 AIRWAY INHALATION TREATMENT: CPT

## 2025-02-26 PROCEDURE — 97535 SELF CARE MNGMENT TRAINING: CPT

## 2025-02-26 PROCEDURE — 6360000002 HC RX W HCPCS: Performed by: STUDENT IN AN ORGANIZED HEALTH CARE EDUCATION/TRAINING PROGRAM

## 2025-02-26 PROCEDURE — 6370000000 HC RX 637 (ALT 250 FOR IP): Performed by: STUDENT IN AN ORGANIZED HEALTH CARE EDUCATION/TRAINING PROGRAM

## 2025-02-26 PROCEDURE — 97530 THERAPEUTIC ACTIVITIES: CPT

## 2025-02-26 PROCEDURE — 99239 HOSP IP/OBS DSCHRG MGMT >30: CPT | Performed by: STUDENT IN AN ORGANIZED HEALTH CARE EDUCATION/TRAINING PROGRAM

## 2025-02-26 PROCEDURE — 97165 OT EVAL LOW COMPLEX 30 MIN: CPT

## 2025-02-26 PROCEDURE — 97161 PT EVAL LOW COMPLEX 20 MIN: CPT

## 2025-02-26 PROCEDURE — 2500000003 HC RX 250 WO HCPCS: Performed by: STUDENT IN AN ORGANIZED HEALTH CARE EDUCATION/TRAINING PROGRAM

## 2025-02-26 RX ADMIN — LISINOPRIL 20 MG: 20 TABLET ORAL at 08:55

## 2025-02-26 RX ADMIN — HYDRALAZINE HYDROCHLORIDE 25 MG: 25 TABLET ORAL at 08:55

## 2025-02-26 RX ADMIN — APIXABAN 5 MG: 5 TABLET, FILM COATED ORAL at 08:55

## 2025-02-26 RX ADMIN — ARFORMOTEROL TARTRATE: 15 SOLUTION RESPIRATORY (INHALATION) at 08:52

## 2025-02-26 RX ADMIN — ATORVASTATIN CALCIUM 40 MG: 40 TABLET, FILM COATED ORAL at 08:55

## 2025-02-26 RX ADMIN — SODIUM CHLORIDE, PRESERVATIVE FREE 10 ML: 5 INJECTION INTRAVENOUS at 08:56

## 2025-02-26 RX ADMIN — IPRATROPIUM BROMIDE 0.5 MG: 0.5 SOLUTION RESPIRATORY (INHALATION) at 08:53

## 2025-02-26 RX ADMIN — HYDRALAZINE HYDROCHLORIDE 25 MG: 25 TABLET ORAL at 13:23

## 2025-02-26 RX ADMIN — CARVEDILOL 12.5 MG: 6.25 TABLET, FILM COATED ORAL at 08:56

## 2025-02-26 RX ADMIN — ASPIRIN 81 MG: 81 TABLET, COATED ORAL at 08:55

## 2025-02-26 RX ADMIN — IPRATROPIUM BROMIDE 0.5 MG: 0.5 SOLUTION RESPIRATORY (INHALATION) at 00:10

## 2025-02-26 RX ADMIN — SPIRONOLACTONE 25 MG: 25 TABLET ORAL at 09:01

## 2025-02-26 RX ADMIN — ISOSORBIDE MONONITRATE 30 MG: 30 TABLET, EXTENDED RELEASE ORAL at 08:55

## 2025-02-26 NOTE — PLAN OF CARE
Problem: Chronic Conditions and Co-morbidities  Goal: Patient's chronic conditions and co-morbidity symptoms are monitored and maintained or improved  2/25/2025 2157 by Loreto Eng RN  Outcome: Progressing  Flowsheets (Taken 2/25/2025 2000)  Care Plan - Patient's Chronic Conditions and Co-Morbidity Symptoms are Monitored and Maintained or Improved: Monitor and assess patient's chronic conditions and comorbid symptoms for stability, deterioration, or improvement  2/25/2025 1722 by Jennifer Cardoso RN  Outcome: Progressing  Flowsheets  Taken 2/25/2025 1722  Care Plan - Patient's Chronic Conditions and Co-Morbidity Symptoms are Monitored and Maintained or Improved:   Monitor and assess patient's chronic conditions and comorbid symptoms for stability, deterioration, or improvement   Collaborate with multidisciplinary team to address chronic and comorbid conditions and prevent exacerbation or deterioration   Update acute care plan with appropriate goals if chronic or comorbid symptoms are exacerbated and prevent overall improvement and discharge  Taken 2/25/2025 1503  Care Plan - Patient's Chronic Conditions and Co-Morbidity Symptoms are Monitored and Maintained or Improved:   Monitor and assess patient's chronic conditions and comorbid symptoms for stability, deterioration, or improvement   Collaborate with multidisciplinary team to address chronic and comorbid conditions and prevent exacerbation or deterioration   Update acute care plan with appropriate goals if chronic or comorbid symptoms are exacerbated and prevent overall improvement and discharge     Problem: Discharge Planning  Goal: Discharge to home or other facility with appropriate resources  2/25/2025 2157 by Loreto Eng RN  Outcome: Progressing  Flowsheets (Taken 2/25/2025 2000)  Discharge to home or other facility with appropriate resources: Identify barriers to discharge with patient and caregiver  2/25/2025 1722 by Jennifer Cardoso RN  Outcome: 
  Problem: Chronic Conditions and Co-morbidities  Goal: Patient's chronic conditions and co-morbidity symptoms are monitored and maintained or improved  2/26/2025 0959 by Hali Randhawa RN  Outcome: Progressing  Flowsheets (Taken 2/26/2025 0833)  Care Plan - Patient's Chronic Conditions and Co-Morbidity Symptoms are Monitored and Maintained or Improved: Monitor and assess patient's chronic conditions and comorbid symptoms for stability, deterioration, or improvement  2/25/2025 2157 by Loreto Eng RN  Outcome: Progressing  Flowsheets (Taken 2/25/2025 2000)  Care Plan - Patient's Chronic Conditions and Co-Morbidity Symptoms are Monitored and Maintained or Improved: Monitor and assess patient's chronic conditions and comorbid symptoms for stability, deterioration, or improvement     Problem: Discharge Planning  Goal: Discharge to home or other facility with appropriate resources  2/26/2025 0959 by Hali Randhawa RN  Outcome: Progressing  Flowsheets (Taken 2/26/2025 0833)  Discharge to home or other facility with appropriate resources: Identify barriers to discharge with patient and caregiver  2/25/2025 2157 by Loreto Eng RN  Outcome: Progressing  Flowsheets (Taken 2/25/2025 2000)  Discharge to home or other facility with appropriate resources: Identify barriers to discharge with patient and caregiver     Problem: ABCDS Injury Assessment  Goal: Absence of physical injury  2/26/2025 0959 by Hali Randhawa RN  Outcome: Progressing  2/25/2025 2157 by Loreto Eng RN  Outcome: Progressing     Problem: Confusion  Goal: Confusion, delirium, dementia, or psychosis is improved or at baseline  Description: INTERVENTIONS:  1. Assess for possible contributors to thought disturbance, including medications, impaired vision or hearing, underlying metabolic abnormalities, dehydration, psychiatric diagnoses, and notify attending LIP  2. [place] high risk fall precautions, as indicated  3. Provide frequent short contacts to 
to provide reality reorientation, refocusing and direction  4. Decrease environmental stimuli, including noise as appropriate  5. Monitor and intervene to maintain adequate nutrition, hydration, elimination, sleep and activity  6. If unable to ensure safety without constant attention obtain sitter and review sitter guidelines with assigned personnel  7. Initiate Psychosocial CNS and Spiritual Care consult, as indicated  Outcome: Progressing  Flowsheets (Taken 2/25/2025 1722)  Effect of thought disturbance (confusion, delirium, dementia, or psychosis) are managed with adequate functional status:   Assess for contributors to thought disturbance, including medications, impaired vision or hearing, underlying metabolic abnormalities, dehydration, psychiatric diagnoses, notify [facility]   [place] high risk fall precautions, as indicated   Provide frequent short contacts to provide reality reorientation, refocusing and direction

## 2025-02-26 NOTE — PROGRESS NOTES
OCCUPATIONAL THERAPY INITIAL EVALUATION    [facility]  [address], [place]      Date:2025                                                  Patient Name: Chuck Chavarria Jr.  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    Surgeries this admission: none     Precautions:  Fall Risk  Cognition - Alarms  Wounds Subhash LEs distally    Assessment of current deficits   [x] Functional mobility  [x]ADLs   [x] Strength                 [x]Cognition   [x] Functional transfers   [x] IADLs         [x] Safety Awareness    [x]Endurance   [] Fine Coordination  [x] Balance    [] Vision/perception     []Sensation    []Gross Motor Coordination  [] ROM   [] Delirium                     [] Motor Control       OT PLAN OF CARE   OT POC based on physician orders, patient diagnosis and results of clinical assessment    Frequency/Duration 2-4 days/wk for 2 weeks PRN   Specific OT Treatment to include:   * Instruction/training on adapted ADL techniques and AE recommendations to increase functional independence within

## 2025-02-26 NOTE — CARE COORDINATION
provided with a choice of provider and agrees with the discharge plan. Freedom of choice list with basic dialogue that supports the patient's individualized plan of care/goals and shares the quality data associated with the providers was provided to:     Patient Representative Name:       The Patient and/or Patient Representative Agree with the Discharge Plan?      Tony Henson RN BSN  Case Management  [phone]

## 2025-02-26 NOTE — DISCHARGE SUMMARY
Hospitalist Discharge Summary    Patient ID: Chuck Chavarria Jr.   Patient : 1950  Patient's PCP: Gunner Espinosa MD    Admit Date: 2025   Admitting Physician: Diaz Cueva MD    Discharge Date:  2025   Discharge Physician: Vidhi Cleaning MD   Discharge Condition: Stable  Discharge Disposition: Home      Hospital course in brief:  (Please refer to daily progress notes for a comprehensive review of the hospitalization by requesting medical records)      74-year-old male with past medical history of A-fib, COPD presented to the hospital with chief complaint of lethargy, altered mental status from skilled nursing facility.  Further workup with head CT was unremarkable, patient underwent MRI brain which was negative for any acute stroke.  Patient was requiring 6 L of oxygen at nasal cannula during his admission  -Patient seen and examined at bedside today a.m. patient has been afebrile, saturating at room air.  Patient alert oriented answering to my questions appropriately   -patient is AAOx4; denies any complains; on room air; clinically and hemodynamically stable to discharge  D/w sw patient is getting discharged to home; continue home meds  Follow up with pcp in a week  Consults:   IP CONSULT TO INTERNAL MEDICINE    Discharge Diagnoses:    Acute metabolic encephalopathy has resolved   copd    Discharge Instructions / Follow up:  Follow-up with PCP within 1 week of discharge.  Follow-up with consultants as indicated by them.  Compliance with medications as prescribed on discharge.    Future Appointments   Date Time Provider Department Center   3/3/2025  9:15 AM [facility] CHF ROOM 2 [facility]   2025 10:30 AM [facility] CT SCAN 3 SEYZ CT [facility] Rad/Car   2025  9:00 AM Joseline Campos MD [facility] Pulm [facility]   2025  8:15 AM Gunner Espinosa MD [facility] BS [facility] DEP   2025  9:20 AM Nico Carrasco DO [facility]   10/30/2025  1:00 PM DORINA NICHOLE US 1 SEYZ

## 2025-02-26 NOTE — PROGRESS NOTES
This nurse phoned pt's sister, with no answer a voice message left to notify her pt has a dc order in and ready to go home. Await her return call and/or arrival.   Electronically signed by Hali Randhawa RN on 2/26/2025 at 2:16 PM     With no return call this nurse phoned pt's sister again. Sister answered phone with SN providing update regarding DC. Sister reports she plans to come to the hospital to pt's room to allow nurse to review DC instructions with sister and pt. Nurse instructed sister to notify nursing desk when she arrives to allow nurse to come to room.   Electronically signed by Hali Randhawa RN on 2/26/2025 at 5:15 PM     Sister present. Discharge instructions provided to sister and pt. IV removed. Sister reports needing script for Eliquis. This nurse sent perfect serve message to Dr. Cleaning to request script be sent to pt's pharmacy.   Electronically signed by Hali Randhawa RN on 2/26/2025 at 6:11 PM     Dr. Cleaning acknowledged.   Electronically signed by Hali Randhawa RN on 2/26/2025 at 6:14 PM

## 2025-02-26 NOTE — ACP (ADVANCE CARE PLANNING)
Advance Care Planning   Healthcare Decision Maker:    Primary Decision Maker: Ashia Tillman - Brother/Sister - [phone]    Secondary Decision Maker: Elena Tillman - Other - [phone]    Supplemental (Other) Decision Maker: EVANGELINA REED - Grandchild - [phone]    Click here to complete Healthcare Decision Makers including selection of the Healthcare Decision Maker Relationship (ie \"Primary\").

## 2025-02-26 NOTE — PROGRESS NOTES
Physical Therapy  Physical Therapy Initial Assessment    Name: Chuck Chavarria Jr.  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   has a past surgical history that includes Dental surgery; Cardiac catheterization (2023); Tonsillectomy; Colonoscopy; and Upper gastrointestinal endoscopy (N/A, 2024).  Procedure/Surgery:  None  Precautions:  Falls, cognition  Equipment Needs:  TBD    SUBJECTIVE:    Pt is questionable historian.  Per chart, patient lives with sister in a 1 story home.  Pt ambulated with quad cane and FWW prior to admission.    OBJECTIVE:   Initial Evaluation  Date: 2025 Treatment Short Term/ Long Term   Goals   AM-PAC 6 Clicks      Was pt agreeable to Eval/treatment? Yes     Does pt have pain? No c/o pain     Bed Mobility  Rolling: NT  Supine to sit: SBA  Sit to supine: NT  Scooting: NT  Rolling: Independent  Supine to sit: Independent  Sit to supine: Independent  Scooting: Independent   Transfers Sit to stand: SBA  Stand to sit: SBA  Stand pivot: NT  Sit to stand: Mod I  Stand to sit: Mod I  Stand pivot: Mod I with AAD   Ambulation    100 feet x2 with WW SBA  500 feet with AAD Mod I   Stair negotiation: ascended and descended  NT  12 step(s) with 1 rail(s) Mod I   ROM BUE:  See OT note  BLE:  WFL     Strength BUE:  See OT note  BLE:  WFL     Balance Sitting EOB:  SBA  Dynamic Standing:  SBA with WW  Sitting EOB:  Independent  Dynamic Standing:  Mod I with AAD     Pt is A & O x 3

## 2025-03-03 ENCOUNTER — HOSPITAL ENCOUNTER (OUTPATIENT)
Dept: OTHER | Age: 75
Setting detail: THERAPIES SERIES
Discharge: HOME | End: 2025-03-03
Payer: MEDICAID

## 2025-03-03 VITALS
DIASTOLIC BLOOD PRESSURE: 63 MMHG | WEIGHT: 128 LBS | BODY MASS INDEX: 19.46 KG/M2 | RESPIRATION RATE: 18 BRPM | SYSTOLIC BLOOD PRESSURE: 104 MMHG | OXYGEN SATURATION: 97 % | HEART RATE: 82 BPM

## 2025-03-03 LAB
ANION GAP SERPL CALCULATED.3IONS-SCNC: 11 MMOL/L (ref 7–16)
BNP SERPL-MCNC: 497 PG/ML (ref 0–450)
BUN SERPL-MCNC: 16 MG/DL (ref 6–23)
CALCIUM SERPL-MCNC: 9.7 MG/DL (ref 8.6–10.2)
CHLORIDE SERPL-SCNC: 103 MMOL/L (ref 98–107)
CO2 SERPL-SCNC: 27 MMOL/L (ref 22–29)
CREAT SERPL-MCNC: 1.3 MG/DL (ref 0.7–1.2)
GFR, ESTIMATED: 57 ML/MIN/1.73M2
GLUCOSE SERPL-MCNC: 136 MG/DL (ref 74–99)
POTASSIUM SERPL-SCNC: 3.9 MMOL/L (ref 3.5–5)
SODIUM SERPL-SCNC: 141 MMOL/L (ref 132–146)

## 2025-03-03 PROCEDURE — 83880 ASSAY OF NATRIURETIC PEPTIDE: CPT

## 2025-03-03 PROCEDURE — 80048 BASIC METABOLIC PNL TOTAL CA: CPT

## 2025-03-03 PROCEDURE — 99214 OFFICE O/P EST MOD 30 MIN: CPT

## 2025-03-03 NOTE — PLAN OF CARE
Problem: Chronic Conditions and Co-morbidities  Goal: Patient's chronic conditions and co-morbidity symptoms are monitored and maintained or improved  Flowsheets (Taken 3/3/2025 3277)  Care Plan - Patient's Chronic Conditions and Co-Morbidity Symptoms are Monitored and Maintained or Improved: Monitor and assess patient's chronic conditions and comorbid symptoms for stability, deterioration, or improvement

## 2025-03-03 NOTE — PROGRESS NOTES
Congestive Heart Failure Clinic   [facility]      Referring Provider: Luna  Primary Care Physician: Gunner Espinosa MD   Cardiologist: Luna  Nephrologist:       HISTORY OF PRESENT ILLNESS:     Chuck Chavarria Jr. is a 74 y.o. (1950) male with a history of HFrEF (EF < 40%)  Pre Cupid:     Lab Results   Component Value Date    LVEF 40 09/11/2024     Post Cupid:    Lab Results   Component Value Date    EFBP 52 (A) 01/15/2024         He presents to the CHF clinic for ongoing evaluation and monitoring of heart failure.    In the CHF clinic today he denies any adverse symptoms except:  [x] Dizziness or lightheadedness   [] Syncope or near syncope  [] Recent Fall  [] Shortness of breath at rest   [x] Dyspnea with exertion  [] Decline in functional capacity (unable to perform activities they had previously been able to do)  [x] Fatigue   [] Orthopnea  [] PND  [] Nocturnal cough  [] Hemoptysis  [] Chest pain, pressure, or discomfort  [] Palpitations  [] Abdominal distention  [] Abdominal bloating  [] Early satiety  [] Blood in stool   [] Diarrhea  [] Constipation  [] Nausea/Vomiting  [] Decreased urinary response to oral diuretic   [] Scrotal swelling   [] Lower extremity edema  [] Used PRN doses of oral diuretic   [] Weight gain    Wt Readings from Last 3 Encounters:   03/03/25 58.1 kg (128 lb)   02/25/25 56.7 kg (125 lb)   02/14/25 56.7 kg (125 lb)         SOCIAL HISTORY:  [x] Denies tobacco, alcohol or illicit drug abuse  [] Tobacco use:  [] ETOH use:  [] Illicit drug use:        MEDICATIONS:    Allergies   Allergen Reactions    Doxycycline Shortness Of Breath    Rocephin [Ceftriaxone] Shortness Of Breath     Prior to Visit Medications    Medication Sig Taking? Authorizing Provider   apixaban (ELIQUIS) 5 MG TABS tablet Take 1 tablet by mouth 2 times daily Yes Vidhi Cleaning MD   triamcinolone (KENALOG) 0.025 % cream Apply 1 application

## 2025-03-05 ENCOUNTER — OFFICE VISIT (OUTPATIENT)
Dept: PRIMARY CARE CLINIC | Age: 75
End: 2025-03-05
Payer: MEDICAID

## 2025-03-05 VITALS
HEIGHT: 68 IN | HEART RATE: 78 BPM | WEIGHT: 130 LBS | SYSTOLIC BLOOD PRESSURE: 118 MMHG | DIASTOLIC BLOOD PRESSURE: 70 MMHG | RESPIRATION RATE: 16 BRPM | TEMPERATURE: 98 F | OXYGEN SATURATION: 96 % | BODY MASS INDEX: 19.7 KG/M2

## 2025-03-05 DIAGNOSIS — L01.00 IMPETIGO: ICD-10-CM

## 2025-03-05 DIAGNOSIS — J44.9 CHRONIC OBSTRUCTIVE PULMONARY DISEASE, UNSPECIFIED COPD TYPE (HCC): Primary | ICD-10-CM

## 2025-03-05 DIAGNOSIS — I42.8 NON-ISCHEMIC CARDIOMYOPATHY (HCC): ICD-10-CM

## 2025-03-05 DIAGNOSIS — Z72.0 TOBACCO ABUSE: ICD-10-CM

## 2025-03-05 DIAGNOSIS — I48.91 ATRIAL FIBRILLATION, UNSPECIFIED TYPE (HCC): ICD-10-CM

## 2025-03-05 DIAGNOSIS — I10 ESSENTIAL HYPERTENSION: ICD-10-CM

## 2025-03-05 PROCEDURE — 3078F DIAST BP <80 MM HG: CPT | Performed by: INTERNAL MEDICINE

## 2025-03-05 PROCEDURE — 3074F SYST BP LT 130 MM HG: CPT | Performed by: INTERNAL MEDICINE

## 2025-03-05 PROCEDURE — 99214 OFFICE O/P EST MOD 30 MIN: CPT | Performed by: INTERNAL MEDICINE

## 2025-03-05 PROCEDURE — 1123F ACP DISCUSS/DSCN MKR DOCD: CPT | Performed by: INTERNAL MEDICINE

## 2025-03-05 RX ORDER — CETIRIZINE HYDROCHLORIDE 10 MG/1
10 TABLET ORAL DAILY
Qty: 30 TABLET | Refills: 0 | Status: SHIPPED | OUTPATIENT
Start: 2025-03-05

## 2025-03-05 ASSESSMENT — ENCOUNTER SYMPTOMS
VOMITING: 0
COUGH: 0
ABDOMINAL PAIN: 0
SHORTNESS OF BREATH: 0
DIARRHEA: 0
NAUSEA: 0

## 2025-03-05 NOTE — PROGRESS NOTES
SUBJECTIVE  Chuck Cahvarria Jr. is a 74 y.o. male established was seen In the office  for evaluation.    HPI/Chief C/O:  Chief Complaint   Patient presents with    Follow-Up from Hospital     Hospital follow up    Was in hospital with altered mental status .Feels fine except itchy legs    Allergies   Allergen Reactions    Doxycycline Shortness Of Breath    Rocephin [Ceftriaxone] Shortness Of Breath       ROS:  Review of Systems   Respiratory:  Negative for cough and shortness of breath.         Negative for Hemoptysis   Cardiovascular:  Negative for chest pain.   Gastrointestinal:  Negative for abdominal pain, diarrhea, nausea and vomiting.   Endocrine: Negative for polydipsia, polyphagia and polyuria.   Genitourinary:  Negative for dysuria and hematuria.   Skin:  Negative for rash.   Neurological:  Negative for tremors and seizures.        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) 01/17/2024    Hypoplastic, small right vertebral artery, with absent flow proximally CT of the carotids 2024  Patent left vertebral artery, good size    Cocaine abuse ([facility])     COPD (chronic obstructive pulmonary disease) ([facility])     Hyperlipidemia     Hypertension     Occlusion of right vertebral artery 01/17/2024    Small, hypoplastic right vertebral artery occlusion proximally with widely patent dominant left vertebral artery    Tobacco dependence 01/17/2024     Past Surgical History:   Procedure Laterality Date    CARDIAC CATHETERIZATION  08/25/2023    COLONOSCOPY      DENTAL SURGERY      TONSILLECTOMY      UPPER GASTROINTESTINAL ENDOSCOPY N/A 7/16/2024    ESOPHAGOGASTRODUODENOSCOPY performed by Todd Chow MD at

## 2025-03-11 ENCOUNTER — APPOINTMENT (OUTPATIENT)
Dept: GENERAL RADIOLOGY | Age: 75
End: 2025-03-11
Payer: MEDICAID

## 2025-03-11 ENCOUNTER — APPOINTMENT (OUTPATIENT)
Dept: CT IMAGING | Age: 75
End: 2025-03-11
Payer: MEDICAID

## 2025-03-11 ENCOUNTER — HOSPITAL ENCOUNTER (EMERGENCY)
Age: 75
Discharge: HOME | End: 2025-03-11
Attending: EMERGENCY MEDICINE
Payer: MEDICAID

## 2025-03-11 VITALS
RESPIRATION RATE: 18 BRPM | DIASTOLIC BLOOD PRESSURE: 63 MMHG | OXYGEN SATURATION: 96 % | TEMPERATURE: 98.1 F | HEART RATE: 85 BPM | SYSTOLIC BLOOD PRESSURE: 124 MMHG

## 2025-03-11 DIAGNOSIS — R40.4 UNRESPONSIVE EPISODE: Primary | ICD-10-CM

## 2025-03-11 LAB
ALBUMIN SERPL-MCNC: 3.6 G/DL (ref 3.5–5.2)
ALP SERPL-CCNC: 75 U/L (ref 40–129)
ALT SERPL-CCNC: 15 U/L (ref 0–40)
ANION GAP SERPL CALCULATED.3IONS-SCNC: 17 MMOL/L (ref 7–16)
AST SERPL-CCNC: 29 U/L (ref 0–39)
BASOPHILS # BLD: 0.11 K/UL (ref 0–0.2)
BASOPHILS NFR BLD: 2 % (ref 0–2)
BILIRUB SERPL-MCNC: 0.3 MG/DL (ref 0–1.2)
BILIRUB UR QL STRIP: NEGATIVE
BNP SERPL-MCNC: 720 PG/ML (ref 0–450)
BUN SERPL-MCNC: 15 MG/DL (ref 6–23)
CALCIUM SERPL-MCNC: 9.6 MG/DL (ref 8.6–10.2)
CASTS #/AREA URNS LPF: ABNORMAL /LPF
CHLORIDE SERPL-SCNC: 103 MMOL/L (ref 98–107)
CLARITY UR: CLEAR
CO2 SERPL-SCNC: 21 MMOL/L (ref 22–29)
COLOR UR: YELLOW
CREAT SERPL-MCNC: 1.4 MG/DL (ref 0.7–1.2)
EKG ATRIAL RATE: 70 BPM
EKG P AXIS: 63 DEGREES
EKG P-R INTERVAL: 168 MS
EKG Q-T INTERVAL: 454 MS
EKG QRS DURATION: 132 MS
EKG QTC CALCULATION (BAZETT): 490 MS
EKG R AXIS: -94 DEGREES
EKG T AXIS: -54 DEGREES
EKG VENTRICULAR RATE: 70 BPM
EOSINOPHIL # BLD: 0.51 K/UL (ref 0.05–0.5)
EOSINOPHILS RELATIVE PERCENT: 8 % (ref 0–6)
ERYTHROCYTE [DISTWIDTH] IN BLOOD BY AUTOMATED COUNT: 16.2 % (ref 11.5–15)
FLUAV RNA RESP QL NAA+PROBE: NOT DETECTED
FLUBV RNA RESP QL NAA+PROBE: NOT DETECTED
GFR, ESTIMATED: 53 ML/MIN/1.73M2
GLUCOSE BLD-MCNC: 149 MG/DL
GLUCOSE BLD-MCNC: 149 MG/DL (ref 74–99)
GLUCOSE SERPL-MCNC: 124 MG/DL (ref 74–99)
GLUCOSE UR STRIP-MCNC: 250 MG/DL
HCT VFR BLD AUTO: 36 % (ref 37–54)
HGB BLD-MCNC: 11.6 G/DL (ref 12.5–16.5)
HGB UR QL STRIP.AUTO: NEGATIVE
IMM GRANULOCYTES # BLD AUTO: 0.04 K/UL (ref 0–0.58)
IMM GRANULOCYTES NFR BLD: 1 % (ref 0–5)
KETONES UR STRIP-MCNC: NEGATIVE MG/DL
LEUKOCYTE ESTERASE UR QL STRIP: NEGATIVE
LYMPHOCYTES NFR BLD: 0.94 K/UL (ref 1.5–4)
LYMPHOCYTES RELATIVE PERCENT: 15 % (ref 20–42)
MCH RBC QN AUTO: 28.2 PG (ref 26–35)
MCHC RBC AUTO-ENTMCNC: 32.2 G/DL (ref 32–34.5)
MCV RBC AUTO: 87.4 FL (ref 80–99.9)
MONOCYTES NFR BLD: 1.24 K/UL (ref 0.1–0.95)
MONOCYTES NFR BLD: 20 % (ref 2–12)
NEUTROPHILS NFR BLD: 54 % (ref 43–80)
NEUTS SEG NFR BLD: 3.31 K/UL (ref 1.8–7.3)
NITRITE UR QL STRIP: NEGATIVE
PH UR STRIP: 6 [PH] (ref 5–8)
PLATELET # BLD AUTO: 209 K/UL (ref 130–450)
PMV BLD AUTO: 9.4 FL (ref 7–12)
POTASSIUM SERPL-SCNC: 4.2 MMOL/L (ref 3.5–5)
PROT SERPL-MCNC: 7.8 G/DL (ref 6.4–8.3)
PROT UR STRIP-MCNC: ABNORMAL MG/DL
RBC # BLD AUTO: 4.12 M/UL (ref 3.8–5.8)
RBC #/AREA URNS HPF: ABNORMAL /HPF
SARS-COV-2 RNA RESP QL NAA+PROBE: NOT DETECTED
SODIUM SERPL-SCNC: 141 MMOL/L (ref 132–146)
SOURCE: NORMAL
SP GR UR STRIP: 1.01 (ref 1–1.03)
SPECIMEN DESCRIPTION: NORMAL
TROPONIN I SERPL HS-MCNC: 22 NG/L (ref 0–11)
TROPONIN I SERPL HS-MCNC: 25 NG/L (ref 0–11)
UROBILINOGEN UR STRIP-ACNC: 0.2 EU/DL (ref 0–1)
WBC #/AREA URNS HPF: ABNORMAL /HPF
WBC OTHER # BLD: 6.2 K/UL (ref 4.5–11.5)

## 2025-03-11 PROCEDURE — 93005 ELECTROCARDIOGRAM TRACING: CPT

## 2025-03-11 PROCEDURE — 82962 GLUCOSE BLOOD TEST: CPT

## 2025-03-11 PROCEDURE — 93010 ELECTROCARDIOGRAM REPORT: CPT | Performed by: INTERNAL MEDICINE

## 2025-03-11 PROCEDURE — 71045 X-RAY EXAM CHEST 1 VIEW: CPT

## 2025-03-11 PROCEDURE — 81001 URINALYSIS AUTO W/SCOPE: CPT

## 2025-03-11 PROCEDURE — 85025 COMPLETE CBC W/AUTO DIFF WBC: CPT

## 2025-03-11 PROCEDURE — 99285 EMERGENCY DEPT VISIT HI MDM: CPT

## 2025-03-11 PROCEDURE — 87636 SARSCOV2 & INF A&B AMP PRB: CPT

## 2025-03-11 PROCEDURE — 2580000003 HC RX 258

## 2025-03-11 PROCEDURE — 84484 ASSAY OF TROPONIN QUANT: CPT

## 2025-03-11 PROCEDURE — 80053 COMPREHEN METABOLIC PANEL: CPT

## 2025-03-11 PROCEDURE — 83880 ASSAY OF NATRIURETIC PEPTIDE: CPT

## 2025-03-11 PROCEDURE — 70450 CT HEAD/BRAIN W/O DYE: CPT

## 2025-03-11 RX ORDER — 0.9 % SODIUM CHLORIDE 0.9 %
500 INTRAVENOUS SOLUTION INTRAVENOUS ONCE
Status: COMPLETED | OUTPATIENT
Start: 2025-03-11 | End: 2025-03-11

## 2025-03-11 RX ADMIN — SODIUM CHLORIDE 500 ML: 9 INJECTION, SOLUTION INTRAVENOUS at 10:30

## 2025-03-11 ASSESSMENT — LIFESTYLE VARIABLES: HOW OFTEN DO YOU HAVE A DRINK CONTAINING ALCOHOL: NEVER

## 2025-03-11 NOTE — ED PROVIDER NOTES
[facility] EMERGENCY DEPARTMENT  EMERGENCY DEPARTMENT ENCOUNTER        Pt Name: Chuck Chavarria Jr.  MRN: [id number]  Birthdate 1950  Date of evaluation: 3/11/2025  Provider: Richard Haddad MD  Attending Provider: No att. providers found  PCP: Gunner Espinosa MD  Note Started: 10:11 AM EDT 3/11/25    CHIEF COMPLAINT       Chief Complaint   Patient presents with    hypotensive     EMS called for unresponsive and hypotensive. Pt awake and alert during triage with stable vitals       HISTORY OF PRESENT ILLNESS: 1 or more Elements   History From: Nursing report        Chuck Chavarria Jr. is a 74 y.o. male with a past medical history of congestive heart rate, atrial fibrillation on Eliquis, chronic kidney disease, hyperlipidemia, hypertension, prior CVA, cocaine abuse presenting with unresponsive episode.  [facility].  Was reportedly unresponsive.  For EMS, he woke to sternal rub.  He is found to be hypotensive.  Patient is awake and alert and states that he has no complaints at this time.    Nursing Notes were all reviewed and agreed with or any disagreements were addressed in the HPI.      REVIEW OF EXTERNAL NOTE :           PDMP Monitoring:    Last PDMP [place] as Reviewed:  Review User Review Instant Review Result            Urine Drug Screenings (1 yr)       Urine Drug Screen  Collected: 2/24/2025  2:15 PM (Final result)              Urine Drug Screen  Collected: 11/10/2024  4:45 PM (Final result)              URINE DRUG SCREEN  Collected: 10/19/2024 11:30 AM (Final result)              URINE DRUG SCREEN  Collected: 10/9/2024  1:30 PM (Final result)              Urine Drug Screen  Collected: 9/9/2024  8:53 PM (Final result)              Serum Drug Screen  Collected: 2/24/2025  2:15 PM (Final result)              SERUM DRUG SCREEN  Collected: 9/10/2015  4:19 PM (Final result)              Cocaine, Urine, Confirmation  Collected: 9/10/2015  2:12

## 2025-03-11 NOTE — DISCHARGE INSTRUCTIONS
Continue home medications as prescribed.  Follow-up with your primary care physician.  If your symptoms worsen or change, return to the emergency room for further evaluation.  
BLE 2+/5

## 2025-03-12 ENCOUNTER — TELEPHONE (OUTPATIENT)
Dept: PRIMARY CARE CLINIC | Age: 75
End: 2025-03-12

## 2025-03-12 NOTE — TELEPHONE ENCOUNTER
Received a call from Deb at [facility] requesting Dr. Reyes Moore  Go over his medication list. States he has passed out twice at [facility]. Once on 2- and another on 3-3-2025. States he does not have any access to medications at home. She is concerned his blood pressure medication needs to be adjusted. In February his BP was 88/49 and in March it was 80/64. If needed she can be reached at  [phone] Ext# [phone]. Thank you.

## 2025-03-17 ENCOUNTER — HOSPITAL ENCOUNTER (OUTPATIENT)
Dept: OTHER | Age: 75
Setting detail: THERAPIES SERIES
Discharge: HOME | End: 2025-03-17
Payer: MEDICAID

## 2025-03-17 VITALS
BODY MASS INDEX: 19.31 KG/M2 | WEIGHT: 127 LBS | OXYGEN SATURATION: 94 % | HEART RATE: 71 BPM | DIASTOLIC BLOOD PRESSURE: 60 MMHG | SYSTOLIC BLOOD PRESSURE: 106 MMHG | RESPIRATION RATE: 18 BRPM

## 2025-03-17 LAB
ANION GAP SERPL CALCULATED.3IONS-SCNC: 13 MMOL/L (ref 7–16)
BNP SERPL-MCNC: 837 PG/ML (ref 0–450)
BUN SERPL-MCNC: 16 MG/DL (ref 6–23)
CALCIUM SERPL-MCNC: 9.6 MG/DL (ref 8.6–10.2)
CHLORIDE SERPL-SCNC: 101 MMOL/L (ref 98–107)
CO2 SERPL-SCNC: 26 MMOL/L (ref 22–29)
CREAT SERPL-MCNC: 1.5 MG/DL (ref 0.7–1.2)
GFR, ESTIMATED: 51 ML/MIN/1.73M2
GLUCOSE SERPL-MCNC: 140 MG/DL (ref 74–99)
POTASSIUM SERPL-SCNC: 4.1 MMOL/L (ref 3.5–5)
SODIUM SERPL-SCNC: 140 MMOL/L (ref 132–146)

## 2025-03-17 PROCEDURE — 80048 BASIC METABOLIC PNL TOTAL CA: CPT

## 2025-03-17 PROCEDURE — 83880 ASSAY OF NATRIURETIC PEPTIDE: CPT

## 2025-03-17 PROCEDURE — 99214 OFFICE O/P EST MOD 30 MIN: CPT

## 2025-03-17 NOTE — PLAN OF CARE
Problem: Chronic Conditions and Co-morbidities  Goal: Patient's chronic conditions and co-morbidity symptoms are monitored and maintained or improved  Flowsheets (Taken 3/17/2025 0457)  Care Plan - Patient's Chronic Conditions and Co-Morbidity Symptoms are Monitored and Maintained or Improved: Monitor and assess patient's chronic conditions and comorbid symptoms for stability, deterioration, or improvement

## 2025-03-17 NOTE — PROGRESS NOTES
Vidhi Cleaning MD   triamcinolone (KENALOG) 0.025 % cream Apply 1 application  topically every 4 hours as needed (itching) Apply Topically  to BLE Yes ProviderSimi MD   lisinopril (PRINIVIL;ZESTRIL) 10 MG tablet Take 1 tablet by mouth daily Yes Dorita Kan APRN - CNP   furosemide (LASIX) 20 MG tablet Take 2 tablets by mouth daily  Patient taking differently: Take 1 tablet by mouth daily Yes Dorita Kan APRN - CNP   tiotropium (SPIRIVA RESPIMAT) 2.5 MCG/ACT AERS inhaler Inhale 2 puffs into the lungs daily Yes Joseline Campos MD   spironolactone (ALDACTONE) 25 MG tablet Take 1 tablet by mouth daily Yes Dorita Kan APRN - CNP   aspirin 81 MG EC tablet Take 1 tablet by mouth daily Yes Brooklynn Ibarra APRN - CNS   atorvastatin (LIPITOR) 40 MG tablet Take 1 tablet by mouth daily Yes Brooklynn Ibarra APRN - CNS   carvedilol (COREG) 12.5 MG tablet Take 1 tablet by mouth 2 times daily Yes Brooklynn Ibarra APRN - CNS   isosorbide mononitrate (IMDUR) 30 MG extended release tablet Take 1 tablet by mouth daily Yes Brooklynn Ibarra APRN - CNS   empagliflozin (JARDIANCE) 10 MG tablet Take 1 tablet by mouth daily Yes Brooklynn Ibarra APRN - CNS   budesonide-formoterol (SYMBICORT) 160-4.5 MCG/ACT AERO Inhale 2 puffs into the lungs 2 times daily Yes Huseyin Rosenberg MD   tiotropium (SPIRIVA HANDIHALER) 18 MCG inhalation capsule Inhale 1 capsule into the lungs daily Yes Huseyin Rosenberg MD   hydrALAZINE (APRESOLINE) 25 MG tablet Take 1 tablet by mouth 3 times daily  Patient not taking: Reported on 3/17/2025  Brooklynn Ibarra APRN - CNS   albuterol sulfate HFA (PROVENTIL;VENTOLIN;PROAIR) 108 (90 Base) MCG/ACT inhaler Inhale 2 puffs into the lungs every 4 hours as needed for Wheezing  Huseyin Rosenberg MD   nicotine (NICODERM CQ) 21 MG/24HR Place 1 patch onto the skin daily  Patient not taking: Reported on 3/3/2025  Baltazar Huffman MD        GUIDELINE DIRECTED MEDICAL THERAPY for

## 2025-03-19 ENCOUNTER — OFFICE VISIT (OUTPATIENT)
Dept: PRIMARY CARE CLINIC | Age: 75
End: 2025-03-19
Payer: MEDICAID

## 2025-03-19 VITALS
SYSTOLIC BLOOD PRESSURE: 98 MMHG | OXYGEN SATURATION: 97 % | BODY MASS INDEX: 19.85 KG/M2 | HEIGHT: 68 IN | WEIGHT: 131 LBS | RESPIRATION RATE: 16 BRPM | TEMPERATURE: 97.6 F | HEART RATE: 66 BPM | DIASTOLIC BLOOD PRESSURE: 60 MMHG

## 2025-03-19 DIAGNOSIS — I48.91 ATRIAL FIBRILLATION, UNSPECIFIED TYPE (HCC): ICD-10-CM

## 2025-03-19 DIAGNOSIS — I42.8 NON-ISCHEMIC CARDIOMYOPATHY (HCC): ICD-10-CM

## 2025-03-19 DIAGNOSIS — I10 ESSENTIAL HYPERTENSION: Primary | ICD-10-CM

## 2025-03-19 PROCEDURE — 99213 OFFICE O/P EST LOW 20 MIN: CPT | Performed by: INTERNAL MEDICINE

## 2025-03-19 PROCEDURE — 3074F SYST BP LT 130 MM HG: CPT | Performed by: INTERNAL MEDICINE

## 2025-03-19 PROCEDURE — 3078F DIAST BP <80 MM HG: CPT | Performed by: INTERNAL MEDICINE

## 2025-03-19 PROCEDURE — 1123F ACP DISCUSS/DSCN MKR DOCD: CPT | Performed by: INTERNAL MEDICINE

## 2025-03-19 ASSESSMENT — ENCOUNTER SYMPTOMS
COUGH: 0
DIARRHEA: 0
ABDOMINAL PAIN: 0
NAUSEA: 0
SHORTNESS OF BREATH: 0
VOMITING: 0

## 2025-03-19 NOTE — PROGRESS NOTES
2024    ESOPHAGOGASTRODUODENOSCOPY performed by Todd Chow MD at [facility] ENDOSCOPY       Past Family Hx:  Reviewed with patient      Problem Relation Age of Onset    Brain Cancer Mother     Heart Attack Father        Social Hx:  Reviewed with patient  Social History     Tobacco Use    Smoking status: Former     Current packs/day: 0.00     Average packs/day: 0.3 packs/day for 64.8 years (16.2 ttl pk-yrs)     Types: Cigarettes     Start date:      Quit date: 10/21/2024     Years since quittin.4    Smokeless tobacco: Never    Tobacco comments:     1 pack every 3-4 days   Substance Use Topics    Alcohol use: Not Currently     Comment: Occ.       OBJECTIVE  BP 98/60   Pulse 66   Temp 97.6 °F (36.4 °C) (Temporal)   Resp 16   Ht 1.727 m (5' 8\")   Wt 59.4 kg (131 lb)   SpO2 97%   BMI 19.92 kg/m²     Problem List:  Chuck does not have any pertinent problems on file.    PHYS EX:  Physical Exam  Eyes:      Extraocular Movements: Extraocular movements intact.      Pupils: Pupils are equal, round, and reactive to light.   Neck:      Thyroid: No thyromegaly.      Vascular: No carotid bruit or JVD.   Cardiovascular:      Heart sounds: No murmur heard.     No gallop.   Pulmonary:      Effort: Pulmonary effort is normal.      Breath sounds: Normal breath sounds.   Abdominal:      Palpations: There is no hepatomegaly or splenomegaly.      Tenderness: There is no abdominal tenderness.   Skin:     Coloration: Skin is not cyanotic.      Findings: No bruising or rash.      Nails: There is no clubbing.   Neurological:      General: No focal deficit present.       Extremities: Pedal pulses No Edema     ASSESSMENT/PLAN  Chuck was seen today for blood pressure check and lower back pain.    Diagnoses and all orders for this visit:    Essential hypertension,BP remain soft will hold lisinopril and monitor     Atrial fibrillation, unspecified type (HCC) stable continue eliquis     Non-ischemic cardiomyopathy (HCC)

## 2025-03-31 ENCOUNTER — RESULTS FOLLOW-UP (OUTPATIENT)
Dept: CARDIOLOGY | Age: 75
End: 2025-03-31

## 2025-03-31 ENCOUNTER — HOSPITAL ENCOUNTER (OUTPATIENT)
Dept: OTHER | Age: 75
Setting detail: THERAPIES SERIES
Discharge: HOME | End: 2025-03-31
Payer: MEDICAID

## 2025-03-31 VITALS
BODY MASS INDEX: 19.83 KG/M2 | OXYGEN SATURATION: 98 % | WEIGHT: 130.4 LBS | SYSTOLIC BLOOD PRESSURE: 117 MMHG | HEART RATE: 72 BPM | RESPIRATION RATE: 16 BRPM | DIASTOLIC BLOOD PRESSURE: 77 MMHG

## 2025-03-31 DIAGNOSIS — I10 HYPERTENSION, UNSPECIFIED TYPE: ICD-10-CM

## 2025-03-31 DIAGNOSIS — I50.22 CHRONIC HFREF (HEART FAILURE WITH REDUCED EJECTION FRACTION) (HCC): Primary | ICD-10-CM

## 2025-03-31 LAB
ANION GAP SERPL CALCULATED.3IONS-SCNC: 12 MMOL/L (ref 7–16)
BNP SERPL-MCNC: 375 PG/ML (ref 0–450)
BUN SERPL-MCNC: 17 MG/DL (ref 6–23)
CALCIUM SERPL-MCNC: 9.8 MG/DL (ref 8.6–10.2)
CHLORIDE SERPL-SCNC: 101 MMOL/L (ref 98–107)
CO2 SERPL-SCNC: 26 MMOL/L (ref 22–29)
CREAT SERPL-MCNC: 1.5 MG/DL (ref 0.7–1.2)
GFR, ESTIMATED: 49 ML/MIN/1.73M2
GLUCOSE SERPL-MCNC: 124 MG/DL (ref 74–99)
POTASSIUM SERPL-SCNC: 3.7 MMOL/L (ref 3.5–5)
SODIUM SERPL-SCNC: 139 MMOL/L (ref 132–146)

## 2025-03-31 PROCEDURE — 99214 OFFICE O/P EST MOD 30 MIN: CPT

## 2025-03-31 PROCEDURE — 80048 BASIC METABOLIC PNL TOTAL CA: CPT

## 2025-03-31 PROCEDURE — 83880 ASSAY OF NATRIURETIC PEPTIDE: CPT

## 2025-03-31 NOTE — PROGRESS NOTES
Congestive Heart Failure Clinic   [facility]      Referring Provider: Luna  Primary Care Physician: Gunner Espinosa MD   Cardiologist: Luna  Nephrologist:       HISTORY OF PRESENT ILLNESS:     Chuck Chavarria Jr. is a 74 y.o. (1950) male with a history of HFrEF (EF < 40%)  Pre Cupid:     Lab Results   Component Value Date    LVEF 40 09/11/2024     Post Cupid:    Lab Results   Component Value Date    EFBP 52 (A) 01/15/2024         He presents to the CHF clinic for ongoing evaluation and monitoring of heart failure.    In the CHF clinic today he denies any adverse symptoms except:  [] Dizziness or lightheadedness   [] Syncope or near syncope  [] Recent Fall  [] Shortness of breath at rest   [x] Dyspnea with exertion  [] Decline in functional capacity (unable to perform activities they had previously been able to do)  [] Fatigue   [] Orthopnea  [] PND  [] Nocturnal cough  [] Hemoptysis  [] Chest pain, pressure, or discomfort  [] Palpitations  [] Abdominal distention  [] Abdominal bloating  [] Early satiety  [] Blood in stool   [] Diarrhea  [] Constipation  [] Nausea/Vomiting  [] Decreased urinary response to oral diuretic   [] Scrotal swelling   [] Lower extremity edema  [] Used PRN doses of oral diuretic   [] Weight gain    Wt Readings from Last 3 Encounters:   03/31/25 59.1 kg (130 lb 6.4 oz)   03/19/25 59.4 kg (131 lb)   03/17/25 57.6 kg (127 lb)         SOCIAL HISTORY:  [] Denies tobacco, alcohol or illicit drug abuse  [x] Tobacco use:Here and there  [] ETOH use:  [] Illicit drug use:        MEDICATIONS:    Allergies   Allergen Reactions    Doxycycline Shortness Of Breath    Rocephin [Ceftriaxone] Shortness Of Breath     Prior to Visit Medications    Medication Sig Taking? Authorizing Provider   cetirizine (ZYRTEC) 10 MG tablet Take 1 tablet by mouth daily Yes Gunner Espinosa MD   apixaban (ELIQUIS) 5 MG TABS tablet Take 1 tablet by mouth

## 2025-04-01 NOTE — TELEPHONE ENCOUNTER
Spoke with patient's sister (Ashia), she was not at home and will call back to go over instructions.

## 2025-04-01 NOTE — TELEPHONE ENCOUNTER
Patient's sister (Ashia) notified of lab results and Dr. Carrasco's recommendations.  Med list amended.  Order placed for BMP.

## 2025-04-07 ENCOUNTER — HOSPITAL ENCOUNTER (OUTPATIENT)
Dept: CT IMAGING | Age: 75
Discharge: HOME | End: 2025-04-09
Attending: INTERNAL MEDICINE
Payer: MEDICAID

## 2025-04-07 ENCOUNTER — HOSPITAL ENCOUNTER (OUTPATIENT)
Age: 75
Discharge: HOME | End: 2025-04-07
Payer: MEDICAID

## 2025-04-07 DIAGNOSIS — I50.22 CHRONIC HFREF (HEART FAILURE WITH REDUCED EJECTION FRACTION) (HCC): ICD-10-CM

## 2025-04-07 DIAGNOSIS — R91.8 MASS OF UPPER LOBE OF RIGHT LUNG: ICD-10-CM

## 2025-04-07 DIAGNOSIS — I10 HYPERTENSION, UNSPECIFIED TYPE: ICD-10-CM

## 2025-04-07 LAB
ANION GAP SERPL CALCULATED.3IONS-SCNC: 12 MMOL/L (ref 7–16)
BUN SERPL-MCNC: 17 MG/DL (ref 6–23)
CALCIUM SERPL-MCNC: 9.7 MG/DL (ref 8.6–10.2)
CHLORIDE SERPL-SCNC: 105 MMOL/L (ref 98–107)
CO2 SERPL-SCNC: 25 MMOL/L (ref 22–29)
CREAT SERPL-MCNC: 1.4 MG/DL (ref 0.7–1.2)
GFR, ESTIMATED: 54 ML/MIN/1.73M2
GLUCOSE SERPL-MCNC: 108 MG/DL (ref 74–99)
POTASSIUM SERPL-SCNC: 4.5 MMOL/L (ref 3.5–5)
SODIUM SERPL-SCNC: 142 MMOL/L (ref 132–146)

## 2025-04-07 PROCEDURE — 80048 BASIC METABOLIC PNL TOTAL CA: CPT

## 2025-04-07 PROCEDURE — 71250 CT THORAX DX C-: CPT

## 2025-04-07 PROCEDURE — 36415 COLL VENOUS BLD VENIPUNCTURE: CPT

## 2025-04-07 RX ORDER — CETIRIZINE HYDROCHLORIDE 10 MG/1
10 TABLET ORAL DAILY
Qty: 90 TABLET | Refills: 0 | Status: SHIPPED | OUTPATIENT
Start: 2025-04-07

## 2025-04-11 PROCEDURE — 99285 EMERGENCY DEPT VISIT HI MDM: CPT

## 2025-04-11 ASSESSMENT — PAIN - FUNCTIONAL ASSESSMENT: PAIN_FUNCTIONAL_ASSESSMENT: NONE - DENIES PAIN

## 2025-04-12 ENCOUNTER — APPOINTMENT (OUTPATIENT)
Dept: GENERAL RADIOLOGY | Age: 75
DRG: 058 | End: 2025-04-12
Payer: MEDICAID

## 2025-04-12 ENCOUNTER — HOSPITAL ENCOUNTER (INPATIENT)
Age: 75
LOS: 1 days | Discharge: HOME | DRG: 058 | End: 2025-04-13
Attending: STUDENT IN AN ORGANIZED HEALTH CARE EDUCATION/TRAINING PROGRAM | Admitting: FAMILY MEDICINE
Payer: MEDICAID

## 2025-04-12 ENCOUNTER — APPOINTMENT (OUTPATIENT)
Dept: CT IMAGING | Age: 75
DRG: 058 | End: 2025-04-12
Payer: MEDICAID

## 2025-04-12 DIAGNOSIS — R29.90 STROKE-LIKE SYMPTOM: Primary | ICD-10-CM

## 2025-04-12 DIAGNOSIS — I63.9 CEREBROVASCULAR ACCIDENT (CVA), UNSPECIFIED MECHANISM (HCC): ICD-10-CM

## 2025-04-12 LAB
ANION GAP SERPL CALCULATED.3IONS-SCNC: 12 MMOL/L (ref 7–16)
BASOPHILS # BLD: 0.05 K/UL (ref 0–0.2)
BASOPHILS NFR BLD: 1 % (ref 0–2)
BUN SERPL-MCNC: 16 MG/DL (ref 6–23)
CALCIUM SERPL-MCNC: 9.1 MG/DL (ref 8.6–10.2)
CHLORIDE SERPL-SCNC: 105 MMOL/L (ref 98–107)
CO2 SERPL-SCNC: 23 MMOL/L (ref 22–29)
CREAT SERPL-MCNC: 1.3 MG/DL (ref 0.7–1.2)
EKG ATRIAL RATE: 82 BPM
EKG P AXIS: 67 DEGREES
EKG P-R INTERVAL: 170 MS
EKG Q-T INTERVAL: 440 MS
EKG QRS DURATION: 140 MS
EKG QTC CALCULATION (BAZETT): 514 MS
EKG R AXIS: -90 DEGREES
EKG T AXIS: 70 DEGREES
EKG VENTRICULAR RATE: 82 BPM
EOSINOPHIL # BLD: 0.31 K/UL (ref 0.05–0.5)
EOSINOPHILS RELATIVE PERCENT: 5 % (ref 0–6)
ERYTHROCYTE [DISTWIDTH] IN BLOOD BY AUTOMATED COUNT: 15.8 % (ref 11.5–15)
GFR, ESTIMATED: 58 ML/MIN/1.73M2
GLUCOSE SERPL-MCNC: 120 MG/DL (ref 74–99)
HCT VFR BLD AUTO: 35.7 % (ref 37–54)
HGB BLD-MCNC: 11.7 G/DL (ref 12.5–16.5)
INR PPP: 1.4
LYMPHOCYTES NFR BLD: 1.24 K/UL (ref 1.5–4)
LYMPHOCYTES RELATIVE PERCENT: 19 % (ref 20–42)
MAGNESIUM SERPL-MCNC: 1.8 MG/DL (ref 1.6–2.6)
MCH RBC QN AUTO: 28.3 PG (ref 26–35)
MCHC RBC AUTO-ENTMCNC: 32.8 G/DL (ref 32–34.5)
MCV RBC AUTO: 86.2 FL (ref 80–99.9)
MONOCYTES NFR BLD: 0.98 K/UL (ref 0.1–0.95)
MONOCYTES NFR BLD: 15 % (ref 2–12)
NEUTROPHILS NFR BLD: 60 % (ref 43–80)
NEUTS SEG NFR BLD: 3.82 K/UL (ref 1.8–7.3)
PLATELET # BLD AUTO: 218 K/UL (ref 130–450)
PMV BLD AUTO: 9.3 FL (ref 7–12)
POTASSIUM SERPL-SCNC: 3.5 MMOL/L (ref 3.5–5)
PROTHROMBIN TIME: 14.9 SEC (ref 9.3–12.4)
RBC # BLD AUTO: 4.14 M/UL (ref 3.8–5.8)
RBC # BLD: ABNORMAL 10*6/UL
SODIUM SERPL-SCNC: 140 MMOL/L (ref 132–146)
TROPONIN I SERPL HS-MCNC: 22 NG/L (ref 0–11)
TROPONIN I SERPL HS-MCNC: 24 NG/L (ref 0–11)
WBC OTHER # BLD: 6.4 K/UL (ref 4.5–11.5)

## 2025-04-12 PROCEDURE — 70498 CT ANGIOGRAPHY NECK: CPT

## 2025-04-12 PROCEDURE — 94640 AIRWAY INHALATION TREATMENT: CPT

## 2025-04-12 PROCEDURE — 2500000003 HC RX 250 WO HCPCS: Performed by: RADIOLOGY

## 2025-04-12 PROCEDURE — 70450 CT HEAD/BRAIN W/O DYE: CPT

## 2025-04-12 PROCEDURE — 99223 1ST HOSP IP/OBS HIGH 75: CPT | Performed by: FAMILY MEDICINE

## 2025-04-12 PROCEDURE — 93010 ELECTROCARDIOGRAM REPORT: CPT | Performed by: INTERNAL MEDICINE

## 2025-04-12 PROCEDURE — 85610 PROTHROMBIN TIME: CPT

## 2025-04-12 PROCEDURE — 97530 THERAPEUTIC ACTIVITIES: CPT

## 2025-04-12 PROCEDURE — 6370000000 HC RX 637 (ALT 250 FOR IP): Performed by: FAMILY MEDICINE

## 2025-04-12 PROCEDURE — 99222 1ST HOSP IP/OBS MODERATE 55: CPT | Performed by: PSYCHIATRY & NEUROLOGY

## 2025-04-12 PROCEDURE — 2500000003 HC RX 250 WO HCPCS: Performed by: FAMILY MEDICINE

## 2025-04-12 PROCEDURE — 2060000000 HC ICU INTERMEDIATE R&B

## 2025-04-12 PROCEDURE — 84484 ASSAY OF TROPONIN QUANT: CPT

## 2025-04-12 PROCEDURE — 83735 ASSAY OF MAGNESIUM: CPT

## 2025-04-12 PROCEDURE — 6360000004 HC RX CONTRAST MEDICATION: Performed by: RADIOLOGY

## 2025-04-12 PROCEDURE — 93005 ELECTROCARDIOGRAM TRACING: CPT

## 2025-04-12 PROCEDURE — 6360000002 HC RX W HCPCS: Performed by: STUDENT IN AN ORGANIZED HEALTH CARE EDUCATION/TRAINING PROGRAM

## 2025-04-12 PROCEDURE — 71045 X-RAY EXAM CHEST 1 VIEW: CPT

## 2025-04-12 PROCEDURE — 92523 SPEECH SOUND LANG COMPREHEN: CPT

## 2025-04-12 PROCEDURE — 97165 OT EVAL LOW COMPLEX 30 MIN: CPT

## 2025-04-12 PROCEDURE — 85025 COMPLETE CBC W/AUTO DIFF WBC: CPT

## 2025-04-12 PROCEDURE — 6370000000 HC RX 637 (ALT 250 FOR IP): Performed by: STUDENT IN AN ORGANIZED HEALTH CARE EDUCATION/TRAINING PROGRAM

## 2025-04-12 PROCEDURE — 92610 EVALUATE SWALLOWING FUNCTION: CPT

## 2025-04-12 PROCEDURE — 80048 BASIC METABOLIC PNL TOTAL CA: CPT

## 2025-04-12 PROCEDURE — 70496 CT ANGIOGRAPHY HEAD: CPT

## 2025-04-12 PROCEDURE — 97161 PT EVAL LOW COMPLEX 20 MIN: CPT

## 2025-04-12 RX ORDER — PROCHLORPERAZINE EDISYLATE 5 MG/ML
10 INJECTION INTRAMUSCULAR; INTRAVENOUS EVERY 6 HOURS PRN
Status: DISCONTINUED | OUTPATIENT
Start: 2025-04-12 | End: 2025-04-13 | Stop reason: HOSPADM

## 2025-04-12 RX ORDER — SODIUM CHLORIDE 9 MG/ML
INJECTION, SOLUTION INTRAVENOUS PRN
Status: DISCONTINUED | OUTPATIENT
Start: 2025-04-12 | End: 2025-04-13 | Stop reason: HOSPADM

## 2025-04-12 RX ORDER — FUROSEMIDE 40 MG/1
20 TABLET ORAL DAILY
Status: DISCONTINUED | OUTPATIENT
Start: 2025-04-12 | End: 2025-04-13 | Stop reason: HOSPADM

## 2025-04-12 RX ORDER — ASPIRIN 81 MG/1
81 TABLET, CHEWABLE ORAL DAILY
Status: DISCONTINUED | OUTPATIENT
Start: 2025-04-12 | End: 2025-04-13 | Stop reason: HOSPADM

## 2025-04-12 RX ORDER — PROCHLORPERAZINE MALEATE 10 MG
10 TABLET ORAL EVERY 8 HOURS PRN
Status: DISCONTINUED | OUTPATIENT
Start: 2025-04-12 | End: 2025-04-13 | Stop reason: HOSPADM

## 2025-04-12 RX ORDER — ALBUTEROL SULFATE 90 UG/1
2 INHALANT RESPIRATORY (INHALATION) EVERY 4 HOURS PRN
Status: DISCONTINUED | OUTPATIENT
Start: 2025-04-12 | End: 2025-04-12 | Stop reason: CLARIF

## 2025-04-12 RX ORDER — CARVEDILOL 6.25 MG/1
12.5 TABLET ORAL 2 TIMES DAILY
Status: DISCONTINUED | OUTPATIENT
Start: 2025-04-12 | End: 2025-04-13 | Stop reason: HOSPADM

## 2025-04-12 RX ORDER — ONDANSETRON 4 MG/1
4 TABLET, ORALLY DISINTEGRATING ORAL EVERY 8 HOURS PRN
Status: DISCONTINUED | OUTPATIENT
Start: 2025-04-12 | End: 2025-04-12

## 2025-04-12 RX ORDER — SODIUM CHLORIDE 0.9 % (FLUSH) 0.9 %
5-40 SYRINGE (ML) INJECTION EVERY 12 HOURS SCHEDULED
Status: DISCONTINUED | OUTPATIENT
Start: 2025-04-12 | End: 2025-04-13 | Stop reason: HOSPADM

## 2025-04-12 RX ORDER — ASPIRIN 300 MG/1
300 SUPPOSITORY RECTAL DAILY
Status: DISCONTINUED | OUTPATIENT
Start: 2025-04-12 | End: 2025-04-13 | Stop reason: HOSPADM

## 2025-04-12 RX ORDER — POLYETHYLENE GLYCOL 3350 17 G/17G
17 POWDER, FOR SOLUTION ORAL DAILY PRN
Status: DISCONTINUED | OUTPATIENT
Start: 2025-04-12 | End: 2025-04-13 | Stop reason: HOSPADM

## 2025-04-12 RX ORDER — SODIUM CHLORIDE 0.9 % (FLUSH) 0.9 %
10 SYRINGE (ML) INJECTION PRN
Status: COMPLETED | OUTPATIENT
Start: 2025-04-12 | End: 2025-04-12

## 2025-04-12 RX ORDER — SODIUM CHLORIDE 0.9 % (FLUSH) 0.9 %
5-40 SYRINGE (ML) INJECTION PRN
Status: DISCONTINUED | OUTPATIENT
Start: 2025-04-12 | End: 2025-04-13 | Stop reason: HOSPADM

## 2025-04-12 RX ORDER — IOPAMIDOL 755 MG/ML
75 INJECTION, SOLUTION INTRAVASCULAR
Status: COMPLETED | OUTPATIENT
Start: 2025-04-12 | End: 2025-04-12

## 2025-04-12 RX ORDER — ALBUTEROL SULFATE 0.83 MG/ML
2.5 SOLUTION RESPIRATORY (INHALATION) EVERY 4 HOURS PRN
Status: DISCONTINUED | OUTPATIENT
Start: 2025-04-12 | End: 2025-04-13 | Stop reason: HOSPADM

## 2025-04-12 RX ORDER — ATORVASTATIN CALCIUM 40 MG/1
40 TABLET, FILM COATED ORAL DAILY
Status: DISCONTINUED | OUTPATIENT
Start: 2025-04-12 | End: 2025-04-13 | Stop reason: HOSPADM

## 2025-04-12 RX ORDER — ONDANSETRON 2 MG/ML
4 INJECTION INTRAMUSCULAR; INTRAVENOUS EVERY 6 HOURS PRN
Status: DISCONTINUED | OUTPATIENT
Start: 2025-04-12 | End: 2025-04-12

## 2025-04-12 RX ADMIN — ATORVASTATIN CALCIUM 40 MG: 40 TABLET, FILM COATED ORAL at 09:36

## 2025-04-12 RX ADMIN — CARVEDILOL 12.5 MG: 6.25 TABLET, FILM COATED ORAL at 09:36

## 2025-04-12 RX ADMIN — ARFORMOTEROL TARTRATE: 15 SOLUTION RESPIRATORY (INHALATION) at 09:04

## 2025-04-12 RX ADMIN — SODIUM CHLORIDE, PRESERVATIVE FREE 10 ML: 5 INJECTION INTRAVENOUS at 00:33

## 2025-04-12 RX ADMIN — SODIUM CHLORIDE, PRESERVATIVE FREE 10 ML: 5 INJECTION INTRAVENOUS at 09:36

## 2025-04-12 RX ADMIN — IOPAMIDOL 75 ML: 755 INJECTION, SOLUTION INTRAVENOUS at 00:33

## 2025-04-12 RX ADMIN — IPRATROPIUM BROMIDE 0.5 MG: 0.5 SOLUTION RESPIRATORY (INHALATION) at 09:04

## 2025-04-12 RX ADMIN — APIXABAN 5 MG: 5 TABLET, FILM COATED ORAL at 20:42

## 2025-04-12 RX ADMIN — CARVEDILOL 12.5 MG: 6.25 TABLET, FILM COATED ORAL at 20:42

## 2025-04-12 RX ADMIN — APIXABAN 5 MG: 5 TABLET, FILM COATED ORAL at 09:36

## 2025-04-12 RX ADMIN — IPRATROPIUM BROMIDE 0.5 MG: 0.5 SOLUTION RESPIRATORY (INHALATION) at 20:27

## 2025-04-12 RX ADMIN — FUROSEMIDE 20 MG: 40 TABLET ORAL at 09:36

## 2025-04-12 RX ADMIN — ASPIRIN 81 MG CHEWABLE TABLET 81 MG: 81 TABLET CHEWABLE at 09:36

## 2025-04-12 RX ADMIN — SODIUM CHLORIDE, PRESERVATIVE FREE 10 ML: 5 INJECTION INTRAVENOUS at 20:43

## 2025-04-12 RX ADMIN — ARFORMOTEROL TARTRATE: 15 SOLUTION RESPIRATORY (INHALATION) at 20:27

## 2025-04-12 NOTE — PROGRESS NOTES
Physical Therapy  Physical Therapy Initial Assessment     Name: Chuck Chavarria Jr.  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 ([facility]) 2024    Hypoplastic, small right vertebral artery, with absent flow proximally CT of the carotids   Patent left vertebral artery, good size    Cocaine abuse     COPD (chronic obstructive pulmonary disease) ([facility])     Hyperlipidemia     Hypertension     Occlusion of right vertebral artery 2024    Small, hypoplastic right vertebral artery occlusion proximally with widely patent dominant left vertebral artery    Tobacco dependence 2024      Procedure/Surgery:  none this admission  Precautions:  Falls  Equipment Needs:  TBD, pt has SBQC    SUBJECTIVE:    Pt lives with sister in a 1 story home with level entry and 1st floor set up.  Pt ambulated with SBQC PTA. Pt later stated that he does have steps at home, but was unable to quantify.     OBJECTIVE:   Initial Evaluation  Date: 25 Treatment Short Term/ Long Term   Goals   AM-PAC 6 Clicks      Was pt agreeable to Eval/treatment? yes     Does pt have pain? No c/o pain     Bed Mobility  Rolling: SBA  Supine to sit: SBA  Sit to supine: NT  Scooting: SBA  Rolling: Independent   Supine to sit: Independent   Sit to supine: Independent   Scooting: Independent    Transfers Sit to stand: SBA  Stand to sit: SBA  Stand pivot: SBA with no AD  Sit to stand: Independent   Stand to sit: Independent   Stand pivot:

## 2025-04-12 NOTE — PROGRESS NOTES
..4 Eyes Skin Assessment     NAME:  Chuck Chavarria Jr.  YOB: 1950  MEDICAL RECORD NUMBER:  [id number]    The patient is being assessed for  Admission    I agree that at least one RN has performed a thorough Head to Toe Skin Assessment on the patient. ALL assessment sites listed below have been assessed.      Areas assessed by both nurses:    Head, Face, Ears, Shoulders, Back, Chest, Arms, Elbows, Hands, Sacrum. Buttock, Coccyx, Ischium, and Legs. Feet and Heels        Does the Patient have a Wound? No noted wound(s)       Martin Prevention initiated by RN: Yes  Wound Care Orders initiated by RN: No    Pressure Injury (Stage 3,4, Unstageable, DTI, NWPT, and Complex wounds) if present, place Wound referral order by RN under : No    New Ostomies, if present place, Ostomy referral order under : No     Nurse 1 eSignature: Electronically signed by Sylvia Machuca RN on 4/12/25 at 6:40 AM EDT    **SHARE this note so that the co-signing nurse can place an eSignature**    Nurse 2 eSignature: Electronically signed by Hali Ramos RN on 4/12/25 at 6:41 AM EDT

## 2025-04-12 NOTE — PROGRESS NOTES
Seen and evaluated the patient  States the left sided weakness has resolved now  Motor power is 5/5 in left Upper and lower extremity  Mri pending  Echo pending  Pt ot pending  Tolerating diet; speech therapy on board  On eliquis and aspirin ; statin    Monitor blood pressure   Monitor neurological status  Telemetry monitoring    Non billable rounding  Agree with A/P of my colleague

## 2025-04-12 NOTE — CONSULTS
[facility] Neurology    Date:  4/12/2025  Patient Name:  Chuck Chavarria Jr.  YOB: 1950  MRN: [id number]     PCP:  Gunner Espinosa MD   Referring:  No ref. provider found      Chief Complaint: Left-sided weakness    History obtained from: Patient, chart    Assessment  Chuck Chavarria Jr. is a 74 y.o. male with a history of prior strokes and A-fib on Eliquis and aspirin at home.  Presenting with possibly new ischemic stroke VS recrudescence of left-sided weakness.      Plan  Continue home Eliquis and aspirin 81 mg  High intensity statin therapy with a goal LDL<70  Continue risk factor modification for secondary stroke prevention with appropriate BP and glucose control  MRI brain without contrast  Echocardiogram        History of Present Illness:  Chuck Chavarria Jr. is a 74 y.o. right handed male presenting for evaluation of possible stroke.  Patient presenting for left face and arm weakness.  He reports no history of stroke, however, prior MRIs do show multiple chronic lacunar infarcts in various areas including the left basal ganglia, left ariana, and left centrum semiovale as well as the right high frontoparietal area. Prior CTAs have noted chronic occlusion of the right vertebral artery    The patient states that the pain in his left arm is what brought him in and seems less concerned about any weakness.  He did not note any weakness or numbness in his face.    He has a history of A-fib and is reportedly on Eliquis.      Medical History:   Past Medical History:   Diagnosis Date    A-fib ([facility]) 11/13/2024    Arthritis     Bilateral carotid artery stenosis 01/17/2024    Approximately 50% stenosis on the right side and 30 to 40% stenosis on the left side, CT of the carotids, 2024    Cataract, left eye     Cerebral infarction due to occlusion of right vertebral artery ([facility]) 01/17/2024    Hypoplastic, small right vertebral artery, with absent flow proximally CT of the carotids 2024  Patent

## 2025-04-12 NOTE — PROGRESS NOTES
OCCUPATIONAL THERAPY INITIAL EVALUATION    [facility]  [address], [place]      Date:2025                                                               Patient Name: Chuck Chavarria Jr.  MRN: [id number]  : 1950  Room: [address] [place] [address]    Evaluating OT: Suzette Linaresalmita, OTR/L [id number]    Referring Provider:   Huseyin Gurrola DO      Specific Provider Orders/Date: OT eval and treat (25)       Diagnosis: Stroke-like symptoms [R29.90]  Stroke-like symptom [R29.90]      Reason for admission: 74 y.o. male with a history of prior strokes and A-fib on Eliquis and aspirin at home.  Presenting with possibly new ischemic stroke VS recrudescence of left-sided weakness.     Surgery/Procedures: none     Pertinent Medical History:    Past Medical History:   Diagnosis Date    A-fib ([facility]) 2024    Arthritis     Bilateral carotid artery stenosis 2024    Approximately 50% stenosis on the right side and 30 to 40% stenosis on the left side, CT of the carotids,     Cataract, left eye     Cerebral infarction due to occlusion of right vertebral artery ([facility]) 2024    Hypoplastic, small right vertebral artery, with absent flow proximally CT of the carotids   Patent left vertebral artery, good size    Cocaine abuse     COPD (chronic obstructive pulmonary disease) ([facility])     Hyperlipidemia     Hypertension     Occlusion of right vertebral artery 2024    Small, hypoplastic right vertebral artery occlusion proximally with widely patent dominant left vertebral artery    Tobacco dependence 2024        *Precautions:  Fall Risk, alarms    Assessment of current deficits   [x] Functional mobility  [x]ADLs  [x] Strength               []Cognition   [x] Functional transfers   [x] IADLs         [x] Safety Awareness   [x]Endurance   [] Fine Coordination        [] ROM     [] Vision/perception   []Sensation    []Gross Motor

## 2025-04-12 NOTE — ED NOTES
Nurse to nurse report given to GAYLE Etienne. All questions answered. Patient placed in transport.

## 2025-04-12 NOTE — H&P
Hospitalist History & Physical      PCP: Gunnre Espinosa MD    Date of Service: Pt seen/examined on 4/12/2025     Chief Complaint:  had concerns including Cerebrovascular Accident ((LKW 2 hours ago, L arm pain/numbness, L facial droop) +thinners).    History Of Present Illness:    Mr. Chuck Chavarria Jr., a 74 y.o. year old male  who  has a past medical history of A-fib (HCC), Arthritis, Bilateral carotid artery stenosis, Cataract, left eye, Cerebral infarction due to occlusion of right vertebral artery (HCC), Cocaine abuse, COPD (chronic obstructive pulmonary disease) (HCC), Hyperlipidemia, Hypertension, Occlusion of right vertebral artery, and Tobacco dependence.     Patient presented to the emergency department with left arm pain/numbness.  Last known well was 2 hours prior to arrival.  EMS noted a slight left facial droop.  Patient has a history of stroke in the past.  He is currently on Eliquis with a history of atrial fibrillation.  Denies fever, chills, nausea, vomiting, chest pain, shortness of breath, headache, dizziness, dysuria, hematuria, constipation or diarrhea.  Vital signs within normal limits and stable.  The patient is afebrile.  Laboratory studies demonstrate creatinine 1.3, glucose 120, troponin 24 with repeat of 22.  Hemoglobin 11.7.  CT of the head and CTA head/neck are unremarkable with no acute features observed.  Chest x-ray was unremarkable.  EKG did not show any acute features.      Past Medical History:   Diagnosis Date    A-fib ([facility]) 11/13/2024    Arthritis     Bilateral carotid artery stenosis 01/17/2024    Approximately 50% stenosis on the right side and 30 to 40% stenosis on the left side, CT of the carotids, 2024    Cataract, left eye     Cerebral infarction due to occlusion of right vertebral artery (HCC) 01/17/2024    Hypoplastic, small right vertebral artery, with absent flow proximally CT of the carotids 2024  Patent left vertebral artery, good size    Cocaine abuse

## 2025-04-12 NOTE — PROGRESS NOTES
SPEECH/LANGUAGE PATHOLOGY  SPEECH/LANGUAGE/COGNITIVE EVALUATION   and PLAN OF CARE      PATIENT NAME:  Chuck Chavarria Jr.  (male)     MRN:  [id number]    :  1950  (74 y.o.)  STATUS:  Inpatient: Room 8503/8503-B    TODAY'S DATE:  2025  ORDER DATE, DESCRIPTION AND REFERRING PROVIDER : Dr. Mena  REASON FOR REFERRAL:  Assess speech/language/cognition  EVALUATING THERAPIST: Elena Cooper, SLP    ADMITTING DIAGNOSIS: Stroke-like symptoms [R29.90]  Stroke-like symptom [R29.90]    VISIT DIAGNOSIS:        SPEECH THERAPY  PLAN OF CARE   The speech therapy  POC is established based on physician order, speech pathology diagnosis and results of clinical assessment     SPEECH PATHOLOGY DIAGNOSIS:    Mild cognitive linguistic impairment     Speech Pathology intervention is recommended 1-3 times per week for LOS or when goals are met with emphasis on the following:      Conditions Requiring Skilled Therapeutic Intervention for speech, language and/or cognition    Cognitive linguistic impairment  Decreased safety awareness  Decreased short term memory  Decreased problem solving skills   Decreased thought organization    Specific Speech Therapy Interventions to Include:   Therapeutic tasks for Cognition    Specific instructions for next treatment:     To initiate POC    SHORT/LONG TERM GOALS  Pt will improve orientation to spatial and temporal surroundings with use of external memory aides.  Pt will improve immediate, short term, recent memory during structured and unstructured tasks with 80% accuracy   Pt will improve problem solving/thought organization during structured and unstructured tasks with 80% accuracy     Patient goals: Patient/family involved in developing goals and treatment plan:   Treatment goals discussed with Patient    The Patient understand(s) the diagnosis, prognosis and plan of care   The patient/family Agreed with above,     This plan may be re-evaluated and revised as warranted.

## 2025-04-12 NOTE — PROGRESS NOTES
SPEECH/LANGUAGE PATHOLOGY  CLINICAL ASSESSMENT OF SWALLOWING FUNCTION   and PLAN OF CARE      PATIENT NAME:  Chuck Chavarria Jr.  (male)     MRN:  [id number]    :  1950  (74 y.o.)  STATUS:  Inpatient: Room 8503/8503-B    TODAY'S DATE:  2025  ORDER DATE, DESCRIPTION AND REFERRING PROVIDER:  Dr. Mena  REASON FOR REFERRAL: Assess swallow function    EVALUATING THERAPIST: Elena Cooper, SLP                 ASSESSMENT:    DYSPHAGIA DIAGNOSIS:   functional oropharyngeal swallow for age/premorbid functioning and unable to rule out silent aspiration bedside      DIET RECOMMENDATIONS:  Regular consistency solids (IDDSI level 7) with  thin liquids (IDDSI level 0)     FEEDING RECOMMENDATIONS:     Assistance level:  No assistance needed      Compensatory strategies recommended: No strategies are recommended at this time      Discussed recommendations with:  Patient     SPEECH THERAPY  PLAN OF CARE   The dysphagia POC is established based on physician order, dysphagia diagnosis and results of clinical assessment     Dysphagia therapy is not recommended     Conditions Requiring Skilled Therapeutic Intervention for dysphagia:    not applicable    Specific dysphagia interventions to include:     Not applicable    Specific instructions for next treatment:  not applicable   Patient Treatment Goals:    Short Term Goals:  Not applicable no therapy warranted     Long Term Goals:   Not applicable no therapy warranted      Patient/family Goal:    not applicable                    ADMITTING DIAGNOSIS: Stroke-like symptoms [R29.90]  Stroke-like symptom [R29.90]    VISIT DIAGNOSIS:      PATIENT REPORT/COMPLAINT: denies difficulty swallowing  RN cleared patient for participation in assessment     yes     PRIOR LEVEL OF SWALLOW FUNCTION:    PAST HISTORY OF DYSPHAGIA?: yes    Home diet: Regular consistency solids (IDDSI level 7) with  thin liquids (IDDSI level 0)    Current Diet Order:  ADULT DIET; Regular; Low Fat/Low Chol/High

## 2025-04-12 NOTE — ED PROVIDER NOTES
[facility] EMERGENCY DEPARTMENT  EMERGENCY DEPARTMENT ENCOUNTER        Pt Name: Chuck Chavarria Jr.  MRN: [id number]  Birthdate 1950  Date of evaluation: 4/11/2025  Provider: Radha Sims MD  PCP: Gunner Espinosa MD  Note Started: 12:00 AM EDT 4/12/25    CHIEF COMPLAINT       Chief Complaint   Patient presents with    Cerebrovascular Accident     (LKW 2 hours ago, L arm pain/numbness, L facial droop) +thinners       HISTORY OF PRESENT ILLNESS: 1 or more Elements   History From: Patient and EMS    Limitations to history : None    Chuck Chavarria Jr. is a 74 y.o. male who presents for left arm pain starting tonight.  Last known well was 2 hours ago according to family members.  Per EMS they did notice a slight left facial droop.  Patient has had a stroke in the past.  He is on Eliquis and baby aspirin.  Denies chest pain, shortness of breath, abdominal pain, nausea, vomiting, dizziness.  The patient does endorse chronic blurred vision and states his vision is at his baseline at this time.    Nursing Notes were all reviewed and agreed with or any disagreements were addressed in the HPI.        REVIEW OF EXTERNAL NOTE :       Patient was seen on 2/24/2025 and admitted for strokelike symptoms    He was seen on 3/19/2025 for HTN, atrial fibrillation, nonischemic cardiomyopathy    REVIEW OF SYSTEMS :           Positives and Pertinent negatives as per HPI.     SURGICAL HISTORY     Past Surgical History:   Procedure Laterality Date    CARDIAC CATHETERIZATION  08/25/2023    COLONOSCOPY      DENTAL SURGERY      TONSILLECTOMY      UPPER GASTROINTESTINAL ENDOSCOPY N/A 7/16/2024    ESOPHAGOGASTRODUODENOSCOPY performed by Todd Chow MD at [facility] ENDOSCOPY       CURRENTMEDICATIONS       Previous Medications    ALBUTEROL SULFATE HFA (PROVENTIL;VENTOLIN;PROAIR) 108 (90 BASE) MCG/ACT INHALER    Inhale 2 puffs into the lungs every 4 hours as needed for Wheezing    APIXABAN (ELIQUIS) 5

## 2025-04-13 ENCOUNTER — APPOINTMENT (OUTPATIENT)
Dept: MRI IMAGING | Age: 75
DRG: 058 | End: 2025-04-13
Payer: MEDICAID

## 2025-04-13 VITALS
OXYGEN SATURATION: 99 % | DIASTOLIC BLOOD PRESSURE: 48 MMHG | HEART RATE: 44 BPM | BODY MASS INDEX: 19.79 KG/M2 | WEIGHT: 130.6 LBS | SYSTOLIC BLOOD PRESSURE: 106 MMHG | RESPIRATION RATE: 16 BRPM | TEMPERATURE: 98.8 F | HEIGHT: 68 IN

## 2025-04-13 LAB
ANION GAP SERPL CALCULATED.3IONS-SCNC: 12 MMOL/L (ref 7–16)
BUN SERPL-MCNC: 15 MG/DL (ref 6–23)
CALCIUM SERPL-MCNC: 9.4 MG/DL (ref 8.6–10.2)
CHLORIDE SERPL-SCNC: 103 MMOL/L (ref 98–107)
CHOLEST SERPL-MCNC: 103 MG/DL
CO2 SERPL-SCNC: 22 MMOL/L (ref 22–29)
CREAT SERPL-MCNC: 1.3 MG/DL (ref 0.7–1.2)
ERYTHROCYTE [DISTWIDTH] IN BLOOD BY AUTOMATED COUNT: 15.5 % (ref 11.5–15)
GFR, ESTIMATED: 59 ML/MIN/1.73M2
GLUCOSE SERPL-MCNC: 88 MG/DL (ref 74–99)
HBA1C MFR BLD: 5.9 % (ref 4–5.6)
HCT VFR BLD AUTO: 35.6 % (ref 37–54)
HDLC SERPL-MCNC: 39 MG/DL
HGB BLD-MCNC: 11.8 G/DL (ref 12.5–16.5)
LDLC SERPL CALC-MCNC: 53 MG/DL
MCH RBC QN AUTO: 28.4 PG (ref 26–35)
MCHC RBC AUTO-ENTMCNC: 33.1 G/DL (ref 32–34.5)
MCV RBC AUTO: 85.6 FL (ref 80–99.9)
PLATELET # BLD AUTO: 204 K/UL (ref 130–450)
PMV BLD AUTO: 9.8 FL (ref 7–12)
POTASSIUM SERPL-SCNC: 3.7 MMOL/L (ref 3.5–5)
RBC # BLD AUTO: 4.16 M/UL (ref 3.8–5.8)
SODIUM SERPL-SCNC: 137 MMOL/L (ref 132–146)
TRIGL SERPL-MCNC: 55 MG/DL
VLDLC SERPL CALC-MCNC: 11 MG/DL
WBC OTHER # BLD: 6.5 K/UL (ref 4.5–11.5)

## 2025-04-13 PROCEDURE — 99232 SBSQ HOSP IP/OBS MODERATE 35: CPT | Performed by: CLINICAL NURSE SPECIALIST

## 2025-04-13 PROCEDURE — 85027 COMPLETE CBC AUTOMATED: CPT

## 2025-04-13 PROCEDURE — 6360000002 HC RX W HCPCS: Performed by: STUDENT IN AN ORGANIZED HEALTH CARE EDUCATION/TRAINING PROGRAM

## 2025-04-13 PROCEDURE — 6370000000 HC RX 637 (ALT 250 FOR IP): Performed by: STUDENT IN AN ORGANIZED HEALTH CARE EDUCATION/TRAINING PROGRAM

## 2025-04-13 PROCEDURE — 94640 AIRWAY INHALATION TREATMENT: CPT

## 2025-04-13 PROCEDURE — 70551 MRI BRAIN STEM W/O DYE: CPT

## 2025-04-13 PROCEDURE — 80048 BASIC METABOLIC PNL TOTAL CA: CPT

## 2025-04-13 PROCEDURE — 36415 COLL VENOUS BLD VENIPUNCTURE: CPT

## 2025-04-13 PROCEDURE — 83036 HEMOGLOBIN GLYCOSYLATED A1C: CPT

## 2025-04-13 PROCEDURE — 6370000000 HC RX 637 (ALT 250 FOR IP): Performed by: FAMILY MEDICINE

## 2025-04-13 PROCEDURE — 2500000003 HC RX 250 WO HCPCS: Performed by: FAMILY MEDICINE

## 2025-04-13 PROCEDURE — 80061 LIPID PANEL: CPT

## 2025-04-13 RX ADMIN — IPRATROPIUM BROMIDE 0.5 MG: 0.5 SOLUTION RESPIRATORY (INHALATION) at 12:30

## 2025-04-13 RX ADMIN — ALBUTEROL SULFATE 2.5 MG: 0.83 SOLUTION RESPIRATORY (INHALATION) at 03:06

## 2025-04-13 RX ADMIN — IPRATROPIUM BROMIDE 0.5 MG: 0.5 SOLUTION RESPIRATORY (INHALATION) at 03:05

## 2025-04-13 RX ADMIN — ARFORMOTEROL TARTRATE: 15 SOLUTION RESPIRATORY (INHALATION) at 08:21

## 2025-04-13 RX ADMIN — SODIUM CHLORIDE, PRESERVATIVE FREE 10 ML: 5 INJECTION INTRAVENOUS at 09:09

## 2025-04-13 RX ADMIN — APIXABAN 5 MG: 5 TABLET, FILM COATED ORAL at 09:09

## 2025-04-13 RX ADMIN — ASPIRIN 81 MG CHEWABLE TABLET 81 MG: 81 TABLET CHEWABLE at 09:08

## 2025-04-13 RX ADMIN — ATORVASTATIN CALCIUM 40 MG: 40 TABLET, FILM COATED ORAL at 09:09

## 2025-04-13 RX ADMIN — FUROSEMIDE 20 MG: 40 TABLET ORAL at 09:09

## 2025-04-13 RX ADMIN — CARVEDILOL 12.5 MG: 6.25 TABLET, FILM COATED ORAL at 09:09

## 2025-04-13 RX ADMIN — IPRATROPIUM BROMIDE 0.5 MG: 0.5 SOLUTION RESPIRATORY (INHALATION) at 08:21

## 2025-04-13 NOTE — PROGRESS NOTES
Chuck Chavarria Jr. is a 74 y.o. right handed male     Patient was admitted for possible recurrent stroke-reportedly left side of face and arm were weak on arrival.    Prior MRIs do show multiple lacunar infarcts in varying areas including left basal ganglion, left ariana.  Prior CTAs have noted chronic occlusion of the vertebral arteries    Currently, patient does report the pain in his left arm has improved-he denies any weakness in for me he denies any numbness or weakness in his face    He does have a history of A-fib which he is currently taking Eliquis.  He is also on aspirin prior to admission    MRI of the brain is pending    Allergies as of 04/11/2025 - Fully Reviewed 04/11/2025   Allergen Reaction Noted    Doxycycline Shortness Of Breath 07/08/2024    Rocephin [ceftriaxone] Shortness Of Breath 07/08/2024       Objective:     /86   Pulse 76   Temp 98.2 °F (36.8 °C) (Temporal)   Resp 20   Ht 1.727 m (5' 8\")   Wt 59.2 kg (130 lb 9.6 oz)   SpO2 97%   BMI 19.86 kg/m²      General appearance: alert, appears stated age and cooperative  Head: Normocephalic, without obvious abnormality, atraumatic  Extremities: no cyanosis or edema  Pulses: 2+ and symmetric  Skin: no rashes or lesions    Mental Status: Alert, oriented, thought content appropriate    Speech: clear  Language: appropriate    Cranial Nerves:  I: smell    II: visual acuity     II: visual fields Full   II: pupils MADDY   III,VII: ptosis None   III,IV,VI: extraocular muscles  EOMI without nystagmus    V: mastication Normal   V: facial light touch sensation  Normal   V,VII: corneal reflex  Present   VII: facial muscle function - upper     VII: facial muscle function - lower Normal   VIII: hearing Normal   IX: soft palate elevation  Normal   IX,X: gag reflex    XI: trapezius strength  5/5   XI: sternocleidomastoid strength 5/5   XI: neck extension strength  5/5   XII: tongue strength  Normal     Motor:  5/5 throughout  Normal bulk and

## 2025-04-13 NOTE — DISCHARGE SUMMARY
Hospitalist Discharge Summary    Patient ID: Chuck Chavarria Jr.   Patient : 1950  Patient's PCP: Gunner Espinosa MD    Admit Date: 2025   Admitting Physician: No admitting provider for patient encounter.    Discharge Date:  2025   Discharge Physician: Vidhi Cleaning MD   Discharge Condition: Stable  Discharge Disposition: Home      Hospital course in brief:  (Please refer to daily progress notes for a comprehensive review of the hospitalization by requesting medical records)      Mr. Chuck Chavarria Jr., a 74 y.o. year old male  who  has a past medical history of A-fib (HCC), Arthritis, Bilateral carotid artery stenosis, Cataract, left eye, Cerebral infarction due to occlusion of right vertebral artery (HCC), Cocaine abuse, COPD (chronic obstructive pulmonary disease) (HCC), Hyperlipidemia, Hypertension, Occlusion of right vertebral artery, and Tobacco dependence.      Patient presented to the emergency department with left arm pain/numbness.  Last known well was 2 hours prior to arrival.  EMS noted a slight left facial droop.  Patient has a history of stroke in the past.  He is currently on Eliquis with a history of atrial fibrillation.  Denies fever, chills, nausea, vomiting, chest pain, shortness of breath, headache, dizziness, dysuria, hematuria, constipation or diarrhea.  Vital signs within normal limits and stable.  The patient is afebrile.  Laboratory studies demonstrate creatinine 1.3, glucose 120, troponin 24 with repeat of 22.  Hemoglobin 11.7.  CT of the head and CTA head/neck are unremarkable with no acute features observed.  Chest x-ray was unremarkable.  EKG did not show any acute features  Mri done-: 1. No acute infarct or acute intracranial process identified. 2. Moderate chronic small vessel ischemic changes.   Patient on evaluation states that the weakness and numbness of Left side has resolved  Neurology ok with discharge  Pt score is 18  Tolerating regular diet  Follow  vertebral artery. 3.  Similar stenosis of the bilateral proximal internal carotid arteries.     XR CHEST PORTABLE  Result Date: 4/12/2025  EXAMINATION: ONE XRAY VIEW OF THE CHEST 4/12/2025 12:00 am COMPARISON: Chest radiograph 03/11/2025 and chest CT 04/07/2025. HISTORY: ORDERING SYSTEM PROVIDED HISTORY: left arm weakness TECHNOLOGIST PROVIDED HISTORY: Reason for exam:->left arm weakness FINDINGS: No new consolidation, large pleural effusion, or pneumothorax. Redemonstrated right upper lobe consolidation not significantly changed from comparison CT given differences in technique. Stable cardiomediastinal silhouette. No acute osseous abnormality.     No acute cardiopulmonary findings.     CT HEAD WO CONTRAST  Result Date: 4/12/2025  EXAMINATION: CT OF THE HEAD WITHOUT CONTRAST  4/12/2025 12:31 am TECHNIQUE: CT of the head was performed without the administration of intravenous contrast. Automated exposure control, iterative reconstruction, and/or weight based adjustment of the mA/kV was utilized to reduce the radiation dose to as low as reasonably achievable. COMPARISON: None. HISTORY: ORDERING SYSTEM PROVIDED HISTORY: left arm weak TECHNOLOGIST PROVIDED HISTORY: Has a \"code stroke\" or \"stroke alert\" been called?->Yes Reason for exam:->left arm weak What reading provider will be dictating this exam?->CRC FINDINGS: BRAIN/VENTRICLES: There is no acute intracranial hemorrhage, mass effect or midline shift.  No abnormal extra-axial fluid collection.  The gray-white differentiation is maintained without evidence of an acute infarct.  There is no evidence of hydrocephalus.  There are nonspecific hypoattenuating foci in the subcortical and periventricular white matter that most likely represent chronic microangiopathic ischemic changes in a patient of this age. There are atherosclerotic calcifications of the intracranial vessels. ORBITS: The visualized portion of the orbits demonstrate no acute abnormality. SINUSES: The

## 2025-04-13 NOTE — PLAN OF CARE
Ok to discharge if mri is negative for acute findings  Ok to discharge once cleared by neurology      If mri brain can't be done today; we will check with neurology for outpatient mri

## 2025-04-13 NOTE — CARE COORDINATION
Care coordination: CM RN met with pt at bedside. The pt reports he lives with his sister Ashia in a 1 story home with no steps at entry. He has a walker and quad cane he uses. He has in the past used [facility] but is not interested in [facility] on this admission. His PCP is Dr. Reyes Moore and preferred pharmacy is [facility]. The pts sister Ashia will provide transportation home. The pt was admitted with stroke like symptoms. Dr. Cleaning reports if the MRI is unable to be done today will check with neuro to see if the pt can have it done outpatient. JOANA AKINSN, RN  Case Management   [phone]    
Yes

## 2025-04-17 ENCOUNTER — TELEPHONE (OUTPATIENT)
Dept: PRIMARY CARE CLINIC | Age: 75
End: 2025-04-17

## 2025-04-17 NOTE — TELEPHONE ENCOUNTER
Deb ,from [facility] ,called stating that patient's BP and heart rate has been low in the mornings. In fact they have sent him to ER a few times because of it. I called his sister to go over medication list and figure out what is going on to advise Dr Reyes Moore. Sister says she caught him trying to take a double dose of zyrtec and his carvedilol this morning and he stated that he did take them yesterday. I believe that has been happening and suggested she make sure his medication is not available to him unless she is giving it to him. Also told her if his BP and HR keeps bieng low in am, that she needs to call Dr Carrasco to get meds adjusted.  Called Deb back and let her know what I found out and she said that this makes sense. Also gave her Dr Carrasco's name and faxed her over correct copy of patient's med list because they had outdated one.    [facility] number is [phone] and her ext is [phone]. Their fax number is [phone]

## 2025-04-22 ENCOUNTER — OFFICE VISIT (OUTPATIENT)
Dept: PULMONOLOGY | Age: 75
End: 2025-04-22
Payer: MEDICAID

## 2025-04-22 ENCOUNTER — HOSPITAL ENCOUNTER (OUTPATIENT)
Age: 75
Discharge: HOME | End: 2025-04-22
Payer: MEDICAID

## 2025-04-22 VITALS
DIASTOLIC BLOOD PRESSURE: 91 MMHG | SYSTOLIC BLOOD PRESSURE: 141 MMHG | RESPIRATION RATE: 18 BRPM | TEMPERATURE: 97.1 F | HEART RATE: 67 BPM

## 2025-04-22 DIAGNOSIS — R91.8 MASS OF UPPER LOBE OF RIGHT LUNG: ICD-10-CM

## 2025-04-22 DIAGNOSIS — R91.1 LUNG NODULE: ICD-10-CM

## 2025-04-22 DIAGNOSIS — J44.9 CHRONIC OBSTRUCTIVE PULMONARY DISEASE, UNSPECIFIED COPD TYPE (HCC): Primary | ICD-10-CM

## 2025-04-22 PROCEDURE — 99213 OFFICE O/P EST LOW 20 MIN: CPT | Performed by: INTERNAL MEDICINE

## 2025-04-22 PROCEDURE — 36415 COLL VENOUS BLD VENIPUNCTURE: CPT

## 2025-04-22 PROCEDURE — 3077F SYST BP >= 140 MM HG: CPT | Performed by: INTERNAL MEDICINE

## 2025-04-22 PROCEDURE — 3080F DIAST BP >= 90 MM HG: CPT | Performed by: INTERNAL MEDICINE

## 2025-04-22 PROCEDURE — 86481 TB AG RESPONSE T-CELL SUSP: CPT

## 2025-04-22 PROCEDURE — 99214 OFFICE O/P EST MOD 30 MIN: CPT | Performed by: INTERNAL MEDICINE

## 2025-04-22 PROCEDURE — 1123F ACP DISCUSS/DSCN MKR DOCD: CPT | Performed by: INTERNAL MEDICINE

## 2025-04-22 RX ORDER — BUDESONIDE AND FORMOTEROL FUMARATE DIHYDRATE 160; 4.5 UG/1; UG/1
2 AEROSOL RESPIRATORY (INHALATION) 2 TIMES DAILY
Qty: 2 EACH | Refills: 3 | Status: SHIPPED | OUTPATIENT
Start: 2025-04-22

## 2025-04-22 RX ORDER — ALBUTEROL SULFATE 90 UG/1
2 INHALANT RESPIRATORY (INHALATION) EVERY 6 HOURS PRN
Qty: 1 EACH | Refills: 5 | Status: SHIPPED | OUTPATIENT
Start: 2025-04-22

## 2025-04-22 NOTE — PROGRESS NOTES
Progress Note    Chuck Chavarria Jr.  1950    C:Follow-up (3 MTH)         HPI:74 year old male, recently quit smoking, smoked for about 60 years, about 1/2 pack a day, about 30 pack years of smoking. He is sob on walking a short distance, cough and wheezing. No weight loss, no chest discomfort or hemoptysis. His cousin  of lung cancer and his brother has asthma, History of AF and remote stroke. Past history of cocaine use.  Chest  CT, Sep. 2024 Right apical 3.3 and 2.1 cm subpleural pulmonary densities are not  significantly changed from 2024.  PET-CT showed no hypermetabolic activity.  PFT showed stage II COPD.   Past Medical History:   Diagnosis Date    A-fib ([facility]) 2024    Arthritis     Bilateral carotid artery stenosis 2024    Approximately 50% stenosis on the right side and 30 to 40% stenosis on the left side, CT of the carotids,     Cataract, left eye     Cerebral infarction due to occlusion of right vertebral artery ([facility]) 2024    Hypoplastic, small right vertebral artery, with absent flow proximally CT of the carotids   Patent left vertebral artery, good size    Cocaine abuse     COPD (chronic obstructive pulmonary disease) ([facility])     Hyperlipidemia     Hypertension     Occlusion of right vertebral artery 2024    Small, hypoplastic right vertebral artery occlusion proximally with widely patent dominant left vertebral artery    Tobacco dependence 2024      Past Surgical History:   Procedure Laterality Date    CARDIAC CATHETERIZATION  2023    COLONOSCOPY      DENTAL SURGERY      TONSILLECTOMY      UPPER GASTROINTESTINAL ENDOSCOPY N/A 2024    ESOPHAGOGASTRODUODENOSCOPY performed by [place] Chow [place] [facility]      Family History   Problem Relation Age of Onset    Brain Cancer Mother     Heart Attack Father       Social History     Socioeconomic History    Marital status: Legally    Tobacco Use    Smoking status: Former     Current

## 2025-04-23 NOTE — PROGRESS NOTES
Physician Progress Note      PATIENT:               BRANDEN REED  [facility] #:                  [id number]  :                       1950  ADMIT DATE:       2025 12:10 AM  DISCH DATE:        2025 1:24 PM  RESPONDING  PROVIDER #:        Vidhi Cleaning MD          QUERY TEXT:    Prior CVA is documented in the medical record Neuro Consult .  Please   clarify the relationship, if any, between the prior CVA and left-sided   weakness/Facial droop.    The clinical indicators include:  -Presenting with possibly new ischemic stroke VS recrudescence of left-sided   weakness (Neuro Consult )  -Recurrent stroke versus recrudescence (DS )  -MRI: 1. No acute infarct or acute intracranial process identified  -Treatment: Aspirin, atorvastatin, Eliquis  Options provided:  -- Left sided weakness and Facial droop are a sequela of prior CVA  -- Left sided weakness and Facial droop are not a sequela of prior CVA  -- Other - I will add my own diagnosis  -- Disagree - Not applicable / Not valid  -- Disagree - Clinically unable to determine / Unknown  -- Refer to Clinical Documentation Reviewer    PROVIDER RESPONSE TEXT:    Left sided weakness and Facial droop are a sequela of prior CVA    Query created by: Ana Rush on 2025 5:01 PM      QUERY TEXT:    Based on your medical judgment, please clarify these findings and document if   any of the following are being evaluated and/or treated:    The clinical indicators include:  -Patient who has a past medical history of A-fib (HCC) (DS )  -Pmhx of HTN, Stroke  -Treatment: Eliquis  Options provided:  -- Secondary hypercoagulable state in a patient with atrial fibrillation  -- Other - I will add my own diagnosis  -- Disagree - Not applicable / Not valid  -- Disagree - Clinically unable to determine / Unknown  -- Refer to Clinical Documentation Reviewer    PROVIDER RESPONSE TEXT:    This patient has secondary hypercoagulable state in a patient

## 2025-04-26 LAB — T SPOT TB TEST: NORMAL

## 2025-04-28 ENCOUNTER — HOSPITAL ENCOUNTER (OUTPATIENT)
Dept: OTHER | Age: 75
Setting detail: THERAPIES SERIES
Discharge: HOME | End: 2025-04-28
Payer: MEDICAID

## 2025-04-28 ENCOUNTER — RESULTS FOLLOW-UP (OUTPATIENT)
Dept: CARDIOLOGY | Age: 75
End: 2025-04-28

## 2025-04-28 VITALS
SYSTOLIC BLOOD PRESSURE: 130 MMHG | RESPIRATION RATE: 18 BRPM | BODY MASS INDEX: 19.61 KG/M2 | DIASTOLIC BLOOD PRESSURE: 74 MMHG | HEART RATE: 72 BPM | WEIGHT: 129 LBS | OXYGEN SATURATION: 96 %

## 2025-04-28 DIAGNOSIS — I50.22 CHRONIC HFREF (HEART FAILURE WITH REDUCED EJECTION FRACTION) (HCC): Primary | ICD-10-CM

## 2025-04-28 LAB
ANION GAP SERPL CALCULATED.3IONS-SCNC: 11 MMOL/L (ref 7–16)
BNP SERPL-MCNC: 1362 PG/ML (ref 0–450)
BUN SERPL-MCNC: 17 MG/DL (ref 8–23)
CALCIUM SERPL-MCNC: 9.6 MG/DL (ref 8.8–10.2)
CHLORIDE SERPL-SCNC: 101 MMOL/L (ref 98–107)
CO2 SERPL-SCNC: 27 MMOL/L (ref 22–29)
CREAT SERPL-MCNC: 1.3 MG/DL (ref 0.7–1.2)
GFR, ESTIMATED: 59 ML/MIN/1.73M2
GLUCOSE SERPL-MCNC: 111 MG/DL (ref 74–99)
POTASSIUM SERPL-SCNC: 3.6 MMOL/L (ref 3.5–5.1)
SODIUM SERPL-SCNC: 138 MMOL/L (ref 136–145)

## 2025-04-28 PROCEDURE — 83880 ASSAY OF NATRIURETIC PEPTIDE: CPT

## 2025-04-28 PROCEDURE — 80048 BASIC METABOLIC PNL TOTAL CA: CPT

## 2025-04-28 PROCEDURE — 99214 OFFICE O/P EST MOD 30 MIN: CPT

## 2025-04-28 ASSESSMENT — PATIENT HEALTH QUESTIONNAIRE - PHQ9
2. FEELING DOWN, DEPRESSED OR HOPELESS: NOT AT ALL
1. LITTLE INTEREST OR PLEASURE IN DOING THINGS: SEVERAL DAYS
SUM OF ALL RESPONSES TO PHQ QUESTIONS 1-9: 1

## 2025-04-28 NOTE — PLAN OF CARE
Problem: Chronic Conditions and Co-morbidities  Goal: Patient's chronic conditions and co-morbidity symptoms are monitored and maintained or improved  Flowsheets (Taken 4/28/2025 1637)  Care Plan - Patient's Chronic Conditions and Co-Morbidity Symptoms are Monitored and Maintained or Improved: Monitor and assess patient's chronic conditions and comorbid symptoms for stability, deterioration, or improvement

## 2025-04-28 NOTE — PROGRESS NOTES
clinic for sooner if evaluation if needed     [] Difficulty affording medications  [] CHF CHW consulted  [] Prescription assistance information given   [] [facility] medication assistance program information given   [] Sample medications provided to patient to help bridge gap until affordability N/A    Scheduled to follow up in CHF clinic on:   Future Appointments   Date Time Provider Department Center   5/5/2025  9:20 AM Nico Carrasco DO [facility]   5/27/2025  7:45 AM [facility] CHF ROOM 3 [facility]F [facility]   6/4/2025  8:15 AM Gunner Espinosa MD [facility] DEP   10/30/2025  1:00 PM DORNIA BIGGS VAS US 1 [facility]   10/30/2025  1:30 PM Lee Brooks MD [facility]

## 2025-04-29 ENCOUNTER — TELEPHONE (OUTPATIENT)
Dept: PULMONOLOGY | Age: 75
End: 2025-04-29

## 2025-04-29 RX ORDER — POTASSIUM CHLORIDE 1500 MG/1
TABLET, EXTENDED RELEASE ORAL
Qty: 3 TABLET | Refills: 0 | Status: SHIPPED | OUTPATIENT
Start: 2025-04-29

## 2025-04-29 NOTE — TELEPHONE ENCOUNTER
Duplicate referral in place for pt referral to Dr Edouard. Spoke with Dr Smith to review recent Chest CT. Dr Edouard also to review and then will follow up with pt.

## 2025-04-29 NOTE — TELEPHONE ENCOUNTER
Patient's sister (Ashia) notified of Dr. Carrasco's recommendations.  Potassium e-scribed.  Orders placed for labs.

## 2025-04-30 ENCOUNTER — TELEPHONE (OUTPATIENT)
Dept: PRIMARY CARE CLINIC | Age: 75
End: 2025-04-30

## 2025-04-30 DIAGNOSIS — R91.8 LUNG MASS: Primary | ICD-10-CM

## 2025-04-30 NOTE — TELEPHONE ENCOUNTER
Patients daughter called in asking if Chuck was diabetic? Dr. Hoover, his eye doctor, needs to know that information for a cataract surgery the patient will need once they receive all the appropriate information.    Please call Ashia back with this information. [phone]    I faxed the last office visit to Dr. Deluna office. I spoke to them and they said all they need is the last office note. No surgery clearance is needed.

## 2025-05-05 ENCOUNTER — OFFICE VISIT (OUTPATIENT)
Dept: CARDIOLOGY CLINIC | Age: 75
End: 2025-05-05
Payer: MEDICAID

## 2025-05-05 ENCOUNTER — TELEPHONE (OUTPATIENT)
Dept: PULMONOLOGY | Age: 75
End: 2025-05-05

## 2025-05-05 ENCOUNTER — HOSPITAL ENCOUNTER (OUTPATIENT)
Age: 75
Discharge: HOME | End: 2025-05-05
Payer: MEDICAID

## 2025-05-05 VITALS
TEMPERATURE: 97.2 F | SYSTOLIC BLOOD PRESSURE: 122 MMHG | RESPIRATION RATE: 18 BRPM | OXYGEN SATURATION: 96 % | DIASTOLIC BLOOD PRESSURE: 76 MMHG | BODY MASS INDEX: 19.31 KG/M2 | HEART RATE: 70 BPM | WEIGHT: 127.4 LBS | HEIGHT: 68 IN

## 2025-05-05 DIAGNOSIS — I50.22 CHRONIC HFREF (HEART FAILURE WITH REDUCED EJECTION FRACTION) (HCC): ICD-10-CM

## 2025-05-05 DIAGNOSIS — I50.22 CHRONIC HFREF (HEART FAILURE WITH REDUCED EJECTION FRACTION) (HCC): Primary | ICD-10-CM

## 2025-05-05 LAB
ANION GAP SERPL CALCULATED.3IONS-SCNC: 11 MMOL/L (ref 7–16)
BNP SERPL-MCNC: 692 PG/ML (ref 0–450)
BUN SERPL-MCNC: 23 MG/DL (ref 8–23)
CALCIUM SERPL-MCNC: 10 MG/DL (ref 8.8–10.2)
CHLORIDE SERPL-SCNC: 101 MMOL/L (ref 98–107)
CO2 SERPL-SCNC: 27 MMOL/L (ref 22–29)
CREAT SERPL-MCNC: 1.5 MG/DL (ref 0.7–1.2)
GFR, ESTIMATED: 48 ML/MIN/1.73M2
GLUCOSE SERPL-MCNC: 137 MG/DL (ref 74–99)
POTASSIUM SERPL-SCNC: 4.2 MMOL/L (ref 3.5–5.1)
SODIUM SERPL-SCNC: 139 MMOL/L (ref 136–145)

## 2025-05-05 PROCEDURE — 93000 ELECTROCARDIOGRAM COMPLETE: CPT | Performed by: INTERNAL MEDICINE

## 2025-05-05 PROCEDURE — 3074F SYST BP LT 130 MM HG: CPT | Performed by: INTERNAL MEDICINE

## 2025-05-05 PROCEDURE — 1123F ACP DISCUSS/DSCN MKR DOCD: CPT | Performed by: INTERNAL MEDICINE

## 2025-05-05 PROCEDURE — 3078F DIAST BP <80 MM HG: CPT | Performed by: INTERNAL MEDICINE

## 2025-05-05 PROCEDURE — 36415 COLL VENOUS BLD VENIPUNCTURE: CPT

## 2025-05-05 PROCEDURE — 99214 OFFICE O/P EST MOD 30 MIN: CPT | Performed by: INTERNAL MEDICINE

## 2025-05-05 PROCEDURE — 80048 BASIC METABOLIC PNL TOTAL CA: CPT

## 2025-05-05 PROCEDURE — 83880 ASSAY OF NATRIURETIC PEPTIDE: CPT

## 2025-05-05 RX ORDER — HYDRALAZINE HYDROCHLORIDE 10 MG/1
10 TABLET, FILM COATED ORAL 2 TIMES DAILY
Qty: 180 TABLET | Refills: 3 | Status: SHIPPED | OUTPATIENT
Start: 2025-05-05

## 2025-05-05 NOTE — TELEPHONE ENCOUNTER
Orders for Chest CT to be scheduled for July 2025 with follow up appt in office scheduled for pt 7/22/25@9am. Appt card mailed to pt.

## 2025-05-05 NOTE — PROGRESS NOTES
Chuck Chavarria Jr.  1950  Date of Service: 5/5/2025    Patient Active Problem List    Diagnosis Date Noted    HENNY (acute kidney injury) 08/26/2022     Priority: Medium    Stroke-like symptom 02/24/2025    Lethargic 02/24/2025    Asthma 12/17/2024    Hemorrhoids 12/17/2024    Primary erectile dysfunction 12/17/2024    Unilateral recurrent inguinal hernia with obstruction but no gangrene 12/17/2024    Viral hepatitis C 12/17/2024    Vitamin D deficiency 12/17/2024    Respiratory distress 11/13/2024    COPD exacerbation ([facility]) 10/18/2024    Altered mental status 10/16/2024    Seizure-like activity ([facility]) 10/14/2024    National Institutes of Health (NIH) Stroke Scale level of consciousness score 0, alert; keenly responsive 10/13/2024    Stage 2 chronic kidney disease 10/04/2024    COPD (chronic obstructive pulmonary disease) ([facility]) 09/09/2024    (HFpEF) heart failure with preserved ejection fraction ([facility]) 09/09/2024    Polysubstance abuse ([facility]) 09/09/2024    Acute on chronic respiratory failure ([facility]) 09/09/2024    Pyrexia of unknown origin following delivery 09/09/2024    COPD with acute exacerbation ([facility]) 08/12/2024    Acute diastolic CHF (congestive heart failure) ([facility]) 08/11/2024    Esophageal dysphagia 07/16/2024    A-fib ([facility]) 07/08/2024    Arthritis     Bilateral carotid artery stenosis 01/17/2024     Overview Note:     January 2024: CT of the carotids: Approximately 40% to 50% stenosis on the right side and 30 to 40% stenosis on the left side    October 2024: CT of the carotids: No significant changes compared to the above, approximately 50% stenosis on the right and 30 to 40% stenosis on the left      Tobacco dependence 01/17/2024    Occlusion of right vertebral artery 01/17/2024     Overview Note:     Small, hypoplastic right vertebral artery occlusion proximally with widely patent dominant left vertebral artery      SOB (shortness of breath) 01/13/2024    HFrEF (heart failure with reduced ejection

## 2025-05-06 ENCOUNTER — TELEPHONE (OUTPATIENT)
Dept: PULMONOLOGY | Age: 75
End: 2025-05-06

## 2025-05-27 ENCOUNTER — HOSPITAL ENCOUNTER (OUTPATIENT)
Dept: OTHER | Age: 75
Setting detail: THERAPIES SERIES
End: 2025-05-27
Payer: MEDICAID

## 2025-06-12 RX ORDER — EMPAGLIFLOZIN 10 MG/1
10 TABLET, FILM COATED ORAL DAILY
Qty: 90 TABLET | Refills: 3 | Status: SHIPPED | OUTPATIENT
Start: 2025-06-12

## 2025-06-17 ENCOUNTER — TELEPHONE (OUTPATIENT)
Dept: CARDIOLOGY CLINIC | Age: 75
End: 2025-06-17

## 2025-06-17 NOTE — TELEPHONE ENCOUNTER
He would be low risk for perioperative cardiac events for cataract surgery.  No preop cardiac testing is needed.  I do not have him on aspirin.  From neurology for his stroke.  It would be okay with cardiology to hold the Eliquis for 2 days prior to the procedure if needed.  Resume after the procedure when okay with ophthalmology

## 2025-06-17 NOTE — TELEPHONE ENCOUNTER
Patient needs cardiac clearance for cataract surgery under MAC at [place].  Patient seen in our office on 5/5/25.   Patient on Eliquis and Aspirin.

## 2025-06-18 ENCOUNTER — HOSPITAL ENCOUNTER (OUTPATIENT)
Dept: OTHER | Age: 75
Setting detail: THERAPIES SERIES
Discharge: HOME | End: 2025-06-18
Payer: MEDICAID

## 2025-06-18 VITALS
OXYGEN SATURATION: 97 % | WEIGHT: 130 LBS | RESPIRATION RATE: 18 BRPM | BODY MASS INDEX: 19.77 KG/M2 | HEART RATE: 76 BPM | DIASTOLIC BLOOD PRESSURE: 69 MMHG | SYSTOLIC BLOOD PRESSURE: 115 MMHG

## 2025-06-18 LAB
ANION GAP SERPL CALCULATED.3IONS-SCNC: 10 MMOL/L (ref 7–16)
BNP SERPL-MCNC: 904 PG/ML (ref 0–450)
BUN SERPL-MCNC: 18 MG/DL (ref 8–23)
CALCIUM SERPL-MCNC: 9.4 MG/DL (ref 8.8–10.2)
CHLORIDE SERPL-SCNC: 107 MMOL/L (ref 98–107)
CO2 SERPL-SCNC: 25 MMOL/L (ref 22–29)
CREAT SERPL-MCNC: 1.3 MG/DL (ref 0.7–1.2)
GFR, ESTIMATED: 59 ML/MIN/1.73M2
GLUCOSE SERPL-MCNC: 76 MG/DL (ref 74–99)
POTASSIUM SERPL-SCNC: 4.2 MMOL/L (ref 3.5–5.1)
SODIUM SERPL-SCNC: 142 MMOL/L (ref 136–145)

## 2025-06-18 PROCEDURE — 83880 ASSAY OF NATRIURETIC PEPTIDE: CPT

## 2025-06-18 PROCEDURE — 80048 BASIC METABOLIC PNL TOTAL CA: CPT

## 2025-06-18 PROCEDURE — 99214 OFFICE O/P EST MOD 30 MIN: CPT

## 2025-06-18 ASSESSMENT — PATIENT HEALTH QUESTIONNAIRE - PHQ9
1. LITTLE INTEREST OR PLEASURE IN DOING THINGS: NOT AT ALL
2. FEELING DOWN, DEPRESSED OR HOPELESS: NOT AT ALL
SUM OF ALL RESPONSES TO PHQ QUESTIONS 1-9: 0

## 2025-06-18 NOTE — PLAN OF CARE
Problem: Chronic Conditions and Co-morbidities  Goal: Patient's chronic conditions and co-morbidity symptoms are monitored and maintained or improved  Flowsheets (Taken 6/18/2025 3415)  Care Plan - Patient's Chronic Conditions and Co-Morbidity Symptoms are Monitored and Maintained or Improved: Monitor and assess patient's chronic conditions and comorbid symptoms for stability, deterioration, or improvement

## 2025-06-18 NOTE — PROGRESS NOTES
Congestive Heart Failure Clinic   [facility]      Referring Provider: Nico Carrasco DO  Primary Care Physician: Gunner Espinosa MD   Cardiologist: Nico Carrasco DO  Nephrologist: N/A      HISTORY OF PRESENT ILLNESS:     Chuck Chavarria Jr. is a 74 y.o. (1950) male with a history of HFrEF (EF < 40%)  Pre Cupid:     Lab Results   Component Value Date    LVEF 40 09/11/2024     Post Cupid:    Lab Results   Component Value Date    EFBP 52 (A) 01/15/2024         He presents to the CHF clinic for ongoing evaluation and monitoring of heart failure.    In the CHF clinic today he denies any adverse symptoms except:  [x] Dizziness or lightheadedness (occasional)  [] Syncope or near syncope  [] Recent Fall  [] Shortness of breath at rest   [x] Dyspnea with exertion (occasional)  [] Decline in functional capacity (unable to perform activities they had previously been able to do)  [x] Fatigue / Weakness  [] Orthopnea  [] PND  [] Nocturnal cough  [] Hemoptysis  [] Chest pain, pressure, or discomfort  [] Palpitations  [] Abdominal distention  [] Abdominal bloating  [] Early satiety  [] Blood in stool   [] Diarrhea  [] Constipation  [] Nausea/Vomiting  [] Decreased urinary response to oral diuretic   [] Scrotal swelling   [] Lower extremity edema  [] Used PRN doses of oral diuretic   [] Weight gain    Wt Readings from Last 3 Encounters:   06/18/25 59 kg (130 lb)   05/05/25 57.8 kg (127 lb 6.4 oz)   04/28/25 58.5 kg (129 lb)         SOCIAL HISTORY:  [] Denies tobacco, alcohol or illicit drug abuse  [x] Tobacco use:  [x] ETOH use:  [] Illicit drug use:        MEDICATIONS:    Allergies   Allergen Reactions    Doxycycline Shortness Of Breath    Rocephin [Ceftriaxone] Shortness Of Breath     Prior to Visit Medications    Medication Sig Taking? Authorizing Provider   JARDIANCE 10 MG tablet TAKE 1 TABLET BY MOUTH DAILY Yes Nico Carrasco DO   hydrALAZINE

## 2025-07-01 ENCOUNTER — HOSPITAL ENCOUNTER (OUTPATIENT)
Dept: CT IMAGING | Age: 75
Discharge: HOME | End: 2025-07-03
Attending: INTERNAL MEDICINE
Payer: MEDICAID

## 2025-07-01 DIAGNOSIS — R91.8 LUNG MASS: ICD-10-CM

## 2025-07-01 PROCEDURE — 71250 CT THORAX DX C-: CPT

## 2025-07-16 ENCOUNTER — APPOINTMENT (OUTPATIENT)
Dept: GENERAL RADIOLOGY | Age: 75
End: 2025-07-16
Payer: MEDICAID

## 2025-07-16 ENCOUNTER — HOSPITAL ENCOUNTER (INPATIENT)
Age: 75
LOS: 2 days | Discharge: HOME | End: 2025-07-18
Attending: EMERGENCY MEDICINE | Admitting: INTERNAL MEDICINE
Payer: MEDICAID

## 2025-07-16 ENCOUNTER — APPOINTMENT (OUTPATIENT)
Age: 75
End: 2025-07-16
Payer: MEDICAID

## 2025-07-16 ENCOUNTER — APPOINTMENT (OUTPATIENT)
Dept: CT IMAGING | Age: 75
End: 2025-07-16
Payer: MEDICAID

## 2025-07-16 DIAGNOSIS — R41.82 ALTERED MENTAL STATUS, UNSPECIFIED ALTERED MENTAL STATUS TYPE: Primary | ICD-10-CM

## 2025-07-16 DIAGNOSIS — R55 SYNCOPE, UNSPECIFIED SYNCOPE TYPE: ICD-10-CM

## 2025-07-16 PROBLEM — I65.23 CAROTID STENOSIS, ASYMPTOMATIC, BILATERAL: Status: ACTIVE | Noted: 2025-07-16

## 2025-07-16 LAB
ALBUMIN SERPL-MCNC: 3.5 G/DL (ref 3.5–5.2)
ALP SERPL-CCNC: 85 U/L (ref 40–129)
ALT SERPL-CCNC: 15 U/L (ref 0–50)
AMMONIA PLAS-SCNC: 12 UMOL/L (ref 16–60)
AMPHET UR QL SCN: NEGATIVE
ANION GAP SERPL CALCULATED.3IONS-SCNC: 11 MMOL/L (ref 7–16)
APAP SERPL-MCNC: <5 UG/ML (ref 10–30)
AST SERPL-CCNC: 28 U/L (ref 0–50)
B PARAP IS1001 DNA NPH QL NAA+NON-PROBE: NOT DETECTED
B PERT DNA SPEC QL NAA+PROBE: NOT DETECTED
B.E.: -2.9 MMOL/L (ref -3–3)
BARBITURATES UR QL SCN: NEGATIVE
BASOPHILS # BLD: 0.11 K/UL (ref 0–0.2)
BASOPHILS NFR BLD: 2 % (ref 0–2)
BENZODIAZ UR QL: NEGATIVE
BILIRUB SERPL-MCNC: 0.5 MG/DL (ref 0–1.2)
BILIRUB UR QL STRIP: NEGATIVE
BNP SERPL-MCNC: 582 PG/ML (ref 0–450)
BUN SERPL-MCNC: 18 MG/DL (ref 8–23)
BUPRENORPHINE UR QL: NEGATIVE
C PNEUM DNA NPH QL NAA+NON-PROBE: NOT DETECTED
CALCIUM SERPL-MCNC: 9.3 MG/DL (ref 8.8–10.2)
CANNABINOIDS UR QL SCN: NEGATIVE
CHLORIDE SERPL-SCNC: 104 MMOL/L (ref 98–107)
CHOLEST SERPL-MCNC: 104 MG/DL
CLARITY UR: CLEAR
CO2 SERPL-SCNC: 24 MMOL/L (ref 22–29)
COCAINE UR QL SCN: NEGATIVE
COHB: 0.4 % (ref 0–1.5)
COLOR UR: YELLOW
CREAT SERPL-MCNC: 1.5 MG/DL (ref 0.7–1.2)
CRITICAL: ABNORMAL
DATE ANALYZED: ABNORMAL
DATE OF COLLECTION: ABNORMAL
EKG ATRIAL RATE: 65 BPM
EKG P AXIS: 77 DEGREES
EKG P-R INTERVAL: 178 MS
EKG Q-T INTERVAL: 468 MS
EKG QRS DURATION: 108 MS
EKG QTC CALCULATION (BAZETT): 486 MS
EKG R AXIS: -84 DEGREES
EKG T AXIS: 61 DEGREES
EKG VENTRICULAR RATE: 65 BPM
EOSINOPHIL # BLD: 0.45 K/UL (ref 0.05–0.5)
EOSINOPHILS RELATIVE PERCENT: 8 % (ref 0–6)
ERYTHROCYTE [DISTWIDTH] IN BLOOD BY AUTOMATED COUNT: 16 % (ref 11.5–15)
ETHANOLAMINE SERPL-MCNC: <10 MG/DL (ref 0–0.08)
FENTANYL UR QL: NEGATIVE
FLUAV RNA NPH QL NAA+NON-PROBE: NOT DETECTED
FLUBV RNA NPH QL NAA+NON-PROBE: NOT DETECTED
GFR, ESTIMATED: 49 ML/MIN/1.73M2
GLUCOSE BLD-MCNC: 130 MG/DL (ref 74–99)
GLUCOSE SERPL-MCNC: 147 MG/DL (ref 74–99)
GLUCOSE UR STRIP-MCNC: 250 MG/DL
HADV DNA NPH QL NAA+NON-PROBE: NOT DETECTED
HCO3: 21.7 MMOL/L (ref 22–26)
HCOV 229E RNA NPH QL NAA+NON-PROBE: NOT DETECTED
HCOV HKU1 RNA NPH QL NAA+NON-PROBE: NOT DETECTED
HCOV NL63 RNA NPH QL NAA+NON-PROBE: NOT DETECTED
HCOV OC43 RNA NPH QL NAA+NON-PROBE: NOT DETECTED
HCT VFR BLD AUTO: 36.8 % (ref 37–54)
HDLC SERPL-MCNC: 45 MG/DL
HGB BLD-MCNC: 12.2 G/DL (ref 12.5–16.5)
HGB UR QL STRIP.AUTO: NEGATIVE
HHB: 6.9 % (ref 0–5)
HMPV RNA NPH QL NAA+NON-PROBE: NOT DETECTED
HPIV1 RNA NPH QL NAA+NON-PROBE: NOT DETECTED
HPIV2 RNA NPH QL NAA+NON-PROBE: NOT DETECTED
HPIV3 RNA NPH QL NAA+NON-PROBE: NOT DETECTED
HPIV4 RNA NPH QL NAA+NON-PROBE: NOT DETECTED
IMM GRANULOCYTES # BLD AUTO: 0.03 K/UL (ref 0–0.58)
IMM GRANULOCYTES NFR BLD: 1 % (ref 0–5)
INR PPP: 1.5
KETONES UR STRIP-MCNC: NEGATIVE MG/DL
LAB: ABNORMAL
LACTATE BLDV-SCNC: 1.3 MMOL/L (ref 0.5–1.9)
LDLC SERPL CALC-MCNC: 49 MG/DL
LEUKOCYTE ESTERASE UR QL STRIP: NEGATIVE
LYMPHOCYTES NFR BLD: 1.16 K/UL (ref 1.5–4)
LYMPHOCYTES RELATIVE PERCENT: 21 % (ref 20–42)
Lab: 1006
M PNEUMO DNA NPH QL NAA+NON-PROBE: NOT DETECTED
MCH RBC QN AUTO: 28 PG (ref 26–35)
MCHC RBC AUTO-ENTMCNC: 33.2 G/DL (ref 32–34.5)
MCV RBC AUTO: 84.6 FL (ref 80–99.9)
METHADONE UR QL: NEGATIVE
METHB: 0.3 % (ref 0–1.5)
MODE: ABNORMAL
MONOCYTES NFR BLD: 0.88 K/UL (ref 0.1–0.95)
MONOCYTES NFR BLD: 16 % (ref 2–12)
NEUTROPHILS NFR BLD: 52 % (ref 43–80)
NEUTS SEG NFR BLD: 2.88 K/UL (ref 1.8–7.3)
NITRITE UR QL STRIP: NEGATIVE
O2 SATURATION: 93.1 % (ref 92–98.5)
O2HB: 92.4 % (ref 94–97)
OPERATOR ID: ABNORMAL
OPIATES UR QL SCN: NEGATIVE
OXYCODONE UR QL SCN: NEGATIVE
PATIENT TEMP: 37 C
PCO2: 37.5 MMHG (ref 35–45)
PCP UR QL SCN: NEGATIVE
PH BLOOD GAS: 7.38 (ref 7.35–7.45)
PH UR STRIP: 5.5 [PH] (ref 5–8)
PLATELET # BLD AUTO: 208 K/UL (ref 130–450)
PMV BLD AUTO: 10 FL (ref 7–12)
PO2: 71.3 MMHG (ref 75–100)
POTASSIUM SERPL-SCNC: 4.3 MMOL/L (ref 3.5–5.1)
PROT SERPL-MCNC: 7.6 G/DL (ref 6.4–8.3)
PROT UR STRIP-MCNC: NEGATIVE MG/DL
PROTHROMBIN TIME: 16.5 SEC (ref 9.3–12.4)
RBC # BLD AUTO: 4.35 M/UL (ref 3.8–5.8)
RBC #/AREA URNS HPF: ABNORMAL /HPF
RSV RNA NPH QL NAA+NON-PROBE: NOT DETECTED
RV+EV RNA NPH QL NAA+NON-PROBE: NOT DETECTED
SALICYLATES SERPL-MCNC: <0.5 MG/DL (ref 0–30)
SARS-COV-2 RNA NPH QL NAA+NON-PROBE: NOT DETECTED
SODIUM SERPL-SCNC: 139 MMOL/L (ref 136–145)
SOURCE, BLOOD GAS: ABNORMAL
SP GR UR STRIP: 1.01 (ref 1–1.03)
SPECIMEN DESCRIPTION: NORMAL
TEST INFORMATION: NORMAL
THB: 11.9 G/DL (ref 11.5–16.5)
TIME ANALYZED: 1011
TOXIC TRICYCLIC SC,BLOOD: NEGATIVE
TRIGL SERPL-MCNC: 50 MG/DL
TROPONIN I SERPL HS-MCNC: 22 NG/L (ref 0–22)
TROPONIN I SERPL HS-MCNC: 26 NG/L (ref 0–22)
UROBILINOGEN UR STRIP-ACNC: 0.2 EU/DL (ref 0–1)
VLDLC SERPL CALC-MCNC: 10 MG/DL
WBC #/AREA URNS HPF: ABNORMAL /HPF
WBC OTHER # BLD: 5.5 K/UL (ref 4.5–11.5)

## 2025-07-16 PROCEDURE — 70450 CT HEAD/BRAIN W/O DYE: CPT

## 2025-07-16 PROCEDURE — 80053 COMPREHEN METABOLIC PANEL: CPT

## 2025-07-16 PROCEDURE — 70496 CT ANGIOGRAPHY HEAD: CPT

## 2025-07-16 PROCEDURE — 81001 URINALYSIS AUTO W/SCOPE: CPT

## 2025-07-16 PROCEDURE — 6370000000 HC RX 637 (ALT 250 FOR IP): Performed by: NURSE PRACTITIONER

## 2025-07-16 PROCEDURE — 71045 X-RAY EXAM CHEST 1 VIEW: CPT

## 2025-07-16 PROCEDURE — 6370000000 HC RX 637 (ALT 250 FOR IP): Performed by: INTERNAL MEDICINE

## 2025-07-16 PROCEDURE — 99285 EMERGENCY DEPT VISIT HI MDM: CPT

## 2025-07-16 PROCEDURE — 6360000004 HC RX CONTRAST MEDICATION: Performed by: RADIOLOGY

## 2025-07-16 PROCEDURE — 82805 BLOOD GASES W/O2 SATURATION: CPT

## 2025-07-16 PROCEDURE — 99223 1ST HOSP IP/OBS HIGH 75: CPT | Performed by: STUDENT IN AN ORGANIZED HEALTH CARE EDUCATION/TRAINING PROGRAM

## 2025-07-16 PROCEDURE — G0480 DRUG TEST DEF 1-7 CLASSES: HCPCS

## 2025-07-16 PROCEDURE — 2580000003 HC RX 258: Performed by: EMERGENCY MEDICINE

## 2025-07-16 PROCEDURE — 93306 TTE W/DOPPLER COMPLETE: CPT

## 2025-07-16 PROCEDURE — 83605 ASSAY OF LACTIC ACID: CPT

## 2025-07-16 PROCEDURE — 70498 CT ANGIOGRAPHY NECK: CPT

## 2025-07-16 PROCEDURE — 2060000000 HC ICU INTERMEDIATE R&B

## 2025-07-16 PROCEDURE — 82962 GLUCOSE BLOOD TEST: CPT

## 2025-07-16 PROCEDURE — 6360000002 HC RX W HCPCS: Performed by: NURSE PRACTITIONER

## 2025-07-16 PROCEDURE — 84484 ASSAY OF TROPONIN QUANT: CPT

## 2025-07-16 PROCEDURE — 83880 ASSAY OF NATRIURETIC PEPTIDE: CPT

## 2025-07-16 PROCEDURE — 71275 CT ANGIOGRAPHY CHEST: CPT

## 2025-07-16 PROCEDURE — 87040 BLOOD CULTURE FOR BACTERIA: CPT

## 2025-07-16 PROCEDURE — 2500000003 HC RX 250 WO HCPCS: Performed by: NURSE PRACTITIONER

## 2025-07-16 PROCEDURE — 82140 ASSAY OF AMMONIA: CPT

## 2025-07-16 PROCEDURE — 80143 DRUG ASSAY ACETAMINOPHEN: CPT

## 2025-07-16 PROCEDURE — 87086 URINE CULTURE/COLONY COUNT: CPT

## 2025-07-16 PROCEDURE — 99222 1ST HOSP IP/OBS MODERATE 55: CPT | Performed by: NURSE PRACTITIONER

## 2025-07-16 PROCEDURE — 80179 DRUG ASSAY SALICYLATE: CPT

## 2025-07-16 PROCEDURE — 85610 PROTHROMBIN TIME: CPT

## 2025-07-16 PROCEDURE — 93010 ELECTROCARDIOGRAM REPORT: CPT | Performed by: INTERNAL MEDICINE

## 2025-07-16 PROCEDURE — 80307 DRUG TEST PRSMV CHEM ANLYZR: CPT

## 2025-07-16 PROCEDURE — 80061 LIPID PANEL: CPT

## 2025-07-16 PROCEDURE — 0202U NFCT DS 22 TRGT SARS-COV-2: CPT

## 2025-07-16 PROCEDURE — 93005 ELECTROCARDIOGRAM TRACING: CPT

## 2025-07-16 PROCEDURE — 94640 AIRWAY INHALATION TREATMENT: CPT

## 2025-07-16 PROCEDURE — 85025 COMPLETE CBC W/AUTO DIFF WBC: CPT

## 2025-07-16 RX ORDER — FUROSEMIDE 40 MG/1
40 TABLET ORAL DAILY
Status: DISCONTINUED | OUTPATIENT
Start: 2025-07-16 | End: 2025-07-18 | Stop reason: HOSPADM

## 2025-07-16 RX ORDER — SODIUM CHLORIDE 9 MG/ML
INJECTION, SOLUTION INTRAVENOUS PRN
Status: DISCONTINUED | OUTPATIENT
Start: 2025-07-16 | End: 2025-07-18 | Stop reason: HOSPADM

## 2025-07-16 RX ORDER — CARVEDILOL 6.25 MG/1
12.5 TABLET ORAL 2 TIMES DAILY
Status: DISCONTINUED | OUTPATIENT
Start: 2025-07-16 | End: 2025-07-16

## 2025-07-16 RX ORDER — ACETAMINOPHEN 325 MG/1
650 TABLET ORAL EVERY 6 HOURS PRN
Status: DISCONTINUED | OUTPATIENT
Start: 2025-07-16 | End: 2025-07-18 | Stop reason: HOSPADM

## 2025-07-16 RX ORDER — ASPIRIN 81 MG/1
81 TABLET ORAL DAILY
Status: DISCONTINUED | OUTPATIENT
Start: 2025-07-16 | End: 2025-07-18 | Stop reason: HOSPADM

## 2025-07-16 RX ORDER — CETIRIZINE HYDROCHLORIDE 10 MG/1
10 TABLET ORAL DAILY
Status: DISCONTINUED | OUTPATIENT
Start: 2025-07-16 | End: 2025-07-18 | Stop reason: HOSPADM

## 2025-07-16 RX ORDER — ENOXAPARIN SODIUM 100 MG/ML
40 INJECTION SUBCUTANEOUS DAILY
Status: DISCONTINUED | OUTPATIENT
Start: 2025-07-16 | End: 2025-07-16

## 2025-07-16 RX ORDER — HYDRALAZINE HYDROCHLORIDE 10 MG/1
10 TABLET, FILM COATED ORAL 2 TIMES DAILY
Status: DISCONTINUED | OUTPATIENT
Start: 2025-07-16 | End: 2025-07-16

## 2025-07-16 RX ORDER — POTASSIUM CHLORIDE 7.45 MG/ML
10 INJECTION INTRAVENOUS PRN
Status: DISCONTINUED | OUTPATIENT
Start: 2025-07-16 | End: 2025-07-18 | Stop reason: HOSPADM

## 2025-07-16 RX ORDER — IOPAMIDOL 755 MG/ML
130 INJECTION, SOLUTION INTRAVASCULAR
Status: COMPLETED | OUTPATIENT
Start: 2025-07-16 | End: 2025-07-16

## 2025-07-16 RX ORDER — ISOSORBIDE MONONITRATE 30 MG/1
30 TABLET, EXTENDED RELEASE ORAL DAILY
Status: DISCONTINUED | OUTPATIENT
Start: 2025-07-17 | End: 2025-07-18 | Stop reason: HOSPADM

## 2025-07-16 RX ORDER — ONDANSETRON 4 MG/1
4 TABLET, ORALLY DISINTEGRATING ORAL EVERY 8 HOURS PRN
Status: DISCONTINUED | OUTPATIENT
Start: 2025-07-16 | End: 2025-07-18 | Stop reason: HOSPADM

## 2025-07-16 RX ORDER — MAGNESIUM SULFATE IN WATER 40 MG/ML
2000 INJECTION, SOLUTION INTRAVENOUS PRN
Status: DISCONTINUED | OUTPATIENT
Start: 2025-07-16 | End: 2025-07-18 | Stop reason: HOSPADM

## 2025-07-16 RX ORDER — ATORVASTATIN CALCIUM 40 MG/1
40 TABLET, FILM COATED ORAL DAILY
Status: DISCONTINUED | OUTPATIENT
Start: 2025-07-16 | End: 2025-07-18 | Stop reason: HOSPADM

## 2025-07-16 RX ORDER — CARVEDILOL 6.25 MG/1
12.5 TABLET ORAL 2 TIMES DAILY
Status: DISCONTINUED | OUTPATIENT
Start: 2025-07-16 | End: 2025-07-18 | Stop reason: HOSPADM

## 2025-07-16 RX ORDER — 0.9 % SODIUM CHLORIDE 0.9 %
1000 INTRAVENOUS SOLUTION INTRAVENOUS ONCE
Status: COMPLETED | OUTPATIENT
Start: 2025-07-16 | End: 2025-07-16

## 2025-07-16 RX ORDER — SODIUM CHLORIDE 0.9 % (FLUSH) 0.9 %
5-40 SYRINGE (ML) INJECTION EVERY 12 HOURS SCHEDULED
Status: DISCONTINUED | OUTPATIENT
Start: 2025-07-16 | End: 2025-07-18 | Stop reason: HOSPADM

## 2025-07-16 RX ORDER — HYDRALAZINE HYDROCHLORIDE 10 MG/1
10 TABLET, FILM COATED ORAL 2 TIMES DAILY
Status: DISCONTINUED | OUTPATIENT
Start: 2025-07-17 | End: 2025-07-18 | Stop reason: HOSPADM

## 2025-07-16 RX ORDER — ONDANSETRON 2 MG/ML
4 INJECTION INTRAMUSCULAR; INTRAVENOUS EVERY 6 HOURS PRN
Status: DISCONTINUED | OUTPATIENT
Start: 2025-07-16 | End: 2025-07-18 | Stop reason: HOSPADM

## 2025-07-16 RX ORDER — SODIUM CHLORIDE 0.9 % (FLUSH) 0.9 %
5-40 SYRINGE (ML) INJECTION PRN
Status: DISCONTINUED | OUTPATIENT
Start: 2025-07-16 | End: 2025-07-18 | Stop reason: HOSPADM

## 2025-07-16 RX ORDER — POLYETHYLENE GLYCOL 3350 17 G/17G
17 POWDER, FOR SOLUTION ORAL DAILY PRN
Status: DISCONTINUED | OUTPATIENT
Start: 2025-07-16 | End: 2025-07-18 | Stop reason: HOSPADM

## 2025-07-16 RX ORDER — POTASSIUM CHLORIDE 1500 MG/1
40 TABLET, EXTENDED RELEASE ORAL PRN
Status: DISCONTINUED | OUTPATIENT
Start: 2025-07-16 | End: 2025-07-18 | Stop reason: HOSPADM

## 2025-07-16 RX ORDER — ACETAMINOPHEN 650 MG/1
650 SUPPOSITORY RECTAL EVERY 6 HOURS PRN
Status: DISCONTINUED | OUTPATIENT
Start: 2025-07-16 | End: 2025-07-18 | Stop reason: HOSPADM

## 2025-07-16 RX ADMIN — APIXABAN 5 MG: 5 TABLET, FILM COATED ORAL at 20:49

## 2025-07-16 RX ADMIN — SODIUM CHLORIDE 1000 ML: 0.9 INJECTION, SOLUTION INTRAVENOUS at 10:09

## 2025-07-16 RX ADMIN — CARVEDILOL 12.5 MG: 6.25 TABLET, FILM COATED ORAL at 13:30

## 2025-07-16 RX ADMIN — ASPIRIN 81 MG: 81 TABLET, COATED ORAL at 13:30

## 2025-07-16 RX ADMIN — IOPAMIDOL 100 ML: 755 INJECTION, SOLUTION INTRAVENOUS at 10:09

## 2025-07-16 RX ADMIN — IPRATROPIUM BROMIDE 0.5 MG: 0.5 SOLUTION RESPIRATORY (INHALATION) at 20:06

## 2025-07-16 RX ADMIN — CARVEDILOL 12.5 MG: 6.25 TABLET, FILM COATED ORAL at 20:49

## 2025-07-16 RX ADMIN — ATORVASTATIN CALCIUM 40 MG: 40 TABLET, FILM COATED ORAL at 13:30

## 2025-07-16 RX ADMIN — CETIRIZINE HYDROCHLORIDE 10 MG: 10 TABLET, FILM COATED ORAL at 13:29

## 2025-07-16 RX ADMIN — SODIUM CHLORIDE, PRESERVATIVE FREE 10 ML: 5 INJECTION INTRAVENOUS at 20:49

## 2025-07-16 RX ADMIN — ARFORMOTEROL TARTRATE: 15 SOLUTION RESPIRATORY (INHALATION) at 20:05

## 2025-07-16 RX ADMIN — FUROSEMIDE 40 MG: 20 TABLET ORAL at 13:30

## 2025-07-16 RX ADMIN — APIXABAN 5 MG: 5 TABLET, FILM COATED ORAL at 13:30

## 2025-07-16 NOTE — CONSULTS
NEUROLOGY CONSULT NOTE      Requesting Physician: Cristy Paz APRN - CNP    Reason for Consult:  Evaluate for AMS- went unresponsive. Hx of previous CVA's     History of Present Illness:  Chuck Chavarria Jr. is a 75 y.o. male  with h/o CVA from a right VA occlusion, A-fib on Eliquis, Carotid Stenosis-bilateral, HTN, HLD, COPD, hepatitis, and cocaine abuse who was admitted to [facility] on 7/16/2025 with presentation of altered mental status.  Per EMS report, patient was at a bus stop when he started feeling sick and short of breath and subsequently went unresponsive per bystander witnesses.  EMS arrived on the scene and noted that patient was responsive to pain but had no verbal responses and appeared more lethargic.  His last known well was earlier that a.m. per his sisters report.  He does have a history of substance abuse and patient given 2 mg of IV Narcan prior to arrival. Patient only recall was feeling sick with next recall of being in the hospital. Denies any post event symptoms non h/o similar symptoms in the past. On ED presentation he did he did respond to voice but remained nonverbal but alert.  He lives with his sister who indicated that his normal baseline was someone who was fully functional and conversational with no report of any impairments in his daily activities.  ED evaluation included CTA of the chest due for shortness of breath with possible pulmonary embolism with no evidence of of embolism found.  However, CT did show findings compatible with his COPD.  Serum drug screen and urine drug screens were unremarkable. Reevaluation of his mental status of an hour after presentation showed that he was alert and mentating appropriately.  Patient did have a history of cerebrovascular disease and prior stroke with CTA of the head and neck showing: Chronic occlusion right vertebral artery; severe stenosis of the origin of the left vertebral artery; 60% stenosis of the proximal left  bilaterally.  There is moderate stenosis involving the left supraclinoid ICA, which is unchanged.  No significant stenosis of the right internal carotid artery.  No significant stenosis of the anterior cerebral or middle cerebral arteries.  No aneurysm identified. POSTERIOR CIRCULATION: There is only minimal enhancement within the right V4 segment possibly retrograde nature.  No significant stenosis seen of the basilar or left vertebral arteries.  No significant stenosis of the posterior cerebral arteries. OTHER: No dural venous sinus thrombosis on this non-dedicated study. BRAIN: No mass effect or midline shift.     1. Chronic occlusion of the right vertebral artery at its origin with minimal patchy enhancement within the right vertebral artery distally. 2. Atherosclerosis contributes to severe stenosis at the origin of the left vertebral artery. 3. Atherosclerosis contributes to 60% stenosis of the proximal left cervical ICA and less than 50% stenosis of the proximal right cervical ICA by NASCET criteria. 4. Atherosclerosis contributes to moderate stenosis of the left supraclinoid ICA. 5. Otherwise, no significant stenosis or large vessel occlusion of the [id number].     CTA PULMONARY W CONTRAST  Result Date: 7/16/2025  EXAMINATION: CTA OF THE CHEST 7/16/2025 9:47 am TECHNIQUE: CTA of the chest was performed after the administration of intravenous contrast.  Multiplanar reformatted images are provided for review.  MIP images are provided for review. Automated exposure control, iterative reconstruction, and/or weight based adjustment of the mA/kV was utilized to reduce the radiation dose to as low as reasonably achievable. COMPARISON: Serial CT chest exams dating back to August 2023 and with most recent comparison exam 07/01/2025 HISTORY: ORDERING SYSTEM PROVIDED HISTORY: Altered mental status, shortness of breath TECHNOLOGIST PROVIDED HISTORY: Reason for exam:->Altered mental status, shortness of breath

## 2025-07-16 NOTE — PROGRESS NOTES
4 Eyes Skin Assessment     NAME:  Chuck Chavarria Jr.  YOB: 1950  MEDICAL RECORD NUMBER:  [id number]    The patient is being assessed for  Admission    I agree that at least one RN has performed a thorough Head to Toe Skin Assessment on the patient. ALL assessment sites listed below have been assessed.      Areas assessed by both nurses:    Head, Face, Ears, Shoulders, Back, Chest, Arms, Elbows, Hands, Sacrum. Buttock, Coccyx, Ischium, Legs. Feet and Heels, Under Medical Devices , and Other na        Does the Patient have a Wound? No noted wound(s)       Martin Prevention initiated by RN: Yes  Wound Care Orders initiated by RN: no    For hospital-acquired stage 1 & 2 and ALL Stage 3,4, Unstageable, DTI, NWPT, and Complex wounds: place order “IP Wound Care/Ostomy Nurse Eval and Treat” by RN under : No    New Ostomies, if present place, Ostomy referral order under : No     Nurse 1 eSignature: Electronically signed by Charlene Morris RN on 7/16/25 at 7:30 PM EDT    **SHARE this note so that the co-signing nurse can place an eSignature**    Nurse 2 eSignature: Electronically signed by Celine Mackenzie RN on 7/16/25 at 7:31 PM EDT

## 2025-07-16 NOTE — ED NOTES
ED TO INPATIENT SBAR HANDOFF    Patient Name: Chuck Chavarria Jr.   Preferred Name: Chuck  : 1950  75 y.o.   Code Status Order: Full Code  Telemetry Order: Yes  C-SSRS: Risk of Suicide: No Risk  Sitter no   Restraints:       Situation  Chief Complaint   Patient presents with    Altered Mental Status     At bus stop, felt sick, then had SOB then went unresponsive. Responds to pain     Brief Description of Patient's Condition: AMS oriented/disoriented at times   Mental Status: oriented and alert    Background  Allergies:   Allergies   Allergen Reactions    Doxycycline Shortness Of Breath    Rocephin [Ceftriaxone] Shortness Of Breath       Assessment  Vitals/MEWS:    Level of Consciousness: Responds to voice (1)   Vitals:    25 1215 25 1230 25 1245 25 1300   BP:  133/78  127/75   Pulse: 67 76 69 66   Resp: 16 20 17 13   Temp:       TempSrc:       SpO2: 97% 96% 95% 97%   Weight:       Height:         Cardiac Rhythm:    Deterioration Index (DI): Deterioration Index: 28.76  Deterioration Index (DI) Interventions Performed:    O2 Flow Rate:    O2 Device: O2 Device: None (Room air)    Active Central Lines:                          Active Wounds:    Active Jesus's:      Recommendation  Patient Belongings:    Additional Comments: uses urinal   If any further questions, please call Sending RN at    Opportunity for questions and clarification were provided to (name of person notified and time): [place] [facility] [place]       Electronically signed by: Electronically signed by Rakesh Cook RN on 2025 at 3:09 PM

## 2025-07-16 NOTE — H&P
[facility] Hospitalist Group History and Physical      CHIEF COMPLAINT: AMS    History of Present Illness: This is a 75-year-old male with a past medical history of arthritis, A-fib, bilateral carotid artery stenosis, CVA, cocaine abuse, COPD, HLD, and HTN who presented to the ED for AMS.  Per ER reports patient was at the bus stop today and suddenly felt sick and began having shortness of breath soon after that went unresponsive.  Patient has no recollection of this.  He states prior to this episode at the bus stop he was feeling okay.  Denies any recent illness. He has a history of atrial fibrillation for which he is on Eliquis.  He also has history of previous CVA in January 2024 and of bilateral carotid artery stenosis and is followed outpatient with vascular surgery.  Patient is wakeful and seemingly oriented.  Respirations are unlabored on room air.  He is on sinus rhythm.  He is able to move all extremities.  Left upper extremity seems slightly weaker than the right.  No other neurodeficits noted.    ED course is as follows: Vital signs-T97.3, HR 76, /81, SpO2 98% on room air.  BUN/creatinine 18/1.5 glucose 147, troponin 26> 22, , Hgb 12.2. CT head showing atrophy and chronic changes but no acute intracranial abnormality CTA head and neck shows chronic occlusion of the right vertebral artery stenosis of the left vertebral artery stenosis of the proximal left cervical ICA and right cervical ICA and moderate stenosis of the left supraclinoid ICA.    Patient admitted for further management    Informant(s) for H&P: Patient    REVIEW OF SYSTEMS:  A comprehensive review of systems was negative except for: what is in the HPI      PMH:  Past Medical History:   Diagnosis Date    A-fib ([facility]) 11/13/2024    Arthritis     Bilateral carotid artery stenosis 01/17/2024    Approximately 50% stenosis on the right side and 30 to 40% stenosis on the left side, CT of the carotids, 2024    Cataract, left eye

## 2025-07-16 NOTE — PROGRESS NOTES
Name: Chuck Deon Rai  : 1950  MRN: [id number]    Date: 2025    Benefits of immediately proceeding with Radiology exam outweigh the risks and therefore the following is being waived:      [] Pregnancy test    [] Protocol for Iodine allergy    [] MRI questionnaire    [x] BUN/Creatinine        Lacho River DO

## 2025-07-16 NOTE — ED PROVIDER NOTES
[facility] IMCU/NEURO  EMERGENCY DEPARTMENT ENCOUNTER        Pt Name: Chuck Chavarria Jr.  MRN: [id number]  Birthdate 1950  Date of evaluation: 7/16/2025  Provider: Lacho River DO  PCP: No primary care provider on file.  Note Started: 9:18 AM EDT 7/16/25    CHIEF COMPLAINT       Chief Complaint   Patient presents with    Altered Mental Status     At bus stop, felt sick, then had SOB then went unresponsive. Responds to pain       HISTORY OF PRESENT ILLNESS: 1 or more Elements   History From: Patient (limited due to condition), EMS, sister (over the phone)    Chuck Chavarria Jr. is a 75 y.o. male with a PMHx of hypertension, hyperlipidemia, A-fib, on Eliquis, cocaine abuse, COPD, CVA, who presents to the emergency department for altered mental status.  History and review of systems limited due to patient condition.  He was at the bus stop, felt sick, had shortness of breath and went unresponsive per bystanders, EMS arrived, patient responsive to pain, patient lethargic appearing and not responding verbally.  Spoke with sister on the phone, states that the patient was his normal self this morning before leaving the house.  Rest of history and review of systems limited due to patient condition.  EMS gave 2 of IV Narcan prior to arrival.      Nursing Notes were all reviewed and agreed with or any disagreements were addressed in the HPI.      REVIEW OF EXTERNAL NOTES :       PDMP Monitoring:    Last PDMP Lacho as Reviewed:  Review User Review Instant Review Result            Urine Drug Screenings (1 yr)       Urine Drug Screen  Collected: 2/24/2025  2:15 PM (Final result)              Urine Drug Screen  Collected: 11/10/2024  4:45 PM (Final result)              URINE DRUG SCREEN  Collected: 10/19/2024 11:30 AM (Final result)              URINE DRUG SCREEN  Collected: 10/9/2024  1:30 PM (Final result)              Urine Drug Screen  Collected: 9/9/2024  8:53 PM (Final result)              Serum Drug Screen   nonhypoxic on room air, afebrile.  Differential diagnosis includes but is not limited to ACS, PE, intracranial bleed, CVA, electrolyte abnormality, pneumonia, pneumothorax, substance abuse, sepsis of unknown ideology, to name a few.  Appropriate labs and imaging ordered.  Patient treated with fluids.  ACS considered, EKG shows no evidence of acute ischemia, delta troponin is negative.  PE considered, CT negative for pulmonary embolism.  CT head shows no acute intracranial abnormality including bleeds, CTA head and neck showed the followin. Chronic occlusion of the right vertebral artery at its origin with minimal  patchy enhancement within the right vertebral artery distally.  2. Atherosclerosis contributes to severe stenosis at the origin of the left  vertebral artery.  3. Atherosclerosis contributes to 60% stenosis of the proximal left cervical  ICA and less than 50% stenosis of the proximal right cervical ICA by NASCET  criteria.  4. Atherosclerosis contributes to moderate stenosis of the left supraclinoid  ICA.  5. Otherwise, no significant stenosis or large vessel occlusion of the  [id number]-[id number].    ABG stable.  Blood cultures ordered and pending.  CBC negative for leukocytosis or acute anemia.  Creatinine stable from prior, normal hepatic function panel.  Urinalysis shows no acute infection.  Lactic acid normal.  Serum and urine drug screen negative.  Will admit for altered mental status and MRI.  Upon reevaluation of patient, patient more awake and alert, answering all questions and following commands, alert and oriented, asking for water.  Spoke with [facility], updated her on patient presentation, laboratory results, imaging findings as well as management the emergency department, she agreed to accept the patient for admission.      ED Course as of 25 1602   Wed 2025   0923 Last echo was 2024 with ejection fraction 40%.  Last to cardiology for follow-up on 2025,

## 2025-07-16 NOTE — CONSULTS
Vascular Surgery Inpatient Consultation Note      Reason for Consultation: Bilateral carotid stenosis    HISTORY OF PRESENT ILLNESS:                The patient is a 75 y.o. male who is admitted to the hospital for treatment of syncopal event.  He has history notable for bilateral carotid stenosis which my partner Dr WEEMS follows him for,ollipara he also has polysubstance abuse.  As part of his workup he underwent CTA neck, reported to have around 50 to 60% stenosis of bilateral ICA.  Vascular surgery is consulted for evaluation and treatment.  Patient interview, he denies any lateralizing neurologic deficits, he denies any amaurosis events, he says that he passed out.  He is actively undergoing echocardiogram.    IMPRESSION: I reviewed his CTA, he has mural calcification of left common carotid artery, bifurcation ECA and ICA look relatively open, he does have proximal ICA stenosis that is probably about 50% by NASCET criteria.  Right side has proximal ICA disease without any significant stenosis, distally it is tortuous coursing medially into the retropharynx, appears that this area actually has a 50% stenosis here by NASCET criteria as well.  Overall, I doubt the patient has symptomatic carotid disease.  History is not congruent, disease burden on CT scan unlikely to actually cause symptomatic disease.    RECOMMENDATIONS: Recommend further workup of his altered mental status and syncopal event, I agree with echo, MRI will help determine whether he truly has neurologic insult as well.  I will follow up on the results, thanks for the consult.    Patient Active Problem List   Diagnosis Code    Chest pain R07.9    Abdominal pain, epigastric R10.13    Hypertension I10    Tobacco abuse Z72.0    Cocaine abuse (HCC) F14.10    Mild persistent asthma without complication J45.30    LAFB (left anterior fascicular block) I44.4    Hyperlipidemia E78.5    HENNY (acute kidney injury) N17.9    Pneumonia J18.9    SOB (shortness of  2=diminished, 1=barely palpable, 4=widened)      LABS:    Lab Results   Component Value Date    WBC 5.5 07/16/2025    HGB 12.2 (L) 07/16/2025    HCT 36.8 (L) 07/16/2025     07/16/2025    PROTIME 16.5 (H) 07/16/2025    INR 1.5 07/16/2025    K 4.3 07/16/2025    BUN 18 07/16/2025    CREATININE 1.5 (H) 07/16/2025       RADIOLOGY:    I reviewed pertinent imaging    Approximately spent 75 minutes treating this patient including  reviewing the laboratory, imaging, and clinical findings electronic documentation.  I have discussed the clinical implications and recommendations. More than 50% of time was spent counseling patient. The patient verbalized understanding.

## 2025-07-17 ENCOUNTER — APPOINTMENT (OUTPATIENT)
Dept: MRI IMAGING | Age: 75
End: 2025-07-17
Payer: MEDICAID

## 2025-07-17 ENCOUNTER — APPOINTMENT (OUTPATIENT)
Dept: NEUROLOGY | Age: 75
End: 2025-07-17
Payer: MEDICAID

## 2025-07-17 PROBLEM — R40.4 EPISODE OF UNRESPONSIVENESS: Status: ACTIVE | Noted: 2025-07-17

## 2025-07-17 LAB
ANION GAP SERPL CALCULATED.3IONS-SCNC: 11 MMOL/L (ref 7–16)
BASOPHILS # BLD: 0.07 K/UL (ref 0–0.2)
BASOPHILS NFR BLD: 1 % (ref 0–2)
BUN SERPL-MCNC: 21 MG/DL (ref 8–23)
CALCIUM SERPL-MCNC: 9.2 MG/DL (ref 8.8–10.2)
CHLORIDE SERPL-SCNC: 104 MMOL/L (ref 98–107)
CO2 SERPL-SCNC: 23 MMOL/L (ref 22–29)
CREAT SERPL-MCNC: 1.6 MG/DL (ref 0.7–1.2)
ECHO AO ASC DIAM: 2.9 CM
ECHO AO ASCENDING AORTA INDEX: 1.7 CM/M2
ECHO AV AREA PEAK VELOCITY: 2.6 CM2
ECHO AV AREA VTI: 2.9 CM2
ECHO AV AREA/BSA PEAK VELOCITY: 1.5 CM2/M2
ECHO AV AREA/BSA VTI: 1.7 CM2/M2
ECHO AV CUSP MM: 1.1 CM
ECHO AV MEAN GRADIENT: 4 MMHG
ECHO AV MEAN VELOCITY: 0.9 M/S
ECHO AV PEAK GRADIENT: 7 MMHG
ECHO AV PEAK VELOCITY: 1.3 M/S
ECHO AV VELOCITY RATIO: 0.69
ECHO AV VTI: 24.9 CM
ECHO BSA: 1.69 M2
ECHO EST RA PRESSURE: 3 MMHG
ECHO LA DIAMETER INDEX: 1.87 CM/M2
ECHO LA DIAMETER: 3.2 CM
ECHO LA VOL A-L A2C: 50 ML (ref 18–58)
ECHO LA VOL A-L A4C: 35 ML (ref 18–58)
ECHO LA VOL BP: 40 ML (ref 18–58)
ECHO LA VOL MOD A2C: 47 ML (ref 18–58)
ECHO LA VOL MOD A4C: 34 ML (ref 18–58)
ECHO LA VOL/BSA BIPLANE: 23 ML/M2 (ref 16–34)
ECHO LA VOLUME AREA LENGTH: 42 ML
ECHO LA VOLUME INDEX A-L A2C: 29 ML/M2 (ref 16–34)
ECHO LA VOLUME INDEX A-L A4C: 20 ML/M2 (ref 16–34)
ECHO LA VOLUME INDEX AREA LENGTH: 25 ML/M2 (ref 16–34)
ECHO LA VOLUME INDEX MOD A2C: 27 ML/M2 (ref 16–34)
ECHO LA VOLUME INDEX MOD A4C: 20 ML/M2 (ref 16–34)
ECHO LV E' LATERAL VELOCITY: 5 CM/S
ECHO LV E' SEPTAL VELOCITY: 5 CM/S
ECHO LV EF PHYSICIAN: 45 %
ECHO LV EJECTION FRACTION 3D: 45 %
ECHO LV FRACTIONAL SHORTENING: 26 % (ref 28–44)
ECHO LV INTERNAL DIMENSION DIASTOLE INDEX: 2.51 CM/M2
ECHO LV INTERNAL DIMENSION DIASTOLIC: 4.3 CM (ref 4.2–5.9)
ECHO LV INTERNAL DIMENSION SYSTOLIC INDEX: 1.87 CM/M2
ECHO LV INTERNAL DIMENSION SYSTOLIC: 3.2 CM
ECHO LV IVSD: 1 CM (ref 0.6–1)
ECHO LV MASS 2D: 142.5 G (ref 88–224)
ECHO LV MASS INDEX 2D: 83.3 G/M2 (ref 49–115)
ECHO LV POSTERIOR WALL DIASTOLIC: 1 CM (ref 0.6–1)
ECHO LV RELATIVE WALL THICKNESS RATIO: 0.47
ECHO LVOT AREA: 4.2 CM2
ECHO LVOT AV VTI INDEX: 0.73
ECHO LVOT DIAM: 2.3 CM
ECHO LVOT MEAN GRADIENT: 2 MMHG
ECHO LVOT PEAK GRADIENT: 3 MMHG
ECHO LVOT PEAK VELOCITY: 0.9 M/S
ECHO LVOT STROKE VOLUME INDEX: 44 ML/M2
ECHO LVOT SV: 75.2 ML
ECHO LVOT VTI: 18.1 CM
ECHO MV "A" WAVE DURATION: 139.9 MSEC
ECHO MV A VELOCITY: 1.03 M/S
ECHO MV AREA PHT: 3.8 CM2
ECHO MV AREA VTI: 3.3 CM2
ECHO MV E DECELERATION TIME (DT): 201.8 MS
ECHO MV E VELOCITY: 0.56 M/S
ECHO MV E/A RATIO: 0.54
ECHO MV E/E' LATERAL: 11.2
ECHO MV E/E' RATIO (AVERAGED): 11.2
ECHO MV E/E' SEPTAL: 11.2
ECHO MV LVOT VTI INDEX: 1.27
ECHO MV MAX VELOCITY: 1.1 M/S
ECHO MV MEAN GRADIENT: 2 MMHG
ECHO MV MEAN VELOCITY: 0.6 M/S
ECHO MV PEAK GRADIENT: 5 MMHG
ECHO MV PRESSURE HALF TIME (PHT): 57.8 MS
ECHO MV VTI: 23 CM
ECHO PV MAX VELOCITY: 0.6 M/S
ECHO PV MEAN GRADIENT: 1 MMHG
ECHO PV MEAN VELOCITY: 0.4 M/S
ECHO PV PEAK GRADIENT: 2 MMHG
ECHO PV VTI: 11.3 CM
ECHO RIGHT VENTRICULAR SYSTOLIC PRESSURE (RVSP): 39 MMHG
ECHO RV BASAL DIMENSION: 3.2 CM
ECHO RV INTERNAL DIMENSION: 2.9 CM
ECHO RV LONGITUDINAL DIMENSION: 6.5 CM
ECHO RV MID DIMENSION: 2.2 CM
ECHO RV TAPSE: 2 CM (ref 1.7–?)
ECHO TV REGURGITANT MAX VELOCITY: 2.99 M/S
ECHO TV REGURGITANT PEAK GRADIENT: 36 MMHG
EOSINOPHIL # BLD: 0.54 K/UL (ref 0.05–0.5)
EOSINOPHILS RELATIVE PERCENT: 9 % (ref 0–6)
ERYTHROCYTE [DISTWIDTH] IN BLOOD BY AUTOMATED COUNT: 15.8 % (ref 11.5–15)
GFR, ESTIMATED: 46 ML/MIN/1.73M2
GLUCOSE SERPL-MCNC: 108 MG/DL (ref 74–99)
HCT VFR BLD AUTO: 34.2 % (ref 37–54)
HGB BLD-MCNC: 11.7 G/DL (ref 12.5–16.5)
IMM GRANULOCYTES # BLD AUTO: <0.03 K/UL (ref 0–0.58)
IMM GRANULOCYTES NFR BLD: 0 % (ref 0–5)
LYMPHOCYTES NFR BLD: 1.12 K/UL (ref 1.5–4)
LYMPHOCYTES RELATIVE PERCENT: 19 % (ref 20–42)
MCH RBC QN AUTO: 28.3 PG (ref 26–35)
MCHC RBC AUTO-ENTMCNC: 34.2 G/DL (ref 32–34.5)
MCV RBC AUTO: 82.8 FL (ref 80–99.9)
MONOCYTES NFR BLD: 1.02 K/UL (ref 0.1–0.95)
MONOCYTES NFR BLD: 17 % (ref 2–12)
NEUTROPHILS NFR BLD: 54 % (ref 43–80)
NEUTS SEG NFR BLD: 3.28 K/UL (ref 1.8–7.3)
PLATELET # BLD AUTO: 183 K/UL (ref 130–450)
PMV BLD AUTO: 9.1 FL (ref 7–12)
POTASSIUM SERPL-SCNC: 4 MMOL/L (ref 3.5–5.1)
RBC # BLD AUTO: 4.13 M/UL (ref 3.8–5.8)
SODIUM SERPL-SCNC: 138 MMOL/L (ref 136–145)
WBC OTHER # BLD: 6.1 K/UL (ref 4.5–11.5)

## 2025-07-17 PROCEDURE — 95819 EEG AWAKE AND ASLEEP: CPT

## 2025-07-17 PROCEDURE — 2060000000 HC ICU INTERMEDIATE R&B

## 2025-07-17 PROCEDURE — 94640 AIRWAY INHALATION TREATMENT: CPT

## 2025-07-17 PROCEDURE — 95816 EEG AWAKE AND DROWSY: CPT | Performed by: PSYCHIATRY & NEUROLOGY

## 2025-07-17 PROCEDURE — 2500000003 HC RX 250 WO HCPCS: Performed by: NURSE PRACTITIONER

## 2025-07-17 PROCEDURE — 97535 SELF CARE MNGMENT TRAINING: CPT

## 2025-07-17 PROCEDURE — 93306 TTE W/DOPPLER COMPLETE: CPT | Performed by: INTERNAL MEDICINE

## 2025-07-17 PROCEDURE — 6370000000 HC RX 637 (ALT 250 FOR IP): Performed by: STUDENT IN AN ORGANIZED HEALTH CARE EDUCATION/TRAINING PROGRAM

## 2025-07-17 PROCEDURE — 97530 THERAPEUTIC ACTIVITIES: CPT

## 2025-07-17 PROCEDURE — 80048 BASIC METABOLIC PNL TOTAL CA: CPT

## 2025-07-17 PROCEDURE — 6370000000 HC RX 637 (ALT 250 FOR IP): Performed by: INTERNAL MEDICINE

## 2025-07-17 PROCEDURE — 70551 MRI BRAIN STEM W/O DYE: CPT

## 2025-07-17 PROCEDURE — 85025 COMPLETE CBC W/AUTO DIFF WBC: CPT

## 2025-07-17 PROCEDURE — 97161 PT EVAL LOW COMPLEX 20 MIN: CPT

## 2025-07-17 PROCEDURE — 99232 SBSQ HOSP IP/OBS MODERATE 35: CPT | Performed by: NURSE PRACTITIONER

## 2025-07-17 PROCEDURE — 6370000000 HC RX 637 (ALT 250 FOR IP): Performed by: NURSE PRACTITIONER

## 2025-07-17 PROCEDURE — 97166 OT EVAL MOD COMPLEX 45 MIN: CPT

## 2025-07-17 PROCEDURE — 36415 COLL VENOUS BLD VENIPUNCTURE: CPT

## 2025-07-17 PROCEDURE — 6360000002 HC RX W HCPCS: Performed by: NURSE PRACTITIONER

## 2025-07-17 RX ORDER — SPIRONOLACTONE 25 MG/1
25 TABLET ORAL DAILY
COMMUNITY

## 2025-07-17 RX ORDER — OFLOXACIN 3 MG/ML
3 SOLUTION/ DROPS OPHTHALMIC 4 TIMES DAILY
COMMUNITY

## 2025-07-17 RX ORDER — TRIAMCINOLONE ACETONIDE 1 MG/G
CREAM TOPICAL EVERY 4 HOURS PRN
Status: DISCONTINUED | OUTPATIENT
Start: 2025-07-17 | End: 2025-07-18 | Stop reason: HOSPADM

## 2025-07-17 RX ORDER — KETOROLAC TROMETHAMINE 5 MG/ML
1 SOLUTION OPHTHALMIC 4 TIMES DAILY
COMMUNITY

## 2025-07-17 RX ORDER — PREDNISOLONE ACETATE 10 MG/ML
1 SUSPENSION/ DROPS OPHTHALMIC 4 TIMES DAILY
COMMUNITY

## 2025-07-17 RX ADMIN — TRIAMCINOLONE ACETONIDE: 1 CREAM TOPICAL at 20:04

## 2025-07-17 RX ADMIN — CARVEDILOL 12.5 MG: 6.25 TABLET, FILM COATED ORAL at 20:03

## 2025-07-17 RX ADMIN — HYDRALAZINE HYDROCHLORIDE 10 MG: 10 TABLET ORAL at 20:03

## 2025-07-17 RX ADMIN — IPRATROPIUM BROMIDE 0.5 MG: 0.5 SOLUTION RESPIRATORY (INHALATION) at 20:07

## 2025-07-17 RX ADMIN — APIXABAN 5 MG: 5 TABLET, FILM COATED ORAL at 09:31

## 2025-07-17 RX ADMIN — CETIRIZINE HYDROCHLORIDE 10 MG: 10 TABLET, FILM COATED ORAL at 09:32

## 2025-07-17 RX ADMIN — HYDRALAZINE HYDROCHLORIDE 10 MG: 10 TABLET ORAL at 09:32

## 2025-07-17 RX ADMIN — SODIUM CHLORIDE, PRESERVATIVE FREE 10 ML: 5 INJECTION INTRAVENOUS at 09:31

## 2025-07-17 RX ADMIN — ATORVASTATIN CALCIUM 40 MG: 40 TABLET, FILM COATED ORAL at 09:31

## 2025-07-17 RX ADMIN — ISOSORBIDE MONONITRATE 30 MG: 30 TABLET, EXTENDED RELEASE ORAL at 09:32

## 2025-07-17 RX ADMIN — SODIUM CHLORIDE, PRESERVATIVE FREE 10 ML: 5 INJECTION INTRAVENOUS at 20:03

## 2025-07-17 RX ADMIN — ARFORMOTEROL TARTRATE: 15 SOLUTION RESPIRATORY (INHALATION) at 20:07

## 2025-07-17 RX ADMIN — IPRATROPIUM BROMIDE 0.5 MG: 0.5 SOLUTION RESPIRATORY (INHALATION) at 08:51

## 2025-07-17 RX ADMIN — ARFORMOTEROL TARTRATE: 15 SOLUTION RESPIRATORY (INHALATION) at 08:51

## 2025-07-17 RX ADMIN — APIXABAN 5 MG: 5 TABLET, FILM COATED ORAL at 20:03

## 2025-07-17 RX ADMIN — IPRATROPIUM BROMIDE 0.5 MG: 0.5 SOLUTION RESPIRATORY (INHALATION) at 04:08

## 2025-07-17 RX ADMIN — PETROLATUM: 420 OINTMENT TOPICAL at 11:01

## 2025-07-17 RX ADMIN — ASPIRIN 81 MG: 81 TABLET, COATED ORAL at 09:31

## 2025-07-17 RX ADMIN — CARVEDILOL 12.5 MG: 6.25 TABLET, FILM COATED ORAL at 09:31

## 2025-07-17 RX ADMIN — PETROLATUM: 420 OINTMENT TOPICAL at 20:03

## 2025-07-17 NOTE — CARE COORDINATION
Transition of care planning, Chart reviewed.presented to the emergency department for evaluation of AMS At bus stop, felt sick, then had SOB then went unresponsive CT head negative Neurology consult MRI brain  EEG  PT OT Met with patient  and explained case management role./transition of care  Patient lives with his sister Ashia in a 1 story home. Patient has a walker and quad cane. Uses [facility] on [address]  He does not have PCP - Asked  Rona RAMIREZ to set up appt His plan is home and he will call his sister for a ride.Electronically signed by Kassandra Collier RN on 7/17/2025 at 10:27 AM    Case Management Assessment  Initial Evaluation    Date/Time of Evaluation: 7/17/2025 10:34 AM  Assessment Completed by: Kassandra Collier RN    If patient is discharged prior to next notation, then this note serves as note for discharge by case management.    Patient Name: Chuck Reed Jr.                   YOB: 1950  Diagnosis: Altered mental status [R41.82]  Altered mental status, unspecified altered mental status type [R41.82]  Syncope, unspecified syncope type [R55]                   Date / Time: 7/16/2025  9:11 AM    Patient Admission Status: Inpatient   Readmission Risk (Low < 19, Mod (19-27), High > 27): Readmission Risk Score: 25.2    Current PCP: No primary care provider on file.  PCP verified by CM? Yes (needs appt- ntfd Annabelle FARIA)    Chart Reviewed: Yes      History Provided by: Patient  Patient Orientation: Alert and Oriented    Patient Cognition: Alert    Hospitalization in the last 30 days (Readmission):  No    If yes, Readmission Assessment in CM Navigator will be completed.    Advance Directives:      Code Status: Full Code   Patient's Primary Decision Maker is: Legal Next of Kin    Primary Decision Maker: Ashia Tillman - Brother/Sister - [phone]    Secondary Decision Maker: Elena Tillman - Other - [phone]    Supplemental (Other) Decision Maker: Apolinar REED - Grandchild -

## 2025-07-17 NOTE — PROCEDURES
[facility] Neurodiagnostic Report    MRN: [id number]  PATIENT NAME: Chuck Chavarria Jr.  DATE OF REPORT: 2025  DATE OF SERVICE: 2025  PHYSICIAN NAME: Tony Brito DO  STUDY ORDERED BY: WIL Mcintosh      Patient's : 1950  Patient's Age: 75 y.o.  Gender: male    PROCEDURE: Routine EEG with video      Clinical Interpretation:   This abnormal study showed evidence of:    Mild nonspecific cerebral dysfunction of the right frontotemporal region    Structural abnormalities should be considered for the findings above and appropriate imaging obtained if clinically indicated.     No seizures or epileptiform discharges were noted during this study.     __________________________  Electronically signed by: Tony Brito DO, 2025 3:07 PM      Patient Clinical Information   Reason for Study: The patient is undergoing evaluation for altered mental status  Patient State: Awake  Primary neurological diagnosis: Altered mental status  Primary indication for monitoring: Diagnosis of nonconvulsive seizures    Pertinent Medications and Treatments  None    Sedatives administered: No  Intubated: No  Pharmacological paralytic: No    Reporting Period  Start of Study: , 2025   End of Study:  144, 2025       EEG Description  Digital video and scalp EEG monitoring was performed using the standard protocol for this laboratory. Scalp electrodes were applied in the international 10/20 system. Multiple digital montage arrangements were utilized for evaluation. EKG and video were recorded.     Background:      Occipital rhythm (posterior dominant rhythm or PDR): Present  Frequency: 8.5 Hz  Voltage: Medium  Organization: fair   Reactivity to eye opening/closure: Fair    Drowsiness: Present - normal  Sleep: Absent    Technical and Activation Procedures:  Hyperventilation: Not done  Photic stimulation: Done - physiologic driving noted  Reactivity to stimulation:

## 2025-07-17 NOTE — PROGRESS NOTES
Physical Therapy Initial Evaluation    Name: Chuck Chavarria Jr.  : 1950  MRN: [id number]      Date of Service: 2025    Evaluating PT:  Tony Colvin, PT [id number]    Referring provider/PT Order:  PT Eval and Treat   25  PT eval and treat  Start:  25,   End:  25,   ONE TIME,   Standing Count:  1 Occurrences,   R         Alanna Westbrook MD     Room #:  8508/8508-A  Diagnosis:  Altered mental status [R41.82]  Altered mental status, unspecified altered mental status type [R41.82]  Syncope, unspecified syncope type [R55]  PMHx/PSHx:     has a past medical history of A-fib (HCC), Arthritis, Bilateral carotid artery stenosis, Cataract, left eye, Cerebral infarction due to occlusion of right vertebral artery (HCC), Cocaine abuse ([facility]), COPD (chronic obstructive pulmonary disease) ([facility]), Hyperlipidemia, Hypertension, Occlusion of right vertebral artery, and Tobacco dependence.    has a past surgical history that includes Dental surgery; Cardiac catheterization (2023); Tonsillectomy; Colonoscopy; and Upper gastrointestinal endoscopy (N/A, 2024).     Procedure/Surgery:  none  Precautions:  Falls, alarm for safety FWB (full weight bearing) Bilateral LE,   Equipment Needs: Patient has needed equipment ,    SUBJECTIVE:    Patient lives with sister  in a ranch home  with 2 steps to enter with Rail  Bed is on 1 floor and bath is on 1 floor.  Patient ambulated independently  PTA. Equipment owned: Cane, Wheeled Walker, and Quad cane,      OBJECTIVE:   Initial Evaluation  Date: 25 Treatment Short Term/ Long Term   Goals   AM-PAC 6 Clicks      Was pt agreeable to Eval/treatment? Yes      Does pt have pain? no     Bed Mobility  Rolling: Ind  Supine to sit:   Ind   Sit to supine: Ind   Scooting: Ind   Rolling: Ind  Supine to sit: Ind  Sit to supine: Ind  Scooting: Ind   Transfers Sit to stand: SBA to Cane  Stand to sit: SBA  Stand pivot: SBA with Cane  Sit to stand: Mod Ind

## 2025-07-17 NOTE — PROGRESS NOTES
Hospitalist Progress Note      Chief Complaint:  had concerns including Altered Mental Status (At bus stop, felt sick, then had SOB then went unresponsive. Responds to pain).    Admission Date: 2025     SYNOPSIS:He presented on  with an altered mental status.  He was at a bus stop when he started feeling short of breath and then went unresponsive per bystanders.  He was lethargic on EMS arrival.  Due to his history of substance abuse he was given IV Narcan.  He was nonverbal on arrival to the ER but about a an hour after presentation improved and was mentating appropriately.  Intracranial vessel imaging was notable for his known right vertebral artery occlusion, severe left vertebral artery stenosis, and moderate stenosis of his left supraclinoid ICA.  Tox he screens were negative   He was seen by vascular surgery who did not feel that his carotid disease is symptomatic but is awaiting MRI.    Neurology consulted and following, EEG and MRI brain pending        SUBJECTIVE:    Patient seen and examined at bedside not in acute distress, complains of itching   Records reviewed.   Stable overnight. No overnight issues reported     Temp (24hrs), Av.1 °F (36.7 °C), Min:97.4 °F (36.3 °C), Max:98.6 °F (37 °C)    DIET: ADULT DIET; Regular  CODE: Full Code    Intake/Output Summary (Last 24 hours) at 2025 1439  Last data filed at 2025 0900  Gross per 24 hour   Intake 855 ml   Output 200 ml   Net 655 ml       OBJECTIVE:    BP (!) 95/49   Pulse 80   Temp 98.3 °F (36.8 °C) (Temporal)   Resp 18   Ht 1.727 m (5' 8\")   Wt 56.2 kg (123 lb 14.4 oz)   SpO2 95%   BMI 18.84 kg/m²     General appearance: No apparent distress, appears stated age and cooperative.   HEENT:  Normocephalic. Conjunctivae/corneas clear. Moist mucosa   Neck: Supple. No jugular venous distention. Thyroid not visible, non tender   Respiratory: Normal respiratory effort. Clear to auscultation bilaterally. No

## 2025-07-17 NOTE — PLAN OF CARE
Problem: Safety - Adult  Goal: Free from fall injury  Outcome: Progressing     Problem: Chronic Conditions and Co-morbidities  Goal: Patient's chronic conditions and co-morbidity symptoms are monitored and maintained or improved  Outcome: Progressing     Problem: Discharge Planning  Goal: Discharge to home or other facility with appropriate resources  Outcome: Progressing     Problem: ABCDS Injury Assessment  Goal: Absence of physical injury  Outcome: Progressing     Problem: Skin/Tissue Integrity  Goal: Skin integrity remains intact  Description: 1.  Monitor for areas of redness and/or skin breakdown  2.  Assess vascular access sites hourly  3.  Every 4-6 hours minimum:  Change oxygen saturation probe site  4.  Every 4-6 hours:  If on nasal continuous positive airway pressure, respiratory therapy assess nares and determine need for appliance change or resting period  Outcome: Progressing

## 2025-07-17 NOTE — PROGRESS NOTES
GENERAL SURGERY  DAILY PROGRESS NOTE    Patient's Name/Date of Birth: Chuck Chavarria Jr. / 1950    Date: 2025     Chief Complaint   Patient presents with    Altered Mental Status     At bus stop, felt sick, then had SOB then went unresponsive. Responds to pain        Subjective:  Patient seen and examined.  Patient denies any complaints this a.m.  Patient denies any new neurologic deficits.      Objective:  Last 24Hrs  Temp  Av.1 °F (36.7 °C)  Min: 97.4 °F (36.3 °C)  Max: 98.6 °F (37 °C)  Resp  Av.5  Min: 13  Max: 23  Pulse  Av.5  Min: 58  Max: 80  Systolic (24hrs), Av , Min:95 , Max:139     Diastolic (24hrs), Av, Min:49, Max:88    SpO2  Av %  Min: 95 %  Max: 100 %    I/O last 3 completed shifts:  In: 1615 [P.O.:615; IV Piggyback:1000]  Out: 200 [Urine:200]      General: In no acute distress, alert   Cardiovascular: Warm throughout, no edema  Respiratory: no respiratory distress, equal chest rise  Abdomen: soft,  nontender, nondistended  Skin: no obvious rashes or lesions appreciated, no jaundice  Extremities: atraumatic, no focal motor deficits  Neuro: Largely symmetric muscle strength throughout bilateral upper extremities.  Somewhat uncooperative with exam.      CBC  Recent Labs     25  0928 25  0725   WBC 5.5 6.1   RBC 4.35 4.13   HGB 12.2* 11.7*   HCT 36.8* 34.2*   MCV 84.6 82.8   MCH 28.0 28.3   MCHC 33.2 34.2   RDW 16.0* 15.8*    183   MPV 10.0 9.1       CMP  Recent Labs     25  0930 25  0516    138   K 4.3 4.0    104   CO2 24 23   BUN 18 21   CREATININE 1.5* 1.6*   GLUCOSE 147* 108*   CALCIUM 9.3 9.2   BILITOT 0.5  --    ALKPHOS 85  --    AST 28  --    ALT 15  --          Assessment/Plan:    Patient Active Problem List   Diagnosis    Chest pain    Abdominal pain, epigastric    Hypertension    Tobacco abuse    Cocaine abuse (HCC)    Mild persistent asthma without complication    LAFB (left anterior fascicular block)

## 2025-07-17 NOTE — PROGRESS NOTES
OCCUPATIONAL THERAPY INITIAL EVALUATION    [facility] [address] [place], [place]       Date:2025                                                  Patient Name: Chuck Chavarria Jr.  MRN: [id number]  : 1950  Room: [address] [place] [address]    Evaluating OT: Myron Lloyd OTR/L [id number]    Referring Provider: Alanna Westbrook MD      Specific Provider Orders/Date: OT evaluation and treatment 25 0715    Diagnosis:  Altered mental status [R41.82]  Altered mental status, unspecified altered mental status type [R41.82]  Syncope, unspecified syncope type [R55]      Pertinent Medical History:  has a past medical history of A-fib (HCC), Arthritis, Bilateral carotid artery stenosis, Cataract, left eye, Cerebral infarction due to occlusion of right vertebral artery (HCC), Cocaine abuse (HCC), COPD (chronic obstructive pulmonary disease) ([facility]), Hyperlipidemia, Hypertension, Occlusion of right vertebral artery, and Tobacco dependence.   Past Surgical History:   Procedure Laterality Date    CARDIAC CATHETERIZATION  2023    COLONOSCOPY      DENTAL SURGERY      TONSILLECTOMY      UPPER GASTROINTESTINAL ENDOSCOPY N/A 2024    ESOPHAGOGASTRODUODENOSCOPY performed by Todd Chow MD at [facility]       Pt presented to the hospital on  with AMS - possible syncopal episode at bus stop     Orders received, chart reviewed, eval complete.     Precautions:  Fall Risk    Assessment of current deficits   [x] Functional mobility   [x]ADLs  [x] Strength               []Cognition   [x] Functional transfers   [x] IADLs         [x] Safety Awareness   [x]Endurance   [] Fine Motor Coordination [x] Balance [] Vision/perception   []Sensation    [] Gross Motor Coordination [] ROM  [] Delirium                  [] Motor Control     OT PLAN OF CARE   OT POC based on physician orders, patient diagnosis and results of clinical assessment    Frequency/Duration 1-3

## 2025-07-17 NOTE — CONSULTS
Patient only recall was feeling sick with next recall of being in the hospital. Denies any post event symptoms non h/o similar symptoms in the past.

## 2025-07-17 NOTE — ACP (ADVANCE CARE PLANNING)
Advance Care Planning   Healthcare Decision Maker:    Primary Decision Maker: Ashia Tillman - Brother/Sister - [phone]    Secondary Decision Maker: Elena Tillman - Other - [phone]    Supplemental (Other) Decision Maker: Apolinar REED - Grandchild - [phone]    Click here to complete Healthcare Decision Makers including selection of the Healthcare Decision Maker Relationship (ie \"Primary\").

## 2025-07-17 NOTE — PROGRESS NOTES
[facility]  Neuro Inpatient Follow Up        Chuck Chavarria Jr. is a 75 y.o. 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  He was at [place] bus stop when he started feeling short of breath and then went unresponsive per bystanders.  He was lethargic on EMS arrival.  Due to his history of substance abuse he was given IV Narcan.  He was nonverbal on arrival to the ER but about a an hour after presentation improved and was mentating appropriately.  Intracranial vessel imaging was notable for his known right vertebral artery occlusion, severe left vertebral artery stenosis, and moderate stenosis of his left supraclinoid ICA.  Tox he screens were negative    He was seen by [facility] vascular surgery who did not feel that his carotid disease is symptomatic but is awaiting MRI.    He was seen by our service in October for a similar event in the setting of respiratory failure and cocaine abuse.  Seizure medication was not indicated at that time.  He was just seen again by our service in April for strokelike symptoms, but no acute stroke was found on MRI.    He reportedly is alert and oriented and fully functional per his sister with whom he has been living since October.  He has some mild memory deficits from his previous strokes.    MRI of the brain and EEG pending    Subjective:  He is sitting up in bed furiously scratching both of his legs due to itching.  His sister was at the bedside and states that he is back to his baseline.  She manages his medications at home and denies any missed doses of his anticoagulant.  She states that he does have memory decline.  He denies any complaints today.    He does not

## 2025-07-17 NOTE — PLAN OF CARE
Problem: Safety - Adult  Goal: Free from fall injury  7/16/2025 2253 by Chato Wolfe RN  Outcome: Progressing  7/16/2025 1851 by Celine Mackenzie RN  Outcome: Progressing     Problem: Chronic Conditions and Co-morbidities  Goal: Patient's chronic conditions and co-morbidity symptoms are monitored and maintained or improved  7/16/2025 2253 by Chato Wolfe RN  Outcome: Progressing  7/16/2025 1851 by Celine Mackenzie RN  Outcome: Progressing     Problem: Discharge Planning  Goal: Discharge to home or other facility with appropriate resources  7/16/2025 2253 by Chato Wolfe RN  Outcome: Progressing  7/16/2025 1851 by Celine Mackenzie RN  Outcome: Progressing     Problem: ABCDS Injury Assessment  Goal: Absence of physical injury  7/16/2025 2253 by Chato Wolfe RN  Outcome: Progressing  7/16/2025 1851 by Celine Mackenzie RN  Outcome: Progressing

## 2025-07-18 VITALS
WEIGHT: 123.9 LBS | HEART RATE: 93 BPM | BODY MASS INDEX: 18.78 KG/M2 | TEMPERATURE: 98.4 F | RESPIRATION RATE: 24 BRPM | OXYGEN SATURATION: 98 % | SYSTOLIC BLOOD PRESSURE: 111 MMHG | DIASTOLIC BLOOD PRESSURE: 68 MMHG | HEIGHT: 68 IN

## 2025-07-18 PROBLEM — R56.9 SEIZURE AS LATE EFFECT OF CEREBROVASCULAR ACCIDENT (CVA) (HCC): Status: ACTIVE | Noted: 2025-07-18

## 2025-07-18 PROBLEM — I69.398 SEIZURE AS LATE EFFECT OF CEREBROVASCULAR ACCIDENT (CVA) (HCC): Status: ACTIVE | Noted: 2025-07-18

## 2025-07-18 LAB
ANION GAP SERPL CALCULATED.3IONS-SCNC: 12 MMOL/L (ref 7–16)
BASOPHILS # BLD: 0.06 K/UL (ref 0–0.2)
BASOPHILS NFR BLD: 1 % (ref 0–2)
BUN SERPL-MCNC: 20 MG/DL (ref 8–23)
CALCIUM SERPL-MCNC: 9.1 MG/DL (ref 8.8–10.2)
CHLORIDE SERPL-SCNC: 103 MMOL/L (ref 98–107)
CO2 SERPL-SCNC: 22 MMOL/L (ref 22–29)
CREAT SERPL-MCNC: 1.4 MG/DL (ref 0.7–1.2)
EOSINOPHIL # BLD: 0.52 K/UL (ref 0.05–0.5)
EOSINOPHILS RELATIVE PERCENT: 9 % (ref 0–6)
ERYTHROCYTE [DISTWIDTH] IN BLOOD BY AUTOMATED COUNT: 15.7 % (ref 11.5–15)
GFR, ESTIMATED: 52 ML/MIN/1.73M2
GLUCOSE SERPL-MCNC: 126 MG/DL (ref 74–99)
HCT VFR BLD AUTO: 34.4 % (ref 37–54)
HGB BLD-MCNC: 11.7 G/DL (ref 12.5–16.5)
IMM GRANULOCYTES # BLD AUTO: 0.03 K/UL (ref 0–0.58)
IMM GRANULOCYTES NFR BLD: 1 % (ref 0–5)
LYMPHOCYTES NFR BLD: 1.26 K/UL (ref 1.5–4)
LYMPHOCYTES RELATIVE PERCENT: 22 % (ref 20–42)
MCH RBC QN AUTO: 28.5 PG (ref 26–35)
MCHC RBC AUTO-ENTMCNC: 34 G/DL (ref 32–34.5)
MCV RBC AUTO: 83.9 FL (ref 80–99.9)
MICROORGANISM SPEC CULT: ABNORMAL
MONOCYTES NFR BLD: 1.05 K/UL (ref 0.1–0.95)
MONOCYTES NFR BLD: 18 % (ref 2–12)
NEUTROPHILS NFR BLD: 50 % (ref 43–80)
NEUTS SEG NFR BLD: 2.94 K/UL (ref 1.8–7.3)
PLATELET # BLD AUTO: 190 K/UL (ref 130–450)
PMV BLD AUTO: 9.9 FL (ref 7–12)
POTASSIUM SERPL-SCNC: 3.9 MMOL/L (ref 3.5–5.1)
RBC # BLD AUTO: 4.1 M/UL (ref 3.8–5.8)
SERVICE CMNT-IMP: ABNORMAL
SODIUM SERPL-SCNC: 136 MMOL/L (ref 136–145)
SPECIMEN DESCRIPTION: ABNORMAL
WBC OTHER # BLD: 5.9 K/UL (ref 4.5–11.5)

## 2025-07-18 PROCEDURE — 6370000000 HC RX 637 (ALT 250 FOR IP): Performed by: INTERNAL MEDICINE

## 2025-07-18 PROCEDURE — 36415 COLL VENOUS BLD VENIPUNCTURE: CPT

## 2025-07-18 PROCEDURE — 2500000003 HC RX 250 WO HCPCS: Performed by: NURSE PRACTITIONER

## 2025-07-18 PROCEDURE — 85025 COMPLETE CBC W/AUTO DIFF WBC: CPT

## 2025-07-18 PROCEDURE — 99232 SBSQ HOSP IP/OBS MODERATE 35: CPT | Performed by: NURSE PRACTITIONER

## 2025-07-18 PROCEDURE — 6370000000 HC RX 637 (ALT 250 FOR IP): Performed by: NURSE PRACTITIONER

## 2025-07-18 PROCEDURE — 94640 AIRWAY INHALATION TREATMENT: CPT

## 2025-07-18 PROCEDURE — 80048 BASIC METABOLIC PNL TOTAL CA: CPT

## 2025-07-18 PROCEDURE — 99239 HOSP IP/OBS DSCHRG MGMT >30: CPT | Performed by: STUDENT IN AN ORGANIZED HEALTH CARE EDUCATION/TRAINING PROGRAM

## 2025-07-18 PROCEDURE — 6360000002 HC RX W HCPCS: Performed by: NURSE PRACTITIONER

## 2025-07-18 RX ORDER — LEVETIRACETAM 500 MG/1
500 TABLET ORAL 2 TIMES DAILY
Status: DISCONTINUED | OUTPATIENT
Start: 2025-07-18 | End: 2025-07-18 | Stop reason: HOSPADM

## 2025-07-18 RX ORDER — LEVETIRACETAM 500 MG/1
500 TABLET ORAL 2 TIMES DAILY
Qty: 60 TABLET | Refills: 3 | Status: SHIPPED | OUTPATIENT
Start: 2025-07-18

## 2025-07-18 RX ADMIN — CARVEDILOL 12.5 MG: 6.25 TABLET, FILM COATED ORAL at 09:34

## 2025-07-18 RX ADMIN — PETROLATUM: 420 OINTMENT TOPICAL at 09:40

## 2025-07-18 RX ADMIN — ASPIRIN 81 MG: 81 TABLET, COATED ORAL at 09:34

## 2025-07-18 RX ADMIN — ISOSORBIDE MONONITRATE 30 MG: 30 TABLET, EXTENDED RELEASE ORAL at 09:35

## 2025-07-18 RX ADMIN — LEVETIRACETAM 500 MG: 500 TABLET, FILM COATED ORAL at 12:44

## 2025-07-18 RX ADMIN — ATORVASTATIN CALCIUM 40 MG: 40 TABLET, FILM COATED ORAL at 09:35

## 2025-07-18 RX ADMIN — SODIUM CHLORIDE, PRESERVATIVE FREE 10 ML: 5 INJECTION INTRAVENOUS at 09:35

## 2025-07-18 RX ADMIN — APIXABAN 5 MG: 5 TABLET, FILM COATED ORAL at 09:35

## 2025-07-18 RX ADMIN — IPRATROPIUM BROMIDE 0.5 MG: 0.5 SOLUTION RESPIRATORY (INHALATION) at 07:55

## 2025-07-18 RX ADMIN — CETIRIZINE HYDROCHLORIDE 10 MG: 10 TABLET, FILM COATED ORAL at 09:35

## 2025-07-18 RX ADMIN — HYDRALAZINE HYDROCHLORIDE 10 MG: 10 TABLET ORAL at 09:35

## 2025-07-18 RX ADMIN — ARFORMOTEROL TARTRATE: 15 SOLUTION RESPIRATORY (INHALATION) at 07:55

## 2025-07-18 NOTE — PROGRESS NOTES
Discharge paperwork reviewed with patient, all questions answered at this time. Patient awaiting ride from sister.

## 2025-07-18 NOTE — DISCHARGE INSTRUCTIONS
SEIZURE PRECAUTIONS    It is important to continue to try and achieve seizure control because of the potential for injury and illness due to seizures. In a very small minority of patients with generalized tonic clonic seizures (\"grand mal\"), breathing or heart function can stop during a seizure and result in demise (sudden unexpected death in epilepsy or SUDEP). [place] from seizures prevents this kind of outcome.     Please follow seizure precautions:  Please do not engage in any high risk activities such as, but not limited to, driving, bathing in tubs, swimming alone, climbing ladders, working at heights, near open flames or machinery with moving parts etc.  For patients with epilepsy it is recommended to stay seizure free for 6 months on medication before resume driving. Your neurology provider will advise when it is ok for you to drive.

## 2025-07-18 NOTE — PROGRESS NOTES
[facility]  Neuro Inpatient Follow Up        Chuck Chavarria Jr. is a 75 y.o. 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  He was at a bus stop when he started feeling short of breath and then went unresponsive per bystanders.  He was lethargic on EMS arrival.  Due to his history of substance abuse he was given IV Narcan.  He was nonverbal on arrival to the ER but about a an hour after presentation improved and was mentating appropriately.  Intracranial vessel imaging was notable for his known right vertebral artery occlusion, severe left vertebral artery stenosis, and moderate stenosis of his left supraclinoid ICA.  Tox he screens were negative    He was seen by [facility] vascular surgery who did not feel that his carotid disease is symptomatic but is awaiting MRI.    He was seen by our service in October for a similar event in the setting of respiratory failure and cocaine abuse.  Seizure medication was not indicated at that time.  He was just seen again by our service in April for strokelike symptoms, but no acute stroke was found on MRI.    He reportedly is alert and oriented and fully functional per his sister with whom he has been living since October.  He has some mild memory deficits from his previous strokes.    MRI of the brain showed no new strokes and EEG normal    Subjective:  He is sitting up in bed with no new complaints today.  No family at the bedside    No chest pain or palpitations  No SOB  No vertigo, lightheadedness or loss of consciousness  No falls, tripping or stumbling  No incontinence of bowels or bladder  No speech or swallowing troubles    ROS otherwise negative    Current  medial portion of  the right parietal lobe and smaller old cortical infarctions at the upper  posterior aspect of right frontal lobe.  4. Old lacunar infarction in the left lentiform nucleus and in the anterior  limb of the left internal capsule.  5. Evidence of foci of small cryptic hemorrhage at the right temporal  occipital junction and left mid medial temporal lobe, unchanged as compared  to previous MRI of brain on 04/13/2025.    EEG   Routine EEG with video  Clinical Interpretation:   This abnormal study showed evidence of:  Mild nonspecific cerebral dysfunction of the right frontotemporal region  Structural abnormalities should be considered for the findings above and appropriate imaging obtained if clinically indicated.   No seizures or epileptiform discharges were noted during this study.     All pertinent labs and images personally reviewed today    Assessment:     Altered mental status and episode of unresponsiveness: Concern for postinfarct seizure as he has had numerous remote infarcts and encephalomalacia noted in his right MCA territory.  Thankfully, no evidence of new strokes.  His exam remains stable    Plan:     -Start Keppra 500 mg twice daily and continue at discharge  -On ASA, Eliquis, statin for secondary stroke prevention  -Discussed avoidance of all illicit substances  -He does not drive  -Called and updated his Sister Ashia via phone  -Seizure precaution    Neuro signing off--call or reconsult with new issues  Follow-up in our office    JASSI Mcintosh - CNP,  7/18/2025

## 2025-07-18 NOTE — CARE COORDINATION
Transition of care planning, Chart reviewed  EEG completed  - neg for seizures.ok to discharge per neurology  His plan is home and he will call his sister for a ride. PCP appt schulded with IM clinic  7/24/25 1 pm- on AVS. Met with patient and d/c plan remains home -will need to call his sister for ride home.Electronically signed by Kassandra Collier RN on 7/18/2025 at 10:23 AM

## 2025-07-18 NOTE — PROGRESS NOTES
GENERAL SURGERY  DAILY PROGRESS NOTE    Patient's Name/Date of Birth: Chuck Chavarria Jr. / 1950    Date: 2025     Chief Complaint   Patient presents with    Altered Mental Status     At bus stop, felt sick, then had SOB then went unresponsive. Responds to pain        Subjective:  Patient seen and examined this a.m.  Patient denies any new neurologic deficits.    Objective:  Last 24Hrs  Temp  Av.8 °F (36.6 °C)  Min: 97.3 °F (36.3 °C)  Max: 98.3 °F (36.8 °C)  Resp  Av.8  Min: 16  Max: 22  Pulse  Av  Min: 76  Max: 84  Systolic (24hrs), Av , Min:91 , Max:140     Diastolic (24hrs), Av, Min:49, Max:91    SpO2  Av %  Min: 94 %  Max: 100 %    I/O last 3 completed shifts:  In: 1095 [P.O.:1095]  Out: 850 [Urine:850]      General: In no acute distress, alert   Cardiovascular: Warm throughout, no edema  Respiratory: no respiratory distress, equal chest rise  Abdomen: soft,  nontender, nondistended  Skin: no obvious rashes or lesions appreciated, no jaundice  Extremities: atraumatic, no focal motor deficits  Neuro: Largely symmetric muscle strength throughout bilateral upper extremities.  Somewhat uncooperative with exam.      CBC  Recent Labs     25  0928 25  0725 25  0555   WBC 5.5 6.1 5.9   RBC 4.35 4.13 4.10   HGB 12.2* 11.7* 11.7*   HCT 36.8* 34.2* 34.4*   MCV 84.6 82.8 83.9   MCH 28.0 28.3 28.5   MCHC 33.2 34.2 34.0   RDW 16.0* 15.8* 15.7*    183 190   MPV 10.0 9.1 9.9       CMP  Recent Labs     25  0930 25  0516 25  0555    138 136   K 4.3 4.0 3.9    104 103   CO2 24 23 22   BUN 18 21 20   CREATININE 1.5* 1.6* 1.4*   GLUCOSE 147* 108* 126*   CALCIUM 9.3 9.2 9.1   BILITOT 0.5  --   --    ALKPHOS 85  --   --    AST 28  --   --    ALT 15  --   --          Assessment/Plan:    Patient Active Problem List   Diagnosis    Chest pain    Abdominal pain, epigastric    Hypertension    Tobacco abuse    Cocaine abuse (HCC)    Mild

## 2025-07-18 NOTE — DISCHARGE SUMMARY
Hospitalist Discharge Summary    Patient ID: Chuck Chavarria Jr.   Patient : 1950  Patient's PCP: No primary care provider on file.    Admit Date: 2025   Admitting Physician: Marry Suarez DO    Discharge Date:  2025   Discharge Physician: Alanna Westbrook MD   Discharge Condition: Stable  Discharge Disposition: Home      Hospital course in brief:  (Please refer to daily progress notes for a comprehensive review of the hospitalization by requesting medical records)    Patient presented on  with an altered mental status.  He was at a bus stop when he started feeling short of breath and then went unresponsive per bystanders.  He was lethargic on EMS arrival.  Due to his history of substance abuse he was given IV Narcan.  He was nonverbal on arrival to the ER but about a an hour after presentation improved and was mentating appropriately.  Intracranial vessel imaging was notable for his known right vertebral artery occlusion, severe left vertebral artery stenosis, and moderate stenosis of his left supraclinoid ICA.  Tox he screens were negative  He was seen by vascular surgery who did not feel that his carotid disease is symptomatic. Neurology consulted and recommended MRI brain and EEG.  MRI brain not suggestive of new acute infarcts. EEG Mild nonspecific cerebral dysfunction of the right frontotemporal region. Due to concern for post infarct seizure, patient has h/o prior stroke neurology recommended starting anti seizure medication Keppra 500 mg PO BID initiated. Continue ASA, Eliquis and Statin.    Please follow seizure precautions, do not engage in any high risk activities such as, but not limited to, driving, bathing in tubs, swimming alone, climbing ladders, working at heights, near open flames or machinery with moving parts etc.  For patients with epilepsy it is recommended to stay seizure free for 6 months on medication before resume driving. Your neurology provider will advise  proximal right cervical ICA by NASCET criteria. 4. Atherosclerosis contributes to moderate stenosis of the left supraclinoid ICA. 5. Otherwise, no significant stenosis or large vessel occlusion of the [id number].     CTA PULMONARY W CONTRAST  Result Date: 7/16/2025  EXAMINATION: CTA OF THE CHEST 7/16/2025 9:47 am TECHNIQUE: CTA of the chest was performed after the administration of intravenous contrast.  Multiplanar reformatted images are provided for review.  MIP images are provided for review. Automated exposure control, iterative reconstruction, and/or weight based adjustment of the mA/kV was utilized to reduce the radiation dose to as low as reasonably achievable. COMPARISON: Serial CT chest exams dating back to August 2023 and with most recent comparison exam 07/01/2025 HISTORY: ORDERING SYSTEM PROVIDED HISTORY: Altered mental status, shortness of breath TECHNOLOGIST PROVIDED HISTORY: Reason for exam:->Altered mental status, shortness of breath Additional Contrast?->1 What reading provider will be dictating this exam?->CRC FINDINGS: Pulmonary arteries: Normal caliber.  No PE. Lungs and pleura: There is chronic lung disease with distal lobular emphysema with upper lobe predominance.  There is right apical pleuroparenchymal scarring which appears stable dating back to 2023.  The medial right lung apex (axial image 17) shows a stable 2 cm opacity compatible with a focal scar.  This area is contiguous with additional areas of chronic appearing scar.  The anterior segment of the right upper lobe shows a stable 0.4 cm subpleural nodule (axial image 36).  No enlarging or suspicious pulmonary lesion is seen.  No acute pulmonary infiltrate or pleural effusion. Heart and mediastinum: Normal heart size.  No pericardial effusion.  No lymphadenopathy.  Atherosclerotic thoracic aorta which is normal in caliber. The posteromedial right lung base shows a chronic appearing fat containing Bochdalek's type diaphragmatic

## 2025-07-18 NOTE — PLAN OF CARE
Problem: Safety - Adult  Goal: Free from fall injury  7/17/2025 2147 by Chato Wolfe RN  Outcome: Progressing  7/17/2025 1830 by Suzette Roberts RN  Outcome: Progressing     Problem: Chronic Conditions and Co-morbidities  Goal: Patient's chronic conditions and co-morbidity symptoms are monitored and maintained or improved  7/17/2025 2147 by Chato Wolfe RN  Outcome: Progressing  7/17/2025 1830 by Suzette Roberts RN  Outcome: Progressing     Problem: Discharge Planning  Goal: Discharge to home or other facility with appropriate resources  7/17/2025 2147 by Chato Wolfe RN  Outcome: Progressing  7/17/2025 1830 by Suzette Roberts RN  Outcome: Progressing     Problem: ABCDS Injury Assessment  Goal: Absence of physical injury  7/17/2025 2147 by Chato Wolfe RN  Outcome: Progressing  7/17/2025 1830 by Suzette Roberts RN  Outcome: Progressing     Problem: Skin/Tissue Integrity  Goal: Skin integrity remains intact  7/17/2025 2147 by Chato Wolfe RN  Outcome: Progressing  7/17/2025 1830 by Suzette Roberts RN  Outcome: Progressing

## 2025-07-22 NOTE — PROGRESS NOTES
Physician Progress Note      PATIENT:               BRANDEN REED  [facility] #:                  [id number]  :                       1950  ADMIT DATE:       2025 9:11 AM  DISCH DATE:        2025 2:39 PM  RESPONDING  PROVIDER #:        Alanna Westbrook MD          QUERY TEXT:    Please clarify in documentation the etiology of presenting symptoms:    The clinical indicators include:  -ED presented to the emergency department for evaluation of AMS/Syncope  -H&P AMS: Pt found unresponsive; Consult vascular surgery for possible   symptomatic carotid artery stenosis.  -Vascular surgery consult: consulted for bilateral carotid stenosis  -Neurology consult:  Altered mental status and episode of unresponsiveness: In   a patient with severe intracranial atherosclerotic disease, A-fib on   anticoagulation, substance abuse, and previous strokes.  Workup for new   infarct is ongoing, but if negative must consider postinfarct seizure  -MRI no evidence of acute infarction, acute ischemic process or restricted   diffusion in brain  -PN DR Westbrook   intracranial vessel imaging was notable for his known   right vertebral artery occlusion, severe left vertebral artery stenosis, and   moderate stenosis of his left supraclinoid ICA.  seen by vascular surgery who   did not feel that his carotid disease is symptomatic but is awaiting MRI  -Neurology PN  Altered mental status and episode of unresponsiveness:   Concern for postinfarct seizure as he has had numerous remote infarcts and   encephalomalacia noted in his right MCA territory  DC Summary: Due to concern for post infarct seizure, patient has h/o prior   stroke neurology recommended starting anti seizure medication Keppra 500 mg PO   BID initiated. Continue ASA, Eliquis and Statin.  Options provided:  -- AMS due to post infarct seizures  -- AMS due to seizures unrelated to prior CVA  -- Other - I will add my own diagnosis  -- Disagree - Not applicable / Not

## 2025-07-24 ENCOUNTER — HOSPITAL ENCOUNTER (OUTPATIENT)
Age: 75
Discharge: HOME | End: 2025-07-24
Payer: MEDICAID

## 2025-07-24 DIAGNOSIS — R53.83 OTHER FATIGUE: ICD-10-CM

## 2025-07-24 LAB — TSH SERPL DL<=0.05 MIU/L-ACNC: 2.92 UIU/ML (ref 0.27–4.2)

## 2025-07-24 PROCEDURE — 84443 ASSAY THYROID STIM HORMONE: CPT

## 2025-07-24 PROCEDURE — 36415 COLL VENOUS BLD VENIPUNCTURE: CPT

## 2025-07-29 PROBLEM — R40.4 EPISODE OF UNRESPONSIVENESS: Status: RESOLVED | Noted: 2025-07-17 | Resolved: 2025-07-29

## 2025-07-29 PROBLEM — F17.200 TOBACCO DEPENDENCE: Status: RESOLVED | Noted: 2024-01-17 | Resolved: 2025-07-29

## 2025-07-29 PROBLEM — I50.22 CHRONIC HFREF (HEART FAILURE WITH REDUCED EJECTION FRACTION) (HCC): Status: ACTIVE | Noted: 2024-08-11

## 2025-07-29 PROBLEM — R06.03 RESPIRATORY DISTRESS: Status: RESOLVED | Noted: 2024-11-13 | Resolved: 2025-07-29

## 2025-07-29 PROBLEM — Z78.9: Status: RESOLVED | Noted: 2024-10-13 | Resolved: 2025-07-29

## 2025-07-29 PROBLEM — N18.31 STAGE 3A CHRONIC KIDNEY DISEASE (HCC): Status: ACTIVE | Noted: 2024-10-04

## 2025-08-04 ENCOUNTER — HOSPITAL ENCOUNTER (EMERGENCY)
Age: 75
Discharge: HOME | End: 2025-08-05
Attending: STUDENT IN AN ORGANIZED HEALTH CARE EDUCATION/TRAINING PROGRAM
Payer: MEDICAID

## 2025-08-04 DIAGNOSIS — R91.1 LUNG NODULE: ICD-10-CM

## 2025-08-04 DIAGNOSIS — J02.9 ACUTE SORE THROAT: Primary | ICD-10-CM

## 2025-08-04 LAB
ALBUMIN SERPL-MCNC: 3.9 G/DL (ref 3.5–5.2)
ALP SERPL-CCNC: 87 U/L (ref 40–129)
ALT SERPL-CCNC: 14 U/L (ref 0–50)
ANION GAP SERPL CALCULATED.3IONS-SCNC: 15 MMOL/L (ref 7–16)
AST SERPL-CCNC: 34 U/L (ref 0–50)
BASOPHILS # BLD: 0.11 K/UL (ref 0–0.2)
BASOPHILS NFR BLD: 1 % (ref 0–2)
BILIRUB SERPL-MCNC: 0.3 MG/DL (ref 0–1.2)
BUN SERPL-MCNC: 13 MG/DL (ref 8–23)
CALCIUM SERPL-MCNC: 9.5 MG/DL (ref 8.8–10.2)
CHLORIDE SERPL-SCNC: 107 MMOL/L (ref 98–107)
CO2 SERPL-SCNC: 22 MMOL/L (ref 22–29)
CREAT SERPL-MCNC: 1.2 MG/DL (ref 0.7–1.2)
EOSINOPHIL # BLD: 0.57 K/UL (ref 0.05–0.5)
EOSINOPHILS RELATIVE PERCENT: 7 % (ref 0–6)
ERYTHROCYTE [DISTWIDTH] IN BLOOD BY AUTOMATED COUNT: 16.1 % (ref 11.5–15)
GFR, ESTIMATED: 65 ML/MIN/1.73M2
GLUCOSE SERPL-MCNC: 105 MG/DL (ref 74–99)
HCT VFR BLD AUTO: 37.4 % (ref 37–54)
HGB BLD-MCNC: 12.2 G/DL (ref 12.5–16.5)
IMM GRANULOCYTES # BLD AUTO: 0.04 K/UL (ref 0–0.58)
IMM GRANULOCYTES NFR BLD: 1 % (ref 0–5)
LYMPHOCYTES NFR BLD: 1.69 K/UL (ref 1.5–4)
LYMPHOCYTES RELATIVE PERCENT: 21 % (ref 20–42)
MCH RBC QN AUTO: 27.7 PG (ref 26–35)
MCHC RBC AUTO-ENTMCNC: 32.6 G/DL (ref 32–34.5)
MCV RBC AUTO: 85 FL (ref 80–99.9)
MONOCYTES NFR BLD: 1.18 K/UL (ref 0.1–0.95)
MONOCYTES NFR BLD: 14 % (ref 2–12)
NEUTROPHILS NFR BLD: 56 % (ref 43–80)
NEUTS SEG NFR BLD: 4.61 K/UL (ref 1.8–7.3)
PLATELET # BLD AUTO: 242 K/UL (ref 130–450)
PMV BLD AUTO: 9.5 FL (ref 7–12)
POTASSIUM SERPL-SCNC: 4 MMOL/L (ref 3.5–5.1)
PROT SERPL-MCNC: 8.4 G/DL (ref 6.4–8.3)
RBC # BLD AUTO: 4.4 M/UL (ref 3.8–5.8)
SODIUM SERPL-SCNC: 143 MMOL/L (ref 136–145)
SPECIMEN SOURCE: NORMAL
STREP A, MOLECULAR: NEGATIVE
WBC OTHER # BLD: 8.2 K/UL (ref 4.5–11.5)

## 2025-08-04 PROCEDURE — 99285 EMERGENCY DEPT VISIT HI MDM: CPT

## 2025-08-04 PROCEDURE — 85025 COMPLETE CBC W/AUTO DIFF WBC: CPT

## 2025-08-04 PROCEDURE — 80053 COMPREHEN METABOLIC PANEL: CPT

## 2025-08-04 PROCEDURE — 6370000000 HC RX 637 (ALT 250 FOR IP): Performed by: STUDENT IN AN ORGANIZED HEALTH CARE EDUCATION/TRAINING PROGRAM

## 2025-08-04 PROCEDURE — 87651 STREP A DNA AMP PROBE: CPT

## 2025-08-04 PROCEDURE — 96374 THER/PROPH/DIAG INJ IV PUSH: CPT

## 2025-08-04 PROCEDURE — 6360000002 HC RX W HCPCS: Performed by: STUDENT IN AN ORGANIZED HEALTH CARE EDUCATION/TRAINING PROGRAM

## 2025-08-04 RX ORDER — ACETAMINOPHEN 325 MG/1
650 TABLET ORAL ONCE
Status: COMPLETED | OUTPATIENT
Start: 2025-08-04 | End: 2025-08-04

## 2025-08-04 RX ORDER — DEXAMETHASONE SODIUM PHOSPHATE 10 MG/ML
10 INJECTION, SOLUTION INTRA-ARTICULAR; INTRALESIONAL; INTRAMUSCULAR; INTRAVENOUS; SOFT TISSUE ONCE
Status: COMPLETED | OUTPATIENT
Start: 2025-08-04 | End: 2025-08-04

## 2025-08-04 RX ADMIN — LIDOCAINE HYDROCHLORIDE: 20 SOLUTION ORAL at 22:51

## 2025-08-04 RX ADMIN — DEXAMETHASONE SODIUM PHOSPHATE 10 MG: 10 INJECTION INTRAMUSCULAR; INTRAVENOUS at 22:51

## 2025-08-04 RX ADMIN — ACETAMINOPHEN 650 MG: 325 TABLET ORAL at 22:52

## 2025-08-05 ENCOUNTER — APPOINTMENT (OUTPATIENT)
Dept: CT IMAGING | Age: 75
End: 2025-08-05
Attending: STUDENT IN AN ORGANIZED HEALTH CARE EDUCATION/TRAINING PROGRAM
Payer: MEDICAID

## 2025-08-05 VITALS
OXYGEN SATURATION: 95 % | SYSTOLIC BLOOD PRESSURE: 148 MMHG | BODY MASS INDEX: 19.25 KG/M2 | HEART RATE: 80 BPM | RESPIRATION RATE: 16 BRPM | TEMPERATURE: 97.9 F | WEIGHT: 127 LBS | DIASTOLIC BLOOD PRESSURE: 90 MMHG | HEIGHT: 68 IN

## 2025-08-05 PROCEDURE — 6360000004 HC RX CONTRAST MEDICATION: Performed by: RADIOLOGY

## 2025-08-05 PROCEDURE — 70491 CT SOFT TISSUE NECK W/DYE: CPT

## 2025-08-05 RX ORDER — IOPAMIDOL 755 MG/ML
75 INJECTION, SOLUTION INTRAVASCULAR
Status: COMPLETED | OUTPATIENT
Start: 2025-08-05 | End: 2025-08-05

## 2025-08-05 RX ADMIN — IOPAMIDOL 75 ML: 755 INJECTION, SOLUTION INTRAVENOUS at 01:53

## 2025-08-05 ASSESSMENT — PAIN - FUNCTIONAL ASSESSMENT: PAIN_FUNCTIONAL_ASSESSMENT: NONE - DENIES PAIN

## 2025-08-07 RX ORDER — ISOSORBIDE MONONITRATE 30 MG/1
30 TABLET, EXTENDED RELEASE ORAL DAILY
Qty: 90 TABLET | Refills: 3 | Status: SHIPPED | OUTPATIENT
Start: 2025-08-07

## 2025-08-18 ENCOUNTER — HOSPITAL ENCOUNTER (OUTPATIENT)
Dept: OTHER | Age: 75
Setting detail: THERAPIES SERIES
Discharge: HOME | End: 2025-08-18
Payer: MEDICAID

## 2025-08-18 ENCOUNTER — TELEPHONE (OUTPATIENT)
Dept: CARDIOLOGY CLINIC | Age: 75
End: 2025-08-18

## 2025-08-18 VITALS
SYSTOLIC BLOOD PRESSURE: 130 MMHG | BODY MASS INDEX: 19.16 KG/M2 | DIASTOLIC BLOOD PRESSURE: 70 MMHG | RESPIRATION RATE: 18 BRPM | HEART RATE: 87 BPM | WEIGHT: 126 LBS | OXYGEN SATURATION: 97 %

## 2025-08-18 LAB
ANION GAP SERPL CALCULATED.3IONS-SCNC: 15 MMOL/L (ref 7–16)
BNP SERPL-MCNC: 1801 PG/ML (ref 0–450)
BUN SERPL-MCNC: 12 MG/DL (ref 8–23)
CALCIUM SERPL-MCNC: 9 MG/DL (ref 8.8–10.2)
CHLORIDE SERPL-SCNC: 109 MMOL/L (ref 98–107)
CO2 SERPL-SCNC: 18 MMOL/L (ref 22–29)
CREAT SERPL-MCNC: 1.2 MG/DL (ref 0.7–1.2)
GFR, ESTIMATED: 62 ML/MIN/1.73M2
GLUCOSE SERPL-MCNC: 89 MG/DL (ref 74–99)
POTASSIUM SERPL-SCNC: 3.3 MMOL/L (ref 3.5–5.1)
SODIUM SERPL-SCNC: 142 MMOL/L (ref 136–145)

## 2025-08-18 PROCEDURE — 99214 OFFICE O/P EST MOD 30 MIN: CPT

## 2025-08-18 PROCEDURE — 80048 BASIC METABOLIC PNL TOTAL CA: CPT

## 2025-08-18 PROCEDURE — 83880 ASSAY OF NATRIURETIC PEPTIDE: CPT

## 2025-08-20 RX ORDER — POTASSIUM CHLORIDE 1500 MG/1
TABLET, EXTENDED RELEASE ORAL
Qty: 4 TABLET | Refills: 0 | Status: SHIPPED | OUTPATIENT
Start: 2025-08-20

## (undated) DEVICE — GAUZE,SPONGE,4"X4",8PLY,STRL,LF,10/TRAY: Brand: MEDLINE

## (undated) DEVICE — DEFENDO AIR WATER SUCTION AND BIOPSY VALVE KIT FOR  OLYMPUS: Brand: DEFENDO AIR/WATER/SUCTION AND BIOPSY VALVE

## (undated) DEVICE — CONNECTOR IRRIGATION AUXILIARY H2O JET W/ PRT MTL THRD HYDR

## (undated) DEVICE — BITEBLOCK 54FR W/ DENT RIM BLOX